# Patient Record
Sex: FEMALE | Race: WHITE | NOT HISPANIC OR LATINO | Employment: FULL TIME | URBAN - METROPOLITAN AREA
[De-identification: names, ages, dates, MRNs, and addresses within clinical notes are randomized per-mention and may not be internally consistent; named-entity substitution may affect disease eponyms.]

---

## 2017-01-05 PROBLEM — R16.1 SPLENOMEGALY: Status: ACTIVE | Noted: 2017-01-05

## 2017-02-03 PROBLEM — R91.8 MULTIPLE LUNG NODULES ON CT: Status: ACTIVE | Noted: 2017-02-03

## 2017-08-01 ENCOUNTER — OFFICE VISIT (OUTPATIENT)
Dept: HEMATOLOGY/ONCOLOGY | Facility: CLINIC | Age: 42
End: 2017-08-01
Payer: COMMERCIAL

## 2017-08-01 VITALS
WEIGHT: 293 LBS | HEART RATE: 118 BPM | BODY MASS INDEX: 45.99 KG/M2 | TEMPERATURE: 98 F | DIASTOLIC BLOOD PRESSURE: 79 MMHG | HEIGHT: 67 IN | SYSTOLIC BLOOD PRESSURE: 144 MMHG

## 2017-08-01 DIAGNOSIS — R93.89 ABNORMAL CT OF THE CHEST: ICD-10-CM

## 2017-08-01 DIAGNOSIS — Z94.84 HISTORY OF AUTO STEM CELL TRANSPLANT: ICD-10-CM

## 2017-08-01 DIAGNOSIS — D69.6 THROMBOCYTOPENIA: ICD-10-CM

## 2017-08-01 DIAGNOSIS — Z85.71 HISTORY OF HODGKIN'S DISEASE: Primary | ICD-10-CM

## 2017-08-01 PROCEDURE — 99999 PR PBB SHADOW E&M-EST. PATIENT-LVL II: CPT | Mod: PBBFAC,,, | Performed by: INTERNAL MEDICINE

## 2017-08-01 PROCEDURE — 99214 OFFICE O/P EST MOD 30 MIN: CPT | Mod: S$GLB,,, | Performed by: INTERNAL MEDICINE

## 2017-08-01 NOTE — PROGRESS NOTES
-refer to miko/baljinder  -continue fu with mariam  -suspect inflammatory vs infectious; interval change, appearance, and time from diagnosis dont fit for lymphoma relapse but not impossible

## 2017-08-01 NOTE — PROGRESS NOTES
SECTION OF HEMATOLOGY AND BONE MARROW TRANSPLANT  New Patient Visit   08/02/2017  Referred by:  Corinne Self  Referred for: abnormal CT chest     CHIEF COMPLAINT: No chief complaint on file.      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  Has remained in clinical remission since.  Followed regularly in oncology clinic by Dr. Mora.  Serial CT scans over the last 2 years have shown waxing an waning pulmonary nodules. She has undergone 3 nondiagnostic brochoscopies with biopsies.  followed by pulmonology at [facility] who recommended fu at [facility] for [facility] recs.  She is asymptomatic from lymphoma or respiratory perspective.  Denies fever, chills, nightsweats, bleeding, brusing, lymphadenopathy, signs/symptoms of splenomegaly.      PAST MEDICAL HISTORY:   Past Medical History:   Diagnosis Date    Biliary colic     Encounter for blood transfusion     Incisional hernia     Large cell (diffuse) non-Hodgkin's lymphoma     Stem cells transplant status        PAST SURGICAL HISTORY:   Past Surgical History:   Procedure Laterality Date    CHOLECYSTECTOMY      HERNIA REPAIR      incisional    lymphnode biopsy      PORTACATH PLACEMENT Left     TUBAL LIGATION      UMBILICAL HERNIA REPAIR         PAST SOCIAL HISTORY:   reports that she is a non-smoker but has been exposed to tobacco smoke. She has never used smokeless tobacco. She reports that she does not drink alcohol or use drugs.    FAMILY HISTORY:  Family History   Problem Relation Age of Onset    Cancer      Colon cancer      Anesthesia problems Neg Hx        CURRENT MEDICATIONS:   Current Outpatient Prescriptions   Medication Sig    fluticasone (FLONASE) 50 mcg/actuation nasal spray 1 spray by Each Nare route once daily.    loratadine (CLARITIN) 10 mg tablet Take 10 mg by mouth once daily.      No current facility-administered medications for this visit.      ALLERGIES:   Review of patient's allergies indicates:   Allergen Reactions    Tetanus vaccines and toxoid Rash             REVIEW OF SYSTEMS:   General ROS: negative  Psychological ROS: negative  Ophthalmic ROS: negative  ENT ROS: negative  Allergy and Immunology ROS: negative  Hematological and Lymphatic ROS: negative  Endocrine ROS: negative  Respiratory ROS: negative  Cardiovascular ROS: negative  Gastrointestinal ROS: negative  Genito-Urinary ROS: negative  Musculoskeletal ROS: negative  Neurological ROS: negative  Dermatological ROS: negative    PHYSICAL EXAM:   Vitals:    08/01/17 1538   BP: (!) 144/79   Pulse: (!) 118   Temp: 98.3 °F (36.8 °C)       General - well developed, well nourished, no apparent distress  Head & Face - no sinus tenderness  Eyes - normal conjunctivae and lids   ENT - normal external auditory canals and tympanic membranes bilaterally oropharynx clear,  Normal dentition and gums  Neck - normal thyroid  Chest and Lung - normal respiratory effort, clear to auscultation bilaterally   Cardiovascular - RRR with no MGR, normal S1 and S2; no pedal edema  Abdomen -  soft, nontender, no palpable hepatomegaly or splenomegaly  Lymph - no palpable lymphadenopathy  Extremities - unremarkable nails and digits  Heme - no bruising, petechiae, pallor  Skin - no rashes or lesions  Psych - appropriate mood and affect      ECOG Performance Status: (foot note - ECOG PS provided by Eastern Cooperative Oncology Group) 0 - Asymptomatic    Karnofsky Performance Score:  100%- Normal, No Complaints, No Evidence of Disease  DATA:   Lab Results   Component Value Date    WBC 5.25 07/13/2017    HGB 11.6 (L) 07/13/2017    HCT 36.7 (L) 07/13/2017    MCV 91 07/13/2017    PLT 79 (L) 07/13/2017     Gran #   Date Value Ref Range Status   07/13/2017 2.5 1.8 - 7.7 K/uL Final     Gran%   Date Value Ref Range Status   07/13/2017 47.6 38.0 - 73.0 % Final      CMP  Sodium   Date Value Ref Range Status   07/13/2017 137 136 - 145 mmol/L Final     Potassium   Date Value Ref Range Status   07/13/2017 3.7 3.5 - 5.1 mmol/L Final     Chloride   Date Value Ref Range Status   07/13/2017 103 95 - 110 mmol/L Final     CO2   Date Value Ref Range Status   07/13/2017 26 23 - 29 mmol/L Final     Glucose   Date Value Ref Range Status   07/13/2017 111 (H) 70 - 110 mg/dL Final     BUN, Bld   Date Value Ref Range Status   07/13/2017 16 6 - 20 mg/dL Final     Creatinine   Date Value Ref Range Status   07/13/2017 0.8 0.5 - 1.4 mg/dL Final     Calcium   Date Value Ref Range Status   07/13/2017 9.0 8.7 - 10.5 mg/dL Final     Total Protein   Date Value Ref Range Status   07/13/2017 6.3 6.0 - 8.4 g/dL Final     Albumin   Date Value Ref Range Status   07/13/2017 4.2 3.5 - 5.2 g/dL Final     Total Bilirubin   Date Value Ref Range Status   07/13/2017 0.7 0.1 - 1.0 mg/dL Final     Comment:     For infants and newborns, interpretation of results should be based  on gestational age, weight and in agreement with clinical  observations.  Premature Infant recommended reference ranges:  Up to 24 hours.............<8.0 mg/dL  Up to 48 hours............<12.0 mg/dL  3-5 days..................<15.0 mg/dL  6-29 days.................<15.0 mg/dL       Alkaline Phosphatase   Date Value Ref Range Status   07/13/2017 86 55 - 135 U/L Final     AST   Date Value Ref Range Status   07/13/2017 19 10 - 40 U/L Final     ALT   Date Value Ref Range Status   07/13/2017 38 10 - 44 U/L Final     Anion Gap   Date Value Ref Range Status   07/13/2017 8 8 - 16 mmol/L Final     eGFR if    Date Value Ref Range Status   07/13/2017 >60.0 >60 mL/min/1.73 m^2 Final     eGFR if non    Date Value Ref Range Status   07/13/2017 >60.0 >60 mL/min/1.73 m^2 Final     Comment:     Calculation used to obtain the estimated glomerular filtration  rate (eGFR) is the CKD-EPI equation. Since race is unknown   in our  information system, the eGFR values for   -American and Non--American patients are given   for each creatinine result.       LD   Date Value Ref Range Status   02/12/2015 154 110 - 260 U/L Final     Comment:     Results are increased in hemolyzed samples.       ASSESSMENT AND PLAN:   Encounter Diagnoses   Name Primary?    History of Hodgkin's disease Yes    History of auto stem cell transplant     Thrombocytopenia     Abnormal CT of the chest      -radiographic appearance, pace of radiographic changes, lesion locations, and lack of symptoms, multiple negative biopsies, and duration since her transplant make pulmonary relapse of her hodgkins or other primary cancer highly unlikely  -suspect these are inflammatory or infectious changes on CT chest  -will refer to thoracic comprehensive clinic for review and consideration of open biopsy   -continue to fu with Dr. Mora for regular oncologic care     Follow Up: as above  Kush Hunter MD  Hematology/Oncology/Bone Marrow Transplant

## 2017-08-08 ENCOUNTER — TELEPHONE (OUTPATIENT)
Dept: CARDIOTHORACIC SURGERY | Facility: CLINIC | Age: 42
End: 2017-08-08

## 2017-08-08 DIAGNOSIS — R91.8 PULMONARY NODULES: Primary | ICD-10-CM

## 2017-08-08 NOTE — TELEPHONE ENCOUNTER
"Received a referral from Dr. Hunter to Dr. Donaldson re: pulmonary nodules s/p bronch/bx.   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  She has remained in clinical remission since. Followed regularly in oncology clinic by Dr. Mora.  Serial CT scans over the last 2 years have shown waxing an waning pulmonary nodules. She has undergone 3 nondiagnostic brochoscopies with biopsies. Followed by pulmonology at [facility] who recommended follow at [facility] for further recs.    Most recent CT chest on 7/5/17 revealed "Increase in the size and number of the nodules in the lungs bilaterally with increasing consolidation in the left upper and lower lobes."  Last PFT performed on 2/3/17. Most recent bronch performed by Dr. Bradley on 7/18/17, pathology reveals "Findings are nondiagnostic for lymphoma".  Ordered/scheduled new PFT on 8/14 prior to Dr. Donaldson's appt. Spoke with the pt, she is agreeable to PFT's at 230p and Dr. Donaldson at 4p. Provided her with location information for both appts and mailed appt slips to confirmed mailing address.  "

## 2017-08-14 ENCOUNTER — HOSPITAL ENCOUNTER (OUTPATIENT)
Dept: PULMONOLOGY | Facility: CLINIC | Age: 42
Discharge: HOME | End: 2017-08-14
Payer: COMMERCIAL

## 2017-08-14 ENCOUNTER — OFFICE VISIT (OUTPATIENT)
Dept: CARDIOTHORACIC SURGERY | Facility: CLINIC | Age: 42
End: 2017-08-14
Payer: COMMERCIAL

## 2017-08-14 VITALS
HEART RATE: 89 BPM | SYSTOLIC BLOOD PRESSURE: 127 MMHG | OXYGEN SATURATION: 97 % | BODY MASS INDEX: 45.99 KG/M2 | HEIGHT: 67 IN | DIASTOLIC BLOOD PRESSURE: 79 MMHG | WEIGHT: 293 LBS

## 2017-08-14 DIAGNOSIS — R91.8 PULMONARY NODULES: ICD-10-CM

## 2017-08-14 DIAGNOSIS — R91.8 LUNG NODULES: Primary | ICD-10-CM

## 2017-08-14 LAB
POST FEV1 FVC: 0.83
POST FEV1: 2.46
POST FVC: 2.97
PRE FEV1 FVC: 82
PRE FEV1: 2.45
PRE FVC: 2.99
PREDICTED FEV1 FVC: 82
PREDICTED FEV1: 2.99
PREDICTED FVC: 3.64

## 2017-08-14 PROCEDURE — 94060 EVALUATION OF WHEEZING: CPT | Mod: S$GLB,,, | Performed by: INTERNAL MEDICINE

## 2017-08-14 PROCEDURE — 99999 PR PBB SHADOW E&M-EST. PATIENT-LVL III: CPT | Mod: PBBFAC,,, | Performed by: THORACIC SURGERY (CARDIOTHORACIC VASCULAR SURGERY)

## 2017-08-14 PROCEDURE — 99245 OFF/OP CONSLTJ NEW/EST HI 55: CPT | Mod: S$GLB,,, | Performed by: THORACIC SURGERY (CARDIOTHORACIC VASCULAR SURGERY)

## 2017-08-14 PROCEDURE — 94727 GAS DIL/WSHOT DETER LNG VOL: CPT | Mod: 59,S$GLB,, | Performed by: INTERNAL MEDICINE

## 2017-08-14 PROCEDURE — 94729 DIFFUSING CAPACITY: CPT | Mod: S$GLB,,, | Performed by: INTERNAL MEDICINE

## 2017-08-14 NOTE — LETTER
August 15, 2017      Angel Hunter MD  [address]  [facility] [address]           [facility] Thoracic Surgery  [address]  [facility] [address]  Phone: [phone]  Fax: [phone]          Patient: Hedy Conway   MR Number: [id number]   YOB: 1975   Date of Visit: 8/14/2017       Dear Dr. Angel Hunter:    Thank you for referring Hedy Conway to me for evaluation. Attached you will find relevant portions of my assessment and plan of care.    If you have questions, please do not hesitate to call me. I look forward to following Hedy Conway along with you.    Sincerely,    Michael Donaldson MD    Enclosure  CC:  No Recipients    If you would like to receive this communication electronically, please contact [email or web address] or [phone] to request more information on [employer] access.    For providers and/or their staff who would like to refer a patient to [facility], please contact us through our one-stop-shop provider referral line, [facility], at [phone].    If you feel you have received this communication in error or would no longer like to receive these types of communications, please e-mail [email or web address]

## 2017-08-14 NOTE — PROGRESS NOTES
History & Physical    SUBJECTIVE:     History of Present Illness:   42 y.o. female presents with PMH of diffuse large B cell NHL diagnosed in  2011, was treated but had relapse and received EPOCH, R-ICE and autologius HSCT in late 2011.  She has remained in clinical remission since. For the last two years, serial chest CT scans have showed waxing and waning pulmonary nodules. She has undergone multiple bronchs by Dr. Jc Aguilera. Most recently BAL, brush and transbronchial biopsy of lingula on 7/18/17 and once again pathology was nondiagnostic. Denies prior systemic steroid treatment. She remembers being given a course of antibiotics after one bronch for a pneumonia but also states she felt well during that time. Today she is feeling well. Denies fever, chills. SOB. CP, cough, N/V or changes in bowel and bladder functioning. PSH only significant for left port-a-cath placment, lap nicole and umbilical hernia repair. No past cardiac history.     Never smoker. Works for the [employer].     Chief Complaint   Patient presents with    Consult       Review of patient's allergies indicates:   Allergen Reactions    Tetanus vaccines and toxoid Rash       Current Outpatient Prescriptions   Medication Sig Dispense Refill    fluticasone (FLONASE) 50 mcg/actuation nasal spray 1 spray by Each Nare route once daily.      loratadine (CLARITIN) 10 mg tablet Take 10 mg by mouth once daily.       No current facility-administered medications for this visit.        Past Medical History:   Diagnosis Date    Biliary colic     Encounter for blood transfusion     Incisional hernia     Large cell (diffuse) non-Hodgkin's lymphoma     Stem cells transplant status      Past Surgical History:   Procedure Laterality Date    CHOLECYSTECTOMY      HERNIA REPAIR      incisional    lymphnode biopsy      PORTACATH PLACEMENT Left     TUBAL LIGATION      UMBILICAL HERNIA REPAIR       Family History   Problem Relation  "Age of Onset    Cancer      Colon cancer      Anesthesia problems Neg Hx      Social History   Substance Use Topics    Smoking status: Passive Smoke Exposure - Never Smoker    Smokeless tobacco: Never Used    Alcohol use No        Review of Systems:  Review of Systems   Constitutional: Negative for activity change, appetite change, diaphoresis, fatigue and fever.   HENT: Positive for congestion and sinus pressure. Negative for trouble swallowing and voice change.    Eyes: Negative.    Respiratory: Negative.    Cardiovascular: Negative.    Gastrointestinal: Negative.    Endocrine: Negative.    Genitourinary: Negative.    Musculoskeletal: Negative.    Skin: Negative.    Allergic/Immunologic: Negative.    Neurological: Negative.    Hematological: Negative.    Psychiatric/Behavioral: Negative.        OBJECTIVE:     Vital Signs (Most Recent)  Pulse: 89 (08/14/17 1627)  BP: 127/79 (08/14/17 1627)  SpO2: 97 % (08/14/17 1627)  5' 7" (1.702 m)  (!) 138.8 kg (306 lb)     Physical Exam:  Physical Exam   Constitutional: She is oriented to person, place, and time. She appears well-developed and well-nourished.   HENT:   Head: Normocephalic and atraumatic.   Eyes: Pupils are equal, round, and reactive to light.   Neck: Normal range of motion. Neck supple. No tracheal deviation present. No thyromegaly present.   Cardiovascular: Normal rate, regular rhythm, normal heart sounds and intact distal pulses.    Pulmonary/Chest: Effort normal and breath sounds normal. No respiratory distress. She has no wheezes. She exhibits no tenderness.   Abdominal: Soft. Bowel sounds are normal. She exhibits no distension. There is no tenderness.   Musculoskeletal: She exhibits edema.        Right ankle: She exhibits swelling.        Left ankle: She exhibits swelling.   Lymphadenopathy:     She has no cervical adenopathy.   Neurological: She is alert and oriented to person, place, and time.   Skin: Skin is warm and dry.   Psychiatric: She has a " normal mood and affect.   Vitals reviewed.    PFTS:   FEV1- 2.45L 82%  DLCO- 21.8 101%     Diagnostic Results:    2/3/17- 2D ECHO CONCLUSIONS     1 - Normal left ventricular systolic function (EF 55-60%).     2 - Normal right ventricular systolic function .     3 - Normal left ventricular diastolic function.     4 - Mild mitral regurgitation.     5 - The estimated PA systolic pressure is 36 mmHg.     6 - Mild tricuspid regurgitation.     7/5/17 Chest CT   A central venous Port-A-Cath is noted entering from the left subclavian region with the distal tip of the catheter in the region of the superior vena cava. There are no new abnormally enlarged lymph nodes in the mediastinal, hilar or axillary regions. There are no pleural or pericardial effusions. The visualized portion of the spleen is enlarged. There is increase in the size and number of noncalcified nodules in the lungs bilaterally since the prior examination. There is also increasing consolidation in the inferior lingular portion of the left upper lobe posteriorly and in the posterior lateral aspect of the left lower lobe. There are chronic fracture deformities of the left rib cage posteriorly and laterally.    ASSESSMENT/PLAN:     42 year old female with PMH of diffuse large cell b cell non-hodgkins lymphoma (treatment and stem cell transplant 2011) and bilateral pulmonary nodules presents to discuss wedge biopsy     PLAN:Plan    Will proceed with right VATS diagnostic wedge biopsy.   Appropriate patient education regarding the macho-operative period as well as intraperative details were discussed. Risks, including but not limited to, bleeding, infection, pain and anesthetic complication were discussed. Patient was given the opportunity to ask questions and to have those questions answered to their satisfaction. Patient verbalized understanding to both procedure and associated risks. Consent was obtained.  Will get Type and Screen and CBC the morning of surgery  to monitor thrombocytopenia. Platelets recently have been greater than 50.     ATTENDING ATTESTATION:    I evaluated the patient and I agree with the assessment and plan.  43 yo female with history of lymphoma, now with diffuse bilateral lung nodules.  BMT not concerned about recurrence, Pulm recommends diagnostic wedge resection given multiple negative bronchoscopies.  Plan for Right VATS diagnostic wedge.  Long discussion with patient and her  about the indication and rationale for diagnostic wedge resection, the details of the operation, as well as its risks (including, but not limited to, prolinged air leak, bleeding, arrhythmia, pneumonia or wound infection), benefits and potential outcomes.  We also discussed the alternative treatments and the risks of no treatment.  They were given the opportunity to ask questions and I answered all of their questions to their satisfaction.  They demonstrated understanding and appreciation of above info.  She has decided to proceed with Right VATS diagnostic wedge resection, possible thoracotomy on 8/25/2017.

## 2017-08-15 NOTE — PRE ADMISSION SCREENING
Anesthesia Assessment: Preoperative EQUATION    Planned Procedure: Procedure(s) (LRB):  THORACOSCOPY-VIDEO ASSISTED (VATS) W/WEDGE LUNG RESECTION (Right)  THORACOTOMY (Right)  Requested Anesthesia Type:General  Surgeon: Michael Donaldson MD  Service: Thoracic  Known or anticipated Date of Surgery:8/25/2017    Surgeon notes: reviewed and Lung nodules    Electronic QUestionnaire Assessment completed via nurse interview with patient.      NO AQ        Triage considerations:     The patient has no apparent active cardiac condition (No unstable coronary Syndrome such as severe unstable angina or recent [<1 month] myocardial infarction, decompensated CHF, severe valvular   disease or significant arrhythmia)  No past cardiac history.     Previous anesthesia records:10/31/90-Ykaziikczjqo-J[facility]-General-Method of Intubation: Direct laryngoscopy; Inserted by: CRNA; Airway Device: Endotracheal Tube-Hi/Lo; Mask Ventilation: Not Attempted; Intubated: Postinduction; Blade: Thomas #3; Airway Device Size: 9.0; Style: Cuffed; Cuff Inflation: Minimal occlusive pressure; Inflation Amount: 5; Placement Verified By: Auscultation, Capnometry; Grade: Grade I; Complicating Factors: None-Airway/Jaw/Neck:  Airway Findings: Mouth Opening: Normal Tongue: Normal  General Airway Assessment: Adult  Mallampati: III  Jaw/Neck Findings:No PONV -no apparent anesthetic complications and tolerated procedure well       Anesthesia Hx:  No problems with previous Anesthesia  History of prior surgery of interest to airway management or planning: Previous anesthesia: General Denies Family Hx of Anesthesia complications.   Denies Personal Hx of Anesthesia complications.     Past Surgical History:   Procedure Laterality Date    CHOLECYSTECTOMY        HERNIA REPAIR         incisional    lymphnode biopsy        PORTACATH PLACEMENT Left      TUBAL LIGATION        UMBILICAL HERNIA REPAIR           Last PCP note: 3-6 months ago , outside [facility] , focused  "visit   Subspecialty notes: Hematology/Oncology, Pulmonary    Other important co-morbidities:   Past Medical History:   Diagnosis Date    Biliary colic      Encounter for blood transfusion      Incisional hernia      Large cell (diffuse) non-Hodgkin's lymphoma      Stem cells transplant status           Tests already available:  Results have been reviewed.Labs-8/7/17-Sed Rate/CMP/CBC/ 7/31/17-PT-INR/CMC,DNA,Quant./CMP/CBC/ CXR-7/18/17/ EKG-10/31/16            Instructions given. (See in Nurse's note)    Optimization:      Medical Opinion Indicated-Reviewed with anesthesia-Dr. Richard regarding preop plan             Plan:    Testing:  T&S      Consultation:Patient's PCP for re-evaluation Appt. on 8/21/17 @ 8:30a & will fax "clearance" ASAP to preop ctr.     Patient  has previously scheduled Medical Appointment:None prior to surgery date.    Navigation: Tests Scheduled. Lab T&S (Sign & Held)-oredered             Consults scheduled.OS PCP-ZENY Pritchard appt. On 8/21/17 @ 8:30a & will fax "clearance" ASAP             Results will be tracked by Preop Clinic.                   Kimberly Salas RN  8/16/17             "

## 2017-08-16 ENCOUNTER — ANESTHESIA EVENT (OUTPATIENT)
Dept: SURGERY | Facility: HOSPITAL | Age: 42
DRG: 204 | End: 2017-08-16
Payer: COMMERCIAL

## 2017-08-16 DIAGNOSIS — Z01.818 PREOP TESTING: Primary | ICD-10-CM

## 2017-08-16 NOTE — ANESTHESIA PREPROCEDURE EVALUATION
Anesthesia Assessment: Preoperative EQUATION    Planned Procedure: Procedure(s) (LRB):  THORACOSCOPY-VIDEO ASSISTED (VATS) W/WEDGE LUNG RESECTION (Right)  THORACOTOMY (Right)  Requested Anesthesia Type:General  Surgeon: Michael Donaldson MD  Service: Thoracic  Known or anticipated Date of Surgery:8/25/2017    Surgeon notes: reviewed and Lung nodules    Electronic QUestionnaire Assessment completed via nurse interview with patient.      NO AQ        Triage considerations:     The patient has no apparent active cardiac condition (No unstable coronary Syndrome such as severe unstable angina or recent [<1 month] myocardial infarction, decompensated CHF, severe valvular   disease or significant arrhythmia)  No past cardiac history.     Previous anesthesia records:10/31/08-Tzmpovdmtdvo-JLCO-General-Method of Intubation: Direct laryngoscopy; Inserted by: CRNA; Airway Device: Endotracheal Tube-Hi/Lo; Mask Ventilation: Not Attempted; Intubated: Postinduction; Blade: Thomas #3; Airway Device Size: 9.0; Style: Cuffed; Cuff Inflation: Minimal occlusive pressure; Inflation Amount: 5; Placement Verified By: Auscultation, Capnometry; Grade: Grade I; Complicating Factors: None-Airway/Jaw/Neck:  Airway Findings: Mouth Opening: Normal Tongue: Normal  General Airway Assessment: Adult  Mallampati: III  Jaw/Neck Findings:No PONV -no apparent anesthetic complications and tolerated procedure well       Anesthesia Hx:  No problems with previous Anesthesia  History of prior surgery of interest to airway management or planning: Previous anesthesia: General Denies Family Hx of Anesthesia complications.   Denies Personal Hx of Anesthesia complications.     Past Surgical History:   Procedure Laterality Date    CHOLECYSTECTOMY        HERNIA REPAIR         incisional    lymphnode biopsy        PORTACATH PLACEMENT Left      TUBAL LIGATION        UMBILICAL HERNIA REPAIR           Last PCP note: 3-6 months ago , outside [facility] ,  "focused visit   Subspecialty notes: Hematology/Oncology, Pulmonary    Other important co-morbidities:   Past Medical History:   Diagnosis Date    Biliary colic      Encounter for blood transfusion      Incisional hernia      Large cell (diffuse) non-Hodgkin's lymphoma      Stem cells transplant status           Tests already available:  Results have been reviewed.Labs-8/7/17-Sed Rate/CMP/CBC/ 7/31/17-PT-INR/CMC,DNA,Quant./CMP/CBC/ CXR-7/18/17/ EKG-10/31/16            Instructions given. (See in Nurse's note)    Optimization:      Medical Opinion Indicated-Reviewed with anesthesia-Dr. Richard regarding preop plan             Plan:    Testing:  T&S      Consultation:Patient's PCP for re-evaluation Appt. on 8/21/17 @ 8:30a & will fax "clearance" ASAP to preop ctr.     Patient  has previously scheduled Medical Appointment:None prior to surgery date.    Navigation: Tests Scheduled. Lab T&S (Sign & Held)-oredered             Consults scheduled.OS PCP-John Lantigua PS-C appt. On 8/21/17 @ 8:30a & will fax "clearance" ASAP             Results will be tracked by Preop Clinic.                   Kimberly Salas RN  8/16/17  Addendum: Received OS "Clearance" ,recent lab results & Ekg from patients' PA-John Lantigua on 8/22/17, & sent to be scanned into Media tab. Kimberly Salas RN  8/22/17      Addendum: Reviewed platelet count from recent lab tests with anesthesia-.(Type & Screen ordered as Sign & Held) for a.m. of surgery) .  Kimberly Salas RN  8/24/17 08/16/2017  Hedy Conway is a 42 y.o., female with non-hodgkins lymphoma here s/p chemo here for R VATS wedge resection.    Anesthesia Evaluation    I have reviewed the Patient Summary Reports.     I have reviewed the Medications.     Review of Systems  Anesthesia Hx:  No problems with previous Anesthesia  Denies Family Hx of Anesthesia complications.   " Denies Personal Hx of Anesthesia complications.   Social:  No Alcohol Use, Non-Smoker Passive smoker- smokes   Hematology/Oncology:         -- Anemia: Hematology Comments: Hx of blood transfusions-did well  --  Cancer in past history:  chemotherapy and surgery  Oncology Comments: Non-Hodgkin's lymphoma     EENT/Dental:   Wears glasses   Cardiovascular:   Exercise tolerance: good    Pulmonary:   Pneumonia Lung nodules/ hx of pneumonia-treated and resolved   Hepatic/GI:   Umbilical hernia hx/splenomegaly hx       Physical Exam  General:  Well nourished, Morbid Obesity    Airway/Jaw/Neck:  Airway Findings: Mouth Opening: Small, but > 3cm Tongue: Normal  General Airway Assessment: Adult  Mallampati: III  Improves to III with phonation.  Jaw/Neck Findings:  Neck ROM: Normal ROM      Dental:  Dental Findings: In tact   Chest/Lungs:  Chest/Lungs Findings: Clear to auscultation, Normal Respiratory Rate     Heart/Vascular:  Heart Findings: Rate: Normal  Rhythm: Regular Rhythm  Sounds: Normal        Mental Status:  Mental Status Findings:  Cooperative, Alert and Oriented         Anesthesia Plan  Type of Anesthesia, risks & benefits discussed:  Anesthesia Type:  general  Patient's Preference:   Intra-op Monitoring Plan: arterial line  Intra-op Monitoring Plan Comments:   Post Op Pain Control Plan: multimodal analgesia  Post Op Pain Control Plan Comments:   Induction:   IV  Beta Blocker:  Patient is not currently on a Beta-Blocker (No further documentation required).       Informed Consent: Patient understands risks and agrees with Anesthesia plan.  Questions answered. Anesthesia consent signed with patient.  ASA Score: 3     Day of Surgery Review of History & Physical:    H&P update referred to the surgeon.         Ready For Surgery From Anesthesia Perspective.   Kimberly Salas RN  8/16/17

## 2017-08-24 ENCOUNTER — TELEPHONE (OUTPATIENT)
Dept: CARDIOTHORACIC SURGERY | Facility: CLINIC | Age: 42
End: 2017-08-24

## 2017-08-25 ENCOUNTER — ANESTHESIA (OUTPATIENT)
Dept: SURGERY | Facility: HOSPITAL | Age: 42
DRG: 204 | End: 2017-08-25
Payer: COMMERCIAL

## 2017-08-25 ENCOUNTER — SURGERY (OUTPATIENT)
Age: 42
End: 2017-08-25

## 2017-08-25 ENCOUNTER — HOSPITAL ENCOUNTER (INPATIENT)
Facility: HOSPITAL | Age: 42
LOS: 2 days | Discharge: HOME | DRG: 204 | End: 2017-08-27
Attending: THORACIC SURGERY (CARDIOTHORACIC VASCULAR SURGERY) | Admitting: THORACIC SURGERY (CARDIOTHORACIC VASCULAR SURGERY)
Payer: COMMERCIAL

## 2017-08-25 DIAGNOSIS — R91.8 PULMONARY NODULES: Primary | ICD-10-CM

## 2017-08-25 DIAGNOSIS — R91.8 LUNG NODULES: Primary | ICD-10-CM

## 2017-08-25 LAB
ABO + RH BLD: NORMAL
ANION GAP SERPL CALC-SCNC: 8 MMOL/L
BASOPHILS # BLD AUTO: 0.01 K/UL
BASOPHILS # BLD AUTO: 0.01 K/UL
BASOPHILS NFR BLD: 0.2 %
BASOPHILS NFR BLD: 0.2 %
BLD GP AB SCN CELLS X3 SERPL QL: NORMAL
BUN SERPL-MCNC: 13 MG/DL
CALCIUM SERPL-MCNC: 8.3 MG/DL
CHLORIDE SERPL-SCNC: 109 MMOL/L
CO2 SERPL-SCNC: 22 MMOL/L
CREAT SERPL-MCNC: 0.8 MG/DL
DIFFERENTIAL METHOD: ABNORMAL
DIFFERENTIAL METHOD: ABNORMAL
EOSINOPHIL # BLD AUTO: 0 K/UL
EOSINOPHIL # BLD AUTO: 0.1 K/UL
EOSINOPHIL NFR BLD: 0.6 %
EOSINOPHIL NFR BLD: 1.7 %
ERYTHROCYTE [DISTWIDTH] IN BLOOD BY AUTOMATED COUNT: 15.1 %
ERYTHROCYTE [DISTWIDTH] IN BLOOD BY AUTOMATED COUNT: 15.2 %
EST. GFR  (AFRICAN AMERICAN): >60 ML/MIN/1.73 M^2
EST. GFR  (NON AFRICAN AMERICAN): >60 ML/MIN/1.73 M^2
GLUCOSE SERPL-MCNC: 154 MG/DL
HCT VFR BLD AUTO: 35.4 %
HCT VFR BLD AUTO: 35.7 %
HGB BLD-MCNC: 11.6 G/DL
HGB BLD-MCNC: 11.7 G/DL
LYMPHOCYTES # BLD AUTO: 1.6 K/UL
LYMPHOCYTES # BLD AUTO: 2 K/UL
LYMPHOCYTES NFR BLD: 24.3 %
LYMPHOCYTES NFR BLD: 42.4 %
MAGNESIUM SERPL-MCNC: 2 MG/DL
MCH RBC QN AUTO: 28.4 PG
MCH RBC QN AUTO: 28.6 PG
MCHC RBC AUTO-ENTMCNC: 32.8 G/DL
MCHC RBC AUTO-ENTMCNC: 32.8 G/DL
MCV RBC AUTO: 87 FL
MCV RBC AUTO: 87 FL
MONOCYTES # BLD AUTO: 0.3 K/UL
MONOCYTES # BLD AUTO: 0.5 K/UL
MONOCYTES NFR BLD: 3.8 %
MONOCYTES NFR BLD: 9.7 %
NEUTROPHILS # BLD AUTO: 2.2 K/UL
NEUTROPHILS # BLD AUTO: 4.7 K/UL
NEUTROPHILS NFR BLD: 45.6 %
NEUTROPHILS NFR BLD: 70.3 %
PHOSPHATE SERPL-MCNC: 4.1 MG/DL
PLATELET # BLD AUTO: 76 K/UL
PLATELET # BLD AUTO: 77 K/UL
PMV BLD AUTO: 10.9 FL
PMV BLD AUTO: 12.1 FL
POTASSIUM SERPL-SCNC: 4.3 MMOL/L
RBC # BLD AUTO: 4.09 M/UL
RBC # BLD AUTO: 4.09 M/UL
SODIUM SERPL-SCNC: 139 MMOL/L
WBC # BLD AUTO: 4.76 K/UL
WBC # BLD AUTO: 6.66 K/UL

## 2017-08-25 PROCEDURE — 88184 FLOWCYTOMETRY/ TC 1 MARKER: CPT | Performed by: PATHOLOGY

## 2017-08-25 PROCEDURE — 36000711: Performed by: THORACIC SURGERY (CARDIOTHORACIC VASCULAR SURGERY)

## 2017-08-25 PROCEDURE — S0028 INJECTION, FAMOTIDINE, 20 MG: HCPCS | Performed by: THORACIC SURGERY (CARDIOTHORACIC VASCULAR SURGERY)

## 2017-08-25 PROCEDURE — 25000003 PHARM REV CODE 250: Performed by: THORACIC SURGERY (CARDIOTHORACIC VASCULAR SURGERY)

## 2017-08-25 PROCEDURE — 63600175 PHARM REV CODE 636 W HCPCS: Performed by: REGISTERED NURSE

## 2017-08-25 PROCEDURE — D9220A PRA ANESTHESIA: Mod: CRNA,,, | Performed by: REGISTERED NURSE

## 2017-08-25 PROCEDURE — C9290 INJ, BUPIVACAINE LIPOSOME: HCPCS | Performed by: THORACIC SURGERY (CARDIOTHORACIC VASCULAR SURGERY)

## 2017-08-25 PROCEDURE — 88307 TISSUE EXAM BY PATHOLOGIST: CPT | Mod: 26,,, | Performed by: PATHOLOGY

## 2017-08-25 PROCEDURE — 88189 FLOWCYTOMETRY/READ 16 & >: CPT | Mod: ,,, | Performed by: PATHOLOGY

## 2017-08-25 PROCEDURE — 0BB54ZX EXCISION OF RIGHT MIDDLE LOBE BRONCHUS, PERCUTANEOUS ENDOSCOPIC APPROACH, DIAGNOSTIC: ICD-10-PCS | Performed by: THORACIC SURGERY (CARDIOTHORACIC VASCULAR SURGERY)

## 2017-08-25 PROCEDURE — 36620 INSERTION CATHETER ARTERY: CPT | Mod: 59,,, | Performed by: ANESTHESIOLOGY

## 2017-08-25 PROCEDURE — 88365 INSITU HYBRIDIZATION (FISH): CPT | Performed by: PATHOLOGY

## 2017-08-25 PROCEDURE — 94799 UNLISTED PULMONARY SVC/PX: CPT

## 2017-08-25 PROCEDURE — 25000242 PHARM REV CODE 250 ALT 637 W/ HCPCS: Performed by: REGISTERED NURSE

## 2017-08-25 PROCEDURE — 86850 RBC ANTIBODY SCREEN: CPT

## 2017-08-25 PROCEDURE — 25000003 PHARM REV CODE 250: Performed by: REGISTERED NURSE

## 2017-08-25 PROCEDURE — 88365 INSITU HYBRIDIZATION (FISH): CPT | Mod: 26,,, | Performed by: PATHOLOGY

## 2017-08-25 PROCEDURE — 85025 COMPLETE CBC W/AUTO DIFF WBC: CPT | Mod: 91

## 2017-08-25 PROCEDURE — 94760 N-INVAS EAR/PLS OXIMETRY 1: CPT

## 2017-08-25 PROCEDURE — 80048 BASIC METABOLIC PNL TOTAL CA: CPT

## 2017-08-25 PROCEDURE — 25000242 PHARM REV CODE 250 ALT 637 W/ HCPCS: Performed by: THORACIC SURGERY (CARDIOTHORACIC VASCULAR SURGERY)

## 2017-08-25 PROCEDURE — 32607 THORACOSCOPY W/BX INFILTRATE: CPT | Mod: ,,, | Performed by: THORACIC SURGERY (CARDIOTHORACIC VASCULAR SURGERY)

## 2017-08-25 PROCEDURE — 83735 ASSAY OF MAGNESIUM: CPT

## 2017-08-25 PROCEDURE — D9220A PRA ANESTHESIA: Mod: ANES,,, | Performed by: ANESTHESIOLOGY

## 2017-08-25 PROCEDURE — 86850 RBC ANTIBODY SCREEN: CPT | Mod: 91

## 2017-08-25 PROCEDURE — 88307 TISSUE EXAM BY PATHOLOGIST: CPT | Performed by: PATHOLOGY

## 2017-08-25 PROCEDURE — 84100 ASSAY OF PHOSPHORUS: CPT

## 2017-08-25 PROCEDURE — 88312 SPECIAL STAINS GROUP 1: CPT | Mod: 26,,, | Performed by: PATHOLOGY

## 2017-08-25 PROCEDURE — 71000039 HC RECOVERY, EACH ADD'L HOUR: Performed by: THORACIC SURGERY (CARDIOTHORACIC VASCULAR SURGERY)

## 2017-08-25 PROCEDURE — 27000221 HC OXYGEN, UP TO 24 HOURS

## 2017-08-25 PROCEDURE — 88342 IMHCHEM/IMCYTCHM 1ST ANTB: CPT | Mod: 26,,, | Performed by: PATHOLOGY

## 2017-08-25 PROCEDURE — 36000710: Performed by: THORACIC SURGERY (CARDIOTHORACIC VASCULAR SURGERY)

## 2017-08-25 PROCEDURE — 63600175 PHARM REV CODE 636 W HCPCS: Performed by: STUDENT IN AN ORGANIZED HEALTH CARE EDUCATION/TRAINING PROGRAM

## 2017-08-25 PROCEDURE — 63600175 PHARM REV CODE 636 W HCPCS: Performed by: THORACIC SURGERY (CARDIOTHORACIC VASCULAR SURGERY)

## 2017-08-25 PROCEDURE — 27201423 OPTIME MED/SURG SUP & DEVICES STERILE SUPPLY: Performed by: THORACIC SURGERY (CARDIOTHORACIC VASCULAR SURGERY)

## 2017-08-25 PROCEDURE — 20600001 HC STEP DOWN PRIVATE ROOM

## 2017-08-25 PROCEDURE — 88341 IMHCHEM/IMCYTCHM EA ADD ANTB: CPT | Mod: 26,,, | Performed by: PATHOLOGY

## 2017-08-25 PROCEDURE — 86900 BLOOD TYPING SEROLOGIC ABO: CPT

## 2017-08-25 PROCEDURE — 94640 AIRWAY INHALATION TREATMENT: CPT

## 2017-08-25 PROCEDURE — 63600175 PHARM REV CODE 636 W HCPCS: Performed by: PHYSICIAN ASSISTANT

## 2017-08-25 PROCEDURE — 37000008 HC ANESTHESIA 1ST 15 MINUTES: Performed by: THORACIC SURGERY (CARDIOTHORACIC VASCULAR SURGERY)

## 2017-08-25 PROCEDURE — C1729 CATH, DRAINAGE: HCPCS | Performed by: THORACIC SURGERY (CARDIOTHORACIC VASCULAR SURGERY)

## 2017-08-25 PROCEDURE — 86900 BLOOD TYPING SEROLOGIC ABO: CPT | Mod: 91

## 2017-08-25 PROCEDURE — 37000009 HC ANESTHESIA EA ADD 15 MINS: Performed by: THORACIC SURGERY (CARDIOTHORACIC VASCULAR SURGERY)

## 2017-08-25 PROCEDURE — 71000033 HC RECOVERY, INTIAL HOUR: Performed by: THORACIC SURGERY (CARDIOTHORACIC VASCULAR SURGERY)

## 2017-08-25 PROCEDURE — 88185 FLOWCYTOMETRY/TC ADD-ON: CPT | Performed by: PATHOLOGY

## 2017-08-25 RX ORDER — ONDANSETRON 2 MG/ML
4 INJECTION INTRAMUSCULAR; INTRAVENOUS EVERY 6 HOURS PRN
Status: DISCONTINUED | OUTPATIENT
Start: 2017-08-25 | End: 2017-08-27 | Stop reason: HOSPADM

## 2017-08-25 RX ORDER — METOCLOPRAMIDE HYDROCHLORIDE 5 MG/ML
5 INJECTION INTRAMUSCULAR; INTRAVENOUS EVERY 6 HOURS PRN
Status: DISCONTINUED | OUTPATIENT
Start: 2017-08-25 | End: 2017-08-27 | Stop reason: HOSPADM

## 2017-08-25 RX ORDER — BISACODYL 10 MG
10 SUPPOSITORY, RECTAL RECTAL DAILY PRN
Status: DISCONTINUED | OUTPATIENT
Start: 2017-08-25 | End: 2017-08-27 | Stop reason: HOSPADM

## 2017-08-25 RX ORDER — MIDAZOLAM HYDROCHLORIDE 1 MG/ML
INJECTION, SOLUTION INTRAMUSCULAR; INTRAVENOUS
Status: DISCONTINUED | OUTPATIENT
Start: 2017-08-25 | End: 2017-08-25

## 2017-08-25 RX ORDER — FENTANYL CITRATE 50 UG/ML
INJECTION, SOLUTION INTRAMUSCULAR; INTRAVENOUS
Status: DISCONTINUED | OUTPATIENT
Start: 2017-08-25 | End: 2017-08-25

## 2017-08-25 RX ORDER — HYDROMORPHONE HYDROCHLORIDE 2 MG/ML
INJECTION, SOLUTION INTRAMUSCULAR; INTRAVENOUS; SUBCUTANEOUS
Status: DISCONTINUED | OUTPATIENT
Start: 2017-08-25 | End: 2017-08-25

## 2017-08-25 RX ORDER — SODIUM CHLORIDE 9 MG/ML
INJECTION, SOLUTION INTRAVENOUS CONTINUOUS
Status: DISCONTINUED | OUTPATIENT
Start: 2017-08-25 | End: 2017-08-25

## 2017-08-25 RX ORDER — GLYCOPYRROLATE 0.2 MG/ML
INJECTION INTRAMUSCULAR; INTRAVENOUS
Status: DISCONTINUED | OUTPATIENT
Start: 2017-08-25 | End: 2017-08-25

## 2017-08-25 RX ORDER — NEOSTIGMINE METHYLSULFATE 1 MG/ML
INJECTION, SOLUTION INTRAVENOUS
Status: DISCONTINUED | OUTPATIENT
Start: 2017-08-25 | End: 2017-08-25

## 2017-08-25 RX ORDER — ROCURONIUM BROMIDE 10 MG/ML
INJECTION, SOLUTION INTRAVENOUS
Status: DISCONTINUED | OUTPATIENT
Start: 2017-08-25 | End: 2017-08-25

## 2017-08-25 RX ORDER — KETAMINE HYDROCHLORIDE 100 MG/ML
INJECTION, SOLUTION INTRAMUSCULAR; INTRAVENOUS
Status: DISCONTINUED | OUTPATIENT
Start: 2017-08-25 | End: 2017-08-25

## 2017-08-25 RX ORDER — SODIUM CHLORIDE 0.9 % (FLUSH) 0.9 %
3 SYRINGE (ML) INJECTION
Status: DISCONTINUED | OUTPATIENT
Start: 2017-08-25 | End: 2017-08-25 | Stop reason: HOSPADM

## 2017-08-25 RX ORDER — LIDOCAINE HYDROCHLORIDE 10 MG/ML
1 INJECTION, SOLUTION EPIDURAL; INFILTRATION; INTRACAUDAL; PERINEURAL ONCE
Status: COMPLETED | OUTPATIENT
Start: 2017-08-25 | End: 2017-08-25

## 2017-08-25 RX ORDER — ACETAMINOPHEN 10 MG/ML
INJECTION, SOLUTION INTRAVENOUS
Status: DISCONTINUED | OUTPATIENT
Start: 2017-08-25 | End: 2017-08-25

## 2017-08-25 RX ORDER — ALBUTEROL SULFATE 90 UG/1
AEROSOL, METERED RESPIRATORY (INHALATION)
Status: DISCONTINUED | OUTPATIENT
Start: 2017-08-25 | End: 2017-08-25

## 2017-08-25 RX ORDER — HYDROMORPHONE HYDROCHLORIDE 1 MG/ML
0.5 INJECTION, SOLUTION INTRAMUSCULAR; INTRAVENOUS; SUBCUTANEOUS EVERY 6 HOURS PRN
Status: DISCONTINUED | OUTPATIENT
Start: 2017-08-25 | End: 2017-08-27 | Stop reason: HOSPADM

## 2017-08-25 RX ORDER — DEXAMETHASONE SODIUM PHOSPHATE 4 MG/ML
INJECTION, SOLUTION INTRA-ARTICULAR; INTRALESIONAL; INTRAMUSCULAR; INTRAVENOUS; SOFT TISSUE
Status: DISCONTINUED | OUTPATIENT
Start: 2017-08-25 | End: 2017-08-25

## 2017-08-25 RX ORDER — ACETAMINOPHEN 325 MG/1
650 TABLET ORAL EVERY 4 HOURS PRN
Status: DISCONTINUED | OUTPATIENT
Start: 2017-08-25 | End: 2017-08-27 | Stop reason: HOSPADM

## 2017-08-25 RX ORDER — FAMOTIDINE 10 MG/ML
20 INJECTION INTRAVENOUS EVERY 12 HOURS
Status: DISCONTINUED | OUTPATIENT
Start: 2017-08-25 | End: 2017-08-27 | Stop reason: HOSPADM

## 2017-08-25 RX ORDER — ONDANSETRON 2 MG/ML
4 INJECTION INTRAMUSCULAR; INTRAVENOUS DAILY PRN
Status: DISCONTINUED | OUTPATIENT
Start: 2017-08-25 | End: 2017-08-25 | Stop reason: HOSPADM

## 2017-08-25 RX ORDER — CEFAZOLIN SODIUM 2 G/50ML
2 SOLUTION INTRAVENOUS
Status: COMPLETED | OUTPATIENT
Start: 2017-08-25 | End: 2017-08-26

## 2017-08-25 RX ORDER — POLYETHYLENE GLYCOL 3350 17 G/17G
17 POWDER, FOR SOLUTION ORAL DAILY
Status: DISCONTINUED | OUTPATIENT
Start: 2017-08-25 | End: 2017-08-27 | Stop reason: HOSPADM

## 2017-08-25 RX ORDER — LACTULOSE 10 G/15ML
20 SOLUTION ORAL EVERY 6 HOURS PRN
Status: DISCONTINUED | OUTPATIENT
Start: 2017-08-25 | End: 2017-08-27 | Stop reason: HOSPADM

## 2017-08-25 RX ORDER — HYDROCODONE BITARTRATE AND ACETAMINOPHEN 10; 325 MG/1; MG/1
1 TABLET ORAL EVERY 4 HOURS PRN
Status: DISCONTINUED | OUTPATIENT
Start: 2017-08-25 | End: 2017-08-27 | Stop reason: HOSPADM

## 2017-08-25 RX ORDER — IPRATROPIUM BROMIDE AND ALBUTEROL SULFATE 2.5; .5 MG/3ML; MG/3ML
3 SOLUTION RESPIRATORY (INHALATION) EVERY 6 HOURS
Status: DISCONTINUED | OUTPATIENT
Start: 2017-08-25 | End: 2017-08-27 | Stop reason: HOSPADM

## 2017-08-25 RX ORDER — HYDROMORPHONE HYDROCHLORIDE 1 MG/ML
0.2 INJECTION, SOLUTION INTRAMUSCULAR; INTRAVENOUS; SUBCUTANEOUS EVERY 5 MIN PRN
Status: DISCONTINUED | OUTPATIENT
Start: 2017-08-25 | End: 2017-08-25 | Stop reason: HOSPADM

## 2017-08-25 RX ORDER — ONDANSETRON 2 MG/ML
INJECTION INTRAMUSCULAR; INTRAVENOUS
Status: DISCONTINUED | OUTPATIENT
Start: 2017-08-25 | End: 2017-08-25

## 2017-08-25 RX ORDER — PROPOFOL 10 MG/ML
VIAL (ML) INTRAVENOUS
Status: DISCONTINUED | OUTPATIENT
Start: 2017-08-25 | End: 2017-08-25

## 2017-08-25 RX ORDER — LIDOCAINE HCL/PF 100 MG/5ML
SYRINGE (ML) INTRAVENOUS
Status: DISCONTINUED | OUTPATIENT
Start: 2017-08-25 | End: 2017-08-25

## 2017-08-25 RX ORDER — FLUTICASONE PROPIONATE 50 MCG
1 SPRAY, SUSPENSION (ML) NASAL DAILY
Status: DISCONTINUED | OUTPATIENT
Start: 2017-08-25 | End: 2017-08-27 | Stop reason: HOSPADM

## 2017-08-25 RX ORDER — HYDROCODONE BITARTRATE AND ACETAMINOPHEN 5; 325 MG/1; MG/1
1 TABLET ORAL EVERY 4 HOURS PRN
Status: DISCONTINUED | OUTPATIENT
Start: 2017-08-25 | End: 2017-08-27 | Stop reason: HOSPADM

## 2017-08-25 RX ADMIN — HYDROMORPHONE HYDROCHLORIDE 0.5 MG: 2 INJECTION, SOLUTION INTRAMUSCULAR; INTRAVENOUS; SUBCUTANEOUS at 11:08

## 2017-08-25 RX ADMIN — ACETAMINOPHEN 1000 MG: 10 INJECTION, SOLUTION INTRAVENOUS at 11:08

## 2017-08-25 RX ADMIN — CEFAZOLIN SODIUM 2 G: 2 SOLUTION INTRAVENOUS at 04:08

## 2017-08-25 RX ADMIN — BUPIVACAINE 266 MG: 13.3 INJECTION, SUSPENSION, LIPOSOMAL INFILTRATION at 11:08

## 2017-08-25 RX ADMIN — DEXAMETHASONE SODIUM PHOSPHATE 4 MG: 4 INJECTION, SOLUTION INTRAMUSCULAR; INTRAVENOUS at 09:08

## 2017-08-25 RX ADMIN — CEFAZOLIN SODIUM 2 G: 2 SOLUTION INTRAVENOUS at 10:08

## 2017-08-25 RX ADMIN — FENTANYL CITRATE 100 MCG: 50 INJECTION, SOLUTION INTRAMUSCULAR; INTRAVENOUS at 09:08

## 2017-08-25 RX ADMIN — PROPOFOL 40 MG: 10 INJECTION, EMULSION INTRAVENOUS at 09:08

## 2017-08-25 RX ADMIN — ROCURONIUM BROMIDE 50 MG: 10 INJECTION, SOLUTION INTRAVENOUS at 09:08

## 2017-08-25 RX ADMIN — HYDROCODONE BITARTRATE AND ACETAMINOPHEN 1 TABLET: 10; 325 TABLET ORAL at 09:08

## 2017-08-25 RX ADMIN — ALBUTEROL SULFATE 1 PUFF: 90 AEROSOL, METERED RESPIRATORY (INHALATION) at 09:08

## 2017-08-25 RX ADMIN — MIDAZOLAM HYDROCHLORIDE 2 MG: 1 INJECTION, SOLUTION INTRAMUSCULAR; INTRAVENOUS at 09:08

## 2017-08-25 RX ADMIN — LIDOCAINE HYDROCHLORIDE 100 MG: 20 INJECTION, SOLUTION INTRAVENOUS at 09:08

## 2017-08-25 RX ADMIN — GLYCOPYRROLATE 0.6 MG: 0.2 INJECTION, SOLUTION INTRAMUSCULAR; INTRAVENOUS at 11:08

## 2017-08-25 RX ADMIN — FENTANYL CITRATE 50 MCG: 50 INJECTION, SOLUTION INTRAMUSCULAR; INTRAVENOUS at 10:08

## 2017-08-25 RX ADMIN — HYDROMORPHONE HYDROCHLORIDE 0.2 MG: 1 INJECTION, SOLUTION INTRAMUSCULAR; INTRAVENOUS; SUBCUTANEOUS at 01:08

## 2017-08-25 RX ADMIN — KETAMINE HYDROCHLORIDE 20 MG: 100 INJECTION, SOLUTION, CONCENTRATE INTRAMUSCULAR; INTRAVENOUS at 10:08

## 2017-08-25 RX ADMIN — FAMOTIDINE 20 MG: 10 INJECTION, SOLUTION INTRAVENOUS at 09:08

## 2017-08-25 RX ADMIN — ONDANSETRON 4 MG: 2 INJECTION INTRAMUSCULAR; INTRAVENOUS at 11:08

## 2017-08-25 RX ADMIN — ROCURONIUM BROMIDE 50 MG: 10 INJECTION, SOLUTION INTRAVENOUS at 10:08

## 2017-08-25 RX ADMIN — MIDAZOLAM HYDROCHLORIDE 1 MG: 1 INJECTION, SOLUTION INTRAMUSCULAR; INTRAVENOUS at 09:08

## 2017-08-25 RX ADMIN — HYDROCODONE BITARTRATE AND ACETAMINOPHEN 1 TABLET: 10; 325 TABLET ORAL at 05:08

## 2017-08-25 RX ADMIN — POLYETHYLENE GLYCOL 3350 17 G: 17 POWDER, FOR SOLUTION ORAL at 01:08

## 2017-08-25 RX ADMIN — IPRATROPIUM BROMIDE AND ALBUTEROL SULFATE 3 ML: .5; 3 SOLUTION RESPIRATORY (INHALATION) at 08:08

## 2017-08-25 RX ADMIN — MIDAZOLAM HYDROCHLORIDE 1 MG: 1 INJECTION, SOLUTION INTRAMUSCULAR; INTRAVENOUS at 10:08

## 2017-08-25 RX ADMIN — PROPOFOL 200 MG: 10 INJECTION, EMULSION INTRAVENOUS at 09:08

## 2017-08-25 RX ADMIN — SODIUM CHLORIDE: 0.9 INJECTION, SOLUTION INTRAVENOUS at 08:08

## 2017-08-25 RX ADMIN — PROPOFOL 50 MG: 10 INJECTION, EMULSION INTRAVENOUS at 10:08

## 2017-08-25 RX ADMIN — HYDROCODONE BITARTRATE AND ACETAMINOPHEN 1 TABLET: 10; 325 TABLET ORAL at 01:08

## 2017-08-25 RX ADMIN — SODIUM CHLORIDE, SODIUM GLUCONATE, SODIUM ACETATE, POTASSIUM CHLORIDE, MAGNESIUM CHLORIDE, SODIUM PHOSPHATE, DIBASIC, AND POTASSIUM PHOSPHATE: .53; .5; .37; .037; .03; .012; .00082 INJECTION, SOLUTION INTRAVENOUS at 10:08

## 2017-08-25 RX ADMIN — IPRATROPIUM BROMIDE AND ALBUTEROL SULFATE 3 ML: .5; 3 SOLUTION RESPIRATORY (INHALATION) at 02:08

## 2017-08-25 RX ADMIN — HYDROMORPHONE HYDROCHLORIDE 0.5 MG: 2 INJECTION, SOLUTION INTRAMUSCULAR; INTRAVENOUS; SUBCUTANEOUS at 10:08

## 2017-08-25 RX ADMIN — Medication 2 G: at 10:08

## 2017-08-25 RX ADMIN — NEOSTIGMINE METHYLSULFATE 4 MG: 1 INJECTION INTRAVENOUS at 11:08

## 2017-08-25 RX ADMIN — KETAMINE HYDROCHLORIDE 50 MG: 100 INJECTION, SOLUTION, CONCENTRATE INTRAMUSCULAR; INTRAVENOUS at 09:08

## 2017-08-25 RX ADMIN — LIDOCAINE HYDROCHLORIDE 1 MG: 10 INJECTION, SOLUTION EPIDURAL; INFILTRATION; INTRACAUDAL; PERINEURAL at 08:08

## 2017-08-25 NOTE — INTERVAL H&P NOTE
The patient has been examined and the H&P has been reviewed:    I concur with the findings and no changes have occurred since H&P was written.    Anesthesia/Surgery risks, benefits and alternative options discussed and understood by patient/family.          Active Hospital Problems    Diagnosis  POA    Pulmonary nodules [R91.8]  Yes      Resolved Hospital Problems    Diagnosis Date Resolved POA   No resolved problems to display.     Leida Jacome MD  PGY-1 Thoracic Surgery  [facility]

## 2017-08-25 NOTE — TRANSFER OF CARE
"Anesthesia Transfer of Care Note    Patient: Hedy Conway    Procedure(s) Performed: Procedure(s) (LRB):  THORACOSCOPY-VIDEO ASSISTED (VATS) W/WEDGE LUNG RESECTION (Right)    Patient location: PACU    Anesthesia Type: general    Transport from OR: Transported from OR on 6-10 L/min O2 by face mask with adequate spontaneous ventilation    Post pain: adequate analgesia    Post assessment: no apparent anesthetic complications and tolerated procedure well    Post vital signs: stable    Level of consciousness: sedated    Nausea/Vomiting: no nausea/vomiting    Complications: none    Transfer of care protocol was followed      Last vitals:   Visit Vitals  BP (!) 140/71 (BP Location: Left arm, Patient Position: Sitting)   Pulse 83   Temp 36.6 °C (97.9 °F) (Oral)   Resp 16   Ht 5' 7" (1.702 m)   Wt (!) 137.9 kg (304 lb)   LMP 01/01/2011   SpO2 100%   Breastfeeding? No   BMI 47.61 kg/m²     "

## 2017-08-25 NOTE — OP NOTE
Date of Procedure: 8/25/2017     Pre-operative Diagnosis: Pulmonary Nodules; History of Non-Hodgkin's Lymphoma     Post-operative Diagnosis: Same     Procedure(s): 1. Right Thoracoscopy with Diagnostic Wedge Resection x2     Surgeon: Michael Donaldson MD     Assistant(s): Atif Carmona MD     Anesthesia: GETA     Findings: No evidence of extraparenchymal disease     Estimated Blood Loss: 50mL     Drains: 24 Cameroonian chest tube     Specimen(s): RML wedge x2 for permanent; RML nodule for flow cytometry     Complications: None     Indications for Procedure: 43 yo female with history of Non-Hodgkin's Lymphoma who has innumerable pulmonary nodules on surveillance imaging.  Bronchoscopic biopsy has failed to yield a diagnosis, so it was decided to proceed with wedge resection.  Risks, benefits and possible outcomes were discussed in detail with the patient, and she was given the opportunity to ask questions and have those questions answered to her satisfaction.  she desires to proceed and signed consent.     Procedure in Detail: The patient was taken to the operating room and placed supine on the operating table.  Adequate general anesthesia was achieved.  Double-lumen endotracheal tube was placed and its position and function were confirmed with bronchoscopy.  She was turned to the left lateral decubitus position, padded appropriately and secured.  Right chest was prepped and draped in standard sterile fashion.  Prophylactic intravenous antibiotics were administered.  Right lung was isolated.  Time-out was performed.  A 1.5cm incision was made in the 8th intercostal space in the posterior axillary line.  Port and camera were inserted.  There was no evidence of extraparenchymal disease.  A 4cm incision was made in the 5th intercostal space in the anterior axillary line.  Subcutaneous tissue was divided with electrocautery and the chest was entered. Wound protector was placed. Nodules were palpated in the right middle  lobe.  Two separate wedge resections of the right middle lobe were performed using EndoGIA 45mm purple loads.  A portion of one of the nodules was sent for flow cytometry, the rest for permanent. The chest was irrigated with one liter of sterile water.  All irrigation was evacuated.  Then, 20mL of 1.3% Exparel was mixed with 10mL normal saline and used for a regional intercostal thoracic block; 3ml were injected into the intercostal spaces from T3-T9 under direct vision after aspiration to ensure extravascular injection.  Hemostasis was adequate.  A 24 Cymro straight chest tube was passed through the camera port and directed posterior-apically.  The lung was inflated under direct vision.  Tube was secured to the skin with Neurolon suture.  Utility incision was closed in layers with absorbable suture.  Steri-strips and sterile dressings were applied.  All sponge, needle and instrument counts were correct at the end of the case.  The patient tolerated the procedure well.  There were no immediate complications.  She was extubated in the operating room.     Disposition: PACU in stable condition

## 2017-08-25 NOTE — ANESTHESIA POSTPROCEDURE EVALUATION
"Anesthesia Post Evaluation    Patient: Hedy Conway    Procedure(s) Performed: Procedure(s) (LRB):  THORACOSCOPY-VIDEO ASSISTED (VATS) W/WEDGE LUNG RESECTION (Right)    Final Anesthesia Type: general  Patient location during evaluation: PACU  Patient participation: Yes- Able to Participate  Level of consciousness: awake and alert and oriented  Post-procedure vital signs: reviewed and stable  Pain management: adequate  Airway patency: patent  PONV status at discharge: No PONV  Anesthetic complications: no      Cardiovascular status: blood pressure returned to baseline and hemodynamically stable  Respiratory status: unassisted and spontaneous ventilation  Hydration status: euvolemic  Follow-up not needed.        Visit Vitals  BP (!) 128/55   Pulse 69   Temp 36.5 °C (97.7 °F) (Oral)   Resp 18   Ht 5' 7" (1.702 m)   Wt (!) 137.9 kg (304 lb)   LMP 01/01/2011   SpO2 96%   Breastfeeding? No   BMI 47.61 kg/m²       Pain/Stephanie Score: Pain Assessment Performed: Yes (8/25/2017  1:25 PM)  Presence of Pain: non-verbal indicators absent (8/25/2017  1:25 PM)  Pain Rating Prior to Med Admin: 8 (8/25/2017  1:15 PM)  Stephanie Score: 7 (8/25/2017  1:25 PM)      "

## 2017-08-25 NOTE — BRIEF OP NOTE
[facility]  Brief Operative Note    SUMMARY     Surgery Date: 8/25/2017     Surgeon(s) and Role:     * Michael Donaldson MD - Primary     * Atif Carmona MD - Resident     * Leida Jacome MD - Resident    Pre-op Diagnosis:  Lung nodules [R91.8]    Post-op Diagnosis:  Post-Op Diagnosis Codes:     * Lung nodules [R91.8]    Procedure(s) (LRB):  THORACOSCOPY-VIDEO ASSISTED (VATS) W/WEDGE LUNG RESECTION (Right)    Anesthesia: General    Complications: None apparent    Description of the findings of the procedure: Two wedge biopsies taken of right middle lobe.  Sent for fresh and permanent analysis.  One 24 Fr chest tube directed to the apex.    Estimated Blood Loss: 75 mL         Specimens:   Specimen (12h ago through future)    Start     Ordered    08/25/17 1059  Specimen to Pathology - Surgery  Once     Comments:  Specimens to pathology:1. Right middle lobe wedge. Permanent specimen, no preservative. Placed in surgery pathology refrigerator.2. Right middle lobe wedge. Fresh specimen, no preservative. Sent to pathology for Flow Cytometry.3. Right middle lobe wedge #2. Permanent specimen, no preservative. Placed in surgery pathology refrigerator.      08/25/17 1106        Atif Carmona MD  Thoracic Surgery Resident, PGY6  [facility]

## 2017-08-25 NOTE — H&P (VIEW-ONLY)
History & Physical    SUBJECTIVE:     History of Present Illness:   42 y.o. female presents with PMH of diffuse large B cell NHL diagnosed in  2011, was treated but had relapse and received EPOCH, R-ICE and autologius HSCT in late 2011.  She has remained in clinical remission since. For the last two years, serial chest CT scans have showed waxing and waning pulmonary nodules. She has undergone multiple bronchs by Dr. Jc Aguilera. Most recently BAL, brush and transbronchial biopsy of lingula on 7/18/17 and once again pathology was nondiagnostic. Denies prior systemic steroid treatment. She remembers being given a course of antibiotics after one bronch for a pneumonia but also states she felt well during that time. Today she is feeling well. Denies fever, chills. SOB. CP, cough, N/V or changes in bowel and bladder functioning. PSH only significant for left port-a-cath placment, lap nicole and umbilical hernia repair. No past cardiac history.     Never smoker. Works for the [employer].     Chief Complaint   Patient presents with    Consult       Review of patient's allergies indicates:   Allergen Reactions    Tetanus vaccines and toxoid Rash       Current Outpatient Prescriptions   Medication Sig Dispense Refill    fluticasone (FLONASE) 50 mcg/actuation nasal spray 1 spray by Each Nare route once daily.      loratadine (CLARITIN) 10 mg tablet Take 10 mg by mouth once daily.       No current facility-administered medications for this visit.        Past Medical History:   Diagnosis Date    Biliary colic     Encounter for blood transfusion     Incisional hernia     Large cell (diffuse) non-Hodgkin's lymphoma     Stem cells transplant status      Past Surgical History:   Procedure Laterality Date    CHOLECYSTECTOMY      HERNIA REPAIR      incisional    lymphnode biopsy      PORTACATH PLACEMENT Left     TUBAL LIGATION      UMBILICAL HERNIA REPAIR       Family History   Problem Relation  "Age of Onset    Cancer      Colon cancer      Anesthesia problems Neg Hx      Social History   Substance Use Topics    Smoking status: Passive Smoke Exposure - Never Smoker    Smokeless tobacco: Never Used    Alcohol use No        Review of Systems:  Review of Systems   Constitutional: Negative for activity change, appetite change, diaphoresis, fatigue and fever.   HENT: Positive for congestion and sinus pressure. Negative for trouble swallowing and voice change.    Eyes: Negative.    Respiratory: Negative.    Cardiovascular: Negative.    Gastrointestinal: Negative.    Endocrine: Negative.    Genitourinary: Negative.    Musculoskeletal: Negative.    Skin: Negative.    Allergic/Immunologic: Negative.    Neurological: Negative.    Hematological: Negative.    Psychiatric/Behavioral: Negative.        OBJECTIVE:     Vital Signs (Most Recent)  Pulse: 89 (08/14/17 1627)  BP: 127/79 (08/14/17 1627)  SpO2: 97 % (08/14/17 1627)  5' 7" (1.702 m)  (!) 138.8 kg (306 lb)     Physical Exam:  Physical Exam   Constitutional: She is oriented to person, place, and time. She appears well-developed and well-nourished.   HENT:   Head: Normocephalic and atraumatic.   Eyes: Pupils are equal, round, and reactive to light.   Neck: Normal range of motion. Neck supple. No tracheal deviation present. No thyromegaly present.   Cardiovascular: Normal rate, regular rhythm, normal heart sounds and intact distal pulses.    Pulmonary/Chest: Effort normal and breath sounds normal. No respiratory distress. She has no wheezes. She exhibits no tenderness.   Abdominal: Soft. Bowel sounds are normal. She exhibits no distension. There is no tenderness.   Musculoskeletal: She exhibits edema.        Right ankle: She exhibits swelling.        Left ankle: She exhibits swelling.   Lymphadenopathy:     She has no cervical adenopathy.   Neurological: She is alert and oriented to person, place, and time.   Skin: Skin is warm and dry.   Psychiatric: She has a " normal mood and affect.   Vitals reviewed.    PFTS:   FEV1- 2.45L 82%  DLCO- 21.8 101%     Diagnostic Results:    2/3/17- 2D ECHO CONCLUSIONS     1 - Normal left ventricular systolic function (EF 55-60%).     2 - Normal right ventricular systolic function .     3 - Normal left ventricular diastolic function.     4 - Mild mitral regurgitation.     5 - The estimated PA systolic pressure is 36 mmHg.     6 - Mild tricuspid regurgitation.     7/5/17 Chest CT   A central venous Port-A-Cath is noted entering from the left subclavian region with the distal tip of the catheter in the region of the superior vena cava. There are no new abnormally enlarged lymph nodes in the mediastinal, hilar or axillary regions. There are no pleural or pericardial effusions. The visualized portion of the spleen is enlarged. There is increase in the size and number of noncalcified nodules in the lungs bilaterally since the prior examination. There is also increasing consolidation in the inferior lingular portion of the left upper lobe posteriorly and in the posterior lateral aspect of the left lower lobe. There are chronic fracture deformities of the left rib cage posteriorly and laterally.    ASSESSMENT/PLAN:     42 year old female with PMH of diffuse large cell b cell non-hodgkins lymphoma (treatment and stem cell transplant 2011) and bilateral pulmonary nodules presents to discuss wedge biopsy     PLAN:Plan    Will proceed with right VATS diagnostic wedge biopsy.   Appropriate patient education regarding the macho-operative period as well as intraperative details were discussed. Risks, including but not limited to, bleeding, infection, pain and anesthetic complication were discussed. Patient was given the opportunity to ask questions and to have those questions answered to their satisfaction. Patient verbalized understanding to both procedure and associated risks. Consent was obtained.  Will get Type and Screen and CBC the morning of surgery  to monitor thrombocytopenia. Platelets recently have been greater than 50.     ATTENDING ATTESTATION:    I evaluated the patient and I agree with the assessment and plan.  41 yo female with history of lymphoma, now with diffuse bilateral lung nodules.  BMT not concerned about recurrence, Pulm recommends diagnostic wedge resection given multiple negative bronchoscopies.  Plan for Right VATS diagnostic wedge.  Long discussion with patient and her  about the indication and rationale for diagnostic wedge resection, the details of the operation, as well as its risks (including, but not limited to, prolinged air leak, bleeding, arrhythmia, pneumonia or wound infection), benefits and potential outcomes.  We also discussed the alternative treatments and the risks of no treatment.  They were given the opportunity to ask questions and I answered all of their questions to their satisfaction.  They demonstrated understanding and appreciation of above info.  She has decided to proceed with Right VATS diagnostic wedge resection, possible thoracotomy on 8/25/2017.

## 2017-08-25 NOTE — NURSING TRANSFER
Nursing Transfer Note      8/25/2017     Transfer To: 654[address]    Transfer via stretcher    Transfer with chest tube    Transported by RN     Medicines sent: ancef     Chart send with patient: Yes    Notified: spouse    Patient reassessed at: 8/25/17 2258

## 2017-08-25 NOTE — ANESTHESIA PROCEDURE NOTES
Arterial    Diagnosis: lymphoma    Patient location during procedure: done in OR  Procedure start time: 8/25/2017 10:10 AM  Timeout: 8/25/2017 10:11 AM  Procedure end time: 8/25/2017 10:10 AM  Staffing  Anesthesiologist: DONNA SANTOYO  Performed: anesthesiologist   Anesthesiologist was present at the time of the procedure.  Preanesthetic Checklist  Completed: patient identified, site marked, surgical consent, pre-op evaluation, timeout performed, IV checked, risks and benefits discussed, monitors and equipment checked and anesthesia consent givenArterial  Skin Prep: chlorhexidine gluconate  Local Infiltration: none  Orientation: left  Location: radial  Catheter Size: 20 G  Catheter placement by Anatomical landmarks. Heme positive aspiration all ports.Insertion Attempts: 2  Assessment  Dressing: secured with tape and tegaderm  Patient: Tolerated well

## 2017-08-26 LAB
ANION GAP SERPL CALC-SCNC: 13 MMOL/L
BASOPHILS # BLD AUTO: 0.01 K/UL
BASOPHILS NFR BLD: 0.1 %
BUN SERPL-MCNC: 11 MG/DL
CALCIUM SERPL-MCNC: 9.1 MG/DL
CHLORIDE SERPL-SCNC: 102 MMOL/L
CO2 SERPL-SCNC: 24 MMOL/L
CREAT SERPL-MCNC: 0.8 MG/DL
DIFFERENTIAL METHOD: ABNORMAL
EOSINOPHIL # BLD AUTO: 0 K/UL
EOSINOPHIL NFR BLD: 0 %
ERYTHROCYTE [DISTWIDTH] IN BLOOD BY AUTOMATED COUNT: 15.4 %
EST. GFR  (AFRICAN AMERICAN): >60 ML/MIN/1.73 M^2
EST. GFR  (NON AFRICAN AMERICAN): >60 ML/MIN/1.73 M^2
GLUCOSE SERPL-MCNC: 118 MG/DL
HCT VFR BLD AUTO: 37.1 %
HGB BLD-MCNC: 12.3 G/DL
LYMPHOCYTES # BLD AUTO: 2.7 K/UL
LYMPHOCYTES NFR BLD: 31.8 %
MAGNESIUM SERPL-MCNC: 2.3 MG/DL
MCH RBC QN AUTO: 28.1 PG
MCHC RBC AUTO-ENTMCNC: 33.2 G/DL
MCV RBC AUTO: 85 FL
MONOCYTES # BLD AUTO: 0.8 K/UL
MONOCYTES NFR BLD: 9.4 %
NEUTROPHILS # BLD AUTO: 4.9 K/UL
NEUTROPHILS NFR BLD: 58.5 %
PHOSPHATE SERPL-MCNC: 3.7 MG/DL
PLATELET # BLD AUTO: 100 K/UL
PMV BLD AUTO: 11.6 FL
POTASSIUM SERPL-SCNC: 4.2 MMOL/L
RBC # BLD AUTO: 4.38 M/UL
SODIUM SERPL-SCNC: 139 MMOL/L
WBC # BLD AUTO: 8.34 K/UL

## 2017-08-26 PROCEDURE — 84100 ASSAY OF PHOSPHORUS: CPT

## 2017-08-26 PROCEDURE — 83735 ASSAY OF MAGNESIUM: CPT

## 2017-08-26 PROCEDURE — 20600001 HC STEP DOWN PRIVATE ROOM

## 2017-08-26 PROCEDURE — S0028 INJECTION, FAMOTIDINE, 20 MG: HCPCS | Performed by: THORACIC SURGERY (CARDIOTHORACIC VASCULAR SURGERY)

## 2017-08-26 PROCEDURE — 36415 COLL VENOUS BLD VENIPUNCTURE: CPT

## 2017-08-26 PROCEDURE — 25000242 PHARM REV CODE 250 ALT 637 W/ HCPCS: Performed by: THORACIC SURGERY (CARDIOTHORACIC VASCULAR SURGERY)

## 2017-08-26 PROCEDURE — 94640 AIRWAY INHALATION TREATMENT: CPT

## 2017-08-26 PROCEDURE — 85025 COMPLETE CBC W/AUTO DIFF WBC: CPT

## 2017-08-26 PROCEDURE — 27000221 HC OXYGEN, UP TO 24 HOURS

## 2017-08-26 PROCEDURE — 63600175 PHARM REV CODE 636 W HCPCS: Performed by: THORACIC SURGERY (CARDIOTHORACIC VASCULAR SURGERY)

## 2017-08-26 PROCEDURE — 25000003 PHARM REV CODE 250: Performed by: THORACIC SURGERY (CARDIOTHORACIC VASCULAR SURGERY)

## 2017-08-26 PROCEDURE — 80048 BASIC METABOLIC PNL TOTAL CA: CPT

## 2017-08-26 RX ADMIN — IPRATROPIUM BROMIDE AND ALBUTEROL SULFATE 3 ML: .5; 3 SOLUTION RESPIRATORY (INHALATION) at 07:08

## 2017-08-26 RX ADMIN — CEFAZOLIN SODIUM 2 G: 2 SOLUTION INTRAVENOUS at 03:08

## 2017-08-26 RX ADMIN — HYDROCODONE BITARTRATE AND ACETAMINOPHEN 1 TABLET: 5; 325 TABLET ORAL at 06:08

## 2017-08-26 RX ADMIN — HYDROCODONE BITARTRATE AND ACETAMINOPHEN 1 TABLET: 10; 325 TABLET ORAL at 06:08

## 2017-08-26 RX ADMIN — IPRATROPIUM BROMIDE AND ALBUTEROL SULFATE 3 ML: .5; 3 SOLUTION RESPIRATORY (INHALATION) at 01:08

## 2017-08-26 RX ADMIN — FAMOTIDINE 20 MG: 10 INJECTION, SOLUTION INTRAVENOUS at 09:08

## 2017-08-26 RX ADMIN — IPRATROPIUM BROMIDE AND ALBUTEROL SULFATE 3 ML: .5; 3 SOLUTION RESPIRATORY (INHALATION) at 08:08

## 2017-08-26 RX ADMIN — HYDROCODONE BITARTRATE AND ACETAMINOPHEN 1 TABLET: 5; 325 TABLET ORAL at 10:08

## 2017-08-26 RX ADMIN — HYDROCODONE BITARTRATE AND ACETAMINOPHEN 1 TABLET: 10; 325 TABLET ORAL at 02:08

## 2017-08-26 RX ADMIN — IPRATROPIUM BROMIDE AND ALBUTEROL SULFATE 3 ML: .5; 3 SOLUTION RESPIRATORY (INHALATION) at 12:08

## 2017-08-26 RX ADMIN — FAMOTIDINE 20 MG: 10 INJECTION, SOLUTION INTRAVENOUS at 08:08

## 2017-08-26 RX ADMIN — POLYETHYLENE GLYCOL 3350 17 G: 17 POWDER, FOR SOLUTION ORAL at 08:08

## 2017-08-26 RX ADMIN — HYDROCODONE BITARTRATE AND ACETAMINOPHEN 1 TABLET: 10; 325 TABLET ORAL at 01:08

## 2017-08-26 RX ADMIN — FLUTICASONE PROPIONATE 1 SPRAY: 50 SPRAY, METERED NASAL at 08:08

## 2017-08-26 RX ADMIN — HYDROCODONE BITARTRATE AND ACETAMINOPHEN 1 TABLET: 10; 325 TABLET ORAL at 11:08

## 2017-08-26 NOTE — PLAN OF CARE
Problem: Patient Care Overview  Goal: Plan of Care Review  POC reviewed with pt, understanding verbalized. Family at bedside. AAOx4. VSS:afebrile. 2L, no distress noted. CT to -20 suction, no subq emphysema noted. Pt up with bathroom with assist. Pain controlled with prn po meds. Pt remains free of trauma, falls, and skin injuries. Bed in lowest position. Call bell within reach. WCTM.

## 2017-08-26 NOTE — PLAN OF CARE
Problem: Patient Care Overview  Goal: Plan of Care Review  Outcome: Ongoing (interventions implemented as appropriate)  Plan of care reviewed with patient. Patient verbalized understanding.  Patient is AAO and VSS. Pain managed with PRN pain meds.  No complaint of nausea or vomiting. Diet advanced to regular diet and tolerating it well. Voids on own with good output. Chest tube removed today, pt tolerated well. Weaned of of O2.  Ambulates in room with stand bu assist. Free of falls and injury. Will continue to monitor. Kiera Araujo RN

## 2017-08-26 NOTE — SUBJECTIVE & OBJECTIVE
Interval History: Did well overnight.  POD1 s/p right VATS and middle lobe wedge biopsy x2.  Still on O2 this AM.    Medications:  Continuous Infusions:   Scheduled Meds:   albuterol-ipratropium 2.5mg-0.5mg/3mL  3 mL Nebulization Q6H    famotidine (PF)  20 mg Intravenous Q12H    fluticasone  1 spray Each Nare Daily    polyethylene glycol  17 g Oral Daily     PRN Meds:acetaminophen, bisacodyl, hydrocodone-acetaminophen 10-325mg, hydrocodone-acetaminophen 5-325mg, HYDROmorphone, lactulose, metoclopramide HCl, ondansetron     Review of patient's allergies indicates:   Allergen Reactions    Tetanus vaccines and toxoid Rash     Objective:     Vital Signs (Most Recent):  Temp: 98.7 °F (37.1 °C) (08/26/17 0728)  Pulse: 85 (08/26/17 0745)  Resp: 14 (08/26/17 0745)  BP: 136/78 (08/26/17 0728)  SpO2: 98 % (08/26/17 0745) Vital Signs (24h Range):  Temp:  [96 °F (35.6 °C)-98.7 °F (37.1 °C)] 98.7 °F (37.1 °C)  Pulse:  [66-87] 85  Resp:  [11-20] 14  SpO2:  [94 %-99 %] 98 %  BP: (116-164)/(55-82) 136/78  Arterial Line BP: (118-123)/(61-68) 122/68     Intake/Output - Last 3 Shifts       08/24 0700 - 08/25 0659 08/25 0700 - 08/26 0659 08/26 0700 - 08/27 0659    I.V. (mL/kg)  1100 (8)     Total Intake(mL/kg)  1100 (8)     Urine (mL/kg/hr)  1320 (0.4)     Blood  75 (0)     Chest Tube  190 (0.1)     Total Output   1585      Net   -485                   SpO2: 98 %  O2 Device (Oxygen Therapy): nasal cannula    Physical Exam   Constitutional: She is oriented to person, place, and time. She appears well-developed and well-nourished. No distress.   HENT:   Head: Normocephalic and atraumatic.   Cardiovascular: Normal rate, regular rhythm and normal heart sounds.    Pulmonary/Chest: Effort normal and breath sounds normal.   Abdominal: Soft. Bowel sounds are normal.   Musculoskeletal: Normal range of motion.   Neurological: She is alert and oriented to person, place, and time.   Skin: Skin is warm and dry. She is not diaphoretic.    Psychiatric: She has a normal mood and affect. Her behavior is normal. Thought content normal.   Nursing note and vitals reviewed.      Significant Labs:  BMP:   Recent Labs  Lab 08/26/17  0338   *      K 4.2      CO2 24   BUN 11   CREATININE 0.8   CALCIUM 9.1   MG 2.3     CBC:   Recent Labs  Lab 08/26/17  0338   WBC 8.34   RBC 4.38   HGB 12.3   HCT 37.1   *   MCV 85   MCH 28.1   MCHC 33.2       Significant Diagnostics:  I have reviewed and interpreted all pertinent imaging results/findings within the past 24 hours.  CXR this AM with expected postop changes, tube in place.    VTE Risk Mitigation         Ordered     High Risk of VTE  Once      08/25/17 1217     Place KILEY hose  Until discontinued      08/25/17 1217     Place sequential compression device  Until discontinued      08/25/17 1217     Reason for No Pharmacological VTE Prophylaxis  Once      08/25/17 1217

## 2017-08-26 NOTE — NURSING
Gauze dressing to CT site draining bloody drainage. Cleaned around site and place t-slit gauze underneath. Informed Dr. Altman. Will let team evaluate drainage before changing. BENI.

## 2017-08-26 NOTE — HOSPITAL COURSE
8/25/17 - Right VATS with middle lobe wedge resection x2.  8/26/17 - Right chest tube removed.  8/27/17 - Pain controlled, tolerating diet, ambulating, oxygenating well on room air.

## 2017-08-26 NOTE — PROGRESS NOTES
[facility]  Thoracic Surgery  Progress Note    Subjective:     History of Present Illness:  Ms. Conway is a 42 year-old woman with multiple pulmonary nodules.  She underwent a right VATS with middle lobe wedge resection for diagnosis on 8/25/17.    Post-Op Info:  Procedure(s) (LRB):  THORACOSCOPY-VIDEO ASSISTED (VATS) W/WEDGE LUNG RESECTION (Right)   1 Day Post-Op     Interval History: Did well overnight.  POD1 s/p right VATS and middle lobe wedge biopsy x2.  Still on O2 this AM.    Medications:  Continuous Infusions:   Scheduled Meds:   albuterol-ipratropium 2.5mg-0.5mg/3mL  3 mL Nebulization Q6H    famotidine (PF)  20 mg Intravenous Q12H    fluticasone  1 spray Each Nare Daily    polyethylene glycol  17 g Oral Daily     PRN Meds:acetaminophen, bisacodyl, hydrocodone-acetaminophen 10-325mg, hydrocodone-acetaminophen 5-325mg, HYDROmorphone, lactulose, metoclopramide HCl, ondansetron     Review of patient's allergies indicates:   Allergen Reactions    Tetanus vaccines and toxoid Rash     Objective:     Vital Signs (Most Recent):  Temp: 98.7 °F (37.1 °C) (08/26/17 0728)  Pulse: 85 (08/26/17 0745)  Resp: 14 (08/26/17 0745)  BP: 136/78 (08/26/17 0728)  SpO2: 98 % (08/26/17 0745) Vital Signs (24h Range):  Temp:  [96 °F (35.6 °C)-98.7 °F (37.1 °C)] 98.7 °F (37.1 °C)  Pulse:  [66-87] 85  Resp:  [11-20] 14  SpO2:  [94 %-99 %] 98 %  BP: (116-164)/(55-82) 136/78  Arterial Line BP: (118-123)/(61-68) 122/68     Intake/Output - Last 3 Shifts       08/24 0700 - 08/25 0659 08/25 0700 - 08/26 0659 08/26 0700 - 08/27 0659    I.V. (mL/kg)  1100 (8)     Total Intake(mL/kg)  1100 (8)     Urine (mL/kg/hr)  1320 (0.4)     Blood  75 (0)     Chest Tube  190 (0.1)     Total Output   1585      Net   -485                   SpO2: 98 %  O2 Device (Oxygen Therapy): nasal cannula    Physical Exam   Constitutional: She is oriented to person, place, and time. She appears well-developed and well-nourished. No distress.    HENT:   Head: Normocephalic and atraumatic.   Cardiovascular: Normal rate, regular rhythm and normal heart sounds.    Pulmonary/Chest: Effort normal and breath sounds normal.   Abdominal: Soft. Bowel sounds are normal.   Musculoskeletal: Normal range of motion.   Neurological: She is alert and oriented to person, place, and time.   Skin: Skin is warm and dry. She is not diaphoretic.   Psychiatric: She has a normal mood and affect. Her behavior is normal. Thought content normal.   Nursing note and vitals reviewed.      Significant Labs:  BMP:   Recent Labs  Lab 08/26/17  0338   *      K 4.2      CO2 24   BUN 11   CREATININE 0.8   CALCIUM 9.1   MG 2.3     CBC:   Recent Labs  Lab 08/26/17  0338   WBC 8.34   RBC 4.38   HGB 12.3   HCT 37.1   *   MCV 85   MCH 28.1   MCHC 33.2       Significant Diagnostics:  I have reviewed and interpreted all pertinent imaging results/findings within the past 24 hours.  CXR this AM with expected postop changes, tube in place.    VTE Risk Mitigation         Ordered     High Risk of VTE  Once      08/25/17 1217     Place KILEY hose  Until discontinued      08/25/17 1217     Place sequential compression device  Until discontinued      08/25/17 1217     Reason for No Pharmacological VTE Prophylaxis  Once      08/25/17 1217        Assessment/Plan:     * Pulmonary nodules    43yo woman POD1 s/p right VATS and middle lobe wedge resection x2 for diagnosis.  No events overnight.  190ml out of chest tube.  Will remove right chest tube and get a post-pull CXR.  If able to wean off oxygen then patient might be able to go home today.  Will need to return to clinic for suture removal and pathology review.        Thrombocytopenia    Platelets up to 100 from 76 preop due to surgery.            Atif Carmona MD  Thoracic Surgery Resident, PGY6  [facility]

## 2017-08-26 NOTE — PLAN OF CARE
Problem: Patient Care Overview  Goal: Plan of Care Review  Outcome: Ongoing (interventions implemented as appropriate)  Plan of care discussed with pt. Pt verbalizes understanding. Pt tolerating clear liquid diet with no complaints of discomfort or nausea. Pain managed with PRN pain medications.  Pt ambulates to bathroom. Pt positions independently. Pt remains free of falls and injury. Will continue to monitor.

## 2017-08-26 NOTE — HPI
Ms. Conway is a 42 year-old woman with multiple pulmonary nodules.  She underwent a right VATS with middle lobe wedge resection for diagnosis on 8/25/17.

## 2017-08-26 NOTE — ASSESSMENT & PLAN NOTE
43yo woman POD1 s/p right VATS and middle lobe wedge resection x2 for diagnosis.  No events overnight.  190ml out of chest tube.  Will remove right chest tube and get a post-pull CXR.  If able to wean off oxygen then patient might be able to go home today.  Will need to return to clinic for suture removal and pathology review.

## 2017-08-27 VITALS
RESPIRATION RATE: 18 BRPM | TEMPERATURE: 97 F | OXYGEN SATURATION: 92 % | WEIGHT: 293 LBS | SYSTOLIC BLOOD PRESSURE: 147 MMHG | HEART RATE: 100 BPM | HEIGHT: 67 IN | BODY MASS INDEX: 45.99 KG/M2 | DIASTOLIC BLOOD PRESSURE: 65 MMHG

## 2017-08-27 LAB
BASOPHILS # BLD AUTO: 0.01 K/UL
BASOPHILS NFR BLD: 0.2 %
DIFFERENTIAL METHOD: ABNORMAL
EOSINOPHIL # BLD AUTO: 0 K/UL
EOSINOPHIL NFR BLD: 0.8 %
ERYTHROCYTE [DISTWIDTH] IN BLOOD BY AUTOMATED COUNT: 15.9 %
HCT VFR BLD AUTO: 33.1 %
HGB BLD-MCNC: 11 G/DL
LYMPHOCYTES # BLD AUTO: 1.8 K/UL
LYMPHOCYTES NFR BLD: 36.6 %
MAGNESIUM SERPL-MCNC: 2 MG/DL
MCH RBC QN AUTO: 28.4 PG
MCHC RBC AUTO-ENTMCNC: 33.2 G/DL
MCV RBC AUTO: 85 FL
MONOCYTES # BLD AUTO: 0.6 K/UL
MONOCYTES NFR BLD: 11.6 %
NEUTROPHILS # BLD AUTO: 2.5 K/UL
NEUTROPHILS NFR BLD: 50.2 %
PHOSPHATE SERPL-MCNC: 3.1 MG/DL
PLATELET # BLD AUTO: 71 K/UL
PMV BLD AUTO: 11.2 FL
RBC # BLD AUTO: 3.88 M/UL
WBC # BLD AUTO: 5 K/UL

## 2017-08-27 PROCEDURE — 85025 COMPLETE CBC W/AUTO DIFF WBC: CPT

## 2017-08-27 PROCEDURE — S0028 INJECTION, FAMOTIDINE, 20 MG: HCPCS | Performed by: THORACIC SURGERY (CARDIOTHORACIC VASCULAR SURGERY)

## 2017-08-27 PROCEDURE — 83735 ASSAY OF MAGNESIUM: CPT

## 2017-08-27 PROCEDURE — 25000003 PHARM REV CODE 250: Performed by: THORACIC SURGERY (CARDIOTHORACIC VASCULAR SURGERY)

## 2017-08-27 PROCEDURE — 25000242 PHARM REV CODE 250 ALT 637 W/ HCPCS: Performed by: THORACIC SURGERY (CARDIOTHORACIC VASCULAR SURGERY)

## 2017-08-27 PROCEDURE — 36415 COLL VENOUS BLD VENIPUNCTURE: CPT

## 2017-08-27 PROCEDURE — 94761 N-INVAS EAR/PLS OXIMETRY MLT: CPT

## 2017-08-27 PROCEDURE — 84100 ASSAY OF PHOSPHORUS: CPT

## 2017-08-27 PROCEDURE — 94640 AIRWAY INHALATION TREATMENT: CPT

## 2017-08-27 PROCEDURE — 99900035 HC TECH TIME PER 15 MIN (STAT)

## 2017-08-27 RX ORDER — HYDROCODONE BITARTRATE AND ACETAMINOPHEN 5; 325 MG/1; MG/1
1-2 TABLET ORAL EVERY 4 HOURS PRN
Qty: 40 TABLET | Refills: 0 | Status: SHIPPED | OUTPATIENT
Start: 2017-08-27 | End: 2017-09-11

## 2017-08-27 RX ORDER — HYDROCODONE BITARTRATE AND ACETAMINOPHEN 5; 325 MG/1; MG/1
1-2 TABLET ORAL EVERY 4 HOURS PRN
Qty: 40 TABLET | Refills: 0 | Status: SHIPPED | OUTPATIENT
Start: 2017-08-27 | End: 2017-08-27

## 2017-08-27 RX ORDER — POLYETHYLENE GLYCOL 3350 17 G/17G
17 POWDER, FOR SOLUTION ORAL DAILY PRN
Qty: 30 PACKET | Refills: 0 | Status: SHIPPED | OUTPATIENT
Start: 2017-08-27 | End: 2018-12-31 | Stop reason: ALTCHOICE

## 2017-08-27 RX ADMIN — FAMOTIDINE 20 MG: 10 INJECTION, SOLUTION INTRAVENOUS at 10:08

## 2017-08-27 RX ADMIN — POLYETHYLENE GLYCOL 3350 17 G: 17 POWDER, FOR SOLUTION ORAL at 10:08

## 2017-08-27 RX ADMIN — HYDROCODONE BITARTRATE AND ACETAMINOPHEN 1 TABLET: 10; 325 TABLET ORAL at 04:08

## 2017-08-27 RX ADMIN — HYDROCODONE BITARTRATE AND ACETAMINOPHEN 1 TABLET: 10; 325 TABLET ORAL at 01:08

## 2017-08-27 RX ADMIN — IPRATROPIUM BROMIDE AND ALBUTEROL SULFATE 3 ML: .5; 3 SOLUTION RESPIRATORY (INHALATION) at 01:08

## 2017-08-27 RX ADMIN — FLUTICASONE PROPIONATE 1 SPRAY: 50 SPRAY, METERED NASAL at 10:08

## 2017-08-27 RX ADMIN — IPRATROPIUM BROMIDE AND ALBUTEROL SULFATE 3 ML: .5; 3 SOLUTION RESPIRATORY (INHALATION) at 08:08

## 2017-08-27 RX ADMIN — HYDROCODONE BITARTRATE AND ACETAMINOPHEN 1 TABLET: 10; 325 TABLET ORAL at 10:08

## 2017-08-27 RX ADMIN — IPRATROPIUM BROMIDE AND ALBUTEROL SULFATE 3 ML: .5; 3 SOLUTION RESPIRATORY (INHALATION) at 12:08

## 2017-08-27 NOTE — PLAN OF CARE
Problem: Patient Care Overview  Goal: Plan of Care Review  Outcome: Ongoing (interventions implemented as appropriate)  POC reviewed with patient. Pt AAOx4, VSS, Afebrile, SpO2> 90% on rm air. Pt complains of intermittent pain, PRN meds given as ordered. R chest incisions x 2 with gauze and transparent CDI. Pt tolerating a regular diet,  no complaints of nausea/vomiting. Pt urinating adequately per toilet, output monitored. Pt up with standby assist, remains free from falls and injuries, will continue to monitor.

## 2017-08-27 NOTE — ASSESSMENT & PLAN NOTE
41yo woman POD2 s/p right VATS and middle lobe wedge resection x2 for diagnosis.  No events overnight.  Chest tube removed yesterday.  CXR stable.  Will need to maintain room air saturations in 90s this morning to be able to go home.  Will need to return to clinic for suture removal and pathology review in about 2 weeks.

## 2017-08-27 NOTE — DISCHARGE INSTRUCTIONS
After a Thoracoscopy  After surgery, you will wake up in a recovery area. At first youll likely feel groggy and thirsty. An IV (intravenous) line gives you fluids and medicines to relieve pain. Monitors will keep track of your breathing and heartbeat. You may have a chest tube coming out of your chest to drain fluid or air. This may be taken out just after surgery. Or it may stay in place for several days.    Recovering in the hospital  While in the hospital, youll work with a respiratory therapist. He or she will teach you breathing exercises. These help keep your lungs clear and prevent inflammation. Youll do these exercises every hour or so. Depending on your condition, a nurse or therapist will help you get up and walk soon after your surgery. This keeps your blood moving and will help improve your healing. The hospital stay after your surgery will be 1-4 days. If you have chest tubes, you wont go home until theyre removed.  Recovering at home  At home, follow the instructions you are given about how to care for your incisions and lungs. This may include:  · Take your pain medicines as prescribed. This helps relieve soreness and makes activity and deep breathing easier.  · Walk to keep your blood moving and strengthen your muscles. But avoid strenuous activity, heavy lifting, and driving for a few weeks.  · Continue to do the breathing exercises taught to you by your therapist.  · Resume sexual relations when you feel ready.  · Ask your doctor when you can go back to work.  · Follow up with your doctor. He or she will monitor your healing and discuss the results of the procedure.  When to call your doctor  Call your doctor if you have any of the following symptoms after your procedure:  · Shortness of breath  · Dizziness or lightheadedness that continues even after sitting down  · Very red or draining incision  · Sudden, sharp chest pain that does not go away  · Fever of 100.4°F (38°C) or higher, or as  directed by your healthcare provider  · Coughing up bright red blood    Date Last Reviewed: 10/1/2016  © 4767-5295 [employer], [employer]. [address] [place] [address], [place] [address]. All rights reserved. This information is not intended as a substitute for professional medical care. Always follow your healthcare professional's instructions.    Dressing care:  You may remove your outer dressings tomorrow and then you may shower.  Leave steri-strips in place until they begin to fall off on their own.  No baths, hot tubs or swimming for 3 weeks.

## 2017-08-27 NOTE — ASSESSMENT & PLAN NOTE
43yo woman POD2 s/p right VATS and middle lobe wedge resection x2 for diagnosis.  No events overnight.  Chest tube removed yesterday.  CXR stable.  Room air SPO2 back to 92.  Patient feels ready for discharge.  Will need to return to clinic for suture removal and pathology review in about 2 weeks.

## 2017-08-27 NOTE — SUBJECTIVE & OBJECTIVE
Interval History: No events overnight.  POD2 s/p right VATS and middle lobe wedge resection x2.  Was weaned off of O2 yesterday.  SPO2 of 92-95 overnight.  SPO2 of 88 recorded at 0830 this morning.    Medications:  Continuous Infusions:   Scheduled Meds:   albuterol-ipratropium 2.5mg-0.5mg/3mL  3 mL Nebulization Q6H    famotidine (PF)  20 mg Intravenous Q12H    fluticasone  1 spray Each Nare Daily    polyethylene glycol  17 g Oral Daily     PRN Meds:acetaminophen, bisacodyl, hydrocodone-acetaminophen 10-325mg, hydrocodone-acetaminophen 5-325mg, HYDROmorphone, lactulose, metoclopramide HCl, ondansetron     Review of patient's allergies indicates:   Allergen Reactions    Tetanus vaccines and toxoid Rash     Objective:     Vital Signs (Most Recent):  Temp: 98.3 °F (36.8 °C) (08/27/17 0449)  Pulse: 100 (08/27/17 0827)  Resp: 18 (08/27/17 0827)  BP: 134/69 (08/27/17 0449)  SpO2: (!) 88 % (08/27/17 0827) Vital Signs (24h Range):  Temp:  [97.2 °F (36.2 °C)-99 °F (37.2 °C)] 98.3 °F (36.8 °C)  Pulse:  [] 100  Resp:  [14-18] 18  SpO2:  [88 %-95 %] 88 %  BP: (128-142)/(65-74) 134/69     Intake/Output - Last 3 Shifts       08/25 0700 - 08/26 0659 08/26 0700 - 08/27 0659 08/27 0700 - 08/28 0659    P.O.  1440     I.V. (mL/kg) 1100 (8)      Total Intake(mL/kg) 1100 (8) 1440 (10.5)     Urine (mL/kg/hr) 1320 (0.4) 1500 (0.5)     Stool  0 (0)     Blood 75 (0)      Chest Tube 190 (0.1)      Total Output 1585 1500      Net -485 -60             Stool Occurrence  0 x           SpO2: (!) 88 %  O2 Device (Oxygen Therapy): room air    Physical Exam   Constitutional: She is oriented to person, place, and time. She appears well-developed and well-nourished. No distress.   HENT:   Head: Normocephalic and atraumatic.   Cardiovascular: Normal rate, regular rhythm and normal heart sounds.    Pulmonary/Chest: Effort normal and breath sounds normal.   Abdominal: Soft. Bowel sounds are normal.   Musculoskeletal: Normal range of motion.    Neurological: She is alert and oriented to person, place, and time.   Skin: Skin is warm and dry. She is not diaphoretic.   Psychiatric: She has a normal mood and affect. Her behavior is normal. Thought content normal.       Significant Labs:  BMP:   Recent Labs  Lab 08/26/17  0338 08/27/17 0349   *  --      --    K 4.2  --      --    CO2 24  --    BUN 11  --    CREATININE 0.8  --    CALCIUM 9.1  --    MG 2.3 2.0     CBC:   Recent Labs  Lab 08/27/17 0349   WBC 5.00   RBC 3.88*   HGB 11.0*   HCT 33.1*   PLT 71*   MCV 85   MCH 28.4   MCHC 33.2       Significant Diagnostics:  I have reviewed and interpreted all pertinent imaging results/findings within the past 24 hours.  CXR unchanged from post-pull CXR yesterday.    VTE Risk Mitigation         Ordered     High Risk of VTE  Once      08/25/17 1217     Place KILEY hose  Until discontinued      08/25/17 1217     Place sequential compression device  Until discontinued      08/25/17 1217     Reason for No Pharmacological VTE Prophylaxis  Once      08/25/17 1217

## 2017-08-27 NOTE — PROGRESS NOTES
Pt disharged to home. Discharge instructions given verbally and hard copy provided to patient. Hard copy prescription given to patient. Left hand 18g IV d/c'd with cath tip in place. Pt remains free of falls. No acute pain or distress noted.

## 2017-08-27 NOTE — DISCHARGE SUMMARY
[facility]  Thoracic Surgery  Discharge Summary    Patient Name: Hedy Conway  MRN: [id number]  Admission Date: 8/25/2017  Hospital Length of Stay: 1 days  Discharge Date and Time:  08/27/2017 12:05 PM  Attending Physician: Michael Donaldson MD   Discharging Provider: Atif Carmona MD  Primary Care Provider: John Lantigua PA-C    HPI:   Ms. Conway is a 42 year-old woman with multiple pulmonary nodules.  She underwent a right VATS with middle lobe wedge resection for diagnosis on 8/25/17.    Procedure(s) (LRB):  THORACOSCOPY-VIDEO ASSISTED (VATS) W/WEDGE LUNG RESECTION (Right)      Hospital Course: 8/25/17 - Right VATS with middle lobe wedge resection x2.  8/26/17 - Right chest tube removed.  8/27/17 - Pain controlled, tolerating diet, ambulating, oxygenating well on room air.        Significant Diagnostic Studies: Labs:   CMP   Recent Labs  Lab 08/25/17  1217 08/26/17  0338    139   K 4.3 4.2    102   CO2 22* 24   * 118*   BUN 13 11   CREATININE 0.8 0.8   CALCIUM 8.3* 9.1   ANIONGAP 8 13   ESTGFRAFRICA >60.0 >60.0   EGFRNONAA >60.0 >60.0    and CBC   Recent Labs  Lab 08/25/17  1217 08/26/17  0338 08/27/17  0349   WBC 6.66 8.34 5.00   HGB 11.7* 12.3 11.0*   HCT 35.7* 37.1 33.1*   PLT 77* 100* 71*       Pending Diagnostic Studies:     None        Final Active Diagnoses:    Diagnosis Date Noted POA    PRINCIPAL PROBLEM:  Pulmonary nodules [R91.8] 08/25/2017 Yes    Thrombocytopenia [D69.6] 10/25/2016 Yes      Problems Resolved During this Admission:    Diagnosis Date Noted Date Resolved POA      Discharged Condition: good    Disposition: Home or Self Care    Follow Up:  Follow-up Information     Michael Donaldson MD. Schedule an appointment as soon as possible for a visit in 2 weeks.    Specialty:  Cardiothoracic Surgery  Why:  Dr. Donaldson's office will call to set up your postop appointment.  Contact information:  [address] [facility] [place]  [address]  [phone]                 Patient Instructions:     Diet general     Activity as tolerated     Lifting restrictions   Order Comments: 10 pounds for 4 weeks     Call MD for:  temperature >100.4     Call MD for:  persistent nausea and vomiting or diarrhea     Call MD for:  severe uncontrolled pain     Call MD for:  redness, tenderness, or signs of infection (pain, swelling, redness, odor or green/yellow discharge around incision site)     Call MD for:  difficulty breathing or increased cough     Call MD for:  persistent dizziness, light-headedness, or visual disturbances     Call MD for:  increased confusion or weakness     Remove dressing in 24 hours       Medications:  Reconciled Home Medications:   Current Discharge Medication List      START taking these medications    Details   hydrocodone-acetaminophen 5-325mg (NORCO) 5-325 mg per tablet Take 1-2 tablets by mouth every 4 (four) hours as needed for Pain.  Qty: 40 tablet, Refills: 0      polyethylene glycol (GLYCOLAX) 17 gram PwPk Take 17 g by mouth daily as needed (prevent constipation while on norco).  Qty: 30 packet, Refills: 0         CONTINUE these medications which have NOT CHANGED    Details   fluticasone (FLONASE) 50 mcg/actuation nasal spray 1 spray by Each Nare route once daily.      loratadine (CLARITIN) 10 mg tablet Take 10 mg by mouth once daily.           Atif Carmona MD  Thoracic Surgery Resident, PGY6  [facility]

## 2017-08-27 NOTE — PROGRESS NOTES
[facility]  Thoracic Surgery  Progress Note    Subjective:     History of Present Illness:  Ms. Conway is a 42 year-old woman with multiple pulmonary nodules.  She underwent a right VATS with middle lobe wedge resection for diagnosis on 8/25/17.    Post-Op Info:  Procedure(s) (LRB):  THORACOSCOPY-VIDEO ASSISTED (VATS) W/WEDGE LUNG RESECTION (Right)   2 Days Post-Op     Interval History: No events overnight.  POD2 s/p right VATS and middle lobe wedge resection x2.  Was weaned off of O2 yesterday.  SPO2 of 92-95 overnight.  SPO2 of 88 recorded at 0830 this morning.    Medications:  Continuous Infusions:   Scheduled Meds:   albuterol-ipratropium 2.5mg-0.5mg/3mL  3 mL Nebulization Q6H    famotidine (PF)  20 mg Intravenous Q12H    fluticasone  1 spray Each Nare Daily    polyethylene glycol  17 g Oral Daily     PRN Meds:acetaminophen, bisacodyl, hydrocodone-acetaminophen 10-325mg, hydrocodone-acetaminophen 5-325mg, HYDROmorphone, lactulose, metoclopramide HCl, ondansetron     Review of patient's allergies indicates:   Allergen Reactions    Tetanus vaccines and toxoid Rash     Objective:     Vital Signs (Most Recent):  Temp: 98.3 °F (36.8 °C) (08/27/17 0449)  Pulse: 100 (08/27/17 0827)  Resp: 18 (08/27/17 0827)  BP: 134/69 (08/27/17 0449)  SpO2: (!) 88 % (08/27/17 0827) Vital Signs (24h Range):  Temp:  [97.2 °F (36.2 °C)-99 °F (37.2 °C)] 98.3 °F (36.8 °C)  Pulse:  [] 100  Resp:  [14-18] 18  SpO2:  [88 %-95 %] 88 %  BP: (128-142)/(65-74) 134/69     Intake/Output - Last 3 Shifts       08/25 0700 - 08/26 0659 08/26 0700 - 08/27 0659 08/27 0700 - 08/28 0659    P.O.  1440     I.V. (mL/kg) 1100 (8)      Total Intake(mL/kg) 1100 (8) 1440 (10.5)     Urine (mL/kg/hr) 1320 (0.4) 1500 (0.5)     Stool  0 (0)     Blood 75 (0)      Chest Tube 190 (0.1)      Total Output 1585 1500      Net -485 -60             Stool Occurrence  0 x           SpO2: (!) 88 %  O2 Device (Oxygen Therapy): room air    Physical  Exam   Constitutional: She is oriented to person, place, and time. She appears well-developed and well-nourished. No distress.   HENT:   Head: Normocephalic and atraumatic.   Cardiovascular: Normal rate, regular rhythm and normal heart sounds.    Pulmonary/Chest: Effort normal and breath sounds normal.   Abdominal: Soft. Bowel sounds are normal.   Musculoskeletal: Normal range of motion.   Neurological: She is alert and oriented to person, place, and time.   Skin: Skin is warm and dry. She is not diaphoretic.   Psychiatric: She has a normal mood and affect. Her behavior is normal. Thought content normal.       Significant Labs:  BMP:   Recent Labs  Lab 08/26/17  0338 08/27/17  0349   *  --      --    K 4.2  --      --    CO2 24  --    BUN 11  --    CREATININE 0.8  --    CALCIUM 9.1  --    MG 2.3 2.0     CBC:   Recent Labs  Lab 08/27/17  0349   WBC 5.00   RBC 3.88*   HGB 11.0*   HCT 33.1*   PLT 71*   MCV 85   MCH 28.4   MCHC 33.2       Significant Diagnostics:  I have reviewed and interpreted all pertinent imaging results/findings within the past 24 hours.  CXR unchanged from post-pull CXR yesterday.    VTE Risk Mitigation         Ordered     High Risk of VTE  Once      08/25/17 1217     Place KILEY hose  Until discontinued      08/25/17 1217     Place sequential compression device  Until discontinued      08/25/17 1217     Reason for No Pharmacological VTE Prophylaxis  Once      08/25/17 1217        Assessment/Plan:     * Pulmonary nodules    41yo woman POD2 s/p right VATS and middle lobe wedge resection x2 for diagnosis.  No events overnight.  Chest tube removed yesterday.  CXR stable.  Will need to maintain room air saturations in 90s this morning to be able to go home.  Will need to return to clinic for suture removal and pathology review in about 2 weeks.        Thrombocytopenia    Platelets back to baseline of 70s.            Atif Carmona MD  Thoracic Surgery Resident, PGY6  [facility]

## 2017-08-28 ENCOUNTER — TELEPHONE (OUTPATIENT)
Dept: CARDIOTHORACIC SURGERY | Facility: CLINIC | Age: 42
End: 2017-08-28

## 2017-08-28 LAB
FLOW CYTOMETRY ANTIBODIES ANALYZED - TISSUE: NORMAL
FLOW CYTOMETRY COMMENT - TISSUE: NORMAL
FLOW CYTOMETRY INTERPRETATION - TISSUE: NORMAL
SPECIMEN TYPE - TISSUE: NORMAL

## 2017-08-28 NOTE — TELEPHONE ENCOUNTER
Called to check on the pt after hospital d/c, she is recovering well at home. She reports soreness with movement/activity and is taking pain medication every 4-5 hours. Medication is helping with pain.   Discussed use of IS and she will con't to use this when she is sitting down.  She will take a shower today, discussed incision care and removing her dressings while leaving steri strips in place. She is agreeable to f/u on 9/11/17 and has our phone # to call with any needs/concerns.

## 2017-09-11 ENCOUNTER — HOSPITAL ENCOUNTER (OUTPATIENT)
Dept: RADIOLOGY | Facility: HOSPITAL | Age: 42
Discharge: HOME | End: 2017-09-11
Payer: COMMERCIAL

## 2017-09-11 ENCOUNTER — OFFICE VISIT (OUTPATIENT)
Dept: CARDIOTHORACIC SURGERY | Facility: CLINIC | Age: 42
End: 2017-09-11
Payer: COMMERCIAL

## 2017-09-11 VITALS
HEIGHT: 67 IN | HEART RATE: 118 BPM | WEIGHT: 293 LBS | OXYGEN SATURATION: 95 % | BODY MASS INDEX: 45.99 KG/M2 | SYSTOLIC BLOOD PRESSURE: 144 MMHG | DIASTOLIC BLOOD PRESSURE: 91 MMHG

## 2017-09-11 DIAGNOSIS — R91.8 PULMONARY NODULES: Primary | ICD-10-CM

## 2017-09-11 DIAGNOSIS — R30.0 DYSURIA: ICD-10-CM

## 2017-09-11 DIAGNOSIS — R91.8 LUNG NODULES: ICD-10-CM

## 2017-09-11 PROCEDURE — 81001 URINALYSIS AUTO W/SCOPE: CPT

## 2017-09-11 PROCEDURE — 99024 POSTOP FOLLOW-UP VISIT: CPT | Mod: S$GLB,,, | Performed by: THORACIC SURGERY (CARDIOTHORACIC VASCULAR SURGERY)

## 2017-09-11 PROCEDURE — 87088 URINE BACTERIA CULTURE: CPT

## 2017-09-11 PROCEDURE — 87186 SC STD MICRODIL/AGAR DIL: CPT

## 2017-09-11 PROCEDURE — 71020 XR CHEST PA AND LATERAL: CPT | Mod: 26,,, | Performed by: RADIOLOGY

## 2017-09-11 PROCEDURE — 87086 URINE CULTURE/COLONY COUNT: CPT

## 2017-09-11 PROCEDURE — 99999 PR PBB SHADOW E&M-EST. PATIENT-LVL III: CPT | Mod: PBBFAC,,, | Performed by: THORACIC SURGERY (CARDIOTHORACIC VASCULAR SURGERY)

## 2017-09-11 PROCEDURE — 87077 CULTURE AEROBIC IDENTIFY: CPT

## 2017-09-11 PROCEDURE — 71020 XR CHEST PA AND LATERAL: CPT | Mod: TC

## 2017-09-11 NOTE — PROGRESS NOTES
Subjective:       Patient ID: Hedy Conway is a 42 y.o. female.    Chief Complaint: Post-op Evaluation    HPI     41 yo female with history of lymphoma, now with diffuse bilateral lung nodules here today for 2 wk f/u from right VATS with diagnostic wedge resection on 8/25/17. Uncomplicated post-operative course. Weaned off of narcotic pain meds. Still using ibuprofen prn. Increasing activity. C/o 4 days of dysuria and urinary frequency. Taking OTC azo with minimal relief. Reports after last bronchoscopy had UTI. Otherwise, doing well. Denies fever, chills, SOB, CP, nausea, vomiting.     Review of Systems   Constitutional: Negative for chills and fever.   HENT: Negative for congestion.    Eyes: Negative for pain.   Respiratory: Negative for cough and shortness of breath.    Gastrointestinal: Negative for abdominal distention, nausea and vomiting.   Genitourinary: Positive for difficulty urinating, dysuria and frequency.   Musculoskeletal: Negative for arthralgias.   Skin: Negative for color change and rash.   Neurological: Negative for dizziness.   Psychiatric/Behavioral: Negative for agitation. The patient is not nervous/anxious.          Objective:      Physical Exam   Constitutional: She is oriented to person, place, and time. She appears well-developed and well-nourished.   HENT:   Head: Normocephalic and atraumatic.   Eyes: EOM are normal. Pupils are equal, round, and reactive to light.   Neck: Normal range of motion. Neck supple.   Cardiovascular: Normal rate and regular rhythm.    Pulmonary/Chest: Effort normal and breath sounds normal.   Abdominal: Soft. Bowel sounds are normal.   Musculoskeletal: Normal range of motion.   Neurological: She is alert and oriented to person, place, and time.   Skin: Skin is warm and dry.   Right VATS incision healing well. No drainage or erythema. Suture intact and removed in clinic.    Psychiatric: She has a normal mood and affect. Thought content normal.          Pathology   RML wedge biopsy    Atypical lymphocytic infiltrates    Background lung parenchyma shows scattered multinucleated giant cells without accompanying well-formed granulomas   RML wedge   Atypical lymphocytic infiltrates  RML wedge x2    Atypical lymphocytic infiltrates    Background lung parenchyma shows scattered multinucleated giant cells without accompanying well-formed granulomas     CXR 9/11/17:   Left chest port with the tip overlying the region of the innominate vein.  Patchy abnormal parenchymal markings persist bilaterally similar allowing for slightly improved depth of inspiration.  Rib deformities on the left.      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 Pathology reviewed. C/o dysuria and urinary frequency x 4 days.     Plan:       UA and culture today. Will call patient if need for ABX.   RTC prn     ATTENDING ATTESTATION:    I evaluated the patient and I agree with the assessment and plan.  Reports post-op pain that is expected and improving.  Also reports dysuria.  CXR clear.  Path as above, discussed with BMT who reviewed with pathologist and feel that the lymphocytic infiltrates are not malignant.  Will defer further follow up to Nas Mora (Med Onc) and Kirk (Pulm) at [facility].  UA, C&S today--will call in abx if positive for UTI.  RTC PRN.

## 2017-09-12 LAB
BACTERIA #/AREA URNS AUTO: ABNORMAL /HPF
BILIRUB UR QL STRIP: NEGATIVE
CLARITY UR REFRACT.AUTO: ABNORMAL
COLOR UR AUTO: ABNORMAL
GLUCOSE UR QL STRIP: NEGATIVE
HGB UR QL STRIP: ABNORMAL
HYALINE CASTS UR QL AUTO: 0 /LPF
KETONES UR QL STRIP: NEGATIVE
LEUKOCYTE ESTERASE UR QL STRIP: ABNORMAL
MICROSCOPIC COMMENT: ABNORMAL
NITRITE UR QL STRIP: NEGATIVE
PH UR STRIP: 5 [PH] (ref 5–8)
PROT UR QL STRIP: ABNORMAL
RBC #/AREA URNS AUTO: 10 /HPF (ref 0–4)
SP GR UR STRIP: 1.01 (ref 1–1.03)
SQUAMOUS #/AREA URNS AUTO: 5 /HPF
URN SPEC COLLECT METH UR: ABNORMAL
UROBILINOGEN UR STRIP-ACNC: 1 EU/DL
WBC #/AREA URNS AUTO: >100 /HPF (ref 0–5)
WBC CLUMPS UR QL AUTO: ABNORMAL

## 2017-09-12 RX ORDER — NITROFURANTOIN (MACROCRYSTALS) 100 MG/1
100 CAPSULE ORAL EVERY 12 HOURS
Qty: 10 CAPSULE | Refills: 0 | Status: SHIPPED | OUTPATIENT
Start: 2017-09-12 | End: 2017-09-17

## 2017-09-14 LAB — BACTERIA UR CULT: NORMAL

## 2017-09-20 ENCOUNTER — TELEPHONE (OUTPATIENT)
Dept: CARDIOTHORACIC SURGERY | Facility: CLINIC | Age: 42
End: 2017-09-20

## 2017-09-20 NOTE — TELEPHONE ENCOUNTER
----- Message from Manav Mary sent at 9/20/2017  1:59 PM CDT -----  Pt said she needs a doctors excuse saying she has been out of work since 8/25/17 to 10/16/17. Please fax the excuse to [facility] at [phone].      Please call pt if you have any questions at [phone]

## 2017-09-29 NOTE — PHYSICIAN QUERY
PT Name: Hedy Conway  MR #: [id number]    Physician Query Form - Pathology Findings Clarification     CDS/: Rebekah Gant RN  CCDS               Contact information: [email or web address]  This form is a permanent document in the medical record.     Query Date: September 29, 2017      By submitting this query, we are merely seeking further clarification of documentation.  Please utilize your independent clinical judgment when addressing the question(s) below.      The medical record contains the following:     Findings Supporting Clinical Information Location in Medical Record   Pulmonary nodules   42 year-old woman with multiple pulmonary nodules. She underwent a right VATS with middle lobe wedge resection for diagnosis on 8/25/17.        FINAL PATHOLOGIC DIAGNOSIS  Part 1  LUNG, RIGHT MIDDLE LOBE, WEDGE BIOPSY:  -- ATYPICAL LYMPHOCYTIC INFILTRATES.  -- BACKGROUND LUNG PARENCHYMA SHOWS SCATTERED MULTINUCLEATED GIANT CELLS WITHOUT ACCOMPANYING WELL-FORMED GRANULOMAS.    Part 2  LUNG, RIGHT MIDDLE LOBE, WEDGE BIOPSY:  --ATYPICAL LYMPHOCYTIC INFILTRATES    Part 3  LUNG, RIGHT MIDDLE LOBE, WEDGE BIOPSY:  -- ATYPICAL LYMPHOCYTIC INFILTRATES.  -- BACKGROUND LUNG PARENCHYMA SHOWS SCATTERED MULTINUCLEATED GIANT CELLS WITHOUT ACCOMPANYING WELL-FORMED GRANULOMAS.   Discharge Summary                   Pathology report 9/15     Please document the clinical significance of the Pathologists findings of ATYPICAL LYMPHOCYTIC LUNG INFILTRATES AND SCATTERED MULTINUCLEATED GIANT CELLS WITHOUT ACCOMPANYING WELL-FORMED GRANULOMAS:          [X] I agree with the Pathology Findings        [  ] I do not agree with the Pathology Findings        [  ] Clinically Insignificant        [  ] Clinically Undetermined        [  ] Other/Clarification of Findings: ______________________________________________    Please document in your progress notes daily for the duration of treatment until resolved and include in your discharge  summary.

## 2018-12-31 ENCOUNTER — OFFICE VISIT (OUTPATIENT)
Dept: URGENT CARE | Facility: CLINIC | Age: 43
End: 2018-12-31
Payer: COMMERCIAL

## 2018-12-31 VITALS
HEART RATE: 72 BPM | WEIGHT: 293 LBS | SYSTOLIC BLOOD PRESSURE: 146 MMHG | DIASTOLIC BLOOD PRESSURE: 78 MMHG | HEIGHT: 67 IN | RESPIRATION RATE: 20 BRPM | OXYGEN SATURATION: 98 % | TEMPERATURE: 99 F | BODY MASS INDEX: 45.99 KG/M2

## 2018-12-31 DIAGNOSIS — J06.9 UPPER RESPIRATORY TRACT INFECTION, UNSPECIFIED TYPE: Primary | ICD-10-CM

## 2018-12-31 PROCEDURE — 3008F BODY MASS INDEX DOCD: CPT | Mod: CPTII,S$GLB,, | Performed by: PHYSICIAN ASSISTANT

## 2018-12-31 PROCEDURE — 96372 THER/PROPH/DIAG INJ SC/IM: CPT | Mod: S$GLB,,, | Performed by: PHYSICIAN ASSISTANT

## 2018-12-31 PROCEDURE — 99203 OFFICE O/P NEW LOW 30 MIN: CPT | Mod: 25,S$GLB,, | Performed by: PHYSICIAN ASSISTANT

## 2018-12-31 RX ORDER — ALBUTEROL SULFATE 90 UG/1
AEROSOL, METERED RESPIRATORY (INHALATION)
Status: ON HOLD | COMMUNITY
End: 2021-06-15 | Stop reason: HOSPADM

## 2018-12-31 RX ORDER — BROMPHENIRAMINE MALEATE, PSEUDOEPHEDRINE HYDROCHLORIDE, AND DEXTROMETHORPHAN HYDROBROMIDE 2; 30; 10 MG/5ML; MG/5ML; MG/5ML
5 SYRUP ORAL EVERY 6 HOURS PRN
Qty: 118 ML | Refills: 0 | Status: SHIPPED | OUTPATIENT
Start: 2018-12-31 | End: 2019-01-10

## 2018-12-31 RX ORDER — BETAMETHASONE SODIUM PHOSPHATE AND BETAMETHASONE ACETATE 3; 3 MG/ML; MG/ML
9 INJECTION, SUSPENSION INTRA-ARTICULAR; INTRALESIONAL; INTRAMUSCULAR; SOFT TISSUE ONCE
Status: COMPLETED | OUTPATIENT
Start: 2018-12-31 | End: 2018-12-31

## 2018-12-31 RX ORDER — AZELASTINE 1 MG/ML
1 SPRAY, METERED NASAL 2 TIMES DAILY
Qty: 30 ML | Refills: 0 | Status: SHIPPED | OUTPATIENT
Start: 2018-12-31 | End: 2021-02-24

## 2018-12-31 RX ADMIN — BETAMETHASONE SODIUM PHOSPHATE AND BETAMETHASONE ACETATE 9 MG: 3; 3 INJECTION, SUSPENSION INTRA-ARTICULAR; INTRALESIONAL; INTRAMUSCULAR; SOFT TISSUE at 12:12

## 2018-12-31 NOTE — PROGRESS NOTES
"Subjective:       Patient ID: Hedy Conway is a 43 y.o. female.    Vitals:  height is 5' 7" (1.702 m) and weight is 136.1 kg (300 lb). Her oral temperature is 98.5 °F (36.9 °C). Her blood pressure is 146/78 (abnormal) and her pulse is 72. Her respiration is 20 and oxygen saturation is 98%.     Chief Complaint: Sinus Problem    Sinus Problem   This is a new problem. The current episode started in the past 7 days (x3days). The problem has been gradually worsening since onset. There has been no fever. She is experiencing no pain. Associated symptoms include congestion, coughing, headaches and sinus pressure. Pertinent negatives include no chills, diaphoresis, ear pain, shortness of breath or sore throat. Past treatments include oral decongestants. The treatment provided mild relief.       Constitution: Negative for chills, sweating, fatigue and fever.   HENT: Positive for congestion, postnasal drip and sinus pressure. Negative for ear pain, sinus pain, sore throat and voice change.    Neck: Negative for painful lymph nodes.   Eyes: Negative for eye redness.   Respiratory: Positive for cough and sputum production. Negative for chest tightness, bloody sputum, COPD, shortness of breath, stridor, wheezing and asthma.    Gastrointestinal: Negative for nausea and vomiting.   Musculoskeletal: Negative for muscle ache.   Skin: Negative for rash.   Allergic/Immunologic: Negative for seasonal allergies and asthma.   Neurological: Positive for headaches.   Hematologic/Lymphatic: Negative for swollen lymph nodes.       Objective:      Physical Exam   Constitutional: She is oriented to person, place, and time. She appears well-developed and well-nourished. She is cooperative.  Non-toxic appearance. She does not have a sickly appearance. She does not appear ill. No distress.   HENT:   Head: Normocephalic and atraumatic.   Right Ear: Hearing, tympanic membrane, external ear and ear canal normal.   Left Ear: Hearing, tympanic " membrane, external ear and ear canal normal.   Nose: Mucosal edema present. No rhinorrhea or nasal deformity. No epistaxis. Right sinus exhibits no maxillary sinus tenderness and no frontal sinus tenderness. Left sinus exhibits no maxillary sinus tenderness and no frontal sinus tenderness.   Mouth/Throat: Uvula is midline, oropharynx is clear and moist and mucous membranes are normal. No trismus in the jaw. Normal dentition. No uvula swelling. No oropharyngeal exudate, posterior oropharyngeal edema or posterior oropharyngeal erythema.       Eyes: Conjunctivae, EOM and lids are normal. Pupils are equal, round, and reactive to light. No scleral icterus.   Sclera clear bilat   Neck: Trachea normal, full passive range of motion without pain and phonation normal. Neck supple. No neck rigidity. No edema and no erythema present.   Cardiovascular: Normal rate, regular rhythm, normal heart sounds, intact distal pulses and normal pulses.   Pulmonary/Chest: Effort normal and breath sounds normal. No respiratory distress. She has no decreased breath sounds. She has no wheezes. She has no rhonchi. She has no rales.   Nonproductive cough   Abdominal: Soft. Normal appearance and bowel sounds are normal. She exhibits no distension. There is no tenderness.   Musculoskeletal: Normal range of motion. She exhibits no edema or deformity.   Lymphadenopathy:     She has no cervical adenopathy.   Neurological: She is alert and oriented to person, place, and time. She exhibits normal muscle tone. Coordination normal.   Skin: Skin is warm, dry and intact. She is not diaphoretic. No pallor.   Psychiatric: She has a normal mood and affect. Her speech is normal and behavior is normal. Judgment and thought content normal. Cognition and memory are normal.   Nursing note and vitals reviewed.      Assessment:       1. Upper respiratory tract infection, unspecified type        Plan:         Upper respiratory tract infection, unspecified type  -      betamethasone acetate-betamethasone sodium phosphate injection 9 mg  -     brompheniramine-pseudoeph-DM (BROMFED DM) 2-30-10 mg/5 mL Syrp; Take 5 mLs by mouth every 6 (six) hours as needed.  Dispense: 118 mL; Refill: 0  -     azelastine (ASTELIN) 137 mcg (0.1 %) nasal spray; 1 spray (137 mcg total) by Nasal route 2 (two) times daily.  Dispense: 30 mL; Refill: 0      Patient Instructions   · Follow up with your primary care in 2-5 days if symptoms have not improved, or you may return here.  · If you were referred to a specialist, please follow up with that specialty.  · If you were prescribed antibiotics, please take them to completion.  · If you were prescribed a narcotic or any medication with sedative effects, do not drive or operate heavy equipment or machinery while taking these medications.  · You must understand that you have received treatment at an Urgent Care facility only, and that you may be released before all of your medical problems are known or treated. Urgent Care facilities are not equipped to handle life threatening emergencies. It is recommended that you go to an Emergency Department for further evaluation of worsening or concerning symptoms, or possibly life threatening conditions as discussed.                                        If you  smoke, please stop smoking        Symptomatic treatment:    Alternate tylenol and motrin every 4-6 hours  salt water gargles  Cold-eeze helps to reduce the duration of sore throat symptoms  Cepachol helps to numb the discomfort  Chloroseptic spray  Nasal saline spray reduces inflammation and dryness  Warm face compresses as often as you can  Vicks vapor rub at night  Flonase OTC or Nasacort OTC  Simple foods like chicken noodle soup help  Pedialyte helps with dehydration if lacking appetite  Rest as much as you can

## 2019-01-03 ENCOUNTER — TELEPHONE (OUTPATIENT)
Dept: URGENT CARE | Facility: CLINIC | Age: 44
End: 2019-01-03

## 2020-01-30 PROBLEM — Z95.828 PORT-A-CATH IN PLACE: Status: ACTIVE | Noted: 2020-01-30

## 2020-05-20 ENCOUNTER — LAB VISIT (OUTPATIENT)
Dept: LAB | Facility: HOSPITAL | Age: 45
End: 2020-05-20
Attending: INTERNAL MEDICINE
Payer: COMMERCIAL

## 2020-05-20 DIAGNOSIS — Z94.84 HISTORY OF AUTO STEM CELL TRANSPLANT: ICD-10-CM

## 2020-05-20 DIAGNOSIS — C85.90 NON-HODGKIN'S LYMPHOMA, UNSPECIFIED BODY REGION, UNSPECIFIED NON-HODGKIN LYMPHOMA TYPE: ICD-10-CM

## 2020-05-20 LAB
ALBUMIN SERPL BCP-MCNC: 3.9 G/DL (ref 3.5–5.2)
ALP SERPL-CCNC: 76 U/L (ref 55–135)
ALT SERPL W/O P-5'-P-CCNC: 27 U/L (ref 10–44)
ANION GAP SERPL CALC-SCNC: 10 MMOL/L (ref 8–16)
AST SERPL-CCNC: 15 U/L (ref 10–40)
BASOPHILS # BLD AUTO: 0.02 K/UL (ref 0–0.2)
BASOPHILS NFR BLD: 0.3 % (ref 0–1.9)
BILIRUB SERPL-MCNC: 0.6 MG/DL (ref 0.1–1)
BUN SERPL-MCNC: 14 MG/DL (ref 6–20)
CALCIUM SERPL-MCNC: 9.2 MG/DL (ref 8.7–10.5)
CHLORIDE SERPL-SCNC: 104 MMOL/L (ref 95–110)
CO2 SERPL-SCNC: 26 MMOL/L (ref 23–29)
CREAT SERPL-MCNC: 0.7 MG/DL (ref 0.5–1.4)
DIFFERENTIAL METHOD: ABNORMAL
EOSINOPHIL # BLD AUTO: 0.1 K/UL (ref 0–0.5)
EOSINOPHIL NFR BLD: 0.8 % (ref 0–8)
ERYTHROCYTE [DISTWIDTH] IN BLOOD BY AUTOMATED COUNT: 15.9 % (ref 11.5–14.5)
ERYTHROCYTE [SEDIMENTATION RATE] IN BLOOD BY WESTERGREN METHOD: 12 MM/HR (ref 0–20)
EST. GFR  (AFRICAN AMERICAN): >60 ML/MIN/1.73 M^2
EST. GFR  (NON AFRICAN AMERICAN): >60 ML/MIN/1.73 M^2
GLUCOSE SERPL-MCNC: 97 MG/DL (ref 70–110)
HCT VFR BLD AUTO: 35.1 % (ref 37–48.5)
HGB BLD-MCNC: 11.1 G/DL (ref 12–16)
IMM GRANULOCYTES # BLD AUTO: 0.05 K/UL (ref 0–0.04)
IMM GRANULOCYTES NFR BLD AUTO: 0.8 % (ref 0–0.5)
LYMPHOCYTES # BLD AUTO: 2.8 K/UL (ref 1–4.8)
LYMPHOCYTES NFR BLD: 46.2 % (ref 18–48)
MAGNESIUM SERPL-MCNC: 2 MG/DL (ref 1.6–2.6)
MCH RBC QN AUTO: 28.3 PG (ref 27–31)
MCHC RBC AUTO-ENTMCNC: 31.6 G/DL (ref 32–36)
MCV RBC AUTO: 90 FL (ref 82–98)
MONOCYTES # BLD AUTO: 0.6 K/UL (ref 0.3–1)
MONOCYTES NFR BLD: 10.1 % (ref 4–15)
NEUTROPHILS # BLD AUTO: 2.6 K/UL (ref 1.8–7.7)
NEUTROPHILS NFR BLD: 41.8 % (ref 38–73)
NRBC BLD-RTO: 0 /100 WBC
PHOSPHATE SERPL-MCNC: 3.4 MG/DL (ref 2.7–4.5)
PLATELET # BLD AUTO: 84 K/UL (ref 150–350)
PMV BLD AUTO: 10.4 FL (ref 9.2–12.9)
POTASSIUM SERPL-SCNC: 4.1 MMOL/L (ref 3.5–5.1)
PROT SERPL-MCNC: 6.1 G/DL (ref 6–8.4)
RBC # BLD AUTO: 3.92 M/UL (ref 4–5.4)
SODIUM SERPL-SCNC: 140 MMOL/L (ref 136–145)
WBC # BLD AUTO: 6.12 K/UL (ref 3.9–12.7)

## 2020-05-20 PROCEDURE — 85651 RBC SED RATE NONAUTOMATED: CPT

## 2020-05-20 PROCEDURE — 80053 COMPREHEN METABOLIC PANEL: CPT

## 2020-05-20 PROCEDURE — 36415 COLL VENOUS BLD VENIPUNCTURE: CPT

## 2020-05-20 PROCEDURE — 84100 ASSAY OF PHOSPHORUS: CPT

## 2020-05-20 PROCEDURE — 83735 ASSAY OF MAGNESIUM: CPT

## 2020-05-20 PROCEDURE — 85025 COMPLETE CBC W/AUTO DIFF WBC: CPT

## 2020-10-14 ENCOUNTER — PATIENT OUTREACH (OUTPATIENT)
Dept: ADMINISTRATIVE | Facility: OTHER | Age: 45
End: 2020-10-14

## 2020-10-14 VITALS — SYSTOLIC BLOOD PRESSURE: 134 MMHG | DIASTOLIC BLOOD PRESSURE: 80 MMHG

## 2021-03-08 PROBLEM — N87.1 DYSPLASIA OF CERVIX, HIGH GRADE CIN 2: Status: ACTIVE | Noted: 2021-03-08

## 2021-03-08 PROBLEM — Z98.890 S/P CONIZATION OF CERVIX: Status: ACTIVE | Noted: 2021-03-08

## 2021-06-04 ENCOUNTER — HOSPITAL ENCOUNTER (OUTPATIENT)
Dept: RADIOLOGY | Facility: HOSPITAL | Age: 46
Discharge: HOME | End: 2021-06-04
Attending: INTERNAL MEDICINE
Payer: COMMERCIAL

## 2021-06-04 DIAGNOSIS — R07.89 CHEST WALL PAIN: Primary | ICD-10-CM

## 2021-06-04 DIAGNOSIS — J44.89 OBSTRUCTIVE CHRONIC BRONCHITIS WITHOUT EXACERBATION: ICD-10-CM

## 2021-06-04 DIAGNOSIS — R07.89 CHEST WALL PAIN: ICD-10-CM

## 2021-06-04 PROCEDURE — 71046 XR CHEST PA AND LATERAL: ICD-10-PCS | Mod: 26,BMT,, | Performed by: RADIOLOGY

## 2021-06-04 PROCEDURE — 71046 X-RAY EXAM CHEST 2 VIEWS: CPT | Mod: TC

## 2021-06-04 PROCEDURE — 71046 X-RAY EXAM CHEST 2 VIEWS: CPT | Mod: 26,BMT,, | Performed by: RADIOLOGY

## 2021-06-08 ENCOUNTER — HOSPITAL ENCOUNTER (OUTPATIENT)
Dept: PULMONOLOGY | Facility: HOSPITAL | Age: 46
Discharge: HOME | End: 2021-06-08
Attending: INTERNAL MEDICINE
Payer: COMMERCIAL

## 2021-06-08 DIAGNOSIS — R07.89 CHEST WALL PAIN: ICD-10-CM

## 2021-06-08 DIAGNOSIS — J44.89 OBSTRUCTIVE CHRONIC BRONCHITIS WITHOUT EXACERBATION: ICD-10-CM

## 2021-06-08 PROCEDURE — 99900031 HC PATIENT EDUCATION (STAT)

## 2021-06-08 PROCEDURE — 94060 EVALUATION OF WHEEZING: CPT

## 2021-06-08 PROCEDURE — 94799 UNLISTED PULMONARY SVC/PX: CPT | Mod: 26,BMT,, | Performed by: INTERNAL MEDICINE

## 2021-06-08 PROCEDURE — 94729 DIFFUSING CAPACITY: CPT

## 2021-06-08 PROCEDURE — 94727 GAS DIL/WSHOT DETER LNG VOL: CPT

## 2021-06-08 PROCEDURE — 94799 COMPL PULMO FUNCTION W/BR: ICD-10-PCS | Mod: 26,BMT,, | Performed by: INTERNAL MEDICINE

## 2021-06-10 ENCOUNTER — HOSPITAL ENCOUNTER (INPATIENT)
Facility: HOSPITAL | Age: 46
LOS: 4 days | Discharge: HOME | DRG: 871 | End: 2021-06-15
Attending: EMERGENCY MEDICINE | Admitting: EMERGENCY MEDICINE
Payer: COMMERCIAL

## 2021-06-10 DIAGNOSIS — J18.9 PNEUMONIA OF BOTH LUNGS DUE TO INFECTIOUS ORGANISM, UNSPECIFIED PART OF LUNG: Primary | ICD-10-CM

## 2021-06-10 DIAGNOSIS — A41.9 SEPSIS, DUE TO UNSPECIFIED ORGANISM, UNSPECIFIED WHETHER ACUTE ORGAN DYSFUNCTION PRESENT: ICD-10-CM

## 2021-06-10 DIAGNOSIS — R06.02 SOB (SHORTNESS OF BREATH): ICD-10-CM

## 2021-06-10 DIAGNOSIS — R05.9 COUGH: ICD-10-CM

## 2021-06-10 DIAGNOSIS — R91.8 MULTIPLE LUNG NODULES ON CT: ICD-10-CM

## 2021-06-10 DIAGNOSIS — A41.9 SEPSIS: ICD-10-CM

## 2021-06-10 DIAGNOSIS — R91.1 LUNG NODULE: ICD-10-CM

## 2021-06-10 LAB
ALBUMIN SERPL BCP-MCNC: 3.1 G/DL (ref 3.5–5.2)
ALP SERPL-CCNC: 146 U/L (ref 55–135)
ALT SERPL W/O P-5'-P-CCNC: 69 U/L (ref 10–44)
ANION GAP SERPL CALC-SCNC: 12 MMOL/L (ref 8–16)
AST SERPL-CCNC: 11 U/L (ref 10–40)
BASOPHILS # BLD AUTO: 0.02 K/UL (ref 0–0.2)
BASOPHILS NFR BLD: 0.1 % (ref 0–1.9)
BILIRUB SERPL-MCNC: 1.4 MG/DL (ref 0.1–1)
BUN SERPL-MCNC: 26 MG/DL (ref 6–20)
CALCIUM SERPL-MCNC: 8.7 MG/DL (ref 8.7–10.5)
CHLORIDE SERPL-SCNC: 104 MMOL/L (ref 95–110)
CO2 SERPL-SCNC: 22 MMOL/L (ref 23–29)
CREAT SERPL-MCNC: 1.6 MG/DL (ref 0.5–1.4)
CTP QC/QA: YES
CTP QC/QA: YES
DIFFERENTIAL METHOD: ABNORMAL
EOSINOPHIL # BLD AUTO: 0 K/UL (ref 0–0.5)
EOSINOPHIL NFR BLD: 0 % (ref 0–8)
ERYTHROCYTE [DISTWIDTH] IN BLOOD BY AUTOMATED COUNT: 16.6 % (ref 11.5–14.5)
EST. GFR  (AFRICAN AMERICAN): 43.9 ML/MIN/1.73 M^2
EST. GFR  (NON AFRICAN AMERICAN): 38.1 ML/MIN/1.73 M^2
GLUCOSE SERPL-MCNC: 163 MG/DL (ref 70–110)
HCT VFR BLD AUTO: 31.5 % (ref 37–48.5)
HGB BLD-MCNC: 9.7 G/DL (ref 12–16)
IMM GRANULOCYTES # BLD AUTO: 0.23 K/UL (ref 0–0.04)
IMM GRANULOCYTES NFR BLD AUTO: 1.4 % (ref 0–0.5)
LACTATE SERPL-SCNC: 2.3 MMOL/L (ref 0.5–2.2)
LIPASE SERPL-CCNC: 57 U/L (ref 23–300)
LYMPHOCYTES # BLD AUTO: 2 K/UL (ref 1–4.8)
LYMPHOCYTES NFR BLD: 12.6 % (ref 18–48)
MCH RBC QN AUTO: 25.1 PG (ref 27–31)
MCHC RBC AUTO-ENTMCNC: 30.8 G/DL (ref 32–36)
MCV RBC AUTO: 82 FL (ref 82–98)
MONOCYTES # BLD AUTO: 1.3 K/UL (ref 0.3–1)
MONOCYTES NFR BLD: 8.1 % (ref 4–15)
NEUTROPHILS # BLD AUTO: 12.5 K/UL (ref 1.8–7.7)
NEUTROPHILS NFR BLD: 77.8 % (ref 38–73)
NRBC BLD-RTO: 0 /100 WBC
NT-PROBNP SERPL-MCNC: 2643 PG/ML (ref 5–450)
PLATELET # BLD AUTO: 146 K/UL (ref 150–450)
PMV BLD AUTO: 11.5 FL (ref 9.2–12.9)
POC MOLECULAR INFLUENZA A AGN: NEGATIVE
POC MOLECULAR INFLUENZA B AGN: NEGATIVE
POCT GLUCOSE: 169 MG/DL (ref 70–110)
POTASSIUM SERPL-SCNC: 3.6 MMOL/L (ref 3.5–5.1)
PROT SERPL-MCNC: 6 G/DL (ref 6–8.4)
RBC # BLD AUTO: 3.86 M/UL (ref 4–5.4)
SARS-COV-2 RDRP RESP QL NAA+PROBE: NEGATIVE
SODIUM SERPL-SCNC: 138 MMOL/L (ref 136–145)
TROPONIN I SERPL DL<=0.01 NG/ML-MCNC: <0.02 NG/ML (ref 0–0.03)
WBC # BLD AUTO: 16.01 K/UL (ref 3.9–12.7)

## 2021-06-10 PROCEDURE — 85025 COMPLETE CBC W/AUTO DIFF WBC: CPT | Performed by: EMERGENCY MEDICINE

## 2021-06-10 PROCEDURE — 83880 ASSAY OF NATRIURETIC PEPTIDE: CPT | Performed by: EMERGENCY MEDICINE

## 2021-06-10 PROCEDURE — 25000003 PHARM REV CODE 250: Performed by: EMERGENCY MEDICINE

## 2021-06-10 PROCEDURE — 87186 SC STD MICRODIL/AGAR DIL: CPT | Performed by: EMERGENCY MEDICINE

## 2021-06-10 PROCEDURE — 63600175 PHARM REV CODE 636 W HCPCS: Performed by: EMERGENCY MEDICINE

## 2021-06-10 PROCEDURE — 93005 ELECTROCARDIOGRAM TRACING: CPT

## 2021-06-10 PROCEDURE — 83605 ASSAY OF LACTIC ACID: CPT | Performed by: EMERGENCY MEDICINE

## 2021-06-10 PROCEDURE — U0002 COVID-19 LAB TEST NON-CDC: HCPCS | Performed by: EMERGENCY MEDICINE

## 2021-06-10 PROCEDURE — 87040 BLOOD CULTURE FOR BACTERIA: CPT | Performed by: EMERGENCY MEDICINE

## 2021-06-10 PROCEDURE — 94640 AIRWAY INHALATION TREATMENT: CPT

## 2021-06-10 PROCEDURE — 96375 TX/PRO/DX INJ NEW DRUG ADDON: CPT

## 2021-06-10 PROCEDURE — 99900035 HC TECH TIME PER 15 MIN (STAT)

## 2021-06-10 PROCEDURE — 80053 COMPREHEN METABOLIC PANEL: CPT | Performed by: EMERGENCY MEDICINE

## 2021-06-10 PROCEDURE — 93010 ELECTROCARDIOGRAM REPORT: CPT | Mod: BMT,,, | Performed by: INTERNAL MEDICINE

## 2021-06-10 PROCEDURE — 96367 TX/PROPH/DG ADDL SEQ IV INF: CPT

## 2021-06-10 PROCEDURE — 96365 THER/PROPH/DIAG IV INF INIT: CPT

## 2021-06-10 PROCEDURE — 96361 HYDRATE IV INFUSION ADD-ON: CPT

## 2021-06-10 PROCEDURE — 87077 CULTURE AEROBIC IDENTIFY: CPT | Performed by: EMERGENCY MEDICINE

## 2021-06-10 PROCEDURE — 82962 GLUCOSE BLOOD TEST: CPT

## 2021-06-10 PROCEDURE — 83690 ASSAY OF LIPASE: CPT | Performed by: EMERGENCY MEDICINE

## 2021-06-10 PROCEDURE — 99285 EMERGENCY DEPT VISIT HI MDM: CPT | Mod: 25

## 2021-06-10 PROCEDURE — 94761 N-INVAS EAR/PLS OXIMETRY MLT: CPT

## 2021-06-10 PROCEDURE — 99900031 HC PATIENT EDUCATION (STAT)

## 2021-06-10 PROCEDURE — 84484 ASSAY OF TROPONIN QUANT: CPT | Performed by: EMERGENCY MEDICINE

## 2021-06-10 PROCEDURE — 25000242 PHARM REV CODE 250 ALT 637 W/ HCPCS: Performed by: EMERGENCY MEDICINE

## 2021-06-10 PROCEDURE — 93010 EKG 12-LEAD: ICD-10-PCS | Mod: BMT,,, | Performed by: INTERNAL MEDICINE

## 2021-06-10 RX ORDER — HYDROCODONE BITARTRATE AND ACETAMINOPHEN 7.5; 325 MG/15ML; MG/15ML
15 SOLUTION ORAL EVERY 4 HOURS PRN
Status: DISCONTINUED | OUTPATIENT
Start: 2021-06-11 | End: 2021-06-15 | Stop reason: HOSPADM

## 2021-06-10 RX ORDER — IPRATROPIUM BROMIDE AND ALBUTEROL SULFATE 2.5; .5 MG/3ML; MG/3ML
3 SOLUTION RESPIRATORY (INHALATION)
Status: COMPLETED | OUTPATIENT
Start: 2021-06-10 | End: 2021-06-10

## 2021-06-10 RX ADMIN — SODIUM CHLORIDE, SODIUM LACTATE, POTASSIUM CHLORIDE, AND CALCIUM CHLORIDE 1000 ML: .6; .31; .03; .02 INJECTION, SOLUTION INTRAVENOUS at 10:06

## 2021-06-10 RX ADMIN — PIPERACILLIN AND TAZOBACTAM 4.5 G: 4; .5 INJECTION, POWDER, LYOPHILIZED, FOR SOLUTION INTRAVENOUS; PARENTERAL at 11:06

## 2021-06-10 RX ADMIN — SODIUM CHLORIDE 1000 ML: 0.9 INJECTION, SOLUTION INTRAVENOUS at 09:06

## 2021-06-10 RX ADMIN — HYDROCODONE BITARTRATE AND ACETAMINOPHEN 15 ML: 7.5; 325 SOLUTION ORAL at 11:06

## 2021-06-10 RX ADMIN — CEFTRIAXONE 2 G: 2 INJECTION, SOLUTION INTRAVENOUS at 09:06

## 2021-06-10 RX ADMIN — IPRATROPIUM BROMIDE AND ALBUTEROL SULFATE 3 ML: .5; 2.5 SOLUTION RESPIRATORY (INHALATION) at 11:06

## 2021-06-11 PROBLEM — Z79.899 DVT PROPHYLAXIS: Status: ACTIVE | Noted: 2021-06-11

## 2021-06-11 PROBLEM — J18.9 PNEUMONIA OF BOTH LUNGS DUE TO INFECTIOUS ORGANISM: Status: ACTIVE | Noted: 2021-06-11

## 2021-06-11 PROBLEM — A41.9 SEPSIS: Status: ACTIVE | Noted: 2021-06-11

## 2021-06-11 PROBLEM — R06.02 SOB (SHORTNESS OF BREATH): Status: ACTIVE | Noted: 2021-06-11

## 2021-06-11 PROBLEM — R05.9 COUGH: Status: ACTIVE | Noted: 2021-06-11

## 2021-06-11 LAB
BACTERIA #/AREA URNS HPF: NEGATIVE /HPF
BILIRUB UR QL STRIP: ABNORMAL
CLARITY UR: ABNORMAL
COLOR UR: YELLOW
GLUCOSE UR QL STRIP: NEGATIVE
HGB UR QL STRIP: NEGATIVE
HYALINE CASTS #/AREA URNS LPF: 10 /LPF
KETONES UR QL STRIP: ABNORMAL
LACTATE SERPL-SCNC: 2.9 MMOL/L (ref 0.5–2.2)
LEUKOCYTE ESTERASE UR QL STRIP: ABNORMAL
MICROSCOPIC COMMENT: ABNORMAL
NITRITE UR QL STRIP: NEGATIVE
PH UR STRIP: 5 [PH] (ref 5–8)
PROT UR QL STRIP: ABNORMAL
RBC #/AREA URNS HPF: 3 /HPF (ref 0–4)
SP GR UR STRIP: 1.02 (ref 1–1.03)
SQUAMOUS #/AREA URNS HPF: 11 /HPF
URN SPEC COLLECT METH UR: ABNORMAL
UROBILINOGEN UR STRIP-ACNC: 1 EU/DL
WBC #/AREA URNS HPF: 17 /HPF (ref 0–5)

## 2021-06-11 PROCEDURE — 94640 AIRWAY INHALATION TREATMENT: CPT

## 2021-06-11 PROCEDURE — 36415 COLL VENOUS BLD VENIPUNCTURE: CPT | Performed by: EMERGENCY MEDICINE

## 2021-06-11 PROCEDURE — 11000001 HC ACUTE MED/SURG PRIVATE ROOM

## 2021-06-11 PROCEDURE — 97161 PT EVAL LOW COMPLEX 20 MIN: CPT

## 2021-06-11 PROCEDURE — 63600175 PHARM REV CODE 636 W HCPCS: Performed by: NURSE PRACTITIONER

## 2021-06-11 PROCEDURE — 99900035 HC TECH TIME PER 15 MIN (STAT)

## 2021-06-11 PROCEDURE — 63600175 PHARM REV CODE 636 W HCPCS: Performed by: EMERGENCY MEDICINE

## 2021-06-11 PROCEDURE — 99900031 HC PATIENT EDUCATION (STAT)

## 2021-06-11 PROCEDURE — 25000242 PHARM REV CODE 250 ALT 637 W/ HCPCS: Performed by: EMERGENCY MEDICINE

## 2021-06-11 PROCEDURE — 63600175 PHARM REV CODE 636 W HCPCS: Performed by: INTERNAL MEDICINE

## 2021-06-11 PROCEDURE — 94761 N-INVAS EAR/PLS OXIMETRY MLT: CPT

## 2021-06-11 PROCEDURE — 81000 URINALYSIS NONAUTO W/SCOPE: CPT | Performed by: EMERGENCY MEDICINE

## 2021-06-11 PROCEDURE — 25000003 PHARM REV CODE 250: Performed by: INTERNAL MEDICINE

## 2021-06-11 PROCEDURE — 27000221 HC OXYGEN, UP TO 24 HOURS

## 2021-06-11 PROCEDURE — 25000003 PHARM REV CODE 250: Performed by: EMERGENCY MEDICINE

## 2021-06-11 PROCEDURE — 87086 URINE CULTURE/COLONY COUNT: CPT | Performed by: EMERGENCY MEDICINE

## 2021-06-11 PROCEDURE — 25000242 PHARM REV CODE 250 ALT 637 W/ HCPCS: Performed by: NURSE PRACTITIONER

## 2021-06-11 PROCEDURE — 83605 ASSAY OF LACTIC ACID: CPT | Performed by: EMERGENCY MEDICINE

## 2021-06-11 PROCEDURE — 25000003 PHARM REV CODE 250: Performed by: NURSE PRACTITIONER

## 2021-06-11 RX ORDER — METHYLPREDNISOLONE SOD SUCC 125 MG
80 VIAL (EA) INJECTION EVERY 8 HOURS
Status: DISCONTINUED | OUTPATIENT
Start: 2021-06-11 | End: 2021-06-14

## 2021-06-11 RX ORDER — PROMETHAZINE HYDROCHLORIDE AND CODEINE PHOSPHATE 6.25; 1 MG/5ML; MG/5ML
5 SOLUTION ORAL EVERY 4 HOURS PRN
Status: DISCONTINUED | OUTPATIENT
Start: 2021-06-11 | End: 2021-06-15 | Stop reason: HOSPADM

## 2021-06-11 RX ORDER — ONDANSETRON 2 MG/ML
4 INJECTION INTRAMUSCULAR; INTRAVENOUS EVERY 8 HOURS PRN
Status: DISCONTINUED | OUTPATIENT
Start: 2021-06-11 | End: 2021-06-15 | Stop reason: HOSPADM

## 2021-06-11 RX ORDER — HYDROCODONE BITARTRATE AND ACETAMINOPHEN 5; 325 MG/1; MG/1
1 TABLET ORAL EVERY 4 HOURS PRN
Status: DISCONTINUED | OUTPATIENT
Start: 2021-06-11 | End: 2021-06-11

## 2021-06-11 RX ORDER — IPRATROPIUM BROMIDE AND ALBUTEROL SULFATE 2.5; .5 MG/3ML; MG/3ML
3 SOLUTION RESPIRATORY (INHALATION)
Status: DISCONTINUED | OUTPATIENT
Start: 2021-06-11 | End: 2021-06-15 | Stop reason: HOSPADM

## 2021-06-11 RX ORDER — MORPHINE SULFATE 4 MG/ML
4 INJECTION, SOLUTION INTRAMUSCULAR; INTRAVENOUS EVERY 4 HOURS PRN
Status: DISCONTINUED | OUTPATIENT
Start: 2021-06-11 | End: 2021-06-14

## 2021-06-11 RX ORDER — ENOXAPARIN SODIUM 100 MG/ML
40 INJECTION SUBCUTANEOUS EVERY 24 HOURS
Status: DISCONTINUED | OUTPATIENT
Start: 2021-06-11 | End: 2021-06-12

## 2021-06-11 RX ORDER — BENZONATATE 100 MG/1
100 CAPSULE ORAL 3 TIMES DAILY
Status: DISCONTINUED | OUTPATIENT
Start: 2021-06-11 | End: 2021-06-15 | Stop reason: HOSPADM

## 2021-06-11 RX ORDER — ONDANSETRON 4 MG/1
8 TABLET, ORALLY DISINTEGRATING ORAL EVERY 8 HOURS PRN
Status: DISCONTINUED | OUTPATIENT
Start: 2021-06-11 | End: 2021-06-15 | Stop reason: HOSPADM

## 2021-06-11 RX ORDER — SODIUM CHLORIDE, SODIUM LACTATE, POTASSIUM CHLORIDE, CALCIUM CHLORIDE 600; 310; 30; 20 MG/100ML; MG/100ML; MG/100ML; MG/100ML
1000 INJECTION, SOLUTION INTRAVENOUS
Status: COMPLETED | OUTPATIENT
Start: 2021-06-11 | End: 2021-06-11

## 2021-06-11 RX ORDER — SODIUM CHLORIDE 9 MG/ML
INJECTION, SOLUTION INTRAVENOUS CONTINUOUS
Status: DISCONTINUED | OUTPATIENT
Start: 2021-06-11 | End: 2021-06-14

## 2021-06-11 RX ORDER — POLYETHYLENE GLYCOL 3350 17 G/17G
17 POWDER, FOR SOLUTION ORAL DAILY
Status: DISCONTINUED | OUTPATIENT
Start: 2021-06-11 | End: 2021-06-15 | Stop reason: HOSPADM

## 2021-06-11 RX ORDER — HYDROCODONE BITARTRATE AND ACETAMINOPHEN 5; 325 MG/1; MG/1
1 TABLET ORAL EVERY 4 HOURS PRN
Status: DISCONTINUED | OUTPATIENT
Start: 2021-06-11 | End: 2021-06-15 | Stop reason: HOSPADM

## 2021-06-11 RX ORDER — ACETAMINOPHEN 325 MG/1
650 TABLET ORAL EVERY 8 HOURS PRN
Status: DISCONTINUED | OUTPATIENT
Start: 2021-06-11 | End: 2021-06-15 | Stop reason: HOSPADM

## 2021-06-11 RX ORDER — GUAIFENESIN 600 MG/1
600 TABLET, EXTENDED RELEASE ORAL 2 TIMES DAILY
Status: DISCONTINUED | OUTPATIENT
Start: 2021-06-11 | End: 2021-06-15 | Stop reason: HOSPADM

## 2021-06-11 RX ORDER — TALC
6 POWDER (GRAM) TOPICAL NIGHTLY PRN
Status: DISCONTINUED | OUTPATIENT
Start: 2021-06-11 | End: 2021-06-15 | Stop reason: HOSPADM

## 2021-06-11 RX ORDER — SODIUM CHLORIDE 0.9 % (FLUSH) 0.9 %
10 SYRINGE (ML) INJECTION
Status: DISCONTINUED | OUTPATIENT
Start: 2021-06-11 | End: 2021-06-15 | Stop reason: HOSPADM

## 2021-06-11 RX ORDER — SODIUM CHLORIDE, SODIUM LACTATE, POTASSIUM CHLORIDE, CALCIUM CHLORIDE 600; 310; 30; 20 MG/100ML; MG/100ML; MG/100ML; MG/100ML
INJECTION, SOLUTION INTRAVENOUS CONTINUOUS
Status: DISCONTINUED | OUTPATIENT
Start: 2021-06-11 | End: 2021-06-11

## 2021-06-11 RX ADMIN — PIPERACILLIN AND TAZOBACTAM 4.5 G: 4; .5 INJECTION, POWDER, LYOPHILIZED, FOR SOLUTION INTRAVENOUS; PARENTERAL at 07:06

## 2021-06-11 RX ADMIN — BENZONATATE 100 MG: 100 CAPSULE ORAL at 01:06

## 2021-06-11 RX ADMIN — GUAIFENESIN 600 MG: 600 TABLET, EXTENDED RELEASE ORAL at 10:06

## 2021-06-11 RX ADMIN — VANCOMYCIN HYDROCHLORIDE 2000 MG: 1 INJECTION, POWDER, LYOPHILIZED, FOR SOLUTION INTRAVENOUS at 09:06

## 2021-06-11 RX ADMIN — SODIUM CHLORIDE 1000 ML: 0.9 INJECTION, SOLUTION INTRAVENOUS at 04:06

## 2021-06-11 RX ADMIN — POLYETHYLENE GLYCOL 3350 17 G: 17 POWDER, FOR SOLUTION ORAL at 09:06

## 2021-06-11 RX ADMIN — BENZONATATE 100 MG: 100 CAPSULE ORAL at 09:06

## 2021-06-11 RX ADMIN — METHYLPREDNISOLONE SODIUM SUCCINATE 80 MG: 125 INJECTION, POWDER, FOR SOLUTION INTRAMUSCULAR; INTRAVENOUS at 10:06

## 2021-06-11 RX ADMIN — SODIUM CHLORIDE: 0.9 INJECTION, SOLUTION INTRAVENOUS at 09:06

## 2021-06-11 RX ADMIN — SODIUM CHLORIDE, SODIUM LACTATE, POTASSIUM CHLORIDE, AND CALCIUM CHLORIDE 1000 ML: .6; .31; .03; .02 INJECTION, SOLUTION INTRAVENOUS at 02:06

## 2021-06-11 RX ADMIN — PIPERACILLIN AND TAZOBACTAM 4.5 G: 4; .5 INJECTION, POWDER, LYOPHILIZED, FOR SOLUTION INTRAVENOUS; PARENTERAL at 11:06

## 2021-06-11 RX ADMIN — ENOXAPARIN SODIUM 40 MG: 40 INJECTION SUBCUTANEOUS at 04:06

## 2021-06-11 RX ADMIN — IPRATROPIUM BROMIDE AND ALBUTEROL SULFATE 3 ML: .5; 2.5 SOLUTION RESPIRATORY (INHALATION) at 07:06

## 2021-06-11 RX ADMIN — VANCOMYCIN HYDROCHLORIDE 1500 MG: 1.5 INJECTION, POWDER, LYOPHILIZED, FOR SOLUTION INTRAVENOUS at 12:06

## 2021-06-11 RX ADMIN — PIPERACILLIN AND TAZOBACTAM 4.5 G: 4; .5 INJECTION, POWDER, LYOPHILIZED, FOR SOLUTION INTRAVENOUS; PARENTERAL at 02:06

## 2021-06-11 RX ADMIN — IPRATROPIUM BROMIDE AND ALBUTEROL SULFATE 3 ML: .5; 2.5 SOLUTION RESPIRATORY (INHALATION) at 12:06

## 2021-06-11 RX ADMIN — PROMETHAZINE HYDROCHLORIDE AND CODEINE PHOSPHATE 5 ML: 10; 6.25 SOLUTION ORAL at 10:06

## 2021-06-11 RX ADMIN — VANCOMYCIN HYDROCHLORIDE 1250 MG: 1.25 INJECTION, POWDER, LYOPHILIZED, FOR SOLUTION INTRAVENOUS at 09:06

## 2021-06-11 RX ADMIN — METHYLPREDNISOLONE SODIUM SUCCINATE 80 MG: 125 INJECTION, POWDER, FOR SOLUTION INTRAMUSCULAR; INTRAVENOUS at 07:06

## 2021-06-11 RX ADMIN — METHYLPREDNISOLONE SODIUM SUCCINATE 80 MG: 125 INJECTION, POWDER, FOR SOLUTION INTRAMUSCULAR; INTRAVENOUS at 01:06

## 2021-06-11 RX ADMIN — GUAIFENESIN 600 MG: 600 TABLET, EXTENDED RELEASE ORAL at 09:06

## 2021-06-11 RX ADMIN — ACETAMINOPHEN 650 MG: 325 TABLET ORAL at 09:06

## 2021-06-11 RX ADMIN — IPRATROPIUM BROMIDE AND ALBUTEROL SULFATE 3 ML: .5; 2.5 SOLUTION RESPIRATORY (INHALATION) at 04:06

## 2021-06-12 PROBLEM — B95.5 STREPTOCOCCAL BACTEREMIA: Status: ACTIVE | Noted: 2021-06-12

## 2021-06-12 PROBLEM — R78.81 STREPTOCOCCAL BACTEREMIA: Status: ACTIVE | Noted: 2021-06-12

## 2021-06-12 LAB
ALBUMIN SERPL BCP-MCNC: 2.6 G/DL (ref 3.5–5.2)
ALP SERPL-CCNC: 97 U/L (ref 55–135)
ALT SERPL W/O P-5'-P-CCNC: 39 U/L (ref 10–44)
ANION GAP SERPL CALC-SCNC: 10 MMOL/L (ref 8–16)
AST SERPL-CCNC: <3 U/L (ref 10–40)
BASOPHILS NFR BLD: 0 % (ref 0–1.9)
BILIRUB SERPL-MCNC: 0.4 MG/DL (ref 0.1–1)
BUN SERPL-MCNC: 21 MG/DL (ref 6–20)
CALCIUM SERPL-MCNC: 9.1 MG/DL (ref 8.7–10.5)
CHLORIDE SERPL-SCNC: 111 MMOL/L (ref 95–110)
CO2 SERPL-SCNC: 22 MMOL/L (ref 23–29)
CREAT SERPL-MCNC: 0.8 MG/DL (ref 0.5–1.4)
DIFFERENTIAL METHOD: ABNORMAL
EOSINOPHIL NFR BLD: 0 % (ref 0–8)
ERYTHROCYTE [DISTWIDTH] IN BLOOD BY AUTOMATED COUNT: 16.6 % (ref 11.5–14.5)
EST. GFR  (AFRICAN AMERICAN): >60 ML/MIN/1.73 M^2
EST. GFR  (NON AFRICAN AMERICAN): >60 ML/MIN/1.73 M^2
GLUCOSE SERPL-MCNC: 238 MG/DL (ref 70–110)
HCT VFR BLD AUTO: 26.1 % (ref 37–48.5)
HGB BLD-MCNC: 8.1 G/DL (ref 12–16)
IMM GRANULOCYTES # BLD AUTO: ABNORMAL K/UL (ref 0–0.04)
IMM GRANULOCYTES NFR BLD AUTO: ABNORMAL % (ref 0–0.5)
LYMPHOCYTES NFR BLD: 14 % (ref 18–48)
MAGNESIUM SERPL-MCNC: 2.6 MG/DL (ref 1.6–2.6)
MCH RBC QN AUTO: 25.2 PG (ref 27–31)
MCHC RBC AUTO-ENTMCNC: 31 G/DL (ref 32–36)
MCV RBC AUTO: 81 FL (ref 82–98)
MONOCYTES NFR BLD: 5 % (ref 4–15)
NEUTROPHILS NFR BLD: 79 % (ref 38–73)
NEUTS BAND NFR BLD MANUAL: 2 %
NRBC BLD-RTO: 0 /100 WBC
PLATELET # BLD AUTO: 79 K/UL (ref 150–450)
PMV BLD AUTO: 12 FL (ref 9.2–12.9)
POTASSIUM SERPL-SCNC: 3.8 MMOL/L (ref 3.5–5.1)
PROT SERPL-MCNC: 5.8 G/DL (ref 6–8.4)
RBC # BLD AUTO: 3.21 M/UL (ref 4–5.4)
SODIUM SERPL-SCNC: 143 MMOL/L (ref 136–145)
VANCOMYCIN TROUGH SERPL-MCNC: 17.5 UG/ML (ref 10–22)
WBC # BLD AUTO: 6.24 K/UL (ref 3.9–12.7)

## 2021-06-12 PROCEDURE — 11000001 HC ACUTE MED/SURG PRIVATE ROOM

## 2021-06-12 PROCEDURE — 94761 N-INVAS EAR/PLS OXIMETRY MLT: CPT

## 2021-06-12 PROCEDURE — 87070 CULTURE OTHR SPECIMN AEROBIC: CPT | Performed by: NURSE PRACTITIONER

## 2021-06-12 PROCEDURE — 80053 COMPREHEN METABOLIC PANEL: CPT | Performed by: NURSE PRACTITIONER

## 2021-06-12 PROCEDURE — 25000003 PHARM REV CODE 250: Performed by: NURSE PRACTITIONER

## 2021-06-12 PROCEDURE — 99900031 HC PATIENT EDUCATION (STAT)

## 2021-06-12 PROCEDURE — 83735 ASSAY OF MAGNESIUM: CPT | Performed by: NURSE PRACTITIONER

## 2021-06-12 PROCEDURE — 87205 SMEAR GRAM STAIN: CPT | Performed by: NURSE PRACTITIONER

## 2021-06-12 PROCEDURE — 94640 AIRWAY INHALATION TREATMENT: CPT

## 2021-06-12 PROCEDURE — 87040 BLOOD CULTURE FOR BACTERIA: CPT | Mod: 59 | Performed by: INTERNAL MEDICINE

## 2021-06-12 PROCEDURE — 80202 ASSAY OF VANCOMYCIN: CPT | Performed by: NURSE PRACTITIONER

## 2021-06-12 PROCEDURE — 63600175 PHARM REV CODE 636 W HCPCS: Performed by: NURSE PRACTITIONER

## 2021-06-12 PROCEDURE — 85007 BL SMEAR W/DIFF WBC COUNT: CPT | Performed by: NURSE PRACTITIONER

## 2021-06-12 PROCEDURE — 85027 COMPLETE CBC AUTOMATED: CPT | Performed by: NURSE PRACTITIONER

## 2021-06-12 PROCEDURE — 36415 COLL VENOUS BLD VENIPUNCTURE: CPT | Performed by: NURSE PRACTITIONER

## 2021-06-12 PROCEDURE — 25000242 PHARM REV CODE 250 ALT 637 W/ HCPCS: Performed by: NURSE PRACTITIONER

## 2021-06-12 PROCEDURE — 99900035 HC TECH TIME PER 15 MIN (STAT)

## 2021-06-12 PROCEDURE — 36415 COLL VENOUS BLD VENIPUNCTURE: CPT | Performed by: INTERNAL MEDICINE

## 2021-06-12 PROCEDURE — 27000221 HC OXYGEN, UP TO 24 HOURS

## 2021-06-12 RX ADMIN — METHYLPREDNISOLONE SODIUM SUCCINATE 80 MG: 125 INJECTION, POWDER, FOR SOLUTION INTRAMUSCULAR; INTRAVENOUS at 10:06

## 2021-06-12 RX ADMIN — IPRATROPIUM BROMIDE AND ALBUTEROL SULFATE 3 ML: .5; 2.5 SOLUTION RESPIRATORY (INHALATION) at 08:06

## 2021-06-12 RX ADMIN — MELATONIN 6 MG: at 08:06

## 2021-06-12 RX ADMIN — PIPERACILLIN AND TAZOBACTAM 4.5 G: 4; .5 INJECTION, POWDER, LYOPHILIZED, FOR SOLUTION INTRAVENOUS; PARENTERAL at 07:06

## 2021-06-12 RX ADMIN — VANCOMYCIN HYDROCHLORIDE 1250 MG: 1.25 INJECTION, POWDER, LYOPHILIZED, FOR SOLUTION INTRAVENOUS at 08:06

## 2021-06-12 RX ADMIN — POLYETHYLENE GLYCOL 3350 17 G: 17 POWDER, FOR SOLUTION ORAL at 08:06

## 2021-06-12 RX ADMIN — IPRATROPIUM BROMIDE AND ALBUTEROL SULFATE 3 ML: .5; 2.5 SOLUTION RESPIRATORY (INHALATION) at 12:06

## 2021-06-12 RX ADMIN — PIPERACILLIN AND TAZOBACTAM 4.5 G: 4; .5 INJECTION, POWDER, LYOPHILIZED, FOR SOLUTION INTRAVENOUS; PARENTERAL at 02:06

## 2021-06-12 RX ADMIN — METHYLPREDNISOLONE SODIUM SUCCINATE 80 MG: 125 INJECTION, POWDER, FOR SOLUTION INTRAMUSCULAR; INTRAVENOUS at 02:06

## 2021-06-12 RX ADMIN — IPRATROPIUM BROMIDE AND ALBUTEROL SULFATE 3 ML: .5; 2.5 SOLUTION RESPIRATORY (INHALATION) at 04:06

## 2021-06-12 RX ADMIN — IPRATROPIUM BROMIDE AND ALBUTEROL SULFATE 3 ML: .5; 2.5 SOLUTION RESPIRATORY (INHALATION) at 07:06

## 2021-06-12 RX ADMIN — BENZONATATE 100 MG: 100 CAPSULE ORAL at 02:06

## 2021-06-12 RX ADMIN — PIPERACILLIN AND TAZOBACTAM 4.5 G: 4; .5 INJECTION, POWDER, LYOPHILIZED, FOR SOLUTION INTRAVENOUS; PARENTERAL at 10:06

## 2021-06-12 RX ADMIN — GUAIFENESIN 600 MG: 600 TABLET, EXTENDED RELEASE ORAL at 08:06

## 2021-06-12 RX ADMIN — BENZONATATE 100 MG: 100 CAPSULE ORAL at 10:06

## 2021-06-12 RX ADMIN — METHYLPREDNISOLONE SODIUM SUCCINATE 80 MG: 125 INJECTION, POWDER, FOR SOLUTION INTRAMUSCULAR; INTRAVENOUS at 05:06

## 2021-06-12 RX ADMIN — BENZONATATE 100 MG: 100 CAPSULE ORAL at 05:06

## 2021-06-13 LAB
ALBUMIN SERPL BCP-MCNC: 2.7 G/DL (ref 3.5–5.2)
ALP SERPL-CCNC: 87 U/L (ref 55–135)
ALT SERPL W/O P-5'-P-CCNC: 37 U/L (ref 10–44)
ANION GAP SERPL CALC-SCNC: 13 MMOL/L (ref 8–16)
AST SERPL-CCNC: <3 U/L (ref 10–40)
BACTERIA UR CULT: NO GROWTH
BASOPHILS # BLD AUTO: 0 K/UL (ref 0–0.2)
BASOPHILS NFR BLD: 0 % (ref 0–1.9)
BILIRUB SERPL-MCNC: 0.2 MG/DL (ref 0.1–1)
BUN SERPL-MCNC: 30 MG/DL (ref 6–20)
CALCIUM SERPL-MCNC: 9.6 MG/DL (ref 8.7–10.5)
CHLORIDE SERPL-SCNC: 112 MMOL/L (ref 95–110)
CO2 SERPL-SCNC: 18 MMOL/L (ref 23–29)
CREAT SERPL-MCNC: 1 MG/DL (ref 0.5–1.4)
DIFFERENTIAL METHOD: ABNORMAL
EOSINOPHIL # BLD AUTO: 0 K/UL (ref 0–0.5)
EOSINOPHIL NFR BLD: 0 % (ref 0–8)
ERYTHROCYTE [DISTWIDTH] IN BLOOD BY AUTOMATED COUNT: 16.7 % (ref 11.5–14.5)
EST. GFR  (AFRICAN AMERICAN): >60 ML/MIN/1.73 M^2
EST. GFR  (NON AFRICAN AMERICAN): >60 ML/MIN/1.73 M^2
GLUCOSE SERPL-MCNC: 341 MG/DL (ref 70–110)
HCT VFR BLD AUTO: 25.1 % (ref 37–48.5)
HGB BLD-MCNC: 7.6 G/DL (ref 12–16)
IMM GRANULOCYTES # BLD AUTO: 0.16 K/UL (ref 0–0.04)
IMM GRANULOCYTES NFR BLD AUTO: 4.1 % (ref 0–0.5)
LYMPHOCYTES # BLD AUTO: 0.8 K/UL (ref 1–4.8)
LYMPHOCYTES NFR BLD: 21.6 % (ref 18–48)
MAGNESIUM SERPL-MCNC: 2.6 MG/DL (ref 1.6–2.6)
MCH RBC QN AUTO: 25.1 PG (ref 27–31)
MCHC RBC AUTO-ENTMCNC: 30.3 G/DL (ref 32–36)
MCV RBC AUTO: 83 FL (ref 82–98)
MONOCYTES # BLD AUTO: 0.2 K/UL (ref 0.3–1)
MONOCYTES NFR BLD: 5.9 % (ref 4–15)
NEUTROPHILS # BLD AUTO: 2.7 K/UL (ref 1.8–7.7)
NEUTROPHILS NFR BLD: 68.4 % (ref 38–73)
NRBC BLD-RTO: 0 /100 WBC
PLATELET # BLD AUTO: 85 K/UL (ref 150–450)
PMV BLD AUTO: 11.9 FL (ref 9.2–12.9)
POCT GLUCOSE: 269 MG/DL (ref 70–110)
POTASSIUM SERPL-SCNC: 4 MMOL/L (ref 3.5–5.1)
PROT SERPL-MCNC: 5.9 G/DL (ref 6–8.4)
RBC # BLD AUTO: 3.03 M/UL (ref 4–5.4)
SODIUM SERPL-SCNC: 143 MMOL/L (ref 136–145)
WBC # BLD AUTO: 3.89 K/UL (ref 3.9–12.7)

## 2021-06-13 PROCEDURE — 85025 COMPLETE CBC W/AUTO DIFF WBC: CPT | Performed by: NURSE PRACTITIONER

## 2021-06-13 PROCEDURE — C9399 UNCLASSIFIED DRUGS OR BIOLOG: HCPCS | Performed by: INTERNAL MEDICINE

## 2021-06-13 PROCEDURE — 25000242 PHARM REV CODE 250 ALT 637 W/ HCPCS: Performed by: NURSE PRACTITIONER

## 2021-06-13 PROCEDURE — 11000001 HC ACUTE MED/SURG PRIVATE ROOM

## 2021-06-13 PROCEDURE — 94640 AIRWAY INHALATION TREATMENT: CPT

## 2021-06-13 PROCEDURE — 63600175 PHARM REV CODE 636 W HCPCS: Performed by: NURSE PRACTITIONER

## 2021-06-13 PROCEDURE — 25000003 PHARM REV CODE 250: Performed by: NURSE PRACTITIONER

## 2021-06-13 PROCEDURE — 63600175 PHARM REV CODE 636 W HCPCS: Performed by: INTERNAL MEDICINE

## 2021-06-13 PROCEDURE — 83735 ASSAY OF MAGNESIUM: CPT | Performed by: NURSE PRACTITIONER

## 2021-06-13 PROCEDURE — 80053 COMPREHEN METABOLIC PANEL: CPT | Performed by: NURSE PRACTITIONER

## 2021-06-13 PROCEDURE — 94761 N-INVAS EAR/PLS OXIMETRY MLT: CPT

## 2021-06-13 PROCEDURE — 99900035 HC TECH TIME PER 15 MIN (STAT)

## 2021-06-13 PROCEDURE — 99900031 HC PATIENT EDUCATION (STAT)

## 2021-06-13 PROCEDURE — 27000221 HC OXYGEN, UP TO 24 HOURS

## 2021-06-13 PROCEDURE — 25000003 PHARM REV CODE 250: Performed by: INTERNAL MEDICINE

## 2021-06-13 RX ORDER — INSULIN ASPART 100 [IU]/ML
10 INJECTION, SOLUTION INTRAVENOUS; SUBCUTANEOUS
Status: DISCONTINUED | OUTPATIENT
Start: 2021-06-13 | End: 2021-06-15 | Stop reason: HOSPADM

## 2021-06-13 RX ORDER — AMLODIPINE BESYLATE 5 MG/1
5 TABLET ORAL DAILY
Status: DISCONTINUED | OUTPATIENT
Start: 2021-06-13 | End: 2021-06-15 | Stop reason: HOSPADM

## 2021-06-13 RX ORDER — INSULIN ASPART 100 [IU]/ML
1-10 INJECTION, SOLUTION INTRAVENOUS; SUBCUTANEOUS
Status: DISCONTINUED | OUTPATIENT
Start: 2021-06-13 | End: 2021-06-15 | Stop reason: HOSPADM

## 2021-06-13 RX ORDER — IBUPROFEN 200 MG
24 TABLET ORAL
Status: DISCONTINUED | OUTPATIENT
Start: 2021-06-13 | End: 2021-06-15 | Stop reason: HOSPADM

## 2021-06-13 RX ORDER — IBUPROFEN 200 MG
16 TABLET ORAL
Status: DISCONTINUED | OUTPATIENT
Start: 2021-06-13 | End: 2021-06-15 | Stop reason: HOSPADM

## 2021-06-13 RX ORDER — GLUCAGON 1 MG
1 KIT INJECTION
Status: DISCONTINUED | OUTPATIENT
Start: 2021-06-13 | End: 2021-06-15 | Stop reason: HOSPADM

## 2021-06-13 RX ADMIN — INSULIN DETEMIR 30 UNITS: 100 INJECTION, SOLUTION SUBCUTANEOUS at 08:06

## 2021-06-13 RX ADMIN — INSULIN ASPART 6 UNITS: 100 INJECTION, SOLUTION INTRAVENOUS; SUBCUTANEOUS at 05:06

## 2021-06-13 RX ADMIN — BENZONATATE 100 MG: 100 CAPSULE ORAL at 09:06

## 2021-06-13 RX ADMIN — POLYETHYLENE GLYCOL 3350 17 G: 17 POWDER, FOR SOLUTION ORAL at 08:06

## 2021-06-13 RX ADMIN — GUAIFENESIN 600 MG: 600 TABLET, EXTENDED RELEASE ORAL at 08:06

## 2021-06-13 RX ADMIN — METHYLPREDNISOLONE SODIUM SUCCINATE 80 MG: 125 INJECTION, POWDER, FOR SOLUTION INTRAMUSCULAR; INTRAVENOUS at 09:06

## 2021-06-13 RX ADMIN — VANCOMYCIN HYDROCHLORIDE 1250 MG: 1.25 INJECTION, POWDER, LYOPHILIZED, FOR SOLUTION INTRAVENOUS at 08:06

## 2021-06-13 RX ADMIN — BENZONATATE 100 MG: 100 CAPSULE ORAL at 05:06

## 2021-06-13 RX ADMIN — PIPERACILLIN AND TAZOBACTAM 4.5 G: 4; .5 INJECTION, POWDER, LYOPHILIZED, FOR SOLUTION INTRAVENOUS; PARENTERAL at 08:06

## 2021-06-13 RX ADMIN — IPRATROPIUM BROMIDE AND ALBUTEROL SULFATE 3 ML: .5; 2.5 SOLUTION RESPIRATORY (INHALATION) at 07:06

## 2021-06-13 RX ADMIN — METHYLPREDNISOLONE SODIUM SUCCINATE 80 MG: 125 INJECTION, POWDER, FOR SOLUTION INTRAMUSCULAR; INTRAVENOUS at 02:06

## 2021-06-13 RX ADMIN — ACETAMINOPHEN 650 MG: 325 TABLET ORAL at 06:06

## 2021-06-13 RX ADMIN — PIPERACILLIN AND TAZOBACTAM 4.5 G: 4; .5 INJECTION, POWDER, LYOPHILIZED, FOR SOLUTION INTRAVENOUS; PARENTERAL at 02:06

## 2021-06-13 RX ADMIN — IPRATROPIUM BROMIDE AND ALBUTEROL SULFATE 3 ML: .5; 2.5 SOLUTION RESPIRATORY (INHALATION) at 03:06

## 2021-06-13 RX ADMIN — AMLODIPINE BESYLATE 5 MG: 5 TABLET ORAL at 02:06

## 2021-06-13 RX ADMIN — MELATONIN 6 MG: at 08:06

## 2021-06-13 RX ADMIN — BENZONATATE 100 MG: 100 CAPSULE ORAL at 02:06

## 2021-06-13 RX ADMIN — IPRATROPIUM BROMIDE AND ALBUTEROL SULFATE 3 ML: .5; 2.5 SOLUTION RESPIRATORY (INHALATION) at 11:06

## 2021-06-13 RX ADMIN — INSULIN ASPART 10 UNITS: 100 INJECTION, SOLUTION INTRAVENOUS; SUBCUTANEOUS at 05:06

## 2021-06-13 RX ADMIN — METHYLPREDNISOLONE SODIUM SUCCINATE 80 MG: 125 INJECTION, POWDER, FOR SOLUTION INTRAMUSCULAR; INTRAVENOUS at 05:06

## 2021-06-13 RX ADMIN — PIPERACILLIN AND TAZOBACTAM 4.5 G: 4; .5 INJECTION, POWDER, LYOPHILIZED, FOR SOLUTION INTRAVENOUS; PARENTERAL at 11:06

## 2021-06-13 RX ADMIN — INSULIN ASPART 10 UNITS: 100 INJECTION, SOLUTION INTRAVENOUS; SUBCUTANEOUS at 01:06

## 2021-06-14 ENCOUNTER — CLINICAL SUPPORT (OUTPATIENT)
Dept: CARDIOLOGY | Facility: HOSPITAL | Age: 46
DRG: 871 | End: 2021-06-14
Attending: INTERNAL MEDICINE
Payer: COMMERCIAL

## 2021-06-14 LAB
ALBUMIN SERPL BCP-MCNC: 2.8 G/DL (ref 3.5–5.2)
ALP SERPL-CCNC: 81 U/L (ref 55–135)
ALT SERPL W/O P-5'-P-CCNC: 48 U/L (ref 10–44)
ANION GAP SERPL CALC-SCNC: 13 MMOL/L (ref 8–16)
AORTIC ROOT ANNULUS: 2.78 CM
AST SERPL-CCNC: 12 U/L (ref 10–40)
AV INDEX (PROSTH): 0.81
AV MEAN GRADIENT: 4 MMHG
AV PEAK GRADIENT: 9 MMHG
AV VALVE AREA: 3.41 CM2
AV VELOCITY RATIO: 0.73
BACTERIA BLD CULT: ABNORMAL
BASOPHILS # BLD AUTO: 0.02 K/UL (ref 0–0.2)
BASOPHILS NFR BLD: 0.5 % (ref 0–1.9)
BILIRUB SERPL-MCNC: 0.4 MG/DL (ref 0.1–1)
BSA FOR ECHO PROCEDURE: 2.52 M2
BUN SERPL-MCNC: 31 MG/DL (ref 6–20)
CALCIUM SERPL-MCNC: 9.3 MG/DL (ref 8.7–10.5)
CHLORIDE SERPL-SCNC: 113 MMOL/L (ref 95–110)
CO2 SERPL-SCNC: 18 MMOL/L (ref 23–29)
CREAT SERPL-MCNC: 0.9 MG/DL (ref 0.5–1.4)
CV ECHO LV RWT: 0.49 CM
DIFFERENTIAL METHOD: ABNORMAL
DOP CALC AO PEAK VEL: 1.5 M/S
DOP CALC AO VTI: 32.24 CM
DOP CALC LVOT AREA: 4.2 CM2
DOP CALC LVOT DIAMETER: 2.31 CM
DOP CALC LVOT PEAK VEL: 1.09 M/S
DOP CALC LVOT STROKE VOLUME: 109.92 CM3
DOP CALCLVOT PEAK VEL VTI: 26.24 CM
E WAVE DECELERATION TIME: 134.35 MSEC
E/A RATIO: 1.46
ECHO LV POSTERIOR WALL: 1.24 CM (ref 0.6–1.1)
EJECTION FRACTION: 60 %
EOSINOPHIL # BLD AUTO: 0 K/UL (ref 0–0.5)
EOSINOPHIL NFR BLD: 0 % (ref 0–8)
ERYTHROCYTE [DISTWIDTH] IN BLOOD BY AUTOMATED COUNT: 16.7 % (ref 11.5–14.5)
EST. GFR  (AFRICAN AMERICAN): >60 ML/MIN/1.73 M^2
EST. GFR  (NON AFRICAN AMERICAN): >60 ML/MIN/1.73 M^2
FRACTIONAL SHORTENING: 35 % (ref 28–44)
GLUCOSE SERPL-MCNC: 222 MG/DL (ref 70–110)
HCT VFR BLD AUTO: 27.3 % (ref 37–48.5)
HGB BLD-MCNC: 8.3 G/DL (ref 12–16)
IMM GRANULOCYTES # BLD AUTO: 0.19 K/UL (ref 0–0.04)
IMM GRANULOCYTES NFR BLD AUTO: 4.8 % (ref 0–0.5)
INTERVENTRICULAR SEPTUM: 0.88 CM (ref 0.6–1.1)
LEFT ATRIUM SIZE: 4.33 CM
LEFT INTERNAL DIMENSION IN SYSTOLE: 3.28 CM (ref 2.1–4)
LEFT VENTRICLE DIASTOLIC VOLUME INDEX: 50.03 ML/M2
LEFT VENTRICLE DIASTOLIC VOLUME: 119.56 ML
LEFT VENTRICLE MASS INDEX: 83 G/M2
LEFT VENTRICLE SYSTOLIC VOLUME INDEX: 18.2 ML/M2
LEFT VENTRICLE SYSTOLIC VOLUME: 43.42 ML
LEFT VENTRICULAR INTERNAL DIMENSION IN DIASTOLE: 5.02 CM (ref 3.5–6)
LEFT VENTRICULAR MASS: 198.19 G
LYMPHOCYTES # BLD AUTO: 1.1 K/UL (ref 1–4.8)
LYMPHOCYTES NFR BLD: 26.3 % (ref 18–48)
MAGNESIUM SERPL-MCNC: 2.4 MG/DL (ref 1.6–2.6)
MCH RBC QN AUTO: 25.2 PG (ref 27–31)
MCHC RBC AUTO-ENTMCNC: 30.4 G/DL (ref 32–36)
MCV RBC AUTO: 83 FL (ref 82–98)
MONOCYTES # BLD AUTO: 0.2 K/UL (ref 0.3–1)
MONOCYTES NFR BLD: 5.8 % (ref 4–15)
MV PEAK A VEL: 0.69 M/S
MV PEAK E VEL: 1.01 M/S
MV STENOSIS PRESSURE HALF TIME: 38.96 MS
MV VALVE AREA P 1/2 METHOD: 5.65 CM2
NEUTROPHILS # BLD AUTO: 2.5 K/UL (ref 1.8–7.7)
NEUTROPHILS NFR BLD: 62.6 % (ref 38–73)
NRBC BLD-RTO: 0 /100 WBC
PISA MRMAX VEL: 0.05 M/S
PISA TR MAX VEL: 3.35 M/S
PLATELET # BLD AUTO: 84 K/UL (ref 150–450)
PMV BLD AUTO: 12.5 FL (ref 9.2–12.9)
POCT GLUCOSE: 264 MG/DL (ref 70–110)
POCT GLUCOSE: 271 MG/DL (ref 70–110)
POCT GLUCOSE: 272 MG/DL (ref 70–110)
POTASSIUM SERPL-SCNC: 4.1 MMOL/L (ref 3.5–5.1)
PROT SERPL-MCNC: 5.7 G/DL (ref 6–8.4)
PV PEAK VELOCITY: 1.02 CM/S
RA PRESSURE: 8 MMHG
RBC # BLD AUTO: 3.29 M/UL (ref 4–5.4)
RIGHT VENTRICULAR END-DIASTOLIC DIMENSION: 3.47 CM
SODIUM SERPL-SCNC: 144 MMOL/L (ref 136–145)
TR MAX PG: 45 MMHG
TV REST PULMONARY ARTERY PRESSURE: 53 MMHG
WBC # BLD AUTO: 3.99 K/UL (ref 3.9–12.7)

## 2021-06-14 PROCEDURE — 80053 COMPREHEN METABOLIC PANEL: CPT | Performed by: NURSE PRACTITIONER

## 2021-06-14 PROCEDURE — 11000001 HC ACUTE MED/SURG PRIVATE ROOM

## 2021-06-14 PROCEDURE — 94640 AIRWAY INHALATION TREATMENT: CPT

## 2021-06-14 PROCEDURE — 99900031 HC PATIENT EDUCATION (STAT)

## 2021-06-14 PROCEDURE — 93306 TTE W/DOPPLER COMPLETE: CPT

## 2021-06-14 PROCEDURE — 85025 COMPLETE CBC W/AUTO DIFF WBC: CPT | Performed by: NURSE PRACTITIONER

## 2021-06-14 PROCEDURE — 25000003 PHARM REV CODE 250: Performed by: INTERNAL MEDICINE

## 2021-06-14 PROCEDURE — 94799 UNLISTED PULMONARY SVC/PX: CPT

## 2021-06-14 PROCEDURE — 99900035 HC TECH TIME PER 15 MIN (STAT)

## 2021-06-14 PROCEDURE — 97164 PT RE-EVAL EST PLAN CARE: CPT

## 2021-06-14 PROCEDURE — 94761 N-INVAS EAR/PLS OXIMETRY MLT: CPT

## 2021-06-14 PROCEDURE — 25000003 PHARM REV CODE 250: Performed by: NURSE PRACTITIONER

## 2021-06-14 PROCEDURE — 63600175 PHARM REV CODE 636 W HCPCS: Performed by: NURSE PRACTITIONER

## 2021-06-14 PROCEDURE — 36415 COLL VENOUS BLD VENIPUNCTURE: CPT | Performed by: NURSE PRACTITIONER

## 2021-06-14 PROCEDURE — 83735 ASSAY OF MAGNESIUM: CPT | Performed by: NURSE PRACTITIONER

## 2021-06-14 PROCEDURE — 25000242 PHARM REV CODE 250 ALT 637 W/ HCPCS: Performed by: NURSE PRACTITIONER

## 2021-06-14 RX ORDER — PANTOPRAZOLE SODIUM 40 MG/1
40 TABLET, DELAYED RELEASE ORAL DAILY
Status: DISCONTINUED | OUTPATIENT
Start: 2021-06-14 | End: 2021-06-15 | Stop reason: HOSPADM

## 2021-06-14 RX ORDER — MAG HYDROX/ALUMINUM HYD/SIMETH 200-200-20
20 SUSPENSION, ORAL (FINAL DOSE FORM) ORAL EVERY 6 HOURS PRN
Status: DISCONTINUED | OUTPATIENT
Start: 2021-06-14 | End: 2021-06-15 | Stop reason: HOSPADM

## 2021-06-14 RX ORDER — METHYLPREDNISOLONE SOD SUCC 125 MG
80 VIAL (EA) INJECTION 2 TIMES DAILY
Status: DISCONTINUED | OUTPATIENT
Start: 2021-06-14 | End: 2021-06-15 | Stop reason: HOSPADM

## 2021-06-14 RX ADMIN — METHYLPREDNISOLONE SODIUM SUCCINATE 80 MG: 125 INJECTION, POWDER, FOR SOLUTION INTRAMUSCULAR; INTRAVENOUS at 09:06

## 2021-06-14 RX ADMIN — GUAIFENESIN 600 MG: 600 TABLET, EXTENDED RELEASE ORAL at 09:06

## 2021-06-14 RX ADMIN — VANCOMYCIN HYDROCHLORIDE 1250 MG: 1.25 INJECTION, POWDER, LYOPHILIZED, FOR SOLUTION INTRAVENOUS at 08:06

## 2021-06-14 RX ADMIN — GUAIFENESIN 600 MG: 600 TABLET, EXTENDED RELEASE ORAL at 08:06

## 2021-06-14 RX ADMIN — INSULIN ASPART 10 UNITS: 100 INJECTION, SOLUTION INTRAVENOUS; SUBCUTANEOUS at 04:06

## 2021-06-14 RX ADMIN — PIPERACILLIN AND TAZOBACTAM 4.5 G: 4; .5 INJECTION, POWDER, LYOPHILIZED, FOR SOLUTION INTRAVENOUS; PARENTERAL at 11:06

## 2021-06-14 RX ADMIN — IPRATROPIUM BROMIDE AND ALBUTEROL SULFATE 3 ML: .5; 2.5 SOLUTION RESPIRATORY (INHALATION) at 07:06

## 2021-06-14 RX ADMIN — BENZONATATE 100 MG: 100 CAPSULE ORAL at 02:06

## 2021-06-14 RX ADMIN — INSULIN ASPART 3 UNITS: 100 INJECTION, SOLUTION INTRAVENOUS; SUBCUTANEOUS at 09:06

## 2021-06-14 RX ADMIN — INSULIN ASPART 10 UNITS: 100 INJECTION, SOLUTION INTRAVENOUS; SUBCUTANEOUS at 08:06

## 2021-06-14 RX ADMIN — SODIUM CHLORIDE: 0.9 INJECTION, SOLUTION INTRAVENOUS at 07:06

## 2021-06-14 RX ADMIN — IPRATROPIUM BROMIDE AND ALBUTEROL SULFATE 3 ML: .5; 2.5 SOLUTION RESPIRATORY (INHALATION) at 04:06

## 2021-06-14 RX ADMIN — IPRATROPIUM BROMIDE AND ALBUTEROL SULFATE 3 ML: .5; 2.5 SOLUTION RESPIRATORY (INHALATION) at 12:06

## 2021-06-14 RX ADMIN — INSULIN ASPART 10 UNITS: 100 INJECTION, SOLUTION INTRAVENOUS; SUBCUTANEOUS at 11:06

## 2021-06-14 RX ADMIN — AMLODIPINE BESYLATE 5 MG: 5 TABLET ORAL at 08:06

## 2021-06-14 RX ADMIN — METHYLPREDNISOLONE SODIUM SUCCINATE 80 MG: 125 INJECTION, POWDER, FOR SOLUTION INTRAMUSCULAR; INTRAVENOUS at 05:06

## 2021-06-14 RX ADMIN — VANCOMYCIN HYDROCHLORIDE 1250 MG: 1.25 INJECTION, POWDER, LYOPHILIZED, FOR SOLUTION INTRAVENOUS at 09:06

## 2021-06-14 RX ADMIN — ALUMINUM HYDROXIDE, MAGNESIUM HYDROXIDE, AND SIMETHICONE 20 ML: 200; 200; 20 SUSPENSION ORAL at 09:06

## 2021-06-14 RX ADMIN — BENZONATATE 100 MG: 100 CAPSULE ORAL at 05:06

## 2021-06-14 RX ADMIN — PIPERACILLIN AND TAZOBACTAM 4.5 G: 4; .5 INJECTION, POWDER, LYOPHILIZED, FOR SOLUTION INTRAVENOUS; PARENTERAL at 08:06

## 2021-06-14 RX ADMIN — INSULIN DETEMIR 30 UNITS: 100 INJECTION, SOLUTION SUBCUTANEOUS at 09:06

## 2021-06-14 RX ADMIN — INSULIN ASPART 6 UNITS: 100 INJECTION, SOLUTION INTRAVENOUS; SUBCUTANEOUS at 11:06

## 2021-06-14 RX ADMIN — BENZONATATE 100 MG: 100 CAPSULE ORAL at 09:06

## 2021-06-14 RX ADMIN — PIPERACILLIN AND TAZOBACTAM 4.5 G: 4; .5 INJECTION, POWDER, LYOPHILIZED, FOR SOLUTION INTRAVENOUS; PARENTERAL at 04:06

## 2021-06-14 RX ADMIN — PANTOPRAZOLE SODIUM 40 MG: 40 TABLET, DELAYED RELEASE ORAL at 09:06

## 2021-06-15 VITALS
BODY MASS INDEX: 45.99 KG/M2 | DIASTOLIC BLOOD PRESSURE: 86 MMHG | RESPIRATION RATE: 18 BRPM | HEART RATE: 73 BPM | TEMPERATURE: 98 F | SYSTOLIC BLOOD PRESSURE: 178 MMHG | WEIGHT: 293 LBS | OXYGEN SATURATION: 96 % | HEIGHT: 67 IN

## 2021-06-15 LAB
ALBUMIN SERPL BCP-MCNC: 2.8 G/DL (ref 3.5–5.2)
ALP SERPL-CCNC: 74 U/L (ref 55–135)
ALT SERPL W/O P-5'-P-CCNC: 89 U/L (ref 10–44)
ANION GAP SERPL CALC-SCNC: 13 MMOL/L (ref 8–16)
AST SERPL-CCNC: 27 U/L (ref 10–40)
BACTERIA SPEC AEROBE CULT: NORMAL
BACTERIA SPEC AEROBE CULT: NORMAL
BASOPHILS NFR BLD: 0 % (ref 0–1.9)
BILIRUB SERPL-MCNC: 0.5 MG/DL (ref 0.1–1)
BRPFT: ABNORMAL
BUN SERPL-MCNC: 31 MG/DL (ref 6–20)
CALCIUM SERPL-MCNC: 9.1 MG/DL (ref 8.7–10.5)
CHLORIDE SERPL-SCNC: 111 MMOL/L (ref 95–110)
CO2 SERPL-SCNC: 20 MMOL/L (ref 23–29)
CREAT SERPL-MCNC: 0.9 MG/DL (ref 0.5–1.4)
DIFFERENTIAL METHOD: ABNORMAL
DLCO SINGLE BREATH LLN: 20.78
DLCO SINGLE BREATH PRE REF: 48.9 %
DLCO SINGLE BREATH REF: 26.51
DLCOC SBVA LLN: 3.49
DLCOC SBVA REF: 4.87
DLCOC SINGLE BREATH LLN: 20.78
DLCOC SINGLE BREATH REF: 26.51
DLCOVA LLN: 3.49
DLCOVA PRE REF: 102.9 %
DLCOVA PRE: 5.01 ML/(MIN*MMHG*L) (ref 3.49–6.25)
DLCOVA REF: 4.87
EOSINOPHIL NFR BLD: 0 % (ref 0–8)
ERVN2 LLN: -16448.97
ERVN2 PRE REF: 34.7 %
ERVN2 PRE: 0.36 L (ref -16448.97–16451.03)
ERVN2 REF: 1.03
ERYTHROCYTE [DISTWIDTH] IN BLOOD BY AUTOMATED COUNT: 16.4 % (ref 11.5–14.5)
EST. GFR  (AFRICAN AMERICAN): >60 ML/MIN/1.73 M^2
EST. GFR  (NON AFRICAN AMERICAN): >60 ML/MIN/1.73 M^2
FEF 25 75 CHG: 8.8 %
FEF 25 75 LLN: 1.76
FEF 25 75 POST REF: 73.1 %
FEF 25 75 PRE REF: 67.2 %
FEF 25 75 REF: 3.05
FET100 CHG: -3.7 %
FEV1 CHG: 1.2 %
FEV1 FVC CHG: 1.2 %
FEV1 FVC LLN: 70
FEV1 FVC POST REF: 104.9 %
FEV1 FVC PRE REF: 103.7 %
FEV1 FVC REF: 81
FEV1 LLN: 2.46
FEV1 POST REF: 59 %
FEV1 PRE REF: 58.3 %
FEV1 REF: 3.13
FRCN2 LLN: 2.04
FRCN2 PRE REF: 32.3 %
FRCN2 REF: 2.86
FVC CHG: 0 %
FVC LLN: 3.08
FVC POST REF: 55.8 %
FVC PRE REF: 55.8 %
FVC REF: 3.91
GLUCOSE SERPL-MCNC: 222 MG/DL (ref 70–110)
GRAM STN SPEC: NORMAL
HCT VFR BLD AUTO: 27.5 % (ref 37–48.5)
HGB BLD-MCNC: 8.4 G/DL (ref 12–16)
IMM GRANULOCYTES # BLD AUTO: ABNORMAL K/UL (ref 0–0.04)
IMM GRANULOCYTES NFR BLD AUTO: ABNORMAL % (ref 0–0.5)
IVC PRE: 1.94 L (ref 3.08–4.77)
IVC SINGLE BREATH LLN: 3.08
IVC SINGLE BREATH PRE REF: 49.5 %
IVC SINGLE BREATH REF: 3.91
LYMPHOCYTES NFR BLD: 31 % (ref 18–48)
MAGNESIUM SERPL-MCNC: 2.6 MG/DL (ref 1.6–2.6)
MCH RBC QN AUTO: 25.1 PG (ref 27–31)
MCHC RBC AUTO-ENTMCNC: 30.5 G/DL (ref 32–36)
MCV RBC AUTO: 82 FL (ref 82–98)
MEP LLN: 63
MEP PRE REF: 52.8 %
MEP PRE: 42.27 CMH2O (ref 63.23–96.78)
MEP REF: 80
METAMYELOCYTES NFR BLD MANUAL: 3 %
MIP LLN: 33
MIP PRE REF: 136 %
MIP PRE: 68.01 CMH2O (ref 33.23–66.78)
MIP REF: 50
MONOCYTES NFR BLD: 4 % (ref 4–15)
MVV LLN: 100
MVV PRE REF: 63.3 %
MVV REF: 118
MYELOCYTES NFR BLD MANUAL: 2 %
NEUTROPHILS NFR BLD: 55 % (ref 38–73)
NEUTS BAND NFR BLD MANUAL: 5 %
NRBC BLD-RTO: 1 /100 WBC
PEF CHG: 17.6 %
PEF LLN: 5.42
PEF POST REF: 50.6 %
PEF PRE REF: 43.1 %
PEF REF: 7.3
PLATELET # BLD AUTO: 90 K/UL (ref 150–450)
PMV BLD AUTO: 12 FL (ref 9.2–12.9)
POCT GLUCOSE: 196 MG/DL (ref 70–110)
POCT GLUCOSE: 203 MG/DL (ref 70–110)
POST FEF 25 75: 2.23 L/S (ref 1.76–4.67)
POST FET 100: 5.98 SEC
POST FEV1 FVC: 84.53 % (ref 69.58–89.73)
POST FEV1: 1.85 L (ref 2.46–3.78)
POST FVC: 2.18 L (ref 3.08–4.77)
POST PEF: 3.7 L/S (ref 5.42–9.17)
POTASSIUM SERPL-SCNC: 3.9 MMOL/L (ref 3.5–5.1)
PRE DLCO: 12.97 ML/(MIN*MMHG) (ref 20.78–32.24)
PRE FEF 25 75: 2.05 L/S (ref 1.76–4.67)
PRE FET 100: 6.21 SEC
PRE FEV1 FVC: 83.52 % (ref 69.58–89.73)
PRE FEV1: 1.82 L (ref 2.46–3.78)
PRE FRC N2: 0.92 L (ref 2.04–3.68)
PRE FVC: 2.18 L (ref 3.08–4.77)
PRE MVV: 74.61 L/MIN (ref 100.2–135.57)
PRE PEF: 3.15 L/S (ref 5.42–9.17)
PROT SERPL-MCNC: 5.4 G/DL (ref 6–8.4)
RBC # BLD AUTO: 3.35 M/UL (ref 4–5.4)
RVN2 LLN: 1.26
RVN2 PRE REF: 30.9 %
RVN2 PRE: 0.57 L (ref 1.26–2.41)
RVN2 REF: 1.83
RVN2TLCN2 LLN: 25.35
RVN2TLCN2 PRE REF: 58.9 %
RVN2TLCN2 PRE: 20.58 % (ref 25.35–44.53)
RVN2TLCN2 REF: 34.94
SODIUM SERPL-SCNC: 144 MMOL/L (ref 136–145)
TLCN2 LLN: 4.45
TLCN2 PRE REF: 50.6 %
TLCN2 PRE: 2.76 L (ref 4.45–6.43)
TLCN2 REF: 5.44
VA PRE: 2.59 L (ref 5.29–5.29)
VA SINGLE BREATH LLN: 5.29
VA SINGLE BREATH PRE REF: 48.9 %
VA SINGLE BREATH REF: 5.29
VANCOMYCIN TROUGH SERPL-MCNC: 19.9 UG/ML (ref 10–22)
VCMAXN2 LLN: 3.08
VCMAXN2 PRE REF: 56 %
VCMAXN2 PRE: 2.19 L (ref 3.08–4.77)
VCMAXN2 REF: 3.91
WBC # BLD AUTO: 4.41 K/UL (ref 3.9–12.7)

## 2021-06-15 PROCEDURE — 85007 BL SMEAR W/DIFF WBC COUNT: CPT | Performed by: NURSE PRACTITIONER

## 2021-06-15 PROCEDURE — 25000003 PHARM REV CODE 250: Performed by: INTERNAL MEDICINE

## 2021-06-15 PROCEDURE — 94799 UNLISTED PULMONARY SVC/PX: CPT

## 2021-06-15 PROCEDURE — 80202 ASSAY OF VANCOMYCIN: CPT | Performed by: INTERNAL MEDICINE

## 2021-06-15 PROCEDURE — 99900035 HC TECH TIME PER 15 MIN (STAT)

## 2021-06-15 PROCEDURE — 80053 COMPREHEN METABOLIC PANEL: CPT | Performed by: NURSE PRACTITIONER

## 2021-06-15 PROCEDURE — 94761 N-INVAS EAR/PLS OXIMETRY MLT: CPT

## 2021-06-15 PROCEDURE — 63600175 PHARM REV CODE 636 W HCPCS: Performed by: NURSE PRACTITIONER

## 2021-06-15 PROCEDURE — 94640 AIRWAY INHALATION TREATMENT: CPT

## 2021-06-15 PROCEDURE — 25000003 PHARM REV CODE 250: Performed by: NURSE PRACTITIONER

## 2021-06-15 PROCEDURE — 99900031 HC PATIENT EDUCATION (STAT)

## 2021-06-15 PROCEDURE — 83735 ASSAY OF MAGNESIUM: CPT | Performed by: NURSE PRACTITIONER

## 2021-06-15 PROCEDURE — 36415 COLL VENOUS BLD VENIPUNCTURE: CPT | Performed by: INTERNAL MEDICINE

## 2021-06-15 PROCEDURE — 63600175 PHARM REV CODE 636 W HCPCS: Performed by: INTERNAL MEDICINE

## 2021-06-15 PROCEDURE — 27000221 HC OXYGEN, UP TO 24 HOURS

## 2021-06-15 PROCEDURE — 25000242 PHARM REV CODE 250 ALT 637 W/ HCPCS: Performed by: NURSE PRACTITIONER

## 2021-06-15 PROCEDURE — 85027 COMPLETE CBC AUTOMATED: CPT | Performed by: NURSE PRACTITIONER

## 2021-06-15 RX ORDER — IPRATROPIUM BROMIDE AND ALBUTEROL SULFATE 2.5; .5 MG/3ML; MG/3ML
3 SOLUTION RESPIRATORY (INHALATION) EVERY 4 HOURS
Qty: 1 BOX | Refills: 0 | Status: SHIPPED | OUTPATIENT
Start: 2021-06-15 | End: 2022-06-15

## 2021-06-15 RX ORDER — PANTOPRAZOLE SODIUM 40 MG/1
40 TABLET, DELAYED RELEASE ORAL DAILY
Qty: 30 TABLET | Refills: 0 | Status: SHIPPED | OUTPATIENT
Start: 2021-06-16 | End: 2023-01-04 | Stop reason: SDUPTHER

## 2021-06-15 RX ORDER — GUAIFENESIN 600 MG/1
600 TABLET, EXTENDED RELEASE ORAL 2 TIMES DAILY
Qty: 20 TABLET | Refills: 0 | Status: SHIPPED | OUTPATIENT
Start: 2021-06-15 | End: 2021-06-25

## 2021-06-15 RX ORDER — BENZONATATE 100 MG/1
100 CAPSULE ORAL 3 TIMES DAILY
Qty: 30 CAPSULE | Refills: 0 | Status: SHIPPED | OUTPATIENT
Start: 2021-06-15 | End: 2021-06-25

## 2021-06-15 RX ORDER — AMLODIPINE BESYLATE 5 MG/1
5 TABLET ORAL DAILY
Qty: 30 TABLET | Refills: 11 | Status: ON HOLD | OUTPATIENT
Start: 2021-06-16 | End: 2023-03-14

## 2021-06-15 RX ORDER — PROMETHAZINE HYDROCHLORIDE AND CODEINE PHOSPHATE 6.25; 1 MG/5ML; MG/5ML
5 SOLUTION ORAL EVERY 6 HOURS PRN
Qty: 200 ML | Refills: 0 | Status: SHIPPED | OUTPATIENT
Start: 2021-06-15 | End: 2021-06-25

## 2021-06-15 RX ORDER — PREDNISONE 20 MG/1
20 TABLET ORAL DAILY
Qty: 11 TABLET | Refills: 0 | Status: SHIPPED | OUTPATIENT
Start: 2021-06-15 | End: 2021-12-15

## 2021-06-15 RX ORDER — DOXYCYCLINE 100 MG/1
100 CAPSULE ORAL EVERY 12 HOURS
Qty: 20 CAPSULE | Refills: 0 | Status: SHIPPED | OUTPATIENT
Start: 2021-06-15 | End: 2021-06-25

## 2021-06-15 RX ADMIN — INSULIN ASPART 10 UNITS: 100 INJECTION, SOLUTION INTRAVENOUS; SUBCUTANEOUS at 10:06

## 2021-06-15 RX ADMIN — IPRATROPIUM BROMIDE AND ALBUTEROL SULFATE 3 ML: .5; 2.5 SOLUTION RESPIRATORY (INHALATION) at 03:06

## 2021-06-15 RX ADMIN — AMLODIPINE BESYLATE 5 MG: 5 TABLET ORAL at 09:06

## 2021-06-15 RX ADMIN — METHYLPREDNISOLONE SODIUM SUCCINATE 80 MG: 125 INJECTION, POWDER, FOR SOLUTION INTRAMUSCULAR; INTRAVENOUS at 09:06

## 2021-06-15 RX ADMIN — PIPERACILLIN AND TAZOBACTAM 4.5 G: 4; .5 INJECTION, POWDER, LYOPHILIZED, FOR SOLUTION INTRAVENOUS; PARENTERAL at 06:06

## 2021-06-15 RX ADMIN — PIPERACILLIN AND TAZOBACTAM 4.5 G: 4; .5 INJECTION, POWDER, LYOPHILIZED, FOR SOLUTION INTRAVENOUS; PARENTERAL at 02:06

## 2021-06-15 RX ADMIN — INSULIN ASPART 4 UNITS: 100 INJECTION, SOLUTION INTRAVENOUS; SUBCUTANEOUS at 10:06

## 2021-06-15 RX ADMIN — GUAIFENESIN 600 MG: 600 TABLET, EXTENDED RELEASE ORAL at 09:06

## 2021-06-15 RX ADMIN — IPRATROPIUM BROMIDE AND ALBUTEROL SULFATE 3 ML: .5; 2.5 SOLUTION RESPIRATORY (INHALATION) at 07:06

## 2021-06-15 RX ADMIN — BENZONATATE 100 MG: 100 CAPSULE ORAL at 06:06

## 2021-06-15 RX ADMIN — IPRATROPIUM BROMIDE AND ALBUTEROL SULFATE 3 ML: .5; 2.5 SOLUTION RESPIRATORY (INHALATION) at 11:06

## 2021-06-15 RX ADMIN — PANTOPRAZOLE SODIUM 40 MG: 40 TABLET, DELAYED RELEASE ORAL at 09:06

## 2021-06-15 RX ADMIN — INSULIN ASPART 10 UNITS: 100 INJECTION, SOLUTION INTRAVENOUS; SUBCUTANEOUS at 09:06

## 2021-06-15 RX ADMIN — BENZONATATE 100 MG: 100 CAPSULE ORAL at 01:06

## 2021-06-15 RX ADMIN — VANCOMYCIN HYDROCHLORIDE 1250 MG: 1.25 INJECTION, POWDER, LYOPHILIZED, FOR SOLUTION INTRAVENOUS at 12:06

## 2021-06-17 ENCOUNTER — PATIENT OUTREACH (OUTPATIENT)
Dept: ADMINISTRATIVE | Facility: CLINIC | Age: 46
End: 2021-06-17

## 2021-06-17 ENCOUNTER — PATIENT MESSAGE (OUTPATIENT)
Dept: ADMINISTRATIVE | Facility: CLINIC | Age: 46
End: 2021-06-17

## 2021-06-17 LAB
BACTERIA BLD CULT: NORMAL
BACTERIA BLD CULT: NORMAL

## 2021-06-18 ENCOUNTER — NURSE TRIAGE (OUTPATIENT)
Dept: ADMINISTRATIVE | Facility: CLINIC | Age: 46
End: 2021-06-18

## 2021-06-21 ENCOUNTER — HOSPITAL ENCOUNTER (INPATIENT)
Facility: HOSPITAL | Age: 46
LOS: 3 days | Discharge: HOME | DRG: 335 | End: 2021-06-24
Attending: EMERGENCY MEDICINE | Admitting: SURGERY
Payer: COMMERCIAL

## 2021-06-21 DIAGNOSIS — E11.69 TYPE 2 DIABETES MELLITUS WITH OTHER SPECIFIED COMPLICATION, UNSPECIFIED WHETHER LONG TERM INSULIN USE: ICD-10-CM

## 2021-06-21 DIAGNOSIS — K56.609 INTESTINAL OBSTRUCTION, UNSPECIFIED CAUSE, UNSPECIFIED WHETHER PARTIAL OR COMPLETE: ICD-10-CM

## 2021-06-21 DIAGNOSIS — R10.9 ABDOMINAL PAIN, UNSPECIFIED ABDOMINAL LOCATION: ICD-10-CM

## 2021-06-21 DIAGNOSIS — K46.0 INCARCERATED HERNIA: Primary | ICD-10-CM

## 2021-06-21 LAB
AMORPH CRY UR QL COMP ASSIST: ABNORMAL
BACTERIA #/AREA URNS AUTO: ABNORMAL /HPF
BILIRUB UR QL STRIP: NEGATIVE
CLARITY UR REFRACT.AUTO: ABNORMAL
COLOR UR AUTO: YELLOW
CTP QC/QA: YES
GLUCOSE UR QL STRIP: NEGATIVE
HGB UR QL STRIP: NEGATIVE
KETONES UR QL STRIP: NEGATIVE
LEUKOCYTE ESTERASE UR QL STRIP: NEGATIVE
MICROSCOPIC COMMENT: ABNORMAL
NITRITE UR QL STRIP: NEGATIVE
PH UR STRIP: >8 [PH] (ref 5–8)
POCT GLUCOSE: 130 MG/DL (ref 70–110)
POCT GLUCOSE: 149 MG/DL (ref 70–110)
PROT UR QL STRIP: NEGATIVE
SARS-COV-2 RDRP RESP QL NAA+PROBE: NEGATIVE
SP GR UR STRIP: 1.03 (ref 1–1.03)
SQUAMOUS #/AREA URNS AUTO: 1 /HPF
URN SPEC COLLECT METH UR: ABNORMAL

## 2021-06-21 PROCEDURE — U0002 COVID-19 LAB TEST NON-CDC: HCPCS | Performed by: STUDENT IN AN ORGANIZED HEALTH CARE EDUCATION/TRAINING PROGRAM

## 2021-06-21 PROCEDURE — 82962 GLUCOSE BLOOD TEST: CPT

## 2021-06-21 PROCEDURE — 96374 THER/PROPH/DIAG INJ IV PUSH: CPT

## 2021-06-21 PROCEDURE — 25000003 PHARM REV CODE 250: Performed by: INTERNAL MEDICINE

## 2021-06-21 PROCEDURE — 99285 EMERGENCY DEPT VISIT HI MDM: CPT | Mod: 25

## 2021-06-21 PROCEDURE — 12000002 HC ACUTE/MED SURGE SEMI-PRIVATE ROOM

## 2021-06-21 PROCEDURE — 25000003 PHARM REV CODE 250: Performed by: STUDENT IN AN ORGANIZED HEALTH CARE EDUCATION/TRAINING PROGRAM

## 2021-06-21 PROCEDURE — 99285 PR EMERGENCY DEPT VISIT,LEVEL V: ICD-10-PCS | Mod: BMT,CS,, | Performed by: EMERGENCY MEDICINE

## 2021-06-21 PROCEDURE — 81001 URINALYSIS AUTO W/SCOPE: CPT | Performed by: INTERNAL MEDICINE

## 2021-06-21 PROCEDURE — 99285 EMERGENCY DEPT VISIT HI MDM: CPT | Mod: BMT,CS,, | Performed by: EMERGENCY MEDICINE

## 2021-06-21 RX ORDER — ENOXAPARIN SODIUM 100 MG/ML
40 INJECTION SUBCUTANEOUS EVERY 24 HOURS
Status: DISCONTINUED | OUTPATIENT
Start: 2021-06-22 | End: 2021-06-23

## 2021-06-21 RX ORDER — ONDANSETRON 2 MG/ML
4 INJECTION INTRAMUSCULAR; INTRAVENOUS EVERY 6 HOURS PRN
Status: DISCONTINUED | OUTPATIENT
Start: 2021-06-21 | End: 2021-06-24 | Stop reason: HOSPADM

## 2021-06-21 RX ORDER — SODIUM CHLORIDE 9 MG/ML
INJECTION, SOLUTION INTRAVENOUS CONTINUOUS
Status: DISCONTINUED | OUTPATIENT
Start: 2021-06-21 | End: 2021-06-23

## 2021-06-21 RX ORDER — AMLODIPINE BESYLATE 5 MG/1
5 TABLET ORAL DAILY
Status: DISCONTINUED | OUTPATIENT
Start: 2021-06-22 | End: 2021-06-24 | Stop reason: HOSPADM

## 2021-06-21 RX ORDER — HYDROMORPHONE HYDROCHLORIDE 1 MG/ML
0.2 INJECTION, SOLUTION INTRAMUSCULAR; INTRAVENOUS; SUBCUTANEOUS EVERY 4 HOURS PRN
Status: DISCONTINUED | OUTPATIENT
Start: 2021-06-21 | End: 2021-06-24 | Stop reason: HOSPADM

## 2021-06-21 RX ORDER — GLUCAGON 1 MG
1 KIT INJECTION
Status: DISCONTINUED | OUTPATIENT
Start: 2021-06-21 | End: 2021-06-24 | Stop reason: HOSPADM

## 2021-06-21 RX ORDER — IPRATROPIUM BROMIDE AND ALBUTEROL SULFATE 2.5; .5 MG/3ML; MG/3ML
3 SOLUTION RESPIRATORY (INHALATION) EVERY 4 HOURS
Status: DISCONTINUED | OUTPATIENT
Start: 2021-06-22 | End: 2021-06-22

## 2021-06-21 RX ORDER — FAMOTIDINE 10 MG/ML
20 INJECTION INTRAVENOUS
Status: COMPLETED | OUTPATIENT
Start: 2021-06-21 | End: 2021-06-21

## 2021-06-21 RX ORDER — DOXYCYCLINE HYCLATE 100 MG
100 TABLET ORAL EVERY 12 HOURS
Status: DISCONTINUED | OUTPATIENT
Start: 2021-06-21 | End: 2021-06-24 | Stop reason: HOSPADM

## 2021-06-21 RX ORDER — SODIUM CHLORIDE 0.9 % (FLUSH) 0.9 %
10 SYRINGE (ML) INJECTION
Status: DISCONTINUED | OUTPATIENT
Start: 2021-06-21 | End: 2021-06-24 | Stop reason: HOSPADM

## 2021-06-21 RX ORDER — MICONAZOLE NITRATE 2 %
1 CREAM WITH APPLICATOR VAGINAL NIGHTLY
Status: DISCONTINUED | OUTPATIENT
Start: 2021-06-21 | End: 2021-06-24 | Stop reason: HOSPADM

## 2021-06-21 RX ORDER — INSULIN ASPART 100 [IU]/ML
0-5 INJECTION, SOLUTION INTRAVENOUS; SUBCUTANEOUS EVERY 6 HOURS PRN
Status: DISCONTINUED | OUTPATIENT
Start: 2021-06-21 | End: 2021-06-24 | Stop reason: HOSPADM

## 2021-06-21 RX ORDER — IPRATROPIUM BROMIDE AND ALBUTEROL SULFATE 2.5; .5 MG/3ML; MG/3ML
3 SOLUTION RESPIRATORY (INHALATION) EVERY 4 HOURS
Status: DISCONTINUED | OUTPATIENT
Start: 2021-06-21 | End: 2021-06-21

## 2021-06-21 RX ORDER — MICONAZOLE NITRATE 100 MG/1
200 SUPPOSITORY VAGINAL NIGHTLY
Status: DISCONTINUED | OUTPATIENT
Start: 2021-06-21 | End: 2021-06-24 | Stop reason: HOSPADM

## 2021-06-21 RX ADMIN — MICONAZOLE NITRATE 1 APPLICATOR: 20 CREAM VAGINAL at 09:06

## 2021-06-21 RX ADMIN — FAMOTIDINE 20 MG: 10 INJECTION INTRAVENOUS at 07:06

## 2021-06-21 RX ADMIN — DOXYCYCLINE HYCLATE 100 MG: 100 TABLET, COATED ORAL at 09:06

## 2021-06-21 RX ADMIN — MICONAZOLE NITRATE 200 MG: 100 SUPPOSITORY VAGINAL at 09:06

## 2021-06-21 RX ADMIN — SODIUM CHLORIDE: 0.9 INJECTION, SOLUTION INTRAVENOUS at 09:06

## 2021-06-22 ENCOUNTER — ANESTHESIA EVENT (OUTPATIENT)
Dept: SURGERY | Facility: HOSPITAL | Age: 46
DRG: 335 | End: 2021-06-22
Payer: COMMERCIAL

## 2021-06-22 ENCOUNTER — ANESTHESIA (OUTPATIENT)
Dept: SURGERY | Facility: HOSPITAL | Age: 46
DRG: 335 | End: 2021-06-22
Payer: COMMERCIAL

## 2021-06-22 LAB
ALBUMIN SERPL BCP-MCNC: 3.4 G/DL (ref 3.5–5.2)
ALP SERPL-CCNC: 66 U/L (ref 55–135)
ALT SERPL W/O P-5'-P-CCNC: 21 U/L (ref 10–44)
ANION GAP SERPL CALC-SCNC: 11 MMOL/L (ref 8–16)
AST SERPL-CCNC: 6 U/L (ref 10–40)
BASOPHILS # BLD AUTO: 0.02 K/UL (ref 0–0.2)
BASOPHILS NFR BLD: 0.2 % (ref 0–1.9)
BILIRUB SERPL-MCNC: 1.1 MG/DL (ref 0.1–1)
BUN SERPL-MCNC: 18 MG/DL (ref 6–20)
CALCIUM SERPL-MCNC: 8.5 MG/DL (ref 8.7–10.5)
CHLORIDE SERPL-SCNC: 102 MMOL/L (ref 95–110)
CO2 SERPL-SCNC: 26 MMOL/L (ref 23–29)
CREAT SERPL-MCNC: 0.7 MG/DL (ref 0.5–1.4)
DIFFERENTIAL METHOD: ABNORMAL
EOSINOPHIL # BLD AUTO: 0 K/UL (ref 0–0.5)
EOSINOPHIL NFR BLD: 0.1 % (ref 0–8)
ERYTHROCYTE [DISTWIDTH] IN BLOOD BY AUTOMATED COUNT: 18.9 % (ref 11.5–14.5)
EST. GFR  (AFRICAN AMERICAN): >60 ML/MIN/1.73 M^2
EST. GFR  (NON AFRICAN AMERICAN): >60 ML/MIN/1.73 M^2
GLUCOSE SERPL-MCNC: 147 MG/DL (ref 70–110)
HCT VFR BLD AUTO: 37 % (ref 37–48.5)
HGB BLD-MCNC: 11.3 G/DL (ref 12–16)
IMM GRANULOCYTES # BLD AUTO: 0.13 K/UL (ref 0–0.04)
IMM GRANULOCYTES NFR BLD AUTO: 1.5 % (ref 0–0.5)
LYMPHOCYTES # BLD AUTO: 2.8 K/UL (ref 1–4.8)
LYMPHOCYTES NFR BLD: 32.2 % (ref 18–48)
MAGNESIUM SERPL-MCNC: 2.7 MG/DL (ref 1.6–2.6)
MCH RBC QN AUTO: 25.6 PG (ref 27–31)
MCHC RBC AUTO-ENTMCNC: 30.5 G/DL (ref 32–36)
MCV RBC AUTO: 84 FL (ref 82–98)
MONOCYTES # BLD AUTO: 0.7 K/UL (ref 0.3–1)
MONOCYTES NFR BLD: 7.9 % (ref 4–15)
NEUTROPHILS # BLD AUTO: 5 K/UL (ref 1.8–7.7)
NEUTROPHILS NFR BLD: 58.1 % (ref 38–73)
NRBC BLD-RTO: 0 /100 WBC
PHOSPHATE SERPL-MCNC: 2.9 MG/DL (ref 2.7–4.5)
PLATELET # BLD AUTO: 103 K/UL (ref 150–450)
PMV BLD AUTO: 11.7 FL (ref 9.2–12.9)
POCT GLUCOSE: 159 MG/DL (ref 70–110)
POCT GLUCOSE: 190 MG/DL (ref 70–110)
POTASSIUM SERPL-SCNC: 3.8 MMOL/L (ref 3.5–5.1)
PROT SERPL-MCNC: 5.2 G/DL (ref 6–8.4)
RBC # BLD AUTO: 4.41 M/UL (ref 4–5.4)
SODIUM SERPL-SCNC: 139 MMOL/L (ref 136–145)
WBC # BLD AUTO: 8.63 K/UL (ref 3.9–12.7)

## 2021-06-22 PROCEDURE — 80053 COMPREHEN METABOLIC PANEL: CPT | Performed by: STUDENT IN AN ORGANIZED HEALTH CARE EDUCATION/TRAINING PROGRAM

## 2021-06-22 PROCEDURE — 83735 ASSAY OF MAGNESIUM: CPT | Performed by: STUDENT IN AN ORGANIZED HEALTH CARE EDUCATION/TRAINING PROGRAM

## 2021-06-22 PROCEDURE — 25000003 PHARM REV CODE 250: Performed by: ANESTHESIOLOGY

## 2021-06-22 PROCEDURE — 36000707: Performed by: SURGERY

## 2021-06-22 PROCEDURE — 63600175 PHARM REV CODE 636 W HCPCS: Performed by: STUDENT IN AN ORGANIZED HEALTH CARE EDUCATION/TRAINING PROGRAM

## 2021-06-22 PROCEDURE — 37000008 HC ANESTHESIA 1ST 15 MINUTES: Performed by: SURGERY

## 2021-06-22 PROCEDURE — 49568 PR IMPLANT MESH HERNIA REPAIR/DEBRIDEMENT CLOSURE: ICD-10-PCS | Mod: BMT,,, | Performed by: SURGERY

## 2021-06-22 PROCEDURE — 49561 PR REPAIR INCISIONAL HERNIA,STRANG: CPT | Mod: BMT,,, | Performed by: SURGERY

## 2021-06-22 PROCEDURE — 25000242 PHARM REV CODE 250 ALT 637 W/ HCPCS: Performed by: STUDENT IN AN ORGANIZED HEALTH CARE EDUCATION/TRAINING PROGRAM

## 2021-06-22 PROCEDURE — 49568 PR IMPLANT MESH HERNIA REPAIR/DEBRIDEMENT CLOSURE: CPT | Mod: BMT,,, | Performed by: SURGERY

## 2021-06-22 PROCEDURE — 88302 TISSUE EXAM BY PATHOLOGIST: CPT | Mod: 26,BMT,, | Performed by: PATHOLOGY

## 2021-06-22 PROCEDURE — 25000003 PHARM REV CODE 250: Performed by: STUDENT IN AN ORGANIZED HEALTH CARE EDUCATION/TRAINING PROGRAM

## 2021-06-22 PROCEDURE — 71000033 HC RECOVERY, INTIAL HOUR: Performed by: SURGERY

## 2021-06-22 PROCEDURE — 25000003 PHARM REV CODE 250: Performed by: NURSE ANESTHETIST, CERTIFIED REGISTERED

## 2021-06-22 PROCEDURE — 49561 PR REPAIR INCISIONAL HERNIA,STRANG: ICD-10-PCS | Mod: BMT,,, | Performed by: SURGERY

## 2021-06-22 PROCEDURE — 94640 AIRWAY INHALATION TREATMENT: CPT

## 2021-06-22 PROCEDURE — 63600175 PHARM REV CODE 636 W HCPCS: Performed by: ANESTHESIOLOGY

## 2021-06-22 PROCEDURE — 99223 1ST HOSP IP/OBS HIGH 75: CPT | Mod: BMT,,, | Performed by: SURGERY

## 2021-06-22 PROCEDURE — 37000009 HC ANESTHESIA EA ADD 15 MINS: Performed by: SURGERY

## 2021-06-22 PROCEDURE — D9220A PRA ANESTHESIA: Mod: BMT,,, | Performed by: ANESTHESIOLOGY

## 2021-06-22 PROCEDURE — 71000015 HC POSTOP RECOV 1ST HR: Performed by: SURGERY

## 2021-06-22 PROCEDURE — 82962 GLUCOSE BLOOD TEST: CPT | Performed by: SURGERY

## 2021-06-22 PROCEDURE — 63600175 PHARM REV CODE 636 W HCPCS: Performed by: NURSE ANESTHETIST, CERTIFIED REGISTERED

## 2021-06-22 PROCEDURE — 94761 N-INVAS EAR/PLS OXIMETRY MLT: CPT

## 2021-06-22 PROCEDURE — 27000221 HC OXYGEN, UP TO 24 HOURS

## 2021-06-22 PROCEDURE — D9220A PRA ANESTHESIA: ICD-10-PCS | Mod: BMT,,, | Performed by: NURSE ANESTHETIST, CERTIFIED REGISTERED

## 2021-06-22 PROCEDURE — 36415 COLL VENOUS BLD VENIPUNCTURE: CPT | Performed by: STUDENT IN AN ORGANIZED HEALTH CARE EDUCATION/TRAINING PROGRAM

## 2021-06-22 PROCEDURE — D9220A PRA ANESTHESIA: ICD-10-PCS | Mod: BMT,,, | Performed by: ANESTHESIOLOGY

## 2021-06-22 PROCEDURE — 88302 PR  SURG PATH,LEVEL II: ICD-10-PCS | Mod: 26,BMT,, | Performed by: PATHOLOGY

## 2021-06-22 PROCEDURE — 99900035 HC TECH TIME PER 15 MIN (STAT)

## 2021-06-22 PROCEDURE — 25000242 PHARM REV CODE 250 ALT 637 W/ HCPCS: Performed by: EMERGENCY MEDICINE

## 2021-06-22 PROCEDURE — 84100 ASSAY OF PHOSPHORUS: CPT | Performed by: STUDENT IN AN ORGANIZED HEALTH CARE EDUCATION/TRAINING PROGRAM

## 2021-06-22 PROCEDURE — 36000706: Performed by: SURGERY

## 2021-06-22 PROCEDURE — 99223 PR INITIAL HOSPITAL CARE,LEVL III: ICD-10-PCS | Mod: BMT,,, | Performed by: SURGERY

## 2021-06-22 PROCEDURE — 25000242 PHARM REV CODE 250 ALT 637 W/ HCPCS: Performed by: SURGERY

## 2021-06-22 PROCEDURE — 85025 COMPLETE CBC W/AUTO DIFF WBC: CPT | Performed by: STUDENT IN AN ORGANIZED HEALTH CARE EDUCATION/TRAINING PROGRAM

## 2021-06-22 PROCEDURE — 88302 TISSUE EXAM BY PATHOLOGIST: CPT | Performed by: PATHOLOGY

## 2021-06-22 PROCEDURE — 20600001 HC STEP DOWN PRIVATE ROOM

## 2021-06-22 PROCEDURE — C1781 MESH (IMPLANTABLE): HCPCS | Performed by: SURGERY

## 2021-06-22 PROCEDURE — D9220A PRA ANESTHESIA: Mod: BMT,,, | Performed by: NURSE ANESTHETIST, CERTIFIED REGISTERED

## 2021-06-22 DEVICE — PATCH HERNIA VENTRIO ST LG.: Type: IMPLANTABLE DEVICE | Site: ABDOMEN | Status: FUNCTIONAL

## 2021-06-22 RX ORDER — FENTANYL CITRATE 50 UG/ML
INJECTION, SOLUTION INTRAMUSCULAR; INTRAVENOUS
Status: DISCONTINUED | OUTPATIENT
Start: 2021-06-22 | End: 2021-06-22

## 2021-06-22 RX ORDER — HYDROMORPHONE HYDROCHLORIDE 1 MG/ML
0.2 INJECTION, SOLUTION INTRAMUSCULAR; INTRAVENOUS; SUBCUTANEOUS EVERY 5 MIN PRN
Status: DISCONTINUED | OUTPATIENT
Start: 2021-06-22 | End: 2021-06-22 | Stop reason: HOSPADM

## 2021-06-22 RX ORDER — CEFAZOLIN SODIUM 1 G/3ML
INJECTION, POWDER, FOR SOLUTION INTRAMUSCULAR; INTRAVENOUS
Status: DISCONTINUED | OUTPATIENT
Start: 2021-06-22 | End: 2021-06-22

## 2021-06-22 RX ORDER — SCOLOPAMINE TRANSDERMAL SYSTEM 1 MG/1
1 PATCH, EXTENDED RELEASE TRANSDERMAL
Status: DISCONTINUED | OUTPATIENT
Start: 2021-06-22 | End: 2021-06-24 | Stop reason: HOSPADM

## 2021-06-22 RX ORDER — SUCCINYLCHOLINE CHLORIDE 20 MG/ML
INJECTION INTRAMUSCULAR; INTRAVENOUS
Status: DISCONTINUED | OUTPATIENT
Start: 2021-06-22 | End: 2021-06-22

## 2021-06-22 RX ORDER — KETOROLAC TROMETHAMINE 30 MG/ML
INJECTION, SOLUTION INTRAMUSCULAR; INTRAVENOUS
Status: DISCONTINUED | OUTPATIENT
Start: 2021-06-22 | End: 2021-06-22

## 2021-06-22 RX ORDER — NEOSTIGMINE METHYLSULFATE 0.5 MG/ML
INJECTION, SOLUTION INTRAVENOUS
Status: DISCONTINUED | OUTPATIENT
Start: 2021-06-22 | End: 2021-06-22

## 2021-06-22 RX ORDER — IPRATROPIUM BROMIDE AND ALBUTEROL SULFATE 2.5; .5 MG/3ML; MG/3ML
3 SOLUTION RESPIRATORY (INHALATION) EVERY 6 HOURS
Status: DISCONTINUED | OUTPATIENT
Start: 2021-06-22 | End: 2021-06-24 | Stop reason: HOSPADM

## 2021-06-22 RX ORDER — PROPOFOL 10 MG/ML
VIAL (ML) INTRAVENOUS
Status: DISCONTINUED | OUTPATIENT
Start: 2021-06-22 | End: 2021-06-22

## 2021-06-22 RX ORDER — LIDOCAINE HCL/PF 100 MG/5ML
SYRINGE (ML) INTRAVENOUS
Status: DISCONTINUED | OUTPATIENT
Start: 2021-06-22 | End: 2021-06-22

## 2021-06-22 RX ORDER — MIDAZOLAM HYDROCHLORIDE 1 MG/ML
INJECTION, SOLUTION INTRAMUSCULAR; INTRAVENOUS
Status: DISCONTINUED | OUTPATIENT
Start: 2021-06-22 | End: 2021-06-22

## 2021-06-22 RX ORDER — HYDROMORPHONE HYDROCHLORIDE 2 MG/ML
INJECTION, SOLUTION INTRAMUSCULAR; INTRAVENOUS; SUBCUTANEOUS
Status: DISCONTINUED | OUTPATIENT
Start: 2021-06-22 | End: 2021-06-22

## 2021-06-22 RX ORDER — MUPIROCIN 20 MG/G
OINTMENT TOPICAL 2 TIMES DAILY
Status: DISCONTINUED | OUTPATIENT
Start: 2021-06-22 | End: 2021-06-24 | Stop reason: HOSPADM

## 2021-06-22 RX ORDER — DEXAMETHASONE SODIUM PHOSPHATE 4 MG/ML
INJECTION, SOLUTION INTRA-ARTICULAR; INTRALESIONAL; INTRAMUSCULAR; INTRAVENOUS; SOFT TISSUE
Status: DISCONTINUED | OUTPATIENT
Start: 2021-06-22 | End: 2021-06-22

## 2021-06-22 RX ORDER — PROCHLORPERAZINE EDISYLATE 5 MG/ML
5 INJECTION INTRAMUSCULAR; INTRAVENOUS EVERY 30 MIN PRN
Status: DISCONTINUED | OUTPATIENT
Start: 2021-06-22 | End: 2021-06-22

## 2021-06-22 RX ORDER — OXYCODONE AND ACETAMINOPHEN 5; 325 MG/1; MG/1
1 TABLET ORAL EVERY 4 HOURS PRN
Status: DISCONTINUED | OUTPATIENT
Start: 2021-06-22 | End: 2021-06-24 | Stop reason: HOSPADM

## 2021-06-22 RX ORDER — ONDANSETRON 2 MG/ML
INJECTION INTRAMUSCULAR; INTRAVENOUS
Status: DISCONTINUED | OUTPATIENT
Start: 2021-06-22 | End: 2021-06-22

## 2021-06-22 RX ORDER — OXYCODONE AND ACETAMINOPHEN 10; 325 MG/1; MG/1
1 TABLET ORAL EVERY 4 HOURS PRN
Status: DISCONTINUED | OUTPATIENT
Start: 2021-06-22 | End: 2021-06-24 | Stop reason: HOSPADM

## 2021-06-22 RX ORDER — ROCURONIUM BROMIDE 10 MG/ML
INJECTION, SOLUTION INTRAVENOUS
Status: DISCONTINUED | OUTPATIENT
Start: 2021-06-22 | End: 2021-06-22

## 2021-06-22 RX ADMIN — HYDROMORPHONE HYDROCHLORIDE 0.2 MG: 1 INJECTION, SOLUTION INTRAMUSCULAR; INTRAVENOUS; SUBCUTANEOUS at 09:06

## 2021-06-22 RX ADMIN — PROPOFOL 40 MG: 10 INJECTION, EMULSION INTRAVENOUS at 08:06

## 2021-06-22 RX ADMIN — SODIUM CHLORIDE: 0.9 INJECTION, SOLUTION INTRAVENOUS at 03:06

## 2021-06-22 RX ADMIN — HYDROMORPHONE HYDROCHLORIDE 0.4 MG: 2 INJECTION INTRAMUSCULAR; INTRAVENOUS; SUBCUTANEOUS at 08:06

## 2021-06-22 RX ADMIN — GLYCOPYRROLATE 0.4 MG: 0.2 INJECTION, SOLUTION INTRAMUSCULAR; INTRAVITREAL at 08:06

## 2021-06-22 RX ADMIN — HYDROMORPHONE HYDROCHLORIDE 0.2 MG: 1 INJECTION, SOLUTION INTRAMUSCULAR; INTRAVENOUS; SUBCUTANEOUS at 02:06

## 2021-06-22 RX ADMIN — IPRATROPIUM BROMIDE AND ALBUTEROL SULFATE 3 ML: .5; 2.5 SOLUTION RESPIRATORY (INHALATION) at 07:06

## 2021-06-22 RX ADMIN — SODIUM CHLORIDE: 0.9 INJECTION, SOLUTION INTRAVENOUS at 07:06

## 2021-06-22 RX ADMIN — MIDAZOLAM HYDROCHLORIDE 2 MG: 1 INJECTION, SOLUTION INTRAMUSCULAR; INTRAVENOUS at 07:06

## 2021-06-22 RX ADMIN — ROCURONIUM BROMIDE 45 MG: 10 INJECTION, SOLUTION INTRAVENOUS at 07:06

## 2021-06-22 RX ADMIN — SUCCINYLCHOLINE CHLORIDE 160 MG: 20 INJECTION, SOLUTION INTRAMUSCULAR; INTRAVENOUS at 07:06

## 2021-06-22 RX ADMIN — OXYCODONE AND ACETAMINOPHEN 1 TABLET: 10; 325 TABLET ORAL at 11:06

## 2021-06-22 RX ADMIN — METHOCARBAMOL 500 MG: 100 INJECTION, SOLUTION INTRAMUSCULAR; INTRAVENOUS at 11:06

## 2021-06-22 RX ADMIN — DOXYCYCLINE HYCLATE 100 MG: 100 TABLET, COATED ORAL at 11:06

## 2021-06-22 RX ADMIN — IPRATROPIUM BROMIDE AND ALBUTEROL SULFATE 3 ML: .5; 2.5 SOLUTION RESPIRATORY (INHALATION) at 10:06

## 2021-06-22 RX ADMIN — SCOPALAMINE 1 PATCH: 1 PATCH, EXTENDED RELEASE TRANSDERMAL at 06:06

## 2021-06-22 RX ADMIN — METHOCARBAMOL 500 MG: 100 INJECTION, SOLUTION INTRAMUSCULAR; INTRAVENOUS at 03:06

## 2021-06-22 RX ADMIN — AMLODIPINE BESYLATE 5 MG: 5 TABLET ORAL at 11:06

## 2021-06-22 RX ADMIN — NEOSTIGMINE METHYLSULFATE 4 MG: 0.5 INJECTION INTRAVENOUS at 08:06

## 2021-06-22 RX ADMIN — IPRATROPIUM BROMIDE AND ALBUTEROL SULFATE 3 ML: .5; 2.5 SOLUTION RESPIRATORY (INHALATION) at 04:06

## 2021-06-22 RX ADMIN — IPRATROPIUM BROMIDE AND ALBUTEROL SULFATE 3 ML: .5; 2.5 SOLUTION RESPIRATORY (INHALATION) at 12:06

## 2021-06-22 RX ADMIN — ROCURONIUM BROMIDE 5 MG: 10 INJECTION, SOLUTION INTRAVENOUS at 07:06

## 2021-06-22 RX ADMIN — DEXAMETHASONE SODIUM PHOSPHATE 8 MG: 4 INJECTION INTRA-ARTICULAR; INTRALESIONAL; INTRAMUSCULAR; INTRAVENOUS; SOFT TISSUE at 07:06

## 2021-06-22 RX ADMIN — ONDANSETRON 4 MG: 2 INJECTION INTRAMUSCULAR; INTRAVENOUS at 08:06

## 2021-06-22 RX ADMIN — KETOROLAC TROMETHAMINE 30 MG: 30 INJECTION, SOLUTION INTRAMUSCULAR; INTRAVENOUS at 08:06

## 2021-06-22 RX ADMIN — OXYCODONE AND ACETAMINOPHEN 1 TABLET: 10; 325 TABLET ORAL at 07:06

## 2021-06-22 RX ADMIN — CEFAZOLIN 3 G: 330 INJECTION, POWDER, FOR SOLUTION INTRAMUSCULAR; INTRAVENOUS at 07:06

## 2021-06-22 RX ADMIN — SODIUM CHLORIDE: 0.9 INJECTION, SOLUTION INTRAVENOUS at 10:06

## 2021-06-22 RX ADMIN — MUPIROCIN: 20 OINTMENT TOPICAL at 11:06

## 2021-06-22 RX ADMIN — PROPOFOL 160 MG: 10 INJECTION, EMULSION INTRAVENOUS at 07:06

## 2021-06-22 RX ADMIN — FENTANYL CITRATE 100 MCG: 50 INJECTION, SOLUTION INTRAMUSCULAR; INTRAVENOUS at 07:06

## 2021-06-22 RX ADMIN — OXYCODONE AND ACETAMINOPHEN 1 TABLET: 10; 325 TABLET ORAL at 03:06

## 2021-06-22 RX ADMIN — LIDOCAINE HYDROCHLORIDE 75 MG: 20 INJECTION, SOLUTION INTRAVENOUS at 07:06

## 2021-06-23 LAB
ALBUMIN SERPL BCP-MCNC: 3 G/DL (ref 3.5–5.2)
ALP SERPL-CCNC: 177 U/L (ref 55–135)
ALT SERPL W/O P-5'-P-CCNC: 473 U/L (ref 10–44)
ANION GAP SERPL CALC-SCNC: 11 MMOL/L (ref 8–16)
AST SERPL-CCNC: 341 U/L (ref 10–40)
BASOPHILS # BLD AUTO: 0 K/UL (ref 0–0.2)
BASOPHILS NFR BLD: 0 % (ref 0–1.9)
BILIRUB SERPL-MCNC: 2.8 MG/DL (ref 0.1–1)
BUN SERPL-MCNC: 15 MG/DL (ref 6–20)
CALCIUM SERPL-MCNC: 8.3 MG/DL (ref 8.7–10.5)
CHLORIDE SERPL-SCNC: 104 MMOL/L (ref 95–110)
CO2 SERPL-SCNC: 21 MMOL/L (ref 23–29)
CREAT SERPL-MCNC: 0.7 MG/DL (ref 0.5–1.4)
DIFFERENTIAL METHOD: ABNORMAL
EOSINOPHIL # BLD AUTO: 0 K/UL (ref 0–0.5)
EOSINOPHIL NFR BLD: 0.3 % (ref 0–8)
ERYTHROCYTE [DISTWIDTH] IN BLOOD BY AUTOMATED COUNT: 18.6 % (ref 11.5–14.5)
EST. GFR  (AFRICAN AMERICAN): >60 ML/MIN/1.73 M^2
EST. GFR  (NON AFRICAN AMERICAN): >60 ML/MIN/1.73 M^2
GLUCOSE SERPL-MCNC: 148 MG/DL (ref 70–110)
HCT VFR BLD AUTO: 33.2 % (ref 37–48.5)
HGB BLD-MCNC: 9.9 G/DL (ref 12–16)
IMM GRANULOCYTES # BLD AUTO: 0.08 K/UL (ref 0–0.04)
IMM GRANULOCYTES NFR BLD AUTO: 1.1 % (ref 0–0.5)
LYMPHOCYTES # BLD AUTO: 2 K/UL (ref 1–4.8)
LYMPHOCYTES NFR BLD: 27 % (ref 18–48)
MAGNESIUM SERPL-MCNC: 2.3 MG/DL (ref 1.6–2.6)
MCH RBC QN AUTO: 25.9 PG (ref 27–31)
MCHC RBC AUTO-ENTMCNC: 29.8 G/DL (ref 32–36)
MCV RBC AUTO: 87 FL (ref 82–98)
MONOCYTES # BLD AUTO: 0.7 K/UL (ref 0.3–1)
MONOCYTES NFR BLD: 8.8 % (ref 4–15)
NEUTROPHILS # BLD AUTO: 4.7 K/UL (ref 1.8–7.7)
NEUTROPHILS NFR BLD: 62.8 % (ref 38–73)
NRBC BLD-RTO: 0 /100 WBC
PHOSPHATE SERPL-MCNC: 2.4 MG/DL (ref 2.7–4.5)
PLATELET # BLD AUTO: 85 K/UL (ref 150–450)
PMV BLD AUTO: 11.2 FL (ref 9.2–12.9)
POCT GLUCOSE: 164 MG/DL (ref 70–110)
POTASSIUM SERPL-SCNC: 4.2 MMOL/L (ref 3.5–5.1)
PROT SERPL-MCNC: 4.9 G/DL (ref 6–8.4)
RBC # BLD AUTO: 3.82 M/UL (ref 4–5.4)
SODIUM SERPL-SCNC: 136 MMOL/L (ref 136–145)
WBC # BLD AUTO: 7.41 K/UL (ref 3.9–12.7)

## 2021-06-23 PROCEDURE — 83735 ASSAY OF MAGNESIUM: CPT | Performed by: STUDENT IN AN ORGANIZED HEALTH CARE EDUCATION/TRAINING PROGRAM

## 2021-06-23 PROCEDURE — 85025 COMPLETE CBC W/AUTO DIFF WBC: CPT | Performed by: STUDENT IN AN ORGANIZED HEALTH CARE EDUCATION/TRAINING PROGRAM

## 2021-06-23 PROCEDURE — 36415 COLL VENOUS BLD VENIPUNCTURE: CPT | Performed by: STUDENT IN AN ORGANIZED HEALTH CARE EDUCATION/TRAINING PROGRAM

## 2021-06-23 PROCEDURE — 25000242 PHARM REV CODE 250 ALT 637 W/ HCPCS: Performed by: SURGERY

## 2021-06-23 PROCEDURE — 94640 AIRWAY INHALATION TREATMENT: CPT

## 2021-06-23 PROCEDURE — 99900035 HC TECH TIME PER 15 MIN (STAT)

## 2021-06-23 PROCEDURE — 99024 PR POST-OP FOLLOW-UP VISIT: ICD-10-PCS | Mod: BMT,POP,, | Performed by: STUDENT IN AN ORGANIZED HEALTH CARE EDUCATION/TRAINING PROGRAM

## 2021-06-23 PROCEDURE — 25000003 PHARM REV CODE 250: Performed by: STUDENT IN AN ORGANIZED HEALTH CARE EDUCATION/TRAINING PROGRAM

## 2021-06-23 PROCEDURE — 20600001 HC STEP DOWN PRIVATE ROOM

## 2021-06-23 PROCEDURE — 99024 POSTOP FOLLOW-UP VISIT: CPT | Mod: BMT,POP,, | Performed by: STUDENT IN AN ORGANIZED HEALTH CARE EDUCATION/TRAINING PROGRAM

## 2021-06-23 PROCEDURE — 94761 N-INVAS EAR/PLS OXIMETRY MLT: CPT

## 2021-06-23 PROCEDURE — 63600175 PHARM REV CODE 636 W HCPCS: Performed by: STUDENT IN AN ORGANIZED HEALTH CARE EDUCATION/TRAINING PROGRAM

## 2021-06-23 PROCEDURE — 80053 COMPREHEN METABOLIC PANEL: CPT | Performed by: STUDENT IN AN ORGANIZED HEALTH CARE EDUCATION/TRAINING PROGRAM

## 2021-06-23 PROCEDURE — 84100 ASSAY OF PHOSPHORUS: CPT | Performed by: STUDENT IN AN ORGANIZED HEALTH CARE EDUCATION/TRAINING PROGRAM

## 2021-06-23 RX ORDER — POLYETHYLENE GLYCOL 3350 17 G/17G
17 POWDER, FOR SOLUTION ORAL DAILY
Status: DISCONTINUED | OUTPATIENT
Start: 2021-06-23 | End: 2021-06-24 | Stop reason: HOSPADM

## 2021-06-23 RX ORDER — ENOXAPARIN SODIUM 100 MG/ML
40 INJECTION SUBCUTANEOUS EVERY 12 HOURS
Status: DISCONTINUED | OUTPATIENT
Start: 2021-06-23 | End: 2021-06-24 | Stop reason: HOSPADM

## 2021-06-23 RX ORDER — SODIUM,POTASSIUM PHOSPHATES 280-250MG
2 POWDER IN PACKET (EA) ORAL ONCE
Status: COMPLETED | OUTPATIENT
Start: 2021-06-23 | End: 2021-06-23

## 2021-06-23 RX ORDER — METHOCARBAMOL 500 MG/1
500 TABLET, FILM COATED ORAL 4 TIMES DAILY
Status: DISCONTINUED | OUTPATIENT
Start: 2021-06-23 | End: 2021-06-24 | Stop reason: HOSPADM

## 2021-06-23 RX ORDER — BISACODYL 10 MG
10 SUPPOSITORY, RECTAL RECTAL ONCE
Status: DISCONTINUED | OUTPATIENT
Start: 2021-06-23 | End: 2021-06-24

## 2021-06-23 RX ADMIN — IPRATROPIUM BROMIDE AND ALBUTEROL SULFATE 3 ML: .5; 2.5 SOLUTION RESPIRATORY (INHALATION) at 09:06

## 2021-06-23 RX ADMIN — IPRATROPIUM BROMIDE AND ALBUTEROL SULFATE 3 ML: .5; 2.5 SOLUTION RESPIRATORY (INHALATION) at 12:06

## 2021-06-23 RX ADMIN — Medication 2 PACKET: at 09:06

## 2021-06-23 RX ADMIN — MICONAZOLE NITRATE 1 APPLICATOR: 20 CREAM VAGINAL at 01:06

## 2021-06-23 RX ADMIN — MUPIROCIN: 20 OINTMENT TOPICAL at 08:06

## 2021-06-23 RX ADMIN — OXYCODONE AND ACETAMINOPHEN 1 TABLET: 10; 325 TABLET ORAL at 04:06

## 2021-06-23 RX ADMIN — DOXYCYCLINE HYCLATE 100 MG: 100 TABLET, COATED ORAL at 09:06

## 2021-06-23 RX ADMIN — POLYETHYLENE GLYCOL 3350 17 G: 17 POWDER, FOR SOLUTION ORAL at 08:06

## 2021-06-23 RX ADMIN — MUPIROCIN: 20 OINTMENT TOPICAL at 09:06

## 2021-06-23 RX ADMIN — OXYCODONE AND ACETAMINOPHEN 1 TABLET: 10; 325 TABLET ORAL at 08:06

## 2021-06-23 RX ADMIN — ENOXAPARIN SODIUM 40 MG: 40 INJECTION SUBCUTANEOUS at 08:06

## 2021-06-23 RX ADMIN — MICONAZOLE NITRATE 1 APPLICATOR: 20 CREAM VAGINAL at 09:06

## 2021-06-23 RX ADMIN — ONDANSETRON 4 MG: 2 INJECTION INTRAMUSCULAR; INTRAVENOUS at 09:06

## 2021-06-23 RX ADMIN — OXYCODONE AND ACETAMINOPHEN 1 TABLET: 10; 325 TABLET ORAL at 06:06

## 2021-06-23 RX ADMIN — DOCUSATE SODIUM 50 MG: 50 CAPSULE, LIQUID FILLED ORAL at 08:06

## 2021-06-23 RX ADMIN — OXYCODONE AND ACETAMINOPHEN 1 TABLET: 10; 325 TABLET ORAL at 02:06

## 2021-06-23 RX ADMIN — METHOCARBAMOL 500 MG: 500 TABLET ORAL at 02:06

## 2021-06-23 RX ADMIN — IPRATROPIUM BROMIDE AND ALBUTEROL SULFATE 3 ML: .5; 2.5 SOLUTION RESPIRATORY (INHALATION) at 08:06

## 2021-06-23 RX ADMIN — IPRATROPIUM BROMIDE AND ALBUTEROL SULFATE 3 ML: .5; 2.5 SOLUTION RESPIRATORY (INHALATION) at 01:06

## 2021-06-23 RX ADMIN — DOXYCYCLINE HYCLATE 100 MG: 100 TABLET, COATED ORAL at 08:06

## 2021-06-23 RX ADMIN — METHOCARBAMOL 500 MG: 500 TABLET ORAL at 08:06

## 2021-06-23 RX ADMIN — METHOCARBAMOL 500 MG: 500 TABLET ORAL at 06:06

## 2021-06-23 RX ADMIN — AMLODIPINE BESYLATE 5 MG: 5 TABLET ORAL at 08:06

## 2021-06-23 RX ADMIN — METHOCARBAMOL 500 MG: 500 TABLET ORAL at 09:06

## 2021-06-24 VITALS
SYSTOLIC BLOOD PRESSURE: 146 MMHG | RESPIRATION RATE: 18 BRPM | TEMPERATURE: 97 F | OXYGEN SATURATION: 97 % | HEART RATE: 81 BPM | WEIGHT: 293 LBS | HEIGHT: 67 IN | DIASTOLIC BLOOD PRESSURE: 70 MMHG | BODY MASS INDEX: 45.99 KG/M2

## 2021-06-24 PROBLEM — K46.0 INCARCERATED HERNIA: Status: RESOLVED | Noted: 2021-06-21 | Resolved: 2021-06-24

## 2021-06-24 LAB
ALBUMIN SERPL BCP-MCNC: 2.8 G/DL (ref 3.5–5.2)
ALP SERPL-CCNC: 270 U/L (ref 55–135)
ALT SERPL W/O P-5'-P-CCNC: 670 U/L (ref 10–44)
ANION GAP SERPL CALC-SCNC: 10 MMOL/L (ref 8–16)
AST SERPL-CCNC: 234 U/L (ref 10–40)
BASOPHILS # BLD AUTO: 0.01 K/UL (ref 0–0.2)
BASOPHILS NFR BLD: 0.2 % (ref 0–1.9)
BILIRUB SERPL-MCNC: 2.2 MG/DL (ref 0.1–1)
BUN SERPL-MCNC: 13 MG/DL (ref 6–20)
CALCIUM SERPL-MCNC: 8.4 MG/DL (ref 8.7–10.5)
CHLORIDE SERPL-SCNC: 104 MMOL/L (ref 95–110)
CO2 SERPL-SCNC: 23 MMOL/L (ref 23–29)
CREAT SERPL-MCNC: 0.7 MG/DL (ref 0.5–1.4)
DIFFERENTIAL METHOD: ABNORMAL
EOSINOPHIL # BLD AUTO: 0 K/UL (ref 0–0.5)
EOSINOPHIL NFR BLD: 0.3 % (ref 0–8)
ERYTHROCYTE [DISTWIDTH] IN BLOOD BY AUTOMATED COUNT: 18.8 % (ref 11.5–14.5)
EST. GFR  (AFRICAN AMERICAN): >60 ML/MIN/1.73 M^2
EST. GFR  (NON AFRICAN AMERICAN): >60 ML/MIN/1.73 M^2
GLUCOSE SERPL-MCNC: 137 MG/DL (ref 70–110)
HCT VFR BLD AUTO: 31.9 % (ref 37–48.5)
HGB BLD-MCNC: 9.8 G/DL (ref 12–16)
IMM GRANULOCYTES # BLD AUTO: 0.05 K/UL (ref 0–0.04)
IMM GRANULOCYTES NFR BLD AUTO: 0.8 % (ref 0–0.5)
INR PPP: 0.9 (ref 0.8–1.2)
LYMPHOCYTES # BLD AUTO: 2.1 K/UL (ref 1–4.8)
LYMPHOCYTES NFR BLD: 35.5 % (ref 18–48)
MAGNESIUM SERPL-MCNC: 2.2 MG/DL (ref 1.6–2.6)
MCH RBC QN AUTO: 26.3 PG (ref 27–31)
MCHC RBC AUTO-ENTMCNC: 30.7 G/DL (ref 32–36)
MCV RBC AUTO: 86 FL (ref 82–98)
MONOCYTES # BLD AUTO: 0.6 K/UL (ref 0.3–1)
MONOCYTES NFR BLD: 9.3 % (ref 4–15)
NEUTROPHILS # BLD AUTO: 3.2 K/UL (ref 1.8–7.7)
NEUTROPHILS NFR BLD: 53.9 % (ref 38–73)
NRBC BLD-RTO: 0 /100 WBC
PHOSPHATE SERPL-MCNC: 2.9 MG/DL (ref 2.7–4.5)
PLATELET # BLD AUTO: 77 K/UL (ref 150–450)
PMV BLD AUTO: 11.4 FL (ref 9.2–12.9)
POTASSIUM SERPL-SCNC: 4 MMOL/L (ref 3.5–5.1)
PROT SERPL-MCNC: 4.9 G/DL (ref 6–8.4)
PROTHROMBIN TIME: 10.3 SEC (ref 9–12.5)
RBC # BLD AUTO: 3.73 M/UL (ref 4–5.4)
SODIUM SERPL-SCNC: 137 MMOL/L (ref 136–145)
WBC # BLD AUTO: 5.92 K/UL (ref 3.9–12.7)

## 2021-06-24 PROCEDURE — 36415 COLL VENOUS BLD VENIPUNCTURE: CPT | Performed by: STUDENT IN AN ORGANIZED HEALTH CARE EDUCATION/TRAINING PROGRAM

## 2021-06-24 PROCEDURE — 85610 PROTHROMBIN TIME: CPT | Performed by: STUDENT IN AN ORGANIZED HEALTH CARE EDUCATION/TRAINING PROGRAM

## 2021-06-24 PROCEDURE — 80053 COMPREHEN METABOLIC PANEL: CPT | Performed by: STUDENT IN AN ORGANIZED HEALTH CARE EDUCATION/TRAINING PROGRAM

## 2021-06-24 PROCEDURE — 99024 PR POST-OP FOLLOW-UP VISIT: ICD-10-PCS | Mod: BMT,POP,, | Performed by: STUDENT IN AN ORGANIZED HEALTH CARE EDUCATION/TRAINING PROGRAM

## 2021-06-24 PROCEDURE — 25000003 PHARM REV CODE 250: Performed by: STUDENT IN AN ORGANIZED HEALTH CARE EDUCATION/TRAINING PROGRAM

## 2021-06-24 PROCEDURE — 83735 ASSAY OF MAGNESIUM: CPT | Performed by: STUDENT IN AN ORGANIZED HEALTH CARE EDUCATION/TRAINING PROGRAM

## 2021-06-24 PROCEDURE — 94640 AIRWAY INHALATION TREATMENT: CPT

## 2021-06-24 PROCEDURE — 25000242 PHARM REV CODE 250 ALT 637 W/ HCPCS: Performed by: SURGERY

## 2021-06-24 PROCEDURE — 85025 COMPLETE CBC W/AUTO DIFF WBC: CPT | Performed by: STUDENT IN AN ORGANIZED HEALTH CARE EDUCATION/TRAINING PROGRAM

## 2021-06-24 PROCEDURE — 99024 POSTOP FOLLOW-UP VISIT: CPT | Mod: BMT,POP,, | Performed by: STUDENT IN AN ORGANIZED HEALTH CARE EDUCATION/TRAINING PROGRAM

## 2021-06-24 PROCEDURE — 84100 ASSAY OF PHOSPHORUS: CPT | Performed by: STUDENT IN AN ORGANIZED HEALTH CARE EDUCATION/TRAINING PROGRAM

## 2021-06-24 PROCEDURE — 94761 N-INVAS EAR/PLS OXIMETRY MLT: CPT

## 2021-06-24 RX ORDER — METHOCARBAMOL 500 MG/1
500 TABLET, FILM COATED ORAL 4 TIMES DAILY
Qty: 28 TABLET | Refills: 0 | Status: SHIPPED | OUTPATIENT
Start: 2021-06-24 | End: 2021-06-24

## 2021-06-24 RX ORDER — BISACODYL 10 MG
10 SUPPOSITORY, RECTAL RECTAL ONCE
Status: DISCONTINUED | OUTPATIENT
Start: 2021-06-24 | End: 2021-06-24 | Stop reason: HOSPADM

## 2021-06-24 RX ORDER — OXYCODONE AND ACETAMINOPHEN 10; 325 MG/1; MG/1
1 TABLET ORAL EVERY 4 HOURS PRN
Qty: 30 TABLET | Refills: 0 | Status: SHIPPED | OUTPATIENT
Start: 2021-06-24 | End: 2021-12-15

## 2021-06-24 RX ORDER — OXYCODONE AND ACETAMINOPHEN 10; 325 MG/1; MG/1
1 TABLET ORAL EVERY 4 HOURS PRN
Qty: 30 TABLET | Refills: 0 | Status: SHIPPED | OUTPATIENT
Start: 2021-06-24 | End: 2021-06-24

## 2021-06-24 RX ORDER — METHOCARBAMOL 500 MG/1
500 TABLET, FILM COATED ORAL 4 TIMES DAILY
Qty: 28 TABLET | Refills: 0 | Status: SHIPPED | OUTPATIENT
Start: 2021-06-24 | End: 2021-07-01

## 2021-06-24 RX ADMIN — IPRATROPIUM BROMIDE AND ALBUTEROL SULFATE 3 ML: .5; 2.5 SOLUTION RESPIRATORY (INHALATION) at 01:06

## 2021-06-24 RX ADMIN — DOCUSATE SODIUM 50 MG: 50 CAPSULE, LIQUID FILLED ORAL at 08:06

## 2021-06-24 RX ADMIN — METHOCARBAMOL 500 MG: 500 TABLET ORAL at 08:06

## 2021-06-24 RX ADMIN — METHOCARBAMOL 500 MG: 500 TABLET ORAL at 05:06

## 2021-06-24 RX ADMIN — POLYETHYLENE GLYCOL 3350 17 G: 17 POWDER, FOR SOLUTION ORAL at 08:06

## 2021-06-24 RX ADMIN — OXYCODONE AND ACETAMINOPHEN 1 TABLET: 10; 325 TABLET ORAL at 01:06

## 2021-06-24 RX ADMIN — MUPIROCIN: 20 OINTMENT TOPICAL at 09:06

## 2021-06-24 RX ADMIN — METHOCARBAMOL 500 MG: 500 TABLET ORAL at 03:06

## 2021-06-24 RX ADMIN — OXYCODONE AND ACETAMINOPHEN 1 TABLET: 10; 325 TABLET ORAL at 07:06

## 2021-06-24 RX ADMIN — MICONAZOLE NITRATE 200 MG: 100 SUPPOSITORY VAGINAL at 03:06

## 2021-06-24 RX ADMIN — AMLODIPINE BESYLATE 5 MG: 5 TABLET ORAL at 08:06

## 2021-06-24 RX ADMIN — IPRATROPIUM BROMIDE AND ALBUTEROL SULFATE 3 ML: .5; 2.5 SOLUTION RESPIRATORY (INHALATION) at 07:06

## 2021-06-24 RX ADMIN — DOXYCYCLINE HYCLATE 100 MG: 100 TABLET, COATED ORAL at 08:06

## 2021-06-28 ENCOUNTER — PATIENT OUTREACH (OUTPATIENT)
Dept: ADMINISTRATIVE | Facility: CLINIC | Age: 46
End: 2021-06-28

## 2021-06-28 LAB
FINAL PATHOLOGIC DIAGNOSIS: NORMAL
Lab: NORMAL

## 2021-07-08 ENCOUNTER — OFFICE VISIT (OUTPATIENT)
Dept: SURGERY | Facility: CLINIC | Age: 46
End: 2021-07-08
Payer: COMMERCIAL

## 2021-07-08 VITALS
WEIGHT: 293 LBS | HEART RATE: 99 BPM | TEMPERATURE: 98 F | SYSTOLIC BLOOD PRESSURE: 166 MMHG | HEIGHT: 67 IN | BODY MASS INDEX: 45.99 KG/M2 | DIASTOLIC BLOOD PRESSURE: 87 MMHG

## 2021-07-08 DIAGNOSIS — K43.9 VENTRAL HERNIA WITHOUT OBSTRUCTION OR GANGRENE: Primary | ICD-10-CM

## 2021-07-08 PROCEDURE — 99024 POSTOP FOLLOW-UP VISIT: CPT | Mod: BMT,S$GLB,, | Performed by: SURGERY

## 2021-07-08 PROCEDURE — 99999 PR PBB SHADOW E&M-EST. PATIENT-LVL III: ICD-10-PCS | Mod: PBBFAC,BMT,, | Performed by: SURGERY

## 2021-07-08 PROCEDURE — 99024 PR POST-OP FOLLOW-UP VISIT: ICD-10-PCS | Mod: BMT,S$GLB,, | Performed by: SURGERY

## 2021-07-08 PROCEDURE — 3008F PR BODY MASS INDEX (BMI) DOCUMENTED: ICD-10-PCS | Mod: BMT,CPTII,S$GLB, | Performed by: SURGERY

## 2021-07-08 PROCEDURE — 1126F AMNT PAIN NOTED NONE PRSNT: CPT | Mod: BMT,S$GLB,, | Performed by: SURGERY

## 2021-07-08 PROCEDURE — 3008F BODY MASS INDEX DOCD: CPT | Mod: BMT,CPTII,S$GLB, | Performed by: SURGERY

## 2021-07-08 PROCEDURE — 99999 PR PBB SHADOW E&M-EST. PATIENT-LVL III: CPT | Mod: PBBFAC,BMT,, | Performed by: SURGERY

## 2021-07-08 PROCEDURE — 1126F PR PAIN SEVERITY QUANTIFIED, NO PAIN PRESENT: ICD-10-PCS | Mod: BMT,S$GLB,, | Performed by: SURGERY

## 2021-07-12 ENCOUNTER — HOSPITAL ENCOUNTER (OUTPATIENT)
Dept: RADIOLOGY | Facility: HOSPITAL | Age: 46
Discharge: HOME | End: 2021-07-12
Attending: INTERNAL MEDICINE
Payer: COMMERCIAL

## 2021-07-12 DIAGNOSIS — J44.1 OBSTRUCTIVE CHRONIC BRONCHITIS WITH EXACERBATION: ICD-10-CM

## 2021-07-12 DIAGNOSIS — R94.5 NONSPECIFIC ABNORMAL RESULTS OF LIVER FUNCTION STUDY: Primary | ICD-10-CM

## 2021-07-12 PROCEDURE — 71046 X-RAY EXAM CHEST 2 VIEWS: CPT | Mod: TC

## 2021-07-12 PROCEDURE — 71046 XR CHEST PA AND LATERAL: ICD-10-PCS | Mod: 26,BMT,, | Performed by: RADIOLOGY

## 2021-07-12 PROCEDURE — 71046 X-RAY EXAM CHEST 2 VIEWS: CPT | Mod: 26,BMT,, | Performed by: RADIOLOGY

## 2021-10-21 ENCOUNTER — PATIENT OUTREACH (OUTPATIENT)
Dept: ADMINISTRATIVE | Facility: OTHER | Age: 46
End: 2021-10-21

## 2021-10-21 VITALS — SYSTOLIC BLOOD PRESSURE: 142 MMHG | DIASTOLIC BLOOD PRESSURE: 90 MMHG | OXYGEN SATURATION: 98 %

## 2021-12-27 ENCOUNTER — TELEPHONE (OUTPATIENT)
Dept: OBSTETRICS AND GYNECOLOGY | Facility: HOSPITAL | Age: 46
End: 2021-12-27
Payer: COMMERCIAL

## 2021-12-27 DIAGNOSIS — R87.810 CERVICAL HIGH RISK HPV (HUMAN PAPILLOMAVIRUS) TEST POSITIVE: Primary | ICD-10-CM

## 2023-01-16 ENCOUNTER — HOSPITAL ENCOUNTER (OUTPATIENT)
Dept: RADIOLOGY | Facility: HOSPITAL | Age: 48
Discharge: HOME | End: 2023-01-16
Attending: INTERNAL MEDICINE
Payer: COMMERCIAL

## 2023-01-16 DIAGNOSIS — J44.1 COPD EXACERBATION: Primary | ICD-10-CM

## 2023-01-16 DIAGNOSIS — J44.1 COPD EXACERBATION: ICD-10-CM

## 2023-01-16 PROCEDURE — 71046 XR CHEST PA AND LATERAL: ICD-10-PCS | Mod: 26,,, | Performed by: RADIOLOGY

## 2023-01-16 PROCEDURE — 71046 X-RAY EXAM CHEST 2 VIEWS: CPT | Mod: TC

## 2023-01-16 PROCEDURE — 71046 X-RAY EXAM CHEST 2 VIEWS: CPT | Mod: 26,,, | Performed by: RADIOLOGY

## 2023-03-13 ENCOUNTER — HOSPITAL ENCOUNTER (INPATIENT)
Facility: HOSPITAL | Age: 48
LOS: 8 days | Discharge: ANOTHER HOSPITAL | DRG: 871 | End: 2023-03-21
Attending: SURGERY | Admitting: INTERNAL MEDICINE
Payer: COMMERCIAL

## 2023-03-13 DIAGNOSIS — N30.00 ACUTE CYSTITIS WITHOUT HEMATURIA: ICD-10-CM

## 2023-03-13 DIAGNOSIS — N95.0 PMB (POSTMENOPAUSAL BLEEDING): ICD-10-CM

## 2023-03-13 DIAGNOSIS — A41.9 SEPSIS: Primary | ICD-10-CM

## 2023-03-13 DIAGNOSIS — M17.11 ARTHRITIS OF RIGHT KNEE: ICD-10-CM

## 2023-03-13 DIAGNOSIS — J18.9 PNEUMONIA OF BOTH LUNGS DUE TO INFECTIOUS ORGANISM, UNSPECIFIED PART OF LUNG: ICD-10-CM

## 2023-03-13 DIAGNOSIS — A40.3 SEPSIS DUE TO STREPTOCOCCUS PNEUMONIAE, UNSPECIFIED WHETHER ACUTE ORGAN DYSFUNCTION PRESENT: ICD-10-CM

## 2023-03-13 DIAGNOSIS — D64.9 ANEMIA, UNSPECIFIED TYPE: ICD-10-CM

## 2023-03-13 LAB
ALBUMIN SERPL BCP-MCNC: 1.9 G/DL (ref 3.5–5.2)
ALP SERPL-CCNC: 193 U/L (ref 55–135)
ALT SERPL W/O P-5'-P-CCNC: 20 U/L (ref 10–44)
AMORPH CRY URNS QL MICRO: ABNORMAL
ANION GAP SERPL CALC-SCNC: 15 MMOL/L (ref 8–16)
ANISOCYTOSIS BLD QL SMEAR: SLIGHT
AST SERPL-CCNC: 21 U/L (ref 10–40)
BACTERIA #/AREA URNS HPF: ABNORMAL /HPF
BASOPHILS # BLD AUTO: ABNORMAL K/UL (ref 0–0.2)
BASOPHILS NFR BLD: 0 % (ref 0–1.9)
BILIRUB SERPL-MCNC: 3.3 MG/DL (ref 0.1–1)
BILIRUB UR QL STRIP: ABNORMAL
BNP SERPL-MCNC: 32 PG/ML (ref 0–99)
BUN SERPL-MCNC: 45 MG/DL (ref 6–20)
CALCIUM SERPL-MCNC: 9.6 MG/DL (ref 8.7–10.5)
CHLORIDE SERPL-SCNC: 102 MMOL/L (ref 95–110)
CK SERPL-CCNC: 27 U/L (ref 20–180)
CLARITY UR: ABNORMAL
CO2 SERPL-SCNC: 19 MMOL/L (ref 23–29)
COLOR UR: ABNORMAL
CREAT SERPL-MCNC: 1.1 MG/DL (ref 0.5–1.4)
DACRYOCYTES BLD QL SMEAR: ABNORMAL
DIFFERENTIAL METHOD: ABNORMAL
EOSINOPHIL # BLD AUTO: ABNORMAL K/UL (ref 0–0.5)
EOSINOPHIL NFR BLD: 0 % (ref 0–8)
ERYTHROCYTE [DISTWIDTH] IN BLOOD BY AUTOMATED COUNT: 18.3 % (ref 11.5–14.5)
ERYTHROCYTE [SEDIMENTATION RATE] IN BLOOD BY WESTERGREN METHOD: 125 MM/HR (ref 0–20)
EST. GFR  (NO RACE VARIABLE): >60 ML/MIN/1.73 M^2
GIANT PLATELETS BLD QL SMEAR: PRESENT
GLUCOSE SERPL-MCNC: 203 MG/DL (ref 70–110)
GLUCOSE UR QL STRIP: NEGATIVE
HCT VFR BLD AUTO: 29.9 % (ref 37–48.5)
HGB BLD-MCNC: 9.1 G/DL (ref 12–16)
HGB UR QL STRIP: NEGATIVE
HYALINE CASTS #/AREA URNS LPF: 0 /LPF
HYPOCHROMIA BLD QL SMEAR: ABNORMAL
IMM GRANULOCYTES # BLD AUTO: ABNORMAL K/UL (ref 0–0.04)
IMM GRANULOCYTES NFR BLD AUTO: ABNORMAL % (ref 0–0.5)
INFLUENZA A, MOLECULAR: NEGATIVE
INFLUENZA B, MOLECULAR: NEGATIVE
KETONES UR QL STRIP: NEGATIVE
LACTATE SERPL-SCNC: 1.7 MMOL/L (ref 0.5–2.2)
LACTATE SERPL-SCNC: 1.8 MMOL/L (ref 0.5–2.2)
LEUKOCYTE ESTERASE UR QL STRIP: NEGATIVE
LYMPHOCYTES # BLD AUTO: ABNORMAL K/UL (ref 1–4.8)
LYMPHOCYTES NFR BLD: 6 % (ref 18–48)
MAGNESIUM SERPL-MCNC: 2.3 MG/DL (ref 1.6–2.6)
MCH RBC QN AUTO: 23.4 PG (ref 27–31)
MCHC RBC AUTO-ENTMCNC: 30.4 G/DL (ref 32–36)
MCV RBC AUTO: 77 FL (ref 82–98)
MICROSCOPIC COMMENT: ABNORMAL
MONOCYTES # BLD AUTO: ABNORMAL K/UL (ref 0.3–1)
MONOCYTES NFR BLD: 3 % (ref 4–15)
NEUTROPHILS NFR BLD: 72 % (ref 38–73)
NEUTS BAND NFR BLD MANUAL: 19 %
NITRITE UR QL STRIP: NEGATIVE
NRBC BLD-RTO: 0 /100 WBC
OVALOCYTES BLD QL SMEAR: ABNORMAL
PH UR STRIP: 5 [PH] (ref 5–8)
PLATELET # BLD AUTO: 165 K/UL (ref 150–450)
PLATELET BLD QL SMEAR: ABNORMAL
PMV BLD AUTO: 11.4 FL (ref 9.2–12.9)
POIKILOCYTOSIS BLD QL SMEAR: SLIGHT
POTASSIUM SERPL-SCNC: 5 MMOL/L (ref 3.5–5.1)
PROCALCITONIN SERPL IA-MCNC: 5.06 NG/ML
PROT SERPL-MCNC: 5.9 G/DL (ref 6–8.4)
PROT UR QL STRIP: ABNORMAL
RBC # BLD AUTO: 3.89 M/UL (ref 4–5.4)
RBC #/AREA URNS HPF: 0 /HPF (ref 0–4)
SARS-COV-2 RDRP RESP QL NAA+PROBE: NEGATIVE
SCHISTOCYTES BLD QL SMEAR: ABNORMAL
SODIUM SERPL-SCNC: 136 MMOL/L (ref 136–145)
SP GR UR STRIP: 1.02 (ref 1–1.03)
SPECIMEN SOURCE: NORMAL
SQUAMOUS #/AREA URNS HPF: >100 /HPF
TOXIC GRANULES BLD QL SMEAR: PRESENT
TROPONIN I SERPL DL<=0.01 NG/ML-MCNC: <0.006 NG/ML (ref 0–0.03)
URN SPEC COLLECT METH UR: ABNORMAL
UROBILINOGEN UR STRIP-ACNC: ABNORMAL EU/DL
WBC # BLD AUTO: 21.81 K/UL (ref 3.9–12.7)
WBC #/AREA URNS HPF: 20 /HPF (ref 0–5)
YEAST URNS QL MICRO: ABNORMAL

## 2023-03-13 PROCEDURE — 86140 C-REACTIVE PROTEIN: CPT | Performed by: NURSE PRACTITIONER

## 2023-03-13 PROCEDURE — 82550 ASSAY OF CK (CPK): CPT | Performed by: NURSE PRACTITIONER

## 2023-03-13 PROCEDURE — 93010 EKG 12-LEAD: ICD-10-PCS | Mod: ,,, | Performed by: INTERNAL MEDICINE

## 2023-03-13 PROCEDURE — 84145 PROCALCITONIN (PCT): CPT | Performed by: NURSE PRACTITIONER

## 2023-03-13 PROCEDURE — U0002 COVID-19 LAB TEST NON-CDC: HCPCS | Performed by: NURSE PRACTITIONER

## 2023-03-13 PROCEDURE — 87086 URINE CULTURE/COLONY COUNT: CPT | Performed by: NURSE PRACTITIONER

## 2023-03-13 PROCEDURE — 63600175 PHARM REV CODE 636 W HCPCS: Performed by: STUDENT IN AN ORGANIZED HEALTH CARE EDUCATION/TRAINING PROGRAM

## 2023-03-13 PROCEDURE — 84484 ASSAY OF TROPONIN QUANT: CPT | Performed by: NURSE PRACTITIONER

## 2023-03-13 PROCEDURE — 87502 INFLUENZA DNA AMP PROBE: CPT | Performed by: NURSE PRACTITIONER

## 2023-03-13 PROCEDURE — 87077 CULTURE AEROBIC IDENTIFY: CPT | Performed by: NURSE PRACTITIONER

## 2023-03-13 PROCEDURE — 99900031 HC PATIENT EDUCATION (STAT)

## 2023-03-13 PROCEDURE — 85007 BL SMEAR W/DIFF WBC COUNT: CPT | Performed by: NURSE PRACTITIONER

## 2023-03-13 PROCEDURE — 36415 COLL VENOUS BLD VENIPUNCTURE: CPT | Performed by: NURSE PRACTITIONER

## 2023-03-13 PROCEDURE — 85651 RBC SED RATE NONAUTOMATED: CPT | Performed by: NURSE PRACTITIONER

## 2023-03-13 PROCEDURE — 96375 TX/PRO/DX INJ NEW DRUG ADDON: CPT

## 2023-03-13 PROCEDURE — 25500020 PHARM REV CODE 255: Performed by: STUDENT IN AN ORGANIZED HEALTH CARE EDUCATION/TRAINING PROGRAM

## 2023-03-13 PROCEDURE — 87186 SC STD MICRODIL/AGAR DIL: CPT | Performed by: NURSE PRACTITIONER

## 2023-03-13 PROCEDURE — 80053 COMPREHEN METABOLIC PANEL: CPT | Performed by: NURSE PRACTITIONER

## 2023-03-13 PROCEDURE — 99900035 HC TECH TIME PER 15 MIN (STAT)

## 2023-03-13 PROCEDURE — 81000 URINALYSIS NONAUTO W/SCOPE: CPT | Performed by: NURSE PRACTITIONER

## 2023-03-13 PROCEDURE — 83880 ASSAY OF NATRIURETIC PEPTIDE: CPT | Performed by: NURSE PRACTITIONER

## 2023-03-13 PROCEDURE — 83735 ASSAY OF MAGNESIUM: CPT | Performed by: NURSE PRACTITIONER

## 2023-03-13 PROCEDURE — 93005 ELECTROCARDIOGRAM TRACING: CPT

## 2023-03-13 PROCEDURE — 96367 TX/PROPH/DG ADDL SEQ IV INF: CPT

## 2023-03-13 PROCEDURE — 93010 ELECTROCARDIOGRAM REPORT: CPT | Mod: ,,, | Performed by: INTERNAL MEDICINE

## 2023-03-13 PROCEDURE — 87040 BLOOD CULTURE FOR BACTERIA: CPT | Performed by: NURSE PRACTITIONER

## 2023-03-13 PROCEDURE — 25000003 PHARM REV CODE 250: Performed by: NURSE PRACTITIONER

## 2023-03-13 PROCEDURE — 99285 EMERGENCY DEPT VISIT HI MDM: CPT | Mod: 25

## 2023-03-13 PROCEDURE — 85027 COMPLETE CBC AUTOMATED: CPT | Performed by: NURSE PRACTITIONER

## 2023-03-13 PROCEDURE — 96365 THER/PROPH/DIAG IV INF INIT: CPT

## 2023-03-13 PROCEDURE — 83605 ASSAY OF LACTIC ACID: CPT | Performed by: NURSE PRACTITIONER

## 2023-03-13 PROCEDURE — 11000001 HC ACUTE MED/SURG PRIVATE ROOM

## 2023-03-13 PROCEDURE — 25000003 PHARM REV CODE 250: Performed by: STUDENT IN AN ORGANIZED HEALTH CARE EDUCATION/TRAINING PROGRAM

## 2023-03-13 PROCEDURE — 63600175 PHARM REV CODE 636 W HCPCS: Performed by: NURSE PRACTITIONER

## 2023-03-13 RX ORDER — KETOROLAC TROMETHAMINE 30 MG/ML
15 INJECTION, SOLUTION INTRAMUSCULAR; INTRAVENOUS
Status: COMPLETED | OUTPATIENT
Start: 2023-03-13 | End: 2023-03-13

## 2023-03-13 RX ORDER — VANCOMYCIN 2 GRAM/500 ML IN 0.9 % SODIUM CHLORIDE INTRAVENOUS
2000 ONCE
Status: COMPLETED | OUTPATIENT
Start: 2023-03-13 | End: 2023-03-14

## 2023-03-13 RX ORDER — VANCOMYCIN 2 GRAM/500 ML IN 0.9 % SODIUM CHLORIDE INTRAVENOUS
2000
Status: DISCONTINUED | OUTPATIENT
Start: 2023-03-14 | End: 2023-03-13

## 2023-03-13 RX ADMIN — SODIUM CHLORIDE 1848 ML: 9 INJECTION, SOLUTION INTRAVENOUS at 07:03

## 2023-03-13 RX ADMIN — IOHEXOL 100 ML: 350 INJECTION, SOLUTION INTRAVENOUS at 09:03

## 2023-03-13 RX ADMIN — VANCOMYCIN 2 GRAM/500 ML IN 0.9 % SODIUM CHLORIDE INTRAVENOUS 2000 MG: at 10:03

## 2023-03-13 RX ADMIN — KETOROLAC TROMETHAMINE 15 MG: 30 INJECTION, SOLUTION INTRAMUSCULAR; INTRAVENOUS at 11:03

## 2023-03-13 RX ADMIN — PIPERACILLIN AND TAZOBACTAM 4.5 G: 4; .5 INJECTION, POWDER, LYOPHILIZED, FOR SOLUTION INTRAVENOUS; PARENTERAL at 08:03

## 2023-03-13 NOTE — ED PROVIDER NOTES
Encounter Date: 3/13/2023       History     Chief Complaint   Patient presents with    Pain     Patient to ER CC of pain all over her body for a week and was dx with a pulled muscle, however her PCP told her that she has a infection in her blood from blood work that she had done today      Hedy Conway is a 47 y.o. female with PMH: Non-Hodgkin's Lymphoma (2011), DM on metformin, HTN, Arthritis, COPD who was sent to the ED by PCP for further eval of abnormal lab work.   Patient reports that last Friday night (03/03) she went to bed in her normal state of health, woke up the next morning (03/04) and could not walk due to L thigh pain. She went to [facility] ED and was dx with a pulled muscle (Sat). She also reports subjective fever with tmax of 101.3F for three days while home. She followed up with her PCP-John Lantigua, on Wed (03/08) and was treated symptomatically and told to return if symptoms worsen. She went to bed Wed evening; felt a pop in her R knee and now with R wrist pain prompting ED visit at [facility] [place] on 03/09/23. She was again dx with muscle pain.   She was seen by Dr. Mark Anthony gongora, for R knee pain and was told that she possibly has torn cartilage in her knee. She also received a steroid injection in her R wrist. She is awaiting a MRI of her R knee and continues with pain.  Symptoms have progressively worsened, noting that she cannot walk and has been having to wear diapers for the past 5 days. She went back to the ED at [facility] on Friday and was dx with a UTI. She was discharged home with an antibiotic that starts with a C.   Since this time she has had Progressive weakness, fatigue, hallucinations, and night sweats.  Denies abdominal pain, NVD. Denies back pain.   She notes a + productive cough with blood tinged sputum. Denies associated cp or sob.  LBM X 1 week ago>Taking norco for pain at home. Denies rectal bleeding or Black stool  She presents pale, diaphoretic and tachycardic.     The  history is provided by the patient.   Review of patient's allergies indicates:   Allergen Reactions    Tetanus vaccines and toxoid Rash     Past Medical History:   Diagnosis Date    Acute carpal tunnel syndrome of left wrist     Biliary colic     Diabetes mellitus     Encounter for blood transfusion     History of chemotherapy     Incisional hernia     Large cell (diffuse) non-Hodgkin's lymphoma     Stem cells transplant status      Past Surgical History:   Procedure Laterality Date    BREAST BIOPSY Left     lymph node biopsy, benign    BREAST SURGERY      CHOLECYSTECTOMY      COLD KNIFE CONIZATION OF CERVIX N/A 3/8/2021    Procedure: CONE BIOPSY, CERVIX, USING COLD KNIFE;  Surgeon: Evelyn Wheeler MD;  Location: [facility] OR;  Service: OB/GYN;  Laterality: N/A;    COLONOSCOPY N/A 12/21/2022    Procedure: COLONOSCOPY;  Surgeon: Annamarie Jane MD;  Location: [facility];  Service: Endoscopy;  Laterality: N/A;    HERNIA REPAIR      incisional    LUNG BIOPSY      lymphnode biopsy      MEDIPORT REMOVAL Left 1/30/2020    Procedure: REMOVAL, CATHETER, CENTRAL VENOUS, TUNNELED, WITH PORT;  Surgeon: Luis Bogran-Reyes, MD;  Location: [facility] OR;  Service: General;  Laterality: Left;    PORTACATH PLACEMENT Left     REVISION OF SCAR  6/22/2021    Procedure: REVISION, SCAR;  Surgeon: Otis Grimes MD;  Location: [facility] OR [address] [place] [address];  Service: General;;    TUBAL LIGATION      UMBILICAL HERNIA REPAIR       Family History   Problem Relation Age of Onset    Diabetes Mother     Hypertension Mother     Kidney disease Mother     Heart block Mother     Diabetes Father     Hypertension Father     Lung cancer Father     Heart failure Father     Breast cancer Maternal Aunt     Breast cancer Maternal Aunt         leukemia    Colon cancer Paternal Grandmother     Cancer Paternal Grandmother     Anesthesia problems Neg Hx      Social History     Tobacco Use    Smoking status: Never    Smokeless tobacco: Never   Substance Use Topics    Alcohol  use: Not Currently    Drug use: No     Review of Systems   Constitutional:  Positive for activity change, fatigue and fever.   HENT:  Positive for sore throat.    Respiratory:  Positive for cough. Negative for chest tightness and shortness of breath.    Cardiovascular:  Negative for chest pain.   Gastrointestinal:  Positive for vomiting (vomited X 1 (4 days ago)). Negative for abdominal pain, diarrhea and nausea.   Genitourinary:  Positive for dysuria. Negative for frequency and urgency.   Musculoskeletal:  Positive for arthralgias (R knee, wrist). Negative for back pain.   Skin:  Positive for color change and pallor. Negative for rash.   Neurological:  Positive for weakness. Negative for dizziness, light-headedness and numbness.   Hematological:  Does not bruise/bleed easily.     Physical Exam     Initial Vitals [03/13/23 1818]   BP Pulse Resp Temp SpO2   103/65 (!) 128 18 97.6 °F (36.4 °C) 96 %      MAP       --         Physical Exam    Nursing note and vitals reviewed.  Constitutional: She appears well-developed and well-nourished. She is diaphoretic. She is Obese . She appears toxic. She appears ill.   HENT:   Head: Normocephalic and atraumatic.   Right Ear: Tympanic membrane, external ear and ear canal normal. Tympanic membrane is not erythematous. No middle ear effusion.   Left Ear: Tympanic membrane, external ear and ear canal normal. Tympanic membrane is not erythematous.  No middle ear effusion.   Nose: Nose normal.   Mouth/Throat: Uvula is midline and oropharynx is clear and moist. Mucous membranes are dry.   Eyes: Conjunctivae and EOM are normal. Pupils are equal, round, and reactive to light.   Neck: Neck supple.   Normal range of motion.  Cardiovascular:  Regular rhythm, normal heart sounds and intact distal pulses.  No extrasystoles are present.  Tachycardia present.  PMI is not displaced.        Pulmonary/Chest: Effort normal and breath sounds normal. She has no decreased breath sounds. She has no  wheezes. She has no rhonchi. She has no rales.   Abdominal: Abdomen is soft. Bowel sounds are normal. There is generalized abdominal tenderness.   Musculoskeletal:      Cervical back: Normal range of motion and neck supple.      Right knee: Decreased range of motion. Tenderness present.      Right lower leg: Swelling present. 2+ Edema present.      Left lower leg: Swelling present. 1+ Edema present.     Neurological: She is alert and oriented to person, place, and time. She has normal strength. She displays normal reflexes. No cranial nerve deficit or sensory deficit.   Skin: Skin is warm. Capillary refill takes less than 2 seconds. No rash noted. There is pallor.   Psychiatric: She has a normal mood and affect. Her behavior is normal. Judgment and thought content normal.       ED Course   Critical Care    Date/Time: 3/13/2023 8:45 PM  Performed by: Marizol Freitas NP  Authorized by: Say Mayer MD   Direct patient critical care time: 5 minutes  Additional history critical care time: 5 minutes  Ordering / reviewing critical care time: 5 minutes  Documentation critical care time: 5 minutes  Consulting other physicians critical care time: 5 minutes  Consult with family critical care time: 5 minutes  Other critical care time: 5 minutes  Total critical care time (exclusive of procedural time) : 35 minutes  Critical care was necessary to treat or prevent imminent or life-threatening deterioration of the following conditions: sepsis.  Critical care was time spent personally by me on the following activities: blood draw for specimens, discussions with primary provider, evaluation of patient's response to treatment, examination of patient, obtaining history from patient or surrogate, ordering and performing treatments and interventions, ordering and review of laboratory studies, ordering and review of radiographic studies, pulse oximetry, re-evaluation of patient's condition and review of old charts.      Labs  Reviewed   CBC W/ AUTO DIFFERENTIAL - Abnormal; Notable for the following components:       Result Value    WBC 21.81 (*)     RBC 3.89 (*)     Hemoglobin 9.1 (*)     Hematocrit 29.9 (*)     MCV 77 (*)     MCH 23.4 (*)     MCHC 30.4 (*)     RDW 18.3 (*)     Lymph % 6.0 (*)     Mono % 3.0 (*)     All other components within normal limits   COMPREHENSIVE METABOLIC PANEL - Abnormal; Notable for the following components:    CO2 19 (*)     Glucose 203 (*)     BUN 45 (*)     Total Protein 5.9 (*)     Albumin 1.9 (*)     Total Bilirubin 3.3 (*)     Alkaline Phosphatase 193 (*)     All other components within normal limits   URINALYSIS, REFLEX TO URINE CULTURE - Abnormal; Notable for the following components:    Color, UA Orange (*)     Appearance, UA Cloudy (*)     Protein, UA 1+ (*)     Bilirubin (UA) 2+ (*)     Urobilinogen, UA 2.0-3.0 (*)     All other components within normal limits    Narrative:     Specimen Source->Urine   PROCALCITONIN - Abnormal; Notable for the following components:    Procalcitonin 5.06 (*)     All other components within normal limits   SEDIMENTATION RATE - Abnormal; Notable for the following components:    Sed Rate 125 (*)     All other components within normal limits   C-REACTIVE PROTEIN - Abnormal; Notable for the following components:    .9 (*)     All other components within normal limits   URINALYSIS MICROSCOPIC - Abnormal; Notable for the following components:    WBC, UA 20 (*)     Bacteria Many (*)     Yeast, UA Moderate (*)     All other components within normal limits    Narrative:     Specimen Source->Urine   CBC W/ AUTO DIFFERENTIAL - Abnormal; Notable for the following components:    WBC 15.19 (*)     RBC 3.07 (*)     Hemoglobin 7.2 (*)     Hematocrit 23.7 (*)     MCV 77 (*)     MCH 23.5 (*)     MCHC 30.4 (*)     RDW 18.1 (*)     Platelets 142 (*)     Gran % 80.0 (*)     Lymph % 11.0 (*)     Mono % 0.0 (*)     All other components within normal limits   COMPREHENSIVE METABOLIC  PANEL - Abnormal; Notable for the following components:    Sodium 135 (*)     CO2 19 (*)     Glucose 194 (*)     BUN 39 (*)     Total Protein 4.9 (*)     Albumin 1.5 (*)     Total Bilirubin 3.3 (*)     Alkaline Phosphatase 151 (*)     All other components within normal limits   CULTURE, BLOOD    Narrative:     Aerobic and anaerobic   CULTURE, BLOOD    Narrative:     Aerobic and anaerobic   INFLUENZA A & B BY MOLECULAR   CULTURE, URINE   CULTURE, RESPIRATORY   LACTIC ACID, PLASMA   TROPONIN I   B-TYPE NATRIURETIC PEPTIDE   MAGNESIUM   CK   SARS-COV-2 RNA AMPLIFICATION, QUAL   LACTIC ACID, PLASMA   TROPONIN I   PREGNANCY TEST, URINE RAPID          Imaging Results              CT Abdomen Pelvis With Contrast (Final result)  Result time 03/13/23 22:16:29      Final result by Jona Fajardo MD (03/13/23 22:16:29)                   Impression:      1. Bibasilar patchy alveolar infiltrates could represent pneumonia.  Minimal consolidation in the lingula and left lower lobe also.  Recommend follow-up.  2. Hepatosplenomegaly.  3. Nonobstructing nephrolithiasis of the left kidney.  4. Few stable mildly enlarged retroperitoneal lymph nodes.      Electronically signed by: Jona Fajardo  Date:    03/13/2023  Time:    22:16               Narrative:    EXAMINATION:  CT ABDOMEN PELVIS WITH CONTRAST    CLINICAL HISTORY:  Abdominal abscess/infection suspected;    TECHNIQUE:  Low dose axial images, sagittal and coronal reformations were obtained from the lung bases to the pubic symphysis following the IV administration of 100 mL of Omnipaque 350 .  Oral contrast was not administered.    COMPARISON:  12/15/2021, 02/01/2018    FINDINGS:  Abdomen:    - Lower thorax:    - Lung bases: Mild patchy alveolar infiltrates at the lung bases.  Minimal consolidation in the lingula and left lower lobe.    - Liver: Liver is enlarged.  No focal abnormality.    - Gallbladder: Status post cholecystectomy.    - Bile Ducts: No evidence of intra or  extra hepatic biliary ductal dilation.    - Spleen: Spleen is enlarged.    - Kidneys: Small nonobstructing stone at the lower pole the left kidney.  No mass, hydronephrosis or hydroureter bilaterally.    - Adrenals: Unremarkable.    - Pancreas: No mass or peripancreatic fat stranding.    - Retroperitoneum:  Few mildly enlarged periaortic Evelyn iliac retroperitoneal lymph nodes, similar to the prior study.  1.4 cm node on axial 75 1.5 cm lymph node axial 127    - Vascular: No abdominal aortic aneurysm.    - Abdominal wall:  Unremarkable.    Pelvis:    Urinary bladder is within normal limits.    Uterus is present.    The appendix is within normal limits.    Bowel/Mesentery:    No evidence of bowel obstruction or inflammation.    Bones:  No acute osseous abnormality and no suspicious lytic or blastic lesion.    Small bilateral fat containing inguinal hernias.                                       X-Ray Chest AP Portable (Final result)  Result time 03/13/23 20:05:59      Final result by Jona Fajardo MD (03/13/23 20:05:59)                   Impression:      See above comments.  Recommend follow-up      Electronically signed by: Jona Fajardo  Date:    03/13/2023  Time:    20:05               Narrative:    EXAMINATION:  XR CHEST AP PORTABLE    CLINICAL HISTORY:  Sepsis;    TECHNIQUE:  Single frontal view of the chest was performed.    COMPARISON:  01/16/2023    FINDINGS:  Suboptimal inspiration.  Cardiac silhouette is within normal limits.  Bibasilar patchy alveolar infiltrates could represent pneumonia.  This is minimally improved from prior study.  No effusion or pneumothorax.    No acute osseous abnormality.  Remote rib fractures on the left.                                       Medications   piperacillin-tazobactam (ZOSYN) 4.5 g in dextrose 5 % in water (D5W) 5 % 100 mL IVPB (MB+) (0 g Intravenous Stopped 3/14/23 0553)   vancomycin - pharmacy to dose (has no administration in time range)   vancomycin 1,500 mg in  dextrose 5 % (D5W) 250 mL IVPB (Vial-Mate) (has no administration in time range)   fluconazole (DIFLUCAN) IVPB 200 mg (200 mg Intravenous New Bag 3/14/23 0842)   0.9%  NaCl infusion (1,000 mLs Intravenous New Bag 3/14/23 0841)   sodium chloride 0.9% bolus 1,848 mL 1,848 mL (0 mLs Intravenous Stopped 3/13/23 2010)   iohexoL (OMNIPAQUE 350) injection 100 mL (100 mLs Intravenous Given 3/13/23 2123)   vancomycin 2 g in 0.9% sodium chloride 500 mL IVPB (0 mg Intravenous Stopped 3/14/23 0021)     Followed by   vancomycin (VANCOCIN) 500 mg in dextrose 5 % in water (D5W) 5 % 100 mL IVPB (MB+) (0 mg Intravenous Stopped 3/14/23 0154)   azithromycin (ZITHROMAX) 500 mg in dextrose 5 % (D5W) 250 mL IVPB (Vial-Mate) (0 mg Intravenous Stopped 3/14/23 0309)   ketorolac injection 15 mg (15 mg Intravenous Given 3/13/23 2305)   sodium chloride 0.9% bolus 1,000 mL 1,000 mL (0 mLs Intravenous Stopped 3/14/23 0114)   morphine injection 4 mg (4 mg Intravenous Given 3/14/23 0626)     Medical Decision Making:   Differential Diagnosis:   Sepsis, dehydration, anemia, GI bleed, UTI, pyelonephritis, COVID-19, influenza, pneumonia  Independently Interpreted Test(s):   I have ordered and independently interpreted EKG Reading(s) - see summary below       <> Summary of EKG Reading(s): ST, rate 124. Normal NH and QRS intervals. No STEMI.  No significant change when compared to EKG of 06/10/21  Clinical Tests:   Lab Tests: Ordered and Reviewed  Radiological Study: Ordered and Reviewed  ED Management:  Evaluation of a 47-year-old female with generalized body aches, progressive weakness and fatigue for the past week.  She was sent to the ED by her PCP for further evaluation of abnormal lab work with concern for infection.  She presents pale, diaphoretic, and tachycardic.  She is normotensive.  Physical exam with dry mucous membranes, coarse breath sounds bilaterally, generalized abdominal tenderness with no rebound or guarding.  + bilateral lower  "extremity edema with good pedal pulses.  Negative Homans bilaterally.  Reviewed imaging from ED visit @ [facility] [place] on 03/09.   Sepsis workup initiated.  30 ml/kg IBW administered in addition to IV vanc/zosyn  Lab izaguirre with WBC of 21, microcytic anemia that appears to be chronic with H/H of 9.1/29.9, BUN 45, t-bili 3.3. Procal 5.0, Negative lactic.  UA with 20 Wbcs; cx pending. Bld cx X 2 pending in addition to CRP/SED rate.   CXR shows patchy alveolar infiltrates could represent pneumonia.  This is minimally improved from prior study.  No effusion or pneumothorax.    Care of patient transitioned to Dr. Mayer at shift change pending CT abd/pelvis results.                Sepsis Perfusion Assessment: "I attest a sepsis perfusion exam was performed within 6 hours of sepsis, severe sepsis, or septic shock presentation, following fluid resuscitation."            Clinical Impression:   Final diagnoses:  [A41.9] Sepsis (Primary)  [J18.9] Pneumonia of both lungs due to infectious organism, unspecified part of lung  [N30.00] Acute cystitis without hematuria  [D64.9] Anemia, unspecified type        ED Disposition Condition    Admit Stable                Marizol Freitas NP  03/14/23 0927    "

## 2023-03-14 PROBLEM — E80.6 HYPERBILIRUBINEMIA: Status: ACTIVE | Noted: 2023-03-14

## 2023-03-14 PROBLEM — D50.9 MICROCYTIC ANEMIA: Status: ACTIVE | Noted: 2023-03-14

## 2023-03-14 PROBLEM — E11.9 TYPE 2 DIABETES MELLITUS WITHOUT COMPLICATION, WITHOUT LONG-TERM CURRENT USE OF INSULIN: Status: ACTIVE | Noted: 2023-03-14

## 2023-03-14 PROBLEM — N30.00 ACUTE CYSTITIS WITHOUT HEMATURIA: Status: ACTIVE | Noted: 2023-03-14

## 2023-03-14 PROBLEM — M79.10 MYALGIA: Status: ACTIVE | Noted: 2023-03-14

## 2023-03-14 LAB
ALBUMIN SERPL BCP-MCNC: 1.5 G/DL (ref 3.5–5.2)
ALP SERPL-CCNC: 151 U/L (ref 55–135)
ALT SERPL W/O P-5'-P-CCNC: 19 U/L (ref 10–44)
AMPHET+METHAMPHET UR QL: NEGATIVE
ANION GAP SERPL CALC-SCNC: 11 MMOL/L (ref 8–16)
ANISOCYTOSIS BLD QL SMEAR: ABNORMAL
AST SERPL-CCNC: 24 U/L (ref 10–40)
B-HCG UR QL: NEGATIVE
BARBITURATES UR QL SCN>200 NG/ML: NEGATIVE
BASOPHILS # BLD AUTO: ABNORMAL K/UL (ref 0–0.2)
BASOPHILS NFR BLD: 0 % (ref 0–1.9)
BENZODIAZ UR QL SCN>200 NG/ML: NEGATIVE
BILIRUB SERPL-MCNC: 3.3 MG/DL (ref 0.1–1)
BUN SERPL-MCNC: 39 MG/DL (ref 6–20)
BZE UR QL SCN: NEGATIVE
CALCIUM SERPL-MCNC: 8.7 MG/DL (ref 8.7–10.5)
CANNABINOIDS UR QL SCN: NEGATIVE
CHLORIDE SERPL-SCNC: 105 MMOL/L (ref 95–110)
CK SERPL-CCNC: 19 U/L (ref 20–180)
CO2 SERPL-SCNC: 19 MMOL/L (ref 23–29)
CREAT SERPL-MCNC: 0.8 MG/DL (ref 0.5–1.4)
CREAT UR-MCNC: 71.1 MG/DL (ref 15–325)
CRP SERPL-MCNC: 412.9 MG/L (ref 0–8.2)
DIFFERENTIAL METHOD: ABNORMAL
EOSINOPHIL # BLD AUTO: ABNORMAL K/UL (ref 0–0.5)
EOSINOPHIL NFR BLD: 1 % (ref 0–8)
ERYTHROCYTE [DISTWIDTH] IN BLOOD BY AUTOMATED COUNT: 18.1 % (ref 11.5–14.5)
EST. GFR  (NO RACE VARIABLE): >60 ML/MIN/1.73 M^2
GLUCOSE SERPL-MCNC: 194 MG/DL (ref 70–110)
HCT VFR BLD AUTO: 23.7 % (ref 37–48.5)
HGB BLD-MCNC: 7.2 G/DL (ref 12–16)
IMM GRANULOCYTES # BLD AUTO: ABNORMAL K/UL (ref 0–0.04)
IMM GRANULOCYTES NFR BLD AUTO: ABNORMAL % (ref 0–0.5)
LYMPHOCYTES # BLD AUTO: ABNORMAL K/UL (ref 1–4.8)
LYMPHOCYTES NFR BLD: 11 % (ref 18–48)
MCH RBC QN AUTO: 23.5 PG (ref 27–31)
MCHC RBC AUTO-ENTMCNC: 30.4 G/DL (ref 32–36)
MCV RBC AUTO: 77 FL (ref 82–98)
METHADONE UR QL SCN>300 NG/ML: NEGATIVE
MONOCYTES # BLD AUTO: ABNORMAL K/UL (ref 0.3–1)
MONOCYTES NFR BLD: 0 % (ref 4–15)
NEUTROPHILS NFR BLD: 80 % (ref 38–73)
NEUTS BAND NFR BLD MANUAL: 8 %
NRBC BLD-RTO: 0 /100 WBC
OPIATES UR QL SCN: ABNORMAL
OVALOCYTES BLD QL SMEAR: ABNORMAL
PCP UR QL SCN>25 NG/ML: NEGATIVE
PLATELET # BLD AUTO: 142 K/UL (ref 150–450)
PMV BLD AUTO: 11.5 FL (ref 9.2–12.9)
POCT GLUCOSE: 240 MG/DL (ref 70–110)
POCT GLUCOSE: 253 MG/DL (ref 70–110)
POIKILOCYTOSIS BLD QL SMEAR: ABNORMAL
POTASSIUM SERPL-SCNC: 4.6 MMOL/L (ref 3.5–5.1)
PROT SERPL-MCNC: 4.9 G/DL (ref 6–8.4)
RBC # BLD AUTO: 3.07 M/UL (ref 4–5.4)
SODIUM SERPL-SCNC: 135 MMOL/L (ref 136–145)
TARGETS BLD QL SMEAR: ABNORMAL
TOXIC GRANULES BLD QL SMEAR: PRESENT
TOXICOLOGY INFORMATION: ABNORMAL
TROPONIN I SERPL DL<=0.01 NG/ML-MCNC: <0.006 NG/ML (ref 0–0.03)
WBC # BLD AUTO: 15.19 K/UL (ref 3.9–12.7)

## 2023-03-14 PROCEDURE — 63600175 PHARM REV CODE 636 W HCPCS: Performed by: STUDENT IN AN ORGANIZED HEALTH CARE EDUCATION/TRAINING PROGRAM

## 2023-03-14 PROCEDURE — 94760 N-INVAS EAR/PLS OXIMETRY 1: CPT

## 2023-03-14 PROCEDURE — 25000003 PHARM REV CODE 250: Performed by: STUDENT IN AN ORGANIZED HEALTH CARE EDUCATION/TRAINING PROGRAM

## 2023-03-14 PROCEDURE — 36415 COLL VENOUS BLD VENIPUNCTURE: CPT | Performed by: STUDENT IN AN ORGANIZED HEALTH CARE EDUCATION/TRAINING PROGRAM

## 2023-03-14 PROCEDURE — 11000001 HC ACUTE MED/SURG PRIVATE ROOM

## 2023-03-14 PROCEDURE — 36415 COLL VENOUS BLD VENIPUNCTURE: CPT | Performed by: INTERNAL MEDICINE

## 2023-03-14 PROCEDURE — 85027 COMPLETE CBC AUTOMATED: CPT | Performed by: PHYSICIAN ASSISTANT

## 2023-03-14 PROCEDURE — 81025 URINE PREGNANCY TEST: CPT | Performed by: INTERNAL MEDICINE

## 2023-03-14 PROCEDURE — 25000003 PHARM REV CODE 250: Performed by: INTERNAL MEDICINE

## 2023-03-14 PROCEDURE — 80307 DRUG TEST PRSMV CHEM ANLYZR: CPT | Performed by: INTERNAL MEDICINE

## 2023-03-14 PROCEDURE — 94761 N-INVAS EAR/PLS OXIMETRY MLT: CPT

## 2023-03-14 PROCEDURE — 25000003 PHARM REV CODE 250: Performed by: SURGERY

## 2023-03-14 PROCEDURE — 99223 1ST HOSP IP/OBS HIGH 75: CPT | Mod: ,,, | Performed by: INTERNAL MEDICINE

## 2023-03-14 PROCEDURE — 82550 ASSAY OF CK (CPK): CPT | Performed by: INTERNAL MEDICINE

## 2023-03-14 PROCEDURE — 36415 COLL VENOUS BLD VENIPUNCTURE: CPT | Performed by: PHYSICIAN ASSISTANT

## 2023-03-14 PROCEDURE — 80053 COMPREHEN METABOLIC PANEL: CPT | Performed by: PHYSICIAN ASSISTANT

## 2023-03-14 PROCEDURE — 63600175 PHARM REV CODE 636 W HCPCS: Performed by: INTERNAL MEDICINE

## 2023-03-14 PROCEDURE — 99223 PR INITIAL HOSPITAL CARE,LEVL III: ICD-10-PCS | Mod: ,,, | Performed by: INTERNAL MEDICINE

## 2023-03-14 PROCEDURE — 85007 BL SMEAR W/DIFF WBC COUNT: CPT | Performed by: PHYSICIAN ASSISTANT

## 2023-03-14 PROCEDURE — 25000003 PHARM REV CODE 250: Performed by: NURSE PRACTITIONER

## 2023-03-14 PROCEDURE — 63600175 PHARM REV CODE 636 W HCPCS: Performed by: SURGERY

## 2023-03-14 PROCEDURE — 63600175 PHARM REV CODE 636 W HCPCS: Performed by: NURSE PRACTITIONER

## 2023-03-14 PROCEDURE — 84484 ASSAY OF TROPONIN QUANT: CPT | Performed by: STUDENT IN AN ORGANIZED HEALTH CARE EDUCATION/TRAINING PROGRAM

## 2023-03-14 PROCEDURE — 27000492 HC SLEEVE, SCD T/L

## 2023-03-14 PROCEDURE — 99900035 HC TECH TIME PER 15 MIN (STAT)

## 2023-03-14 PROCEDURE — 94640 AIRWAY INHALATION TREATMENT: CPT

## 2023-03-14 RX ORDER — INSULIN ASPART 100 [IU]/ML
1-10 INJECTION, SOLUTION INTRAVENOUS; SUBCUTANEOUS
Status: DISCONTINUED | OUTPATIENT
Start: 2023-03-14 | End: 2023-03-15

## 2023-03-14 RX ORDER — TALC
6 POWDER (GRAM) TOPICAL NIGHTLY PRN
Status: DISCONTINUED | OUTPATIENT
Start: 2023-03-14 | End: 2023-03-21 | Stop reason: HOSPADM

## 2023-03-14 RX ORDER — GLUCAGON 1 MG
1 KIT INJECTION
Status: DISCONTINUED | OUTPATIENT
Start: 2023-03-14 | End: 2023-03-15

## 2023-03-14 RX ORDER — DEXTROSE 40 %
15 GEL (GRAM) ORAL
Status: DISCONTINUED | OUTPATIENT
Start: 2023-03-14 | End: 2023-03-21 | Stop reason: SDUPTHER

## 2023-03-14 RX ORDER — SODIUM CHLORIDE 9 MG/ML
1000 INJECTION, SOLUTION INTRAVENOUS CONTINUOUS
Status: DISCONTINUED | OUTPATIENT
Start: 2023-03-14 | End: 2023-03-14

## 2023-03-14 RX ORDER — CYCLOBENZAPRINE HCL 10 MG
10 TABLET ORAL EVERY 8 HOURS PRN
Status: DISCONTINUED | OUTPATIENT
Start: 2023-03-14 | End: 2023-03-21 | Stop reason: HOSPADM

## 2023-03-14 RX ORDER — HYDROCODONE BITARTRATE AND ACETAMINOPHEN 5; 325 MG/1; MG/1
1 TABLET ORAL EVERY 6 HOURS PRN
Status: ON HOLD | COMMUNITY
Start: 2023-03-10 | End: 2023-04-06 | Stop reason: HOSPADM

## 2023-03-14 RX ORDER — SODIUM CHLORIDE 0.9 % (FLUSH) 0.9 %
10 SYRINGE (ML) INJECTION
Status: DISCONTINUED | OUTPATIENT
Start: 2023-03-14 | End: 2023-03-21 | Stop reason: HOSPADM

## 2023-03-14 RX ORDER — MORPHINE SULFATE 2 MG/ML
4 INJECTION, SOLUTION INTRAMUSCULAR; INTRAVENOUS
Status: COMPLETED | OUTPATIENT
Start: 2023-03-14 | End: 2023-03-14

## 2023-03-14 RX ORDER — CYCLOBENZAPRINE HCL 10 MG
10 TABLET ORAL 3 TIMES DAILY PRN
Status: ON HOLD | COMMUNITY
Start: 2023-03-12 | End: 2023-06-08 | Stop reason: HOSPADM

## 2023-03-14 RX ORDER — HYDROCODONE BITARTRATE AND ACETAMINOPHEN 5; 325 MG/1; MG/1
1 TABLET ORAL EVERY 6 HOURS PRN
Status: DISCONTINUED | OUTPATIENT
Start: 2023-03-14 | End: 2023-03-16

## 2023-03-14 RX ORDER — DEXTROSE 40 %
30 GEL (GRAM) ORAL
Status: DISCONTINUED | OUTPATIENT
Start: 2023-03-14 | End: 2023-03-21 | Stop reason: SDUPTHER

## 2023-03-14 RX ORDER — NAPROXEN 500 MG/1
500 TABLET ORAL 2 TIMES DAILY PRN
Status: ON HOLD | COMMUNITY
End: 2023-06-08 | Stop reason: HOSPADM

## 2023-03-14 RX ORDER — IPRATROPIUM BROMIDE AND ALBUTEROL SULFATE 2.5; .5 MG/3ML; MG/3ML
3 SOLUTION RESPIRATORY (INHALATION) EVERY 8 HOURS
Status: DISCONTINUED | OUTPATIENT
Start: 2023-03-15 | End: 2023-03-17

## 2023-03-14 RX ORDER — MELATONIN 10 MG
10 CAPSULE ORAL NIGHTLY PRN
COMMUNITY

## 2023-03-14 RX ORDER — FLUCONAZOLE 2 MG/ML
200 INJECTION, SOLUTION INTRAVENOUS
Status: DISCONTINUED | OUTPATIENT
Start: 2023-03-14 | End: 2023-03-15

## 2023-03-14 RX ORDER — ONDANSETRON 4 MG/1
4 TABLET, FILM COATED ORAL EVERY 8 HOURS PRN
COMMUNITY
Start: 2023-03-14

## 2023-03-14 RX ADMIN — IPRATROPIUM BROMIDE AND ALBUTEROL SULFATE 3 ML: 2.5; .5 SOLUTION RESPIRATORY (INHALATION) at 11:03

## 2023-03-14 RX ADMIN — HYDROCODONE BITARTRATE AND ACETAMINOPHEN 1 TABLET: 5; 325 TABLET ORAL at 11:03

## 2023-03-14 RX ADMIN — AZITHROMYCIN MONOHYDRATE 500 MG: 500 INJECTION, POWDER, LYOPHILIZED, FOR SOLUTION INTRAVENOUS at 02:03

## 2023-03-14 RX ADMIN — VANCOMYCIN HYDROCHLORIDE 500 MG: 500 INJECTION, POWDER, LYOPHILIZED, FOR SOLUTION INTRAVENOUS at 12:03

## 2023-03-14 RX ADMIN — HYDROCODONE BITARTRATE AND ACETAMINOPHEN 1 TABLET: 5; 325 TABLET ORAL at 05:03

## 2023-03-14 RX ADMIN — SODIUM CHLORIDE 1000 ML: 9 INJECTION, SOLUTION INTRAVENOUS at 12:03

## 2023-03-14 RX ADMIN — CYCLOBENZAPRINE HYDROCHLORIDE 10 MG: 10 TABLET, FILM COATED ORAL at 08:03

## 2023-03-14 RX ADMIN — INSULIN ASPART 3 UNITS: 100 INJECTION, SOLUTION INTRAVENOUS; SUBCUTANEOUS at 08:03

## 2023-03-14 RX ADMIN — MORPHINE SULFATE 4 MG: 2 INJECTION, SOLUTION INTRAMUSCULAR; INTRAVENOUS at 06:03

## 2023-03-14 RX ADMIN — VANCOMYCIN HYDROCHLORIDE 1500 MG: 1.5 INJECTION, POWDER, LYOPHILIZED, FOR SOLUTION INTRAVENOUS at 10:03

## 2023-03-14 RX ADMIN — VANCOMYCIN HYDROCHLORIDE 1500 MG: 1.5 INJECTION, POWDER, LYOPHILIZED, FOR SOLUTION INTRAVENOUS at 11:03

## 2023-03-14 RX ADMIN — FLUCONAZOLE 200 MG: 2 INJECTION, SOLUTION INTRAVENOUS at 08:03

## 2023-03-14 RX ADMIN — PIPERACILLIN AND TAZOBACTAM 4.5 G: 4; .5 INJECTION, POWDER, LYOPHILIZED, FOR SOLUTION INTRAVENOUS; PARENTERAL at 02:03

## 2023-03-14 RX ADMIN — PIPERACILLIN AND TAZOBACTAM 4.5 G: 4; .5 INJECTION, POWDER, LYOPHILIZED, FOR SOLUTION INTRAVENOUS; PARENTERAL at 05:03

## 2023-03-14 RX ADMIN — SODIUM CHLORIDE 1000 ML: 9 INJECTION, SOLUTION INTRAVENOUS at 08:03

## 2023-03-14 NOTE — PROGRESS NOTES
Pharmacokinetic Initial Assessment: IV Vancomycin    Assessment/Plan:    Initiate intravenous vancomycin with loading dose of 2500 mg once followed by a maintenance dose of vancomycin 1500 mg IV every 12 hours  Desired empiric serum trough concentration is 15 to 20 mcg/mL  Draw vancomycin trough level 60 min prior to third dose on 3/15 at approximately 0800  Pharmacy will continue to follow and monitor vancomycin.      Thank you for the consult,   Irasema Ramsey       Patient brief summary:  Hedy Conway is a 47 y.o. female initiated on antimicrobial therapy with IV Vancomycin for treatment of suspected  unknown     Drug Allergies:   Review of patient's allergies indicates:   Allergen Reactions    Tetanus vaccines and toxoid Rash       Actual Body Weight:   124.7 kg     Renal Function:   Estimated Creatinine Clearance: 85.7 mL/min (based on SCr of 1.1 mg/dL).,       CBC (last 72 hours):  Recent Labs   Lab Result Units 03/13/23 1849   WBC K/uL 21.81*   Hemoglobin g/dL 9.1*   Hematocrit % 29.9*   Platelets K/uL 165   Gran % % 72.0   Lymph % % 6.0*   Mono % % 3.0*   Eosinophil % % 0.0   Basophil % % 0.0   Differential Method  Manual       Metabolic Panel (last 72 hours):  Recent Labs   Lab Result Units 03/13/23 1849 03/13/23 1924   Sodium mmol/L 136  --    Potassium mmol/L 5.0  --    Chloride mmol/L 102  --    CO2 mmol/L 19*  --    Glucose mg/dL 203*  --    Glucose, UA   --  Negative   BUN mg/dL 45*  --    Creatinine mg/dL 1.1  --    Albumin g/dL 1.9*  --    Total Bilirubin mg/dL 3.3*  --    Alkaline Phosphatase U/L 193*  --    AST U/L 21  --    ALT U/L 20  --    Magnesium mg/dL 2.3  --        Drug levels (last 3 results):  No results for input(s): VANCOMYCINRA, VANCORANDOM, VANCOMYCINPE, VANCOPEAK, VANCOMYCINTR, VANCOTROUGH in the last 72 hours.    Microbiologic Results:  Microbiology Results (last 7 days)       Procedure Component Value Units Date/Time    Urine culture [[id number]] Collected: 03/13/23 1924     Order Status: No result Specimen: Urine Updated: 03/13/23 2018    Influenza A & B by Molecular [[id number]] Collected: 03/13/23 1926    Order Status: Completed Specimen: Nasopharyngeal Swab Updated: 03/13/23 1959     Influenza A, Molecular Negative     Influenza B, Molecular Negative     Flu A & B Source Nasal swab    Blood culture x two cultures. Draw prior to antibiotics. [[id number]] Collected: 03/13/23 1849    Order Status: Sent Specimen: Blood from Antecubital, Left Arm Updated: 03/13/23 1849    Blood culture x two cultures. Draw prior to antibiotics. [[id number]] Collected: 03/13/23 1849    Order Status: Sent Specimen: Blood from Antecubital, Right Arm Updated: 03/13/23 1849

## 2023-03-14 NOTE — HPI
Hedy Conway is a 47 y.o. female with PMH: Non-Hodgkin's Lymphoma (2011), DM on metformin, HTN, Arthritis, COPD who was sent to the ED by PCP for further eval of abnormal lab work.   Patient reports that last Friday night (03/03) she went to bed in her normal state of health, woke up the next morning (03/04) and could not walk due to L thigh pain. She went to [facility] ED and was dx with a pulled muscle (Sat). She also reports subjective fever with tmax of 101.3F for three days while home. She followed up with her PCP-John Lantigua, on Wed (03/08) and was treated symptomatically and told to return if symptoms worsen. She went to bed Wed evening; felt a pop in her R knee and now with R wrist pain [place] [facility]. She was again dx with muscle pain.   She was seen by Dr. Mark Anthony gongora, for R knee pain and was told that she possibly has torn cartilage in her knee. She also received a steroid injection in her R wrist. She is awaiting a MRI of her R knee and continues with pain.  Symptoms have progressively worsened, noting that she cannot walk and has been having to wear diapers for the past 5 days. She went back to the ED at [facility] on Friday and was dx with a UTI. She was discharged home with an antibiotic that starts with a C.   Since this time she has had Progressive weakness, fatigue, hallucinations, and night sweats.  Denies abdominal pain, NVD. Denies back pain.   She notes a + productive cough with blood tinged sputum. Denies associated cp or sob.  LBM X 1 week ago>Taking norco for pain at home. Denies rectal bleeding or Black stool  She presents pale, diaphoretic and tachycardic.    ER work up showed concern for UTI and bibasilar PNA. She does also report cough and NAIR on further questioning. Started on Vanc and Zosyn for sepsis with WBC of 21K. Initial LA elevated, resolved with IVF in ED. Cr improved from 1.1 to 0.8. Interestingly t bili 3.3m alk phos 151. CT showed  hepatomegaly. US without CBD dilation and gallbladder surgically removed. Denies abd pain. She is mildly jaundiced on exam. On rounds this afternoon her only complaint is diffuse body and joint pain and requesting pain medication.

## 2023-03-14 NOTE — ASSESSMENT & PLAN NOTE
Diffuse myalgias and arthralgias  Possible due to acute infection  IVF in ED  CPK pending  PRN Luis Fernando  PT/OT ordered---she wants to go to a nursing home for skilled if she doesn't get better

## 2023-03-14 NOTE — ASSESSMENT & PLAN NOTE
H/H trended down  No evidence of acute bleeding but stool for occult ordered (currently with constipation)  Daily labs  Iron studies in AM

## 2023-03-14 NOTE — PLAN OF CARE
03/14/23 0857   ED Admissions Case Approval   ED Admissions Case Approval  Approved         Chart review complete Inpatient criteria MET at Inpatient level of care.    LOC:Acute Adult-Infection: Pneumonia by Veronica Freitas RN       Acute Met: Reviewed on 3/14/2023 by Veronica Freitas RN       Created Using Review Status Review Entered   [employer] Completed 3/14/2023 0857       Created By   Veronica Freitas RN       Criteria Set Name - Subset   LOC:Acute Adult-Infection: Pneumonia      Criteria Review   REVIEW SUMMARY     [employer]® Review Status: Completed  Condition Specific: Yes        REVIEW OUTCOMES     Primary Outcome: Approved  Date/Time: 3/14/2023 0857  Reviewer: Veronica Freitas  Reason: Medically necessary              REVIEW DETAILS     Product: LOC:Acute Adult  Subset: Infection: Pneumonia            [X] Select Day, One:          [X] Episode Day 1, One:              [X] ACUTE, One:                  [X] Pneumonia confirmed by imaging and, Both:                  Comment by Veronica Freitas on 3/14/2023 0854:                  CT:  Bibasilar patchy alveolar infiltrates could represent pneumonia.  Minimal consolidation in the lingula and left lower lobe also                      [X] Finding, >= One:                          [X] >= 2 lobes                          Comment by Veronica Freitas on 3/14/2023 0855:                           Bibasilar patchy alveolar infiltrates could represent pneumonia.  Minimal consolidation in the lingula and left lower lobe also                      [X] Intervention, >= One:                          [X] Anti-infective

## 2023-03-14 NOTE — PROVIDER PROGRESS NOTES - EMERGENCY DEPT.
Emergency Room Physician       Hedy Conway is a 47 y.o. female that presented with weakness & fatigue  Patient has not been feeling well last couple days, saw orthopedic last week  Patient thought she had a pulled muscle & was treated conservatively by Ortho  Patient continued to feel bad ended up with the [facility]  Patient was treated for UTI & discharged a couple days ago, feeling poorly still    Patient was seen & evaluated last night by Dr. Mayer with a white count of [id number]   Urine has bacteria & yeast with no obvious leukocyte esterase or nitrite (+) findings  Additionally the patient's chest x-ray showed possible pneumonia based on reading  Patient meeting sepsis criteria based on white count with an elevated procalcitonin     Physical Exam  -- Nursing note and vitals reviewed  -- Constitutional: Appears well-developed and well-nourished  -- Head: Atraumatic. Normocephalic. No obvious abnormality  -- Eyes: Pupils are equal and reactive to light. Normal conjunctiva and lids  -- Cardiac: Normal rate, regular rhythm and normal heart sounds  -- Respiratory: Mild rhonchi with no active wheezing  -- Gastrointestinal: Soft, no tenderness. Normal bowel sounds. Normal liver edge  -- Musculoskeletal: Normal range of motion, no effusions. Joints stable   -- Neurological: No focal deficits. Showed good interaction with staff  -- Vascular: Posterior tibial, dorsalis pedis and radial pulses 2+ bilaterally      Tissue Perfusion Assessment for Sepsis (performed by ER MD)      Cardiovascular Exam:  -- Normal rate  -- Regular rhythm  -- Normal heart sounds    Pulses:  -- Carotid pulses are 2+ on the right side, and 2+ on the left side  -- Radial pulses are 2+ on the right side, and 2+ on the left side   -- Femoral pulses are 2+ on the right side, and 2+ on the left side  -- Popliteal pulses are 2+ on the right side, and 2+ on the left side  -- Dorsalis pedis pulses are 2+ on the right side, and 2+ on  the left side  -- Posterior tibial pulses are 2+ on the right side, and 2+ on the left side    Pulmonary Exam:  -- Effort normal     Skin Exam:  -- Skin is warm and dry  -- Capillary refill <3 sec    This tissue perfusion assessment was performed within the initial 3 hours     Assessment, Disposition, & Plan       I reviewed the patient's antibiotics, she is currently on vancomycin Zosyn  After reviewing the patient's urinalysis I have added IV Diflucan for yeast  Patient's blood pressure is 96/47, I have added maintenance fluids as well  The patient's lactic acid appears to be normal my chart review this morning    This is a complex case, patient has leukocytosis but also had steroids recently  Patient has also had what appears to be body aches & other nonspecific pains  I agree with the plan of treating sepsis with pressure support, pending cultures    Critical Care ED Physician Time (minutes):  -- Performed by: Jonathan Cole M.D.  -- Date/Time: 7:57 AM 3/14/2023   -- Direct Patient Care (Face Time): 5  -- Additional History from Records or Taking Additional History: 5  -- Ordering, Reviewing, and Interpreting Diagnostic Studies: 5  -- Total Time in Documentation: 11  -- Consultation with Other Physicians: 5  -- Consultation with Family Related to Condition: 0  -- Total Critical Care Time: 31  -- Critical care was necessary to treat sepsis/hypotension  -- Critical care was time spent personally by me on the following activities:   -- examination of patient, ordering and performing treatments   -- review of radiographic studies, re-evaluation of pt's condition  -- review of labs and evaluation of response to treatment        Jonathan Cole M.D. 7:58 AM 3/14/2023

## 2023-03-14 NOTE — SUBJECTIVE & OBJECTIVE
Past Medical History:   Diagnosis Date    Acute carpal tunnel syndrome of left wrist     Biliary colic     Diabetes mellitus     Encounter for blood transfusion     History of chemotherapy     Incisional hernia     Large cell (diffuse) non-Hodgkin's lymphoma     Stem cells transplant status        Past Surgical History:   Procedure Laterality Date    BREAST BIOPSY Left     lymph node biopsy, benign    BREAST SURGERY      CHOLECYSTECTOMY      COLD KNIFE CONIZATION OF CERVIX N/A 3/8/2021    Procedure: CONE BIOPSY, CERVIX, USING COLD KNIFE;  Surgeon: Evelyn Wheeler MD;  Location: [facility] OR;  Service: OB/GYN;  Laterality: N/A;    COLONOSCOPY N/A 12/21/2022    Procedure: COLONOSCOPY;  Surgeon: Annamarie Jane MD;  Location: [facility];  Service: Endoscopy;  Laterality: N/A;    HERNIA REPAIR      incisional    LUNG BIOPSY      lymphnode biopsy      MEDIPORT REMOVAL Left 1/30/2020    Procedure: REMOVAL, CATHETER, CENTRAL VENOUS, TUNNELED, WITH PORT;  Surgeon: Luis Bogran-Reyes, MD;  Location: [facility] OR;  Service: General;  Laterality: Left;    PORTACATH PLACEMENT Left     REVISION OF SCAR  6/22/2021    Procedure: REVISION, SCAR;  Surgeon: Otis Grimes MD;  Location: [address];  Service: General;;    TUBAL LIGATION      UMBILICAL HERNIA REPAIR         Review of patient's allergies indicates:   Allergen Reactions    Tetanus vaccines and toxoid Rash       No current facility-administered medications on file prior to encounter.     Current Outpatient Medications on File Prior to Encounter   Medication Sig    budesonide (PULMICORT) 0.5 mg/2 mL nebulizer solution USE 1 VIAL IN NEBULIZER TWICE DAILY (OK TO MIX WITH ALBUTEROL. RINSE MOUTH AFTER USE)    celecoxib (CELEBREX) 200 MG capsule Take 200 mg by mouth once daily.    conjugated estrogens (PREMARIN) vaginal cream Place 0.5 g vaginally once daily. Place 0.5 vaginally for 4 weeks, then transition to twice weekly use.    cyclobenzaprine (FLEXERIL) 10 MG tablet Take 10  mg by mouth every 8 (eight) hours as needed.    fluticasone (FLONASE) 50 mcg/actuation nasal spray 1 spray by Each Nare route once daily.    HYDROcodone-acetaminophen (NORCO) 5-325 mg per tablet Take 1 tablet by mouth every 6 (six) hours as needed.    metFORMIN (GLUCOPHAGE) 500 MG tablet Take 500 mg by mouth once daily.    montelukast (SINGULAIR) 10 mg tablet Take 10 mg by mouth once daily.    naproxen (NAPROSYN) 500 MG tablet Take 500 mg by mouth 2 (two) times daily as needed.    ondansetron (ZOFRAN) 4 MG tablet Take 4 mg by mouth every 8 (eight) hours as needed.    pantoprazole (PROTONIX) 40 MG tablet Take 1 tablet (40 mg total) by mouth once daily.    zinc sulfate (ZINCATE) 220 (50) mg capsule Take 220 mg by mouth 3 (three) times daily.    [DISCONTINUED] bisoprolol (ZEBETA) 5 MG tablet Take 5 mg by mouth once daily.    [DISCONTINUED] diclofenac sodium (VOLTAREN) 1 % Gel APPLY 4 GM (TOPICAL) 4 TIMES PER DAY AS NEEED    albuterol (PROVENTIL/VENTOLIN HFA) 90 mcg/actuation inhaler Ventolin HFA 90 mcg/actuation aerosol inhaler   INHALE 2 PUFFS BY MOUTH 3 TIMES DAILY AS NEEDED.    albuterol-ipratropium (DUO-NEB) 2.5 mg-0.5 mg/3 mL nebulizer solution ipratropium 0.5 mg-albuterol 3 mg (2.5 mg base)/3 mL nebulization soln   USE 1 VIAL IN NEBULIZER EVERY 6 TO 8 HOURS AS NEEDED    blood sugar diagnostic Strp USE TO CHECK BLOOD SUGAR TWICE A DAY    budesonide (PULMICORT) 0.5 mg/2 mL nebulizer solution budesonide 0.5 mg/2 mL suspension for nebulization   USE 1 VIAL IN NEBULIZER TWICE DAILY (OK TO MIX WITH ALBUTEROL. RINSE MOUTH AFTER USE)    melatonin 10 mg Cap Take 10 mg by mouth nightly as needed.    [DISCONTINUED] amLODIPine (NORVASC) 5 MG tablet Take 1 tablet (5 mg total) by mouth once daily.    [DISCONTINUED] amoxicillin-clavulanate 875-125mg (AUGMENTIN) 875-125 mg per tablet amoxicillin 875 mg-potassium clavulanate 125 mg tablet   Take 1 tablet every 12 hours by oral route for 10 days.    [DISCONTINUED]  amoxicillin-clavulanate 875-125mg (AUGMENTIN) 875-125 mg per tablet amoxicillin 875 mg-potassium clavulanate 125 mg tablet   Take 1 tablet every 12 hours by oral route for 7 days.    [DISCONTINUED] benzonatate (TESSALON) 200 MG capsule Take 200 mg by mouth 3 (three) times daily as needed.    [DISCONTINUED] HYDROcodone-acetaminophen (NORCO) 7.5-325 mg per tablet Take 1 tablet by mouth every 6 (six) hours as needed for Pain.    [DISCONTINUED] ondansetron (ZOFRAN-ODT) 8 MG TbDL Take 8 mg by mouth as needed.     Family History       Problem Relation (Age of Onset)    Breast cancer Maternal Aunt, Maternal Aunt    Cancer Paternal Grandmother    Colon cancer Paternal Grandmother    Diabetes Mother, Father    Heart block Mother    Heart failure Father    Hypertension Mother, Father    Kidney disease Mother    Lung cancer Father          Tobacco Use    Smoking status: Never    Smokeless tobacco: Never   Substance and Sexual Activity    Alcohol use: Not Currently    Drug use: No    Sexual activity: Yes     Partners: Male     Birth control/protection: See Surgical Hx     Comment: with one partner for 24 years     Review of Systems   Constitutional:  Positive for fatigue and fever. Negative for activity change and unexpected weight change.   HENT:  Negative for congestion, ear pain, hearing loss, rhinorrhea, sore throat and tinnitus.    Eyes:  Negative for pain, redness and visual disturbance.   Respiratory:  Positive for cough (productive green sputum) and shortness of breath (NAIR). Negative for wheezing.    Cardiovascular:  Positive for leg swelling. Negative for chest pain and palpitations.   Gastrointestinal:  Positive for constipation (1 week since last BM). Negative for abdominal pain, blood in stool, diarrhea, nausea and vomiting.   Genitourinary:  Negative for dysuria, frequency, pelvic pain and urgency.        Dark colored urine but denies dysuria   Musculoskeletal:  Positive for arthralgias and myalgias. Negative for  back pain, joint swelling and neck pain.   Skin:  Negative for color change, rash and wound.   Neurological:  Negative for dizziness, tremors, weakness, light-headedness and headaches.   Objective:     Vital Signs (Most Recent):  Temp: 98.9 °F (37.2 °C) (03/14/23 1540)  Pulse: (!) 114 (03/14/23 1759)  Resp: 20 (03/14/23 1746)  BP: (!) 113/54 (03/14/23 1540)  SpO2: 95 % (03/14/23 1540)   Vital Signs (24h Range):  Temp:  [97.6 °F (36.4 °C)-98.9 °F (37.2 °C)] 98.9 °F (37.2 °C)  Pulse:  [111-128] 114  Resp:  [18-25] 20  SpO2:  [95 %-98 %] 95 %  BP: ()/(46-65) 113/54     Weight: (!) 136.4 kg (300 lb 11.3 oz)  Body mass index is 47.1 kg/m².    Physical Exam  Vitals and nursing note reviewed.   Constitutional:       General: She is not in acute distress.     Appearance: She is well-developed. She is obese.   HENT:      Head: Normocephalic and atraumatic.      Right Ear: External ear normal.      Left Ear: External ear normal.      Nose: Nose normal.   Eyes:      General: No scleral icterus.        Right eye: No discharge.         Left eye: No discharge.      Extraocular Movements: Extraocular movements intact.      Conjunctiva/sclera: Conjunctivae normal.      Pupils: Pupils are equal, round, and reactive to light.   Neck:      Thyroid: No thyromegaly.   Cardiovascular:      Rate and Rhythm: Normal rate and regular rhythm.      Heart sounds: No murmur heard.  Pulmonary:      Effort: Pulmonary effort is normal. No respiratory distress.      Breath sounds: Rhonchi (soft rhonchi b/l bases) present. No wheezing.   Abdominal:      General: Bowel sounds are normal. There is no distension.      Palpations: Abdomen is soft.      Tenderness: There is no abdominal tenderness.   Musculoskeletal:      Right lower leg: Edema present.      Left lower leg: Edema present.   Skin:     General: Skin is warm and dry.      Coloration: Skin is jaundiced.   Neurological:      Mental Status: She is alert and oriented to person, place, and  time.      Cranial Nerves: No cranial nerve deficit.   Psychiatric:         Behavior: Behavior normal.         Thought Content: Thought content normal.         CRANIAL NERVES     CN III, IV, VI   Pupils are equal, round, and reactive to light.     Significant Labs: All pertinent labs within the past 24 hours have been reviewed.  Blood Culture:   Recent Labs   Lab 03/13/23 1849   LABBLOO Gram stain aer bottle: Gram positive cocci in chains resembling Strep  Results called to and read back by: Nurys Gutiérrez RN  03/14/2023  15:50  No Growth to date     CBC:   Recent Labs   Lab 03/13/23 1849 03/14/23  0645   WBC 21.81* 15.19*   HGB 9.1* 7.2*   HCT 29.9* 23.7*    142*     CMP:   Recent Labs   Lab 03/13/23 1849 03/14/23  0645    135*   K 5.0 4.6    105   CO2 19* 19*   * 194*   BUN 45* 39*   CREATININE 1.1 0.8   CALCIUM 9.6 8.7   PROT 5.9* 4.9*   ALBUMIN 1.9* 1.5*   BILITOT 3.3* 3.3*   ALKPHOS 193* 151*   AST 21 24   ALT 20 19   ANIONGAP 15 11     Cardiac Markers:   Recent Labs   Lab 03/13/23 1849   BNP 32     Lactic Acid:   Recent Labs   Lab 03/13/23 1849 03/13/23  2241   LACTATE 1.8 1.7     Troponin:   Recent Labs   Lab 03/13/23 1849 03/14/23  0645   TROPONINI <0.006 <0.006     Urine Culture: No results for input(s): LABURIN in the last 48 hours.  Urine Studies:   Recent Labs   Lab 03/13/23 1924   COLORU Orange*   APPEARANCEUA Cloudy*   PHUR 5.0   SPECGRAV 1.025   PROTEINUA 1+*   GLUCUA Negative   KETONESU Negative   BILIRUBINUA 2+*   OCCULTUA Negative   NITRITE Negative   UROBILINOGEN 2.0-3.0*   LEUKOCYTESUR Negative   RBCUA 0   WBCUA 20*   BACTERIA Many*   SQUAMEPITHEL >100   HYALINECASTS 0       Significant Imaging: I have reviewed all pertinent imaging results/findings within the past 24 hours.

## 2023-03-14 NOTE — ASSESSMENT & PLAN NOTE
This patient does have evidence of infective focus  My overall impression is sepsis.  Source: Respiratory and Urinary Tract  Antibiotics given-   Antibiotics (72h ago, onward)    Start     Stop Route Frequency Ordered    03/14/23 1000  vancomycin 1,500 mg in dextrose 5 % (D5W) 250 mL IVPB (Vial-Mate)         -- IV Every 12 hours (non-standard times) 03/13/23 2153 03/13/23 2125  vancomycin - pharmacy to dose  (vancomycin IVPB)        See Hyperspace for full Linked Orders Report.    -- IV pharmacy to manage frequency 03/13/23 2026        Latest lactate reviewed-  Recent Labs   Lab 03/13/23  1849 03/13/23  2241   LACTATE 1.8 1.7     Organ dysfunction indicated by Thrombocytopenia     Fluid challenge Not needed - patient is not hypotensive      Post- resuscitation assessment No - Post resuscitation assessment not needed       Will Not start Pressors- Levophed for MAP of 65  Source control achieved by: Munir Mcneill  IVF resuscitation already occurred in ED

## 2023-03-14 NOTE — H&P
[facility] Surg ([facility])  [facility] Medicine  History & Physical    Patient Name: Hedy Conway  MRN: [id number]  Patient Class: IP- Inpatient  Admission Date: 3/13/2023  Attending Physician: Uche Blum MD   Primary Care Provider: John Lantigua PA-C         Patient information was obtained from patient and ER records.     Subjective:     Principal Problem:Sepsis    Chief Complaint:   Chief Complaint   Patient presents with    Pain     Patient to ER CC of pain all over her body for a week and was dx with a pulled muscle, however her PCP told her that she has a infection in her blood from blood work that she had done today         HPI: Hedy Conway is a 47 y.o. female with PMH: Non-Hodgkin's Lymphoma (2011), DM on metformin, HTN, Arthritis, COPD who was sent to the ED by PCP for further eval of abnormal lab work.   Patient reports that last Friday night (03/03) she went to bed in her normal state of health, woke up the next morning (03/04) and could not walk due to L thigh pain. She went to [facility] ED and was dx with a pulled muscle (Sat). She also reports subjective fever with tmax of 101.3F for three days while home. She followed up with her PCP-John Lantigua, on Wed (03/08) and was treated symptomatically and told to return if symptoms worsen. She went to bed Wed evening; felt a pop in her R knee and now with R wrist pain prompting ED visit at [facility] on 03/09/23. She was again dx with muscle pain.   She was seen by Dr. Mark Anthony gongora, for R knee pain and was told that she possibly has torn cartilage in her knee. She also received a steroid injection in her R wrist. She is awaiting a MRI of her R knee and continues with pain.  Symptoms have progressively worsened, noting that she cannot walk and has been having to wear diapers for the past 5 days. She went back to the ED at [facility] on Friday and was dx with a UTI. She was discharged home with an antibiotic that starts with a C.    Since this time she has had Progressive weakness, fatigue, hallucinations, and night sweats.  Denies abdominal pain, NVD. Denies back pain.   She notes a + productive cough with blood tinged sputum. Denies associated cp or sob.  LBM X 1 week ago>Taking norco for pain at home. Denies rectal bleeding or Black stool  She presents pale, diaphoretic and tachycardic.    ER work up showed concern for UTI and bibasilar PNA. She does also report cough and NAIR on further questioning. Started on Vanc and Zosyn for sepsis with WBC of 21K. Initial LA elevated, resolved with IVF in ED. Cr improved from 1.1 to 0.8. Interestingly t bili 3.3m alk phos 151. CT showed hepatomegaly. US without CBD dilation and gallbladder surgically removed. Denies abd pain. She is mildly jaundiced on exam. On rounds this afternoon her only complaint is diffuse body and joint pain and requesting pain medication.       Past Medical History:   Diagnosis Date    Acute carpal tunnel syndrome of left wrist     Biliary colic     Diabetes mellitus     Encounter for blood transfusion     History of chemotherapy     Incisional hernia     Large cell (diffuse) non-Hodgkin's lymphoma     Stem cells transplant status        Past Surgical History:   Procedure Laterality Date    BREAST BIOPSY Left     lymph node biopsy, benign    BREAST SURGERY      CHOLECYSTECTOMY      COLD KNIFE CONIZATION OF CERVIX N/A 3/8/2021    Procedure: CONE BIOPSY, CERVIX, USING COLD KNIFE;  Surgeon: Evelyn Wheeler MD;  Location: [facility];  Service: OB/GYN;  Laterality: N/A;    COLONOSCOPY N/A 12/21/2022    Procedure: COLONOSCOPY;  Surgeon: Annamarie Jane MD;  [facility];  Service: Endoscopy;  Laterality: N/A;    HERNIA REPAIR      incisional    LUNG BIOPSY      lymphnode biopsy      MEDIPORT REMOVAL Left 1/30/2020    Procedure: REMOVAL, CATHETER, CENTRAL VENOUS, TUNNELED, WITH PORT;  Surgeon: Luis Bogran-Reyes, MD;  Location: [facility];  Service: General;   Laterality: Left;    PORTACATH PLACEMENT Left     REVISION OF SCAR  6/22/2021    Procedure: REVISION, SCAR;  Surgeon: Otis Grimes MD;  Location: [facility] OR [address] [place] [address];  Service: General;;    TUBAL LIGATION      UMBILICAL HERNIA REPAIR         Review of patient's allergies indicates:   Allergen Reactions    Tetanus vaccines and toxoid Rash       No current facility-administered medications on file prior to encounter.     Current Outpatient Medications on File Prior to Encounter   Medication Sig    budesonide (PULMICORT) 0.5 mg/2 mL nebulizer solution USE 1 VIAL IN NEBULIZER TWICE DAILY (OK TO MIX WITH ALBUTEROL. RINSE MOUTH AFTER USE)    celecoxib (CELEBREX) 200 MG capsule Take 200 mg by mouth once daily.    conjugated estrogens (PREMARIN) vaginal cream Place 0.5 g vaginally once daily. Place 0.5 vaginally for 4 weeks, then transition to twice weekly use.    cyclobenzaprine (FLEXERIL) 10 MG tablet Take 10 mg by mouth every 8 (eight) hours as needed.    fluticasone (FLONASE) 50 mcg/actuation nasal spray 1 spray by Each Nare route once daily.    HYDROcodone-acetaminophen (NORCO) 5-325 mg per tablet Take 1 tablet by mouth every 6 (six) hours as needed.    metFORMIN (GLUCOPHAGE) 500 MG tablet Take 500 mg by mouth once daily.    montelukast (SINGULAIR) 10 mg tablet Take 10 mg by mouth once daily.    naproxen (NAPROSYN) 500 MG tablet Take 500 mg by mouth 2 (two) times daily as needed.    ondansetron (ZOFRAN) 4 MG tablet Take 4 mg by mouth every 8 (eight) hours as needed.    pantoprazole (PROTONIX) 40 MG tablet Take 1 tablet (40 mg total) by mouth once daily.    zinc sulfate (ZINCATE) 220 (50) mg capsule Take 220 mg by mouth 3 (three) times daily.    [DISCONTINUED] bisoprolol (ZEBETA) 5 MG tablet Take 5 mg by mouth once daily.    [DISCONTINUED] diclofenac sodium (VOLTAREN) 1 % Gel APPLY 4 GM (TOPICAL) 4 TIMES PER DAY AS NEEED    albuterol (PROVENTIL/VENTOLIN HFA) 90 mcg/actuation inhaler Ventolin HFA 90  mcg/actuation aerosol inhaler   INHALE 2 PUFFS BY MOUTH 3 TIMES DAILY AS NEEDED.    albuterol-ipratropium (DUO-NEB) 2.5 mg-0.5 mg/3 mL nebulizer solution ipratropium 0.5 mg-albuterol 3 mg (2.5 mg base)/3 mL nebulization soln   USE 1 VIAL IN NEBULIZER EVERY 6 TO 8 HOURS AS NEEDED    blood sugar diagnostic Strp USE TO CHECK BLOOD SUGAR TWICE A DAY    budesonide (PULMICORT) 0.5 mg/2 mL nebulizer solution budesonide 0.5 mg/2 mL suspension for nebulization   USE 1 VIAL IN NEBULIZER TWICE DAILY (OK TO MIX WITH ALBUTEROL. RINSE MOUTH AFTER USE)    melatonin 10 mg Cap Take 10 mg by mouth nightly as needed.    [DISCONTINUED] amLODIPine (NORVASC) 5 MG tablet Take 1 tablet (5 mg total) by mouth once daily.    [DISCONTINUED] amoxicillin-clavulanate 875-125mg (AUGMENTIN) 875-125 mg per tablet amoxicillin 875 mg-potassium clavulanate 125 mg tablet   Take 1 tablet every 12 hours by oral route for 10 days.    [DISCONTINUED] amoxicillin-clavulanate 875-125mg (AUGMENTIN) 875-125 mg per tablet amoxicillin 875 mg-potassium clavulanate 125 mg tablet   Take 1 tablet every 12 hours by oral route for 7 days.    [DISCONTINUED] benzonatate (TESSALON) 200 MG capsule Take 200 mg by mouth 3 (three) times daily as needed.    [DISCONTINUED] HYDROcodone-acetaminophen (NORCO) 7.5-325 mg per tablet Take 1 tablet by mouth every 6 (six) hours as needed for Pain.    [DISCONTINUED] ondansetron (ZOFRAN-ODT) 8 MG TbDL Take 8 mg by mouth as needed.     Family History       Problem Relation (Age of Onset)    Breast cancer Maternal Aunt, Maternal Aunt    Cancer Paternal Grandmother    Colon cancer Paternal Grandmother    Diabetes Mother, Father    Heart block Mother    Heart failure Father    Hypertension Mother, Father    Kidney disease Mother    Lung cancer Father          Tobacco Use    Smoking status: Never    Smokeless tobacco: Never   Substance and Sexual Activity    Alcohol use: Not Currently    Drug use: No    Sexual activity: Yes      Partners: Male     Birth control/protection: See Surgical Hx     Comment: with one partner for 24 years     Review of Systems   Constitutional:  Positive for fatigue and fever. Negative for activity change and unexpected weight change.   HENT:  Negative for congestion, ear pain, hearing loss, rhinorrhea, sore throat and tinnitus.    Eyes:  Negative for pain, redness and visual disturbance.   Respiratory:  Positive for cough (productive green sputum) and shortness of breath (NAIR). Negative for wheezing.    Cardiovascular:  Positive for leg swelling. Negative for chest pain and palpitations.   Gastrointestinal:  Positive for constipation (1 week since last BM). Negative for abdominal pain, blood in stool, diarrhea, nausea and vomiting.   Genitourinary:  Negative for dysuria, frequency, pelvic pain and urgency.        Dark colored urine but denies dysuria   Musculoskeletal:  Positive for arthralgias and myalgias. Negative for back pain, joint swelling and neck pain.   Skin:  Negative for color change, rash and wound.   Neurological:  Negative for dizziness, tremors, weakness, light-headedness and headaches.   Objective:     Vital Signs (Most Recent):  Temp: 98.9 °F (37.2 °C) (03/14/23 1540)  Pulse: (!) 114 (03/14/23 1759)  Resp: 20 (03/14/23 1746)  BP: (!) 113/54 (03/14/23 1540)  SpO2: 95 % (03/14/23 1540)   Vital Signs (24h Range):  Temp:  [97.6 °F (36.4 °C)-98.9 °F (37.2 °C)] 98.9 °F (37.2 °C)  Pulse:  [111-128] 114  Resp:  [18-25] 20  SpO2:  [95 %-98 %] 95 %  BP: ()/(46-65) 113/54     Weight: (!) 136.4 kg (300 lb 11.3 oz)  Body mass index is 47.1 kg/m².    Physical Exam  Vitals and nursing note reviewed.   Constitutional:       General: She is not in acute distress.     Appearance: She is well-developed. She is obese.   HENT:      Head: Normocephalic and atraumatic.      Right Ear: External ear normal.      Left Ear: External ear normal.      Nose: Nose normal.   Eyes:      General: No scleral icterus.         Right eye: No discharge.         Left eye: No discharge.      Extraocular Movements: Extraocular movements intact.      Conjunctiva/sclera: Conjunctivae normal.      Pupils: Pupils are equal, round, and reactive to light.   Neck:      Thyroid: No thyromegaly.   Cardiovascular:      Rate and Rhythm: Normal rate and regular rhythm.      Heart sounds: No murmur heard.  Pulmonary:      Effort: Pulmonary effort is normal. No respiratory distress.      Breath sounds: Rhonchi (soft rhonchi b/l bases) present. No wheezing.   Abdominal:      General: Bowel sounds are normal. There is no distension.      Palpations: Abdomen is soft.      Tenderness: There is no abdominal tenderness.   Musculoskeletal:      Right lower leg: Edema present.      Left lower leg: Edema present.   Skin:     General: Skin is warm and dry.      Coloration: Skin is jaundiced.   Neurological:      Mental Status: She is alert and oriented to person, place, and time.      Cranial Nerves: No cranial nerve deficit.   Psychiatric:         Behavior: Behavior normal.         Thought Content: Thought content normal.         CRANIAL NERVES     CN III, IV, VI   Pupils are equal, round, and reactive to light.     Significant Labs: All pertinent labs within the past 24 hours have been reviewed.  Blood Culture:   Recent Labs   Lab 03/13/23 1849   LABBLOO Gram stain aer bottle: Gram positive cocci in chains resembling Strep  Results called to and read back by: Nurys Gutiérrez RN  03/14/2023  15:50  No Growth to date     CBC:   Recent Labs   Lab 03/13/23 1849 03/14/23  0645   WBC 21.81* 15.19*   HGB 9.1* 7.2*   HCT 29.9* 23.7*    142*     CMP:   Recent Labs   Lab 03/13/23 1849 03/14/23  0645    135*   K 5.0 4.6    105   CO2 19* 19*   * 194*   BUN 45* 39*   CREATININE 1.1 0.8   CALCIUM 9.6 8.7   PROT 5.9* 4.9*   ALBUMIN 1.9* 1.5*   BILITOT 3.3* 3.3*   ALKPHOS 193* 151*   AST 21 24   ALT 20 19   ANIONGAP 15 11     Cardiac Markers:    Recent Labs   Lab 03/13/23  1849   BNP 32     Lactic Acid:   Recent Labs   Lab 03/13/23  1849 03/13/23  2241   LACTATE 1.8 1.7     Troponin:   Recent Labs   Lab 03/13/23  1849 03/14/23  0645   TROPONINI <0.006 <0.006     Urine Culture: No results for input(s): LABURIN in the last 48 hours.  Urine Studies:   Recent Labs   Lab 03/13/23  1924   COLORU Orange*   APPEARANCEUA Cloudy*   PHUR 5.0   SPECGRAV 1.025   PROTEINUA 1+*   GLUCUA Negative   KETONESU Negative   BILIRUBINUA 2+*   OCCULTUA Negative   NITRITE Negative   UROBILINOGEN 2.0-3.0*   LEUKOCYTESUR Negative   RBCUA 0   WBCUA 20*   BACTERIA Many*   SQUAMEPITHEL >100   HYALINECASTS 0       Significant Imaging: I have reviewed all pertinent imaging results/findings within the past 24 hours.    Assessment/Plan:     * Sepsis  This patient does have evidence of infective focus  My overall impression is sepsis.  Source: Respiratory and Urinary Tract  Antibiotics given-   Antibiotics (72h ago, onward)    Start     Stop Route Frequency Ordered    03/14/23 1000  vancomycin 1,500 mg in dextrose 5 % (D5W) 250 mL IVPB (Vial-Mate)         -- IV Every 12 hours (non-standard times) 03/13/23 2153 03/13/23 2125  vancomycin - pharmacy to dose  (vancomycin IVPB)        See Hyperspace for full Linked Orders Report.    -- IV pharmacy to manage frequency 03/13/23 2026        Latest lactate reviewed-  Recent Labs   Lab 03/13/23  1849 03/13/23  2241   LACTATE 1.8 1.7     Organ dysfunction indicated by Thrombocytopenia     Fluid challenge Not needed - patient is not hypotensive      Post- resuscitation assessment No - Post resuscitation assessment not needed       Will Not start Pressors- Levophed for MAP of 65  Source control achieved by: Vanc, Zosyn  IVF resuscitation already occurred in ED    Myalgia  Diffuse myalgias and arthralgias  Possible due to acute infection  IVF in ED  CPK pending  PRN Luis Fernando  PT/OT ordered---she wants to go to a nursing home for skilled if she doesn't  get better      Type 2 diabetes mellitus without complication, without long-term current use of insulin  Patient's FSGs are uncontrolled due to hyperglycemia on current medication regimen.  Last A1c reviewed- No results found for: LABA1C, HGBA1C  Most recent fingerstick glucose reviewed- Recent Labs   Lab 03/14/23  1645   POCTGLUCOSE 240*     Current correctional scale  Medium  Maintain anti-hyperglycemic dose as follows-   Antihyperglycemics (From admission, onward)    Start     Stop Route Frequency Ordered    03/14/23 1903  insulin aspart U-100 pen 1-10 Units         -- SubQ Before meals & nightly PRN 03/14/23 1803        Hold Oral hypoglycemics while patient is in the hospital.    Acute cystitis without hematuria  UA concnerning for infection  Urine and blood cx sent and pending  Cont broad spectrum antibx for now        Hyperbilirubinemia  Unclear etiology  Imaging is unrevelaing  Repeat labs in AM      Microcytic anemia  H/H trended down  No evidence of acute bleeding but stool for occult ordered (currently with constipation)  Daily labs  Iron studies in AM      Streptococcal bacteremia  Suspect related to PNA  Cont broad spectrum antibx pending culture results      Pneumonia of both lungs due to infectious organism  notedo n imaging  No hypoxia  Nebs scheduled  Cont broad spectrum antibx for now and narrow pending cx      Thrombocytopenia  Has had in the psat as well but currently in relation to sepsis  Daily labs        VTE Risk Mitigation (From admission, onward)         Ordered     IP VTE HIGH RISK PATIENT  Once         03/14/23 1046     Place sequential compression device  Until discontinued         03/14/23 1046                   Elisha Grimes MD  Department of Hospital Medicine   [place] [facility] Surg (3rd Fl)

## 2023-03-14 NOTE — ASSESSMENT & PLAN NOTE
notedo n imaging  No hypoxia  Nebs scheduled  Cont broad spectrum antibx for now and narrow pending cx

## 2023-03-14 NOTE — ASSESSMENT & PLAN NOTE
UA concnerning for infection  Urine and blood cx sent and pending  Cont broad spectrum antibx for now

## 2023-03-14 NOTE — ASSESSMENT & PLAN NOTE
Patient's FSGs are uncontrolled due to hyperglycemia on current medication regimen.  Last A1c reviewed- No results found for: LABA1C, HGBA1C  Most recent fingerstick glucose reviewed- Recent Labs   Lab 03/14/23  1645   POCTGLUCOSE 240*     Current correctional scale  Medium  Maintain anti-hyperglycemic dose as follows-   Antihyperglycemics (From admission, onward)    Start     Stop Route Frequency Ordered    03/14/23 1903  insulin aspart U-100 pen 1-10 Units         -- SubQ Before meals & nightly PRN 03/14/23 1803        Hold Oral hypoglycemics while patient is in the hospital.

## 2023-03-15 LAB
ABO + RH BLD: NORMAL
ALBUMIN SERPL BCP-MCNC: 1.3 G/DL (ref 3.5–5.2)
ALP SERPL-CCNC: 123 U/L (ref 55–135)
ALT SERPL W/O P-5'-P-CCNC: 19 U/L (ref 10–44)
ANION GAP SERPL CALC-SCNC: 8 MMOL/L (ref 8–16)
ANISOCYTOSIS BLD QL SMEAR: ABNORMAL
AST SERPL-CCNC: 19 U/L (ref 10–40)
BACTERIA UR CULT: NO GROWTH
BASOPHILS # BLD AUTO: ABNORMAL K/UL (ref 0–0.2)
BASOPHILS NFR BLD: 0 % (ref 0–1.9)
BILIRUB SERPL-MCNC: 2.1 MG/DL (ref 0.1–1)
BLD GP AB SCN CELLS X3 SERPL QL: NORMAL
BLD PROD TYP BPU: NORMAL
BLD PROD TYP BPU: NORMAL
BLOOD UNIT EXPIRATION DATE: NORMAL
BLOOD UNIT EXPIRATION DATE: NORMAL
BLOOD UNIT TYPE CODE: 6200
BLOOD UNIT TYPE CODE: 6200
BLOOD UNIT TYPE: NORMAL
BLOOD UNIT TYPE: NORMAL
BUN SERPL-MCNC: 24 MG/DL (ref 6–20)
CALCIUM SERPL-MCNC: 8.7 MG/DL (ref 8.7–10.5)
CHLORIDE SERPL-SCNC: 104 MMOL/L (ref 95–110)
CO2 SERPL-SCNC: 21 MMOL/L (ref 23–29)
CODING SYSTEM: NORMAL
CODING SYSTEM: NORMAL
CREAT SERPL-MCNC: 0.7 MG/DL (ref 0.5–1.4)
CROSSMATCH INTERPRETATION: NORMAL
CROSSMATCH INTERPRETATION: NORMAL
DACRYOCYTES BLD QL SMEAR: ABNORMAL
DIFFERENTIAL METHOD: ABNORMAL
DISPENSE STATUS: NORMAL
DISPENSE STATUS: NORMAL
EOSINOPHIL # BLD AUTO: ABNORMAL K/UL (ref 0–0.5)
EOSINOPHIL NFR BLD: 0 % (ref 0–8)
ERYTHROCYTE [DISTWIDTH] IN BLOOD BY AUTOMATED COUNT: 18.3 % (ref 11.5–14.5)
EST. GFR  (NO RACE VARIABLE): >60 ML/MIN/1.73 M^2
FERRITIN SERPL-MCNC: 2150 NG/ML (ref 20–300)
GLUCOSE SERPL-MCNC: 205 MG/DL (ref 70–110)
HCT VFR BLD AUTO: 21.3 % (ref 37–48.5)
HGB BLD-MCNC: 6.3 G/DL (ref 12–16)
HYPOCHROMIA BLD QL SMEAR: ABNORMAL
IMM GRANULOCYTES # BLD AUTO: ABNORMAL K/UL (ref 0–0.04)
IMM GRANULOCYTES NFR BLD AUTO: ABNORMAL % (ref 0–0.5)
IRON SERPL-MCNC: 17 UG/DL (ref 30–160)
LYMPHOCYTES # BLD AUTO: ABNORMAL K/UL (ref 1–4.8)
LYMPHOCYTES NFR BLD: 6 % (ref 18–48)
MCH RBC QN AUTO: 23.3 PG (ref 27–31)
MCHC RBC AUTO-ENTMCNC: 29.6 G/DL (ref 32–36)
MCV RBC AUTO: 79 FL (ref 82–98)
MONOCYTES # BLD AUTO: ABNORMAL K/UL (ref 0.3–1)
MONOCYTES NFR BLD: 5 % (ref 4–15)
NEUTROPHILS NFR BLD: 83 % (ref 38–73)
NEUTS BAND NFR BLD MANUAL: 6 %
NRBC BLD-RTO: 0 /100 WBC
NUM UNITS TRANS PACKED RBC: NORMAL
NUM UNITS TRANS PACKED RBC: NORMAL
OVALOCYTES BLD QL SMEAR: ABNORMAL
PLATELET # BLD AUTO: 170 K/UL (ref 150–450)
PLATELET BLD QL SMEAR: ABNORMAL
PMV BLD AUTO: 11.5 FL (ref 9.2–12.9)
POCT GLUCOSE: 203 MG/DL (ref 70–110)
POCT GLUCOSE: 255 MG/DL (ref 70–110)
POCT GLUCOSE: 261 MG/DL (ref 70–110)
POIKILOCYTOSIS BLD QL SMEAR: ABNORMAL
POTASSIUM SERPL-SCNC: 4.6 MMOL/L (ref 3.5–5.1)
PROT SERPL-MCNC: 4.6 G/DL (ref 6–8.4)
RBC # BLD AUTO: 2.7 M/UL (ref 4–5.4)
RETICS/RBC NFR AUTO: 1 % (ref 0.5–2.5)
SATURATED IRON: 14 % (ref 20–50)
SODIUM SERPL-SCNC: 133 MMOL/L (ref 136–145)
TARGETS BLD QL SMEAR: ABNORMAL
TOTAL IRON BINDING CAPACITY: 123 UG/DL (ref 250–450)
TRANSFERRIN SERPL-MCNC: 83 MG/DL (ref 200–375)
VANCOMYCIN TROUGH SERPL-MCNC: 17.7 UG/ML (ref 10–22)
WBC # BLD AUTO: 14.43 K/UL (ref 3.9–12.7)

## 2023-03-15 PROCEDURE — 11000001 HC ACUTE MED/SURG PRIVATE ROOM

## 2023-03-15 PROCEDURE — 97162 PT EVAL MOD COMPLEX 30 MIN: CPT

## 2023-03-15 PROCEDURE — 99233 PR SUBSEQUENT HOSPITAL CARE,LEVL III: ICD-10-PCS | Mod: GT,,, | Performed by: PHYSICIAN ASSISTANT

## 2023-03-15 PROCEDURE — 25000003 PHARM REV CODE 250: Performed by: INTERNAL MEDICINE

## 2023-03-15 PROCEDURE — 85027 COMPLETE CBC AUTOMATED: CPT | Performed by: INTERNAL MEDICINE

## 2023-03-15 PROCEDURE — 87205 SMEAR GRAM STAIN: CPT | Performed by: PHYSICIAN ASSISTANT

## 2023-03-15 PROCEDURE — 99233 SBSQ HOSP IP/OBS HIGH 50: CPT | Mod: GT,,, | Performed by: PHYSICIAN ASSISTANT

## 2023-03-15 PROCEDURE — 36430 TRANSFUSION BLD/BLD COMPNT: CPT

## 2023-03-15 PROCEDURE — 87070 CULTURE OTHR SPECIMN AEROBIC: CPT | Performed by: PHYSICIAN ASSISTANT

## 2023-03-15 PROCEDURE — 84466 ASSAY OF TRANSFERRIN: CPT | Performed by: INTERNAL MEDICINE

## 2023-03-15 PROCEDURE — P9040 RBC LEUKOREDUCED IRRADIATED: HCPCS | Performed by: PHYSICIAN ASSISTANT

## 2023-03-15 PROCEDURE — 87186 SC STD MICRODIL/AGAR DIL: CPT | Performed by: PHYSICIAN ASSISTANT

## 2023-03-15 PROCEDURE — 63600175 PHARM REV CODE 636 W HCPCS: Performed by: INTERNAL MEDICINE

## 2023-03-15 PROCEDURE — 36415 COLL VENOUS BLD VENIPUNCTURE: CPT | Performed by: PHYSICIAN ASSISTANT

## 2023-03-15 PROCEDURE — 97530 THERAPEUTIC ACTIVITIES: CPT

## 2023-03-15 PROCEDURE — 94640 AIRWAY INHALATION TREATMENT: CPT

## 2023-03-15 PROCEDURE — 94761 N-INVAS EAR/PLS OXIMETRY MLT: CPT

## 2023-03-15 PROCEDURE — 25000003 PHARM REV CODE 250: Performed by: PHYSICIAN ASSISTANT

## 2023-03-15 PROCEDURE — 85007 BL SMEAR W/DIFF WBC COUNT: CPT | Performed by: INTERNAL MEDICINE

## 2023-03-15 PROCEDURE — 82728 ASSAY OF FERRITIN: CPT | Performed by: INTERNAL MEDICINE

## 2023-03-15 PROCEDURE — 80053 COMPREHEN METABOLIC PANEL: CPT | Performed by: INTERNAL MEDICINE

## 2023-03-15 PROCEDURE — 63600175 PHARM REV CODE 636 W HCPCS: Performed by: SURGERY

## 2023-03-15 PROCEDURE — 83036 HEMOGLOBIN GLYCOSYLATED A1C: CPT | Performed by: FAMILY MEDICINE

## 2023-03-15 PROCEDURE — 86900 BLOOD TYPING SEROLOGIC ABO: CPT | Performed by: PHYSICIAN ASSISTANT

## 2023-03-15 PROCEDURE — 63600175 PHARM REV CODE 636 W HCPCS: Performed by: FAMILY MEDICINE

## 2023-03-15 PROCEDURE — 36415 COLL VENOUS BLD VENIPUNCTURE: CPT | Performed by: FAMILY MEDICINE

## 2023-03-15 PROCEDURE — 97165 OT EVAL LOW COMPLEX 30 MIN: CPT

## 2023-03-15 PROCEDURE — 86920 COMPATIBILITY TEST SPIN: CPT | Performed by: PHYSICIAN ASSISTANT

## 2023-03-15 PROCEDURE — 25000242 PHARM REV CODE 250 ALT 637 W/ HCPCS: Performed by: INTERNAL MEDICINE

## 2023-03-15 PROCEDURE — 87077 CULTURE AEROBIC IDENTIFY: CPT | Performed by: PHYSICIAN ASSISTANT

## 2023-03-15 PROCEDURE — 36415 COLL VENOUS BLD VENIPUNCTURE: CPT | Performed by: INTERNAL MEDICINE

## 2023-03-15 PROCEDURE — 63600175 PHARM REV CODE 636 W HCPCS: Performed by: PHYSICIAN ASSISTANT

## 2023-03-15 PROCEDURE — 85045 AUTOMATED RETICULOCYTE COUNT: CPT | Performed by: INTERNAL MEDICINE

## 2023-03-15 PROCEDURE — 36415 COLL VENOUS BLD VENIPUNCTURE: CPT | Performed by: STUDENT IN AN ORGANIZED HEALTH CARE EDUCATION/TRAINING PROGRAM

## 2023-03-15 PROCEDURE — 80202 ASSAY OF VANCOMYCIN: CPT | Performed by: STUDENT IN AN ORGANIZED HEALTH CARE EDUCATION/TRAINING PROGRAM

## 2023-03-15 RX ORDER — LANOLIN ALCOHOL/MO/W.PET/CERES
1 CREAM (GRAM) TOPICAL DAILY
Status: DISCONTINUED | OUTPATIENT
Start: 2023-03-15 | End: 2023-03-21 | Stop reason: HOSPADM

## 2023-03-15 RX ORDER — DEXTROSE 40 %
15 GEL (GRAM) ORAL
Status: DISCONTINUED | OUTPATIENT
Start: 2023-03-15 | End: 2023-03-21 | Stop reason: HOSPADM

## 2023-03-15 RX ORDER — DIPHENHYDRAMINE HYDROCHLORIDE 50 MG/ML
25 INJECTION INTRAMUSCULAR; INTRAVENOUS ONCE
Status: COMPLETED | OUTPATIENT
Start: 2023-03-15 | End: 2023-03-15

## 2023-03-15 RX ORDER — DOCUSATE SODIUM 100 MG/1
100 CAPSULE, LIQUID FILLED ORAL DAILY
Status: DISCONTINUED | OUTPATIENT
Start: 2023-03-15 | End: 2023-03-21 | Stop reason: HOSPADM

## 2023-03-15 RX ORDER — HYDROCODONE BITARTRATE AND ACETAMINOPHEN 500; 5 MG/1; MG/1
TABLET ORAL
Status: DISCONTINUED | OUTPATIENT
Start: 2023-03-15 | End: 2023-03-21

## 2023-03-15 RX ORDER — GLUCAGON 1 MG
1 KIT INJECTION
Status: DISCONTINUED | OUTPATIENT
Start: 2023-03-15 | End: 2023-03-21 | Stop reason: HOSPADM

## 2023-03-15 RX ORDER — DEXTROSE 40 %
30 GEL (GRAM) ORAL
Status: DISCONTINUED | OUTPATIENT
Start: 2023-03-15 | End: 2023-03-21 | Stop reason: HOSPADM

## 2023-03-15 RX ORDER — INSULIN ASPART 100 [IU]/ML
0-15 INJECTION, SOLUTION INTRAVENOUS; SUBCUTANEOUS
Status: DISCONTINUED | OUTPATIENT
Start: 2023-03-15 | End: 2023-03-21 | Stop reason: HOSPADM

## 2023-03-15 RX ADMIN — INSULIN ASPART 0 UNITS: 100 INJECTION, SOLUTION INTRAVENOUS; SUBCUTANEOUS at 08:03

## 2023-03-15 RX ADMIN — HYDROCODONE BITARTRATE AND ACETAMINOPHEN 1 TABLET: 5; 325 TABLET ORAL at 06:03

## 2023-03-15 RX ADMIN — AZITHROMYCIN MONOHYDRATE 250 MG: 500 INJECTION, POWDER, LYOPHILIZED, FOR SOLUTION INTRAVENOUS at 09:03

## 2023-03-15 RX ADMIN — PIPERACILLIN AND TAZOBACTAM 4.5 G: 4; .5 INJECTION, POWDER, LYOPHILIZED, FOR SOLUTION INTRAVENOUS; PARENTERAL at 06:03

## 2023-03-15 RX ADMIN — INSULIN ASPART 6 UNITS: 100 INJECTION, SOLUTION INTRAVENOUS; SUBCUTANEOUS at 05:03

## 2023-03-15 RX ADMIN — PIPERACILLIN AND TAZOBACTAM 4.5 G: 4; .5 INJECTION, POWDER, LYOPHILIZED, FOR SOLUTION INTRAVENOUS; PARENTERAL at 11:03

## 2023-03-15 RX ADMIN — IPRATROPIUM BROMIDE AND ALBUTEROL SULFATE 3 ML: 2.5; .5 SOLUTION RESPIRATORY (INHALATION) at 07:03

## 2023-03-15 RX ADMIN — FLUCONAZOLE 200 MG: 2 INJECTION, SOLUTION INTRAVENOUS at 09:03

## 2023-03-15 RX ADMIN — VANCOMYCIN HYDROCHLORIDE 1250 MG: 1.25 INJECTION, POWDER, LYOPHILIZED, FOR SOLUTION INTRAVENOUS at 12:03

## 2023-03-15 RX ADMIN — FERROUS SULFATE TAB 325 MG (65 MG ELEMENTAL FE) 1 EACH: 325 (65 FE) TAB at 04:03

## 2023-03-15 RX ADMIN — IPRATROPIUM BROMIDE AND ALBUTEROL SULFATE 3 ML: 2.5; .5 SOLUTION RESPIRATORY (INHALATION) at 11:03

## 2023-03-15 RX ADMIN — IPRATROPIUM BROMIDE AND ALBUTEROL SULFATE 3 ML: 2.5; .5 SOLUTION RESPIRATORY (INHALATION) at 03:03

## 2023-03-15 RX ADMIN — DOCUSATE SODIUM 100 MG: 100 CAPSULE, LIQUID FILLED ORAL at 04:03

## 2023-03-15 RX ADMIN — CYCLOBENZAPRINE HYDROCHLORIDE 10 MG: 10 TABLET, FILM COATED ORAL at 07:03

## 2023-03-15 RX ADMIN — DIPHENHYDRAMINE HYDROCHLORIDE 25 MG: 50 INJECTION, SOLUTION INTRAMUSCULAR; INTRAVENOUS at 04:03

## 2023-03-15 RX ADMIN — CYCLOBENZAPRINE HYDROCHLORIDE 10 MG: 10 TABLET, FILM COATED ORAL at 06:03

## 2023-03-15 RX ADMIN — INSULIN ASPART 6 UNITS: 100 INJECTION, SOLUTION INTRAVENOUS; SUBCUTANEOUS at 11:03

## 2023-03-15 NOTE — PROGRESS NOTES
Pharmacokinetic Assessment Follow Up: IV Vancomycin    Vancomycin serum concentration assessment(s):    The trough level was drawn correctly and can be used to guide therapy at this time. The measurement is above the desired definitive target range of 10 to 15 mcg/mL.    Vancomycin Regimen Plan:    Change regimen to Vancomycin 1250 mg IV every 12 hours with next serum trough concentration measured at 2200 prior to 4th dose on 3/16/23    Drug levels (last 3 results):  Recent Labs   Lab Result Units 03/15/23  0854   Vancomycin-Trough ug/mL 17.7       Pharmacy will continue to follow and monitor vancomycin.    Please contact pharmacy at extension [phone] for questions regarding this assessment.    Thank you for the consult,   Shi Edge       Patient brief summary:  Hedy Conway is a 47 y.o. female initiated on antimicrobial therapy with IV Vancomycin for treatment of sepsis    The patient's current regimen is 1250mg IV q 12 h     Drug Allergies:   Review of patient's allergies indicates:   Allergen Reactions    Tetanus vaccines and toxoid Rash       Actual Body Weight:   136.4kg    Renal Function:   Estimated Creatinine Clearance: 142 mL/min (based on SCr of 0.7 mg/dL).,     Dialysis Method (if applicable):  N/A    CBC (last 72 hours):  Recent Labs   Lab Result Units 03/13/23 1849 03/14/23  0645 03/15/23  0555   WBC K/uL 21.81* 15.19* 14.43*   Hemoglobin g/dL 9.1* 7.2* 6.3*   Hematocrit % 29.9* 23.7* 21.3*   Platelets K/uL 165 142* 170   Gran % % 72.0 80.0* 83.0*   Lymph % % 6.0* 11.0* 6.0*   Mono % % 3.0* 0.0* 5.0   Eosinophil % % 0.0 1.0 0.0   Basophil % % 0.0 0.0 0.0   Differential Method  Manual Manual Manual       Metabolic Panel (last 72 hours):  Recent Labs   Lab Result Units 03/13/23  1849 03/13/23  1924 03/14/23  0645 03/14/23  1230 03/15/23  0555   Sodium mmol/L 136  --  135*  --  133*   Potassium mmol/L 5.0  --  4.6  --  4.6   Chloride mmol/L 102  --  105  --  104   CO2 mmol/L 19*  --  19*   --  21*   Glucose mg/dL 203*  --  194*  --  205*   Glucose, UA   --  Negative  --   --   --    BUN mg/dL 45*  --  39*  --  24*   Creatinine mg/dL 1.1  --  0.8  --  0.7   Creatinine, Urine mg/dL  --   --   --  71.1  --    Albumin g/dL 1.9*  --  1.5*  --  1.3*   Total Bilirubin mg/dL 3.3*  --  3.3*  --  2.1*   Alkaline Phosphatase U/L 193*  --  151*  --  123   AST U/L 21  --  24  --  19   ALT U/L 20  --  19  --  19   Magnesium mg/dL 2.3  --   --   --   --        Vancomycin Administrations:  vancomycin given in the last 96 hours                     vancomycin 1,500 mg in dextrose 5 % (D5W) 250 mL IVPB (Vial-Mate) (mg) 1,500 mg New Bag 03/14/23 2205     1,500 mg New Bag  1110    vancomycin (VANCOCIN) 500 mg in dextrose 5 % in water (D5W) 5 % 100 mL IVPB (MB+) (mg) 500 mg New Bag 03/14/23 0054    vancomycin 2 g in 0.9% sodium chloride 500 mL IVPB (mg) 2,000 mg New Bag 03/13/23 2221                    Microbiologic Results:  Microbiology Results (last 7 days)       Procedure Component Value Units Date/Time    Culture, Respiratory with Gram Stain [[id number]] Collected: 03/15/23 1009    Order Status: Sent Specimen: Respiratory from Sputum Updated: 03/15/23 1012    Blood culture x two cultures. Draw prior to antibiotics. [[id number]]  (Abnormal) Collected: 03/13/23 1849    Order Status: Completed Specimen: Blood from Antecubital, Right Arm Updated: 03/15/23 0947     Blood Culture, Routine Gram stain aer bottle: Gram positive cocci in chains resembling Strep      Results called to and read back by: Nurys Gutiérrez RN  03/14/2023      15:50      ALPHA HEMOLYTIC STREP  Identification and susceptibility pending      Narrative:      Aerobic and anaerobic    Urine culture [[id number]] Collected: 03/13/23 1924    Order Status: Completed Specimen: Urine Updated: 03/15/23 0208     Urine Culture, Routine No growth    Narrative:      Specimen Source->Urine    Blood culture x two cultures. Draw prior to antibiotics. [[id number]] Collected:  03/13/23 1849    Order Status: Completed Specimen: Blood from Antecubital, Left Arm Updated: 03/14/23 2312     Blood Culture, Routine No Growth to date      No Growth to date    Narrative:      Aerobic and anaerobic    Influenza A & B by Molecular [[id number]] Collected: 03/13/23 1926    Order Status: Completed Specimen: Nasopharyngeal Swab Updated: 03/13/23 1959     Influenza A, Molecular Negative     Influenza B, Molecular Negative     Flu A & B Source Nasal swab

## 2023-03-15 NOTE — PLAN OF CARE
03/15/23 1413   Post-Acute Status   Post-Acute Authorization Placement   Post-Acute Placement Status Patient List Provided   Discharge Delays None known at this time         Patient requesting SNF at [facility]. SW sent patient referral through [facility]. Patient denied due to needs exceeding current capacity. SW discussed with patient. Provided list of additional local SNF facilities. Patient will review list and discuss with family. SW to follow-up.

## 2023-03-15 NOTE — PT/OT/SLP EVAL
"Occupational Therapy   Evaluation    Name: Hedy Conway  MRN: [id number]  Admitting Diagnosis: Sepsis  Recent Surgery: * No surgery found *      Recommendations:     Discharge Recommendations: nursing facility, basic, nursing facility, skilled  Discharge Equipment Recommendations:  hospital bed, lift device, wheelchair  Barriers to discharge:  Inaccessible home environment, Decreased caregiver support    Assessment:     Hedy Conway is a 47 y.o. female with a medical diagnosis of Sepsis.  She presents with performance deficits affecting function: weakness, impaired endurance, impaired self care skills, impaired functional mobility, gait instability, impaired balance, decreased upper extremity function, decreased lower extremity function, pain, decreased ROM, impaired joint extensibility. Pt with significant weakness and pain in bilateral upper extremity and lower extremity. Upon evaluation Pt with much reduced bilateral shoulder range of motion with increased pain. Reduced range of motion also noted in bilateral elbow movement with increased pain. Reduced strength noted in right hand during gripping tasks with reduced active supination. Unable to assess bed mobility due to antibiotic lines and increased pain in Pt. Pt participated in ADL activity at bed level with increased pain and difficulty using bilateral upper extremity. Pt would benefit from continued skilled OT services for increased strength and independence in ADL prior to discharge.     Rehab Prognosis: Fair; patient would benefit from acute skilled OT services to address these deficits and reach maximum level of function.       Plan:     Patient to be seen 5 x/week to address the above listed problems via self-care/home management, therapeutic activities, therapeutic exercises  Plan of Care Expires:    Plan of Care Reviewed with: patient    Subjective     Chief Complaint: "My legs and arms hurt."  Patient/Family Comments/goals: To not hurt so " much.    Occupational Profile:  Living Environment: Pt lives in RV with  and daughter. RV with 2 CASI and handrail.  Previous level of function: Pt reports prior to injury she was independent in all ADL. Following injury, pain became so severe that she became dependent on  for all ADL.  Equipment Used at Home: CPAP, nebulizer, glucometer  Assistance upon Discharge: .    Pain/Comfort:  Pain Rating 1: 10/10  Location - Orientation 1: generalized  Location 1: other (see comments) (Pt with reports of pain at bilateral legs, shoulders, and wrists.)  Pain Addressed 1: Reposition, Cessation of Activity  Pain Rating Post-Intervention 1: 10/10    Patients cultural, spiritual, Caodaism conflicts given the current situation: no    Objective:     Communicated with: Nursing prior to session.  Patient found HOB elevated with Kane, peripheral IV, SCD upon OT entry to room.    General Precautions: Standard, fall  Orthopedic Precautions: N/A  Braces: N/A  Respiratory Status: Room air    Occupational Performance:    Bed Mobility:    - Limited bed mobility due to lines from antibiotics in bilateral upper extremity and increased full body pain. Will follow up tomorrow (3/16/2023)    Activities of Daily Living:  Feeding:  minimum assistance to grasp cup with right upper extremity due to increased weakness.  Grooming: stand by assistance for seated oral care and face washing at bed level.     Cognitive/Visual Perceptual:  Cognitive/Psychosocial Skills:     -       Oriented to: Person, Place, Time, and Situation   -       Follows Commands/attention:Follows multistep  commands  -       Communication: clear/fluent    Physical Exam:  Skin integrity: Visible skin intact  Upper Extremity Range of Motion:     -       Right Upper Extremity: Deficits: Shoulder flexion <70 degrees with reduced elbow flexion due to pain. Reduced forearm supination with Pt reaching ~neutral.  -       Left Upper Extremity: Deficits: Shoulder  flexion <20 degrees with reduced elbow flexion due to pain.  Upper Extremity Strength:    -       Right Upper Extremity: Deficits: 3/5 strength in shoulder flexion.  -       Left Upper Extremity: Deficits: -3/5 strength in shoulder flexion.   Strength:    -       Right Upper Extremity: Deficits: -4/5  -       Left Upper Extremity: WNL    AMPAC 6 Click ADL:  AMPAC Total Score: 10    Treatment & Education:  OT evaluation completed. Pt educated on OT POC and role.     Patient left HOB elevated with all lines intact, call button in reach, and nursing notified    GOALS:   Multidisciplinary Problems       Occupational Therapy Goals          Problem: Occupational Therapy    Goal Priority Disciplines Outcome Interventions   Occupational Therapy Goal     OT, PT/OT Ongoing, Progressing    Description: Pt to perform level functional transfers required for ADL's with MOD A, RW level.  Pt to participate in bilateral upper extremity strengthening routine for increased strength, range of motion, and performance in ADL.  Pt to perform seated ADL activity for ~ 5 minutes at EOB for improved activity tolerance and reduced caregiver burden upon discharge.                        History:     Past Medical History:   Diagnosis Date    Acute carpal tunnel syndrome of left wrist     Biliary colic     Diabetes mellitus     Encounter for blood transfusion     History of chemotherapy     Incisional hernia     Large cell (diffuse) non-Hodgkin's lymphoma     Stem cells transplant status          Past Surgical History:   Procedure Laterality Date    BREAST BIOPSY Left     lymph node biopsy, benign    BREAST SURGERY      CHOLECYSTECTOMY      COLD KNIFE CONIZATION OF CERVIX N/A 3/8/2021    Procedure: CONE BIOPSY, CERVIX, USING COLD KNIFE;  Surgeon: Evelyn Wheeler MD;  Location: [facility];  Service: OB/GYN;  Laterality: N/A;    COLONOSCOPY N/A 12/21/2022    Procedure: COLONOSCOPY;  Surgeon: Annamarie Jane MD;  Location: [facility];  Service:  Endoscopy;  Laterality: N/A;    HERNIA REPAIR      incisional    LUNG BIOPSY      lymphnode biopsy      MEDIPORT REMOVAL Left 1/30/2020    Procedure: REMOVAL, CATHETER, CENTRAL VENOUS, TUNNELED, WITH PORT;  Surgeon: Luis Bogran-Reyes, MD;  Location: [facility] OR;  Service: General;  Laterality: Left;    PORTACATH PLACEMENT Left     REVISION OF SCAR  6/22/2021    Procedure: REVISION, SCAR;  Surgeon: Otis Grimes MD;  Location: [facility] OR [address] [place] [address];  Service: General;;    TUBAL LIGATION      UMBILICAL HERNIA REPAIR         Time Tracking:     OT Date of Treatment: 03/15/23  OT Start Time: 1345  OT Stop Time: 1405  OT Total Time (min): 20 min    Billable Minutes:Evaluation 20    3/15/2023

## 2023-03-15 NOTE — ASSESSMENT & PLAN NOTE
H/H trended down  No evidence of acute bleeding but stool for occult ordered (currently with constipation)  Daily labs  Iron level low but will defer IV iron in acute infection.  Ferrous sulfate ordered.     This is a chronic problem for pt and she receives IV injections (unsure name)

## 2023-03-15 NOTE — ASSESSMENT & PLAN NOTE
Diffuse myalgias and arthralgias  Possible due to acute infection  IVF in ED  CPK normal  PRN Venkatco  PT/OT ordered---she wants to go to a nursing home for skilled if she doesn't get better

## 2023-03-15 NOTE — PLAN OF CARE
[facility] - Med Surg (3rd Fl)  Initial Discharge Assessment       Primary Care Provider: John Lantigua PA-C    Admission Diagnosis: Acute cystitis without hematuria [N30.00]  Sepsis [A41.9]  Pneumonia of both lungs due to infectious organism, unspecified part of lung [J18.9]  Anemia, unspecified type [D64.9]    Admission Date: 3/13/2023  Expected Discharge Date:     Discharge Barriers Identified: Other (see comments)    Payor: BLUE CROSS BLUE SHIELD / Plan: [place] PPO / Product Type: PPO /     Extended Emergency Contact Information  Primary Emergency Contact: Raheem Conway  Address: [address] [place] [address]  Home Phone: [phone]  Mobile Phone: [phone]  Relation: Spouse    Discharge Plan A: Home with family, Home Health  Discharge Plan B: Skilled Nursing Facility      [facility] - [place], [place] - [address]  [address]  [place] [address]  Phone: [phone] Fax: [phone]    [facility]  [address]  [place] [address]  Phone: [phone] Fax: [phone]      Initial Assessment (most recent)       Adult Discharge Assessment - 03/15/23 0920          Discharge Assessment    Assessment Type Discharge Planning Assessment     Confirmed/corrected address, phone number and insurance Yes     Confirmed Demographics Correct on Facesheet     Source of Information patient     Communicated JESSE with patient/caregiver Date not available/Unable to determine     Reason For Admission Sepsis     People in Home spouse     Facility Arrived From: Home     Do you expect to return to your current living situation? Other (see comments)     Do you have help at home or someone to help you manage your care at home? Yes     Who are your caregiver(s) and their phone number(s)? Raheem Conway (Spouse) [phone]     Prior to hospitilization cognitive status: Alert/Oriented     Current cognitive status:  Alert/Oriented     Home Accessibility stairs to enter home   Patient resides in a [place]    Number of Stairs, Main Entrance two     Equipment Currently Used at Home CPAP;nebulizer;glucometer     Readmission within 30 days? No     Patient currently being followed by outpatient case management? No     Do you currently have service(s) that help you manage your care at home? No     Do you take prescription medications? Yes     Do you have prescription coverage? Yes     Coverage BCBS     Do you have any problems affording any of your prescribed medications? No     How do you get to doctors appointments? family or friend will provide;car, drives self     Are you on dialysis? No     Do you take coumadin? No     Discharge Plan A Home with family;Home Health     Discharge Plan B Skilled Nursing Facility     DME Needed Upon Discharge  other (see comments)   TBD    Discharge Plan discussed with: Patient     Discharge Barriers Identified Other (see comments)                   Discharge assessment completed with patient. Patient requesting to go to [facility] at discharge for SNF pending an 'MRI of knee and potential surgery.' States she cannot walk since hearing a 'pop' in her knee. Due to this, she is requesting SNF. STACY explained to patient that SNF may be difficult to locate as SNF is generally a Medicare product but [facility] may approve. Also explained to patient that she will need to make progress with therapy if approved to continue SNF and may not be an appropriate solution as patient believes she needs an MRI and surgery. STACY to send patient referral to [facility] once PT note received. STACY to complete LOCET and PASRR. SW to remain available.

## 2023-03-15 NOTE — ASSESSMENT & PLAN NOTE
This patient does have evidence of infective focus  My overall impression is sepsis.  Source: Respiratory and Urinary Tract  Antibiotics given-   Antibiotics (72h ago, onward)    Start     Stop Route Frequency Ordered    03/15/23 1100  vancomycin 1,250 mg in dextrose 5 % (D5W) 250 mL IVPB (Vial-Mate)         -- IV Every 12 hours (non-standard times) 03/15/23 1025    03/15/23 0930  piperacillin-tazobactam (ZOSYN) 4.5 g in dextrose 5 % in water (D5W) 5 % 100 mL IVPB (MB+)         -- IV Every 8 hours (non-standard times) 03/15/23 0810    03/15/23 0915  azithromycin (ZITHROMAX) 250 mg in dextrose 5 % (D5W) 250 mL IVPB         03/19 0914 IV Every 24 hours (non-standard times) 03/15/23 0811    03/13/23 2125  vancomycin - pharmacy to dose  (vancomycin IVPB)        See Hyperspace for full Linked Orders Report.    -- IV pharmacy to manage frequency 03/13/23 2026        Latest lactate reviewed-  Recent Labs   Lab 03/13/23  1849 03/13/23  2241   LACTATE 1.8 1.7     Organ dysfunction indicated by Thrombocytopenia     Fluid challenge Not needed - patient is not hypotensive      Post- resuscitation assessment No - Post resuscitation assessment not needed       Will Not start Pressors- Levophed for MAP of 65  Source control achieved by: Vanc, Zosyn and azithromycin   IVF resuscitation already occurred in ED

## 2023-03-15 NOTE — PLAN OF CARE
Problem: Adult Inpatient Plan of Care  Goal: Plan of Care Review  Outcome: Ongoing, Progressing  Goal: Optimal Comfort and Wellbeing  Outcome: Ongoing, Progressing     Problem: Bariatric Environmental Safety  Goal: Safety Maintained with Care  Outcome: Ongoing, Progressing     Problem: Skin Injury Risk Increased  Goal: Skin Health and Integrity  Outcome: Ongoing, Progressing     Problem: Respiratory Compromise (Pneumonia)  Goal: Effective Oxygenation and Ventilation  Outcome: Ongoing, Progressing     Problem: Diabetes Comorbidity  Goal: Blood Glucose Level Within Targeted Range  Outcome: Ongoing, Progressing     Problem: Impaired Wound Healing  Goal: Optimal Wound Healing  Outcome: Ongoing, Progressing

## 2023-03-15 NOTE — PLAN OF CARE
Plan of care reviewed with pt. Pt voiced understanding. Pt AAO X 4. Pt c/o generalized body pain especially when she is moved. No apparent distress noted. Fall precautions maintained. Pt remains free of fall or injury. Bed in lowest position, locked, call light within reach, and bed alarm on. Side rails up x's 2 with slip resistant socks on. Patient at risk for skin breakdown. Patient turned Q2 hours during shift to prevent skin from breakdown. Pt with purewick with concentrated, tea colored urine. Pt skin is jaundice but eyes are white in color. Pt's bilateral thigh are red and hot. Pt on telemetry running NST. No true red alarms or ectopy throughout shift. Pt states she was supposed to be on something to help control her HR, but her blood pressure kept dropping so they had to discontinue the medication. Pt couldn't think of what medication it was. Pt on RA with sats >93%. Pt has her own cpap at bedside but states she can't wear it because she can't get comfortable in the bed with it on.   Problem: Adult Inpatient Plan of Care  Goal: Plan of Care Review  Outcome: Ongoing, Progressing  Flowsheets (Taken 3/15/2023 7101)  Plan of Care Reviewed With: patient  Goal: Absence of Hospital-Acquired Illness or Injury  Outcome: Ongoing, Progressing  Goal: Optimal Comfort and Wellbeing  Outcome: Ongoing, Progressing     Problem: Bariatric Environmental Safety  Goal: Safety Maintained with Care  Outcome: Ongoing, Progressing     Problem: Skin Injury Risk Increased  Goal: Skin Health and Integrity  Outcome: Ongoing, Progressing     Problem: Adjustment to Illness (Sepsis/Septic Shock)  Goal: Optimal Coping  Outcome: Ongoing, Progressing     Problem: Bleeding (Sepsis/Septic Shock)  Goal: Absence of Bleeding  Outcome: Ongoing, Progressing     Problem: Glycemic Control Impaired (Sepsis/Septic Shock)  Goal: Blood Glucose Level Within Desired Range  Outcome: Ongoing, Progressing     Problem: Infection Progression (Sepsis/Septic  Shock)  Goal: Absence of Infection Signs and Symptoms  Outcome: Ongoing, Progressing     Problem: Nutrition Impaired (Sepsis/Septic Shock)  Goal: Optimal Nutrition Intake  Outcome: Ongoing, Progressing     Problem: Fluid Imbalance (Pneumonia)  Goal: Fluid Balance  Outcome: Ongoing, Progressing     Problem: Infection (Pneumonia)  Goal: Resolution of Infection Signs and Symptoms  Outcome: Ongoing, Progressing     Problem: Respiratory Compromise (Pneumonia)  Goal: Effective Oxygenation and Ventilation  Outcome: Ongoing, Progressing     Problem: Diabetes Comorbidity  Goal: Blood Glucose Level Within Targeted Range  Outcome: Ongoing, Progressing     Problem: Impaired Wound Healing  Goal: Optimal Wound Healing  Outcome: Ongoing, Progressing     Problem: Fall Injury Risk  Goal: Absence of Fall and Fall-Related Injury  Outcome: Ongoing, Progressing     Problem: Pain Acute  Goal: Acceptable Pain Control and Functional Ability  Outcome: Ongoing, Progressing     Problem: Anemia  Goal: Anemia Symptom Improvement  Outcome: Ongoing, Progressing

## 2023-03-15 NOTE — SUBJECTIVE & OBJECTIVE
Past Medical History:   Diagnosis Date    Acute carpal tunnel syndrome of left wrist     Biliary colic     Diabetes mellitus     Encounter for blood transfusion     History of chemotherapy     Incisional hernia     Large cell (diffuse) non-Hodgkin's lymphoma     Stem cells transplant status        Past Surgical History:   Procedure Laterality Date    BREAST BIOPSY Left     lymph node biopsy, benign    BREAST SURGERY      CHOLECYSTECTOMY      COLD KNIFE CONIZATION OF CERVIX N/A 3/8/2021    Procedure: CONE BIOPSY, CERVIX, USING COLD KNIFE;  Surgeon: Evelyn Wheeler MD;  Location: [facility] OR;  Service: OB/GYN;  Laterality: N/A;    COLONOSCOPY N/A 12/21/2022    Procedure: COLONOSCOPY;  Surgeon: Annamarie Jane MD;  Location: [facility];  Service: Endoscopy;  Laterality: N/A;    HERNIA REPAIR      incisional    LUNG BIOPSY      lymphnode biopsy      MEDIPORT REMOVAL Left 1/30/2020    Procedure: REMOVAL, CATHETER, CENTRAL VENOUS, TUNNELED, WITH PORT;  Surgeon: Luis Bogran-Reyes, MD;  Location: [facility] OR;  Service: General;  Laterality: Left;    PORTACATH PLACEMENT Left     REVISION OF SCAR  6/22/2021    Procedure: REVISION, SCAR;  Surgeon: Otis Grimes MD;  Location: [address];  Service: General;;    TUBAL LIGATION      UMBILICAL HERNIA REPAIR         Review of patient's allergies indicates:   Allergen Reactions    Tetanus vaccines and toxoid Rash       No current facility-administered medications on file prior to encounter.     Current Outpatient Medications on File Prior to Encounter   Medication Sig    budesonide (PULMICORT) 0.5 mg/2 mL nebulizer solution USE 1 VIAL IN NEBULIZER TWICE DAILY (OK TO MIX WITH ALBUTEROL. RINSE MOUTH AFTER USE)    celecoxib (CELEBREX) 200 MG capsule Take 200 mg by mouth once daily.    conjugated estrogens (PREMARIN) vaginal cream Place 0.5 g vaginally once daily. Place 0.5 vaginally for 4 weeks, then transition to twice weekly use.    cyclobenzaprine (FLEXERIL) 10 MG tablet Take 10  mg by mouth every 8 (eight) hours as needed.    fluticasone (FLONASE) 50 mcg/actuation nasal spray 1 spray by Each Nare route once daily.    HYDROcodone-acetaminophen (NORCO) 5-325 mg per tablet Take 1 tablet by mouth every 6 (six) hours as needed.    metFORMIN (GLUCOPHAGE) 500 MG tablet Take 500 mg by mouth once daily.    montelukast (SINGULAIR) 10 mg tablet Take 10 mg by mouth once daily.    naproxen (NAPROSYN) 500 MG tablet Take 500 mg by mouth 2 (two) times daily as needed.    ondansetron (ZOFRAN) 4 MG tablet Take 4 mg by mouth every 8 (eight) hours as needed.    pantoprazole (PROTONIX) 40 MG tablet Take 1 tablet (40 mg total) by mouth once daily.    zinc sulfate (ZINCATE) 220 (50) mg capsule Take 220 mg by mouth 3 (three) times daily.    albuterol (PROVENTIL/VENTOLIN HFA) 90 mcg/actuation inhaler Ventolin HFA 90 mcg/actuation aerosol inhaler   INHALE 2 PUFFS BY MOUTH 3 TIMES DAILY AS NEEDED.    albuterol-ipratropium (DUO-NEB) 2.5 mg-0.5 mg/3 mL nebulizer solution ipratropium 0.5 mg-albuterol 3 mg (2.5 mg base)/3 mL nebulization soln   USE 1 VIAL IN NEBULIZER EVERY 6 TO 8 HOURS AS NEEDED    blood sugar diagnostic Strp USE TO CHECK BLOOD SUGAR TWICE A DAY    budesonide (PULMICORT) 0.5 mg/2 mL nebulizer solution budesonide 0.5 mg/2 mL suspension for nebulization   USE 1 VIAL IN NEBULIZER TWICE DAILY (OK TO MIX WITH ALBUTEROL. RINSE MOUTH AFTER USE)    melatonin 10 mg Cap Take 10 mg by mouth nightly as needed.     Family History       Problem Relation (Age of Onset)    Breast cancer Maternal Aunt, Maternal Aunt    Cancer Paternal Grandmother    Colon cancer Paternal Grandmother    Diabetes Mother, Father    Heart block Mother    Heart failure Father    Hypertension Mother, Father    Kidney disease Mother    Lung cancer Father          Tobacco Use    Smoking status: Never    Smokeless tobacco: Never   Substance and Sexual Activity    Alcohol use: Not Currently    Drug use: No    Sexual activity: Yes     Partners:  Male     Birth control/protection: See Surgical Hx     Comment: with one partner for 24 years     Review of Systems   Constitutional:  Positive for fatigue and fever. Negative for activity change and unexpected weight change.   HENT:  Negative for congestion, ear pain, hearing loss, rhinorrhea, sore throat and tinnitus.    Eyes:  Negative for pain, redness and visual disturbance.   Respiratory:  Positive for cough (productive green sputum) and shortness of breath (NAIR). Negative for wheezing.    Cardiovascular:  Positive for leg swelling. Negative for chest pain and palpitations.   Gastrointestinal:  Positive for constipation (1 week since last BM). Negative for abdominal pain, blood in stool, diarrhea, nausea and vomiting.   Genitourinary:  Negative for dysuria, frequency, pelvic pain and urgency.        Dark colored urine but denies dysuria   Musculoskeletal:  Positive for arthralgias and myalgias. Negative for back pain, joint swelling and neck pain.   Skin:  Negative for color change, rash and wound.   Neurological:  Negative for dizziness, tremors, weakness, light-headedness and headaches.   Objective:     Vital Signs (Most Recent):  Temp: 98.2 °F (36.8 °C) (03/15/23 1120)  Pulse: 105 (03/15/23 1200)  Resp: 20 (03/15/23 1120)  BP: 136/65 (03/15/23 1120)  SpO2: (!) 94 % (03/15/23 1120)   Vital Signs (24h Range):  Temp:  [97.8 °F (36.6 °C)-99 °F (37.2 °C)] 98.2 °F (36.8 °C)  Pulse:  [105-117] 105  Resp:  [12-24] 20  SpO2:  [93 %-95 %] 94 %  BP: ()/(54-65) 136/65     Weight: (!) 136.4 kg (300 lb 11.3 oz)  Body mass index is 47.1 kg/m².    Physical Exam  Vitals and nursing note reviewed.   Constitutional:       General: She is not in acute distress.     Appearance: She is well-developed. She is obese.   HENT:      Head: Normocephalic and atraumatic.      Right Ear: External ear normal.      Left Ear: External ear normal.      Nose: Nose normal.   Eyes:      General: No scleral icterus.        Right eye: No  discharge.         Left eye: No discharge.      Extraocular Movements: Extraocular movements intact.      Conjunctiva/sclera: Conjunctivae normal.      Pupils: Pupils are equal, round, and reactive to light.   Neck:      Thyroid: No thyromegaly.   Cardiovascular:      Rate and Rhythm: Normal rate and regular rhythm.      Heart sounds: No murmur heard.  Pulmonary:      Effort: Pulmonary effort is normal. No respiratory distress.      Breath sounds: Rhonchi (soft rhonchi b/l bases) present. No wheezing.   Abdominal:      General: Bowel sounds are normal. There is no distension.      Palpations: Abdomen is soft.      Tenderness: There is no abdominal tenderness.   Musculoskeletal:      Right lower leg: Edema present.      Left lower leg: Edema present.   Skin:     General: Skin is warm and dry.      Coloration: Skin is jaundiced.   Neurological:      Mental Status: She is alert and oriented to person, place, and time.      Cranial Nerves: No cranial nerve deficit.   Psychiatric:         Behavior: Behavior normal.         Thought Content: Thought content normal.         CRANIAL NERVES     CN III, IV, VI   Pupils are equal, round, and reactive to light.     Significant Labs: All pertinent labs within the past 24 hours have been reviewed.  Blood Culture:   Recent Labs   Lab 03/13/23 1849   LABBLOO Gram stain aer bottle: Gram positive cocci in chains resembling Strep  Results called to and read back by: Nurys Gutiérrez RN  03/14/2023  15:50  ALPHA HEMOLYTIC STREP  Identification and susceptibility pending  *  No Growth to date  No Growth to date       CBC:   Recent Labs   Lab 03/13/23 1849 03/14/23  0645 03/15/23  0555   WBC 21.81* 15.19* 14.43*   HGB 9.1* 7.2* 6.3*   HCT 29.9* 23.7* 21.3*    142* 170       CMP:   Recent Labs   Lab 03/13/23 1849 03/14/23  0645 03/15/23  0555    135* 133*   K 5.0 4.6 4.6    105 104   CO2 19* 19* 21*   * 194* 205*   BUN 45* 39* 24*   CREATININE 1.1 0.8 0.7    CALCIUM 9.6 8.7 8.7   PROT 5.9* 4.9* 4.6*   ALBUMIN 1.9* 1.5* 1.3*   BILITOT 3.3* 3.3* 2.1*   ALKPHOS 193* 151* 123   AST 21 24 19   ALT 20 19 19   ANIONGAP 15 11 8       Cardiac Markers:   Recent Labs   Lab 03/13/23  1849   BNP 32       Lactic Acid:   Recent Labs   Lab 03/13/23  1849 03/13/23  2241   LACTATE 1.8 1.7       Troponin:   Recent Labs   Lab 03/13/23  1849 03/14/23  0645   TROPONINI <0.006 <0.006       Urine Culture:   Recent Labs   Lab 03/13/23 1924   LABURIN No growth     Urine Studies:   Recent Labs   Lab 03/13/23 1924   COLORU Orange*   APPEARANCEUA Cloudy*   PHUR 5.0   SPECGRAV 1.025   PROTEINUA 1+*   GLUCUA Negative   KETONESU Negative   BILIRUBINUA 2+*   OCCULTUA Negative   NITRITE Negative   UROBILINOGEN 2.0-3.0*   LEUKOCYTESUR Negative   RBCUA 0   WBCUA 20*   BACTERIA Many*   SQUAMEPITHEL >100   HYALINECASTS 0         Significant Imaging: I have reviewed all pertinent imaging results/findings within the past 24 hours.  Abdominal u/s:  Surgically removed gallbladder with no biliary dilatation.  Enlarged heterogeneous liver.  Splenomegaly     CT AP  1. Bibasilar patchy alveolar infiltrates could represent pneumonia.  Minimal consolidation in the lingula and left lower lobe also.  Recommend follow-up.  2. Hepatosplenomegaly.  3. Nonobstructing nephrolithiasis of the left kidney.  4. Few stable mildly enlarged retroperitoneal lymph nodes.    CXR  Suboptimal inspiration.  Cardiac silhouette is within normal limits.  Bibasilar patchy alveolar infiltrates could represent pneumonia.  This is minimally improved from prior study.  No effusion or pneumothorax.     No acute osseous abnormality.  Remote rib fractures on the left.

## 2023-03-15 NOTE — PLAN OF CARE
VS stable. Blood administered in RAC. Pt premedicated with benadryl. No adverse side effects noted.   Piperacillin administered in LAC. House supervisor verified okay.     Problem: Anemia  Goal: Anemia Symptom Improvement  Outcome: Ongoing, Progressing     Problem: Pain Acute  Goal: Acceptable Pain Control and Functional Ability  Outcome: Ongoing, Progressing     Problem: Fall Injury Risk  Goal: Absence of Fall and Fall-Related Injury  Outcome: Ongoing, Progressing

## 2023-03-15 NOTE — ASSESSMENT & PLAN NOTE
Patient's FSGs are uncontrolled due to hyperglycemia on current medication regimen.  Last A1c reviewed- No results found for: LABA1C, HGBA1C  Most recent fingerstick glucose reviewed-   Recent Labs   Lab 03/14/23  1645 03/14/23  1948 03/15/23  0715 03/15/23  1115   POCTGLUCOSE 240* 253* 203* 255*     Current correctional scale  Medium  Maintain anti-hyperglycemic dose as follows-   Antihyperglycemics (From admission, onward)    Start     Stop Route Frequency Ordered    03/14/23 1903  insulin aspart U-100 pen 1-10 Units         -- SubQ Before meals & nightly PRN 03/14/23 1803        Hold Oral hypoglycemics while patient is in the hospital.

## 2023-03-15 NOTE — PT/OT/SLP EVAL
"Physical Therapy Evaluation    Patient Name:  Hedy Conway   MRN:  [id number]    Recommendations:     Discharge Recommendations: nursing facility, basic, nursing facility, skilled   Discharge Equipment Recommendations: hospital bed, lift device, wheelchair   Barriers to discharge: Inaccessible home and Decreased caregiver support    Assessment:     Hedy Conway is a 47 y.o. female admitted with a medical diagnosis of Sepsis.  She presents with the following impairments/functional limitations: weakness, impaired endurance, impaired self care skills, impaired functional mobility, gait instability, impaired balance, decreased upper extremity function, decreased lower extremity function, pain, decreased ROM, impaired joint extensibility. Patient is having general body pain and requires dependent assistance in rolling to sides with limited tolerance due to body pain. Also noted with difficulty to move upper and lower extremities due to pain and weakness. Patient will benefit from PT tx to inc safety , inc mobility and dec burden of care.    Rehab Prognosis: Fair; patient would benefit from acute skilled PT services to address these deficits and reach maximum level of function.    Recent Surgery: * No surgery found *      Plan:     During this hospitalization, patient to be seen 5 x/week to address the identified rehab impairments via therapeutic activities, therapeutic exercises and progress toward the following goals:    Plan of Care Expires:  03/29/23    Subjective     Chief Complaint: body pain and weakness  Patient/Family Comments/goals: "to get better"  Pain/Comfort:  Pain Rating 1: 10/10 (general body pain)  Location - Orientation 1: generalized  Location 1: other (see comments) (general body pain)  Pain Addressed 1: Cessation of Activity, Reposition    Patients cultural, spiritual, Anglican conflicts given the current situation:      Living Environment:  Patient lives with  and daughter in a camper " with 2 steps and 1 handrail to enter.  Prior to admission, patients level of function was currently bedbound , totally assisted by  with transfers, was calling paramedics to get out from the [facility] for doctor's appointment.   Equipment used at home: CPAP, nebulizer, glucometer.  DME owned (not currently used): none.  Upon discharge, patient will have assistance from .    Objective:     Communicated with patient prior to session.  Patient found HOB elevated with PureWick, peripheral IV, SCD  upon PT entry to room.    General Precautions: Standard, fall  Orthopedic Precautions:N/A   Braces: N/A  Respiratory Status: Room air    Exams:  Cognitive Exam:  Patient is oriented to Person, Place, Time, and Situation  Gross Motor Coordination:  WFL  Skin Integrity/Edema:      -       Skin integrity: Visible skin intact  RLE ROM: Limited with pain  RLE Strength: 2/5 with pain  LLE ROM: limited with pain  LLE Strength: 2/5 with pain    Functional Mobility:  Bed Mobility:     Rolling Left:  dependence  Rolling Right: dependence  Supine to Sit: unable due to body pain and weakness  Transfers:  unable due to medical safety; recommend total assist with nia lift  Gait: unable; currently bed bound      AM-PAC 6 CLICK MOBILITY  Total Score:7       Treatment & Education:  PT eval; Educated patfridan the role of PT and initiated ROM ex on B LE and rolling to sides with difficulty and requires dependent assistance.    Patient left HOB elevated with all lines intact, call button in reach, bed alarm on, and nursing notified.    GOALS:   Multidisciplinary Problems       Physical Therapy Goals          Problem: Physical Therapy    Goal Priority Disciplines Outcome Goal Variances Interventions   Physical Therapy Goal     PT, PT/OT      Description:   Patient will increase functional independence with mobility by performin. Rolling to sides with Malachi using bed rail  2. Supine <> sit with Moderate Assistance  3. Able to  perform and tolerate static sitting at edge of the  bed for ~ 10 minutes  with minimal assistance.  4. Lower extremity exercise program x10 reps per handout, with assistance as needed                          History:     Past Medical History:   Diagnosis Date    Acute carpal tunnel syndrome of left wrist     Biliary colic     Diabetes mellitus     Encounter for blood transfusion     History of chemotherapy     Incisional hernia     Large cell (diffuse) non-Hodgkin's lymphoma     Stem cells transplant status        Past Surgical History:   Procedure Laterality Date    BREAST BIOPSY Left     lymph node biopsy, benign    BREAST SURGERY      CHOLECYSTECTOMY      COLD KNIFE CONIZATION OF CERVIX N/A 3/8/2021    Procedure: CONE BIOPSY, CERVIX, USING COLD KNIFE;  Surgeon: Evelyn Wheeler MD;  Location: [facility];  Service: OB/GYN;  Laterality: N/A;    COLONOSCOPY N/A 12/21/2022    Procedure: COLONOSCOPY;  Surgeon: Annamarie Jane MD;  Location: [facility];  Service: Endoscopy;  Laterality: N/A;    HERNIA REPAIR      incisional    LUNG BIOPSY      lymphnode biopsy      MEDIPORT REMOVAL Left 1/30/2020    Procedure: REMOVAL, CATHETER, CENTRAL VENOUS, TUNNELED, WITH PORT;  Surgeon: Luis Bogran-Reyes, MD;  Location: [facility];  Service: General;  Laterality: Left;    PORTACATH PLACEMENT Left     REVISION OF SCAR  6/22/2021    Procedure: REVISION, SCAR;  Surgeon: Otis Grimes MD;  Location: [address];  Service: General;;    TUBAL LIGATION      UMBILICAL HERNIA REPAIR         Time Tracking:     PT Received On: 03/15/23  PT Start Time: 0815     PT Stop Time: 0845  PT Total Time (min): 30 min     Billable Minutes: Evaluation 15 and Therapeutic Activity 15      03/15/2023

## 2023-03-15 NOTE — HOSPITAL COURSE
Admitted for sepsis secondary to bacteremia and pneumonia.  Currently on IV azithromycin, vancomycin and zosyn.  Hg 6.3 on am labs and patient with weakness/tachycardia.  2 units of pRBC ordered.      Leukocytosis slowly improving.  H/h stable with blood transfusion and currently 8.2.  Deescalate abx; group A strept seen on 1 out of 2 blood cultures.  Sensitive to rocephin.  Will cover for pneumonia with rocephin and azithromycin.  Encouraging patient to participate with therapy.  She has generalized pain.  Gabapentin started.      Patient still with generalized pain.  ESR and CRP ordered.  Leukocytosis continues to improve. Continue IV abx for strep bacteremia secondary to PNA infection.  Encouraging patient to work with therapy.      3/18  Patient still c/o pain and swelling to right wrist and right knee. Left hip pain improved. She was able to get up on the side of the bed. No fever. WBC ct improved.     3/19  Patient is day 6 on rocephin today. She has had some improvement of her right wrist swelling and has been able to at least bear weight on her right knee. However, because of persistent swelling and her total clinical picture, I reached out to ID. The recommendation was to have an aspirate of her joints to determine duration of abx in the event that her bacteremia was secondary to migratory septic arthritis. Aspiration of the right wrist and right knee at bedside - see a/p. She remains afebrile, pain improved, no hypoxia, slight cough.    3/20 Leukocytosis improving. CT wrist with diffuse superficial soft tissue swelling and concern for carpal joint effusion concerning for septic arthritis.  MRI knee pending.  Working with therapy as tolerated.  Continue IV abxs for pneumonia, bacteremia and possible septic arthritis.      3/21 Found to have large joint synovitis.  Plan for transfer for ortho and ID.  Concern for septic arthritis.  Patient with vaginal bleeding (stable h/h and bp).  Obgyn consulted.

## 2023-03-15 NOTE — ASSESSMENT & PLAN NOTE
Noted on imaging  No hypoxia  Nebs scheduled  Cont broad spectrum antibx for now and narrow pending cx

## 2023-03-15 NOTE — PROGRESS NOTES
[facility]urg ([facility])  [facility] Medicine  Progress Note    Patient Name: Hedy Conway  MRN: [id number]  Patient Class: IP- Inpatient   Admission Date: 3/13/2023  Length of Stay: 2 days  Attending Physician: Uche Blum MD  Primary Care Provider: John Lantigua PA-C        Subjective:     Principal Problem:Sepsis        HPI:  Hedy Conway is a 47 y.o. female with PMH: Non-Hodgkin's Lymphoma (2011), DM on metformin, HTN, Arthritis, COPD who was sent to the ED by PCP for further eval of abnormal lab work.   Patient reports that last Friday night (03/03) she went to bed in her normal state of health, woke up the next morning (03/04) and could not walk due to L thigh pain. She went to [facility] ED and was dx with a pulled muscle (Sat). She also reports subjective fever with tmax of 101.3F for three days while home. She followed up with her PCP-John Lantigua, on Wed (03/08) and was treated symptomatically and told to return if symptoms worsen. She went to bed Wed evening; felt a pop in her R knee and now with R wrist pain prompting ED visit at [facility] on 03/09/23. She was again dx with muscle pain.   She was seen by Dr. Mark Anthony gongora, for R knee pain and was told that she possibly has torn cartilage in her knee. She also received a steroid injection in her R wrist. She is awaiting a MRI of her R knee and continues with pain.  Symptoms have progressively worsened, noting that she cannot walk and has been having to wear diapers for the past 5 days. She went back to the ED at [facility] on Friday and was dx with a UTI. She was discharged home with an antibiotic that starts with a C.   Since this time she has had Progressive weakness, fatigue, hallucinations, and night sweats.  Denies abdominal pain, NVD. Denies back pain.   She notes a + productive cough with blood tinged sputum. Denies associated cp or sob.  LBM X 1 week ago>Taking norco for pain at home. Denies rectal bleeding or Black  stool  She presents pale, diaphoretic and tachycardic.    ER work up showed concern for UTI and bibasilar PNA. She does also report cough and NAIR on further questioning. Started on Vanc and Zosyn for sepsis with WBC of 21K. Initial LA elevated, resolved with IVF in ED. Cr improved from 1.1 to 0.8. Interestingly t bili 3.3m alk phos 151. CT showed hepatomegaly. US without CBD dilation and gallbladder surgically removed. Denies abd pain. She is mildly jaundiced on exam. On rounds this afternoon her only complaint is diffuse body and joint pain and requesting pain medication.       Overview/Hospital Course:  Admitted for sepsis secondary to bacteremia and pneumonia.  Currently on IV azithromycin, vancomycin and zosyn.  Hg 6.3 on am labs and patient with weakness/tachycardia.  2 units of pRBC ordered.        Past Medical History:   Diagnosis Date    Acute carpal tunnel syndrome of left wrist     Biliary colic     Diabetes mellitus     Encounter for blood transfusion     History of chemotherapy     Incisional hernia     Large cell (diffuse) non-Hodgkin's lymphoma     Stem cells transplant status        Past Surgical History:   Procedure Laterality Date    BREAST BIOPSY Left     lymph node biopsy, benign    BREAST SURGERY      CHOLECYSTECTOMY      COLD KNIFE CONIZATION OF CERVIX N/A 3/8/2021    Procedure: CONE BIOPSY, CERVIX, USING COLD KNIFE;  Surgeon: Evelyn Wheeler MD;  Location: [facility];  Service: OB/GYN;  Laterality: N/A;    COLONOSCOPY N/A 12/21/2022    Procedure: COLONOSCOPY;  Surgeon: Annamarie Jane MD;  Location: [facility];  Service: Endoscopy;  Laterality: N/A;    HERNIA REPAIR      incisional    LUNG BIOPSY      lymphnode biopsy      MEDIPORT REMOVAL Left 1/30/2020    Procedure: REMOVAL, CATHETER, CENTRAL VENOUS, TUNNELED, WITH PORT;  Surgeon: Luis Bogran-Reyes, MD;  Location: [facility];  Service: General;  Laterality: Left;    PORTACATH PLACEMENT Left     REVISION OF SCAR  6/22/2021     Procedure: REVISION, SCAR;  Surgeon: Otis Grimes MD;  Location: [facility] OR [address] [place] [address];  Service: General;;    TUBAL LIGATION      UMBILICAL HERNIA REPAIR         Review of patient's allergies indicates:   Allergen Reactions    Tetanus vaccines and toxoid Rash       No current facility-administered medications on file prior to encounter.     Current Outpatient Medications on File Prior to Encounter   Medication Sig    budesonide (PULMICORT) 0.5 mg/2 mL nebulizer solution USE 1 VIAL IN NEBULIZER TWICE DAILY (OK TO MIX WITH ALBUTEROL. RINSE MOUTH AFTER USE)    celecoxib (CELEBREX) 200 MG capsule Take 200 mg by mouth once daily.    conjugated estrogens (PREMARIN) vaginal cream Place 0.5 g vaginally once daily. Place 0.5 vaginally for 4 weeks, then transition to twice weekly use.    cyclobenzaprine (FLEXERIL) 10 MG tablet Take 10 mg by mouth every 8 (eight) hours as needed.    fluticasone (FLONASE) 50 mcg/actuation nasal spray 1 spray by Each Nare route once daily.    HYDROcodone-acetaminophen (NORCO) 5-325 mg per tablet Take 1 tablet by mouth every 6 (six) hours as needed.    metFORMIN (GLUCOPHAGE) 500 MG tablet Take 500 mg by mouth once daily.    montelukast (SINGULAIR) 10 mg tablet Take 10 mg by mouth once daily.    naproxen (NAPROSYN) 500 MG tablet Take 500 mg by mouth 2 (two) times daily as needed.    ondansetron (ZOFRAN) 4 MG tablet Take 4 mg by mouth every 8 (eight) hours as needed.    pantoprazole (PROTONIX) 40 MG tablet Take 1 tablet (40 mg total) by mouth once daily.    zinc sulfate (ZINCATE) 220 (50) mg capsule Take 220 mg by mouth 3 (three) times daily.    albuterol (PROVENTIL/VENTOLIN HFA) 90 mcg/actuation inhaler Ventolin HFA 90 mcg/actuation aerosol inhaler   INHALE 2 PUFFS BY MOUTH 3 TIMES DAILY AS NEEDED.    albuterol-ipratropium (DUO-NEB) 2.5 mg-0.5 mg/3 mL nebulizer solution ipratropium 0.5 mg-albuterol 3 mg (2.5 mg base)/3 mL nebulization soln   USE 1 VIAL IN NEBULIZER EVERY 6 TO 8  HOURS AS NEEDED    blood sugar diagnostic Strp USE TO CHECK BLOOD SUGAR TWICE A DAY    budesonide (PULMICORT) 0.5 mg/2 mL nebulizer solution budesonide 0.5 mg/2 mL suspension for nebulization   USE 1 VIAL IN NEBULIZER TWICE DAILY (OK TO MIX WITH ALBUTEROL. RINSE MOUTH AFTER USE)    melatonin 10 mg Cap Take 10 mg by mouth nightly as needed.     Family History       Problem Relation (Age of Onset)    Breast cancer Maternal Aunt, Maternal Aunt    Cancer Paternal Grandmother    Colon cancer Paternal Grandmother    Diabetes Mother, Father    Heart block Mother    Heart failure Father    Hypertension Mother, Father    Kidney disease Mother    Lung cancer Father          Tobacco Use    Smoking status: Never    Smokeless tobacco: Never   Substance and Sexual Activity    Alcohol use: Not Currently    Drug use: No    Sexual activity: Yes     Partners: Male     Birth control/protection: See Surgical Hx     Comment: with one partner for 24 years     Review of Systems   Constitutional:  Positive for fatigue and fever. Negative for activity change and unexpected weight change.   HENT:  Negative for congestion, ear pain, hearing loss, rhinorrhea, sore throat and tinnitus.    Eyes:  Negative for pain, redness and visual disturbance.   Respiratory:  Positive for cough (productive green sputum) and shortness of breath (NAIR). Negative for wheezing.    Cardiovascular:  Positive for leg swelling. Negative for chest pain and palpitations.   Gastrointestinal:  Positive for constipation (1 week since last BM). Negative for abdominal pain, blood in stool, diarrhea, nausea and vomiting.   Genitourinary:  Negative for dysuria, frequency, pelvic pain and urgency.        Dark colored urine but denies dysuria   Musculoskeletal:  Positive for arthralgias and myalgias. Negative for back pain, joint swelling and neck pain.   Skin:  Negative for color change, rash and wound.   Neurological:  Negative for dizziness, tremors, weakness,  light-headedness and headaches.   Objective:     Vital Signs (Most Recent):  Temp: 98.2 °F (36.8 °C) (03/15/23 1120)  Pulse: 105 (03/15/23 1200)  Resp: 20 (03/15/23 1120)  BP: 136/65 (03/15/23 1120)  SpO2: (!) 94 % (03/15/23 1120)   Vital Signs (24h Range):  Temp:  [97.8 °F (36.6 °C)-99 °F (37.2 °C)] 98.2 °F (36.8 °C)  Pulse:  [105-117] 105  Resp:  [12-24] 20  SpO2:  [93 %-95 %] 94 %  BP: ()/(54-65) 136/65     Weight: (!) 136.4 kg (300 lb 11.3 oz)  Body mass index is 47.1 kg/m².    Physical Exam  Vitals and nursing note reviewed.   Constitutional:       General: She is not in acute distress.     Appearance: She is well-developed. She is obese.   HENT:      Head: Normocephalic and atraumatic.      Right Ear: External ear normal.      Left Ear: External ear normal.      Nose: Nose normal.   Eyes:      General: No scleral icterus.        Right eye: No discharge.         Left eye: No discharge.      Extraocular Movements: Extraocular movements intact.      Conjunctiva/sclera: Conjunctivae normal.      Pupils: Pupils are equal, round, and reactive to light.   Neck:      Thyroid: No thyromegaly.   Cardiovascular:      Rate and Rhythm: Normal rate and regular rhythm.      Heart sounds: No murmur heard.  Pulmonary:      Effort: Pulmonary effort is normal. No respiratory distress.      Breath sounds: Rhonchi (soft rhonchi b/l bases) present. No wheezing.   Abdominal:      General: Bowel sounds are normal. There is no distension.      Palpations: Abdomen is soft.      Tenderness: There is no abdominal tenderness.   Musculoskeletal:      Right lower leg: Edema present.      Left lower leg: Edema present.   Skin:     General: Skin is warm and dry.      Coloration: Skin is jaundiced.   Neurological:      Mental Status: She is alert and oriented to person, place, and time.      Cranial Nerves: No cranial nerve deficit.   Psychiatric:         Behavior: Behavior normal.         Thought Content: Thought content normal.          CRANIAL NERVES     CN III, IV, VI   Pupils are equal, round, and reactive to light.     Significant Labs: All pertinent labs within the past 24 hours have been reviewed.  Blood Culture:   Recent Labs   Lab 03/13/23 1849   LABBLOO Gram stain aer bottle: Gram positive cocci in chains resembling Strep  Results called to and read back by: Nurys Gutiérrez RN  03/14/2023  15:50  ALPHA HEMOLYTIC STREP  Identification and susceptibility pending  *  No Growth to date  No Growth to date       CBC:   Recent Labs   Lab 03/13/23 1849 03/14/23  0645 03/15/23  0555   WBC 21.81* 15.19* 14.43*   HGB 9.1* 7.2* 6.3*   HCT 29.9* 23.7* 21.3*    142* 170       CMP:   Recent Labs   Lab 03/13/23 1849 03/14/23  0645 03/15/23  0555    135* 133*   K 5.0 4.6 4.6    105 104   CO2 19* 19* 21*   * 194* 205*   BUN 45* 39* 24*   CREATININE 1.1 0.8 0.7   CALCIUM 9.6 8.7 8.7   PROT 5.9* 4.9* 4.6*   ALBUMIN 1.9* 1.5* 1.3*   BILITOT 3.3* 3.3* 2.1*   ALKPHOS 193* 151* 123   AST 21 24 19   ALT 20 19 19   ANIONGAP 15 11 8       Cardiac Markers:   Recent Labs   Lab 03/13/23 1849   BNP 32       Lactic Acid:   Recent Labs   Lab 03/13/23 1849 03/13/23  2241   LACTATE 1.8 1.7       Troponin:   Recent Labs   Lab 03/13/23 1849 03/14/23  0645   TROPONINI <0.006 <0.006       Urine Culture:   Recent Labs   Lab 03/13/23 1924   LABURIN No growth     Urine Studies:   Recent Labs   Lab 03/13/23 1924   COLORU Orange*   APPEARANCEUA Cloudy*   PHUR 5.0   SPECGRAV 1.025   PROTEINUA 1+*   GLUCUA Negative   KETONESU Negative   BILIRUBINUA 2+*   OCCULTUA Negative   NITRITE Negative   UROBILINOGEN 2.0-3.0*   LEUKOCYTESUR Negative   RBCUA 0   WBCUA 20*   BACTERIA Many*   SQUAMEPITHEL >100   HYALINECASTS 0         Significant Imaging: I have reviewed all pertinent imaging results/findings within the past 24 hours.  Abdominal u/s:  Surgically removed gallbladder with no biliary dilatation.  Enlarged heterogeneous liver.   Splenomegaly     CT AP  1. Bibasilar patchy alveolar infiltrates could represent pneumonia.  Minimal consolidation in the lingula and left lower lobe also.  Recommend follow-up.  2. Hepatosplenomegaly.  3. Nonobstructing nephrolithiasis of the left kidney.  4. Few stable mildly enlarged retroperitoneal lymph nodes.    CXR  Suboptimal inspiration.  Cardiac silhouette is within normal limits.  Bibasilar patchy alveolar infiltrates could represent pneumonia.  This is minimally improved from prior study.  No effusion or pneumothorax.     No acute osseous abnormality.  Remote rib fractures on the left.         Assessment/Plan:      * Sepsis  This patient does have evidence of infective focus  My overall impression is sepsis.  Source: Respiratory and Urinary Tract  Antibiotics given-   Antibiotics (72h ago, onward)    Start     Stop Route Frequency Ordered    03/15/23 1100  vancomycin 1,250 mg in dextrose 5 % (D5W) 250 mL IVPB (Vial-Mate)         -- IV Every 12 hours (non-standard times) 03/15/23 1025    03/15/23 0930  piperacillin-tazobactam (ZOSYN) 4.5 g in dextrose 5 % in water (D5W) 5 % 100 mL IVPB (MB+)         -- IV Every 8 hours (non-standard times) 03/15/23 0810    03/15/23 0915  azithromycin (ZITHROMAX) 250 mg in dextrose 5 % (D5W) 250 mL IVPB         03/19 0914 IV Every 24 hours (non-standard times) 03/15/23 0811    03/13/23 2125  vancomycin - pharmacy to dose  (vancomycin IVPB)        See Hyperspace for full Linked Orders Report.    -- IV pharmacy to manage frequency 03/13/23 2026        Latest lactate reviewed-  Recent Labs   Lab 03/13/23  1849 03/13/23  2241   LACTATE 1.8 1.7     Organ dysfunction indicated by Thrombocytopenia     Fluid challenge Not needed - patient is not hypotensive      Post- resuscitation assessment No - Post resuscitation assessment not needed       Will Not start Pressors- Levophed for MAP of 65  Source control achieved by: Vanc, Zosyn and azithromycin   IVF resuscitation already  occurred in ED    Myalgia  Diffuse myalgias and arthralgias  Possible due to acute infection  IVF in ED  CPK normal  PRN Norco  PT/OT ordered---she wants to go to a nursing home for skilled if she doesn't get better      Type 2 diabetes mellitus without complication, without long-term current use of insulin  Patient's FSGs are uncontrolled due to hyperglycemia on current medication regimen.  Last A1c reviewed- No results found for: LABA1C, HGBA1C  Most recent fingerstick glucose reviewed-   Recent Labs   Lab 03/14/23  1645 03/14/23  1948 03/15/23  0715 03/15/23  1115   POCTGLUCOSE 240* 253* 203* 255*     Current correctional scale  Medium  Maintain anti-hyperglycemic dose as follows-   Antihyperglycemics (From admission, onward)    Start     Stop Route Frequency Ordered    03/14/23 1903  insulin aspart U-100 pen 1-10 Units         -- SubQ Before meals & nightly PRN 03/14/23 1803        Hold Oral hypoglycemics while patient is in the hospital.    Acute cystitis without hematuria  UA concnerning for infection  Urine and blood cx sent and pending  Cont broad spectrum antibx for now        Hyperbilirubinemia  Unclear etiology  Imaging is unrevelaing  Repeat lab shows down trending       Microcytic anemia  H/H trended down  No evidence of acute bleeding but stool for occult ordered (currently with constipation)  Daily labs  Iron level low but will defer IV iron in acute infection.  Ferrous sulfate ordered.     This is a chronic problem for pt and she receives IV injections (unsure name)        Streptococcal bacteremia  Suspect related to PNA  Cont broad spectrum antibx pending culture results      Pneumonia of both lungs due to infectious organism  Noted on imaging  No hypoxia  Nebs scheduled  Cont broad spectrum antibx for now and narrow pending cx      Thrombocytopenia  Has had in the psat as well but currently in relation to sepsis  Daily labs      Large cell (diffuse) non-Hodgkin's lymphoma  In 2011         VTE Risk  Mitigation (From admission, onward)         Ordered     IP VTE HIGH RISK PATIENT  Once         03/14/23 1046     Place sequential compression device  Until discontinued         03/14/23 1046                Discharge Planning   JESSE:      Code Status: Full Code   Is the patient medically ready for discharge?:     Reason for patient still in hospital (select all that apply): Patient trending condition  Discharge Plan A: Home with family, Home Health                  Saira Solitario PA-C  Department of Hospital Medicine   [place] [facility] Surg (3rd Fl)

## 2023-03-16 LAB
ALBUMIN SERPL BCP-MCNC: 1.5 G/DL (ref 3.5–5.2)
ALP SERPL-CCNC: 115 U/L (ref 55–135)
ALT SERPL W/O P-5'-P-CCNC: 20 U/L (ref 10–44)
ANION GAP SERPL CALC-SCNC: 9 MMOL/L (ref 8–16)
ANISOCYTOSIS BLD QL SMEAR: ABNORMAL
AST SERPL-CCNC: 16 U/L (ref 10–40)
BACTERIA BLD CULT: ABNORMAL
BASOPHILS NFR BLD: 0 % (ref 0–1.9)
BILIRUB SERPL-MCNC: 2.5 MG/DL (ref 0.1–1)
BUN SERPL-MCNC: 18 MG/DL (ref 6–20)
CALCIUM SERPL-MCNC: 9 MG/DL (ref 8.7–10.5)
CHLORIDE SERPL-SCNC: 101 MMOL/L (ref 95–110)
CO2 SERPL-SCNC: 21 MMOL/L (ref 23–29)
CREAT SERPL-MCNC: 0.7 MG/DL (ref 0.5–1.4)
DACRYOCYTES BLD QL SMEAR: ABNORMAL
DIFFERENTIAL METHOD: ABNORMAL
EOSINOPHIL NFR BLD: 0 % (ref 0–8)
ERYTHROCYTE [DISTWIDTH] IN BLOOD BY AUTOMATED COUNT: 17.5 % (ref 11.5–14.5)
EST. GFR  (NO RACE VARIABLE): >60 ML/MIN/1.73 M^2
ESTIMATED AVG GLUCOSE: 137 MG/DL (ref 68–131)
GIANT PLATELETS BLD QL SMEAR: PRESENT
GLUCOSE SERPL-MCNC: 231 MG/DL (ref 70–110)
HBA1C MFR BLD: 6.4 % (ref 4–5.6)
HCT VFR BLD AUTO: 26.7 % (ref 37–48.5)
HGB BLD-MCNC: 8.2 G/DL (ref 12–16)
HYPOCHROMIA BLD QL SMEAR: ABNORMAL
IMM GRANULOCYTES # BLD AUTO: ABNORMAL K/UL (ref 0–0.04)
IMM GRANULOCYTES NFR BLD AUTO: ABNORMAL % (ref 0–0.5)
LYMPHOCYTES NFR BLD: 16 % (ref 18–48)
MCH RBC QN AUTO: 24.3 PG (ref 27–31)
MCHC RBC AUTO-ENTMCNC: 30.7 G/DL (ref 32–36)
MCV RBC AUTO: 79 FL (ref 82–98)
MONOCYTES NFR BLD: 10 % (ref 4–15)
NEUTROPHILS NFR BLD: 69 % (ref 38–73)
NEUTS BAND NFR BLD MANUAL: 5 %
NRBC BLD-RTO: 0 /100 WBC
OVALOCYTES BLD QL SMEAR: ABNORMAL
PLATELET # BLD AUTO: 197 K/UL (ref 150–450)
PLATELET BLD QL SMEAR: ABNORMAL
PMV BLD AUTO: 11.3 FL (ref 9.2–12.9)
POCT GLUCOSE: 230 MG/DL (ref 70–110)
POCT GLUCOSE: 241 MG/DL (ref 70–110)
POCT GLUCOSE: 253 MG/DL (ref 70–110)
POCT GLUCOSE: 305 MG/DL (ref 70–110)
POCT GLUCOSE: 310 MG/DL (ref 70–110)
POIKILOCYTOSIS BLD QL SMEAR: ABNORMAL
POTASSIUM SERPL-SCNC: 5.1 MMOL/L (ref 3.5–5.1)
PROT SERPL-MCNC: 5 G/DL (ref 6–8.4)
RBC # BLD AUTO: 3.38 M/UL (ref 4–5.4)
SODIUM SERPL-SCNC: 131 MMOL/L (ref 136–145)
TARGETS BLD QL SMEAR: ABNORMAL
WBC # BLD AUTO: 14.66 K/UL (ref 3.9–12.7)

## 2023-03-16 PROCEDURE — 25000003 PHARM REV CODE 250: Performed by: FAMILY MEDICINE

## 2023-03-16 PROCEDURE — 99232 SBSQ HOSP IP/OBS MODERATE 35: CPT | Mod: GT,,, | Performed by: PHYSICIAN ASSISTANT

## 2023-03-16 PROCEDURE — 63600175 PHARM REV CODE 636 W HCPCS: Performed by: PHYSICIAN ASSISTANT

## 2023-03-16 PROCEDURE — 97530 THERAPEUTIC ACTIVITIES: CPT

## 2023-03-16 PROCEDURE — 94761 N-INVAS EAR/PLS OXIMETRY MLT: CPT

## 2023-03-16 PROCEDURE — 97110 THERAPEUTIC EXERCISES: CPT

## 2023-03-16 PROCEDURE — 85007 BL SMEAR W/DIFF WBC COUNT: CPT | Performed by: PHYSICIAN ASSISTANT

## 2023-03-16 PROCEDURE — 80053 COMPREHEN METABOLIC PANEL: CPT | Performed by: PHYSICIAN ASSISTANT

## 2023-03-16 PROCEDURE — 25000003 PHARM REV CODE 250: Performed by: INTERNAL MEDICINE

## 2023-03-16 PROCEDURE — 11000001 HC ACUTE MED/SURG PRIVATE ROOM

## 2023-03-16 PROCEDURE — 94640 AIRWAY INHALATION TREATMENT: CPT

## 2023-03-16 PROCEDURE — 25000242 PHARM REV CODE 250 ALT 637 W/ HCPCS: Performed by: INTERNAL MEDICINE

## 2023-03-16 PROCEDURE — 87040 BLOOD CULTURE FOR BACTERIA: CPT | Performed by: PHYSICIAN ASSISTANT

## 2023-03-16 PROCEDURE — 36415 COLL VENOUS BLD VENIPUNCTURE: CPT | Performed by: PHYSICIAN ASSISTANT

## 2023-03-16 PROCEDURE — 36430 TRANSFUSION BLD/BLD COMPNT: CPT

## 2023-03-16 PROCEDURE — 63600175 PHARM REV CODE 636 W HCPCS: Performed by: INTERNAL MEDICINE

## 2023-03-16 PROCEDURE — 99232 PR SUBSEQUENT HOSPITAL CARE,LEVL II: ICD-10-PCS | Mod: GT,,, | Performed by: PHYSICIAN ASSISTANT

## 2023-03-16 PROCEDURE — 25000003 PHARM REV CODE 250: Performed by: STUDENT IN AN ORGANIZED HEALTH CARE EDUCATION/TRAINING PROGRAM

## 2023-03-16 PROCEDURE — 25000003 PHARM REV CODE 250: Performed by: PHYSICIAN ASSISTANT

## 2023-03-16 PROCEDURE — 85027 COMPLETE CBC AUTOMATED: CPT | Performed by: PHYSICIAN ASSISTANT

## 2023-03-16 RX ORDER — GABAPENTIN 100 MG/1
100 CAPSULE ORAL 3 TIMES DAILY
Status: DISCONTINUED | OUTPATIENT
Start: 2023-03-16 | End: 2023-03-21 | Stop reason: HOSPADM

## 2023-03-16 RX ORDER — CALCIUM CARBONATE 200(500)MG
500 TABLET,CHEWABLE ORAL 2 TIMES DAILY PRN
Status: DISCONTINUED | OUTPATIENT
Start: 2023-03-16 | End: 2023-03-21 | Stop reason: HOSPADM

## 2023-03-16 RX ORDER — LIDOCAINE HYDROCHLORIDE 20 MG/ML
15 SOLUTION OROPHARYNGEAL ONCE
Status: COMPLETED | OUTPATIENT
Start: 2023-03-16 | End: 2023-03-16

## 2023-03-16 RX ORDER — ACETAMINOPHEN 500 MG
500 TABLET ORAL EVERY 8 HOURS PRN
Status: DISCONTINUED | OUTPATIENT
Start: 2023-03-16 | End: 2023-03-21 | Stop reason: HOSPADM

## 2023-03-16 RX ADMIN — LIDOCAINE HYDROCHLORIDE 15 ML: 20 SOLUTION ORAL at 06:03

## 2023-03-16 RX ADMIN — IPRATROPIUM BROMIDE AND ALBUTEROL SULFATE 3 ML: 2.5; .5 SOLUTION RESPIRATORY (INHALATION) at 11:03

## 2023-03-16 RX ADMIN — VANCOMYCIN HYDROCHLORIDE 1250 MG: 1.25 INJECTION, POWDER, LYOPHILIZED, FOR SOLUTION INTRAVENOUS at 12:03

## 2023-03-16 RX ADMIN — GABAPENTIN 100 MG: 100 CAPSULE ORAL at 03:03

## 2023-03-16 RX ADMIN — CEFTRIAXONE 1 G: 1 INJECTION, SOLUTION INTRAVENOUS at 12:03

## 2023-03-16 RX ADMIN — IPRATROPIUM BROMIDE AND ALBUTEROL SULFATE 3 ML: 2.5; .5 SOLUTION RESPIRATORY (INHALATION) at 03:03

## 2023-03-16 RX ADMIN — INSULIN ASPART 12 UNITS: 100 INJECTION, SOLUTION INTRAVENOUS; SUBCUTANEOUS at 12:03

## 2023-03-16 RX ADMIN — PIPERACILLIN AND TAZOBACTAM 4.5 G: 4; .5 INJECTION, POWDER, LYOPHILIZED, FOR SOLUTION INTRAVENOUS; PARENTERAL at 09:03

## 2023-03-16 RX ADMIN — INSULIN ASPART 6 UNITS: 100 INJECTION, SOLUTION INTRAVENOUS; SUBCUTANEOUS at 08:03

## 2023-03-16 RX ADMIN — CYCLOBENZAPRINE HYDROCHLORIDE 10 MG: 10 TABLET, FILM COATED ORAL at 10:03

## 2023-03-16 RX ADMIN — INSULIN ASPART 9 UNITS: 100 INJECTION, SOLUTION INTRAVENOUS; SUBCUTANEOUS at 04:03

## 2023-03-16 RX ADMIN — PIPERACILLIN AND TAZOBACTAM 4.5 G: 4; .5 INJECTION, POWDER, LYOPHILIZED, FOR SOLUTION INTRAVENOUS; PARENTERAL at 02:03

## 2023-03-16 RX ADMIN — AZITHROMYCIN MONOHYDRATE 250 MG: 500 INJECTION, POWDER, LYOPHILIZED, FOR SOLUTION INTRAVENOUS at 09:03

## 2023-03-16 RX ADMIN — INSULIN ASPART 4 UNITS: 100 INJECTION, SOLUTION INTRAVENOUS; SUBCUTANEOUS at 08:03

## 2023-03-16 RX ADMIN — DOCUSATE SODIUM 100 MG: 100 CAPSULE, LIQUID FILLED ORAL at 08:03

## 2023-03-16 RX ADMIN — HYDROCODONE BITARTRATE AND ACETAMINOPHEN 1 TABLET: 5; 325 TABLET ORAL at 02:03

## 2023-03-16 RX ADMIN — IPRATROPIUM BROMIDE AND ALBUTEROL SULFATE 3 ML: 2.5; .5 SOLUTION RESPIRATORY (INHALATION) at 07:03

## 2023-03-16 RX ADMIN — FERROUS SULFATE TAB 325 MG (65 MG ELEMENTAL FE) 1 EACH: 325 (65 FE) TAB at 08:03

## 2023-03-16 RX ADMIN — VANCOMYCIN HYDROCHLORIDE 1250 MG: 1.25 INJECTION, POWDER, LYOPHILIZED, FOR SOLUTION INTRAVENOUS at 11:03

## 2023-03-16 RX ADMIN — ACETAMINOPHEN 500 MG: 500 TABLET ORAL at 05:03

## 2023-03-16 RX ADMIN — GABAPENTIN 100 MG: 100 CAPSULE ORAL at 08:03

## 2023-03-16 NOTE — PLAN OF CARE
VS stable. Insulin coverage administered. Flexeril administered for pain.       Problem: Impaired Wound Healing  Goal: Optimal Wound Healing  Outcome: Ongoing, Progressing     Problem: Diabetes Comorbidity  Goal: Blood Glucose Level Within Targeted Range  Outcome: Ongoing, Progressing     Problem: Pain Acute  Goal: Acceptable Pain Control and Functional Ability  Outcome: Ongoing, Progressing

## 2023-03-16 NOTE — PT/OT/SLP PROGRESS
Physical Therapy Treatment    Patient Name:  Hedy Conway   MRN:  [id number]    Recommendations:     Discharge Recommendations: nursing facility, basic, nursing facility, skilled  Discharge Equipment Recommendations: hospital bed, wheelchair, lift device  Barriers to discharge: Inaccessible home and Decreased caregiver support    Assessment:     Hedy Conway is a 47 y.o. female admitted with a medical diagnosis of Sepsis.  She presents with the following impairments/functional limitations: weakness, impaired endurance, impaired functional mobility, impaired self care skills, gait instability, pain, decreased upper extremity function, decreased lower extremity function, decreased ROM, impaired joint extensibility. Patient tolerated AAROM ex on B LE's fairly due to pain  Nursing made aware.     Rehab Prognosis: Fair; patient would benefit from acute skilled PT services to address these deficits and reach maximum level of function.    Recent Surgery: * No surgery found *      Plan:     During this hospitalization, patient to be seen 5 x/week to address the identified rehab impairments via therapeutic activities, therapeutic exercises and progress toward the following goals:    Plan of Care Expires:  03/29/23    Subjective     Chief Complaint: pain especially upon movement on B LE/UE  Patient/Family Comments/goals: to decrease pain  Pain/Comfort:  Pain Rating 1: 10/10  Location - Side 1: Bilateral  Location - Orientation 1: generalized  Location 1: other (see comments) (bilateral LE and UE)  Pain Addressed 1: Reposition, Cessation of Activity, Other (see comments) (nursing made aware)  Pain Rating Post-Intervention 1: 10/10      Objective:     Communicated with patient prior to session.  Patient found HOB elevated with PureWick, peripheral IV, SCD upon PT entry to room.     General Precautions: Standard, fall  Orthopedic Precautions: N/A  Braces: N/A  Respiratory Status: Room air     Functional Mobility:  Bed  Mobility:     Rolling Left:  dependence  Rolling Right: dependence  Supine to Sit: unable      AM-PAC 6 CLICK MOBILITY  Turning over in bed (including adjusting bedclothes, sheets and blankets)?: 2  Sitting down on and standing up from a chair with arms (e.g., wheelchair, bedside commode, etc.): 1  Moving from lying on back to sitting on the side of the bed?: 1  Moving to and from a bed to a chair (including a wheelchair)?: 1  Need to walk in hospital room?: 1  Climbing 3-5 steps with a railing?: 1  Basic Mobility Total Score: 7       Treatment & Education:  Provided AAROM ex on B LE's x 10 reps with rest peiods. Working on rolling to sides x 5 reps with dependentA with rest periods. Bed repositioning    Patient left HOB elevated with all lines intact, call button in reach, bed alarm on, and nursing present..    GOALS:   Multidisciplinary Problems       Physical Therapy Goals          Problem: Physical Therapy    Goal Priority Disciplines Outcome Goal Variances Interventions   Physical Therapy Goal     PT, PT/OT      Description:   Patient will increase functional independence with mobility by performin. Rolling to sides with Malachi using bed rail  2. Supine <> sit with Moderate Assistance  3. Able to perform and tolerate static sitting at edge of the  bed for ~ 10 minutes  with minimal assistance.  4. Lower extremity exercise program x10 reps per handout, with assistance as needed                          Time Tracking:     PT Received On: 23  PT Start Time: 952     PT Stop Time: 1007  PT Total Time (min): 15 min     Billable Minutes: Therapeutic Exercise 15    Treatment Type: Treatment  PT/PTA: PT           2023

## 2023-03-16 NOTE — NURSING
Messaged Dr. Gloria due to pt's blood sugar being 310 but only on moderate insulin scale. Questioned if he wanted to put pt on Detemir. MD stated to change sliding scale to high intensity. Order changed at this time.

## 2023-03-16 NOTE — ASSESSMENT & PLAN NOTE
H/H trended down  No evidence of acute bleeding but stool for occult ordered (currently with constipation)  Daily labs  Iron level low but will defer IV iron in acute infection.  Ferrous sulfate ordered.     This is a chronic problem for pt and she receives IV injections (unsure name)    Stable today 3/16

## 2023-03-16 NOTE — PLAN OF CARE
03/16/23 1453   Post-Acute Status   Post-Acute Authorization Placement   Post-Acute Placement Status Referrals Sent   Discharge Delays None known at this time       On admit, patient requesting SNF placement at [facility]. SW sent patient referral and [facility] has declined. Yesterday, SW provided patient with a list from [employer] of other local SNF locations. Patient states that she did not review and did not know where list was located. SW looked briefly in hospital room without touching patients' belongings. Patient agreeable to referral being sent to [facility]. Referral faxed via [facility]. [facility] has declined stating that patient is functioning at her prior level of functioning (bedbound).     STACY then sent patient's referral to ALL local nursing homes in [facility] including:   [facility]     STACY to send hard fax referral to [facility] and [facility] at they are not in [facility].

## 2023-03-16 NOTE — PROGRESS NOTES
Therapy with Vancomycin complete and/or consult discontinued by provider.  Pharmacy will sign off, please re-consult as needed.    Thanks!   Michelle Cedillo, PharmD  03/16/2023 11:40 AM

## 2023-03-16 NOTE — ASSESSMENT & PLAN NOTE
This patient does have evidence of infective focus  My overall impression is sepsis.  Source: Respiratory and Urinary Tract  Antibiotics given-   Antibiotics (72h ago, onward)    Start     Stop Route Frequency Ordered    03/16/23 1200  cefTRIAXone (ROCEPHIN) 1 g/50 mL D5W IVPB         -- IV Every 24 hours (non-standard times) 03/16/23 1138    03/15/23 0915  azithromycin (ZITHROMAX) 250 mg in dextrose 5 % (D5W) 250 mL IVPB         03/19 0914 IV Every 24 hours (non-standard times) 03/15/23 0811        Latest lactate reviewed-  Recent Labs   Lab 03/13/23  1849 03/13/23  2241   LACTATE 1.8 1.7     Organ dysfunction indicated by Thrombocytopenia     Fluid challenge Not needed - patient is not hypotensive      Post- resuscitation assessment No - Post resuscitation assessment not needed       Will Not start Pressors- Levophed for MAP of 65  Source control achieved by: Vanc, Zosyn and azithromycin   IVF resuscitation already occurred in ED    Resolved and descalate abx 3/16

## 2023-03-16 NOTE — PT/OT/SLP PROGRESS
Occupational Therapy   Treatment    Name: Hedy Conway  MRN: [id number]  Admitting Diagnosis:  Sepsis       Recommendations:     Discharge Recommendations: nursing facility, basic, nursing facility, skilled  Discharge Equipment Recommendations:  hospital bed, wheelchair, lift device  Barriers to discharge:  Inaccessible home environment, Decreased caregiver support    Assessment:     Hedy Conway is a 47 y.o. female with a medical diagnosis of Sepsis.  She presents with performance deficits affecting function are weakness, impaired endurance, impaired self care skills, impaired functional mobility, gait instability, pain, decreased lower extremity function, decreased upper extremity function, decreased ROM, impaired joint extensibility.     Rehab Prognosis:  Fair; patient would benefit from acute skilled OT services to address these deficits and reach maximum level of function.       Plan:     Patient to be seen 5 x/week to address the above listed problems via self-care/home management, therapeutic activities, therapeutic exercises  Plan of Care Expires:    Plan of Care Reviewed with: patient    Subjective     Pain/Comfort:  Pain Rating 1: 10/10  Location - Side 1: Bilateral  Location - Orientation 1: generalized  Location 1: other (see comments) (Bilateral upper and lower extremities.)  Pain Addressed 1: Reposition, Cessation of Activity  Pain Rating Post-Intervention 1: 10/10    Objective:     Communicated with: Nursing prior to session.  Patient found HOB elevated with PureWick, peripheral IV, SCD upon OT entry to room.    General Precautions: Standard, fall    Orthopedic Precautions:N/A  Braces: N/A  Respiratory Status: Room air     Occupational Performance:     Bed Mobility:    Patient completed Rolling/Turning to Left with  total assistance  Patient completed Scooting/Bridging with total assistance  Patient completed Supine to Sit with total assistance  Patient completed Sit to Supine with total  assistance     Activities of Daily Living:  Feeding:  stand by assistance to drink from cup with straw using right upper extremity seated EOB.  Grooming: stand by assistance to perform oral  care and face washing seated EOB. Pt tolerated ~5 minutes seated with stabilization on bed using left upper extremity. Pt unable to scoot completely to EOB to place right foot on floor due to increased right LE pain.       [facility] 6 Click ADL: 11    Treatment & Education:  Pt with Total Assist for all bed mobility tasks demonstrating good seated balance once at EOB with increased stabilization from left upper extremity on bed. Pt performed seated oral care and face washing using right upper extremity tolerating ~5 minutes seated. Consistent report of bilateral upper extremity and LE pain throughout. Total assist to return to supine with Pt in significant pain until properly positioned with pillows under bilateral knees, right hip, and right upper extremity.    Patient left HOB elevated with all lines intact, call button in reach, and nursing notified    GOALS:   Multidisciplinary Problems       Occupational Therapy Goals          Problem: Occupational Therapy    Goal Priority Disciplines Outcome Interventions   Occupational Therapy Goal     OT, PT/OT Ongoing, Progressing    Description: Pt to perform level functional transfers required for ADL's with MOD A, RW level.  Pt to participate in bilateral upper extremity strengthening routine for increased strength, range of motion, and performance in ADL.  Pt to perform seated ADL activity for ~ 5 minutes at EOB for improved activity tolerance and reduced caregiver burden upon discharge. (MET)                       Time Tracking:     OT Date of Treatment: 03/16/23  OT Start Time: 1326  OT Stop Time: 1350  OT Total Time (min): 24 min    Billable Minutes:Self Care/Home Management 5  Therapeutic Activity 19    OT/CHANDLER: OT     Number of CHANDLER visits since last OT visit: 0    3/16/2023

## 2023-03-16 NOTE — ASSESSMENT & PLAN NOTE
Diffuse myalgias and arthralgias  Possible due to acute infection  IVF in ED  CPK normal  PRN Cunningham  D/c today and started on gabapentin   PT/OT ordered---she wants to go to a nursing home for skilled if she doesn't get better.  If unable to work with therapy nursing home is an alternative.

## 2023-03-16 NOTE — PROGRESS NOTES
[facility] S[facility] ([facility])  [facility] Medicine  Progress Note    Patient Name: Hedy Conway  MRN: [id number]  Patient Class: IP- Inpatient   Admission Date: 3/13/2023  Length of Stay: 3 days  Attending Physician: Uche Blum MD  Primary Care Provider: John Lantigua PA-C        Subjective:     Principal Problem:Sepsis        HPI:  Hedy Conway is a 47 y.o. female with PMH: Non-Hodgkin's Lymphoma (2011), DM on metformin, HTN, Arthritis, COPD who was sent to the ED by PCP for further eval of abnormal lab work.   Patient reports that last Friday night (03/03) she went to bed in her normal state of health, woke up the next morning (03/04) and could not walk due to L thigh pain. She went to [facility] ED and was dx with a pulled muscle (Sat). She also reports subjective fever with tmax of 101.3F for three days while home. She followed up with her PCP-John Lantigua, on Wed (03/08) and was treated symptomatically and told to return if symptoms worsen. She went to bed Wed evening; felt a pop in her R knee and now with R wrist pain prompting ED visit at [facility] on 03/09/23. She was again dx with muscle pain.   She was seen by Dr. Mark Anthony gongora, for R knee pain and was told that she possibly has torn cartilage in her knee. She also received a steroid injection in her R wrist. She is awaiting a MRI of her R knee and continues with pain.  Symptoms have progressively worsened, noting that she cannot walk and has been having to wear diapers for the past 5 days. She went back to the ED at [facility] on Friday and was dx with a UTI. She was discharged home with an antibiotic that starts with a C.   Since this time she has had Progressive weakness, fatigue, hallucinations, and night sweats.  Denies abdominal pain, NVD. Denies back pain.   She notes a + productive cough with blood tinged sputum. Denies associated cp or sob.  LBM X 1 week ago>Taking norco for pain at home. Denies rectal bleeding or Black  stool  She presents pale, diaphoretic and tachycardic.    ER work up showed concern for UTI and bibasilar PNA. She does also report cough and NAIR on further questioning. Started on Vanc and Zosyn for sepsis with WBC of 21K. Initial LA elevated, resolved with IVF in ED. Cr improved from 1.1 to 0.8. Interestingly t bili 3.3m alk phos 151. CT showed hepatomegaly. US without CBD dilation and gallbladder surgically removed. Denies abd pain. She is mildly jaundiced on exam. On rounds this afternoon her only complaint is diffuse body and joint pain and requesting pain medication.       Overview/Hospital Course:  Admitted for sepsis secondary to bacteremia and pneumonia.  Currently on IV azithromycin, vancomycin and zosyn.  Hg 6.3 on am labs and patient with weakness/tachycardia.  2 units of pRBC ordered.      Leukocytosis slowly improving.  H/h stable with blood transfusion and currently 8.2.  Deescalate abx; group A strept seen on 1 out of 2 blood cultures.  Sensitive to rocephin.  Will cover for pneumonia with rocephin and azithromycin.  Encouraging patient to participate with therapy.  She has generalized pain.  Gabapentin started.        Past Medical History:   Diagnosis Date    Acute carpal tunnel syndrome of left wrist     Biliary colic     Diabetes mellitus     Encounter for blood transfusion     History of chemotherapy     Incisional hernia     Large cell (diffuse) non-Hodgkin's lymphoma     Stem cells transplant status        Past Surgical History:   Procedure Laterality Date    BREAST BIOPSY Left     lymph node biopsy, benign    BREAST SURGERY      CHOLECYSTECTOMY      COLD KNIFE CONIZATION OF CERVIX N/A 3/8/2021    Procedure: CONE BIOPSY, CERVIX, USING COLD KNIFE;  Surgeon: Evelyn Wheeler MD;  Location: [facility] OR;  Service: OB/GYN;  Laterality: N/A;    COLONOSCOPY N/A 12/21/2022    Procedure: COLONOSCOPY;  Surgeon: Annamarie Jane MD;  Location: [facility];  Service: Endoscopy;  Laterality: N/A;     HERNIA REPAIR      incisional    LUNG BIOPSY      lymphnode biopsy      MEDIPORT REMOVAL Left 1/30/2020    Procedure: REMOVAL, CATHETER, CENTRAL VENOUS, TUNNELED, WITH PORT;  Surgeon: Luis Bogran-Reyes, MD;  Location: [facility] OR;  Service: General;  Laterality: Left;    PORTACATH PLACEMENT Left     REVISION OF SCAR  6/22/2021    Procedure: REVISION, SCAR;  Surgeon: Otis Grimes MD;  Location: [facility] OR 2ND FLR;  Service: General;;    TUBAL LIGATION      UMBILICAL HERNIA REPAIR         Review of patient's allergies indicates:   Allergen Reactions    Tetanus vaccines and toxoid Rash       No current facility-administered medications on file prior to encounter.     Current Outpatient Medications on File Prior to Encounter   Medication Sig    budesonide (PULMICORT) 0.5 mg/2 mL nebulizer solution USE 1 VIAL IN NEBULIZER TWICE DAILY (OK TO MIX WITH ALBUTEROL. RINSE MOUTH AFTER USE)    celecoxib (CELEBREX) 200 MG capsule Take 200 mg by mouth once daily.    conjugated estrogens (PREMARIN) vaginal cream Place 0.5 g vaginally once daily. Place 0.5 vaginally for 4 weeks, then transition to twice weekly use.    cyclobenzaprine (FLEXERIL) 10 MG tablet Take 10 mg by mouth every 8 (eight) hours as needed.    fluticasone (FLONASE) 50 mcg/actuation nasal spray 1 spray by Each Nare route once daily.    HYDROcodone-acetaminophen (NORCO) 5-325 mg per tablet Take 1 tablet by mouth every 6 (six) hours as needed.    metFORMIN (GLUCOPHAGE) 500 MG tablet Take 500 mg by mouth once daily.    montelukast (SINGULAIR) 10 mg tablet Take 10 mg by mouth once daily.    naproxen (NAPROSYN) 500 MG tablet Take 500 mg by mouth 2 (two) times daily as needed.    ondansetron (ZOFRAN) 4 MG tablet Take 4 mg by mouth every 8 (eight) hours as needed.    pantoprazole (PROTONIX) 40 MG tablet Take 1 tablet (40 mg total) by mouth once daily.    zinc sulfate (ZINCATE) 220 (50) mg capsule Take 220 mg by mouth 3 (three) times daily.     albuterol (PROVENTIL/VENTOLIN HFA) 90 mcg/actuation inhaler Ventolin HFA 90 mcg/actuation aerosol inhaler   INHALE 2 PUFFS BY MOUTH 3 TIMES DAILY AS NEEDED.    albuterol-ipratropium (DUO-NEB) 2.5 mg-0.5 mg/3 mL nebulizer solution ipratropium 0.5 mg-albuterol 3 mg (2.5 mg base)/3 mL nebulization soln   USE 1 VIAL IN NEBULIZER EVERY 6 TO 8 HOURS AS NEEDED    blood sugar diagnostic Strp USE TO CHECK BLOOD SUGAR TWICE A DAY    budesonide (PULMICORT) 0.5 mg/2 mL nebulizer solution budesonide 0.5 mg/2 mL suspension for nebulization   USE 1 VIAL IN NEBULIZER TWICE DAILY (OK TO MIX WITH ALBUTEROL. RINSE MOUTH AFTER USE)    melatonin 10 mg Cap Take 10 mg by mouth nightly as needed.     Family History       Problem Relation (Age of Onset)    Breast cancer Maternal Aunt, Maternal Aunt    Cancer Paternal Grandmother    Colon cancer Paternal Grandmother    Diabetes Mother, Father    Heart block Mother    Heart failure Father    Hypertension Mother, Father    Kidney disease Mother    Lung cancer Father          Tobacco Use    Smoking status: Never    Smokeless tobacco: Never   Substance and Sexual Activity    Alcohol use: Not Currently    Drug use: No    Sexual activity: Yes     Partners: Male     Birth control/protection: See Surgical Hx     Comment: with one partner for 24 years     Review of Systems   Constitutional:  Positive for fatigue and fever. Negative for activity change and unexpected weight change.   HENT:  Negative for congestion, ear pain, hearing loss, rhinorrhea, sore throat and tinnitus.    Eyes:  Negative for pain, redness and visual disturbance.   Respiratory:  Positive for cough (productive green sputum) and shortness of breath (NAIR). Negative for wheezing.    Cardiovascular:  Positive for leg swelling. Negative for chest pain and palpitations.   Gastrointestinal:  Positive for constipation (1 week since last BM). Negative for abdominal pain, blood in stool, diarrhea, nausea and vomiting.    Genitourinary:  Negative for dysuria, frequency, pelvic pain and urgency.        Dark colored urine but denies dysuria   Musculoskeletal:  Positive for arthralgias and myalgias. Negative for back pain, joint swelling and neck pain.   Skin:  Negative for color change, rash and wound.   Neurological:  Negative for dizziness, tremors, weakness, light-headedness and headaches.   Objective:     Vital Signs (Most Recent):  Temp: 99.8 °F (37.7 °C) (03/16/23 1125)  Pulse: (!) 113 (03/16/23 1200)  Resp: (!) 22 (03/16/23 1125)  BP: (!) 119/59 (03/16/23 1125)  SpO2: (!) 94 % (03/16/23 1125)   Vital Signs (24h Range):  Temp:  [98 °F (36.7 °C)-99.8 °F (37.7 °C)] 99.8 °F (37.7 °C)  Pulse:  [] 113  Resp:  [16-40] 22  SpO2:  [92 %-97 %] 94 %  BP: (119-136)/(58-72) 119/59     Weight: (!) 136.4 kg (300 lb 11.3 oz)  Body mass index is 47.1 kg/m².    Physical Exam  Vitals and nursing note reviewed.   Constitutional:       General: She is not in acute distress.     Appearance: She is well-developed. She is obese.   HENT:      Head: Normocephalic and atraumatic.      Right Ear: External ear normal.      Left Ear: External ear normal.      Nose: Nose normal.      Mouth/Throat:      Pharynx: Posterior oropharyngeal erythema (but no edema.  Pt states this happens to her intermittently) present.   Eyes:      General: No scleral icterus.        Right eye: No discharge.         Left eye: No discharge.      Extraocular Movements: Extraocular movements intact.      Conjunctiva/sclera: Conjunctivae normal.      Pupils: Pupils are equal, round, and reactive to light.   Neck:      Thyroid: No thyromegaly.   Cardiovascular:      Rate and Rhythm: Normal rate and regular rhythm.      Heart sounds: No murmur heard.  Pulmonary:      Effort: Pulmonary effort is normal. No respiratory distress.      Breath sounds: Rhonchi (soft rhonchi b/l bases) present. No wheezing.   Abdominal:      General: Bowel sounds are normal. There is no distension.       Palpations: Abdomen is soft.      Tenderness: There is no abdominal tenderness.   Musculoskeletal:      Right lower leg: Edema present.      Left lower leg: Edema present.   Skin:     General: Skin is warm and dry.      Coloration: Skin is jaundiced.   Neurological:      Mental Status: She is alert and oriented to person, place, and time.      Cranial Nerves: No cranial nerve deficit.   Psychiatric:         Behavior: Behavior normal.         Thought Content: Thought content normal.         CRANIAL NERVES     CN III, IV, VI   Pupils are equal, round, and reactive to light.     Significant Labs: All pertinent labs within the past 24 hours have been reviewed.  Blood Culture:   No results for input(s): LABBLOO in the last 48 hours.    CBC:   Recent Labs   Lab 03/15/23  0555 03/16/23  0606   WBC 14.43* 14.66*   HGB 6.3* 8.2*   HCT 21.3* 26.7*    197       CMP:   Recent Labs   Lab 03/15/23  0555 03/16/23  0606   * 131*   K 4.6 5.1    101   CO2 21* 21*   * 231*   BUN 24* 18   CREATININE 0.7 0.7   CALCIUM 8.7 9.0   PROT 4.6* 5.0*   ALBUMIN 1.3* 1.5*   BILITOT 2.1* 2.5*   ALKPHOS 123 115   AST 19 16   ALT 19 20   ANIONGAP 8 9       Cardiac Markers:   No results for input(s): CKMB, MYOGLOBIN, BNP, TROPISTAT in the last 48 hours.    Lactic Acid:   No results for input(s): LACTATE in the last 48 hours.    Troponin:   No results for input(s): TROPONINI, TROPONINIHS in the last 48 hours.    Urine Culture: No results for input(s): LABURIN in the last 48 hours.    Urine Studies:   No results for input(s): COLORU, APPEARANCEUA, PHUR, SPECGRAV, PROTEINUA, GLUCUA, KETONESU, BILIRUBINUA, OCCULTUA, NITRITE, UROBILINOGEN, LEUKOCYTESUR, RBCUA, WBCUA, BACTERIA, SQUAMEPITHEL, HYALINECASTS in the last 48 hours.    Invalid input(s): WRIGHTSUR      Significant Imaging: I have reviewed all pertinent imaging results/findings within the past 24 hours.  Abdominal u/s:  Surgically removed gallbladder with no biliary  dilatation.  Enlarged heterogeneous liver.  Splenomegaly     CT AP  1. Bibasilar patchy alveolar infiltrates could represent pneumonia.  Minimal consolidation in the lingula and left lower lobe also.  Recommend follow-up.  2. Hepatosplenomegaly.  3. Nonobstructing nephrolithiasis of the left kidney.  4. Few stable mildly enlarged retroperitoneal lymph nodes.    CXR  Suboptimal inspiration.  Cardiac silhouette is within normal limits.  Bibasilar patchy alveolar infiltrates could represent pneumonia.  This is minimally improved from prior study.  No effusion or pneumothorax.     No acute osseous abnormality.  Remote rib fractures on the left.         Assessment/Plan:      * Sepsis  This patient does have evidence of infective focus  My overall impression is sepsis.  Source: Respiratory and Urinary Tract  Antibiotics given-   Antibiotics (72h ago, onward)    Start     Stop Route Frequency Ordered    03/16/23 1200  cefTRIAXone (ROCEPHIN) 1 g/50 mL D5W IVPB         -- IV Every 24 hours (non-standard times) 03/16/23 1138    03/15/23 0915  azithromycin (ZITHROMAX) 250 mg in dextrose 5 % (D5W) 250 mL IVPB         03/19 0914 IV Every 24 hours (non-standard times) 03/15/23 0811        Latest lactate reviewed-  Recent Labs   Lab 03/13/23  1849 03/13/23  2241   LACTATE 1.8 1.7     Organ dysfunction indicated by Thrombocytopenia     Fluid challenge Not needed - patient is not hypotensive      Post- resuscitation assessment No - Post resuscitation assessment not needed       Will Not start Pressors- Levophed for MAP of 65  Source control achieved by: Vanc, Zosyn and azithromycin   IVF resuscitation already occurred in ED    Resolved and descalate abx 3/16    Myalgia  Diffuse myalgias and arthralgias  Possible due to acute infection  IVF in ED  CPK normal  PRN [place]ano  D/c today and started on gabapentin   PT/OT ordered---she wants to go to a nursing home for skilled if she doesn't get better.  If unable to work with therapy nursing  home is an alternative.        Type 2 diabetes mellitus without complication, without long-term current use of insulin  Patient's FSGs are uncontrolled due to hyperglycemia on current medication regimen.  Last A1c reviewed-   Lab Results   Component Value Date    HGBA1C 6.4 (H) 03/15/2023     Most recent fingerstick glucose reviewed-   Recent Labs   Lab 03/15/23  1621 03/15/23  1946 03/16/23  0733 03/16/23  1122   POCTGLUCOSE 261* 310* 241* 305*     Current correctional scale  Medium  Maintain anti-hyperglycemic dose as follows-   Antihyperglycemics (From admission, onward)    Start     Stop Route Frequency Ordered    03/15/23 2054  insulin aspart U-100 pen 0-15 Units         -- SubQ Before meals & nightly PRN 03/15/23 1955        Hold Oral hypoglycemics while patient is in the hospital.    Acute cystitis without hematuria  UA concnerning for infection  Urine and blood cx sent and pending  Cont broad spectrum antibx for now        Hyperbilirubinemia  Unclear etiology  Imaging is unrevelaing  Repeat lab shows down trending       Microcytic anemia  H/H trended down  No evidence of acute bleeding but stool for occult ordered (currently with constipation)  Daily labs  Iron level low but will defer IV iron in acute infection.  Ferrous sulfate ordered.     This is a chronic problem for pt and she receives IV injections (unsure name)    Stable today 3/16        Streptococcal bacteremia  Suspect related to PNA  Sensitive to rocephin  D/c vanc and zosyn       Pneumonia of both lungs due to infectious organism  Noted on imaging  No hypoxia  Nebs scheduled  Deescalate abx to IV rocephin and IV azithromycin      Thrombocytopenia  Has had in the pastt as well but currently in relation to sepsis  Daily labs      Large cell (diffuse) non-Hodgkin's lymphoma  Diagnose In 2011         VTE Risk Mitigation (From admission, onward)         Ordered     IP VTE HIGH RISK PATIENT  Once         03/14/23 1046     Place sequential compression  device  Until discontinued         03/14/23 1046                Discharge Planning   JESSE:      Code Status: Full Code   Is the patient medically ready for discharge?:     Reason for patient still in hospital (select all that apply): Patient trending condition  Discharge Plan A: Home with family, Home Health   Discharge Delays: None known at this time              Saira Solitario PA-C  Department of Hospital Medicine   [place] [facility] ([facility])

## 2023-03-16 NOTE — SUBJECTIVE & OBJECTIVE
Past Medical History:   Diagnosis Date    Acute carpal tunnel syndrome of left wrist     Biliary colic     Diabetes mellitus     Encounter for blood transfusion     History of chemotherapy     Incisional hernia     Large cell (diffuse) non-Hodgkin's lymphoma     Stem cells transplant status        Past Surgical History:   Procedure Laterality Date    BREAST BIOPSY Left     lymph node biopsy, benign    BREAST SURGERY      CHOLECYSTECTOMY      COLD KNIFE CONIZATION OF CERVIX N/A 3/8/2021    Procedure: CONE BIOPSY, CERVIX, USING COLD KNIFE;  Surgeon: Evelyn Wheeler MD;  Location: [facility] OR;  Service: OB/GYN;  Laterality: N/A;    COLONOSCOPY N/A 12/21/2022    Procedure: COLONOSCOPY;  Surgeon: Annamarie Jane MD;  Location: [facility];  Service: Endoscopy;  Laterality: N/A;    HERNIA REPAIR      incisional    LUNG BIOPSY      lymphnode biopsy      MEDIPORT REMOVAL Left 1/30/2020    Procedure: REMOVAL, CATHETER, CENTRAL VENOUS, TUNNELED, WITH PORT;  Surgeon: Luis Bogran-Reyes, MD;  Location: [facility] OR;  Service: General;  Laterality: Left;    PORTACATH PLACEMENT Left     REVISION OF SCAR  6/22/2021    Procedure: REVISION, SCAR;  Surgeon: Otis Grimes MD;  Location: [address];  Service: General;;    TUBAL LIGATION      UMBILICAL HERNIA REPAIR         Review of patient's allergies indicates:   Allergen Reactions    Tetanus vaccines and toxoid Rash       No current facility-administered medications on file prior to encounter.     Current Outpatient Medications on File Prior to Encounter   Medication Sig    budesonide (PULMICORT) 0.5 mg/2 mL nebulizer solution USE 1 VIAL IN NEBULIZER TWICE DAILY (OK TO MIX WITH ALBUTEROL. RINSE MOUTH AFTER USE)    celecoxib (CELEBREX) 200 MG capsule Take 200 mg by mouth once daily.    conjugated estrogens (PREMARIN) vaginal cream Place 0.5 g vaginally once daily. Place 0.5 vaginally for 4 weeks, then transition to twice weekly use.    cyclobenzaprine (FLEXERIL) 10 MG tablet Take 10  mg by mouth every 8 (eight) hours as needed.    fluticasone (FLONASE) 50 mcg/actuation nasal spray 1 spray by Each Nare route once daily.    HYDROcodone-acetaminophen (NORCO) 5-325 mg per tablet Take 1 tablet by mouth every 6 (six) hours as needed.    metFORMIN (GLUCOPHAGE) 500 MG tablet Take 500 mg by mouth once daily.    montelukast (SINGULAIR) 10 mg tablet Take 10 mg by mouth once daily.    naproxen (NAPROSYN) 500 MG tablet Take 500 mg by mouth 2 (two) times daily as needed.    ondansetron (ZOFRAN) 4 MG tablet Take 4 mg by mouth every 8 (eight) hours as needed.    pantoprazole (PROTONIX) 40 MG tablet Take 1 tablet (40 mg total) by mouth once daily.    zinc sulfate (ZINCATE) 220 (50) mg capsule Take 220 mg by mouth 3 (three) times daily.    albuterol (PROVENTIL/VENTOLIN HFA) 90 mcg/actuation inhaler Ventolin HFA 90 mcg/actuation aerosol inhaler   INHALE 2 PUFFS BY MOUTH 3 TIMES DAILY AS NEEDED.    albuterol-ipratropium (DUO-NEB) 2.5 mg-0.5 mg/3 mL nebulizer solution ipratropium 0.5 mg-albuterol 3 mg (2.5 mg base)/3 mL nebulization soln   USE 1 VIAL IN NEBULIZER EVERY 6 TO 8 HOURS AS NEEDED    blood sugar diagnostic Strp USE TO CHECK BLOOD SUGAR TWICE A DAY    budesonide (PULMICORT) 0.5 mg/2 mL nebulizer solution budesonide 0.5 mg/2 mL suspension for nebulization   USE 1 VIAL IN NEBULIZER TWICE DAILY (OK TO MIX WITH ALBUTEROL. RINSE MOUTH AFTER USE)    melatonin 10 mg Cap Take 10 mg by mouth nightly as needed.     Family History       Problem Relation (Age of Onset)    Breast cancer Maternal Aunt, Maternal Aunt    Cancer Paternal Grandmother    Colon cancer Paternal Grandmother    Diabetes Mother, Father    Heart block Mother    Heart failure Father    Hypertension Mother, Father    Kidney disease Mother    Lung cancer Father          Tobacco Use    Smoking status: Never    Smokeless tobacco: Never   Substance and Sexual Activity    Alcohol use: Not Currently    Drug use: No    Sexual activity: Yes     Partners:  Male     Birth control/protection: See Surgical Hx     Comment: with one partner for 24 years     Review of Systems   Constitutional:  Positive for fatigue and fever. Negative for activity change and unexpected weight change.   HENT:  Negative for congestion, ear pain, hearing loss, rhinorrhea, sore throat and tinnitus.    Eyes:  Negative for pain, redness and visual disturbance.   Respiratory:  Positive for cough (productive green sputum) and shortness of breath (NAIR). Negative for wheezing.    Cardiovascular:  Positive for leg swelling. Negative for chest pain and palpitations.   Gastrointestinal:  Positive for constipation (1 week since last BM). Negative for abdominal pain, blood in stool, diarrhea, nausea and vomiting.   Genitourinary:  Negative for dysuria, frequency, pelvic pain and urgency.        Dark colored urine but denies dysuria   Musculoskeletal:  Positive for arthralgias and myalgias. Negative for back pain, joint swelling and neck pain.   Skin:  Negative for color change, rash and wound.   Neurological:  Negative for dizziness, tremors, weakness, light-headedness and headaches.   Objective:     Vital Signs (Most Recent):  Temp: 99.8 °F (37.7 °C) (03/16/23 1125)  Pulse: (!) 113 (03/16/23 1200)  Resp: (!) 22 (03/16/23 1125)  BP: (!) 119/59 (03/16/23 1125)  SpO2: (!) 94 % (03/16/23 1125)   Vital Signs (24h Range):  Temp:  [98 °F (36.7 °C)-99.8 °F (37.7 °C)] 99.8 °F (37.7 °C)  Pulse:  [] 113  Resp:  [16-40] 22  SpO2:  [92 %-97 %] 94 %  BP: (119-136)/(58-72) 119/59     Weight: (!) 136.4 kg (300 lb 11.3 oz)  Body mass index is 47.1 kg/m².    Physical Exam  Vitals and nursing note reviewed.   Constitutional:       General: She is not in acute distress.     Appearance: She is well-developed. She is obese.   HENT:      Head: Normocephalic and atraumatic.      Right Ear: External ear normal.      Left Ear: External ear normal.      Nose: Nose normal.      Mouth/Throat:      Pharynx: Posterior  oropharyngeal erythema (but no edema.  Pt states this happens to her intermittently) present.   Eyes:      General: No scleral icterus.        Right eye: No discharge.         Left eye: No discharge.      Extraocular Movements: Extraocular movements intact.      Conjunctiva/sclera: Conjunctivae normal.      Pupils: Pupils are equal, round, and reactive to light.   Neck:      Thyroid: No thyromegaly.   Cardiovascular:      Rate and Rhythm: Normal rate and regular rhythm.      Heart sounds: No murmur heard.  Pulmonary:      Effort: Pulmonary effort is normal. No respiratory distress.      Breath sounds: Rhonchi (soft rhonchi b/l bases) present. No wheezing.   Abdominal:      General: Bowel sounds are normal. There is no distension.      Palpations: Abdomen is soft.      Tenderness: There is no abdominal tenderness.   Musculoskeletal:      Right lower leg: Edema present.      Left lower leg: Edema present.   Skin:     General: Skin is warm and dry.      Coloration: Skin is jaundiced.   Neurological:      Mental Status: She is alert and oriented to person, place, and time.      Cranial Nerves: No cranial nerve deficit.   Psychiatric:         Behavior: Behavior normal.         Thought Content: Thought content normal.         CRANIAL NERVES     CN III, IV, VI   Pupils are equal, round, and reactive to light.     Significant Labs: All pertinent labs within the past 24 hours have been reviewed.  Blood Culture:   No results for input(s): LABBLOO in the last 48 hours.    CBC:   Recent Labs   Lab 03/15/23  0555 03/16/23  0606   WBC 14.43* 14.66*   HGB 6.3* 8.2*   HCT 21.3* 26.7*    197       CMP:   Recent Labs   Lab 03/15/23  0555 03/16/23  0606   * 131*   K 4.6 5.1    101   CO2 21* 21*   * 231*   BUN 24* 18   CREATININE 0.7 0.7   CALCIUM 8.7 9.0   PROT 4.6* 5.0*   ALBUMIN 1.3* 1.5*   BILITOT 2.1* 2.5*   ALKPHOS 123 115   AST 19 16   ALT 19 20   ANIONGAP 8 9       Cardiac Markers:   No results for  input(s): CKMB, MYOGLOBIN, BNP, TROPISTAT in the last 48 hours.    Lactic Acid:   No results for input(s): LACTATE in the last 48 hours.    Troponin:   No results for input(s): TROPONINI, TROPONINIHS in the last 48 hours.    Urine Culture: No results for input(s): LABURIN in the last 48 hours.    Urine Studies:   No results for input(s): COLORU, APPEARANCEUA, PHUR, SPECGRAV, PROTEINUA, GLUCUA, KETONESU, BILIRUBINUA, OCCULTUA, NITRITE, UROBILINOGEN, LEUKOCYTESUR, RBCUA, WBCUA, BACTERIA, SQUAMEPITHEL, HYALINECASTS in the last 48 hours.    Invalid input(s): WRIGHTSUR      Significant Imaging: I have reviewed all pertinent imaging results/findings within the past 24 hours.  Abdominal u/s:  Surgically removed gallbladder with no biliary dilatation.  Enlarged heterogeneous liver.  Splenomegaly     CT AP  1. Bibasilar patchy alveolar infiltrates could represent pneumonia.  Minimal consolidation in the lingula and left lower lobe also.  Recommend follow-up.  2. Hepatosplenomegaly.  3. Nonobstructing nephrolithiasis of the left kidney.  4. Few stable mildly enlarged retroperitoneal lymph nodes.    CXR  Suboptimal inspiration.  Cardiac silhouette is within normal limits.  Bibasilar patchy alveolar infiltrates could represent pneumonia.  This is minimally improved from prior study.  No effusion or pneumothorax.     No acute osseous abnormality.  Remote rib fractures on the left.

## 2023-03-16 NOTE — PLAN OF CARE
Problem: Adult Inpatient Plan of Care  Goal: Plan of Care Review  Outcome: Ongoing, Progressing  Flowsheets (Taken 3/16/2023 4422)  Plan of Care Reviewed With: patient  Goal: Absence of Hospital-Acquired Illness or Injury  Outcome: Ongoing, Progressing  Goal: Optimal Comfort and Wellbeing  Outcome: Ongoing, Progressing     Problem: Bariatric Environmental Safety  Goal: Safety Maintained with Care  Outcome: Ongoing, Progressing     Problem: Skin Injury Risk Increased  Goal: Skin Health and Integrity  Outcome: Ongoing, Progressing     Problem: Adjustment to Illness (Sepsis/Septic Shock)  Goal: Optimal Coping  Outcome: Ongoing, Progressing     Problem: Bleeding (Sepsis/Septic Shock)  Goal: Absence of Bleeding  Outcome: Ongoing, Progressing     Problem: Glycemic Control Impaired (Sepsis/Septic Shock)  Goal: Blood Glucose Level Within Desired Range  Outcome: Ongoing, Progressing     Problem: Infection Progression (Sepsis/Septic Shock)  Goal: Absence of Infection Signs and Symptoms  Outcome: Ongoing, Progressing     Problem: Nutrition Impaired (Sepsis/Septic Shock)  Goal: Optimal Nutrition Intake  Outcome: Ongoing, Progressing     Problem: Fluid Imbalance (Pneumonia)  Goal: Fluid Balance  Outcome: Ongoing, Progressing     Problem: Infection (Pneumonia)  Goal: Resolution of Infection Signs and Symptoms  Outcome: Ongoing, Progressing     Problem: Respiratory Compromise (Pneumonia)  Goal: Effective Oxygenation and Ventilation  Outcome: Ongoing, Progressing     Problem: Diabetes Comorbidity  Goal: Blood Glucose Level Within Targeted Range  Outcome: Ongoing, Progressing     Problem: Impaired Wound Healing  Goal: Optimal Wound Healing  Outcome: Ongoing, Progressing     Problem: Fall Injury Risk  Goal: Absence of Fall and Fall-Related Injury  Outcome: Ongoing, Progressing     Problem: Pain Acute  Goal: Acceptable Pain Control and Functional Ability  Outcome: Ongoing, Progressing     Problem: Anemia  Goal: Anemia Symptom  Improvement  Outcome: Ongoing, Progressing

## 2023-03-16 NOTE — ASSESSMENT & PLAN NOTE
Patient's FSGs are uncontrolled due to hyperglycemia on current medication regimen.  Last A1c reviewed-   Lab Results   Component Value Date    HGBA1C 6.4 (H) 03/15/2023     Most recent fingerstick glucose reviewed-   Recent Labs   Lab 03/15/23  1621 03/15/23  1946 03/16/23  0733 03/16/23  1122   POCTGLUCOSE 261* 310* 241* 305*     Current correctional scale  Medium  Maintain anti-hyperglycemic dose as follows-   Antihyperglycemics (From admission, onward)    Start     Stop Route Frequency Ordered    03/15/23 2054  insulin aspart U-100 pen 0-15 Units         -- SubQ Before meals & nightly PRN 03/15/23 1955        Hold Oral hypoglycemics while patient is in the hospital.

## 2023-03-17 PROBLEM — J44.9 COPD (CHRONIC OBSTRUCTIVE PULMONARY DISEASE): Status: ACTIVE | Noted: 2023-03-17

## 2023-03-17 LAB
ANION GAP SERPL CALC-SCNC: 11 MMOL/L (ref 8–16)
ANISOCYTOSIS BLD QL SMEAR: SLIGHT
BASOPHILS # BLD AUTO: ABNORMAL K/UL (ref 0–0.2)
BASOPHILS NFR BLD: 0 % (ref 0–1.9)
BUN SERPL-MCNC: 16 MG/DL (ref 6–20)
CALCIUM SERPL-MCNC: 8.9 MG/DL (ref 8.7–10.5)
CHLORIDE SERPL-SCNC: 101 MMOL/L (ref 95–110)
CK SERPL-CCNC: 10 U/L (ref 20–180)
CO2 SERPL-SCNC: 21 MMOL/L (ref 23–29)
CREAT SERPL-MCNC: 0.6 MG/DL (ref 0.5–1.4)
CRP SERPL-MCNC: 217.6 MG/L (ref 0–8.2)
DACRYOCYTES BLD QL SMEAR: ABNORMAL
DIFFERENTIAL METHOD: ABNORMAL
EOSINOPHIL # BLD AUTO: ABNORMAL K/UL (ref 0–0.5)
EOSINOPHIL NFR BLD: 2 % (ref 0–8)
ERYTHROCYTE [DISTWIDTH] IN BLOOD BY AUTOMATED COUNT: 18 % (ref 11.5–14.5)
ERYTHROCYTE [SEDIMENTATION RATE] IN BLOOD BY WESTERGREN METHOD: 105 MM/HR (ref 0–20)
EST. GFR  (NO RACE VARIABLE): >60 ML/MIN/1.73 M^2
GIANT PLATELETS BLD QL SMEAR: PRESENT
GLUCOSE SERPL-MCNC: 213 MG/DL (ref 70–110)
HCT VFR BLD AUTO: 26.5 % (ref 37–48.5)
HGB BLD-MCNC: 8.1 G/DL (ref 12–16)
HYPOCHROMIA BLD QL SMEAR: ABNORMAL
IMM GRANULOCYTES # BLD AUTO: ABNORMAL K/UL (ref 0–0.04)
IMM GRANULOCYTES NFR BLD AUTO: ABNORMAL % (ref 0–0.5)
LYMPHOCYTES # BLD AUTO: ABNORMAL K/UL (ref 1–4.8)
LYMPHOCYTES NFR BLD: 8 % (ref 18–48)
MCH RBC QN AUTO: 24.7 PG (ref 27–31)
MCHC RBC AUTO-ENTMCNC: 30.6 G/DL (ref 32–36)
MCV RBC AUTO: 81 FL (ref 82–98)
MONOCYTES # BLD AUTO: ABNORMAL K/UL (ref 0.3–1)
MONOCYTES NFR BLD: 1 % (ref 4–15)
MYELOCYTES NFR BLD MANUAL: 1 %
NEUTROPHILS NFR BLD: 81 % (ref 38–73)
NEUTS BAND NFR BLD MANUAL: 7 %
NRBC BLD-RTO: 0 /100 WBC
OVALOCYTES BLD QL SMEAR: ABNORMAL
PLATELET # BLD AUTO: 201 K/UL (ref 150–450)
PLATELET BLD QL SMEAR: ABNORMAL
PMV BLD AUTO: 11.3 FL (ref 9.2–12.9)
POCT GLUCOSE: 220 MG/DL (ref 70–110)
POCT GLUCOSE: 220 MG/DL (ref 70–110)
POCT GLUCOSE: 234 MG/DL (ref 70–110)
POCT GLUCOSE: 266 MG/DL (ref 70–110)
POIKILOCYTOSIS BLD QL SMEAR: SLIGHT
POLYCHROMASIA BLD QL SMEAR: ABNORMAL
POTASSIUM SERPL-SCNC: 4.3 MMOL/L (ref 3.5–5.1)
RBC # BLD AUTO: 3.28 M/UL (ref 4–5.4)
SODIUM SERPL-SCNC: 133 MMOL/L (ref 136–145)
TARGETS BLD QL SMEAR: ABNORMAL
TOXIC GRANULES BLD QL SMEAR: PRESENT
WBC # BLD AUTO: 11.12 K/UL (ref 3.9–12.7)

## 2023-03-17 PROCEDURE — 97110 THERAPEUTIC EXERCISES: CPT

## 2023-03-17 PROCEDURE — 94640 AIRWAY INHALATION TREATMENT: CPT

## 2023-03-17 PROCEDURE — 85027 COMPLETE CBC AUTOMATED: CPT | Performed by: STUDENT IN AN ORGANIZED HEALTH CARE EDUCATION/TRAINING PROGRAM

## 2023-03-17 PROCEDURE — 25000003 PHARM REV CODE 250: Performed by: PHYSICIAN ASSISTANT

## 2023-03-17 PROCEDURE — 80048 BASIC METABOLIC PNL TOTAL CA: CPT | Performed by: STUDENT IN AN ORGANIZED HEALTH CARE EDUCATION/TRAINING PROGRAM

## 2023-03-17 PROCEDURE — 11000001 HC ACUTE MED/SURG PRIVATE ROOM

## 2023-03-17 PROCEDURE — 25000242 PHARM REV CODE 250 ALT 637 W/ HCPCS: Performed by: PHYSICIAN ASSISTANT

## 2023-03-17 PROCEDURE — 36415 COLL VENOUS BLD VENIPUNCTURE: CPT | Performed by: STUDENT IN AN ORGANIZED HEALTH CARE EDUCATION/TRAINING PROGRAM

## 2023-03-17 PROCEDURE — 25000242 PHARM REV CODE 250 ALT 637 W/ HCPCS: Performed by: INTERNAL MEDICINE

## 2023-03-17 PROCEDURE — 94761 N-INVAS EAR/PLS OXIMETRY MLT: CPT

## 2023-03-17 PROCEDURE — 85007 BL SMEAR W/DIFF WBC COUNT: CPT | Performed by: STUDENT IN AN ORGANIZED HEALTH CARE EDUCATION/TRAINING PROGRAM

## 2023-03-17 PROCEDURE — 63600175 PHARM REV CODE 636 W HCPCS: Performed by: PHYSICIAN ASSISTANT

## 2023-03-17 PROCEDURE — 36415 COLL VENOUS BLD VENIPUNCTURE: CPT | Performed by: FAMILY MEDICINE

## 2023-03-17 PROCEDURE — 86140 C-REACTIVE PROTEIN: CPT | Performed by: PHYSICIAN ASSISTANT

## 2023-03-17 PROCEDURE — 85651 RBC SED RATE NONAUTOMATED: CPT | Performed by: FAMILY MEDICINE

## 2023-03-17 PROCEDURE — 82550 ASSAY OF CK (CPK): CPT | Performed by: PHYSICIAN ASSISTANT

## 2023-03-17 PROCEDURE — 97110 THERAPEUTIC EXERCISES: CPT | Mod: CO

## 2023-03-17 PROCEDURE — 25000003 PHARM REV CODE 250: Performed by: STUDENT IN AN ORGANIZED HEALTH CARE EDUCATION/TRAINING PROGRAM

## 2023-03-17 PROCEDURE — 36415 COLL VENOUS BLD VENIPUNCTURE: CPT | Performed by: PHYSICIAN ASSISTANT

## 2023-03-17 RX ORDER — FLUTICASONE FUROATE AND VILANTEROL 100; 25 UG/1; UG/1
1 POWDER RESPIRATORY (INHALATION) DAILY
Status: DISCONTINUED | OUTPATIENT
Start: 2023-03-17 | End: 2023-03-21 | Stop reason: HOSPADM

## 2023-03-17 RX ORDER — LEVALBUTEROL INHALATION SOLUTION 0.63 MG/3ML
0.63 SOLUTION RESPIRATORY (INHALATION) EVERY 8 HOURS PRN
Status: DISCONTINUED | OUTPATIENT
Start: 2023-03-17 | End: 2023-03-21 | Stop reason: HOSPADM

## 2023-03-17 RX ORDER — MONTELUKAST SODIUM 10 MG/1
10 TABLET ORAL DAILY
Status: DISCONTINUED | OUTPATIENT
Start: 2023-03-17 | End: 2023-03-21 | Stop reason: HOSPADM

## 2023-03-17 RX ADMIN — INSULIN ASPART 9 UNITS: 100 INJECTION, SOLUTION INTRAVENOUS; SUBCUTANEOUS at 11:03

## 2023-03-17 RX ADMIN — MONTELUKAST 10 MG: 10 TABLET, FILM COATED ORAL at 03:03

## 2023-03-17 RX ADMIN — FLUTICASONE FUROATE AND VILANTEROL TRIFENATATE 1 PUFF: 100; 25 POWDER RESPIRATORY (INHALATION) at 03:03

## 2023-03-17 RX ADMIN — INSULIN ASPART 6 UNITS: 100 INJECTION, SOLUTION INTRAVENOUS; SUBCUTANEOUS at 08:03

## 2023-03-17 RX ADMIN — INSULIN DETEMIR 5 UNITS: 100 INJECTION, SOLUTION SUBCUTANEOUS at 08:03

## 2023-03-17 RX ADMIN — DOCUSATE SODIUM 100 MG: 100 CAPSULE, LIQUID FILLED ORAL at 08:03

## 2023-03-17 RX ADMIN — GABAPENTIN 100 MG: 100 CAPSULE ORAL at 03:03

## 2023-03-17 RX ADMIN — IPRATROPIUM BROMIDE AND ALBUTEROL SULFATE 3 ML: 2.5; .5 SOLUTION RESPIRATORY (INHALATION) at 07:03

## 2023-03-17 RX ADMIN — INSULIN ASPART 6 UNITS: 100 INJECTION, SOLUTION INTRAVENOUS; SUBCUTANEOUS at 04:03

## 2023-03-17 RX ADMIN — GABAPENTIN 100 MG: 100 CAPSULE ORAL at 08:03

## 2023-03-17 RX ADMIN — CEFTRIAXONE 1 G: 1 INJECTION, SOLUTION INTRAVENOUS at 11:03

## 2023-03-17 RX ADMIN — FERROUS SULFATE TAB 325 MG (65 MG ELEMENTAL FE) 1 EACH: 325 (65 FE) TAB at 08:03

## 2023-03-17 RX ADMIN — INSULIN ASPART 4 UNITS: 100 INJECTION, SOLUTION INTRAVENOUS; SUBCUTANEOUS at 08:03

## 2023-03-17 RX ADMIN — ACETAMINOPHEN 500 MG: 500 TABLET ORAL at 04:03

## 2023-03-17 RX ADMIN — AZITHROMYCIN MONOHYDRATE 250 MG: 500 INJECTION, POWDER, LYOPHILIZED, FOR SOLUTION INTRAVENOUS at 09:03

## 2023-03-17 NOTE — PLAN OF CARE
03/17/23 1004   Post-Acute Status   Post-Acute Authorization Placement   Post-Acute Placement Status Referrals Sent   Discharge Delays None known at this time         Patient remains awaiting SNF placement. Referrals have been sent to the following facilities:     [facility]-- Declined   [facility]--Declined (Care Needs Exceed Current Capacity)   [facility]--Declined (Patient prior level of functioning is bed bound and currently bed bound)   [facility]--[facility] of [facility] [place]-- Under Review (Verifying benefits)  [facility]--Referral received  [facility]   Tiana KUMAR to follow-up with remaining nursing facilities that have yet to respond. Will update this note as responses are received.     1448--LOCET and PASRR completed. 142 received.

## 2023-03-17 NOTE — PT/OT/SLP PROGRESS
Occupational Therapy   Treatment    Name: Hedy Conway  MRN: [id number]  Admitting Diagnosis:  Sepsis       Recommendations:     Discharge Recommendations: nursing facility, basic, nursing facility, skilled  Discharge Equipment Recommendations:  hospital bed, wheelchair, lift device  Barriers to discharge:  Inaccessible home environment, Decreased caregiver support    Assessment:     Hedy Conway is a 47 y.o. female with a medical diagnosis of Sepsis.   Performance deficits affecting function are weakness, impaired endurance, impaired self care skills, impaired functional mobility, gait instability, decreased upper extremity function, decreased lower extremity function, pain, decreased ROM, impaired joint extensibility.     Rehab Prognosis:  Fair; patient would benefit from acute skilled OT services to address these deficits and reach maximum level of function.       Plan:     Patient to be seen 5 x/week to address the above listed problems via self-care/home management, therapeutic activities, therapeutic exercises  Plan of Care Expires:    Plan of Care Reviewed with: patient, spouse    Subjective     Pain/Comfort:  Pain Rating 1: 10/10  Location - Side 1: Right  Location - Orientation 1: generalized  Location 1: hand  Pain Addressed 1: Cessation of Activity, Nurse notified, Reposition  Pain Rating Post-Intervention 1: 10/10    Objective:     Communicated with: RN prior to session.  Patient found HOB elevated with PureWick, peripheral IV upon OT entry to room.    General Precautions: Standard, fall    Orthopedic Precautions:N/A  Braces: N/A  Respiratory Status: Room air     Occupational Performance:     Bed Mobility:     Patient completed Rolling/Turning to Left with  maximal assistance x 2  Patient completed Rolling/Turning to Right with maximal assistance x 2      AMPAC 6 Click ADL: 11    Treatment & Education:  Pt required Max A x 2 to roll to R and L sides due to c/o pain in legs, arms. Pt required  verbal and tactile cues for hand placement on bedside rails to assist with rolling.  Pt was unable to move hips due to pain. Pt c/o pain in R hand with some swelling evident. Pt was educated on the use of retrograde massage to help with swelling and pain/sensitivity and was able to perform task with verbal cues and encouragement by using lotion to massage R hand with L hand. Pt was instructed in B UE there ex of shoulder flex/ext, ab/adduction and bicep flex/extension( 2 sets of 5 reps), B hand AROM to make composite fist to increase strength and to reduce swelling and hypersensitivity of R hand 2 sets of 10 reps. Pt required some assistance for shoulder flex/ext exercises to reach full range or L shoulder due to what patient describes as an old injury.     Patient left HOB elevated with all lines intact, call button in reach, bed alarm on, RN notified, and  present    GOALS:   Multidisciplinary Problems       Occupational Therapy Goals          Problem: Occupational Therapy    Goal Priority Disciplines Outcome Interventions   Occupational Therapy Goal     OT, PT/OT Ongoing, Progressing    Description: Pt to perform level functional transfers required for ADL's with MOD A, RW level.  Pt to participate in bilateral upper extremity strengthening routine for increased strength, range of motion, and performance in ADL.  Pt to perform seated ADL activity for ~ 5 minutes at EOB for improved activity tolerance and reduced caregiver burden upon discharge. (MET)                       Time Tracking:     OT Date of Treatment: 03/17/23  OT Start Time: 1359  OT Stop Time: 1422  OT Total Time (min): 23 min    Billable Minutes:Therapeutic Exercise 23    OT/CHANDLER: CHANDLER     Number of CHANDLER visits since last OT visit: 1    3/17/2023

## 2023-03-17 NOTE — PLAN OF CARE
Problem: Adult Inpatient Plan of Care  Goal: Absence of Hospital-Acquired Illness or Injury  Outcome: Ongoing, Progressing     Problem: Adult Inpatient Plan of Care  Goal: Optimal Comfort and Wellbeing  Outcome: Ongoing, Progressing     Problem: Bariatric Environmental Safety  Goal: Safety Maintained with Care  Outcome: Ongoing, Progressing     Problem: Diabetes Comorbidity  Goal: Blood Glucose Level Within Targeted Range  Outcome: Ongoing, Progressing     Problem: Pain Acute  Goal: Acceptable Pain Control and Functional Ability  Outcome: Ongoing, Progressing

## 2023-03-17 NOTE — ASSESSMENT & PLAN NOTE
Noted on imaging  No hypoxia  Nebs scheduled  Deescalate abx to IV rocephin and IV azithromycin (completed 4 days)

## 2023-03-17 NOTE — PLAN OF CARE
Problem: Occupational Therapy  Goal: Occupational Therapy Goal  Description: Pt to perform level functional transfers required for ADL's with MOD A, RW level.  Pt to participate in bilateral upper extremity strengthening routine for increased strength, range of motion, and performance in ADL.  Pt to perform seated ADL activity for ~ 5 minutes at EOB for improved activity tolerance and reduced caregiver burden upon discharge. (MET)  Outcome: Ongoing, Progressing   Cont with OT POC

## 2023-03-17 NOTE — SUBJECTIVE & OBJECTIVE
Past Medical History:   Diagnosis Date    Acute carpal tunnel syndrome of left wrist     Biliary colic     Diabetes mellitus     Encounter for blood transfusion     History of chemotherapy     Incisional hernia     Large cell (diffuse) non-Hodgkin's lymphoma     Stem cells transplant status        Past Surgical History:   Procedure Laterality Date    BREAST BIOPSY Left     lymph node biopsy, benign    BREAST SURGERY      CHOLECYSTECTOMY      COLD KNIFE CONIZATION OF CERVIX N/A 3/8/2021    Procedure: CONE BIOPSY, CERVIX, USING COLD KNIFE;  Surgeon: Evelyn Wheeler MD;  Location: [facility] OR;  Service: OB/GYN;  Laterality: N/A;    COLONOSCOPY N/A 12/21/2022    Procedure: COLONOSCOPY;  Surgeon: Annamarie Jane MD;  Location: [facility];  Service: Endoscopy;  Laterality: N/A;    HERNIA REPAIR      incisional    LUNG BIOPSY      lymphnode biopsy      MEDIPORT REMOVAL Left 1/30/2020    Procedure: REMOVAL, CATHETER, CENTRAL VENOUS, TUNNELED, WITH PORT;  Surgeon: Luis Bogran-Reyes, MD;  Location: [facility] OR;  Service: General;  Laterality: Left;    PORTACATH PLACEMENT Left     REVISION OF SCAR  6/22/2021    Procedure: REVISION, SCAR;  Surgeon: Otis Grimes MD;  Location: [address];  Service: General;;    TUBAL LIGATION      UMBILICAL HERNIA REPAIR         Review of patient's allergies indicates:   Allergen Reactions    Tetanus vaccines and toxoid Rash       No current facility-administered medications on file prior to encounter.     Current Outpatient Medications on File Prior to Encounter   Medication Sig    budesonide (PULMICORT) 0.5 mg/2 mL nebulizer solution USE 1 VIAL IN NEBULIZER TWICE DAILY (OK TO MIX WITH ALBUTEROL. RINSE MOUTH AFTER USE)    celecoxib (CELEBREX) 200 MG capsule Take 200 mg by mouth once daily.    conjugated estrogens (PREMARIN) vaginal cream Place 0.5 g vaginally once daily. Place 0.5 vaginally for 4 weeks, then transition to twice weekly use.    cyclobenzaprine (FLEXERIL) 10 MG tablet Take 10  mg by mouth every 8 (eight) hours as needed.    fluticasone (FLONASE) 50 mcg/actuation nasal spray 1 spray by Each Nare route once daily.    HYDROcodone-acetaminophen (NORCO) 5-325 mg per tablet Take 1 tablet by mouth every 6 (six) hours as needed.    metFORMIN (GLUCOPHAGE) 500 MG tablet Take 500 mg by mouth once daily.    montelukast (SINGULAIR) 10 mg tablet Take 10 mg by mouth once daily.    naproxen (NAPROSYN) 500 MG tablet Take 500 mg by mouth 2 (two) times daily as needed.    ondansetron (ZOFRAN) 4 MG tablet Take 4 mg by mouth every 8 (eight) hours as needed.    pantoprazole (PROTONIX) 40 MG tablet Take 1 tablet (40 mg total) by mouth once daily.    zinc sulfate (ZINCATE) 220 (50) mg capsule Take 220 mg by mouth 3 (three) times daily.    albuterol (PROVENTIL/VENTOLIN HFA) 90 mcg/actuation inhaler Ventolin HFA 90 mcg/actuation aerosol inhaler   INHALE 2 PUFFS BY MOUTH 3 TIMES DAILY AS NEEDED.    albuterol-ipratropium (DUO-NEB) 2.5 mg-0.5 mg/3 mL nebulizer solution ipratropium 0.5 mg-albuterol 3 mg (2.5 mg base)/3 mL nebulization soln   USE 1 VIAL IN NEBULIZER EVERY 6 TO 8 HOURS AS NEEDED    blood sugar diagnostic Strp USE TO CHECK BLOOD SUGAR TWICE A DAY    budesonide (PULMICORT) 0.5 mg/2 mL nebulizer solution budesonide 0.5 mg/2 mL suspension for nebulization   USE 1 VIAL IN NEBULIZER TWICE DAILY (OK TO MIX WITH ALBUTEROL. RINSE MOUTH AFTER USE)    melatonin 10 mg Cap Take 10 mg by mouth nightly as needed.     Family History       Problem Relation (Age of Onset)    Breast cancer Maternal Aunt, Maternal Aunt    Cancer Paternal Grandmother    Colon cancer Paternal Grandmother    Diabetes Mother, Father    Heart block Mother    Heart failure Father    Hypertension Mother, Father    Kidney disease Mother    Lung cancer Father          Tobacco Use    Smoking status: Never    Smokeless tobacco: Never   Substance and Sexual Activity    Alcohol use: Not Currently    Drug use: No    Sexual activity: Yes     Partners:  Male     Birth control/protection: See Surgical Hx     Comment: with one partner for 24 years     Review of Systems   Constitutional:  Positive for fatigue. Negative for activity change, fever and unexpected weight change.   HENT:  Negative for congestion, ear pain, hearing loss, rhinorrhea, sore throat and tinnitus.    Eyes:  Negative for pain, redness and visual disturbance.   Respiratory:  Positive for cough (productive green sputum). Negative for shortness of breath (NAIR) and wheezing.    Cardiovascular:  Negative for chest pain, palpitations and leg swelling.   Gastrointestinal:  Negative for abdominal pain, blood in stool, constipation (1 week since last BM), diarrhea, nausea and vomiting.   Genitourinary:  Negative for dysuria, frequency, pelvic pain and urgency.        Dark colored urine but denies dysuria   Musculoskeletal:  Positive for arthralgias and myalgias. Negative for back pain, joint swelling and neck pain.   Skin:  Negative for color change, rash and wound.   Neurological:  Negative for dizziness, tremors, weakness, light-headedness and headaches.   Objective:     Vital Signs (Most Recent):  Temp: 98.7 °F (37.1 °C) (03/17/23 1148)  Pulse: (!) 115 (03/17/23 1356)  Resp: (!) 22 (03/17/23 1148)  BP: 122/72 (03/17/23 1148)  SpO2: 96 % (03/17/23 1148)   Vital Signs (24h Range):  Temp:  [96.9 °F (36.1 °C)-98.7 °F (37.1 °C)] 98.7 °F (37.1 °C)  Pulse:  [105-124] 115  Resp:  [16-28] 22  SpO2:  [93 %-97 %] 96 %  BP: (116-148)/(53-77) 122/72     Weight: (!) 136.4 kg (300 lb 11.3 oz)  Body mass index is 47.1 kg/m².    Physical Exam  Vitals and nursing note reviewed.   Constitutional:       General: She is not in acute distress.     Appearance: She is well-developed. She is obese.   HENT:      Head: Normocephalic and atraumatic.      Right Ear: External ear normal.      Left Ear: External ear normal.      Nose: Nose normal.      Mouth/Throat:      Pharynx: Posterior oropharyngeal erythema (but no edema.  Pt  states this happens to her intermittently) present.   Eyes:      General: No scleral icterus.        Right eye: No discharge.         Left eye: No discharge.      Extraocular Movements: Extraocular movements intact.      Conjunctiva/sclera: Conjunctivae normal.   Neck:      Thyroid: No thyromegaly.   Cardiovascular:      Rate and Rhythm: Normal rate and regular rhythm.      Heart sounds: No murmur heard.  Pulmonary:      Effort: Pulmonary effort is normal. No respiratory distress.      Breath sounds: No wheezing or rhonchi (soft rhonchi b/l bases).   Abdominal:      General: Bowel sounds are normal. There is no distension.      Palpations: Abdomen is soft.      Tenderness: There is no abdominal tenderness.   Musculoskeletal:         General: Swelling (right wrist) present.      Right lower leg: No edema.      Left lower leg: No edema.   Skin:     General: Skin is warm and dry.      Coloration: Skin is not jaundiced.   Neurological:      Mental Status: She is alert and oriented to person, place, and time.      Comments: Neg kernigs/brudzinskis. But cannot bend right knee   Psychiatric:         Behavior: Behavior normal.         Thought Content: Thought content normal.           Significant Labs: All pertinent labs within the past 24 hours have been reviewed.  Blood Culture:   Recent Labs   Lab 03/16/23  0849   LABBLOO No Growth to date  No Growth to date       CBC:   Recent Labs   Lab 03/16/23  0606 03/17/23  0545   WBC 14.66* 11.12   HGB 8.2* 8.1*   HCT 26.7* 26.5*    201       CMP:   Recent Labs   Lab 03/16/23  0606 03/17/23  0545   * 133*   K 5.1 4.3    101   CO2 21* 21*   * 213*   BUN 18 16   CREATININE 0.7 0.6   CALCIUM 9.0 8.9   PROT 5.0*  --    ALBUMIN 1.5*  --    BILITOT 2.5*  --    ALKPHOS 115  --    AST 16  --    ALT 20  --    ANIONGAP 9 11       Cardiac Markers:   No results for input(s): CKMB, MYOGLOBIN, BNP, TROPISTAT in the last 48 hours.    Lactic Acid:   No results for  input(s): LACTATE in the last 48 hours.    Troponin:   No results for input(s): TROPONINI, TROPONINIHS in the last 48 hours.    Urine Culture: No results for input(s): LABURIN in the last 48 hours.    Urine Studies:   No results for input(s): COLORU, APPEARANCEUA, PHUR, SPECGRAV, PROTEINUA, GLUCUA, KETONESU, BILIRUBINUA, OCCULTUA, NITRITE, UROBILINOGEN, LEUKOCYTESUR, RBCUA, WBCUA, BACTERIA, SQUAMEPITHEL, HYALINECASTS in the last 48 hours.    Invalid input(s): WRIGHTSUR      Significant Imaging: I have reviewed all pertinent imaging results/findings within the past 24 hours.  Abdominal u/s:  Surgically removed gallbladder with no biliary dilatation.  Enlarged heterogeneous liver.  Splenomegaly     CT AP  1. Bibasilar patchy alveolar infiltrates could represent pneumonia.  Minimal consolidation in the lingula and left lower lobe also.  Recommend follow-up.  2. Hepatosplenomegaly.  3. Nonobstructing nephrolithiasis of the left kidney.  4. Few stable mildly enlarged retroperitoneal lymph nodes.    CXR  Suboptimal inspiration.  Cardiac silhouette is within normal limits.  Bibasilar patchy alveolar infiltrates could represent pneumonia.  This is minimally improved from prior study.  No effusion or pneumothorax.     No acute osseous abnormality.  Remote rib fractures on the left.

## 2023-03-17 NOTE — PT/OT/SLP PROGRESS
Physical Therapy Treatment    Patient Name:  Hedy Conway   MRN:  [id number]    Recommendations:     Discharge Recommendations: nursing facility, basic, nursing facility, skilled  Discharge Equipment Recommendations: hospital bed, wheelchair, lift device  Barriers to discharge: Inaccessible home and Decreased caregiver support    Assessment:     Hedy Conway is a 47 y.o. female admitted with a medical diagnosis of Sepsis.  She presents with the following impairments/functional limitations: weakness, impaired endurance, impaired self care skills, impaired functional mobility, gait instability, decreased upper extremity function, decreased lower extremity function, pain, decreased ROM, impaired joint extensibility. Patient tolerated AAROM ex on B LE with pain on R LE upon movement. Patient requires continues verbal cueing to inc participation and complete tasks. Requires max/dependent A in rolling to sides with verbal cueing to complete task.    Rehab Prognosis: Fair; patient would benefit from acute skilled PT services to address these deficits and reach maximum level of function.    Recent Surgery: * No surgery found *      Plan:     During this hospitalization, patient to be seen 5 x/week to address the identified rehab impairments via therapeutic activities, therapeutic exercises and progress toward the following goals:    Plan of Care Expires:  03/29/23    Subjective     Chief Complaint: pain on R leg upon movement  Patient/Family Comments/goals: none stated  Pain/Comfort:  Pain Rating 1: 10/10  Location - Side 1: Right  Location - Orientation 1: generalized  Location 1: leg  Pain Addressed 1: Reposition, Cessation of Activity      Objective:     Communicated with patient prior to session.  Patient found HOB elevated with PureWick, peripheral IV, SCD upon PT entry to room.     General Precautions: Standard, fall  Orthopedic Precautions: N/A  Braces: N/A  Respiratory Status: Room air     Functional  Mobility:  Bed Mobility:     Rolling Left:  dependence/maximal assistance  Rolling Right: dependence/maximal assistance  AAROM ex on B LE      AM-PAC 6 CLICK MOBILITY  Turning over in bed (including adjusting bedclothes, sheets and blankets)?: 2  Sitting down on and standing up from a chair with arms (e.g., wheelchair, bedside commode, etc.): 1  Moving from lying on back to sitting on the side of the bed?: 1  Moving to and from a bed to a chair (including a wheelchair)?: 1  Need to walk in hospital room?: 1  Climbing 3-5 steps with a railing?: 1  Basic Mobility Total Score: 7       Treatment & Education:  Provided AAROM ex on B LE x 10 reps on each with rest periods; Rolling to sides x 5 reps with max/dep A and cues    Patient left HOB elevated with all lines intact, call button in reach, bed alarm on, and nursing present..    GOALS:   Multidisciplinary Problems       Physical Therapy Goals          Problem: Physical Therapy    Goal Priority Disciplines Outcome Goal Variances Interventions   Physical Therapy Goal     PT, PT/OT      Description:   Patient will increase functional independence with mobility by performin. Rolling to sides with Malachi using bed rail  2. Supine <> sit with Moderate Assistance  3. Able to perform and tolerate static sitting at edge of the  bed for ~ 10 minutes  with minimal assistance.  4. Lower extremity exercise program x10 reps per handout, with assistance as needed                          Time Tracking:     PT Received On: 23  PT Start Time: 920     PT Stop Time: 935  PT Total Time (min): 15 min     Billable Minutes: Therapeutic Exercise 15    Treatment Type: Treatment  PT/PTA: PT           2023

## 2023-03-17 NOTE — ASSESSMENT & PLAN NOTE
This patient does have evidence of infective focus  My overall impression is sepsis.  Source: Respiratory and Urinary Tract Bacteremia  Antibiotics given-   Antibiotics (72h ago, onward)    Start     Stop Route Frequency Ordered    03/16/23 1200  cefTRIAXone (ROCEPHIN) 1 g/50 mL D5W IVPB         -- IV Every 24 hours (non-standard times) 03/16/23 1138    03/15/23 0915  azithromycin (ZITHROMAX) 250 mg in dextrose 5 % (D5W) 250 mL IVPB         03/19 0914 IV Every 24 hours (non-standard times) 03/15/23 0811        Latest lactate reviewed-  No results for input(s): LACTATE in the last 72 hours.  Organ dysfunction indicated by Thrombocytopenia     Fluid challenge Not needed - patient is not hypotensive      Post- resuscitation assessment No - Post resuscitation assessment not needed       Will Not start Pressors- Levophed for MAP of 65  Source control achieved by: Vanc, Zosyn and azithromycin   IVF resuscitation already occurred in ED    Resolved and descalate abx 3/16

## 2023-03-17 NOTE — PLAN OF CARE
03/17/23 1056   Post-Acute Status   Post-Acute Authorization Placement   Post-Acute Placement Status Pending post-acute provider review/more information requested   Discharge Plan   Discharge Plan A Skilled Nursing Facility         [facility] in [place] called and states they are interested in taking patient. They state they will contact patient's [employer] to make sure they are in network with payor.   Awaiting response.

## 2023-03-18 LAB
ALBUMIN SERPL BCP-MCNC: 1.7 G/DL (ref 3.5–5.2)
ALP SERPL-CCNC: 103 U/L (ref 55–135)
ALT SERPL W/O P-5'-P-CCNC: 21 U/L (ref 10–44)
ANION GAP SERPL CALC-SCNC: 9 MMOL/L (ref 8–16)
ANISOCYTOSIS BLD QL SMEAR: SLIGHT
AST SERPL-CCNC: 21 U/L (ref 10–40)
BACTERIA BLD CULT: NORMAL
BACTERIA SPEC AEROBE CULT: ABNORMAL
BACTERIA SPEC AEROBE CULT: ABNORMAL
BASOPHILS NFR BLD: 0 % (ref 0–1.9)
BILIRUB SERPL-MCNC: 1.1 MG/DL (ref 0.1–1)
BUN SERPL-MCNC: 16 MG/DL (ref 6–20)
CALCIUM SERPL-MCNC: 9.1 MG/DL (ref 8.7–10.5)
CHLORIDE SERPL-SCNC: 101 MMOL/L (ref 95–110)
CO2 SERPL-SCNC: 22 MMOL/L (ref 23–29)
CREAT SERPL-MCNC: 0.6 MG/DL (ref 0.5–1.4)
DACRYOCYTES BLD QL SMEAR: ABNORMAL
DIFFERENTIAL METHOD: ABNORMAL
EOSINOPHIL NFR BLD: 0 % (ref 0–8)
ERYTHROCYTE [DISTWIDTH] IN BLOOD BY AUTOMATED COUNT: 18.3 % (ref 11.5–14.5)
EST. GFR  (NO RACE VARIABLE): >60 ML/MIN/1.73 M^2
GIANT PLATELETS BLD QL SMEAR: PRESENT
GLUCOSE SERPL-MCNC: 181 MG/DL (ref 70–110)
GRAM STN SPEC: ABNORMAL
GRAM STN SPEC: ABNORMAL
HCT VFR BLD AUTO: 27.2 % (ref 37–48.5)
HGB BLD-MCNC: 8.2 G/DL (ref 12–16)
HYPOCHROMIA BLD QL SMEAR: ABNORMAL
IMM GRANULOCYTES # BLD AUTO: ABNORMAL K/UL (ref 0–0.04)
IMM GRANULOCYTES NFR BLD AUTO: ABNORMAL % (ref 0–0.5)
LYMPHOCYTES NFR BLD: 9 % (ref 18–48)
MCH RBC QN AUTO: 24.6 PG (ref 27–31)
MCHC RBC AUTO-ENTMCNC: 30.1 G/DL (ref 32–36)
MCV RBC AUTO: 81 FL (ref 82–98)
MONOCYTES NFR BLD: 9 % (ref 4–15)
MYELOCYTES NFR BLD MANUAL: 2 %
NEUTROPHILS NFR BLD: 70 % (ref 38–73)
NEUTS BAND NFR BLD MANUAL: 10 %
NRBC BLD-RTO: 0 /100 WBC
OVALOCYTES BLD QL SMEAR: ABNORMAL
PLATELET # BLD AUTO: 225 K/UL (ref 150–450)
PLATELET BLD QL SMEAR: ABNORMAL
PMV BLD AUTO: 11.4 FL (ref 9.2–12.9)
POCT GLUCOSE: 193 MG/DL (ref 70–110)
POCT GLUCOSE: 232 MG/DL (ref 70–110)
POCT GLUCOSE: 255 MG/DL (ref 70–110)
POCT GLUCOSE: 265 MG/DL (ref 70–110)
POIKILOCYTOSIS BLD QL SMEAR: SLIGHT
POTASSIUM SERPL-SCNC: 4.3 MMOL/L (ref 3.5–5.1)
PROT SERPL-MCNC: 4.9 G/DL (ref 6–8.4)
RBC # BLD AUTO: 3.34 M/UL (ref 4–5.4)
SODIUM SERPL-SCNC: 132 MMOL/L (ref 136–145)
TARGETS BLD QL SMEAR: ABNORMAL
TOXIC GRANULES BLD QL SMEAR: PRESENT
WBC # BLD AUTO: 11.04 K/UL (ref 3.9–12.7)

## 2023-03-18 PROCEDURE — 94640 AIRWAY INHALATION TREATMENT: CPT

## 2023-03-18 PROCEDURE — 85027 COMPLETE CBC AUTOMATED: CPT | Performed by: FAMILY MEDICINE

## 2023-03-18 PROCEDURE — 80053 COMPREHEN METABOLIC PANEL: CPT | Performed by: FAMILY MEDICINE

## 2023-03-18 PROCEDURE — 97110 THERAPEUTIC EXERCISES: CPT

## 2023-03-18 PROCEDURE — 99232 PR SUBSEQUENT HOSPITAL CARE,LEVL II: ICD-10-PCS | Mod: ,,, | Performed by: FAMILY MEDICINE

## 2023-03-18 PROCEDURE — 25000003 PHARM REV CODE 250: Performed by: PHYSICIAN ASSISTANT

## 2023-03-18 PROCEDURE — 99232 SBSQ HOSP IP/OBS MODERATE 35: CPT | Mod: ,,, | Performed by: FAMILY MEDICINE

## 2023-03-18 PROCEDURE — 63600175 PHARM REV CODE 636 W HCPCS: Performed by: PHYSICIAN ASSISTANT

## 2023-03-18 PROCEDURE — 25000003 PHARM REV CODE 250: Performed by: FAMILY MEDICINE

## 2023-03-18 PROCEDURE — 11000001 HC ACUTE MED/SURG PRIVATE ROOM

## 2023-03-18 PROCEDURE — 36415 COLL VENOUS BLD VENIPUNCTURE: CPT | Performed by: FAMILY MEDICINE

## 2023-03-18 PROCEDURE — 85007 BL SMEAR W/DIFF WBC COUNT: CPT | Performed by: FAMILY MEDICINE

## 2023-03-18 PROCEDURE — 94761 N-INVAS EAR/PLS OXIMETRY MLT: CPT

## 2023-03-18 PROCEDURE — 25000003 PHARM REV CODE 250: Performed by: STUDENT IN AN ORGANIZED HEALTH CARE EDUCATION/TRAINING PROGRAM

## 2023-03-18 PROCEDURE — 63600175 PHARM REV CODE 636 W HCPCS: Performed by: FAMILY MEDICINE

## 2023-03-18 PROCEDURE — 97530 THERAPEUTIC ACTIVITIES: CPT

## 2023-03-18 RX ORDER — METOPROLOL TARTRATE 25 MG/1
25 TABLET, FILM COATED ORAL 2 TIMES DAILY
Status: DISCONTINUED | OUTPATIENT
Start: 2023-03-18 | End: 2023-03-21 | Stop reason: HOSPADM

## 2023-03-18 RX ADMIN — METOPROLOL TARTRATE 25 MG: 25 TABLET, FILM COATED ORAL at 01:03

## 2023-03-18 RX ADMIN — CALCIUM CARBONATE (ANTACID) CHEW TAB 500 MG 500 MG: 500 CHEW TAB at 06:03

## 2023-03-18 RX ADMIN — AZITHROMYCIN MONOHYDRATE 250 MG: 500 INJECTION, POWDER, LYOPHILIZED, FOR SOLUTION INTRAVENOUS at 10:03

## 2023-03-18 RX ADMIN — FLUTICASONE FUROATE AND VILANTEROL TRIFENATATE 1 PUFF: 100; 25 POWDER RESPIRATORY (INHALATION) at 07:03

## 2023-03-18 RX ADMIN — INSULIN ASPART 6 UNITS: 100 INJECTION, SOLUTION INTRAVENOUS; SUBCUTANEOUS at 08:03

## 2023-03-18 RX ADMIN — CEFTRIAXONE 1 G: 1 INJECTION, SOLUTION INTRAVENOUS at 11:03

## 2023-03-18 RX ADMIN — METHYLPREDNISOLONE SODIUM SUCCINATE 40 MG: 40 INJECTION, POWDER, FOR SOLUTION INTRAMUSCULAR; INTRAVENOUS at 01:03

## 2023-03-18 RX ADMIN — INSULIN ASPART 9 UNITS: 100 INJECTION, SOLUTION INTRAVENOUS; SUBCUTANEOUS at 05:03

## 2023-03-18 RX ADMIN — DOCUSATE SODIUM 100 MG: 100 CAPSULE, LIQUID FILLED ORAL at 09:03

## 2023-03-18 RX ADMIN — ACETAMINOPHEN 500 MG: 500 TABLET ORAL at 05:03

## 2023-03-18 RX ADMIN — GABAPENTIN 100 MG: 100 CAPSULE ORAL at 09:03

## 2023-03-18 RX ADMIN — FERROUS SULFATE TAB 325 MG (65 MG ELEMENTAL FE) 1 EACH: 325 (65 FE) TAB at 09:03

## 2023-03-18 RX ADMIN — INSULIN ASPART 6 UNITS: 100 INJECTION, SOLUTION INTRAVENOUS; SUBCUTANEOUS at 12:03

## 2023-03-18 RX ADMIN — ACETAMINOPHEN 500 MG: 500 TABLET ORAL at 01:03

## 2023-03-18 RX ADMIN — INSULIN ASPART 3 UNITS: 100 INJECTION, SOLUTION INTRAVENOUS; SUBCUTANEOUS at 09:03

## 2023-03-18 RX ADMIN — GABAPENTIN 100 MG: 100 CAPSULE ORAL at 08:03

## 2023-03-18 RX ADMIN — METOPROLOL TARTRATE 25 MG: 25 TABLET, FILM COATED ORAL at 08:03

## 2023-03-18 RX ADMIN — MONTELUKAST 10 MG: 10 TABLET, FILM COATED ORAL at 09:03

## 2023-03-18 RX ADMIN — GABAPENTIN 100 MG: 100 CAPSULE ORAL at 03:03

## 2023-03-18 NOTE — ASSESSMENT & PLAN NOTE
Patient's FSGs are uncontrolled due to hyperglycemia on current medication regimen.  Last A1c reviewed-   Lab Results   Component Value Date    HGBA1C 6.4 (H) 03/15/2023     Most recent fingerstick glucose reviewed-   Recent Labs   Lab 03/17/23  1651 03/17/23  1952 03/18/23  0702 03/18/23  1136   POCTGLUCOSE 234* 220* 193* 232*     Current correctional scale  Medium  Maintain anti-hyperglycemic dose as follows-   Antihyperglycemics (From admission, onward)    Start     Stop Route Frequency Ordered    03/17/23 2100  insulin detemir U-100 pen 5 Units         -- SubQ Nightly 03/17/23 1305    03/15/23 2054  insulin aspart U-100 pen 0-15 Units         -- SubQ Before meals & nightly PRN 03/15/23 1955        Hold Oral hypoglycemics while patient is in the hospital.    Increase detemir 10

## 2023-03-18 NOTE — ASSESSMENT & PLAN NOTE
Suspect related to PNA - though sputum culture with pseudomonas  Sensitive to rocephin  WBC ct improved. ESR and CRP improved.  Right knee and wrist still swollen painful which draws concern for possible septic arthritis. Check CT because of no MRI today. Will consider MRI on Monday. She is able to bear weight now.  No meningeal signs.

## 2023-03-18 NOTE — PLAN OF CARE
Problem: Adult Inpatient Plan of Care  Goal: Plan of Care Review  Outcome: Ongoing, Progressing  Goal: Optimal Comfort and Wellbeing  Outcome: Ongoing, Progressing     Problem: Bariatric Environmental Safety  Goal: Safety Maintained with Care  Outcome: Ongoing, Progressing     Problem: Skin Injury Risk Increased  Goal: Skin Health and Integrity  Outcome: Ongoing, Progressing     Problem: Bleeding (Sepsis/Septic Shock)  Goal: Absence of Bleeding  Outcome: Ongoing, Progressing     Problem: Infection Progression (Sepsis/Septic Shock)  Goal: Absence of Infection Signs and Symptoms  Outcome: Ongoing, Progressing     Problem: Fluid Imbalance (Pneumonia)  Goal: Fluid Balance  Outcome: Ongoing, Progressing     Problem: Infection (Pneumonia)  Goal: Resolution of Infection Signs and Symptoms  Outcome: Ongoing, Progressing     Problem: Diabetes Comorbidity  Goal: Blood Glucose Level Within Targeted Range  Outcome: Ongoing, Progressing     Problem: Impaired Wound Healing  Goal: Optimal Wound Healing  Outcome: Ongoing, Progressing     Problem: Fall Injury Risk  Goal: Absence of Fall and Fall-Related Injury  Outcome: Ongoing, Progressing     Problem: Pain Acute  Goal: Acceptable Pain Control and Functional Ability  Outcome: Ongoing, Progressing

## 2023-03-18 NOTE — PROGRESS NOTES
[facility] ([facility])  [facility] Medicine  Progress Note    Patient Name: Hedy Conway  MRN: [id number]  Patient Class: IP- Inpatient   Admission Date: 3/13/2023  Length of Stay: 5 days  Attending Physician: Uche Blum MD  Primary Care Provider: John Lantigua PA-C        Subjective:     Principal Problem:Sepsis        HPI:  Hedy Conway is a 47 y.o. female with PMH: Non-Hodgkin's Lymphoma (2011), DM on metformin, HTN, Arthritis, COPD who was sent to the ED by PCP for further eval of abnormal lab work.   Patient reports that last Friday night (03/03) she went to bed in her normal state of health, woke up the next morning (03/04) and could not walk due to L thigh pain. She went to [facility] ED and was dx with a pulled muscle (Sat). She also reports subjective fever with tmax of 101.3F for three days while home. She followed up with her PCP-John Lantigua, on Wed (03/08) and was treated symptomatically and told to return if symptoms worsen. She went to bed Wed evening; felt a pop in her R knee and now with R wrist pain prompting ED visit at [facility] on [place]09/23. She was again dx with muscle pain.   She was seen by Dr. Mark Anthony gongora, for R knee pain and was told that she possibly has torn cartilage in her knee. She also received a steroid injection in her R wrist. She is awaiting a MRI of her R knee and continues with pain.  Symptoms have progressively worsened, noting that she cannot walk and has been having to wear diapers for the past 5 days. She went back to the ED at [facility] on Friday and was dx with a UTI. She was discharged home with an antibiotic that starts with a C.   Since this time she has had Progressive weakness, fatigue, hallucinations, and night sweats.  Denies abdominal pain, NVD. Denies back pain.   She notes a + productive cough with blood tinged sputum. Denies associated cp or sob.  LBM X 1 week ago>Taking norco for pain at home. Denies rectal bleeding or Black  stool  She presents pale, diaphoretic and tachycardic.    ER work up showed concern for UTI and bibasilar PNA. She does also report cough and NAIR on further questioning. Started on Vanc and Zosyn for sepsis with WBC of 21K. Initial LA elevated, resolved with IVF in ED. Cr improved from 1.1 to 0.8. Interestingly t bili 3.3m alk phos 151. CT showed hepatomegaly. US without CBD dilation and gallbladder surgically removed. Denies abd pain. She is mildly jaundiced on exam. On rounds this afternoon her only complaint is diffuse body and joint pain and requesting pain medication.       Overview/Hospital Course:  Admitted for sepsis secondary to bacteremia and pneumonia.  Currently on IV azithromycin, vancomycin and zosyn.  Hg 6.3 on am labs and patient with weakness/tachycardia.  2 units of pRBC ordered.      Leukocytosis slowly improving.  H/h stable with blood transfusion and currently 8.2.  Deescalate abx; group A strept seen on 1 out of 2 blood cultures.  Sensitive to rocephin.  Will cover for pneumonia with rocephin and azithromycin.  Encouraging patient to participate with therapy.  She has generalized pain.  Gabapentin started.      Patient still with generalized pain.  ESR and CRP ordered.  Leukocytosis continues to improve. Continue IV abx for strep bacteremia secondary to PNA infection.  Encouraging patient to work with therapy.      3/18  Patient still c/o pain and swelling to right wrist and right knee. Left hip pain improved. She was able to get up on the side of the bed. No fever. WBC ct improved.       Past Medical History:   Diagnosis Date    Acute carpal tunnel syndrome of left wrist     Biliary colic     Diabetes mellitus     Encounter for blood transfusion     History of chemotherapy     Incisional hernia     Large cell (diffuse) non-Hodgkin's lymphoma     Stem cells transplant status        Past Surgical History:   Procedure Laterality Date    BREAST BIOPSY Left     lymph node biopsy, benign     BREAST SURGERY      CHOLECYSTECTOMY      COLD KNIFE CONIZATION OF CERVIX N/A 3/8/2021    Procedure: CONE BIOPSY, CERVIX, USING COLD KNIFE;  Surgeon: Evelyn Wheeler MD;  Location: [facility] OR;  Service: OB/GYN;  Laterality: N/A;    COLONOSCOPY N/A 12/21/2022    Procedure: COLONOSCOPY;  Surgeon: Annamarie Jane MD;  Location: [facility];  Service: Endoscopy;  Laterality: N/A;    HERNIA REPAIR      incisional    LUNG BIOPSY      lymphnode biopsy      MEDIPORT REMOVAL Left 1/30/2020    Procedure: REMOVAL, CATHETER, CENTRAL VENOUS, TUNNELED, WITH PORT;  Surgeon: Luis Bogran-Reyes, MD;  Location: [facility] OR;  Service: General;  Laterality: Left;    PORTACATH PLACEMENT Left     REVISION OF SCAR  6/22/2021    Procedure: REVISION, SCAR;  Surgeon: Otis Grimes MD;  Location: [address];  Service: General;;    TUBAL LIGATION      UMBILICAL HERNIA REPAIR         Review of patient's allergies indicates:   Allergen Reactions    Tetanus vaccines and toxoid Rash       No current facility-administered medications on file prior to encounter.     Current Outpatient Medications on File Prior to Encounter   Medication Sig    budesonide (PULMICORT) 0.5 mg/2 mL nebulizer solution USE 1 VIAL IN NEBULIZER TWICE DAILY (OK TO MIX WITH ALBUTEROL. RINSE MOUTH AFTER USE)    celecoxib (CELEBREX) 200 MG capsule Take 200 mg by mouth once daily.    conjugated estrogens (PREMARIN) vaginal cream Place 0.5 g vaginally once daily. Place 0.5 vaginally for 4 weeks, then transition to twice weekly use.    cyclobenzaprine (FLEXERIL) 10 MG tablet Take 10 mg by mouth every 8 (eight) hours as needed.    fluticasone (FLONASE) 50 mcg/actuation nasal spray 1 spray by Each Nare route once daily.    HYDROcodone-acetaminophen (NORCO) 5-325 mg per tablet Take 1 tablet by mouth every 6 (six) hours as needed.    metFORMIN (GLUCOPHAGE) 500 MG tablet Take 500 mg by mouth once daily.    montelukast (SINGULAIR) 10 mg tablet Take 10 mg by  mouth once daily.    naproxen (NAPROSYN) 500 MG tablet Take 500 mg by mouth 2 (two) times daily as needed.    ondansetron (ZOFRAN) 4 MG tablet Take 4 mg by mouth every 8 (eight) hours as needed.    pantoprazole (PROTONIX) 40 MG tablet Take 1 tablet (40 mg total) by mouth once daily.    zinc sulfate (ZINCATE) 220 (50) mg capsule Take 220 mg by mouth 3 (three) times daily.    albuterol (PROVENTIL/VENTOLIN HFA) 90 mcg/actuation inhaler Ventolin HFA 90 mcg/actuation aerosol inhaler   INHALE 2 PUFFS BY MOUTH 3 TIMES DAILY AS NEEDED.    albuterol-ipratropium (DUO-NEB) 2.5 mg-0.5 mg/3 mL nebulizer solution ipratropium 0.5 mg-albuterol 3 mg (2.5 mg base)/3 mL nebulization soln   USE 1 VIAL IN NEBULIZER EVERY 6 TO 8 HOURS AS NEEDED    blood sugar diagnostic Strp USE TO CHECK BLOOD SUGAR TWICE A DAY    budesonide (PULMICORT) 0.5 mg/2 mL nebulizer solution budesonide 0.5 mg/2 mL suspension for nebulization   USE 1 VIAL IN NEBULIZER TWICE DAILY (OK TO MIX WITH ALBUTEROL. RINSE MOUTH AFTER USE)    melatonin 10 mg Cap Take 10 mg by mouth nightly as needed.     Family History       Problem Relation (Age of Onset)    Breast cancer Maternal Aunt, Maternal Aunt    Cancer Paternal Grandmother    Colon cancer Paternal Grandmother    Diabetes Mother, Father    Heart block Mother    Heart failure Father    Hypertension Mother, Father    Kidney disease Mother    Lung cancer Father          Tobacco Use    Smoking status: Never    Smokeless tobacco: Never   Substance and Sexual Activity    Alcohol use: Not Currently    Drug use: No    Sexual activity: Yes     Partners: Male     Birth control/protection: See Surgical Hx     Comment: with one partner for 24 years     Review of Systems   Constitutional:  Positive for fatigue. Negative for activity change, fever and unexpected weight change.   HENT:  Negative for congestion, ear pain, hearing loss, rhinorrhea, sore throat and tinnitus.    Eyes:  Negative for pain, redness and  visual disturbance.   Respiratory:  Positive for cough (productive green sputum). Negative for shortness of breath (NAIR) and wheezing.    Cardiovascular:  Negative for chest pain, palpitations and leg swelling.   Gastrointestinal:  Negative for abdominal pain, blood in stool, constipation (1 week since last BM), diarrhea, nausea and vomiting.   Genitourinary:  Negative for dysuria, frequency, pelvic pain and urgency.        Dark colored urine but denies dysuria   Musculoskeletal:  Positive for arthralgias and myalgias. Negative for back pain, joint swelling and neck pain.   Skin:  Negative for color change, rash and wound.   Neurological:  Negative for dizziness, tremors, weakness, light-headedness and headaches.   Objective:     Vital Signs (Most Recent):  Temp: 98.7 °F (37.1 °C) (03/17/23 1148)  Pulse: (!) 115 (03/17/23 1356)  Resp: (!) 22 (03/17/23 1148)  BP: 122/72 (03/17/23 1148)  SpO2: 96 % (03/17/23 1148)   Vital Signs (24h Range):  Temp:  [96.9 °F (36.1 °C)-98.7 °F (37.1 °C)] 98.7 °F (37.1 °C)  Pulse:  [105-124] 115  Resp:  [16-28] 22  SpO2:  [93 %-97 %] 96 %  BP: (116-148)/(53-77) 122/72     Weight: (!) 136.4 kg (300 lb 11.3 oz)  Body mass index is 47.1 kg/m².    Physical Exam  Vitals and nursing note reviewed.   Constitutional:       General: She is not in acute distress.     Appearance: She is well-developed. She is obese.   HENT:      Head: Normocephalic and atraumatic.      Right Ear: External ear normal.      Left Ear: External ear normal.      Nose: Nose normal.      Mouth/Throat:      Pharynx: Posterior oropharyngeal erythema (but no edema.  Pt states this happens to her intermittently) present.   Eyes:      General: No scleral icterus.        Right eye: No discharge.         Left eye: No discharge.      Extraocular Movements: Extraocular movements intact.      Conjunctiva/sclera: Conjunctivae normal.   Neck:      Thyroid: No thyromegaly.   Cardiovascular:      Rate and Rhythm: Normal rate and  regular rhythm.      Heart sounds: No murmur heard.  Pulmonary:      Effort: Pulmonary effort is normal. No respiratory distress.      Breath sounds: No wheezing or rhonchi (soft rhonchi b/l bases).   Abdominal:      General: Bowel sounds are normal. There is no distension.      Palpations: Abdomen is soft.      Tenderness: There is no abdominal tenderness.   Musculoskeletal:         General: Swelling (right wrist) present.      Right lower leg: No edema.      Left lower leg: No edema.   Skin:     General: Skin is warm and dry.      Coloration: Skin is not jaundiced.   Neurological:      Mental Status: She is alert and oriented to person, place, and time.      Comments: Neg kernigs/brudzinskis. But cannot bend right knee   Psychiatric:         Behavior: Behavior normal.         Thought Content: Thought content normal.           Significant Labs: All pertinent labs within the past 24 hours have been reviewed.  Blood Culture:   Recent Labs   Lab 03/16/23  0849   LABBLOO No Growth to date  No Growth to date       CBC:   Recent Labs   Lab 03/16/23  0606 03/17/23  0545   WBC 14.66* 11.12   HGB 8.2* 8.1*   HCT 26.7* 26.5*    201       CMP:   Recent Labs   Lab 03/16/23  0606 03/17/23  0545   * 133*   K 5.1 4.3    101   CO2 21* 21*   * 213*   BUN 18 16   CREATININE 0.7 0.6   CALCIUM 9.0 8.9   PROT 5.0*  --    ALBUMIN 1.5*  --    BILITOT 2.5*  --    ALKPHOS 115  --    AST 16  --    ALT 20  --    ANIONGAP 9 11       Cardiac Markers:   No results for input(s): CKMB, MYOGLOBIN, BNP, TROPISTAT in the last 48 hours.    Lactic Acid:   No results for input(s): LACTATE in the last 48 hours.    Troponin:   No results for input(s): TROPONINI, TROPONINIHS in the last 48 hours.    Urine Culture: No results for input(s): LABURIN in the last 48 hours.    Urine Studies:   No results for input(s): COLORU, APPEARANCEUA, PHUR, SPECGRAV, PROTEINUA, GLUCUA, KETONESU, BILIRUBINUA, OCCULTUA, NITRITE, UROBILINOGEN,  LEUKOCYTESUR, RBCUA, WBCUA, BACTERIA, SQUAMEPITHEL, HYALINECASTS in the last 48 hours.    Invalid input(s): WRIGHTSUR      Significant Imaging: I have reviewed all pertinent imaging results/findings within the past 24 hours.  Abdominal u/s:  Surgically removed gallbladder with no biliary dilatation.  Enlarged heterogeneous liver.  Splenomegaly     CT AP  1. Bibasilar patchy alveolar infiltrates could represent pneumonia.  Minimal consolidation in the lingula and left lower lobe also.  Recommend follow-up.  2. Hepatosplenomegaly.  3. Nonobstructing nephrolithiasis of the left kidney.  4. Few stable mildly enlarged retroperitoneal lymph nodes.    CXR  Suboptimal inspiration.  Cardiac silhouette is within normal limits.  Bibasilar patchy alveolar infiltrates could represent pneumonia.  This is minimally improved from prior study.  No effusion or pneumothorax.     No acute osseous abnormality.  Remote rib fractures on the left.         Assessment/Plan:      * Sepsis  This patient does have evidence of infective focus  My overall impression is sepsis.  Source: Respiratory and Urinary Tract Bacteremia  Antibiotics given-   Antibiotics (72h ago, onward)    Start     Stop Route Frequency Ordered    03/16/23 1200  cefTRIAXone (ROCEPHIN) 1 g/50 mL D5W IVPB         -- IV Every 24 hours (non-standard times) 03/16/23 1138    03/15/23 0915  azithromycin (ZITHROMAX) 250 mg in dextrose 5 % (D5W) 250 mL IVPB         03/19 0914 IV Every 24 hours (non-standard times) 03/15/23 0811        Latest lactate reviewed-  No results for input(s): LACTATE in the last 72 hours.  Organ dysfunction indicated by Thrombocytopenia     Fluid challenge Not needed - patient is not hypotensive      Post- resuscitation assessment No - Post resuscitation assessment not needed       Will Not start Pressors- Levophed for MAP of 65  Source control achieved by: Vanc, Zosyn and azithromycin   IVF resuscitation already occurred in ED    Resolved and descalate  abx 3/16    COPD (chronic obstructive pulmonary disease)  xopenex prn  Scheduled breo       Myalgia  Diffuse myalgias and arthralgias  Possible due to acute infection  IVF in ED  CPK normal  PRN [place]  D/c today and started on gabapentin   PT/OT ordered---she wants to go to a nursing home for skilled if she doesn't get better.  If unable to work with therapy nursing home is an alternative.        Type 2 diabetes mellitus without complication, without long-term current use of insulin  Patient's FSGs are uncontrolled due to hyperglycemia on current medication regimen.  Last A1c reviewed-   Lab Results   Component Value Date    HGBA1C 6.4 (H) 03/15/2023     Most recent fingerstick glucose reviewed-   Recent Labs   Lab 03/17/23  1651 03/17/23  1952 03/18/23  0702 03/18/23  1136   POCTGLUCOSE 234* 220* 193* 232*     Current correctional scale  Medium  Maintain anti-hyperglycemic dose as follows-   Antihyperglycemics (From admission, onward)    Start     Stop Route Frequency Ordered    03/17/23 2100  insulin detemir U-100 pen 5 Units         -- SubQ Nightly 03/17/23 1305    03/15/23 2054  insulin aspart U-100 pen 0-15 Units         -- SubQ Before meals & nightly PRN 03/15/23 1955        Hold Oral hypoglycemics while patient is in the hospital.    Increase detemir 10    Acute cystitis without hematuria  UA concnerning for infection  Urine and blood cx sent and pending  Cont broad spectrum antibx for now    NGTD      Hyperbilirubinemia  Unclear etiology  Imaging is unrevelaing  Repeat lab shows down trending       Microcytic anemia  H/H trended down  No evidence of acute bleeding but stool for occult ordered (currently with constipation)  Daily labs  Iron level low but will defer IV iron in acute infection.  Ferrous sulfate ordered.     This is a chronic problem for pt and she receives IV injections (unsure name)    Stable today 3/16        Streptococcal bacteremia  Suspect related to PNA - though sputum culture with  pseudomonas  Sensitive to rocephin  WBC ct improved. ESR and CRP improved.  Right knee and wrist still swollen painful which draws concern for possible septic arthritis. Check CT because of no MRI today. Will consider MRI on Monday. She is able to bear weight now.  No meningeal signs.      Pneumonia of both lungs due to infectious organism  Noted on imaging  No hypoxia  Nebs scheduled  Deescalate abx to IV rocephin and IV azithromycin (completed 4 days)      Thrombocytopenia  Has had in the pastt as well but currently in relation to sepsis  Daily labs      Large cell (diffuse) non-Hodgkin's lymphoma  Diagnosed In 2011         VTE Risk Mitigation (From admission, onward)         Ordered     IP VTE HIGH RISK PATIENT  Once         03/14/23 1046     Place sequential compression device  Until discontinued         03/14/23 1046                Discharge Planning   EJSSE:      Code Status: Full Code   Is the patient medically ready for discharge?:     Reason for patient still in hospital (select all that apply): Treatment and Imaging  Discharge Plan A: Skilled Nursing Facility   Discharge Delays: None known at this time              Anaid Yung MD  Department of Hospital Medicine   [facility] (3rd Fl)

## 2023-03-18 NOTE — ASSESSMENT & PLAN NOTE
UA concnerning for infection  Urine and blood cx sent and pending  Cont broad spectrum antibx for now    NGTD

## 2023-03-18 NOTE — PLAN OF CARE
Problem: Adult Inpatient Plan of Care  Goal: Absence of Hospital-Acquired Illness or Injury  Outcome: Ongoing, Progressing     Problem: Adult Inpatient Plan of Care  Goal: Optimal Comfort and Wellbeing  Outcome: Ongoing, Progressing     Problem: Skin Injury Risk Increased  Goal: Skin Health and Integrity  Outcome: Ongoing, Progressing     Problem: Glycemic Control Impaired (Sepsis/Septic Shock)  Goal: Blood Glucose Level Within Desired Range  Outcome: Ongoing, Progressing     Problem: Pain Acute  Goal: Acceptable Pain Control and Functional Ability  Outcome: Ongoing, Progressing

## 2023-03-18 NOTE — PLAN OF CARE
Problem: Physical Therapy  Goal: Physical Therapy Goal  Description:   Patient will increase functional independence with mobility by performin. Rolling to sides with Malachi using bed rail  2. Supine <> sit with Moderate Assistance  3. Able to perform and tolerate static sitting at edge of the  bed for ~ 10 minutes  with minimal assistance. (MET dated 3/18/2023)   4. Lower extremity exercise program x10 reps per handout, with assistance as needed     3/18/2023 1003 by Amanda Paul, PT  Outcome: Ongoing, Progressing

## 2023-03-18 NOTE — PT/OT/SLP PROGRESS
Physical Therapy Treatment    Patient Name:  Hedy Conway   MRN:  [id number]    Recommendations:     Discharge Recommendations: rehabilitation facility  Discharge Equipment Recommendations: walker, rolling, wheelchair  Barriers to discharge:  severity of impairments, pain and morbid obesity     Assessment:     Hedy Conway is a 47 y.o. female admitted with a medical diagnosis of Sepsis.  She presents with the following impairments/functional limitations: weakness, impaired endurance, impaired fine motor, impaired sensation, decreased upper extremity function, impaired self care skills, decreased lower extremity function, edema, impaired functional mobility, decreased safety awareness, impaired cardiopulmonary response to activity, pain, impaired joint extensibility, gait instability, impaired balance, impaired muscle length, decreased ROM .  Patient was found supine in bed, c/o generalized pain, both legs and right hand.  She reports that she has not gotten out of bed or sat at the edge of the bed  for  almost a week and she would like to try to sit up. Patient required max assist with supine to sit with increase time due to pain, max assist with scooting at edge of bed, sat at edge of bed modified independently after set up, performed AAROM on both legs in sitting.  Patient requested to sit at the edge of bed  after therapy, nursing assistant informed. If patient continue to progress and tolerate functional activity, she will benefit from an in-patient rehab unit to maximize functional independence.    Rehab Prognosis: Good; patient would benefit from acute skilled PT services to address these deficits and reach maximum level of function.    Recent Surgery: * No surgery found *      Plan:     During this hospitalization, patient to be seen 5 x/week to address the identified rehab impairments via gait training, therapeutic activities, therapeutic exercises, neuromuscular re-education, wheelchair  "management/training and progress toward the following goals:    Plan of Care Expires:  03/29/23    Subjective     Chief Complaint: "I have not sat up for almost a week."   Patient/Family Comments/goals: Get out of this bed.  Pain/Comfort:  Pain Rating 1:  (did not quantify)  Location - Orientation 1: generalized  Pain Addressed 1: Reposition, Distraction  Pain Rating Post-Intervention 1:  (did not quantify)  Pain Rating 2:  (did not quantify)  Location - Side 2: Right  Location 2: hand  Pain Addressed 2: Reposition, Distraction  Pain Rating Post-Intervention 2:  (did not quantify)      Objective:     Communicated with nurse and patient  prior to session.  Patient found supine with PureWick, peripheral IV upon PT entry to room.     General Precautions: Standard, fall  Orthopedic Precautions: N/A  Braces: N/A  Respiratory Status: Room air     Functional Mobility:  Bed Mobility:     Rolling Left:  maximal assistance  Scooting: maximal assistance  Supine to Sit: maximal assistance  Transfers:     Sit to Stand:  unable  with rolling walker  Gait: unable   Balance: modified independent with static sitting balance       AM-PAC 6 CLICK MOBILITY  Turning over in bed (including adjusting bedclothes, sheets and blankets)?: 2  Sitting down on and standing up from a chair with arms (e.g., wheelchair, bedside commode, etc.): 1  Moving from lying on back to sitting on the side of the bed?: 2  Moving to and from a bed to a chair (including a wheelchair)?: 1  Need to walk in hospital room?: 1  Climbing 3-5 steps with a railing?: 1  Basic Mobility Total Score: 8       Treatment & Education:  Rolling to the left, supine to sit, scooting, sitting at edge of bed and thera. Ex   Seated   Both Lower Extremity   Active ROM Sets / Reps / Resistance / Etc.   Hip Flexion 10 x 1   LAQ 10 x 1   Ankle DF/PF                 10 x 1        Patient left sitting edge of bed with all lines intact, call button in reach, bed alarm on, and nursing " assistant  notified..    GOALS:   Multidisciplinary Problems       Physical Therapy Goals          Problem: Physical Therapy    Goal Priority Disciplines Outcome Goal Variances Interventions   Physical Therapy Goal     PT, PT/OT Ongoing, Progressing     Description:   Patient will increase functional independence with mobility by performin. Rolling to sides with Malachi using bed rail  2. Supine <> sit with Moderate Assistance  3. Able to perform and tolerate static sitting at edge of the  bed for ~ 10 minutes  with minimal assistance. (MET dated 3/18/2023)   4. Lower extremity exercise program x10 reps per handout, with assistance as needed                          Time Tracking:     PT Received On: 23  PT Start Time: 927     PT Stop Time: 957  PT Total Time (min): 30 min     Billable Minutes: Therapeutic Activity 20 and Therapeutic Exercise 10    Treatment Type: Treatment  PT/PTA: PT           2023

## 2023-03-18 NOTE — PLAN OF CARE
Problem: Adult Inpatient Plan of Care  Goal: Plan of Care Review  Outcome: Ongoing, Progressing  Goal: Optimal Comfort and Wellbeing  Outcome: Ongoing, Progressing     Problem: Bariatric Environmental Safety  Goal: Safety Maintained with Care  Outcome: Ongoing, Progressing     Problem: Skin Injury Risk Increased  Goal: Skin Health and Integrity  Outcome: Ongoing, Progressing     Problem: Adjustment to Illness (Sepsis/Septic Shock)  Goal: Optimal Coping  Outcome: Ongoing, Progressing     Problem: Glycemic Control Impaired (Sepsis/Septic Shock)  Goal: Blood Glucose Level Within Desired Range  Outcome: Ongoing, Progressing     Problem: Fall Injury Risk  Goal: Absence of Fall and Fall-Related Injury  Outcome: Ongoing, Progressing     Problem: Pain Acute  Goal: Acceptable Pain Control and Functional Ability  Outcome: Ongoing, Progressing

## 2023-03-19 PROBLEM — M17.11 ARTHRITIS OF RIGHT KNEE: Status: ACTIVE | Noted: 2023-03-19

## 2023-03-19 LAB
BASOPHILS NFR BLD: 0 % (ref 0–1.9)
DACRYOCYTES BLD QL SMEAR: ABNORMAL
DIFFERENTIAL METHOD: ABNORMAL
EOSINOPHIL NFR BLD: 0 % (ref 0–8)
ERYTHROCYTE [DISTWIDTH] IN BLOOD BY AUTOMATED COUNT: 18.3 % (ref 11.5–14.5)
GIANT PLATELETS BLD QL SMEAR: PRESENT
HCT VFR BLD AUTO: 28 % (ref 37–48.5)
HGB BLD-MCNC: 8.4 G/DL (ref 12–16)
IMM GRANULOCYTES # BLD AUTO: ABNORMAL K/UL (ref 0–0.04)
IMM GRANULOCYTES NFR BLD AUTO: ABNORMAL % (ref 0–0.5)
LYMPHOCYTES NFR BLD: 18 % (ref 18–48)
MCH RBC QN AUTO: 24.6 PG (ref 27–31)
MCHC RBC AUTO-ENTMCNC: 30 G/DL (ref 32–36)
MCV RBC AUTO: 82 FL (ref 82–98)
MONOCYTES NFR BLD: 7 % (ref 4–15)
NEUTROPHILS NFR BLD: 70 % (ref 38–73)
NEUTS BAND NFR BLD MANUAL: 4 %
NRBC BLD-RTO: 0 /100 WBC
OVALOCYTES BLD QL SMEAR: ABNORMAL
PLATELET # BLD AUTO: 262 K/UL (ref 150–450)
PLATELET BLD QL SMEAR: ABNORMAL
PMV BLD AUTO: 11 FL (ref 9.2–12.9)
POCT GLUCOSE: 157 MG/DL (ref 70–110)
POCT GLUCOSE: 186 MG/DL (ref 70–110)
POCT GLUCOSE: 193 MG/DL (ref 70–110)
POCT GLUCOSE: 240 MG/DL (ref 70–110)
POIKILOCYTOSIS BLD QL SMEAR: SLIGHT
POLYCHROMASIA BLD QL SMEAR: ABNORMAL
PROCALCITONIN SERPL IA-MCNC: 0.62 NG/ML
RBC # BLD AUTO: 3.42 M/UL (ref 4–5.4)
WBC # BLD AUTO: 8.56 K/UL (ref 3.9–12.7)
WBC OTHER NFR BLD MANUAL: 1 %

## 2023-03-19 PROCEDURE — 85027 COMPLETE CBC AUTOMATED: CPT | Performed by: FAMILY MEDICINE

## 2023-03-19 PROCEDURE — 99233 PR SUBSEQUENT HOSPITAL CARE,LEVL III: ICD-10-PCS | Mod: 25,,, | Performed by: FAMILY MEDICINE

## 2023-03-19 PROCEDURE — 20610 PR DRAIN/INJECT LARGE JOINT/BURSA: ICD-10-PCS | Mod: RT,,, | Performed by: FAMILY MEDICINE

## 2023-03-19 PROCEDURE — 25000003 PHARM REV CODE 250

## 2023-03-19 PROCEDURE — 25000003 PHARM REV CODE 250: Performed by: PHYSICIAN ASSISTANT

## 2023-03-19 PROCEDURE — 97530 THERAPEUTIC ACTIVITIES: CPT

## 2023-03-19 PROCEDURE — 25000003 PHARM REV CODE 250: Performed by: FAMILY MEDICINE

## 2023-03-19 PROCEDURE — 94640 AIRWAY INHALATION TREATMENT: CPT

## 2023-03-19 PROCEDURE — 84145 PROCALCITONIN (PCT): CPT | Performed by: FAMILY MEDICINE

## 2023-03-19 PROCEDURE — 20610 DRAIN/INJ JOINT/BURSA W/O US: CPT | Mod: RT,,, | Performed by: FAMILY MEDICINE

## 2023-03-19 PROCEDURE — 99233 SBSQ HOSP IP/OBS HIGH 50: CPT | Mod: 25,,, | Performed by: FAMILY MEDICINE

## 2023-03-19 PROCEDURE — 25000003 PHARM REV CODE 250: Performed by: INTERNAL MEDICINE

## 2023-03-19 PROCEDURE — 11000001 HC ACUTE MED/SURG PRIVATE ROOM

## 2023-03-19 PROCEDURE — 85007 BL SMEAR W/DIFF WBC COUNT: CPT | Performed by: FAMILY MEDICINE

## 2023-03-19 PROCEDURE — 25000003 PHARM REV CODE 250: Performed by: STUDENT IN AN ORGANIZED HEALTH CARE EDUCATION/TRAINING PROGRAM

## 2023-03-19 PROCEDURE — 63600175 PHARM REV CODE 636 W HCPCS: Performed by: PHYSICIAN ASSISTANT

## 2023-03-19 PROCEDURE — 94761 N-INVAS EAR/PLS OXIMETRY MLT: CPT

## 2023-03-19 PROCEDURE — 36415 COLL VENOUS BLD VENIPUNCTURE: CPT | Performed by: FAMILY MEDICINE

## 2023-03-19 RX ORDER — LEVOFLOXACIN 250 MG/1
750 TABLET ORAL DAILY
Status: DISCONTINUED | OUTPATIENT
Start: 2023-03-19 | End: 2023-03-21 | Stop reason: HOSPADM

## 2023-03-19 RX ORDER — LIDOCAINE HYDROCHLORIDE 10 MG/ML
INJECTION INFILTRATION; PERINEURAL
Status: COMPLETED
Start: 2023-03-19 | End: 2023-03-19

## 2023-03-19 RX ORDER — LIDOCAINE HYDROCHLORIDE 10 MG/ML
10 INJECTION, SOLUTION EPIDURAL; INFILTRATION; INTRACAUDAL; PERINEURAL ONCE
Status: DISCONTINUED | OUTPATIENT
Start: 2023-03-19 | End: 2023-03-21

## 2023-03-19 RX ADMIN — CEFTRIAXONE 1 G: 1 INJECTION, SOLUTION INTRAVENOUS at 01:03

## 2023-03-19 RX ADMIN — GABAPENTIN 100 MG: 100 CAPSULE ORAL at 08:03

## 2023-03-19 RX ADMIN — INSULIN ASPART 2 UNITS: 100 INJECTION, SOLUTION INTRAVENOUS; SUBCUTANEOUS at 08:03

## 2023-03-19 RX ADMIN — FERROUS SULFATE TAB 325 MG (65 MG ELEMENTAL FE) 1 EACH: 325 (65 FE) TAB at 08:03

## 2023-03-19 RX ADMIN — Medication 6 MG: at 08:03

## 2023-03-19 RX ADMIN — MONTELUKAST 10 MG: 10 TABLET, FILM COATED ORAL at 08:03

## 2023-03-19 RX ADMIN — CYCLOBENZAPRINE HYDROCHLORIDE 10 MG: 10 TABLET, FILM COATED ORAL at 08:03

## 2023-03-19 RX ADMIN — INSULIN ASPART 3 UNITS: 100 INJECTION, SOLUTION INTRAVENOUS; SUBCUTANEOUS at 05:03

## 2023-03-19 RX ADMIN — GABAPENTIN 100 MG: 100 CAPSULE ORAL at 03:03

## 2023-03-19 RX ADMIN — LEVOFLOXACIN 750 MG: 250 TABLET, FILM COATED ORAL at 09:03

## 2023-03-19 RX ADMIN — ACETAMINOPHEN 500 MG: 500 TABLET ORAL at 05:03

## 2023-03-19 RX ADMIN — LIDOCAINE HYDROCHLORIDE 100 MG: 10 INJECTION, SOLUTION INFILTRATION; PERINEURAL at 12:03

## 2023-03-19 RX ADMIN — CALCIUM CARBONATE (ANTACID) CHEW TAB 500 MG 500 MG: 500 CHEW TAB at 09:03

## 2023-03-19 RX ADMIN — METOPROLOL TARTRATE 25 MG: 25 TABLET, FILM COATED ORAL at 08:03

## 2023-03-19 RX ADMIN — ACETAMINOPHEN 500 MG: 500 TABLET ORAL at 06:03

## 2023-03-19 RX ADMIN — FLUTICASONE FUROATE AND VILANTEROL TRIFENATATE 1 PUFF: 100; 25 POWDER RESPIRATORY (INHALATION) at 07:03

## 2023-03-19 RX ADMIN — CYCLOBENZAPRINE HYDROCHLORIDE 10 MG: 10 TABLET, FILM COATED ORAL at 09:03

## 2023-03-19 RX ADMIN — DOCUSATE SODIUM 100 MG: 100 CAPSULE, LIQUID FILLED ORAL at 08:03

## 2023-03-19 RX ADMIN — INSULIN ASPART 6 UNITS: 100 INJECTION, SOLUTION INTRAVENOUS; SUBCUTANEOUS at 12:03

## 2023-03-19 RX ADMIN — INSULIN ASPART 3 UNITS: 100 INJECTION, SOLUTION INTRAVENOUS; SUBCUTANEOUS at 08:03

## 2023-03-19 NOTE — ASSESSMENT & PLAN NOTE
Noted on imaging  No hypoxia  Nebs scheduled  Deescalate abx to IV rocephin and IV azithromycin (completed 4 days)    3/19  Sputum cx with pseudomonas - levaquin day 1

## 2023-03-19 NOTE — ASSESSMENT & PLAN NOTE
Suspect related to PNA - though sputum culture with pseudomonas  Sensitive to rocephin  WBC ct improved. ESR and CRP improved.  Right knee and wrist still swollen painful which draws concern for possible septic arthritis. Check CT because of no MRI today. Will consider MRI on Monday. She is able to bear weight now.  No meningeal signs.    3/19  I tapped her wrist without aspirate, and Dr. Gloria tapped her knee. Neither was productive of a large amount of fluid. This makes septic joint very unlikely. She did have an injection of her right wrist which should explain her localized swelling/effusion seen yesterday. Today, her swelling is improved. With above information, and recs from ID, she needs 7 days of Rocephin from neg culture, will order repeat esr/crp for tomorrow. As we do not have ortho here, and Dr. Hopson saw patient, may consider d/c home and f/u with him for joint aspiration and continued oral abx until negative eval with ortho. She has clinically improved. MRI of right knee ordered for tomorrow to gain more information as to why she cannot bear weight.

## 2023-03-19 NOTE — PLAN OF CARE
Problem: Physical Therapy  Goal: Physical Therapy Goal  Description:   Patient will increase functional independence with mobility by performin. Rolling to sides with Malachi using bed rail  2. Supine <> sit with Moderate Assistance  3. Able to perform and tolerate static sitting at edge of the  bed for ~ 10 minutes  with minimal assistance. (MET dated 3/18/2023)   4. Lower extremity exercise program x10 reps per handout, with assistance as needed  5. SBA with wheelchair mobility using both UE/LE for about 100 feet.     Outcome: Ongoing, Progressing

## 2023-03-19 NOTE — PT/OT/SLP PROGRESS
Physical Therapy Treatment    Patient Name:  Hedy Conway   MRN:  [id number]    Recommendations:     Discharge Recommendations: rehabilitation facility  Discharge Equipment Recommendations: walker, rolling, wheelchair  Barriers to discharge:  medical issues and functional impairments    Assessment:     Hedy Conway is a 47 y.o. female admitted with a medical diagnosis of Sepsis.  She presents with the following impairments/functional limitations: weakness, impaired coordination, impaired endurance, impaired sensation, impaired self care skills, decreased lower extremity function, edema, impaired functional mobility, impaired cardiopulmonary response to activity, pain, impaired joint extensibility, impaired balance, impaired muscle length, decreased ROM .  Patient was found sitting at edge of bed , still c/o pain on the right hand and right knee.  Still with generalized swelling on both UE/LE, she is morbidly obese.  Max assist with sliding board transfers from bed to wheelchair, increase time needed due to pain and anxiety.  Propelled her wheelchair for about 25 feet using both UE/LE with SBA, increase time needed.  MD came and talk to the patient explaining her medical situation, she might be transferred to another facility for orthopedic management due to pain and swelling of multiple joints. Awaiting medical management, once medically stable she might benefit from in-patient rehab unit to return to prior level of function.     Rehab Prognosis: Good; patient would benefit from acute skilled PT services to address these deficits and reach maximum level of function.    Recent Surgery: * No surgery found *      Plan:     During this hospitalization, patient to be seen 5 x/week to address the identified rehab impairments via gait training, therapeutic activities, therapeutic exercises, neuromuscular re-education, wheelchair management/training and progress toward the following goals:    Plan of Care Expires:   "03/29/23    Subjective     Chief Complaint: "I feel better sitting up."   Patient/Family Comments/goals: Unstated   Pain/Comfort:  Pain Rating 1:  (generalized)  Location - Side 1: Right  Location - Orientation 1: generalized  Location 1: hand  Pain Addressed 1: Reposition, Distraction  Pain Rating Post-Intervention 1:  (better but di dnot quantify)  Pain Rating 2:  (did not quantify)  Location - Side 2: Right  Location 2: knee  Pain Addressed 2: Reposition, Distraction, Cessation of Activity, Nurse notified  Pain Rating Post-Intervention 2:  (did not quantify)      Objective:     Communicated with nurse and patient  prior to session.  Patient found sitting edge of bed with peripheral IV, PureWick upon PT entry to room.     General Precautions: Standard, fall  Orthopedic Precautions: N/A  Braces: N/A  Respiratory Status: Room air     Functional Mobility:  Bed Mobility:   sit to supine: max assist                          Scooting max assist   Transfers:     Bed to Chair: maximal assistance with  slide board  using  Scoot Pivot  Gait: unable   Balance: independent with static sitting   Wheelchair Propulsion:  Pt propelled Standard wheelchair x 25  feet on Level tile with  Bilateral upper extremity and Bilateral lower extremity with Stand-by Assistance.       AM-PAC 6 CLICK MOBILITY  Turning over in bed (including adjusting bedclothes, sheets and blankets)?: 2  Sitting down on and standing up from a chair with arms (e.g., wheelchair, bedside commode, etc.): 1  Moving from lying on back to sitting on the side of the bed?: 2  Moving to and from a bed to a chair (including a wheelchair)?: 2  Need to walk in hospital room?: 1  Climbing 3-5 steps with a railing?: 1  Basic Mobility Total Score: 9       Treatment & Education:  Scooting, sliding board transfers from bed to wheelchair, wheelchair mobility and out of bed tolerance     Patient supine in bed with call button in reach and nurse notified..    GOALS: "   Multidisciplinary Problems       Physical Therapy Goals          Problem: Physical Therapy    Goal Priority Disciplines Outcome Goal Variances Interventions   Physical Therapy Goal     PT, PT/OT Ongoing, Progressing     Description:   Patient will increase functional independence with mobility by performin. Rolling to sides with Malachi using bed rail  2. Supine <> sit with Moderate Assistance  3. Able to perform and tolerate static sitting at edge of the  bed for ~ 10 minutes  with minimal assistance. (MET dated 3/18/2023)   4. Lower extremity exercise program x10 reps per handout, with assistance as needed  5. SBA with wheelchair mobility using both UE/LE for about 100 feet.                          Time Tracking:     PT Received On: 23  PT Start Time: 911     PT Stop Time: 0947 (Returned to bed at [phone])  PT Total Time (min): 36 min  plus 10 minutes = 46 minutes   Billable Minutes: Therapeutic Activity 46    Treatment Type: Treatment  PT/PTA: PT           2023

## 2023-03-19 NOTE — PROGRESS NOTES
[facility] ([facility])  [facility] Medicine  Progress Note    Patient Name: Hedy Conway  MRN: [id number]  Patient Class: IP- Inpatient   Admission Date: 3/13/2023  Length of Stay: 6 days  Attending Physician: Uche Blum MD  Primary Care Provider: John Lantigua PA-C        Subjective:     Principal Problem:Sepsis        HPI:  Hedy Conway is a 47 y.o. female with PMH: Non-Hodgkin's Lymphoma (2011), DM on metformin, HTN, Arthritis, COPD who was sent to the ED by PCP for further eval of abnormal lab work.   Patient reports that last Friday night (03/03) she went to bed in her normal state of health, woke up the next morning (03/04) and could not walk due to L thigh pain. She went to [facility] ED and was dx with a pulled muscle (Sat). She also reports subjective fever with tmax of 101.3F for three days while home. She followed up with her PCP-John Lantigua, on Wed (03/08) and was treated symptomatically and told to return if symptoms worsen. She went to bed Wed evening; felt a pop in her R knee and now with R wrist pain prompting [place] [facility] [place]. She was again dx with muscle pain.   She was seen by Dr. Mark Anthony gongora, for R knee pain and was told that she possibly has torn cartilage in her knee. She also received a steroid injection in her R wrist. She is awaiting a MRI of her R knee and continues with pain.  Symptoms have progressively worsened, noting that she cannot walk and has been having to wear diapers for the past 5 days. She went back to the ED at [facility] on Friday and was dx with a UTI. She was discharged home with an antibiotic that starts with a C.   Since this time she has had Progressive weakness, fatigue, hallucinations, and night sweats.  Denies abdominal pain, NVD. Denies back pain.   She notes a + productive cough with blood tinged sputum. Denies associated cp or sob.  LBM X 1 week ago>Taking norco for pain at home. Denies rectal bleeding or Black  stool  She presents pale, diaphoretic and tachycardic.    ER work up showed concern for UTI and bibasilar PNA. She does also report cough and NAIR on further questioning. Started on Vanc and Zosyn for sepsis with WBC of 21K. Initial LA elevated, resolved with IVF in ED. Cr improved from 1.1 to 0.8. Interestingly t bili 3.3m alk phos 151. CT showed hepatomegaly. US without CBD dilation and gallbladder surgically removed. Denies abd pain. She is mildly jaundiced on exam. On rounds this afternoon her only complaint is diffuse body and joint pain and requesting pain medication.       Overview/Hospital Course:  Admitted for sepsis secondary to bacteremia and pneumonia.  Currently on IV azithromycin, vancomycin and zosyn.  Hg 6.3 on am labs and patient with weakness/tachycardia.  2 units of pRBC ordered.      Leukocytosis slowly improving.  H/h stable with blood transfusion and currently 8.2.  Deescalate abx; group A strept seen on 1 out of 2 blood cultures.  Sensitive to rocephin.  Will cover for pneumonia with rocephin and azithromycin.  Encouraging patient to participate with therapy.  She has generalized pain.  Gabapentin started.      Patient still with generalized pain.  ESR and CRP ordered.  Leukocytosis continues to improve. Continue IV abx for strep bacteremia secondary to PNA infection.  Encouraging patient to work with therapy.      3/18  Patient still c/o pain and swelling to right wrist and right knee. Left hip pain improved. She was able to get up on the side of the bed. No fever. WBC ct improved.     3/19  Patient is day 6 on rocephin today. She has had some improvement of her right wrist swelling and has been able to at least bear weight on her right knee. However, because of persistent swelling and her total clinical picture, I reached out to ID. The recommendation was to have an aspirate of her joints to determine duration of abx in the event that her bacteremia was secondary to migratory septic  arthritis. Aspiration of the right wrist and right knee at bedside - see a/p. She remains afebrile, pain improved, no hypoxia, slight cough.          Review of Systems   Constitutional:  Positive for fatigue. Negative for activity change, fever and unexpected weight change.   HENT:  Negative for congestion, ear pain, hearing loss, rhinorrhea, sore throat and tinnitus.    Eyes:  Negative for pain, redness and visual disturbance.   Respiratory:  Positive for cough (productive green sputum). Negative for shortness of breath (NAIR) and wheezing.    Cardiovascular:  Negative for chest pain, palpitations and leg swelling.   Gastrointestinal:  Negative for abdominal pain, blood in stool, constipation (1 week since last BM), diarrhea, nausea and vomiting.   Genitourinary:  Negative for dysuria, frequency, pelvic pain and urgency.        Dark colored urine but denies dysuria   Musculoskeletal:  Positive for arthralgias and myalgias. Negative for back pain, joint swelling and neck pain.   Skin:  Negative for color change, rash and wound.   Neurological:  Negative for dizziness, tremors, weakness, light-headedness and headaches.   Objective:     Vital Signs (Most Recent):  Temp: 97.7 °F (36.5 °C) (03/19/23 1100)  Pulse: 97 (03/19/23 1355)  Resp: 20 (03/19/23 1100)  BP: (!) 117/56 (03/19/23 1100)  SpO2: 96 % (03/19/23 1100)   Vital Signs (24h Range):  Temp:  [97.5 °F (36.4 °C)-98.4 °F (36.9 °C)] 97.7 °F (36.5 °C)  Pulse:  [] 97  Resp:  [18-21] 20  SpO2:  [93 %-96 %] 96 %  BP: (117-127)/(56-75) 117/56     Weight: (!) 136.4 kg (300 lb 11.3 oz)  Body mass index is 47.1 kg/m².    Physical Exam  Vitals and nursing note reviewed.   Constitutional:       General: She is not in acute distress.     Appearance: She is well-developed. She is obese.   HENT:      Head: Normocephalic and atraumatic.      Right Ear: External ear normal.      Left Ear: External ear normal.      Nose: Nose normal.      Mouth/Throat:      Pharynx: Posterior  oropharyngeal erythema (but no edema.  Pt states this happens to her intermittently) present.   Eyes:      General: No scleral icterus.        Right eye: No discharge.         Left eye: No discharge.      Extraocular Movements: Extraocular movements intact.      Conjunctiva/sclera: Conjunctivae normal.   Neck:      Thyroid: No thyromegaly.   Cardiovascular:      Rate and Rhythm: Normal rate and regular rhythm.      Heart sounds: No murmur heard.  Pulmonary:      Effort: Pulmonary effort is normal. No respiratory distress.      Breath sounds: No wheezing or rhonchi (soft rhonchi b/l bases).   Abdominal:      General: Bowel sounds are normal. There is no distension.      Palpations: Abdomen is soft.      Tenderness: There is no abdominal tenderness.   Musculoskeletal:         General: Swelling (right wrist, right knee) present.      Right lower leg: No edema.      Left lower leg: No edema.   Skin:     General: Skin is warm and dry.      Coloration: Skin is not jaundiced.   Neurological:      Mental Status: She is alert and oriented to person, place, and time.      Comments: Neg kernigs/brudzinskis. But cannot bend right knee   Psychiatric:         Behavior: Behavior normal.         Thought Content: Thought content normal.           Significant Labs: All pertinent labs within the past 24 hours have been reviewed.  Blood Culture:   No results for input(s): LABBLOO in the last 48 hours.    CBC:   Recent Labs   Lab 03/18/23  0620 03/19/23  0613   WBC 11.04 8.56   HGB 8.2* 8.4*   HCT 27.2* 28.0*    262       CMP:   Recent Labs   Lab 03/18/23  0620   *   K 4.3      CO2 22*   *   BUN 16   CREATININE 0.6   CALCIUM 9.1   PROT 4.9*   ALBUMIN 1.7*   BILITOT 1.1*   ALKPHOS 103   AST 21   ALT 21   ANIONGAP 9       Cardiac Markers:   No results for input(s): CKMB, MYOGLOBIN, BNP, TROPISTAT in the last 48 hours.    Lactic Acid:   No results for input(s): LACTATE in the last 48 hours.    Troponin:   No  results for input(s): TROPONINI, TROPONINIHS in the last 48 hours.    Urine Culture: No results for input(s): LABURIN in the last 48 hours.    Urine Studies:   No results for input(s): COLORU, APPEARANCEUA, PHUR, SPECGRAV, PROTEINUA, GLUCUA, KETONESU, BILIRUBINUA, OCCULTUA, NITRITE, UROBILINOGEN, LEUKOCYTESUR, RBCUA, WBCUA, BACTERIA, SQUAMEPITHEL, HYALINECASTS in the last 48 hours.    Invalid input(s): WRIGHTSUR      Significant Imaging: I have reviewed all pertinent imaging results/findings within the past 24 hours.  Abdominal u/s:  Surgically removed gallbladder with no biliary dilatation.  Enlarged heterogeneous liver.  Splenomegaly     CT AP  1. Bibasilar patchy alveolar infiltrates could represent pneumonia.  Minimal consolidation in the lingula and left lower lobe also.  Recommend follow-up.  2. Hepatosplenomegaly.  3. Nonobstructing nephrolithiasis of the left kidney.  4. Few stable mildly enlarged retroperitoneal lymph nodes.    CXR  Suboptimal inspiration.  Cardiac silhouette is within normal limits.  Bibasilar patchy alveolar infiltrates could represent pneumonia.  This is minimally improved from prior study.  No effusion or pneumothorax.     No acute osseous abnormality.  Remote rib fractures on the left.    CT:  Diffuse superficial soft tissue swelling compatible cellulitis.  Accompany findings concerning for accompanying deep soft tissue infection.  Carpal joint effusion for which septic arthritis is not excluded.  Flexor pollicis longus tenosynovitis, for septic tenosynovitis is not excluded.  Apparent small fluid collection within the superficial soft tissues over the dorsal ulnar aspect of the wrist for which abscess is not excluded.            Assessment/Plan:      * Sepsis  This patient does have evidence of infective focus  My overall impression is sepsis.  Source: Respiratory and Urinary Tract Bacteremia  Antibiotics given-   Antibiotics (72h ago, onward)    Start     Stop Route Frequency Ordered     03/16/23 1200  cefTRIAXone (ROCEPHIN) 1 g/50 mL D5W IVPB         -- IV Every 24 hours (non-standard times) 03/16/23 1138    03/15/23 0915  azithromycin (ZITHROMAX) 250 mg in dextrose 5 % (D5W) 250 mL IVPB         03/19 0914 IV Every 24 hours (non-standard times) 03/15/23 0811        Latest lactate reviewed-  No results for input(s): LACTATE in the last 72 hours.  Organ dysfunction indicated by Thrombocytopenia     Fluid challenge Not needed - patient is not hypotensive      Post- resuscitation assessment No - Post resuscitation assessment not needed       Will Not start Pressors- Levophed for MAP of 65  Source control achieved by: Vanc, Zosyn and azithromycin   IVF resuscitation already occurred in ED    Resolved and descalate abx 3/16    Arthritis of right knee  MRI in am.      COPD (chronic obstructive pulmonary disease)  xopenex prn  Scheduled breo       Myalgia  Diffuse myalgias and arthralgias  Possible due to acute infection  IVF in ED  CPK normal  PRN [place]  D/c today and started on gabapentin   PT/OT ordered---she wants to go to a nursing home for skilled if she doesn't get better.  If unable to work with therapy nursing home is an alternative.        Type 2 diabetes mellitus without complication, without long-term current use of insulin  Patient's FSGs are uncontrolled due to hyperglycemia on current medication regimen.  Last A1c reviewed-   Lab Results   Component Value Date    HGBA1C 6.4 (H) 03/15/2023     Most recent fingerstick glucose reviewed-   Recent Labs   Lab 03/17/23  1651 03/17/23  1952 03/18/23  0702 03/18/23  1136   POCTGLUCOSE 234* 220* 193* 232*     Current correctional scale  Medium  Maintain anti-hyperglycemic dose as follows-   Antihyperglycemics (From admission, onward)    Start     Stop Route Frequency Ordered    03/17/23 2100  insulin detemir U-100 pen 5 Units         -- SubQ Nightly 03/17/23 1305    03/15/23 2054  insulin aspart U-100 pen 0-15 Units         -- SubQ Before meals &  nightly PRN 03/15/23 1955        Hold Oral hypoglycemics while patient is in the hospital.    Increase detemir 10    Acute cystitis without hematuria  UA concnerning for infection  Urine and blood cx sent and pending  Cont broad spectrum antibx for now    NGTD      Hyperbilirubinemia  Unclear etiology  Imaging is unrevelaing  Repeat lab shows down trending       Microcytic anemia  H/H trended down  No evidence of acute bleeding but stool for occult ordered (currently with constipation)  Daily labs  Iron level low but will defer IV iron in acute infection.  Ferrous sulfate ordered.     This is a chronic problem for pt and she receives IV injections (unsure name)    Stable today 3/16        Streptococcal bacteremia  Suspect related to PNA - though sputum culture with pseudomonas  Sensitive to rocephin  WBC ct improved. ESR and CRP improved.  Right knee and wrist still swollen painful which draws concern for possible septic arthritis. Check CT because of no MRI today. Will consider MRI on Monday. She is able to bear weight now.  No meningeal signs.    3/19  I tapped her wrist without aspirate, and Dr. Gloria tapped her knee. Neither was productive of a large amount of fluid. This makes septic joint very unlikely. She did have an injection of her right wrist which should explain her localized swelling/effusion seen yesterday. Today, her swelling is improved. With above information, and recs from ID, she needs 7 days of Rocephin from neg culture, will order repeat esr/crp for tomorrow. As we do not have ortho here, and Dr. Hopson saw patient, may consider d/c home and f/u with him for joint aspiration and continued oral abx until negative eval with ortho. She has clinically improved. MRI of right knee ordered for tomorrow to gain more information as to why she cannot bear weight.    Pneumonia of both lungs due to infectious organism  Noted on imaging  No hypoxia  Nebs scheduled  Deescalate abx to IV rocephin and IV  azithromycin (completed 4 days)    3/19  Sputum cx with pseudomonas - levaquin day 1      Thrombocytopenia  Has had in the pastt as well but currently in relation to sepsis  Daily labs      Large cell (diffuse) non-Hodgkin's lymphoma  Diagnosed In 2011         VTE Risk Mitigation (From admission, onward)         Ordered     IP VTE HIGH RISK PATIENT  Once         03/14/23 1046     Place sequential compression device  Until discontinued         03/14/23 1046                Discharge Planning   JESSE:      Code Status: Full Code   Is the patient medically ready for discharge?:     Reason for patient still in hospital (select all that apply): Pending disposition  Discharge Plan A: Skilled Nursing Facility   Discharge Delays: None known at this time              Anaid Yung MD  Department of Hospital Medicine   [place] - Med Surg (3rd Fl)

## 2023-03-19 NOTE — SUBJECTIVE & OBJECTIVE
Review of Systems   Constitutional:  Positive for fatigue. Negative for activity change, fever and unexpected weight change.   HENT:  Negative for congestion, ear pain, hearing loss, rhinorrhea, sore throat and tinnitus.    Eyes:  Negative for pain, redness and visual disturbance.   Respiratory:  Positive for cough (productive green sputum). Negative for shortness of breath (NAIR) and wheezing.    Cardiovascular:  Negative for chest pain, palpitations and leg swelling.   Gastrointestinal:  Negative for abdominal pain, blood in stool, constipation (1 week since last BM), diarrhea, nausea and vomiting.   Genitourinary:  Negative for dysuria, frequency, pelvic pain and urgency.        Dark colored urine but denies dysuria   Musculoskeletal:  Positive for arthralgias and myalgias. Negative for back pain, joint swelling and neck pain.   Skin:  Negative for color change, rash and wound.   Neurological:  Negative for dizziness, tremors, weakness, light-headedness and headaches.   Objective:     Vital Signs (Most Recent):  Temp: 97.7 °F (36.5 °C) (03/19/23 1100)  Pulse: 97 (03/19/23 1355)  Resp: 20 (03/19/23 1100)  BP: (!) 117/56 (03/19/23 1100)  SpO2: 96 % (03/19/23 1100)   Vital Signs (24h Range):  Temp:  [97.5 °F (36.4 °C)-98.4 °F (36.9 °C)] 97.7 °F (36.5 °C)  Pulse:  [] 97  Resp:  [18-21] 20  SpO2:  [93 %-96 %] 96 %  BP: (117-127)/(56-75) 117/56     Weight: (!) 136.4 kg (300 lb 11.3 oz)  Body mass index is 47.1 kg/m².    Physical Exam  Vitals and nursing note reviewed.   Constitutional:       General: She is not in acute distress.     Appearance: She is well-developed. She is obese.   HENT:      Head: Normocephalic and atraumatic.      Right Ear: External ear normal.      Left Ear: External ear normal.      Nose: Nose normal.      Mouth/Throat:      Pharynx: Posterior oropharyngeal erythema (but no edema.  Pt states this happens to her intermittently) present.   Eyes:      General: No scleral icterus.         Right eye: No discharge.         Left eye: No discharge.      Extraocular Movements: Extraocular movements intact.      Conjunctiva/sclera: Conjunctivae normal.   Neck:      Thyroid: No thyromegaly.   Cardiovascular:      Rate and Rhythm: Normal rate and regular rhythm.      Heart sounds: No murmur heard.  Pulmonary:      Effort: Pulmonary effort is normal. No respiratory distress.      Breath sounds: No wheezing or rhonchi (soft rhonchi b/l bases).   Abdominal:      General: Bowel sounds are normal. There is no distension.      Palpations: Abdomen is soft.      Tenderness: There is no abdominal tenderness.   Musculoskeletal:         General: Swelling (right wrist, right knee) present.      Right lower leg: No edema.      Left lower leg: No edema.   Skin:     General: Skin is warm and dry.      Coloration: Skin is not jaundiced.   Neurological:      Mental Status: She is alert and oriented to person, place, and time.      Comments: Neg kernigs/brudzinskis. But cannot bend right knee   Psychiatric:         Behavior: Behavior normal.         Thought Content: Thought content normal.           Significant Labs: All pertinent labs within the past 24 hours have been reviewed.  Blood Culture:   No results for input(s): LABBLOO in the last 48 hours.    CBC:   Recent Labs   Lab 03/18/23  0620 03/19/23  0613   WBC 11.04 8.56   HGB 8.2* 8.4*   HCT 27.2* 28.0*    262       CMP:   Recent Labs   Lab 03/18/23  0620   *   K 4.3      CO2 22*   *   BUN 16   CREATININE 0.6   CALCIUM 9.1   PROT 4.9*   ALBUMIN 1.7*   BILITOT 1.1*   ALKPHOS 103   AST 21   ALT 21   ANIONGAP 9       Cardiac Markers:   No results for input(s): CKMB, MYOGLOBIN, BNP, TROPISTAT in the last 48 hours.    Lactic Acid:   No results for input(s): LACTATE in the last 48 hours.    Troponin:   No results for input(s): TROPONINI, TROPONINIHS in the last 48 hours.    Urine Culture: No results for input(s): LABURIN in the last 48 hours.    Urine  Studies:   No results for input(s): COLORU, APPEARANCEUA, PHUR, SPECGRAV, PROTEINUA, GLUCUA, KETONESU, BILIRUBINUA, OCCULTUA, NITRITE, UROBILINOGEN, LEUKOCYTESUR, RBCUA, WBCUA, BACTERIA, SQUAMEPITHEL, HYALINECASTS in the last 48 hours.    Invalid input(s): WRIGHTSUR      Significant Imaging: I have reviewed all pertinent imaging results/findings within the past 24 hours.  Abdominal u/s:  Surgically removed gallbladder with no biliary dilatation.  Enlarged heterogeneous liver.  Splenomegaly     CT AP  1. Bibasilar patchy alveolar infiltrates could represent pneumonia.  Minimal consolidation in the lingula and left lower lobe also.  Recommend follow-up.  2. Hepatosplenomegaly.  3. Nonobstructing nephrolithiasis of the left kidney.  4. Few stable mildly enlarged retroperitoneal lymph nodes.    CXR  Suboptimal inspiration.  Cardiac silhouette is within normal limits.  Bibasilar patchy alveolar infiltrates could represent pneumonia.  This is minimally improved from prior study.  No effusion or pneumothorax.     No acute osseous abnormality.  Remote rib fractures on the left.    CT:  Diffuse superficial soft tissue swelling compatible cellulitis.  Accompany findings concerning for accompanying deep soft tissue infection.  Carpal joint effusion for which septic arthritis is not excluded.  Flexor pollicis longus tenosynovitis, for septic tenosynovitis is not excluded.  Apparent small fluid collection within the superficial soft tissues over the dorsal ulnar aspect of the wrist for which abscess is not excluded.

## 2023-03-19 NOTE — PROGRESS NOTES
Pharmacist Renal Dose Adjustment Note    Hedy Conway is a 47 y.o. female being treated with the medication LEVAQUIN    Patient Data:    Vital Signs (Most Recent):  Temp: 97.9 °F (36.6 °C) (03/19/23 0728)  Pulse: 91 (03/19/23 0800)  Resp: 20 (03/19/23 0729)  BP: 122/75 (03/19/23 0728)  SpO2: 96 % (03/19/23 0729) Vital Signs (72h Range):  Temp:  [96.9 °F (36.1 °C)-99.8 °F (37.7 °C)]   Pulse:  []   Resp:  [16-28]   BP: (116-148)/(53-77)   SpO2:  [92 %-97 %]      Recent Labs   Lab 03/16/23  0606 03/17/23  0545 03/18/23  0620   CREATININE 0.7 0.6 0.6     Serum creatinine: 0.6 mg/dL 03/18/23 0620  Estimated creatinine clearance: 165.6 mL/min    Medication: LEVAQUIN  dose:  500MG  frequency  DAILY  will be changed to medication: LEVAQUIN  dose: 750MG  frequency: Q24H     Pharmacist's Name: Manjula Pike, PharmD   Pharmacist's Extension: [phone]

## 2023-03-19 NOTE — PLAN OF CARE
Problem: Adult Inpatient Plan of Care  Goal: Absence of Hospital-Acquired Illness or Injury  Outcome: Ongoing, Progressing     Problem: Adult Inpatient Plan of Care  Goal: Optimal Comfort and Wellbeing  Outcome: Ongoing, Progressing     Problem: Glycemic Control Impaired (Sepsis/Septic Shock)  Goal: Blood Glucose Level Within Desired Range  Outcome: Ongoing, Progressing     Problem: Pain Acute  Goal: Acceptable Pain Control and Functional Ability  Outcome: Ongoing, Progressing

## 2023-03-19 NOTE — PROCEDURES
"Hedy Conway is a 47 y.o. female patient.    Temp: 97.7 °F (36.5 °C) (03/19/23 1100)  Pulse: 96 (03/19/23 1100)  Resp: 20 (03/19/23 1100)  BP: (!) 117/56 (03/19/23 1100)  SpO2: 96 % (03/19/23 1100)  Weight: (!) 136.4 kg (300 lb 11.3 oz) (03/14/23 2006)  Height: 5' 7" (170.2 cm) (03/14/23 2006)       Arthrocentesis    Date/Time: 3/19/2023 1:11 PM  Location procedure was performed: [facility] MEDICINE  Performed by: Faizan Palmer MD  Authorized by: Faizan Palmer MD   Pre-op diagnosis: Arthritis right knee with effusion  Post-op diagnosis: Same  Consent Done: Yes  Consent: Verbal consent obtained.  Risks and benefits: risks, benefits and alternatives were discussed  Consent given by: patient  Patient understanding: patient states understanding of the procedure being performed  Patient consent: the patient's understanding of the procedure matches consent given  Procedure consent: procedure consent matches procedure scheduled  Relevant documents: relevant documents present and verified  Test results: test results available and properly labeled  Site marked: the operative site was marked  Imaging studies: imaging studies available  Patient identity confirmed: verbally with patient  Time out: Immediately prior to procedure a "time out" was called to verify the correct patient, procedure, equipment, support staff and site/side marked as required.  Indications: joint swelling and possible septic joint   Body area: knee  Joint: right knee  Local anesthesia used: yes  Anesthesia: local infiltration    Anesthesia:  Local anesthesia used: yes  Local Anesthetic: lidocaine 1% with epinephrine    Patient sedated: no  Description of findings: Tender swollen right knee   Needle size: 22 G  Approach: lateral  Aspirate amount: 0 (2 drops of serosanguinous fluid) mL  Patient tolerance: Patient tolerated the procedure well with no immediate complications  Complications: No  Estimated blood loss (mL): " 0  Specimens: No        3/19/2023

## 2023-03-20 PROBLEM — R82.90 ABNORMAL URINALYSIS: Status: ACTIVE | Noted: 2023-03-14

## 2023-03-20 LAB
POCT GLUCOSE: 148 MG/DL (ref 70–110)
POCT GLUCOSE: 153 MG/DL (ref 70–110)
POCT GLUCOSE: 206 MG/DL (ref 70–110)
POCT GLUCOSE: 208 MG/DL (ref 70–110)

## 2023-03-20 PROCEDURE — 99233 SBSQ HOSP IP/OBS HIGH 50: CPT | Mod: GT,,, | Performed by: PHYSICIAN ASSISTANT

## 2023-03-20 PROCEDURE — 97535 SELF CARE MNGMENT TRAINING: CPT | Mod: CO

## 2023-03-20 PROCEDURE — 11000001 HC ACUTE MED/SURG PRIVATE ROOM

## 2023-03-20 PROCEDURE — 63600175 PHARM REV CODE 636 W HCPCS: Performed by: PHYSICIAN ASSISTANT

## 2023-03-20 PROCEDURE — 25000003 PHARM REV CODE 250: Performed by: PHYSICIAN ASSISTANT

## 2023-03-20 PROCEDURE — 25000003 PHARM REV CODE 250: Performed by: FAMILY MEDICINE

## 2023-03-20 PROCEDURE — 25000003 PHARM REV CODE 250: Performed by: INTERNAL MEDICINE

## 2023-03-20 PROCEDURE — 99233 PR SUBSEQUENT HOSPITAL CARE,LEVL III: ICD-10-PCS | Mod: GT,,, | Performed by: PHYSICIAN ASSISTANT

## 2023-03-20 PROCEDURE — 97530 THERAPEUTIC ACTIVITIES: CPT

## 2023-03-20 PROCEDURE — 97110 THERAPEUTIC EXERCISES: CPT | Mod: CO

## 2023-03-20 PROCEDURE — 94640 AIRWAY INHALATION TREATMENT: CPT

## 2023-03-20 PROCEDURE — 94761 N-INVAS EAR/PLS OXIMETRY MLT: CPT

## 2023-03-20 PROCEDURE — 25000003 PHARM REV CODE 250: Performed by: STUDENT IN AN ORGANIZED HEALTH CARE EDUCATION/TRAINING PROGRAM

## 2023-03-20 RX ORDER — BISACODYL 10 MG
10 SUPPOSITORY, RECTAL RECTAL DAILY PRN
Status: DISCONTINUED | OUTPATIENT
Start: 2023-03-20 | End: 2023-03-21 | Stop reason: HOSPADM

## 2023-03-20 RX ADMIN — DOCUSATE SODIUM 100 MG: 100 CAPSULE, LIQUID FILLED ORAL at 09:03

## 2023-03-20 RX ADMIN — ACETAMINOPHEN 500 MG: 500 TABLET ORAL at 02:03

## 2023-03-20 RX ADMIN — GABAPENTIN 100 MG: 100 CAPSULE ORAL at 08:03

## 2023-03-20 RX ADMIN — GABAPENTIN 100 MG: 100 CAPSULE ORAL at 09:03

## 2023-03-20 RX ADMIN — FERROUS SULFATE TAB 325 MG (65 MG ELEMENTAL FE) 1 EACH: 325 (65 FE) TAB at 09:03

## 2023-03-20 RX ADMIN — INSULIN ASPART 6 UNITS: 100 INJECTION, SOLUTION INTRAVENOUS; SUBCUTANEOUS at 12:03

## 2023-03-20 RX ADMIN — ACETAMINOPHEN 500 MG: 500 TABLET ORAL at 08:03

## 2023-03-20 RX ADMIN — CEFTRIAXONE 1 G: 1 INJECTION, SOLUTION INTRAVENOUS at 11:03

## 2023-03-20 RX ADMIN — FLUTICASONE FUROATE AND VILANTEROL TRIFENATATE 1 PUFF: 100; 25 POWDER RESPIRATORY (INHALATION) at 07:03

## 2023-03-20 RX ADMIN — CYCLOBENZAPRINE HYDROCHLORIDE 10 MG: 10 TABLET, FILM COATED ORAL at 08:03

## 2023-03-20 RX ADMIN — LEVOFLOXACIN 750 MG: 250 TABLET, FILM COATED ORAL at 09:03

## 2023-03-20 RX ADMIN — METOPROLOL TARTRATE 25 MG: 25 TABLET, FILM COATED ORAL at 08:03

## 2023-03-20 RX ADMIN — MONTELUKAST 10 MG: 10 TABLET, FILM COATED ORAL at 09:03

## 2023-03-20 RX ADMIN — BISACODYL 10 MG: 10 SUPPOSITORY RECTAL at 09:03

## 2023-03-20 RX ADMIN — GABAPENTIN 100 MG: 100 CAPSULE ORAL at 02:03

## 2023-03-20 RX ADMIN — INSULIN ASPART 4 UNITS: 100 INJECTION, SOLUTION INTRAVENOUS; SUBCUTANEOUS at 08:03

## 2023-03-20 RX ADMIN — METOPROLOL TARTRATE 25 MG: 25 TABLET, FILM COATED ORAL at 09:03

## 2023-03-20 RX ADMIN — ACETAMINOPHEN 500 MG: 500 TABLET ORAL at 09:03

## 2023-03-20 NOTE — ASSESSMENT & PLAN NOTE
This patient does have evidence of infective focus  My overall impression is sepsis.  Source: Respiratory and Bacteremia  Antibiotics given-   Antibiotics (72h ago, onward)    Start     Stop Route Frequency Ordered    03/19/23 0930  levoFLOXacin tablet 750 mg         -- Oral Daily 03/19/23 0908    03/16/23 1200  cefTRIAXone (ROCEPHIN) 1 g/50 mL D5W IVPB         -- IV Every 24 hours (non-standard times) 03/16/23 1138        Latest lactate reviewed-  No results for input(s): LACTATE in the last 72 hours.  Organ dysfunction indicated by Thrombocytopenia     Fluid challenge Not needed - patient is not hypotensive      Post- resuscitation assessment No - Post resuscitation assessment not needed       Will Not start Pressors- Levophed for MAP of 65  Source control achieved by: Vanc, Zosyn and azithromycin   IVF resuscitation already occurred in ED    Resolved and descalate abx 3/16

## 2023-03-20 NOTE — PROGRESS NOTES
[facility] Surg ([facility])  [facility] Medicine  Progress Note    Patient Name: Hedy Conway  MRN: [id number]  Patient Class: IP- Inpatient   Admission Date: 3/13/2023  Length of Stay: 7 days  Attending Physician: Uche Blum MD  Primary Care Provider: John Lantigua PA-C        Subjective:     Principal Problem:Sepsis        HPI:  Hedy Conway is a 47 y.o. female with PMH: Non-Hodgkin's Lymphoma (2011), DM on metformin, HTN, Arthritis, COPD who was sent to the ED by PCP for further eval of abnormal lab work.   Patient reports that last Friday night (03/03) she went to bed in her normal state of health, woke up the next morning (03/04) and could not walk due to L thigh pain. She went to [facility] ED and was dx with a pulled muscle (Sat). She also reports subjective fever with tmax of 101.3F for three days while home. She followed up with her PCP-John Lantigua, on Wed (03/08) and was treated symptomatically and told to return if symptoms worsen. She went to bed Wed evening; felt a pop in her R knee and now with R wrist pain prompting ED visit at [facility] on 03/09/23. She was again dx with muscle pain.   She was seen by Dr. Mark Anthony gongora, for R knee pain and was told that she possibly has torn cartilage in her knee. She also received a steroid injection in her R wrist. She is awaiting a MRI of her R knee and continues with pain.  Symptoms have progressively worsened, noting that she cannot walk and has been having to wear diapers for the past 5 days. She went back to the ED at [facility] on Friday and was dx with a UTI. She was discharged home with an antibiotic that starts with a C.   Since this time she has had Progressive weakness, fatigue, hallucinations, and night sweats.  Denies abdominal pain, NVD. Denies back pain.   She notes a + productive cough with blood tinged sputum. Denies associated cp or sob.  LBM X 1 week ago>Taking norco for pain at home. Denies rectal bleeding or Black  stool  She presents pale, diaphoretic and tachycardic.    ER work up showed concern for UTI and bibasilar PNA. She does also report cough and NAIR on further questioning. Started on Vanc and Zosyn for sepsis with WBC of 21K. Initial LA elevated, resolved with IVF in ED. Cr improved from 1.1 to 0.8. Interestingly t bili 3.3m alk phos 151. CT showed hepatomegaly. US without CBD dilation and gallbladder surgically removed. Denies abd pain. She is mildly jaundiced on exam. On rounds this afternoon her only complaint is diffuse body and joint pain and requesting pain medication.       Overview/Hospital Course:  Admitted for sepsis secondary to bacteremia and pneumonia.  Currently on IV azithromycin, vancomycin and zosyn.  Hg 6.3 on am labs and patient with weakness/tachycardia.  2 units of pRBC ordered.      Leukocytosis slowly improving.  H/h stable with blood transfusion and currently 8.2.  Deescalate abx; group A strept seen on 1 out of 2 blood cultures.  Sensitive to rocephin.  Will cover for pneumonia with rocephin and azithromycin.  Encouraging patient to participate with therapy.  She has generalized pain.  Gabapentin started.      Patient still with generalized pain.  ESR and CRP ordered.  Leukocytosis continues to improve. Continue IV abx for strep bacteremia secondary to PNA infection.  Encouraging patient to work with therapy.      3/18  Patient still c/o pain and swelling to right wrist and right knee. Left hip pain improved. She was able to get up on the side of the bed. No fever. WBC ct improved.     3/19  Patient is day 6 on rocephin today. She has had some improvement of her right wrist swelling and has been able to at least bear weight on her right knee. However, because of persistent swelling and her total clinical picture, I reached out to ID. The recommendation was to have an aspirate of her joints to determine duration of abx in the event that her bacteremia was secondary to migratory septic  arthritis. Aspiration of the right wrist and right knee at bedside - see a/p. She remains afebrile, pain improved, no hypoxia, slight cough.    3/20 Leukocytosis improving. CT wrist with diffuse superficial soft tissue swelling and concern for carpal joint effusion concerning for septic arthritis.  MRI knee pending.  Working with therapy as tolerated.  Continue IV abxs for pneumonia, bacteremia and possible septic arthritis.            Review of Systems   Constitutional:  Positive for fatigue. Negative for activity change, fever and unexpected weight change.   HENT:  Negative for congestion, ear pain, hearing loss, rhinorrhea, sore throat and tinnitus.    Eyes:  Negative for pain, redness and visual disturbance.   Respiratory:  Positive for cough (improving). Negative for shortness of breath (NAIR) and wheezing.    Cardiovascular:  Negative for chest pain, palpitations and leg swelling.   Gastrointestinal:  Negative for abdominal pain, blood in stool, constipation (1 week since last BM), diarrhea, nausea and vomiting.   Genitourinary:  Negative for dysuria, frequency, pelvic pain and urgency.        Dark colored urine but denies dysuria   Musculoskeletal:  Positive for arthralgias and myalgias. Negative for back pain, joint swelling and neck pain.   Skin:  Negative for color change, rash and wound.   Neurological:  Negative for dizziness, tremors, weakness, light-headedness and headaches.   Objective:     Vital Signs (Most Recent):  Temp: 99 °F (37.2 °C) (03/20/23 1122)  Pulse: 103 (03/20/23 1200)  Resp: 16 (03/20/23 1122)  BP: 120/63 (03/20/23 1122)  SpO2: 97 % (03/20/23 1122)   Vital Signs (24h Range):  Temp:  [96.9 °F (36.1 °C)-99 °F (37.2 °C)] 99 °F (37.2 °C)  Pulse:  [] 103  Resp:  [16-20] 16  SpO2:  [94 %-98 %] 97 %  BP: (116-140)/(56-82) 120/63     Weight: 130.8 kg (288 lb 5.8 oz)  Body mass index is 45.16 kg/m².    Physical Exam  Vitals and nursing note reviewed.   Constitutional:       General: She is  not in acute distress.     Appearance: She is well-developed. She is obese.   HENT:      Head: Normocephalic and atraumatic.      Right Ear: External ear normal.      Left Ear: External ear normal.      Nose: Nose normal.      Mouth/Throat:      Pharynx: Posterior oropharyngeal erythema (but no edema.  Pt states this happens to her intermittently) present.   Eyes:      General: No scleral icterus.        Right eye: No discharge.         Left eye: No discharge.      Extraocular Movements: Extraocular movements intact.      Conjunctiva/sclera: Conjunctivae normal.   Neck:      Thyroid: No thyromegaly.   Cardiovascular:      Rate and Rhythm: Normal rate and regular rhythm.      Heart sounds: No murmur heard.  Pulmonary:      Effort: Pulmonary effort is normal. No respiratory distress.      Breath sounds: No wheezing or rhonchi (soft rhonchi b/l bases).   Abdominal:      General: Bowel sounds are normal. There is no distension.      Palpations: Abdomen is soft.      Tenderness: There is no abdominal tenderness.   Musculoskeletal:         General: Swelling (right wrist, right knee) present.      Right lower leg: No edema.      Left lower leg: No edema.   Skin:     General: Skin is warm and dry.      Coloration: Skin is not jaundiced.   Neurological:      Mental Status: She is alert and oriented to person, place, and time.      Comments: Neg kernigs/brudzinskis. But cannot bend right knee   Psychiatric:         Behavior: Behavior normal.         Thought Content: Thought content normal.           Significant Labs: All pertinent labs within the past 24 hours have been reviewed.  Blood Culture:   No results for input(s): LABBLOO in the last 48 hours.    CBC:   Recent Labs   Lab 03/19/23  0613   WBC 8.56   HGB 8.4*   HCT 28.0*                Significant Imaging: I have reviewed all pertinent imaging results/findings within the past 24 hours.  Abdominal u/s:  Surgically removed gallbladder with no biliary dilatation.   Enlarged heterogeneous liver.  Splenomegaly     CT AP  1. Bibasilar patchy alveolar infiltrates could represent pneumonia.  Minimal consolidation in the lingula and left lower lobe also.  Recommend follow-up.  2. Hepatosplenomegaly.  3. Nonobstructing nephrolithiasis of the left kidney.  4. Few stable mildly enlarged retroperitoneal lymph nodes.    CXR  Suboptimal inspiration.  Cardiac silhouette is within normal limits.  Bibasilar patchy alveolar infiltrates could represent pneumonia.  This is minimally improved from prior study.  No effusion or pneumothorax.     No acute osseous abnormality.  Remote rib fractures on the left.    CT:  Diffuse superficial soft tissue swelling compatible cellulitis.  Accompany findings concerning for accompanying deep soft tissue infection.  Carpal joint effusion for which septic arthritis is not excluded.  Flexor pollicis longus tenosynovitis, for septic tenosynovitis is not excluded.  Apparent small fluid collection within the superficial soft tissues over the dorsal ulnar aspect of the wrist for which abscess is not excluded.            Assessment/Plan:      * Sepsis  This patient does have evidence of infective focus  My overall impression is sepsis.  Source: Respiratory and Bacteremia  Antibiotics given-   Antibiotics (72h ago, onward)    Start     Stop Route Frequency Ordered    03/19/23 0930  levoFLOXacin tablet 750 mg         -- Oral Daily 03/19/23 0908    03/16/23 1200  cefTRIAXone (ROCEPHIN) 1 g/50 mL D5W IVPB         -- IV Every 24 hours (non-standard times) 03/16/23 1138        Latest lactate reviewed-  No results for input(s): LACTATE in the last 72 hours.  Organ dysfunction indicated by Thrombocytopenia     Fluid challenge Not needed - patient is not hypotensive      Post- resuscitation assessment No - Post resuscitation assessment not needed       Will Not start Pressors- Levophed for MAP of 65  Source control achieved by: Vanc, Zosyn and azithromycin   IVF  resuscitation already occurred in ED    Resolved and descalate abx 3/16          Arthritis of right knee  MRI today      COPD (chronic obstructive pulmonary disease)  xopenex prn  Scheduled breo       Myalgia  Diffuse myalgias and arthralgias  Possible due to acute infection  IVF in ED  CPK elevated at presentation   Gabapentin   PT/OT ordered---she wants to go to a nursing home for skilled if she doesn't get better.  If unable to work with therapy nursing home is an alternative.    Working up septic arthritis.        Type 2 diabetes mellitus without complication, without long-term current use of insulin  Patient's FSGs are uncontrolled due to hyperglycemia on current medication regimen.  Last A1c reviewed-   Lab Results   Component Value Date    HGBA1C 6.4 (H) 03/15/2023     Most recent fingerstick glucose reviewed-   Recent Labs   Lab 03/19/23  1643 03/19/23  1952 03/20/23  0711 03/20/23  1119   POCTGLUCOSE 157* 186* 148* 208*     Current correctional scale  Medium  Maintain anti-hyperglycemic dose as follows-   Antihyperglycemics (From admission, onward)    Start     Stop Route Frequency Ordered    03/18/23 2100  insulin detemir U-100 pen 10 Units         -- SubQ Nightly 03/18/23 1247    03/15/23 2054  insulin aspart U-100 pen 0-15 Units         -- SubQ Before meals & nightly PRN 03/15/23 1955        Hold Oral hypoglycemics while patient is in the hospital.    Continue detemir 10    Abnormal urinalysis  Received broad spectrum abx but low suspicion for UTI  Urine culture negative       Hyperbilirubinemia  Unclear etiology  Imaging is unrevelaing  Repeat lab shows down trending       Microcytic anemia  H/H trended down  No evidence of acute bleeding but stool for occult ordered (currently with constipation)  Daily labs  Iron level low but will defer IV iron in acute infection.  Ferrous sulfate ordered.     This is a chronic problem for pt and she receives IV injections (unsure name)    Stable 8.4  Repeat labs for  tomorrow       Streptococcal bacteremia  Suspect related to PNA - though sputum culture with pseudomonas  Sensitive to rocephin  WBC ct improved. ESR and CRP improved.  Right knee and wrist still swollen painful which draws concern for possible septic arthritis. Check CT because of no MRI today. Will consider MRI on Monday. She is able to bear weight now.  No meningeal signs.    3/19  I tapped her wrist without aspirate, and Dr. Gloria tapped her knee. Neither was productive of a large amount of fluid. This makes septic joint very unlikely. She did have an injection of her right wrist which should explain her localized swelling/effusion seen yesterday. Today, her swelling is improved. With above information, and recs from ID, she needs 7 days of Rocephin from neg culture, will order repeat esr/crp for tomorrow. As we do not have ortho here, and Dr. Hopson saw patient, may consider d/c home and f/u with him for joint aspiration and continued oral abx until negative eval with ortho. She has clinically improved. MRI of right knee ordered for tomorrow to gain more information as to why she cannot bear weight.    3/20 CT wrist with cellulitis and effusion in carpal jt concerning for septic arthritis (Elevated CRP/ESR).  MRI right knee pending.  Continue IV rocephin.       Pneumonia of both lungs due to infectious organism  Noted on imaging  No hypoxia  Nebs scheduled  Deescalate abx to IV rocephin and IV azithromycin (completed 4 days)    3/20  Sputum cx with pseudomonas - levaquin day 2      Thrombocytopenia  Has had in the past as well but currently in relation to sepsis  Daily labs      Large cell (diffuse) non-Hodgkin's lymphoma  Diagnosed In 2011         VTE Risk Mitigation (From admission, onward)         Ordered     IP VTE HIGH RISK PATIENT  Once         03/14/23 1046     Place sequential compression device  Until discontinued         03/14/23 1046                Discharge Planning   JESSE:      Code Status: Full Code    Is the patient medically ready for discharge?:     Reason for patient still in hospital (select all that apply): Patient trending condition  Discharge Plan A: Skilled Nursing Facility   Discharge Delays: None known at this time              Saira Solitario PA-C  Department of Hospital Medicine   [facility] Surg (3rd Fl)

## 2023-03-20 NOTE — ASSESSMENT & PLAN NOTE
H/H trended down  No evidence of acute bleeding but stool for occult ordered (currently with constipation)  Daily labs  Iron level low but will defer IV iron in acute infection.  Ferrous sulfate ordered.     This is a chronic problem for pt and she receives IV injections (unsure name)    Stable 8.4  Repeat labs for tomorrow

## 2023-03-20 NOTE — ASSESSMENT & PLAN NOTE
Diffuse myalgias and arthralgias  Possible due to acute infection  IVF in ED  CPK elevated at presentation   Gabapentin   PT/OT ordered---she wants to go to a nursing home for skilled if she doesn't get better.  If unable to work with therapy nursing home is an alternative.    Working up septic arthritis.

## 2023-03-20 NOTE — ASSESSMENT & PLAN NOTE
Patient's FSGs are uncontrolled due to hyperglycemia on current medication regimen.  Last A1c reviewed-   Lab Results   Component Value Date    HGBA1C 6.4 (H) 03/15/2023     Most recent fingerstick glucose reviewed-   Recent Labs   Lab 03/19/23  1643 03/19/23  1952 03/20/23  0711 03/20/23  1119   POCTGLUCOSE 157* 186* 148* 208*     Current correctional scale  Medium  Maintain anti-hyperglycemic dose as follows-   Antihyperglycemics (From admission, onward)    Start     Stop Route Frequency Ordered    03/18/23 2100  insulin detemir U-100 pen 10 Units         -- SubQ Nightly 03/18/23 1247    03/15/23 2054  insulin aspart U-100 pen 0-15 Units         -- SubQ Before meals & nightly PRN 03/15/23 1955        Hold Oral hypoglycemics while patient is in the hospital.    Continue detemir 10

## 2023-03-20 NOTE — PT/OT/SLP PROGRESS
"Physical Therapy Treatment    Patient Name:  Hedy Conway   MRN:  [id number]    Recommendations:     Discharge Recommendations: rehabilitation facility, nursing facility, skilled, nursing facility, basic  Discharge Equipment Recommendations: walker, rolling, wheelchair  Barriers to discharge: Inaccessible home and Decreased caregiver support    Assessment:     Hedy Conway is a 47 y.o. female admitted with a medical diagnosis of Sepsis.  She presents with the following impairments/functional limitations: weakness, impaired endurance, impaired self care skills, impaired functional mobility, gait instability, impaired balance, decreased upper extremity function, decreased lower extremity function, decreased safety awareness, pain, decreased ROM, impaired joint extensibility, impaired coordination, edema, impaired muscle length. Patient reported pain on right knee and hand especially upon movement resulting to limited ROM ex with rest periods in between. Tolerated sitting up at edge of the bed with maxA and cues in supine to sit. Patient  requested to stay up at edge of the bed for breakfast. Nursing made aware.    Rehab Prognosis: Fair; patient would benefit from acute skilled PT services to address these deficits and reach maximum level of function.    Recent Surgery: * No surgery found *      Plan:     During this hospitalization, patient to be seen 5 x/week to address the identified rehab impairments via therapeutic activities, therapeutic exercises, neuromuscular re-education and progress toward the following goals:    Plan of Care Expires:  03/29/23    Subjective     Chief Complaint: Right knee and hand pain especially upon movement.  Patient/Family Comments/goals: 'to dec pain".  Pain/Comfort:  Pain Rating 1: 10/10  Location - Orientation 1: generalized  Location 1: hand  Pain Addressed 1: Reposition, Distraction  Pain Rating Post-Intervention 1: 10/10  Pain Rating 2: 10/10  Location - Side 2: " Right  Location - Orientation 2: generalized  Location 2: knee  Pain Addressed 2: Reposition, Cessation of Activity  Pain Rating Post-Intervention 2: 10/10      Objective:     Communicated with patient  prior to session.  Patient found HOB elevated with peripheral IV, telemetry, PureWick upon PT entry to room.     General Precautions: Standard, fall  Orthopedic Precautions: N/A  Braces: N/A  Respiratory Status: Room air     Functional Mobility:  Bed Mobility:     Rolling Left:  maximal assistance   Rolling Right: maximal assistance  Supine to Sit: maximal assistance  Transfers:  unable due to generalized body pain upon movement.  Gait: unable      AM-PAC 6 CLICK MOBILITY  Turning over in bed (including adjusting bedclothes, sheets and blankets)?: 2  Sitting down on and standing up from a chair with arms (e.g., wheelchair, bedside commode, etc.): 1  Moving from lying on back to sitting on the side of the bed?: 2  Moving to and from a bed to a chair (including a wheelchair)?: 1  Need to walk in hospital room?: 1  Climbing 3-5 steps with a railing?: 1  Basic Mobility Total Score: 8       Treatment & Education:  Provided AAROM ex to Bilateral LE x 5 - 10 reps on each with rest periods dependeing on pain tolerance; Rolling to side x 5 reps on each with maxA and cues using bed rail. Supine to sit with maxA and cues; Sitting activity at edge of the bed for breakfast set up.    Patient left sitting edge of bed with all lines intact, call button in reach, and nursing notified..    GOALS:   Multidisciplinary Problems       Physical Therapy Goals          Problem: Physical Therapy    Goal Priority Disciplines Outcome Goal Variances Interventions   Physical Therapy Goal     PT, PT/OT Ongoing, Progressing     Description:   Patient will increase functional independence with mobility by performin. Rolling to sides with Malachi using bed rail  2. Supine <> sit with Moderate Assistance  3. Able to perform and tolerate static  sitting at edge of the  bed for ~ 10 minutes  with minimal assistance. (MET dated 3/18/2023)   4. Lower extremity exercise program x10 reps per handout, with assistance as needed  5. SBA with wheelchair mobility using both UE/LE for about 100 feet.                          Time Tracking:     PT Received On: 03/20/23  PT Start Time: 0848     PT Stop Time: 0905  PT Total Time (min): 17 min     Billable Minutes: Therapeutic Activity 17    Treatment Type: Treatment  PT/PTA: PT           03/20/2023

## 2023-03-20 NOTE — PLAN OF CARE
Problem: Adult Inpatient Plan of Care  Goal: Plan of Care Review  Outcome: Ongoing, Progressing  Goal: Patient-Specific Goal (Individualized)  Outcome: Ongoing, Progressing  Goal: Absence of Hospital-Acquired Illness or Injury  Outcome: Ongoing, Progressing  Goal: Optimal Comfort and Wellbeing  Outcome: Ongoing, Progressing  Goal: Readiness for Transition of Care  Outcome: Ongoing, Progressing     Problem: Bariatric Environmental Safety  Goal: Safety Maintained with Care  Outcome: Ongoing, Progressing     Problem: Skin Injury Risk Increased  Goal: Skin Health and Integrity  Outcome: Ongoing, Progressing     Problem: Adjustment to Illness (Sepsis/Septic Shock)  Goal: Optimal Coping  Outcome: Ongoing, Progressing     Problem: Bleeding (Sepsis/Septic Shock)  Goal: Absence of Bleeding  Outcome: Ongoing, Progressing     Problem: Glycemic Control Impaired (Sepsis/Septic Shock)  Goal: Blood Glucose Level Within Desired Range  Outcome: Ongoing, Progressing     Problem: Infection Progression (Sepsis/Septic Shock)  Goal: Absence of Infection Signs and Symptoms  Outcome: Ongoing, Progressing     Problem: Nutrition Impaired (Sepsis/Septic Shock)  Goal: Optimal Nutrition Intake  Outcome: Ongoing, Progressing     Problem: Fluid Imbalance (Pneumonia)  Goal: Fluid Balance  Outcome: Ongoing, Progressing     Problem: Infection (Pneumonia)  Goal: Resolution of Infection Signs and Symptoms  Outcome: Ongoing, Progressing     Problem: Respiratory Compromise (Pneumonia)  Goal: Effective Oxygenation and Ventilation  Outcome: Ongoing, Progressing     Problem: Diabetes Comorbidity  Goal: Blood Glucose Level Within Targeted Range  Outcome: Ongoing, Progressing     Problem: Impaired Wound Healing  Goal: Optimal Wound Healing  Outcome: Ongoing, Progressing     Problem: Fall Injury Risk  Goal: Absence of Fall and Fall-Related Injury  Outcome: Ongoing, Progressing     Problem: Pain Acute  Goal: Acceptable Pain Control and Functional  Ability  Outcome: Ongoing, Progressing     Problem: Anemia  Goal: Anemia Symptom Improvement  Outcome: Ongoing, Progressing

## 2023-03-20 NOTE — PT/OT/SLP PROGRESS
Occupational Therapy   Treatment    Name: Hedy Conway  MRN: [id number]  Admitting Diagnosis:  Sepsis       Recommendations:     Discharge Recommendations: rehabilitation facility, nursing facility, basic, nursing facility, skilled  Discharge Equipment Recommendations:  walker, rolling, wheelchair  Barriers to discharge:  Inaccessible home environment, Decreased caregiver support    Assessment:     Hedy Conway is a 47 y.o. female with a medical diagnosis of Sepsis.  Performance deficits affecting function are weakness, impaired endurance, impaired self care skills, impaired functional mobility, gait instability, impaired balance, decreased upper extremity function, decreased lower extremity function, decreased safety awareness, pain, decreased ROM, impaired joint extensibility, impaired coordination, edema, impaired muscle length.     Rehab Prognosis:  Fair; patient would benefit from acute skilled OT services to address these deficits and reach maximum level of function.       Plan:     Patient to be seen 5 x/week to address the above listed problems via self-care/home management, therapeutic activities, therapeutic exercises  Plan of Care Expires:    Plan of Care Reviewed with: patient    Subjective     Pain/Comfort:  Pain Rating 1: other (see comments) (5/10  with no movement and 10/10 with movement)  Location - Side 1: Right  Location - Orientation 1: generalized  Location 1: hand  Pain Addressed 1: Reposition, Cessation of Activity  Pain Rating Post-Intervention 1:  (5/10 without movement and 10/10 with movement)  Pain Rating 2:  (5/10 without movement and 10/10 with movement)  Location - Side 2: Right  Location - Orientation 2: generalized  Location 2: wrist  Pain Addressed 2: Reposition, Cessation of Activity  Pain Rating Post-Intervention 2:  (5/10 without movement and 10/10 with movement)    Objective:     Communicated with: RN prior to session.  Patient found HOB elevated with peripheral IV,  telemetry, Kane upon OT entry to room.    General Precautions: Standard, fall    Orthopedic Precautions:N/A  Braces: N/A  Respiratory Status: Room air     Occupational Performance:     Activities of Daily Living:  Feeding:  stand by assistance for opening packages      [facility] 6 Click ADL: 11    Treatment & Education:  Pt was instructed in B UE there ex 2 sets of 10 reps with AAROM to perform shoulder flex/ext of both arms due to pain and stiffness. Pt was able to perform hand flex/ext with limited range of motion of R hand as patient was unable to make a composite fist or move wrist without increase in c/o pain. Pt required SBA for feeding post set up as patient is unable to open any packages that require bilateral coordination due to pain and limited strength/AROM of R hand.     Patient left HOB elevated with all lines intact, call button in reach, and RN notified    GOALS:   Multidisciplinary Problems       Occupational Therapy Goals          Problem: Occupational Therapy    Goal Priority Disciplines Outcome Interventions   Occupational Therapy Goal     OT, PT/OT Ongoing, Progressing    Description: Pt to perform level functional transfers required for ADL's with MOD A, RW level.  Pt to participate in bilateral upper extremity strengthening routine for increased strength, range of motion, and performance in ADL.  Pt to perform seated ADL activity for ~ 5 minutes at EOB for improved activity tolerance and reduced caregiver burden upon discharge. (MET)                       Time Tracking:     OT Date of Treatment: 03/20/23  OT Start Time: 1650  OT Stop Time: 1715  OT Total Time (min): 25 min    Billable Minutes:Self Care/Home Management 8  Therapeutic Exercise 17    OT/CHANDLER: CHANDLER     Number of CHANDLER visits since last OT visit: 2    3/20/2023

## 2023-03-20 NOTE — ASSESSMENT & PLAN NOTE
Suspect related to PNA - though sputum culture with pseudomonas  Sensitive to rocephin  WBC ct improved. ESR and CRP improved.  Right knee and wrist still swollen painful which draws concern for possible septic arthritis. Check CT because of no MRI today. Will consider MRI on Monday. She is able to bear weight now.  No meningeal signs.    3/19  I tapped her wrist without aspirate, and Dr. Gloria tapped her knee. Neither was productive of a large amount of fluid. This makes septic joint very unlikely. She did have an injection of her right wrist which should explain her localized swelling/effusion seen yesterday. Today, her swelling is improved. With above information, and recs from ID, she needs 7 days of Rocephin from neg culture, will order repeat esr/crp for tomorrow. As we do not have ortho here, and Dr. Hopson saw patient, may consider d/c home and f/u with him for joint aspiration and continued oral abx until negative eval with ortho. She has clinically improved. MRI of right knee ordered for tomorrow to gain more information as to why she cannot bear weight.    3/20 CT wrist with cellulitis and effusion in carpal jt concerning for septic arthritis (Elevated CRP/ESR).  MRI right knee pending.  Continue IV rocephin.

## 2023-03-20 NOTE — PLAN OF CARE
03/20/23 0957   Post-Acute Status   Post-Acute Authorization Placement   Post-Acute Placement Status Referrals Sent   Discharge Delays None known at this time         Patient remains awaiting placement for debility. PT now recommending inpatient rehab. SW sent patient referral to [facility] and [facility]. Awaiting responses.

## 2023-03-20 NOTE — SUBJECTIVE & OBJECTIVE
Review of Systems   Constitutional:  Positive for fatigue. Negative for activity change, fever and unexpected weight change.   HENT:  Negative for congestion, ear pain, hearing loss, rhinorrhea, sore throat and tinnitus.    Eyes:  Negative for pain, redness and visual disturbance.   Respiratory:  Positive for cough (improving). Negative for shortness of breath (NAIR) and wheezing.    Cardiovascular:  Negative for chest pain, palpitations and leg swelling.   Gastrointestinal:  Negative for abdominal pain, blood in stool, constipation (1 week since last BM), diarrhea, nausea and vomiting.   Genitourinary:  Negative for dysuria, frequency, pelvic pain and urgency.        Dark colored urine but denies dysuria   Musculoskeletal:  Positive for arthralgias and myalgias. Negative for back pain, joint swelling and neck pain.   Skin:  Negative for color change, rash and wound.   Neurological:  Negative for dizziness, tremors, weakness, light-headedness and headaches.   Objective:     Vital Signs (Most Recent):  Temp: 99 °F (37.2 °C) (03/20/23 1122)  Pulse: 103 (03/20/23 1200)  Resp: 16 (03/20/23 1122)  BP: 120/63 (03/20/23 1122)  SpO2: 97 % (03/20/23 1122)   Vital Signs (24h Range):  Temp:  [96.9 °F (36.1 °C)-99 °F (37.2 °C)] 99 °F (37.2 °C)  Pulse:  [] 103  Resp:  [16-20] 16  SpO2:  [94 %-98 %] 97 %  BP: (116-140)/(56-82) 120/63     Weight: 130.8 kg (288 lb 5.8 oz)  Body mass index is 45.16 kg/m².    Physical Exam  Vitals and nursing note reviewed.   Constitutional:       General: She is not in acute distress.     Appearance: She is well-developed. She is obese.   HENT:      Head: Normocephalic and atraumatic.      Right Ear: External ear normal.      Left Ear: External ear normal.      Nose: Nose normal.      Mouth/Throat:      Pharynx: Posterior oropharyngeal erythema (but no edema.  Pt states this happens to her intermittently) present.   Eyes:      General: No scleral icterus.        Right eye: No discharge.          Left eye: No discharge.      Extraocular Movements: Extraocular movements intact.      Conjunctiva/sclera: Conjunctivae normal.   Neck:      Thyroid: No thyromegaly.   Cardiovascular:      Rate and Rhythm: Normal rate and regular rhythm.      Heart sounds: No murmur heard.  Pulmonary:      Effort: Pulmonary effort is normal. No respiratory distress.      Breath sounds: No wheezing or rhonchi (soft rhonchi b/l bases).   Abdominal:      General: Bowel sounds are normal. There is no distension.      Palpations: Abdomen is soft.      Tenderness: There is no abdominal tenderness.   Musculoskeletal:         General: Swelling (right wrist, right knee) present.      Right lower leg: No edema.      Left lower leg: No edema.   Skin:     General: Skin is warm and dry.      Coloration: Skin is not jaundiced.   Neurological:      Mental Status: She is alert and oriented to person, place, and time.      Comments: Neg kernigs/brudzinskis. But cannot bend right knee   Psychiatric:         Behavior: Behavior normal.         Thought Content: Thought content normal.           Significant Labs: All pertinent labs within the past 24 hours have been reviewed.  Blood Culture:   No results for input(s): LABBLOO in the last 48 hours.    CBC:   Recent Labs   Lab 03/19/23  0613   WBC 8.56   HGB 8.4*   HCT 28.0*                Significant Imaging: I have reviewed all pertinent imaging results/findings within the past 24 hours.  Abdominal u/s:  Surgically removed gallbladder with no biliary dilatation.  Enlarged heterogeneous liver.  Splenomegaly     CT AP  1. Bibasilar patchy alveolar infiltrates could represent pneumonia.  Minimal consolidation in the lingula and left lower lobe also.  Recommend follow-up.  2. Hepatosplenomegaly.  3. Nonobstructing nephrolithiasis of the left kidney.  4. Few stable mildly enlarged retroperitoneal lymph nodes.    CXR  Suboptimal inspiration.  Cardiac silhouette is within normal limits.  Bibasilar  patchy alveolar infiltrates could represent pneumonia.  This is minimally improved from prior study.  No effusion or pneumothorax.     No acute osseous abnormality.  Remote rib fractures on the left.    CT:  Diffuse superficial soft tissue swelling compatible cellulitis.  Accompany findings concerning for accompanying deep soft tissue infection.  Carpal joint effusion for which septic arthritis is not excluded.  Flexor pollicis longus tenosynovitis, for septic tenosynovitis is not excluded.  Apparent small fluid collection within the superficial soft tissues over the dorsal ulnar aspect of the wrist for which abscess is not excluded.

## 2023-03-20 NOTE — ASSESSMENT & PLAN NOTE
Noted on imaging  No hypoxia  Nebs scheduled  Deescalate abx to IV rocephin and IV azithromycin (completed 4 days)    3/20  Sputum cx with pseudomonas - levaquin day 2

## 2023-03-21 ENCOUNTER — HOSPITAL ENCOUNTER (INPATIENT)
Facility: HOSPITAL | Age: 48
LOS: 17 days | Discharge: NURSING, REHAB OR LONG-TERM CARE | DRG: 485 | End: 2023-04-07
Attending: INTERNAL MEDICINE | Admitting: INTERNAL MEDICINE
Payer: COMMERCIAL

## 2023-03-21 VITALS
BODY MASS INDEX: 45.26 KG/M2 | OXYGEN SATURATION: 92 % | WEIGHT: 288.38 LBS | TEMPERATURE: 99 F | RESPIRATION RATE: 20 BRPM | HEIGHT: 67 IN | SYSTOLIC BLOOD PRESSURE: 123 MMHG | DIASTOLIC BLOOD PRESSURE: 66 MMHG | HEART RATE: 115 BPM

## 2023-03-21 DIAGNOSIS — J18.9 PNEUMONIA: ICD-10-CM

## 2023-03-21 DIAGNOSIS — M13.0 POLYARTHRITIS: Primary | ICD-10-CM

## 2023-03-21 DIAGNOSIS — R07.9 CHEST PAIN: ICD-10-CM

## 2023-03-21 PROBLEM — M00.9 SEPTIC ARTHRITIS: Status: ACTIVE | Noted: 2023-03-14

## 2023-03-21 PROBLEM — N95.0 POSTMENOPAUSAL BLEEDING: Status: ACTIVE | Noted: 2023-03-19

## 2023-03-21 LAB
ANION GAP SERPL CALC-SCNC: 10 MMOL/L (ref 8–16)
BACTERIA BLD CULT: NORMAL
BACTERIA BLD CULT: NORMAL
BASOPHILS # BLD AUTO: 0.01 K/UL (ref 0–0.2)
BASOPHILS # BLD AUTO: 0.01 K/UL (ref 0–0.2)
BASOPHILS NFR BLD: 0.1 % (ref 0–1.9)
BASOPHILS NFR BLD: 0.2 % (ref 0–1.9)
BUN SERPL-MCNC: 14 MG/DL (ref 6–20)
CALCIUM SERPL-MCNC: 9.1 MG/DL (ref 8.7–10.5)
CHLORIDE SERPL-SCNC: 100 MMOL/L (ref 95–110)
CO2 SERPL-SCNC: 24 MMOL/L (ref 23–29)
CREAT SERPL-MCNC: 0.5 MG/DL (ref 0.5–1.4)
DIFFERENTIAL METHOD: ABNORMAL
DIFFERENTIAL METHOD: ABNORMAL
EOSINOPHIL # BLD AUTO: 0 K/UL (ref 0–0.5)
EOSINOPHIL # BLD AUTO: 0 K/UL (ref 0–0.5)
EOSINOPHIL NFR BLD: 0 % (ref 0–8)
EOSINOPHIL NFR BLD: 0.2 % (ref 0–8)
ERYTHROCYTE [DISTWIDTH] IN BLOOD BY AUTOMATED COUNT: 18.3 % (ref 11.5–14.5)
ERYTHROCYTE [DISTWIDTH] IN BLOOD BY AUTOMATED COUNT: 18.3 % (ref 11.5–14.5)
EST. GFR  (NO RACE VARIABLE): >60 ML/MIN/1.73 M^2
GLUCOSE SERPL-MCNC: 189 MG/DL (ref 70–110)
HCT VFR BLD AUTO: 26.2 % (ref 37–48.5)
HCT VFR BLD AUTO: 28.5 % (ref 37–48.5)
HGB BLD-MCNC: 7.8 G/DL (ref 12–16)
HGB BLD-MCNC: 8.6 G/DL (ref 12–16)
IMM GRANULOCYTES # BLD AUTO: 0.11 K/UL (ref 0–0.04)
IMM GRANULOCYTES # BLD AUTO: 0.14 K/UL (ref 0–0.04)
IMM GRANULOCYTES NFR BLD AUTO: 1.9 % (ref 0–0.5)
IMM GRANULOCYTES NFR BLD AUTO: 1.9 % (ref 0–0.5)
LYMPHOCYTES # BLD AUTO: 1.2 K/UL (ref 1–4.8)
LYMPHOCYTES # BLD AUTO: 1.5 K/UL (ref 1–4.8)
LYMPHOCYTES NFR BLD: 16.3 % (ref 18–48)
LYMPHOCYTES NFR BLD: 25.5 % (ref 18–48)
MAGNESIUM SERPL-MCNC: 1.8 MG/DL (ref 1.6–2.6)
MCH RBC QN AUTO: 24.5 PG (ref 27–31)
MCH RBC QN AUTO: 24.9 PG (ref 27–31)
MCHC RBC AUTO-ENTMCNC: 29.8 G/DL (ref 32–36)
MCHC RBC AUTO-ENTMCNC: 30.2 G/DL (ref 32–36)
MCV RBC AUTO: 82 FL (ref 82–98)
MCV RBC AUTO: 82 FL (ref 82–98)
MONOCYTES # BLD AUTO: 0.5 K/UL (ref 0.3–1)
MONOCYTES # BLD AUTO: 0.6 K/UL (ref 0.3–1)
MONOCYTES NFR BLD: 8.1 % (ref 4–15)
MONOCYTES NFR BLD: 8.5 % (ref 4–15)
NEUTROPHILS # BLD AUTO: 3.8 K/UL (ref 1.8–7.7)
NEUTROPHILS # BLD AUTO: 5.3 K/UL (ref 1.8–7.7)
NEUTROPHILS NFR BLD: 63.7 % (ref 38–73)
NEUTROPHILS NFR BLD: 73.6 % (ref 38–73)
NRBC BLD-RTO: 0 /100 WBC
NRBC BLD-RTO: 0 /100 WBC
PLATELET # BLD AUTO: 220 K/UL (ref 150–450)
PLATELET # BLD AUTO: 256 K/UL (ref 150–450)
PMV BLD AUTO: 10.2 FL (ref 9.2–12.9)
PMV BLD AUTO: 10.4 FL (ref 9.2–12.9)
POCT GLUCOSE: 136 MG/DL (ref 70–110)
POCT GLUCOSE: 156 MG/DL (ref 70–110)
POCT GLUCOSE: 159 MG/DL (ref 70–110)
POCT GLUCOSE: 169 MG/DL (ref 70–110)
POTASSIUM SERPL-SCNC: 3.8 MMOL/L (ref 3.5–5.1)
RBC # BLD AUTO: 3.19 M/UL (ref 4–5.4)
RBC # BLD AUTO: 3.46 M/UL (ref 4–5.4)
SODIUM SERPL-SCNC: 134 MMOL/L (ref 136–145)
WBC # BLD AUTO: 5.91 K/UL (ref 3.9–12.7)
WBC # BLD AUTO: 7.25 K/UL (ref 3.9–12.7)

## 2023-03-21 PROCEDURE — 63600175 PHARM REV CODE 636 W HCPCS: Performed by: HOSPITALIST

## 2023-03-21 PROCEDURE — 83735 ASSAY OF MAGNESIUM: CPT | Performed by: PHYSICIAN ASSISTANT

## 2023-03-21 PROCEDURE — 25000003 PHARM REV CODE 250: Performed by: INTERNAL MEDICINE

## 2023-03-21 PROCEDURE — 36415 COLL VENOUS BLD VENIPUNCTURE: CPT | Performed by: PHYSICIAN ASSISTANT

## 2023-03-21 PROCEDURE — 11000001 HC ACUTE MED/SURG PRIVATE ROOM

## 2023-03-21 PROCEDURE — 25000003 PHARM REV CODE 250: Performed by: PHYSICIAN ASSISTANT

## 2023-03-21 PROCEDURE — 97530 THERAPEUTIC ACTIVITIES: CPT

## 2023-03-21 PROCEDURE — 63600175 PHARM REV CODE 636 W HCPCS: Performed by: PHYSICIAN ASSISTANT

## 2023-03-21 PROCEDURE — 25000003 PHARM REV CODE 250: Performed by: HOSPITALIST

## 2023-03-21 PROCEDURE — 99233 PR SUBSEQUENT HOSPITAL CARE,LEVL III: ICD-10-PCS | Mod: GT,,, | Performed by: PHYSICIAN ASSISTANT

## 2023-03-21 PROCEDURE — 94640 AIRWAY INHALATION TREATMENT: CPT

## 2023-03-21 PROCEDURE — 94761 N-INVAS EAR/PLS OXIMETRY MLT: CPT

## 2023-03-21 PROCEDURE — 85025 COMPLETE CBC W/AUTO DIFF WBC: CPT | Performed by: PHYSICIAN ASSISTANT

## 2023-03-21 PROCEDURE — 80048 BASIC METABOLIC PNL TOTAL CA: CPT | Performed by: PHYSICIAN ASSISTANT

## 2023-03-21 PROCEDURE — 25000003 PHARM REV CODE 250: Performed by: STUDENT IN AN ORGANIZED HEALTH CARE EDUCATION/TRAINING PROGRAM

## 2023-03-21 PROCEDURE — 99233 SBSQ HOSP IP/OBS HIGH 50: CPT | Mod: GT,,, | Performed by: PHYSICIAN ASSISTANT

## 2023-03-21 PROCEDURE — 97110 THERAPEUTIC EXERCISES: CPT | Mod: CO

## 2023-03-21 PROCEDURE — 25000003 PHARM REV CODE 250: Performed by: FAMILY MEDICINE

## 2023-03-21 PROCEDURE — 63600175 PHARM REV CODE 636 W HCPCS: Performed by: INTERNAL MEDICINE

## 2023-03-21 RX ORDER — ONDANSETRON 2 MG/ML
4 INJECTION INTRAMUSCULAR; INTRAVENOUS EVERY 8 HOURS PRN
Status: DISCONTINUED | OUTPATIENT
Start: 2023-03-21 | End: 2023-04-07 | Stop reason: HOSPADM

## 2023-03-21 RX ORDER — ACETAMINOPHEN 325 MG/1
650 TABLET ORAL EVERY 6 HOURS PRN
Status: DISCONTINUED | OUTPATIENT
Start: 2023-03-21 | End: 2023-03-31

## 2023-03-21 RX ORDER — SIMETHICONE 80 MG
1 TABLET,CHEWABLE ORAL 4 TIMES DAILY PRN
Status: DISCONTINUED | OUTPATIENT
Start: 2023-03-21 | End: 2023-04-07 | Stop reason: HOSPADM

## 2023-03-21 RX ORDER — GLUCAGON 1 MG
1 KIT INJECTION
Status: DISCONTINUED | OUTPATIENT
Start: 2023-03-21 | End: 2023-04-07 | Stop reason: HOSPADM

## 2023-03-21 RX ORDER — BISOPROLOL FUMARATE 5 MG/1
5 TABLET, FILM COATED ORAL DAILY
COMMUNITY

## 2023-03-21 RX ORDER — CEFTRIAXONE 2 G/50ML
2 INJECTION, SOLUTION INTRAVENOUS
Status: DISCONTINUED | OUTPATIENT
Start: 2023-03-21 | End: 2023-03-22

## 2023-03-21 RX ORDER — PROCHLORPERAZINE EDISYLATE 5 MG/ML
5 INJECTION INTRAMUSCULAR; INTRAVENOUS EVERY 6 HOURS PRN
Status: DISCONTINUED | OUTPATIENT
Start: 2023-03-21 | End: 2023-04-07 | Stop reason: HOSPADM

## 2023-03-21 RX ORDER — DEXTROSE 40 %
15 GEL (GRAM) ORAL
Status: DISCONTINUED | OUTPATIENT
Start: 2023-03-21 | End: 2023-04-07 | Stop reason: HOSPADM

## 2023-03-21 RX ORDER — POLYETHYLENE GLYCOL 3350 17 G/17G
17 POWDER, FOR SOLUTION ORAL DAILY
Status: DISCONTINUED | OUTPATIENT
Start: 2023-03-22 | End: 2023-04-07 | Stop reason: HOSPADM

## 2023-03-21 RX ORDER — TALC
6 POWDER (GRAM) TOPICAL NIGHTLY PRN
Status: DISCONTINUED | OUTPATIENT
Start: 2023-03-21 | End: 2023-04-07 | Stop reason: HOSPADM

## 2023-03-21 RX ORDER — DEXTROSE 40 %
30 GEL (GRAM) ORAL
Status: DISCONTINUED | OUTPATIENT
Start: 2023-03-21 | End: 2023-04-07 | Stop reason: HOSPADM

## 2023-03-21 RX ORDER — NALOXONE HCL 0.4 MG/ML
0.02 VIAL (ML) INJECTION
Status: DISCONTINUED | OUTPATIENT
Start: 2023-03-21 | End: 2023-04-07 | Stop reason: HOSPADM

## 2023-03-21 RX ORDER — SODIUM CHLORIDE 0.9 % (FLUSH) 0.9 %
10 SYRINGE (ML) INJECTION EVERY 8 HOURS PRN
Status: DISCONTINUED | OUTPATIENT
Start: 2023-03-21 | End: 2023-03-29

## 2023-03-21 RX ORDER — MAG HYDROX/ALUMINUM HYD/SIMETH 200-200-20
30 SUSPENSION, ORAL (FINAL DOSE FORM) ORAL 4 TIMES DAILY PRN
Status: DISCONTINUED | OUTPATIENT
Start: 2023-03-21 | End: 2023-04-07 | Stop reason: HOSPADM

## 2023-03-21 RX ORDER — HYDROCODONE BITARTRATE AND ACETAMINOPHEN 5; 325 MG/1; MG/1
1 TABLET ORAL ONCE
Status: COMPLETED | OUTPATIENT
Start: 2023-03-21 | End: 2023-03-21

## 2023-03-21 RX ADMIN — FERROUS SULFATE TAB 325 MG (65 MG ELEMENTAL FE) 1 EACH: 325 (65 FE) TAB at 08:03

## 2023-03-21 RX ADMIN — CEFTRIAXONE 2 G: 2 INJECTION, SOLUTION INTRAVENOUS at 11:03

## 2023-03-21 RX ADMIN — ACETAMINOPHEN 500 MG: 500 TABLET ORAL at 03:03

## 2023-03-21 RX ADMIN — MONTELUKAST 10 MG: 10 TABLET, FILM COATED ORAL at 08:03

## 2023-03-21 RX ADMIN — MELATONIN TAB 3 MG 6 MG: 3 TAB at 11:03

## 2023-03-21 RX ADMIN — CYCLOBENZAPRINE HYDROCHLORIDE 10 MG: 10 TABLET, FILM COATED ORAL at 03:03

## 2023-03-21 RX ADMIN — HYDROCODONE BITARTRATE AND ACETAMINOPHEN 1 TABLET: 5; 325 TABLET ORAL at 05:03

## 2023-03-21 RX ADMIN — ACETAMINOPHEN 500 MG: 500 TABLET ORAL at 08:03

## 2023-03-21 RX ADMIN — GABAPENTIN 100 MG: 100 CAPSULE ORAL at 08:03

## 2023-03-21 RX ADMIN — METOPROLOL TARTRATE 25 MG: 25 TABLET, FILM COATED ORAL at 08:03

## 2023-03-21 RX ADMIN — DOCUSATE SODIUM 100 MG: 100 CAPSULE, LIQUID FILLED ORAL at 08:03

## 2023-03-21 RX ADMIN — ACETAMINOPHEN 650 MG: 325 TABLET ORAL at 11:03

## 2023-03-21 RX ADMIN — VANCOMYCIN HYDROCHLORIDE 2250 MG: 500 INJECTION, POWDER, LYOPHILIZED, FOR SOLUTION INTRAVENOUS at 11:03

## 2023-03-21 RX ADMIN — FLUTICASONE FUROATE AND VILANTEROL TRIFENATATE 1 PUFF: 100; 25 POWDER RESPIRATORY (INHALATION) at 08:03

## 2023-03-21 RX ADMIN — GABAPENTIN 100 MG: 100 CAPSULE ORAL at 04:03

## 2023-03-21 RX ADMIN — LEVOFLOXACIN 750 MG: 250 TABLET, FILM COATED ORAL at 08:03

## 2023-03-21 RX ADMIN — CEFTRIAXONE 1 G: 1 INJECTION, SOLUTION INTRAVENOUS at 12:03

## 2023-03-21 NOTE — ASSESSMENT & PLAN NOTE
H/H trended down  No evidence of acute bleeding but stool for occult ordered (currently with constipation)  Daily labs  Iron level low but will defer IV iron in acute infection.  Ferrous sulfate ordered.     This is a chronic problem for pt and she receives IV injections (unsure name)    H/h stable

## 2023-03-21 NOTE — PLAN OF CARE
Plan of care reviewed with pt. Pt voiced understanding. Pt AAO X 4. Pt c/o R wrist and R knee pain during the shift especially when moving her or the bed. Pt does get spasm in her R thigh when she is moved. Pt did have several bowel movements after getting PRN supp. Pt did start bleeding vaginally. Pt stated she hasn't had a menses in years but does spot every once in a while. No apparent distress noted. Fall precautions maintained. Pt remains free of fall or injury. Bed in lowest position, locked, call light within reach, and bed alarm on. Side rails up x's 3 with slip resistant socks on. Patient at risk for skin breakdown. Patient turned Q2 hours during shift to prevent skin from breakdown.     Problem: Adult Inpatient Plan of Care  Goal: Plan of Care Review  Outcome: Ongoing, Progressing  Flowsheets (Taken 3/21/2023 9374)  Plan of Care Reviewed With: patient  Goal: Absence of Hospital-Acquired Illness or Injury  Outcome: Ongoing, Progressing  Goal: Optimal Comfort and Wellbeing  Outcome: Ongoing, Progressing     Problem: Bariatric Environmental Safety  Goal: Safety Maintained with Care  Outcome: Ongoing, Progressing     Problem: Skin Injury Risk Increased  Goal: Skin Health and Integrity  Outcome: Ongoing, Progressing     Problem: Adjustment to Illness (Sepsis/Septic Shock)  Goal: Optimal Coping  Outcome: Ongoing, Progressing     Problem: Bleeding (Sepsis/Septic Shock)  Goal: Absence of Bleeding  Outcome: Ongoing, Progressing     Problem: Glycemic Control Impaired (Sepsis/Septic Shock)  Goal: Blood Glucose Level Within Desired Range  Outcome: Ongoing, Progressing     Problem: Infection Progression (Sepsis/Septic Shock)  Goal: Absence of Infection Signs and Symptoms  Outcome: Ongoing, Progressing     Problem: Nutrition Impaired (Sepsis/Septic Shock)  Goal: Optimal Nutrition Intake  Outcome: Ongoing, Progressing     Problem: Fluid Imbalance (Pneumonia)  Goal: Fluid Balance  Outcome: Ongoing, Progressing      Problem: Infection (Pneumonia)  Goal: Resolution of Infection Signs and Symptoms  Outcome: Ongoing, Progressing     Problem: Respiratory Compromise (Pneumonia)  Goal: Effective Oxygenation and Ventilation  Outcome: Ongoing, Progressing     Problem: Diabetes Comorbidity  Goal: Blood Glucose Level Within Targeted Range  Outcome: Ongoing, Progressing     Problem: Impaired Wound Healing  Goal: Optimal Wound Healing  Outcome: Ongoing, Progressing     Problem: Fall Injury Risk  Goal: Absence of Fall and Fall-Related Injury  Outcome: Ongoing, Progressing     Problem: Pain Acute  Goal: Acceptable Pain Control and Functional Ability  Outcome: Ongoing, Progressing     Problem: Anemia  Goal: Anemia Symptom Improvement  Outcome: Ongoing, Progressing

## 2023-03-21 NOTE — ASSESSMENT & PLAN NOTE
Diffuse myalgias and arthralgias  Elevated CPK at presentation  Gabapentin  MRI knee: large joint infusion  3/20 CT wrist with cellulitis and effusion in carpal jt concerning for septic arthritis (Elevated CRP/ESR).      Transfer to [facility] for ortho and ID services.

## 2023-03-21 NOTE — SUBJECTIVE & OBJECTIVE
Review of Systems   Constitutional:  Positive for fatigue. Negative for activity change, fever and unexpected weight change.   HENT:  Negative for congestion, ear pain, hearing loss, rhinorrhea, sore throat and tinnitus.    Eyes:  Negative for pain, redness and visual disturbance.   Respiratory:  Positive for cough (improving). Negative for shortness of breath (NAIR) and wheezing.    Cardiovascular:  Negative for chest pain, palpitations and leg swelling.   Gastrointestinal:  Negative for abdominal pain, blood in stool, constipation (1 week since last BM), diarrhea, nausea and vomiting.   Genitourinary:  Negative for dysuria, frequency, pelvic pain and urgency.        Dark colored urine but denies dysuria   Musculoskeletal:  Positive for arthralgias and myalgias. Negative for back pain, joint swelling and neck pain.   Skin:  Negative for color change, rash and wound.   Neurological:  Negative for dizziness, tremors, weakness, light-headedness and headaches.   Objective:     Vital Signs (Most Recent):  Temp: 98.5 °F (36.9 °C) (03/21/23 1418)  Pulse: 103 (03/21/23 1418)  Resp: (!) 22 (03/21/23 1418)  BP: 127/64 (03/21/23 1418)  SpO2: 97 % (03/21/23 1418)   Vital Signs (24h Range):  Temp:  [98.1 °F (36.7 °C)-99.4 °F (37.4 °C)] 98.5 °F (36.9 °C)  Pulse:  [] 103  Resp:  [16-24] 22  SpO2:  [95 %-97 %] 97 %  BP: (100-141)/(55-72) 127/64     Weight: 130.8 kg (288 lb 5.8 oz)  Body mass index is 45.16 kg/m².    Physical Exam  Vitals and nursing note reviewed.   Constitutional:       General: She is not in acute distress.     Appearance: She is well-developed. She is obese.   HENT:      Head: Normocephalic and atraumatic.      Right Ear: External ear normal.      Left Ear: External ear normal.      Nose: Nose normal.      Mouth/Throat:      Pharynx: Posterior oropharyngeal erythema (but no edema.  Pt states this happens to her intermittently) present.   Eyes:      General: No scleral icterus.        Right eye: No  discharge.         Left eye: No discharge.      Extraocular Movements: Extraocular movements intact.      Conjunctiva/sclera: Conjunctivae normal.   Neck:      Thyroid: No thyromegaly.   Cardiovascular:      Rate and Rhythm: Normal rate and regular rhythm.      Heart sounds: No murmur heard.  Pulmonary:      Effort: Pulmonary effort is normal. No respiratory distress.      Breath sounds: No wheezing or rhonchi (soft rhonchi b/l bases).   Abdominal:      General: Bowel sounds are normal. There is no distension.      Palpations: Abdomen is soft.      Tenderness: There is no abdominal tenderness.   Musculoskeletal:         General: Swelling (right wrist, right knee) present.      Right lower leg: No edema.      Left lower leg: No edema.   Skin:     General: Skin is warm and dry.      Coloration: Skin is not jaundiced.   Neurological:      Mental Status: She is alert and oriented to person, place, and time.      Comments: Neg kernigs/brudzinskis. But cannot bend right knee   Psychiatric:         Behavior: Behavior normal.         Thought Content: Thought content normal.           Significant Labs: All pertinent labs within the past 24 hours have been reviewed.  Blood Culture:   No results for input(s): LABBLOO in the last 48 hours.    CBC:   Recent Labs   Lab 03/21/23  0549 03/21/23  1210   WBC 5.91 7.25   HGB 7.8* 8.6*   HCT 26.2* 28.5*    256             Significant Imaging: I have reviewed all pertinent imaging results/findings within the past 24 hours.  Abdominal u/s:  Surgically removed gallbladder with no biliary dilatation.  Enlarged heterogeneous liver.  Splenomegaly     CT AP  1. Bibasilar patchy alveolar infiltrates could represent pneumonia.  Minimal consolidation in the lingula and left lower lobe also.  Recommend follow-up.  2. Hepatosplenomegaly.  3. Nonobstructing nephrolithiasis of the left kidney.  4. Few stable mildly enlarged retroperitoneal lymph nodes.    CXR  Suboptimal inspiration.  Cardiac  silhouette is within normal limits.  Bibasilar patchy alveolar infiltrates could represent pneumonia.  This is minimally improved from prior study.  No effusion or pneumothorax.     No acute osseous abnormality.  Remote rib fractures on the left.    CT:  Diffuse superficial soft tissue swelling compatible cellulitis.  Accompany findings concerning for accompanying deep soft tissue infection.  Carpal joint effusion for which septic arthritis is not excluded.  Flexor pollicis longus tenosynovitis, for septic tenosynovitis is not excluded.  Apparent small fluid collection within the superficial soft tissues over the dorsal ulnar aspect of the wrist for which abscess is not excluded.    MRI right knee 3/20  Radial tear of medial meniscus, with peripheral extrusion and reactive joint line edema.     Tricompartmental cartilage loss, difficult to evaluate given motion artifact.     Bone marrow signal abnormalities as described.  Red marrow reconversion is favored given history of anemia, COPD, and obesity.  No erosions.     Large joint effusion with synovitis.  Septic arthritis cannot be excluded.  Consider arthrocentesis.     Extensive soft tissue edema about the knee, which could be inflammatory or infectious.  No organized fluid collections.

## 2023-03-21 NOTE — PLAN OF CARE
Patient to remain inpatient as she is not medically ready for discharge. Patient now with dx of septic arthritis. It was dicussed that patient would need higher level of care for ortho and ID. CM contacted House supervisor, Selma, to notify of initiation of transfer.  Patient pending transfer at this time.

## 2023-03-21 NOTE — PT/OT/SLP PROGRESS
Physical Therapy Treatment    Patient Name:  Hedy Conway   MRN:  [id number]    Recommendations:     Discharge Recommendations: rehabilitation facility, nursing facility, basic, nursing facility, skilled  Discharge Equipment Recommendations: hospital bed, slide board, wheelchair  Barriers to discharge: Inaccessible home and Decreased caregiver support    Assessment:     Hdey Conway is a 47 y.o. female admitted with a medical diagnosis of Sepsis.  She presents with the following impairments/functional limitations: weakness, impaired endurance, impaired self care skills, impaired functional mobility, gait instability, impaired balance, decreased upper extremity function, decreased lower extremity function, decreased safety awareness, pain, decreased ROM, impaired joint extensibility, impaired muscle length, edema. Continue to encourage patient to increase participation and initiate active body movement  for pain mgt today. Able to perform out to bed activity/ transfers bed<>wheelchair using sliding board with maxA and cues. Able to perform  and tolerarte wheelchair propulsion using bilateral UE at the hallway with supervision at slow pace. Left patient up in a wheelchair for continue self care activities including meal activity and nursing made aware.    Rehab Prognosis: Fair; patient would benefit from acute skilled PT services to address these deficits and reach maximum level of function.    Recent Surgery: * No surgery found *      Plan:     During this hospitalization, patient to be seen 5 x/week to address the identified rehab impairments via therapeutic activities, therapeutic exercises, wheelchair management/training and progress toward the following goals:    Plan of Care Expires:  03/29/23    Subjective     Chief Complaint: pain on right hand and right knee  Patient/Family Comments/goals: to dec pain  Pain/Comfort:  Pain Rating 1: 10/10  Location - Side 1: Right  Location - Orientation 1:  generalized  Location 1: hand  Pain Addressed 1: Reposition, Cessation of Activity  Pain Rating Post-Intervention 1: 10/10  Pain Rating 2: 10/10  Location - Side 2: Right  Location - Orientation 2: generalized  Location 2: hand  Pain Addressed 2: Reposition, Cessation of Activity  Pain Rating Post-Intervention 2: 10/10      Objective:     Communicated with patient  prior to session.  Patient found HOB elevated with peripheral IV, telemetry, SCD, PureWick upon PT entry to room.     General Precautions: Standard, fall  Orthopedic Precautions: N/A  Braces: N/A  Respiratory Status: Room air     Functional Mobility:  Bed Mobility:     Rolling Left:  moderate assistance  Rolling Right: moderate assistance  Scooting: maximal assistance  Supine to Sit: moderate assistance  Sit to Supine: moderate assistance  Transfers:     Bed to Wheelchair  maximal assistance  and cues with  sliding board  using  Slide tech.  Wheelchair Propulsion:  Pt propelled Standard wheelchair x ~100 feet on Level tile with  Bilateral upper extremity with Supervision or Set-up Assistance.       AM-PAC 6 CLICK MOBILITY  Turning over in bed (including adjusting bedclothes, sheets and blankets)?: 2  Sitting down on and standing up from a chair with arms (e.g., wheelchair, bedside commode, etc.): 1  Moving from lying on back to sitting on the side of the bed?: 2  Moving to and from a bed to a chair (including a wheelchair)?: 2  Need to walk in hospital room?: 1  Climbing 3-5 steps with a railing?: 1  Basic Mobility Total Score: 9       Treatment & Education:  AAROM ex on B LE x 10 reps on each, rolling to sides x 10 reps; siting activity at edge of the bed, transfer trng bed<>wheelchair  using sliding board with max A and cues; wheelchair mobility/mgt trng.    Patient left up in a wheelchair for 1 - 2 hours  and assisted back to bed  with all lines intact, call button in reach, and nursing notified..    GOALS:   Multidisciplinary Problems       Physical  Therapy Goals          Problem: Physical Therapy    Goal Priority Disciplines Outcome Goal Variances Interventions   Physical Therapy Goal     PT, PT/OT Ongoing, Progressing     Description:   Patient will increase functional independence with mobility by performin. Rolling to sides with Malachi using bed rail  2. Supine <> sit with Moderate Assistance  3. Able to perform and tolerate static sitting at edge of the  bed for ~ 10 minutes  with minimal assistance. (MET dated 3/18/2023)   4. Lower extremity exercise program x10 reps per handout, with assistance as needed  5. SBA with wheelchair mobility using both UE/LE for about 100 feet.                          Time Tracking:     PT Received On: 23  PT Start Time: 917     PT Stop Time: 947  PT Total Time (min): 30 min     Billable Minutes: Therapeutic Activity 30    Treatment Type: Treatment  PT/PTA: PT           2023

## 2023-03-21 NOTE — ASSESSMENT & PLAN NOTE
Patient's FSGs are uncontrolled due to hyperglycemia on current medication regimen.  Last A1c reviewed-   Lab Results   Component Value Date    HGBA1C 6.4 (H) 03/15/2023     Most recent fingerstick glucose reviewed-   Recent Labs   Lab 03/20/23  1614 03/20/23  2022 03/21/23  0715 03/21/23  1419   POCTGLUCOSE 153* 206* 169* 159*     Current correctional scale  Medium  Maintain anti-hyperglycemic dose as follows-   Antihyperglycemics (From admission, onward)    Start     Stop Route Frequency Ordered    03/18/23 2100  insulin detemir U-100 pen 10 Units         -- SubQ Nightly 03/18/23 1247    03/15/23 2054  insulin aspart U-100 pen 0-15 Units         -- SubQ Before meals & nightly PRN 03/15/23 1955        Hold Oral hypoglycemics while patient is in the hospital.    Continue detemir 10

## 2023-03-21 NOTE — CONSULTS
Subjective:    Patient ID: Hedy Conway is a 47 y.o. female    Chief Complaint:   Chief Complaint   Patient presents with    Pain     Patient to ER CC of pain all over her body for a week and was dx with a pulled muscle, however her PCP told her that she has a infection in her blood from blood work that she had done today        History of Present Illness:  Hedy is admitted to the hospital for treatment of sepsis/septic arthritis. She starting having acute onset of vaginal bleeding following many years of amenorrhea. Overnight the patient had some passage of clots but that has stabilized; her pad today has scant blood present.       ROS:   CONSTITUTIONAL: weakness, fatigue  ENT: negative for sore throat, nasal congestion, nasal discharge, epistaxis, tinnitus, hearing loss  EYES: negative for blurry vision, decreased vision, loss of vision, eye pain, diplopia, photophobia, discharge  SKIN: Negative for rash, itching, hives  RESPIRATORY: negative for cough, hemoptysis, shortness of breath, pleuritic chest pain, wheezing  CARDIOVASCULAR: negative for chest pain, dyspnea on exertion, orthopnea, paroxysmal nocturnal dyspnea, edema, palpitations  GASTROINTESTINAL: negative for abdominal pain, flank pain, nausea, vomiting, diarrhea, constipation, black stool, blood in stool  BREAST: negative for breast  tenderness, breast mass, nipple discharge, or skin changes  GENITOURINARY: vaginal bleeding, HEMATOLOGIC/LYMPHATIC: negative for swollen lymph nodes, bleeding, bruising  MUSCULOSKELETAL: back pain, joint pain, muscle pain, muscle weakness  NEUROLOGICAL: negative for dizzy/vertigo, headache, focal weakness, numbness/tingling, speech problems, loss of consciousness, confusion, memory loss  BEHAVORIAL/PSYCH: negative for anxiety, depression, psychiatric illness  ENDOCRINE: negative for polydipsia/polyuria, palpitations, skin changes, temperature intolerance, unexpected weight changes  ALLERGIC/IMMUNOLOGIC: negative for  "urticaria, hay fever, angioedema          Objective:    /67 (BP Location: Right arm, Patient Position: Lying)   Pulse (!) 113   Temp 98.6 °F (37 °C) (Oral)   Resp 16   Ht 5' 7" (1.702 m)   Wt 130.8 kg (288 lb 5.8 oz)   LMP 06/22/2011 (Approximate) Comment: menopause from chemotherapy  SpO2 97%   Breastfeeding No   BMI 45.16 kg/m²       Physical Exam: speculum exam: dark blood present but no active bleeding noted; cervix palpated small and flush with vaginal tissue. Uterus and adnexa unable to palpate secondary to body habitus. Exam severely limited secondary to patient's inability to move/position.     Pelvic scan:  The uterus is normal in size and measures 7.8 x 3.4 x 4.5 cm.  There is thickening of the endometrium which measures 6.2 mm.  There is a hyperechoic nodule in the endometrial cavity near the internal cervical os which either represents a polyp or submucosal fibroid.  The nodule measures 2.0 x 1.6 x 1.6 cm.  Neither ovary could be visualized due to obscuration by bowel gas.  No abnormal fluid collections are evident.     Impression:     1. Abnormal thickening of the endometrium.  The possibility of endometrial hyperplasia or neoplasia should be considered and biopsy may be indicated.  2. Hyperechoic nodule within the endometrial cavity near the internal cervical os which either represents a polyp or submucosal fibroid.  3. Nonvisualization of either ovary.    Assessment:    PMB    Plan:      Recent H/H reviewed; stable from admit  U/S reviewed; patient will need a EMBx as an outpatient  OK for patient to be transferred and can address PMB as an outpatient work up              "

## 2023-03-21 NOTE — ASSESSMENT & PLAN NOTE
Pelvic U/S abnormal thickening of the endometrium.  Consider hyperplasia or neoplasia  Hyperechoic nodule within the endometrial cavity near the internal cervical os which with represents a polyp or submucosal fibroid.     OBGYN guiding care  H/h stable

## 2023-03-21 NOTE — PROVIDER TRANSFER
Outside Transfer Acceptance Note / Regional Referral Center    Referring facility: [facility]   Referring provider: COLT HALEY  Accepting facility: [facility]  Accepting provider: KWADWO WASHINGTON  Reason for transfer: Higher Level of Care   Transfer diagnosis: pneumonia, bacteremia, artthritis  Transfer specialty requested:   Transfer specialty notified: no  Transfer level: NUMBER 1-5: 2  Bed type requested: MS  Isolation status: No active isolations   Admission class or status: IP- Inpatient    Narrative     47-year-old woman with PMH non-Hodgkin's lymphoma (2011), chronic anemia, DM2 HTN, obesity (BMI 45.1), arthritis and COPD was adm to [facility] on 03/13 with fever and cough and  pain in the right knee (unable to walk), and right wrist.  Patient is being followed by orthopedics for chronic right knee pain.    VS on admission T 98.9°, , /54, RR 20, SpO2 95% (RA) Exam was pos for BL LE edema and jaundice    Labs (3/13) WBC 21.8, Hb 9.1, Na 136, K 5.0, CO2 19, Cr 1.1, Glu 203, T bili 3.3, AST 21, ALT 20, BNP 32, CPK 27, troponin < 0.006, LA 1.7, U/A WBC 20, bacteria many.      CXR pos for bibasilar patchy alveolar infiltrates. CTA abdomen and pelvis with contrast (3/13) pos for bibasilar patchy alveolar infiltrates and hepatosplenomegaly.     X-ray of right wrist and right knee unremarkable.     Patient started on started on Zosyn and [place] .  BC (1/2) from 03/13 pos for strep pneumoniae resistant to penicillin, sensitive to CTX.  Patient switched to CTX on 03/16.  Patient has had some vaginal bleeding in the hospital and has been given pRBC x1.  Ob gyn consult pending.    CT wrist (right) right wrist (3/18) pos for diffuse soft tissue swelling with findings concerning for deep soft tissue infection.  MRI right knee (3/19) pos for large joint effusion with synovitis     Labs (3/21) WBC 7.25, Hb 8.6, Plts 256 Na 134, K 3.8, CO2 24, Cr 0.5, T bili 1.1.  VS T 98.3°, , RR 18,  /56, SpO2 96% (RA)    Transfer requested for Infectious Disease and Orthopedics.  Transfer diagnosis:  Pneumonia, pneumococcal bacteremia, pain and swelling in the right knee and wrist concerning for septic arthritis.      Instructions      [facility]  Admit to Hospital Medicine  Upon patient arrival to floor, please contact Hospital Medicine on call.

## 2023-03-21 NOTE — ASSESSMENT & PLAN NOTE
Noted on imaging  No hypoxia  Nebs scheduled  Deescalate abx to IV rocephin and IV azithromycin (completed 4 days)    3/20  Sputum cx with pseudomonas - levaquin day 2    3/21 levaquin day 3

## 2023-03-21 NOTE — PROGRESS NOTES
[facility] Surg ([facility])  [facility] Medicine  Progress Note    Patient Name: Hedy Conway  MRN: [id number]  Patient Class: IP- Inpatient   Admission Date: 3/13/2023  Length of Stay: 8 days  Attending Physician: Uche Blum MD  Primary Care Provider: John Lantigua PA-C        Subjective:     Principal Problem:Sepsis        HPI:  Hedy Conway is a 47 y.o. female with PMH: Non-Hodgkin's Lymphoma (2011), DM on metformin, HTN, Arthritis, COPD who was sent to the ED by PCP for further eval of abnormal lab work.   Patient reports that last Friday night (03/03) she went to bed in her normal state of health, woke up the next morning (03/04) and could not walk due to L thigh pain. She went to [facility] ED and was dx with a pulled muscle (Sat). She also reports subjective fever with tmax of 101.3F for three days while home. She followed up with her PCP-John Lantigua, on Wed (03/08) and was treated symptomatically and told to return if symptoms worsen. She went to bed Wed evening; felt a pop in her R knee and now with R wrist pain prompting ED visit at [facility] on 03/09/23. She was again dx with muscle pain.   She was seen by Dr. Mark Anthony gongora, for R knee pain and was told that she possibly has torn cartilage in her knee. She also received a steroid injection in her R wrist. She is awaiting a MRI of her R knee and continues with pain.  Symptoms have progressively worsened, noting that she cannot walk and has been having to wear diapers for the past 5 days. She went back to the ED at [facility] on Friday and was dx with a UTI. She was discharged home with an antibiotic that starts with a C.   Since this time she has had Progressive weakness, fatigue, hallucinations, and night sweats.  Denies abdominal pain, NVD. Denies back pain.   She notes a + productive cough with blood tinged sputum. Denies associated cp or sob.  LBM X 1 week ago>Taking norco for pain at home. Denies rectal bleeding or Black  stool  She presents pale, diaphoretic and tachycardic.    ER work up showed concern for UTI and bibasilar PNA. She does also report cough and NAIR on further questioning. Started on Vanc and Zosyn for sepsis with WBC of 21K. Initial LA elevated, resolved with IVF in ED. Cr improved from 1.1 to 0.8. Interestingly t bili 3.3m alk phos 151. CT showed hepatomegaly. US without CBD dilation and gallbladder surgically removed. Denies abd pain. She is mildly jaundiced on exam. On rounds this afternoon her only complaint is diffuse body and joint pain and requesting pain medication.       Overview/Hospital Course:  Admitted for sepsis secondary to bacteremia and pneumonia.  Currently on IV azithromycin, vancomycin and zosyn.  Hg 6.3 on am labs and patient with weakness/tachycardia.  2 units of pRBC ordered.      Leukocytosis slowly improving.  H/h stable with blood transfusion and currently 8.2.  Deescalate abx; group A strept seen on 1 out of 2 blood cultures.  Sensitive to rocephin.  Will cover for pneumonia with rocephin and azithromycin.  Encouraging patient to participate with therapy.  She has generalized pain.  Gabapentin started.      Patient still with generalized pain.  ESR and CRP ordered.  Leukocytosis continues to improve. Continue IV abx for strep bacteremia secondary to PNA infection.  Encouraging patient to work with therapy.      3/18  Patient still c/o pain and swelling to right wrist and right knee. Left hip pain improved. She was able to get up on the side of the bed. No fever. WBC ct improved.     3/19  Patient is day 6 on rocephin today. She has had some improvement of her right wrist swelling and has been able to at least bear weight on her right knee. However, because of persistent swelling and her total clinical picture, I reached out to ID. The recommendation was to have an aspirate of her joints to determine duration of abx in the event that her bacteremia was secondary to migratory septic  arthritis. Aspiration of the right wrist and right knee at bedside - see a/p. She remains afebrile, pain improved, no hypoxia, slight cough.    3/20 Leukocytosis improving. CT wrist with diffuse superficial soft tissue swelling and concern for carpal joint effusion concerning for septic arthritis.  MRI knee pending.  Working with therapy as tolerated.  Continue IV abxs for pneumonia, bacteremia and possible septic arthritis.      3/21 Found to have large joint synovitis.  Plan for transfer for ortho and ID.  Concern for septic arthritis.  Patient with vaginal bleeding (stable h/h and bp).  Obgyn consulted.            Review of Systems   Constitutional:  Positive for fatigue. Negative for activity change, fever and unexpected weight change.   HENT:  Negative for congestion, ear pain, hearing loss, rhinorrhea, sore throat and tinnitus.    Eyes:  Negative for pain, redness and visual disturbance.   Respiratory:  Positive for cough (improving). Negative for shortness of breath (NAIR) and wheezing.    Cardiovascular:  Negative for chest pain, palpitations and leg swelling.   Gastrointestinal:  Negative for abdominal pain, blood in stool, constipation (1 week since last BM), diarrhea, nausea and vomiting.   Genitourinary:  Negative for dysuria, frequency, pelvic pain and urgency.        Dark colored urine but denies dysuria   Musculoskeletal:  Positive for arthralgias and myalgias. Negative for back pain, joint swelling and neck pain.   Skin:  Negative for color change, rash and wound.   Neurological:  Negative for dizziness, tremors, weakness, light-headedness and headaches.   Objective:     Vital Signs (Most Recent):  Temp: 98.5 °F (36.9 °C) (03/21/23 1418)  Pulse: 103 (03/21/23 1418)  Resp: (!) 22 (03/21/23 1418)  BP: 127/64 (03/21/23 1418)  SpO2: 97 % (03/21/23 1418)   Vital Signs (24h Range):  Temp:  [98.1 °F (36.7 °C)-99.4 °F (37.4 °C)] 98.5 °F (36.9 °C)  Pulse:  [] 103  Resp:  [16-24] 22  SpO2:  [95 %-97 %] 97  %  BP: (100-141)/(55-72) 127/64     Weight: 130.8 kg (288 lb 5.8 oz)  Body mass index is 45.16 kg/m².    Physical Exam  Vitals and nursing note reviewed.   Constitutional:       General: She is not in acute distress.     Appearance: She is well-developed. She is obese.   HENT:      Head: Normocephalic and atraumatic.      Right Ear: External ear normal.      Left Ear: External ear normal.      Nose: Nose normal.      Mouth/Throat:      Pharynx: Posterior oropharyngeal erythema (but no edema.  Pt states this happens to her intermittently) present.   Eyes:      General: No scleral icterus.        Right eye: No discharge.         Left eye: No discharge.      Extraocular Movements: Extraocular movements intact.      Conjunctiva/sclera: Conjunctivae normal.   Neck:      Thyroid: No thyromegaly.   Cardiovascular:      Rate and Rhythm: Normal rate and regular rhythm.      Heart sounds: No murmur heard.  Pulmonary:      Effort: Pulmonary effort is normal. No respiratory distress.      Breath sounds: No wheezing or rhonchi (soft rhonchi b/l bases).   Abdominal:      General: Bowel sounds are normal. There is no distension.      Palpations: Abdomen is soft.      Tenderness: There is no abdominal tenderness.   Musculoskeletal:         General: Swelling (right wrist, right knee) present.      Right lower leg: No edema.      Left lower leg: No edema.   Skin:     General: Skin is warm and dry.      Coloration: Skin is not jaundiced.   Neurological:      Mental Status: She is alert and oriented to person, place, and time.      Comments: Neg kernigs/brudzinskis. But cannot bend right knee   Psychiatric:         Behavior: Behavior normal.         Thought Content: Thought content normal.           Significant Labs: All pertinent labs within the past 24 hours have been reviewed.  Blood Culture:   No results for input(s): LABBLOO in the last 48 hours.    CBC:   Recent Labs   Lab 03/21/23  0549 03/21/23  1210   WBC 5.91 7.25   HGB 7.8*  8.6*   HCT 26.2* 28.5*    256             Significant Imaging: I have reviewed all pertinent imaging results/findings within the past 24 hours.  Abdominal u/s:  Surgically removed gallbladder with no biliary dilatation.  Enlarged heterogeneous liver.  Splenomegaly     CT AP  1. Bibasilar patchy alveolar infiltrates could represent pneumonia.  Minimal consolidation in the lingula and left lower lobe also.  Recommend follow-up.  2. Hepatosplenomegaly.  3. Nonobstructing nephrolithiasis of the left kidney.  4. Few stable mildly enlarged retroperitoneal lymph nodes.    CXR  Suboptimal inspiration.  Cardiac silhouette is within normal limits.  Bibasilar patchy alveolar infiltrates could represent pneumonia.  This is minimally improved from prior study.  No effusion or pneumothorax.     No acute osseous abnormality.  Remote rib fractures on the left.    CT:  Diffuse superficial soft tissue swelling compatible cellulitis.  Accompany findings concerning for accompanying deep soft tissue infection.  Carpal joint effusion for which septic arthritis is not excluded.  Flexor pollicis longus tenosynovitis, for septic tenosynovitis is not excluded.  Apparent small fluid collection within the superficial soft tissues over the dorsal ulnar aspect of the wrist for which abscess is not excluded.    MRI right knee 3/20  Radial tear of medial meniscus, with peripheral extrusion and reactive joint line edema.     Tricompartmental cartilage loss, difficult to evaluate given motion artifact.     Bone marrow signal abnormalities as described.  Red marrow reconversion is favored given history of anemia, COPD, and obesity.  No erosions.     Large joint effusion with synovitis.  Septic arthritis cannot be excluded.  Consider arthrocentesis.     Extensive soft tissue edema about the knee, which could be inflammatory or infectious.  No organized fluid collections.                 Assessment/Plan:      * Sepsis  This patient does have  evidence of infective focus  My overall impression is sepsis.  Source: Respiratory and Bacteremia septic joint  Antibiotics given-   Antibiotics (72h ago, onward)    Start     Stop Route Frequency Ordered    03/19/23 0930  levoFLOXacin tablet 750 mg         -- Oral Daily 03/19/23 0908    03/16/23 1200  cefTRIAXone (ROCEPHIN) 1 g/50 mL D5W IVPB         -- IV Every 24 hours (non-standard times) 03/16/23 1138        Latest lactate reviewed-  No results for input(s): LACTATE in the last 72 hours.  Organ dysfunction indicated by Thrombocytopenia     Fluid challenge Not needed - patient is not hypotensive      Post- resuscitation assessment No - Post resuscitation assessment not needed       Will Not start Pressors- Levophed for MAP of 65  Source control achieved by: Vanc, Zosyn and azithromycin   IVF resuscitation already occurred in ED    Resolved and descalate abx 3/16          Postmenopausal bleeding  Pelvic U/S abnormal thickening of the endometrium.  Consider hyperplasia or neoplasia  Hyperechoic nodule within the endometrial cavity near the internal cervical os which with represents a polyp or submucosal fibroid.     OBGYN guiding care  H/h stable       COPD (chronic obstructive pulmonary disease)  xopenex prn  Scheduled breo       Septic arthritis  Diffuse myalgias and arthralgias  Elevated CPK at presentation  Gabapentin  MRI knee: large joint infusion  3/20 CT wrist with cellulitis and effusion in carpal jt concerning for septic arthritis (Elevated CRP/ESR).      Transfer to [facility] for ortho and ID services.        Type 2 diabetes mellitus without complication, without long-term current use of insulin  Patient's FSGs are uncontrolled due to hyperglycemia on current medication regimen.  Last A1c reviewed-   Lab Results   Component Value Date    HGBA1C 6.4 (H) 03/15/2023     Most recent fingerstick glucose reviewed-   Recent Labs   Lab 03/20/23  1614 03/20/23 2022 03/21/23  0715 03/21/23  1419   POCTGLUCOSE  153* 206* 169* 159*     Current correctional scale  Medium  Maintain anti-hyperglycemic dose as follows-   Antihyperglycemics (From admission, onward)    Start     Stop Route Frequency Ordered    03/18/23 2100  insulin detemir U-100 pen 10 Units         -- SubQ Nightly 03/18/23 1247    03/15/23 2054  insulin aspart U-100 pen 0-15 Units         -- SubQ Before meals & nightly PRN 03/15/23 1955        Hold Oral hypoglycemics while patient is in the hospital.    Continue detemir 10    Abnormal urinalysis  Received broad spectrum abx but low suspicion for UTI  Urine culture negative       Hyperbilirubinemia  Unclear etiology  Imaging is unrevelaing  Repeat lab shows down trending       Microcytic anemia  H/H trended down  No evidence of acute bleeding but stool for occult ordered (currently with constipation)  Daily labs  Iron level low but will defer IV iron in acute infection.  Ferrous sulfate ordered.     This is a chronic problem for pt and she receives IV injections (unsure name)    H/h stable       Streptococcal bacteremia  Suspect related to PNA - though sputum culture with pseudomonas  Sensitive to rocephin  WBC ct improved. ESR and CRP improved.  Right knee and wrist still swollen painful which draws concern for possible septic arthritis. Check CT because of no MRI today. Will consider MRI on Monday. She is able to bear weight now.  No meningeal signs.    3/19  I tapped her wrist without aspirate, and Dr. Gloria tapped her knee. Neither was productive of a large amount of fluid. This makes septic joint very unlikely. She did have an injection of her right wrist which should explain her localized swelling/effusion seen yesterday. Today, her swelling is improved. With above information, and recs from ID, she needs 7 days of Rocephin from neg culture, will order repeat esr/crp for tomorrow. As we do not have ortho here, and Dr. Hopson saw patient, may consider d/c home and f/u with him for joint aspiration and  continued oral abx until negative eval with ortho. She has clinically improved. MRI of right knee ordered for tomorrow to gain more information as to why she cannot bear weight.        Pneumonia of both lungs due to infectious organism  Noted on imaging  No hypoxia  Nebs scheduled  Deescalate abx to IV rocephin and IV azithromycin (completed 4 days)    3/20  Sputum cx with pseudomonas - levaquin day 2    3/21 levaquin day 3       Thrombocytopenia  Has had in the past as well but currently in relation to sepsis  Daily labs      Large cell (diffuse) non-Hodgkin's lymphoma  Diagnosed In 2011         VTE Risk Mitigation (From admission, onward)         Ordered     IP VTE HIGH RISK PATIENT  Once         03/14/23 1046     Place sequential compression device  Until discontinued         03/14/23 1046                Discharge Planning   JESSE:      Code Status: Full Code   Is the patient medically ready for discharge?:     Reason for patient still in hospital (select all that apply): Patient trending condition Pending transfer.    Discharge Plan A: Skilled Nursing Facility   Discharge Delays: None known at this time              Saira Solitario PA-C  Department of Hospital Medicine   [place] [facility] Surg (3rd Fl)

## 2023-03-21 NOTE — ASSESSMENT & PLAN NOTE
This patient does have evidence of infective focus  My overall impression is sepsis.  Source: Respiratory and Bacteremia septic joint  Antibiotics given-   Antibiotics (72h ago, onward)    Start     Stop Route Frequency Ordered    03/19/23 0930  levoFLOXacin tablet 750 mg         -- Oral Daily 03/19/23 0908    03/16/23 1200  cefTRIAXone (ROCEPHIN) 1 g/50 mL D5W IVPB         -- IV Every 24 hours (non-standard times) 03/16/23 1138        Latest lactate reviewed-  No results for input(s): LACTATE in the last 72 hours.  Organ dysfunction indicated by Thrombocytopenia     Fluid challenge Not needed - patient is not hypotensive      Post- resuscitation assessment No - Post resuscitation assessment not needed       Will Not start Pressors- Levophed for MAP of 65  Source control achieved by: Vanc, Zosyn and azithromycin   IVF resuscitation already occurred in ED    Resolved and descalate abx 3/16

## 2023-03-21 NOTE — PLAN OF CARE
Pt alert and oriented X4. Pt moderate assist with 2 people.   Pt receiving IV Rocephin Q24 and oral levofloxacin daily. Accuchecks ac and hs.   Pt with generalized pain/ discomfort with movement.  Pure-wick in place and suctioning well.    Pt with vaginal bleeding throughout shift.   OBGYN on case.    Pt and OT on case.    Infection disease has been consulted and patient is awaiting transport for transfer to [facility] for Ortho.   Report was called into receiving nurse.   Pt VSS      Problem: Adult Inpatient Plan of Care  Goal: Plan of Care Review  Outcome: Ongoing, Progressing  Goal: Patient-Specific Goal (Individualized)  Outcome: Ongoing, Progressing  Goal: Absence of Hospital-Acquired Illness or Injury  Outcome: Ongoing, Progressing  Goal: Optimal Comfort and Wellbeing  Outcome: Ongoing, Progressing  Goal: Readiness for Transition of Care  Outcome: Ongoing, Progressing     Problem: Bariatric Environmental Safety  Goal: Safety Maintained with Care  Outcome: Ongoing, Progressing     Problem: Skin Injury Risk Increased  Goal: Skin Health and Integrity  Outcome: Ongoing, Progressing     Problem: Adjustment to Illness (Sepsis/Septic Shock)  Goal: Optimal Coping  Outcome: Ongoing, Progressing     Problem: Bleeding (Sepsis/Septic Shock)  Goal: Absence of Bleeding  Outcome: Ongoing, Progressing     Problem: Glycemic Control Impaired (Sepsis/Septic Shock)  Goal: Blood Glucose Level Within Desired Range  Outcome: Ongoing, Progressing     Problem: Infection Progression (Sepsis/Septic Shock)  Goal: Absence of Infection Signs and Symptoms  Outcome: Ongoing, Progressing     Problem: Nutrition Impaired (Sepsis/Septic Shock)  Goal: Optimal Nutrition Intake  Outcome: Ongoing, Progressing     Problem: Fluid Imbalance (Pneumonia)  Goal: Fluid Balance  Outcome: Ongoing, Progressing     Problem: Infection (Pneumonia)  Goal: Resolution of Infection Signs and Symptoms  Outcome: Ongoing, Progressing     Problem: Respiratory  Compromise (Pneumonia)  Goal: Effective Oxygenation and Ventilation  Outcome: Ongoing, Progressing     Problem: Diabetes Comorbidity  Goal: Blood Glucose Level Within Targeted Range  Outcome: Ongoing, Progressing     Problem: Impaired Wound Healing  Goal: Optimal Wound Healing  Outcome: Ongoing, Progressing     Problem: Fall Injury Risk  Goal: Absence of Fall and Fall-Related Injury  Outcome: Ongoing, Progressing     Problem: Pain Acute  Goal: Acceptable Pain Control and Functional Ability  Outcome: Ongoing, Progressing     Problem: Anemia  Goal: Anemia Symptom Improvement  Outcome: Ongoing, Progressing

## 2023-03-21 NOTE — PT/OT/SLP PROGRESS
Occupational Therapy   Treatment    Name: Hedy Conway  MRN: [id number]  Admitting Diagnosis:  Sepsis       Recommendations:     Discharge Recommendations: nursing facility, basic, nursing facility, skilled, rehabilitation facility  Discharge Equipment Recommendations:  hospital bed, slide board, wheelchair  Barriers to discharge:  Inaccessible home environment, Decreased caregiver support    Assessment:     Hedy Conway is a 47 y.o. female with a medical diagnosis of Sepsis. Performance deficits affecting function are weakness, impaired endurance, impaired self care skills, impaired functional mobility, gait instability, impaired balance, decreased upper extremity function, decreased lower extremity function, decreased safety awareness, pain, impaired muscle length, impaired joint extensibility, decreased ROM.     Rehab Prognosis:  Fair; patient would benefit from acute skilled OT services to address these deficits and reach maximum level of function.       Plan:     Patient to be seen 5 x/week to address the above listed problems via self-care/home management, therapeutic activities, therapeutic exercises  Plan of Care Expires:    Plan of Care Reviewed with: patient    Subjective     Pain/Comfort:  Pain Rating 1: 10/10  Location - Side 1: Right  Location - Orientation 1: generalized  Location 1: hand  Pain Addressed 1: Reposition, Cessation of Activity  Pain Rating Post-Intervention 1: 10/10  Pain Rating 2: 10/10  Location - Side 2: Right  Location - Orientation 2: generalized  Location 2: knee  Pain Addressed 2: Reposition, Cessation of Activity  Pain Rating Post-Intervention 2: 10/10    Objective:     Communicated with: RN prior to session.  Patient found HOB elevated with peripheral IV, telemetry, SCD, PureWick upon OT entry to room.    General Precautions: Standard, fall    Orthopedic Precautions:N/A  Braces: N/A  Respiratory Status: Room air     Occupational Performance:       [facility] 6 Click ADL:  11    Treatment & Education:  Pt was instructed in B UE there ex for shoulder flex/ext, ab/adduction, elbow/hand flex/ext 2 sets if 10 reps. Pt required AAROM for B shoulder flex/ext due to pain and weakness but was able to perform elbow and hand exercises with AROM. Pt is still unable to make a full composite fist of R hand and continues to c/o wrist and hand pain which is limiting range of motion. Pt was encouraged to get up but declined due to pain with movement. Pt was also encouraged to eat breakfast sitting on edge of bed but declined as well. Pt was encouraged to move arms/ hands and legs as much as possible to keep from getting stiff and to reduce risk of contractures. Pt v/u.    Patient left HOB elevated with all lines intact, call button in reach, and RN notified    GOALS:   Multidisciplinary Problems       Occupational Therapy Goals          Problem: Occupational Therapy    Goal Priority Disciplines Outcome Interventions   Occupational Therapy Goal     OT, PT/OT Ongoing, Progressing    Description: Pt to perform level functional transfers required for ADL's with MOD A, RW level.  Pt to participate in bilateral upper extremity strengthening routine for increased strength, range of motion, and performance in ADL.  Pt to perform seated ADL activity for ~ 5 minutes at EOB for improved activity tolerance and reduced caregiver burden upon discharge. (MET)                       Time Tracking:     OT Date of Treatment: 03/21/23  OT Start Time: 0844  OT Stop Time: 0907  OT Total Time (min): 23 min    Billable Minutes:Therapeutic Exercise 23    OT/CHANDLER: CHANDLER     Number of CHANDLER visits since last OT visit: 3    3/21/2023

## 2023-03-22 ENCOUNTER — TELEPHONE (OUTPATIENT)
Dept: ORTHOPEDICS | Facility: CLINIC | Age: 48
End: 2023-03-22

## 2023-03-22 PROBLEM — D63.8 ANEMIA OF CHRONIC DISEASE: Status: ACTIVE | Noted: 2023-03-22

## 2023-03-22 PROBLEM — G72.89: Status: ACTIVE | Noted: 2023-03-22

## 2023-03-22 PROBLEM — D72.9 PLASMA CELL DISORDER: Status: ACTIVE | Noted: 2023-03-22

## 2023-03-22 LAB
ALBUMIN SERPL BCP-MCNC: 1.8 G/DL (ref 3.5–5.2)
ALP SERPL-CCNC: 78 U/L (ref 55–135)
ALT SERPL W/O P-5'-P-CCNC: 26 U/L (ref 10–44)
ANION GAP SERPL CALC-SCNC: 6 MMOL/L (ref 8–16)
AST SERPL-CCNC: 17 U/L (ref 10–40)
BASOPHILS # BLD AUTO: 0.01 K/UL (ref 0–0.2)
BASOPHILS NFR BLD: 0.2 % (ref 0–1.9)
BILIRUB SERPL-MCNC: 0.7 MG/DL (ref 0.1–1)
BUN SERPL-MCNC: 13 MG/DL (ref 6–20)
CALCIUM SERPL-MCNC: 8.8 MG/DL (ref 8.7–10.5)
CHLORIDE SERPL-SCNC: 103 MMOL/L (ref 95–110)
CK SERPL-CCNC: 7 U/L (ref 20–180)
CO2 SERPL-SCNC: 26 MMOL/L (ref 23–29)
CREAT SERPL-MCNC: 0.5 MG/DL (ref 0.5–1.4)
CRP SERPL-MCNC: 222.8 MG/L (ref 0–8.2)
DIFFERENTIAL METHOD: ABNORMAL
EOSINOPHIL # BLD AUTO: 0 K/UL (ref 0–0.5)
EOSINOPHIL NFR BLD: 0.2 % (ref 0–8)
ERYTHROCYTE [DISTWIDTH] IN BLOOD BY AUTOMATED COUNT: 18.3 % (ref 11.5–14.5)
ERYTHROCYTE [SEDIMENTATION RATE] IN BLOOD BY WESTERGREN METHOD: 99 MM/HR (ref 0–20)
EST. GFR  (NO RACE VARIABLE): >60 ML/MIN/1.73 M^2
ESTIMATED AVG GLUCOSE: 137 MG/DL (ref 68–131)
GLUCOSE SERPL-MCNC: 190 MG/DL (ref 70–110)
HBA1C MFR BLD: 6.4 % (ref 4–5.6)
HCT VFR BLD AUTO: 25 % (ref 37–48.5)
HGB BLD-MCNC: 7.4 G/DL (ref 12–16)
IMM GRANULOCYTES # BLD AUTO: 0.11 K/UL (ref 0–0.04)
IMM GRANULOCYTES NFR BLD AUTO: 2.1 % (ref 0–0.5)
LYMPHOCYTES # BLD AUTO: 1.5 K/UL (ref 1–4.8)
LYMPHOCYTES NFR BLD: 28 % (ref 18–48)
MCH RBC QN AUTO: 23.9 PG (ref 27–31)
MCHC RBC AUTO-ENTMCNC: 29.6 G/DL (ref 32–36)
MCV RBC AUTO: 81 FL (ref 82–98)
MONOCYTES # BLD AUTO: 0.5 K/UL (ref 0.3–1)
MONOCYTES NFR BLD: 9.2 % (ref 4–15)
NEUTROPHILS # BLD AUTO: 3.2 K/UL (ref 1.8–7.7)
NEUTROPHILS NFR BLD: 60.3 % (ref 38–73)
NRBC BLD-RTO: 0 /100 WBC
PLATELET # BLD AUTO: 247 K/UL (ref 150–450)
PMV BLD AUTO: 10.6 FL (ref 9.2–12.9)
POCT GLUCOSE: 149 MG/DL (ref 70–110)
POCT GLUCOSE: 168 MG/DL (ref 70–110)
POCT GLUCOSE: 173 MG/DL (ref 70–110)
POCT GLUCOSE: 248 MG/DL (ref 70–110)
POTASSIUM SERPL-SCNC: 3.8 MMOL/L (ref 3.5–5.1)
PROT SERPL-MCNC: 4.7 G/DL (ref 6–8.4)
RBC # BLD AUTO: 3.09 M/UL (ref 4–5.4)
SODIUM SERPL-SCNC: 135 MMOL/L (ref 136–145)
T4 FREE SERPL-MCNC: 0.77 NG/DL (ref 0.71–1.51)
TSH SERPL DL<=0.005 MIU/L-ACNC: 14.2 UIU/ML (ref 0.4–4)
URATE SERPL-MCNC: 2.8 MG/DL (ref 2.4–5.7)
WBC # BLD AUTO: 5.22 K/UL (ref 3.9–12.7)

## 2023-03-22 PROCEDURE — 86431 RHEUMATOID FACTOR QUANT: CPT | Performed by: ORTHOPAEDIC SURGERY

## 2023-03-22 PROCEDURE — 80053 COMPREHEN METABOLIC PANEL: CPT | Performed by: HOSPITALIST

## 2023-03-22 PROCEDURE — 36415 COLL VENOUS BLD VENIPUNCTURE: CPT | Performed by: HOSPITALIST

## 2023-03-22 PROCEDURE — 82550 ASSAY OF CK (CPK): CPT | Performed by: STUDENT IN AN ORGANIZED HEALTH CARE EDUCATION/TRAINING PROGRAM

## 2023-03-22 PROCEDURE — 63600175 PHARM REV CODE 636 W HCPCS: Performed by: INTERNAL MEDICINE

## 2023-03-22 PROCEDURE — 36415 COLL VENOUS BLD VENIPUNCTURE: CPT | Performed by: STUDENT IN AN ORGANIZED HEALTH CARE EDUCATION/TRAINING PROGRAM

## 2023-03-22 PROCEDURE — 25000003 PHARM REV CODE 250: Performed by: HOSPITALIST

## 2023-03-22 PROCEDURE — 63600175 PHARM REV CODE 636 W HCPCS: Mod: TB,JG | Performed by: HOSPITALIST

## 2023-03-22 PROCEDURE — 99222 1ST HOSP IP/OBS MODERATE 55: CPT | Mod: ,,, | Performed by: PSYCHIATRY & NEUROLOGY

## 2023-03-22 PROCEDURE — 85025 COMPLETE CBC W/AUTO DIFF WBC: CPT | Performed by: HOSPITALIST

## 2023-03-22 PROCEDURE — 25000003 PHARM REV CODE 250: Performed by: STUDENT IN AN ORGANIZED HEALTH CARE EDUCATION/TRAINING PROGRAM

## 2023-03-22 PROCEDURE — 84550 ASSAY OF BLOOD/URIC ACID: CPT | Performed by: ORTHOPAEDIC SURGERY

## 2023-03-22 PROCEDURE — 86038 ANTINUCLEAR ANTIBODIES: CPT | Performed by: ORTHOPAEDIC SURGERY

## 2023-03-22 PROCEDURE — 83036 HEMOGLOBIN GLYCOSYLATED A1C: CPT | Performed by: STUDENT IN AN ORGANIZED HEALTH CARE EDUCATION/TRAINING PROGRAM

## 2023-03-22 PROCEDURE — 36415 COLL VENOUS BLD VENIPUNCTURE: CPT | Performed by: ORTHOPAEDIC SURGERY

## 2023-03-22 PROCEDURE — 11000001 HC ACUTE MED/SURG PRIVATE ROOM

## 2023-03-22 PROCEDURE — 84443 ASSAY THYROID STIM HORMONE: CPT | Performed by: STUDENT IN AN ORGANIZED HEALTH CARE EDUCATION/TRAINING PROGRAM

## 2023-03-22 PROCEDURE — 25000003 PHARM REV CODE 250: Performed by: ORTHOPAEDIC SURGERY

## 2023-03-22 PROCEDURE — 84439 ASSAY OF FREE THYROXINE: CPT | Performed by: STUDENT IN AN ORGANIZED HEALTH CARE EDUCATION/TRAINING PROGRAM

## 2023-03-22 PROCEDURE — 87040 BLOOD CULTURE FOR BACTERIA: CPT | Performed by: STUDENT IN AN ORGANIZED HEALTH CARE EDUCATION/TRAINING PROGRAM

## 2023-03-22 PROCEDURE — 25000003 PHARM REV CODE 250: Performed by: INTERNAL MEDICINE

## 2023-03-22 PROCEDURE — 86140 C-REACTIVE PROTEIN: CPT | Performed by: ORTHOPAEDIC SURGERY

## 2023-03-22 PROCEDURE — 99222 PR INITIAL HOSPITAL CARE,LEVL II: ICD-10-PCS | Mod: ,,, | Performed by: PSYCHIATRY & NEUROLOGY

## 2023-03-22 PROCEDURE — 63600175 PHARM REV CODE 636 W HCPCS: Performed by: STUDENT IN AN ORGANIZED HEALTH CARE EDUCATION/TRAINING PROGRAM

## 2023-03-22 PROCEDURE — 86334 IMMUNOFIX E-PHORESIS SERUM: CPT | Performed by: STUDENT IN AN ORGANIZED HEALTH CARE EDUCATION/TRAINING PROGRAM

## 2023-03-22 PROCEDURE — 85652 RBC SED RATE AUTOMATED: CPT | Performed by: ORTHOPAEDIC SURGERY

## 2023-03-22 RX ORDER — CYCLOBENZAPRINE HCL 5 MG
5 TABLET ORAL 3 TIMES DAILY PRN
Status: DISCONTINUED | OUTPATIENT
Start: 2023-03-22 | End: 2023-04-07 | Stop reason: HOSPADM

## 2023-03-22 RX ORDER — DEXTROSE 40 %
30 GEL (GRAM) ORAL
Status: DISCONTINUED | OUTPATIENT
Start: 2023-03-22 | End: 2023-03-22 | Stop reason: SDUPTHER

## 2023-03-22 RX ORDER — IBUPROFEN 400 MG/1
800 TABLET ORAL 3 TIMES DAILY
Status: COMPLETED | OUTPATIENT
Start: 2023-03-22 | End: 2023-03-23

## 2023-03-22 RX ORDER — SODIUM CHLORIDE 0.9 % (FLUSH) 0.9 %
10 SYRINGE (ML) INJECTION EVERY 12 HOURS PRN
Status: DISCONTINUED | OUTPATIENT
Start: 2023-03-22 | End: 2023-03-22

## 2023-03-22 RX ORDER — DEXTROSE 40 %
15 GEL (GRAM) ORAL
Status: DISCONTINUED | OUTPATIENT
Start: 2023-03-22 | End: 2023-03-22 | Stop reason: SDUPTHER

## 2023-03-22 RX ORDER — COLCHICINE 0.6 MG/1
1.2 TABLET, FILM COATED ORAL ONCE
Status: COMPLETED | OUTPATIENT
Start: 2023-03-22 | End: 2023-03-22

## 2023-03-22 RX ORDER — GLUCAGON 1 MG
1 KIT INJECTION
Status: DISCONTINUED | OUTPATIENT
Start: 2023-03-22 | End: 2023-04-07 | Stop reason: HOSPADM

## 2023-03-22 RX ORDER — CEFEPIME HYDROCHLORIDE 1 G/50ML
2 INJECTION, SOLUTION INTRAVENOUS
Status: DISCONTINUED | OUTPATIENT
Start: 2023-03-22 | End: 2023-03-27

## 2023-03-22 RX ORDER — COLCHICINE 0.6 MG/1
0.6 TABLET, FILM COATED ORAL DAILY
Status: DISCONTINUED | OUTPATIENT
Start: 2023-03-23 | End: 2023-03-26

## 2023-03-22 RX ORDER — ACETAMINOPHEN 325 MG/1
650 TABLET ORAL EVERY 8 HOURS PRN
Status: DISCONTINUED | OUTPATIENT
Start: 2023-03-22 | End: 2023-03-22

## 2023-03-22 RX ORDER — INSULIN ASPART 100 [IU]/ML
0-5 INJECTION, SOLUTION INTRAVENOUS; SUBCUTANEOUS
Status: DISCONTINUED | OUTPATIENT
Start: 2023-03-22 | End: 2023-04-07 | Stop reason: HOSPADM

## 2023-03-22 RX ADMIN — MELATONIN TAB 3 MG 6 MG: 3 TAB at 08:03

## 2023-03-22 RX ADMIN — CEFEPIME HYDROCHLORIDE 2 G: 2 INJECTION, SOLUTION INTRAVENOUS at 05:03

## 2023-03-22 RX ADMIN — CYCLOBENZAPRINE HYDROCHLORIDE 5 MG: 5 TABLET, FILM COATED ORAL at 07:03

## 2023-03-22 RX ADMIN — INSULIN DETEMIR 5 UNITS: 100 INJECTION, SOLUTION SUBCUTANEOUS at 09:03

## 2023-03-22 RX ADMIN — VANCOMYCIN HYDROCHLORIDE 2000 MG: 500 INJECTION, POWDER, LYOPHILIZED, FOR SOLUTION INTRAVENOUS at 01:03

## 2023-03-22 RX ADMIN — CYCLOBENZAPRINE HYDROCHLORIDE 5 MG: 5 TABLET, FILM COATED ORAL at 08:03

## 2023-03-22 RX ADMIN — POLYETHYLENE GLYCOL 3350 17 G: 17 POWDER, FOR SOLUTION ORAL at 09:03

## 2023-03-22 RX ADMIN — CEFEPIME HYDROCHLORIDE 2 G: 2 INJECTION, SOLUTION INTRAVENOUS at 09:03

## 2023-03-22 RX ADMIN — INSULIN ASPART 2 UNITS: 100 INJECTION, SOLUTION INTRAVENOUS; SUBCUTANEOUS at 05:03

## 2023-03-22 RX ADMIN — IBUPROFEN 800 MG: 400 TABLET ORAL at 08:03

## 2023-03-22 RX ADMIN — COLCHICINE 1.2 MG: 0.6 TABLET, FILM COATED ORAL at 08:03

## 2023-03-22 NOTE — TELEPHONE ENCOUNTER
----- Message from Heath Carlos Eduardo sent at 3/22/2023  4:25 PM CDT -----  Regarding: [facility] [phone]  Type: Patient Call Back    Who called: [facility]    What is the request in detail:  called in regards to an inpatient consult for the pt. Stated room number is 430, for sceptic arthritis     Can the clinic reply by [facility]? No     Would the patient rather a call back or a response via My [facility]? Call back     Best call back number: [phone]    Additional Information:    Thank you.

## 2023-03-22 NOTE — NURSING
Patient resting in bed, admitted from [facility]. Pt a&ox4, respirations e/u, denies pain. CLWR, BR upx2, bed low, locked in position, pt placed on purewick. Pt educated on unit policies and procedures, pt verbalized understanding. Significant other at bedside. CLWR,BR upx3,bed low, locked in positoin. Safety maintained, will continue to monitor. Physician (Darlin) notified patient on unit.

## 2023-03-22 NOTE — CARE UPDATE
Assumed care of Ms Hedy Conway who was transferred from [facility]. There she was admitted for multiple joint pains. MRI R knee shows meniscal tear and large effusion with synovitis. There was concern for septic arthritis, but no aspiration has been done. She also had Strep pneumoniae bacteremia (3/13) and Pseudomonas pneumonia (3/15) and is currently being treated with cefepime. She is also receiving vancomycin with concerns for septic joint. She did require 1U RBC transfusion and has had vaginal bleeding. Pelvic US 3/20 showed thickened endometrium, and she will need outpatient GYN follow up. Her iron panel indicates mixed iron deficiency and anemia of chronic disease. Plan for her to see Neurology and Orthopaedics today for workup of her multiple joint pains and weakness. Plan of care discussed with patient and her  at bedside.     Karla Garber MD  03/22/2023  5:19 PM

## 2023-03-22 NOTE — ASSESSMENT & PLAN NOTE
Presented with bilateral lower extremity weakness  With no significant sensory deficit  She denied preceding diarrhea/GI symptoms, although she had upper respiratory tract symptoms  Power in the LLE is 2/5, right is 1/5  Guillain-Merino syndrome is in the differential  We will consult Neurology to review patient.  While awaiting blood culture and continue antibiotics.

## 2023-03-22 NOTE — PLAN OF CARE
Problem: Diabetes Comorbidity  Goal: Blood Glucose Level Within Targeted Range  Outcome: Ongoing, Progressing     Problem: Adjustment to Illness (Sepsis/Septic Shock)  Goal: Optimal Coping  Outcome: Ongoing, Progressing     Problem: Bleeding (Sepsis/Septic Shock)  Goal: Absence of Bleeding  Outcome: Ongoing, Progressing     Problem: Glycemic Control Impaired (Sepsis/Septic Shock)  Goal: Blood Glucose Level Within Desired Range  Outcome: Ongoing, Progressing     Problem: Infection Progression (Sepsis/Septic Shock)  Goal: Absence of Infection Signs and Symptoms  Outcome: Ongoing, Progressing     Problem: Fluid Imbalance (Pneumonia)  Goal: Fluid Balance  Outcome: Ongoing, Progressing     Problem: Infection (Pneumonia)  Goal: Resolution of Infection Signs and Symptoms  Outcome: Ongoing, Progressing     Problem: Adult Inpatient Plan of Care  Goal: Plan of Care Review  Outcome: Ongoing, Progressing  Goal: Absence of Hospital-Acquired Illness or Injury  Outcome: Ongoing, Progressing  Goal: Optimal Comfort and Wellbeing  Outcome: Ongoing, Progressing     Problem: Infection  Goal: Absence of Infection Signs and Symptoms  Outcome: Ongoing, Progressing     Problem: Skin Injury Risk Increased  Goal: Skin Health and Integrity  Outcome: Ongoing, Progressing     Problem: Pain Acute  Goal: Acceptable Pain Control and Functional Ability  Outcome: Ongoing, Progressing

## 2023-03-22 NOTE — HPI
47-year-old  female with medical history significant for type 2 diabetes mellitus, hypertension, large cell diffuse non-Hodgkin lymphoma status post chemotherapy and stem cell transplant 2011 was transferred from outside hospital with suspicion of septic arthritis.  Of note she voiced migratory pain in her left-right knee-both shoulders that started 2 weeks ago, associated with generalized weakness, cleansing inability to move right lower extremity and only have movement without gravity on the left lower extremity without associated trauma, she voiced being on 3 different antibiotics at the outside hospital for both pneumonia and urinary tract infection, and this could explain for been afebrile although she voiced subjective fever at the onset of her symptoms she denied cough, shortness of breath, orthopnea, paroxysmal nocturnal dyspnea or pedal swelling.  She denied prior diarrhea although she voiced upper respiratory tract symptoms,treatment for pneumonia prior to onset of these symptoms.  She denied any urinary symptoms at this time of dysuria, frequency, urgency, straining at micturition or hematuria.  No recent travel, no sick contacts, no tick bite.  She had been sent here for possible knee aspiration.

## 2023-03-22 NOTE — ASSESSMENT & PLAN NOTE
Likely anemia of chronic disease  H/H 8.6/28.5  Ferritin elevated at 2150  Iron 17, TIBC 123, %sat14  Treating underlying condition with help

## 2023-03-22 NOTE — SUBJECTIVE & OBJECTIVE
Past Medical History:   Diagnosis Date    Acute carpal tunnel syndrome of left wrist     Biliary colic     Diabetes mellitus     Encounter for blood transfusion     History of chemotherapy     Incisional hernia     Large cell (diffuse) non-Hodgkin's lymphoma     Stem cells transplant status        Past Surgical History:   Procedure Laterality Date    BREAST BIOPSY Left     lymph node biopsy, benign    BREAST SURGERY      CHOLECYSTECTOMY      COLD KNIFE CONIZATION OF CERVIX N/A 3/8/2021    Procedure: CONE BIOPSY, CERVIX, USING COLD KNIFE;  Surgeon: Evelyn Wheeler MD;  Location: [facility] OR;  Service: OB/GYN;  Laterality: N/A;    COLONOSCOPY N/A 12/21/2022    Procedure: COLONOSCOPY;  Surgeon: Annamarie Jane MD;  Location: [facility];  Service: Endoscopy;  Laterality: N/A;    HERNIA REPAIR      incisional    LUNG BIOPSY      lymphnode biopsy      MEDIPORT REMOVAL Left 1/30/2020    Procedure: REMOVAL, CATHETER, CENTRAL VENOUS, TUNNELED, WITH PORT;  Surgeon: Luis Bogran-Reyes, MD;  Location: [facility] OR;  Service: General;  Laterality: Left;    PORTACATH PLACEMENT Left     REVISION OF SCAR  6/22/2021    Procedure: REVISION, SCAR;  Surgeon: Otis Grimes MD;  Location: [address];  Service: General;;    TUBAL LIGATION      UMBILICAL HERNIA REPAIR         Review of patient's allergies indicates:   Allergen Reactions    Tetanus vaccines and toxoid Rash       Current Facility-Administered Medications on File Prior to Encounter   Medication    [COMPLETED] HYDROcodone-acetaminophen 5-325 mg per tablet 1 tablet    [DISCONTINUED] 0.9%  NaCl infusion (for blood administration)    [DISCONTINUED] 0.9%  NaCl infusion (for blood administration)    [DISCONTINUED] acetaminophen tablet 500 mg    [DISCONTINUED] bisacodyL suppository 10 mg    [DISCONTINUED] calcium carbonate 200 mg calcium (500 mg) chewable tablet 500 mg    [DISCONTINUED] cefTRIAXone (ROCEPHIN) 1 g/50 mL D5W IVPB    [DISCONTINUED] cyclobenzaprine tablet 10 mg     [DISCONTINUED] dextrose 10% bolus 125 mL 125 mL    [DISCONTINUED] dextrose 10% bolus 125 mL 125 mL    [DISCONTINUED] dextrose 10% bolus 250 mL 250 mL    [DISCONTINUED] dextrose 10% bolus 250 mL 250 mL    [DISCONTINUED] dextrose 40 % gel 15,000 mg    [DISCONTINUED] dextrose 40 % gel 15,000 mg    [DISCONTINUED] dextrose 40 % gel 30,000 mg    [DISCONTINUED] dextrose 40 % gel 30,000 mg    [DISCONTINUED] docusate sodium capsule 100 mg    [DISCONTINUED] ferrous sulfate tablet 1 each    [DISCONTINUED] fluticasone furoate-vilanteroL 100-25 mcg/dose diskus inhaler 1 puff    [DISCONTINUED] gabapentin capsule 100 mg    [DISCONTINUED] glucagon (human recombinant) injection 1 mg    [DISCONTINUED] insulin aspart U-100 pen 0-15 Units    [DISCONTINUED] insulin detemir U-100 pen 10 Units    [DISCONTINUED] levalbuterol nebulizer solution 0.63 mg    [DISCONTINUED] levoFLOXacin tablet 750 mg    [DISCONTINUED] LIDOcaine (PF) 10 mg/ml (1%) injection 100 mg    [DISCONTINUED] melatonin tablet 6 mg    [DISCONTINUED] metoprolol tartrate (LOPRESSOR) tablet 25 mg    [DISCONTINUED] montelukast tablet 10 mg    [DISCONTINUED] sodium chloride 0.9% flush 10 mL     Current Outpatient Medications on File Prior to Encounter   Medication Sig    celecoxib (CELEBREX) 200 MG capsule Take 200 mg by mouth once daily. Pt stated she was recently taken off this medication    albuterol (PROVENTIL/VENTOLIN HFA) 90 mcg/actuation inhaler Ventolin HFA 90 mcg/actuation aerosol inhaler   INHALE 2 PUFFS BY MOUTH 3 TIMES DAILY AS NEEDED.    albuterol-ipratropium (DUO-NEB) 2.5 mg-0.5 mg/3 mL nebulizer solution ipratropium 0.5 mg-albuterol 3 mg (2.5 mg base)/3 mL nebulization soln   USE 1 VIAL IN NEBULIZER EVERY 6 TO 8 HOURS AS NEEDED    bisoprolol (ZEBETA) 5 MG tablet Take 5 mg by mouth once daily.    blood sugar diagnostic Strp USE TO CHECK BLOOD SUGAR TWICE A DAY    budesonide (PULMICORT) 0.5 mg/2 mL nebulizer solution USE 1 VIAL IN NEBULIZER TWICE DAILY (OK TO MIX  WITH ALBUTEROL. RINSE MOUTH AFTER USE)    budesonide (PULMICORT) 0.5 mg/2 mL nebulizer solution budesonide 0.5 mg/2 mL suspension for nebulization   USE 1 VIAL IN NEBULIZER TWICE DAILY (OK TO MIX WITH ALBUTEROL. RINSE MOUTH AFTER USE)    conjugated estrogens (PREMARIN) vaginal cream Place 0.5 g vaginally once daily. Place 0.5 vaginally for 4 weeks, then transition to twice weekly use.    cyclobenzaprine (FLEXERIL) 10 MG tablet Take 10 mg by mouth 3 (three) times daily as needed. Take for 5 days    fluticasone (FLONASE) 50 mcg/actuation nasal spray 1 spray by Each Nare route once daily.    HYDROcodone-acetaminophen (NORCO) 5-325 mg per tablet Take 1 tablet by mouth every 6 (six) hours as needed.    melatonin 10 mg Cap Take 10 mg by mouth nightly as needed.    metFORMIN (GLUCOPHAGE) 500 MG tablet Take 500 mg by mouth once daily.    montelukast (SINGULAIR) 10 mg tablet Take 10 mg by mouth once daily.    naproxen (NAPROSYN) 500 MG tablet Take 500 mg by mouth 2 (two) times daily as needed. Take for 5 days    ondansetron (ZOFRAN) 4 MG tablet Take 4 mg by mouth every 8 (eight) hours as needed.    pantoprazole (PROTONIX) 40 MG tablet Take 1 tablet (40 mg total) by mouth once daily.    zinc sulfate (ZINCATE) 220 (50) mg capsule Take 220 mg by mouth 3 (three) times daily.     Family History       Problem Relation (Age of Onset)    Breast cancer Maternal Aunt, Maternal Aunt    Cancer Paternal Grandmother    Colon cancer Paternal Grandmother    Diabetes Mother, Father    Heart block Mother    Heart failure Father    Hypertension Mother, Father    Kidney disease Mother    Lung cancer Father          Tobacco Use    Smoking status: Never    Smokeless tobacco: Never   Substance and Sexual Activity    Alcohol use: Not Currently    Drug use: No    Sexual activity: Yes     Partners: Male     Birth control/protection: See Surgical Hx     Comment: with one partner for 24 years     Review of Systems   Constitutional:  Positive for  activity change and fatigue. Negative for appetite change, chills and fever.   HENT:  Negative for congestion, ear discharge, ear pain and sore throat.    Eyes:  Negative for photophobia, redness and visual disturbance.   Respiratory:  Negative for apnea, cough, chest tightness, shortness of breath and wheezing.    Cardiovascular:  Negative for chest pain, palpitations and leg swelling.   Gastrointestinal:  Positive for vomiting (single episode - recently ingested meals). Negative for abdominal distention, abdominal pain, blood in stool, constipation, diarrhea and nausea.   Endocrine: Negative for cold intolerance and heat intolerance.   Genitourinary:  Negative for difficulty urinating, dysuria, flank pain, frequency and hematuria.   Musculoskeletal:  Positive for arthralgias and gait problem. Negative for back pain and joint swelling.   Skin:  Negative for rash and wound.   Neurological:  Negative for dizziness, tremors, seizures, syncope, weakness, light-headedness and headaches.   Psychiatric/Behavioral:  Negative for behavioral problems, confusion, hallucinations and suicidal ideas.    Objective:     Vital Signs (Most Recent):  Temp: 98.3 °F (36.8 °C) (03/22/23 0405)  Pulse: 102 (03/22/23 0405)  Resp: 18 (03/22/23 0405)  BP: 134/64 (03/22/23 0405)  SpO2: (!) 94 % (03/22/23 0405)   Vital Signs (24h Range):  Temp:  [98.1 °F (36.7 °C)-98.9 °F (37.2 °C)] 98.3 °F (36.8 °C)  Pulse:  [] 102  Resp:  [16-22] 18  SpO2:  [92 %-97 %] 94 %  BP: (100-134)/(56-70) 134/64     Weight: 121.5 kg (267 lb 13.7 oz)  Body mass index is 41.95 kg/m².    Physical Exam  Constitutional:       Appearance: She is obese.   HENT:      Head: Normocephalic and atraumatic.      Nose: Nose normal. No congestion or rhinorrhea.      Mouth/Throat:      Mouth: Mucous membranes are moist.   Eyes:      Extraocular Movements: Extraocular movements intact.      Conjunctiva/sclera: Conjunctivae normal.      Pupils: Pupils are equal, round, and  reactive to light.   Cardiovascular:      Rate and Rhythm: Normal rate and regular rhythm.      Pulses: Normal pulses.      Heart sounds: Normal heart sounds.   Pulmonary:      Effort: Pulmonary effort is normal. No respiratory distress.      Breath sounds: Normal breath sounds. No wheezing or rales.   Chest:      Chest wall: No tenderness.   Abdominal:      General: Abdomen is flat. Bowel sounds are normal. There is no distension.      Palpations: Abdomen is soft.      Tenderness: There is no abdominal tenderness. There is no right CVA tenderness, left CVA tenderness, guarding or rebound.   Musculoskeletal:         General: Tenderness (bilateral lower extremity tenderness) present.      Cervical back: Normal range of motion.      Right lower leg: No edema.      Left lower leg: No edema.   Skin:     General: Skin is warm and dry.   Neurological:      Mental Status: She is alert and oriented to person, place, and time.      Cranial Nerves: No cranial nerve deficit.      Sensory: No sensory deficit.      Motor: Weakness (power 1/5 RLL 2/5 LLL) present.      Gait: Gait abnormal (Could not work).   Psychiatric:         Mood and Affect: Mood normal.         Behavior: Behavior normal.         Thought Content: Thought content normal.         CRANIAL NERVES     CN III, IV, VI   Pupils are equal, round, and reactive to light.     Significant Labs: All pertinent labs within the past 24 hours have been reviewed.  Blood Culture: No results for input(s): LABBLOO in the last 48 hours.  CBC:   Recent Labs   Lab 03/21/23  0549 03/21/23  1210 03/22/23  0424   WBC 5.91 7.25 5.22   HGB 7.8* 8.6* 7.4*   HCT 26.2* 28.5* 25.0*    256 247     CMP:   Recent Labs   Lab 03/21/23  0549   *   K 3.8      CO2 24   *   BUN 14   CREATININE 0.5   CALCIUM 9.1   ANIONGAP 10     Lactic Acid: No results for input(s): LACTATE in the last 48 hours.  Lipase: No results for input(s): LIPASE in the last 48 hours.  Lipid Panel: No  results for input(s): CHOL, HDL, LDLCALC, TRIG, CHOLHDL in the last 48 hours.  Magnesium:   Recent Labs   Lab 03/21/23  0549   MG 1.8     TSH:   Recent Labs   Lab 12/30/22  1415   TSH 2.144     Urine Studies: No results for input(s): COLORU, APPEARANCEUA, PHUR, SPECGRAV, PROTEINUA, GLUCUA, KETONESU, BILIRUBINUA, OCCULTUA, NITRITE, UROBILINOGEN, LEUKOCYTESUR, RBCUA, WBCUA, BACTERIA, SQUAMEPITHEL, HYALINECASTS in the last 48 hours.    Invalid input(s): JOSSELIN    Significant Imaging: I have reviewed all pertinent imaging results/findings within the past 24 hours.

## 2023-03-22 NOTE — PLAN OF CARE
Problem: Diabetes Comorbidity  Goal: Blood Glucose Level Within Targeted Range  Intervention: Monitor and Manage Glycemia  Flowsheets (Taken 3/22/2023 1657)  Glycemic Management:   blood glucose monitored   supplemental insulin given     Problem: Adjustment to Illness (Sepsis/Septic Shock)  Goal: Optimal Coping  Outcome: Ongoing, Progressing     Problem: Fluid Imbalance (Pneumonia)  Goal: Fluid Balance  Intervention: Monitor and Manage Fluid Balance  Flowsheets (Taken 3/22/2023 1657)  Fluid/Electrolyte Management:   electrolyte-binding therapy initiated   fluids provided   electrolyte supplement adjusted   electrolyte supplement initiated   fluids adjusted

## 2023-03-22 NOTE — PROGRESS NOTES
Pharmacokinetic Initial Assessment: IV Vancomycin    Assessment/Plan:    Initiate intravenous vancomycin with loading dose of 2250 mg once followed by a maintenance dose of vancomycin 2000 mg IV every 12 hours  Desired empiric serum trough concentration is 10 to 20 mcg/mL  Draw vancomycin trough level 60 min prior to fourth dose on 3/23/23 at approximately 1100  Pharmacy will continue to follow and monitor vancomycin.      Please contact pharmacy at extension [phone] with any questions regarding this assessment.     Thank you for the consult,   Fernando Barajas       Patient brief summary:  Hedy Conway is a 47 y.o. female initiated on antimicrobial therapy with IV Vancomycin for treatment of suspected bone/joint infection    Drug Allergies:   Review of patient's allergies indicates:   Allergen Reactions    Tetanus vaccines and toxoid Rash       Actual Body Weight:   121.5 kg    Renal Function:   Estimated Creatinine Clearance: 185.9 mL/min (based on SCr of 0.5 mg/dL).,     Dialysis Method (if applicable):  N/A    CBC (last 72 hours):  Recent Labs   Lab Result Units 03/19/23  0613 03/21/23  0549 03/21/23  1210   WBC K/uL 8.56 5.91 7.25   Hemoglobin g/dL 8.4* 7.8* 8.6*   Hematocrit % 28.0* 26.2* 28.5*   Platelets K/uL 262 220 256   Gran % % 70.0 63.7 73.6*   Lymph % % 18.0 25.5 16.3*   Mono % % 7.0 8.5 8.1   Eosinophil % % 0.0 0.2 0.0   Basophil % % 0.0 0.2 0.1   Differential Method  Manual Automated Automated       Metabolic Panel (last 72 hours):  Recent Labs   Lab Result Units 03/21/23  0549   Sodium mmol/L 134*   Potassium mmol/L 3.8   Chloride mmol/L 100   CO2 mmol/L 24   Glucose mg/dL 189*   BUN mg/dL 14   Creatinine mg/dL 0.5   Magnesium mg/dL 1.8       Drug levels (last 3 results):  No results for input(s): VANCOMYCINRA, VANCORANDOM, VANCOMYCINPE, VANCOPEAK, VANCOMYCINTR, VANCOTROUGH in the last 72 hours.    Microbiologic Results:  Microbiology Results (last 7 days)       ** No results found for the  last 168 hours. **

## 2023-03-22 NOTE — ASSESSMENT & PLAN NOTE
Patient's FSGs are controlled on current medication regimen.  Last A1c reviewed-   Lab Results   Component Value Date    HGBA1C 6.4 (H) 03/15/2023     Most recent fingerstick glucose reviewed-   Recent Labs   Lab 03/21/23  0715 03/21/23  1419 03/21/23  1642 03/21/23  2308   POCTGLUCOSE 169* 159* 136* 156*     Current correctional scale  Medium  Maintain anti-hyperglycemic dose as follows-   Antihyperglycemics (From admission, onward)    Start     Stop Route Frequency Ordered    03/22/23 0900  insulin detemir U-100 pen 5 Units         -- SubQ Daily 03/22/23 0541    03/22/23 0638  insulin aspart U-100 pen 0-5 Units         -- SubQ Before meals & nightly PRN 03/22/23 0541        Hold Oral hypoglycemics while patient is in the hospital.  Will start basal.bolus and correctional insulin

## 2023-03-22 NOTE — CONSULTS
47-year-old female transferred from [facility] last night after 12 days admission for multiple joint pains.  Orthopedics consulted to rule out septic joint.    Patient reports she began with spontaneous left hip pain about 3 weeks ago.  She was seen at 3 different hospitals and told she had muscle pull.  The pain slowly worsened to where she could not ambulate.  The pain then radiated to her left knee.  After admission she began having pain in her right wrist followed by severe pain in her right knee and both shoulders.  Apparently attempt was made at outlying facility to aspirate right knee but no fluid was obtained     Patient has diabetes  Patient has a history of lymphoma with stem cell transplant but no recent treatment.  Denies history of gout, rheumatoid arthritis or lupus     Patient has had radiographs of the right knee and right wrist which were essentially normal.  CT scan of the right wrist shows tendonitis, MRI of the right knee is read as joint effusion with probable meniscus tear.      Afebrile vital signs stable   WBC normal but leukocytosis present last week in [facility].  Sed rate and C-reactive protein elevated     On exam patient is alert and oriented and very pleasant.  No pain unless she begins range of motion to both shoulders, right wrist, both knees and left hip.  Neurovascular intact all extremities   No erythema on any joint.  Very limited range of motion both shoulders, right wrist, both knees and left hip secondary to pain.    Patient appears to have some sort of polyarthralgia due to rheumatologic disorder or something associated with her lymphoma and previous stem cell transplant.  I doubt patient has multiple septic joints of both knees, left hip, right wrist and both shoulders.    Rheumatologic labs ordered, rheumatoid factor, uric acid, SILVIA, repeat ESR and CRP.  Colchicine ordered, ibuprofen ordered.  Rheumatology consult placed for polyarthralgia   Patient  should probably be followed by heme Onc also  If workup with above does not eliminate any disorder, I will attempt aspiration of 1 of the painful joints.  Also consider course of corticosteroids if inflammatory disorder suspected.

## 2023-03-22 NOTE — PLAN OF CARE
[facility] - Med Surg  Initial Discharge Assessment      Patient from home and lives with spouse, who will be her help at home. Spoke with pt at bedside, stated currently lives in a camper. Pt transfer from [place], previous pt/ot recommendations for rehab vs snf. Ortho consulted. TN to continue to follow up for dc needs.      Primary Care Provider: John Lantigua PA-C    Admission Diagnosis: Pneumonia [J18.9]    Admission Date: 3/21/2023  Expected Discharge Date:     Discharge Barriers Identified: None    Payor: [employer] [facility] / Plan: [employer] [place] PPO / Product Type: PPO /     Extended Emergency Contact Information  Primary Emergency Contact: Raheem Conway  Address: [address] [place] [address]  Home Phone: [phone]  Mobile Phone: [phone]  Relation: Spouse    Discharge Plan A: Skilled Nursing Facility  Discharge Plan B: Rehab      [facility] - [place], [place] - [address]  [address]  [place] [address]  Phone: [phone] Fax: [phone]    [facility]  [address]  [place] [address]  Phone: [phone] Fax: [phone]      Initial Assessment (most recent)       Adult Discharge Assessment - 03/22/23 1220          Discharge Assessment    Assessment Type Discharge Planning Assessment     Confirmed/corrected address, phone number and insurance Yes     Confirmed Demographics Correct on Facesheet     Source of Information patient     Does patient/caregiver understand observation status No     Was observation education provided? No     Communicated JESSE with patient/caregiver Date not available/Unable to determine     Reason For Admission Large cell non-Hodgkin's lymphoma     People in Home spouse     Facility Arrived From: Home     Do you expect to return to your current living situation? Yes     Do you have help at home or someone to help you manage your care at home? Yes     Who are your  caregiver(s) and their phone number(s)? Raheem Conway (Spouse)   [phone]     Prior to hospitilization cognitive status: Alert/Oriented     Current cognitive status: Alert/Oriented     Walking or Climbing Stairs ambulation difficulty, dependent;stair climbing difficulty, dependent;transferring difficulty, dependent     Dressing/Bathing bathing difficulty, dependent;dressing difficulty, dependent     Equipment Currently Used at Home CPAP;nebulizer;glucometer     Readmission within 30 days? No     Patient currently being followed by outpatient case management? No     Do you currently have service(s) that help you manage your care at home? No     Do you take prescription medications? Yes     Do you have prescription coverage? Yes     Coverage BCBS     Do you have any problems affording any of your prescribed medications? No     Is the patient taking medications as prescribed? yes     Who is going to help you get home at discharge? Raheem Conway (Spouse)   [phone]     How do you get to doctors appointments? family or friend will provide     Are you on dialysis? No     Do you take coumadin? No     Discharge Plan A Skilled Nursing Facility     Discharge Plan B Rehab     DME Needed Upon Discharge  other (see comments)   TBD    Discharge Plan discussed with: Patient     Discharge Barriers Identified None

## 2023-03-22 NOTE — H&P
[facility] Medicine  History & Physical    Patient Name: Hedy Conway  MRN: [id number]  Patient Class: IP- Inpatient  Admission Date: 3/21/2023  Attending Physician: Karla Garber MD   Primary Care Provider: John Lantigua PA-C         Patient information was obtained from patient, past medical records and ER records.     Subjective:     Principal Problem:<principal problem not specified>    Chief Complaint: No chief complaint on file.       HPI: 47-year-old  female with medical history significant for type 2 diabetes mellitus, hypertension, large cell diffuse non-Hodgkin lymphoma status post chemotherapy and stem cell transplant 2011 was transferred from outside hospital with suspicion of septic arthritis.  Of note she voiced migratory pain in her left-right knee-both shoulders that started 2 weeks ago, associated with generalized weakness, cleansing inability to move right lower extremity and only have movement without gravity on the left lower extremity without associated trauma, she voiced being on 3 different antibiotics at the outside hospital for both pneumonia and urinary tract infection, and this could explain for been afebrile although she voiced subjective fever at the onset of her symptoms she denied cough, shortness of breath, orthopnea, paroxysmal nocturnal dyspnea or pedal swelling.  She denied prior diarrhea although she voiced upper respiratory tract symptoms,treatment for pneumonia prior to onset of these symptoms.  She denied any urinary symptoms at this time of dysuria, frequency, urgency, straining at micturition or hematuria.  No recent travel, no sick contacts, no tick bite.  She had been sent here for possible knee aspiration.      Past Medical History:   Diagnosis Date    Acute carpal tunnel syndrome of left wrist     Biliary colic     Diabetes mellitus     Encounter for blood transfusion     History of chemotherapy     Incisional hernia     Large  cell (diffuse) non-Hodgkin's lymphoma     Stem cells transplant status        Past Surgical History:   Procedure Laterality Date    BREAST BIOPSY Left     lymph node biopsy, benign    BREAST SURGERY      CHOLECYSTECTOMY      COLD KNIFE CONIZATION OF CERVIX N/A 3/8/2021    Procedure: CONE BIOPSY, CERVIX, USING COLD KNIFE;  Surgeon: Evelyn Wheeler MD;  Location: [facility] OR;  Service: OB/GYN;  Laterality: N/A;    COLONOSCOPY N/A 12/21/2022    Procedure: COLONOSCOPY;  Surgeon: Annamarie Jane MD;  Location: [facility];  Service: Endoscopy;  Laterality: N/A;    HERNIA REPAIR      incisional    LUNG BIOPSY      lymphnode biopsy      MEDIPORT REMOVAL Left 1/30/2020    Procedure: REMOVAL, CATHETER, CENTRAL VENOUS, TUNNELED, WITH PORT;  Surgeon: Luis Bogran-Reyes, MD;  Location: [facility];  Service: General;  Laterality: Left;    PORTACATH PLACEMENT Left     REVISION OF SCAR  6/22/2021    Procedure: REVISION, SCAR;  Surgeon: Otis Grimes MD;  Location: [address];  Service: General;;    TUBAL LIGATION      UMBILICAL HERNIA REPAIR         Review of patient's allergies indicates:   Allergen Reactions    Tetanus vaccines and toxoid Rash       Current Facility-Administered Medications on File Prior to Encounter   Medication    [COMPLETED] HYDROcodone-acetaminophen 5-325 mg per tablet 1 tablet    [DISCONTINUED] 0.9%  NaCl infusion (for blood administration)    [DISCONTINUED] 0.9%  NaCl infusion (for blood administration)    [DISCONTINUED] acetaminophen tablet 500 mg    [DISCONTINUED] bisacodyL suppository 10 mg    [DISCONTINUED] calcium carbonate 200 mg calcium (500 mg) chewable tablet 500 mg    [DISCONTINUED] cefTRIAXone (ROCEPHIN) 1 g/50 mL D5W IVPB    [DISCONTINUED] cyclobenzaprine tablet 10 mg    [DISCONTINUED] dextrose 10% bolus 125 mL 125 mL    [DISCONTINUED] dextrose 10% bolus 125 mL 125 mL    [DISCONTINUED] dextrose 10% bolus 250 mL 250 mL    [DISCONTINUED] dextrose 10% bolus 250 mL 250  mL    [DISCONTINUED] dextrose 40 % gel 15,000 mg    [DISCONTINUED] dextrose 40 % gel 15,000 mg    [DISCONTINUED] dextrose 40 % gel 30,000 mg    [DISCONTINUED] dextrose 40 % gel 30,000 mg    [DISCONTINUED] docusate sodium capsule 100 mg    [DISCONTINUED] ferrous sulfate tablet 1 each    [DISCONTINUED] fluticasone furoate-vilanteroL 100-25 mcg/dose diskus inhaler 1 puff    [DISCONTINUED] gabapentin capsule 100 mg    [DISCONTINUED] glucagon (human recombinant) injection 1 mg    [DISCONTINUED] insulin aspart U-100 pen 0-15 Units    [DISCONTINUED] insulin detemir U-100 pen 10 Units    [DISCONTINUED] levalbuterol nebulizer solution 0.63 mg    [DISCONTINUED] levoFLOXacin tablet 750 mg    [DISCONTINUED] LIDOcaine (PF) 10 mg/ml (1%) injection 100 mg    [DISCONTINUED] melatonin tablet 6 mg    [DISCONTINUED] metoprolol tartrate (LOPRESSOR) tablet 25 mg    [DISCONTINUED] montelukast tablet 10 mg    [DISCONTINUED] sodium chloride 0.9% flush 10 mL     Current Outpatient Medications on File Prior to Encounter   Medication Sig    celecoxib (CELEBREX) 200 MG capsule Take 200 mg by mouth once daily. Pt stated she was recently taken off this medication    albuterol (PROVENTIL/VENTOLIN HFA) 90 mcg/actuation inhaler Ventolin HFA 90 mcg/actuation aerosol inhaler   INHALE 2 PUFFS BY MOUTH 3 TIMES DAILY AS NEEDED.    albuterol-ipratropium (DUO-NEB) 2.5 mg-0.5 mg/3 mL nebulizer solution ipratropium 0.5 mg-albuterol 3 mg (2.5 mg base)/3 mL nebulization soln   USE 1 VIAL IN NEBULIZER EVERY 6 TO 8 HOURS AS NEEDED    bisoprolol (ZEBETA) 5 MG tablet Take 5 mg by mouth once daily.    blood sugar diagnostic Strp USE TO CHECK BLOOD SUGAR TWICE A DAY    budesonide (PULMICORT) 0.5 mg/2 mL nebulizer solution USE 1 VIAL IN NEBULIZER TWICE DAILY (OK TO MIX WITH ALBUTEROL. RINSE MOUTH AFTER USE)    budesonide (PULMICORT) 0.5 mg/2 mL nebulizer solution budesonide 0.5 mg/2 mL suspension for nebulization   USE 1 VIAL IN NEBULIZER  TWICE DAILY (OK TO MIX WITH ALBUTEROL. RINSE MOUTH AFTER USE)    conjugated estrogens (PREMARIN) vaginal cream Place 0.5 g vaginally once daily. Place 0.5 vaginally for 4 weeks, then transition to twice weekly use.    cyclobenzaprine (FLEXERIL) 10 MG tablet Take 10 mg by mouth 3 (three) times daily as needed. Take for 5 days    fluticasone (FLONASE) 50 mcg/actuation nasal spray 1 spray by Each Nare route once daily.    HYDROcodone-acetaminophen (NORCO) 5-325 mg per tablet Take 1 tablet by mouth every 6 (six) hours as needed.    melatonin 10 mg Cap Take 10 mg by mouth nightly as needed.    metFORMIN (GLUCOPHAGE) 500 MG tablet Take 500 mg by mouth once daily.    montelukast (SINGULAIR) 10 mg tablet Take 10 mg by mouth once daily.    naproxen (NAPROSYN) 500 MG tablet Take 500 mg by mouth 2 (two) times daily as needed. Take for 5 days    ondansetron (ZOFRAN) 4 MG tablet Take 4 mg by mouth every 8 (eight) hours as needed.    pantoprazole (PROTONIX) 40 MG tablet Take 1 tablet (40 mg total) by mouth once daily.    zinc sulfate (ZINCATE) 220 (50) mg capsule Take 220 mg by mouth 3 (three) times daily.     Family History       Problem Relation (Age of Onset)    Breast cancer Maternal Aunt, Maternal Aunt    Cancer Paternal Grandmother    Colon cancer Paternal Grandmother    Diabetes Mother, Father    Heart block Mother    Heart failure Father    Hypertension Mother, Father    Kidney disease Mother    Lung cancer Father          Tobacco Use    Smoking status: Never    Smokeless tobacco: Never   Substance and Sexual Activity    Alcohol use: Not Currently    Drug use: No    Sexual activity: Yes     Partners: Male     Birth control/protection: See Surgical Hx     Comment: with one partner for 24 years     Review of Systems   Constitutional:  Positive for activity change and fatigue. Negative for appetite change, chills and fever.   HENT:  Negative for congestion, ear discharge, ear pain and sore throat.    Eyes:   Negative for photophobia, redness and visual disturbance.   Respiratory:  Negative for apnea, cough, chest tightness, shortness of breath and wheezing.    Cardiovascular:  Negative for chest pain, palpitations and leg swelling.   Gastrointestinal:  Positive for vomiting (single episode - recently ingested meals). Negative for abdominal distention, abdominal pain, blood in stool, constipation, diarrhea and nausea.   Endocrine: Negative for cold intolerance and heat intolerance.   Genitourinary:  Negative for difficulty urinating, dysuria, flank pain, frequency and hematuria.   Musculoskeletal:  Positive for arthralgias and gait problem. Negative for back pain and joint swelling.   Skin:  Negative for rash and wound.   Neurological:  Negative for dizziness, tremors, seizures, syncope, weakness, light-headedness and headaches.   Psychiatric/Behavioral:  Negative for behavioral problems, confusion, hallucinations and suicidal ideas.    Objective:     Vital Signs (Most Recent):  Temp: 98.3 °F (36.8 °C) (03/22/23 0405)  Pulse: 102 (03/22/23 0405)  Resp: 18 (03/22/23 0405)  BP: 134/64 (03/22/23 0405)  SpO2: (!) 94 % (03/22/23 0405)   Vital Signs (24h Range):  Temp:  [98.1 °F (36.7 °C)-98.9 °F (37.2 °C)] 98.3 °F (36.8 °C)  Pulse:  [] 102  Resp:  [16-22] 18  SpO2:  [92 %-97 %] 94 %  BP: (100-134)/(56-70) 134/64     Weight: 121.5 kg (267 lb 13.7 oz)  Body mass index is 41.95 kg/m².    Physical Exam  Constitutional:       Appearance: She is obese.   HENT:      Head: Normocephalic and atraumatic.      Nose: Nose normal. No congestion or rhinorrhea.      Mouth/Throat:      Mouth: Mucous membranes are moist.   Eyes:      Extraocular Movements: Extraocular movements intact.      Conjunctiva/sclera: Conjunctivae normal.      Pupils: Pupils are equal, round, and reactive to light.   Cardiovascular:      Rate and Rhythm: Normal rate and regular rhythm.      Pulses: Normal pulses.      Heart sounds: Normal heart sounds.    Pulmonary:      Effort: Pulmonary effort is normal. No respiratory distress.      Breath sounds: Normal breath sounds. No wheezing or rales.   Chest:      Chest wall: No tenderness.   Abdominal:      General: Abdomen is flat. Bowel sounds are normal. There is no distension.      Palpations: Abdomen is soft.      Tenderness: There is no abdominal tenderness. There is no right CVA tenderness, left CVA tenderness, guarding or rebound.   Musculoskeletal:         General: Tenderness (bilateral lower extremity tenderness) present.      Cervical back: Normal range of motion.      Right lower leg: No edema.      Left lower leg: No edema.   Skin:     General: Skin is warm and dry.   Neurological:      Mental Status: She is alert and oriented to person, place, and time.      Cranial Nerves: No cranial nerve deficit.      Sensory: No sensory deficit.      Motor: Weakness (power 1/5 RLL 2/5 LLL) present.      Gait: Gait abnormal (Could not work).   Psychiatric:         Mood and Affect: Mood normal.         Behavior: Behavior normal.         Thought Content: Thought content normal.         CRANIAL NERVES     CN III, IV, VI   Pupils are equal, round, and reactive to light.     Significant Labs: All pertinent labs within the past 24 hours have been reviewed.  Blood Culture: No results for input(s): LABBLOO in the last 48 hours.  CBC:   Recent Labs   Lab 03/21/23  0549 03/21/23  1210 03/22/23  0424   WBC 5.91 7.25 5.22   HGB 7.8* 8.6* 7.4*   HCT 26.2* 28.5* 25.0*    256 247     CMP:   Recent Labs   Lab 03/21/23  0549   *   K 3.8      CO2 24   *   BUN 14   CREATININE 0.5   CALCIUM 9.1   ANIONGAP 10     Lactic Acid: No results for input(s): LACTATE in the last 48 hours.  Lipase: No results for input(s): LIPASE in the last 48 hours.  Lipid Panel: No results for input(s): CHOL, HDL, LDLCALC, TRIG, CHOLHDL in the last 48 hours.  Magnesium:   Recent Labs   Lab 03/21/23  0549   MG 1.8     TSH:   Recent Labs    Lab 12/30/22  1415   TSH 2.144     Urine Studies: No results for input(s): COLORU, APPEARANCEUA, PHUR, SPECGRAV, PROTEINUA, GLUCUA, KETONESU, BILIRUBINUA, OCCULTUA, NITRITE, UROBILINOGEN, LEUKOCYTESUR, RBCUA, WBCUA, BACTERIA, SQUAMEPITHEL, HYALINECASTS in the last 48 hours.    Invalid input(s): WRIGHTSUR    Significant Imaging: I have reviewed all pertinent imaging results/findings within the past 24 hours.    Assessment/Plan:     Plasma cell disorder  She presented with bilateral lower extremity pain and weakness  Anion gap was low at 6.serum albumin of 1.8 and total protein of 4  Will obtain serum and urine electrophoresis  Urine/protein cr ratio  Will rule out multiple myeloma as well  Has history of  large cell diffuse non-hodgkin's lymphoma    Anemia of chronic disease  Likely anemia of chronic disease  H/H 8.6/28.5  Ferritin elevated at 2150  Iron 17, TIBC 123, %sat14  Treating underlying condition with help      Proximal myopathy  Presented with bilateral lower extremity weakness  With no significant sensory deficit  She denied preceding diarrhea/GI symptoms, although she had upper respiratory tract symptoms  Power in the LLE is 2/5, right is 1/5  Guillain-Bennett syndrome is in the differential  We will consult Neurology to review patient.  While awaiting blood culture and continue antibiotics.      Septic arthritis  Complained of rt knee pain  Prior tap has been unsuccessful  Sed rate elevated at 105  Has been on antibiotics, so they not be febrile  Will consult ortho for review      Type 2 diabetes mellitus without complication, without long-term current use of insulin  Patient's FSGs are controlled on current medication regimen.  Last A1c reviewed-   Lab Results   Component Value Date    HGBA1C 6.4 (H) 03/15/2023     Most recent fingerstick glucose reviewed-   Recent Labs   Lab 03/21/23  0715 03/21/23  1419 03/21/23  1642 03/21/23  2308   POCTGLUCOSE 169* 159* 136* 156*     Current correctional scale   Medium  Maintain anti-hyperglycemic dose as follows-   Antihyperglycemics (From admission, onward)    Start     Stop Route Frequency Ordered    03/22/23 0900  insulin detemir U-100 pen 5 Units         -- SubQ Daily 03/22/23 0541    03/22/23 0638  insulin aspart U-100 pen 0-5 Units         -- SubQ Before meals & nightly PRN 03/22/23 0541        Hold Oral hypoglycemics while patient is in the hospital.  Will start basal.bolus and correctional insulin    Pneumonia of both lungs due to infectious organism  She voiced cough and subjective fever  Has just been treated for pneumonia  CXR showed basal consolidation,concerning for pneumonia but she just got treated and still on antibiotics  This should suffice    Stem cells transplant status  Noted  Not currently onimmunosuppresant  Follow up with oncology      Large cell (diffuse) non-Hodgkin's lymphoma  In remission,follow up with oncology  No new enlarged lymph nodes        VTE Risk Mitigation (From admission, onward)         Ordered     Reason for No Pharmacological VTE Prophylaxis  Once        Question:  Reasons:  Answer:  Active Bleeding    03/21/23 2230     IP VTE HIGH RISK PATIENT  Once         03/21/23 2230     Place sequential compression device  Until discontinued         03/21/23 2230                   On 03/22/2023, patient should be placed in hospital observation services under my care.        Atilio Saeed MD  Department of Hospital Medicine  [facility] [place]

## 2023-03-22 NOTE — ASSESSMENT & PLAN NOTE
Complained of rt knee pain  Prior tap has been unsuccessful  Sed rate elevated at 105  Has been on antibiotics, so they not be febrile  Will consult ortho for review

## 2023-03-22 NOTE — ASSESSMENT & PLAN NOTE
She voiced cough and subjective fever  Has just been treated for pneumonia  CXR showed basal consolidation,concerning for pneumonia but she just got treated and still on antibiotics  This should suffice

## 2023-03-22 NOTE — CONSULTS
"[facility]  Neurology  Consult Note    Patient Name: Hedy Conway  MRN: [id number]  Admission Date: 3/21/2023  Hospital Length of Stay: 1 days  Code Status: Full Code   Attending Provider: Karla Garber MD   Consulting Provider: Mandeep Dietz MD  Primary Care Physician: John Lantigua PA-C  Principal Problem:<principal problem not specified>    Inpatient consult to Neurology  Consult performed by: Mandeep Dietz MD  Consult ordered by: Atilio Saeed MD      Subjective:     Chief Complaint:  "Pain in my legs and arms. 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 She also had a fever of 103.  Ms. Conway's BCx were positive for strep pneumoniae. Pt had been on antibiotics. Symptoms have been present for two week. She reports pain in all limbs as well as tingling and needles sensation in her LE's with associated weakness. She has  not been beatrice to walk. Denies urinary/bowel disturbances, headaches, visual or speech difficulties.    Past Medical History:   Diagnosis Date    Acute carpal tunnel syndrome of left wrist     Biliary colic     Diabetes mellitus     Encounter for blood transfusion     History of chemotherapy     Incisional hernia     Large cell (diffuse) non-Hodgkin's lymphoma     Stem cells transplant status        Past Surgical History:   Procedure Laterality Date    BREAST BIOPSY Left     lymph node biopsy, benign    BREAST SURGERY      CHOLECYSTECTOMY      COLD KNIFE CONIZATION OF CERVIX N/A 3/8/2021    Procedure: CONE BIOPSY, CERVIX, USING COLD KNIFE;  Surgeon: Evelyn Wheeler MD;  Location: [facility];  Service: OB/GYN;  Laterality: N/A;    COLONOSCOPY N/A 12/21/2022    Procedure: COLONOSCOPY;  Surgeon: Annamarie Jane MD;  Location: [facility];  Service: Endoscopy;  " Laterality: N/A;    HERNIA REPAIR      incisional    LUNG BIOPSY      lymphnode biopsy      MEDIPORT REMOVAL Left 1/30/2020    Procedure: REMOVAL, CATHETER, CENTRAL VENOUS, TUNNELED, WITH PORT;  Surgeon: Luis Bogran-Reyes, MD;  Location: [facility] OR;  Service: General;  Laterality: Left;    PORTACATH PLACEMENT Left     REVISION OF SCAR  6/22/2021    Procedure: REVISION, SCAR;  Surgeon: Otis Grimes MD;  Location: [facility] OR [address] [place] [address];  Service: General;;    TUBAL LIGATION      UMBILICAL HERNIA REPAIR         Review of patient's allergies indicates:   Allergen Reactions    Tetanus vaccines and toxoid Rash       Current Neurological Medications:     Current Facility-Administered Medications on File Prior to Encounter   Medication    [COMPLETED] HYDROcodone-acetaminophen 5-325 mg per tablet 1 tablet    [DISCONTINUED] acetaminophen tablet 500 mg    [DISCONTINUED] bisacodyL suppository 10 mg    [DISCONTINUED] calcium carbonate 200 mg calcium (500 mg) chewable tablet 500 mg    [DISCONTINUED] cefTRIAXone (ROCEPHIN) 1 g/50 mL D5W IVPB    [DISCONTINUED] cyclobenzaprine tablet 10 mg    [DISCONTINUED] dextrose 10% bolus 125 mL 125 mL    [DISCONTINUED] dextrose 10% bolus 250 mL 250 mL    [DISCONTINUED] dextrose 40 % gel 15,000 mg    [DISCONTINUED] dextrose 40 % gel 30,000 mg    [DISCONTINUED] docusate sodium capsule 100 mg    [DISCONTINUED] ferrous sulfate tablet 1 each    [DISCONTINUED] fluticasone furoate-vilanteroL 100-25 mcg/dose diskus inhaler 1 puff    [DISCONTINUED] gabapentin capsule 100 mg    [DISCONTINUED] glucagon (human recombinant) injection 1 mg    [DISCONTINUED] insulin aspart U-100 pen 0-15 Units    [DISCONTINUED] insulin detemir U-100 pen 10 Units    [DISCONTINUED] levalbuterol nebulizer solution 0.63 mg    [DISCONTINUED] levoFLOXacin tablet 750 mg    [DISCONTINUED] melatonin tablet 6 mg    [DISCONTINUED] metoprolol tartrate (LOPRESSOR) tablet 25 mg    [DISCONTINUED] montelukast tablet 10 mg    [DISCONTINUED]  sodium chloride 0.9% flush 10 mL     Current Outpatient Medications on File Prior to Encounter   Medication Sig    celecoxib (CELEBREX) 200 MG capsule Take 200 mg by mouth once daily. Pt stated she was recently taken off this medication    albuterol (PROVENTIL/VENTOLIN HFA) 90 mcg/actuation inhaler Ventolin HFA 90 mcg/actuation aerosol inhaler   INHALE 2 PUFFS BY MOUTH 3 TIMES DAILY AS NEEDED.    albuterol-ipratropium (DUO-NEB) 2.5 mg-0.5 mg/3 mL nebulizer solution ipratropium 0.5 mg-albuterol 3 mg (2.5 mg base)/3 mL nebulization soln   USE 1 VIAL IN NEBULIZER EVERY 6 TO 8 HOURS AS NEEDED    bisoprolol (ZEBETA) 5 MG tablet Take 5 mg by mouth once daily.    blood sugar diagnostic Strp USE TO CHECK BLOOD SUGAR TWICE A DAY    budesonide (PULMICORT) 0.5 mg/2 mL nebulizer solution USE 1 VIAL IN NEBULIZER TWICE DAILY (OK TO MIX WITH ALBUTEROL. RINSE MOUTH AFTER USE)    budesonide (PULMICORT) 0.5 mg/2 mL nebulizer solution budesonide 0.5 mg/2 mL suspension for nebulization   USE 1 VIAL IN NEBULIZER TWICE DAILY (OK TO MIX WITH ALBUTEROL. RINSE MOUTH AFTER USE)    conjugated estrogens (PREMARIN) vaginal cream Place 0.5 g vaginally once daily. Place 0.5 vaginally for 4 weeks, then transition to twice weekly use.    cyclobenzaprine (FLEXERIL) 10 MG tablet Take 10 mg by mouth 3 (three) times daily as needed. Take for 5 days    fluticasone (FLONASE) 50 mcg/actuation nasal spray 1 spray by Each Nare route once daily.    HYDROcodone-acetaminophen (NORCO) 5-325 mg per tablet Take 1 tablet by mouth every 6 (six) hours as needed.    melatonin 10 mg Cap Take 10 mg by mouth nightly as needed.    metFORMIN (GLUCOPHAGE) 500 MG tablet Take 500 mg by mouth once daily.    montelukast (SINGULAIR) 10 mg tablet Take 10 mg by mouth once daily.    naproxen (NAPROSYN) 500 MG tablet Take 500 mg by mouth 2 (two) times daily as needed. Take for 5 days    ondansetron (ZOFRAN) 4 MG tablet Take 4 mg by mouth every 8 (eight) hours as needed.     pantoprazole (PROTONIX) 40 MG tablet Take 1 tablet (40 mg total) by mouth once daily.    zinc sulfate (ZINCATE) 220 (50) mg capsule Take 220 mg by mouth 3 (three) times daily.      Family History       Problem Relation (Age of Onset)    Breast cancer Maternal Aunt, Maternal Aunt    Cancer Paternal Grandmother    Colon cancer Paternal Grandmother    Diabetes Mother, Father    Heart block Mother    Heart failure Father    Hypertension Mother, Father    Kidney disease Mother    Lung cancer Father          Tobacco Use    Smoking status: Never    Smokeless tobacco: Never   Substance and Sexual Activity    Alcohol use: Not Currently    Drug use: No    Sexual activity: Yes     Partners: Male     Birth control/protection: See Surgical Hx     Comment: with one partner for 24 years     Review of Systems   Constitutional:  Negative for fever.   HENT:  Negative for trouble swallowing.    Eyes:  Negative for photophobia.   Respiratory:  Negative for shortness of breath.    Cardiovascular:  Negative for chest pain.   Gastrointestinal:  Negative for abdominal pain.   Genitourinary:  Negative for flank pain.   Musculoskeletal:  Positive for back pain, gait problem and myalgias.   Neurological:  Positive for weakness.   Psychiatric/Behavioral:  Negative for confusion.    Objective:     Vital Signs (Most Recent):  Temp: 99.2 °F (37.3 °C) (03/22/23 1105)  Pulse: 109 (03/22/23 1105)  Resp: 18 (03/22/23 1105)  BP: 129/60 (03/22/23 1105)  SpO2: 95 % (03/22/23 1105) Vital Signs (24h Range):  Temp:  [98.3 °F (36.8 °C)-99.2 °F (37.3 °C)] 99.2 °F (37.3 °C)  Pulse:  [101-115] 109  Resp:  [16-22] 18  SpO2:  [92 %-97 %] 95 %  BP: (116-134)/(56-67) 129/60     Weight: 121.5 kg (267 lb 13.7 oz)  Body mass index is 41.95 kg/m².    Physical Exam  Constitutional:       General: She is not in acute distress.  HENT:      Head: Normocephalic.   Eyes:      General:         Right eye: No discharge.         Left eye: No discharge.   Cardiovascular:       Rate and Rhythm: Normal rate.   Pulmonary:      Breath sounds: Normal breath sounds.   Abdominal:      Palpations: Abdomen is soft.   Musculoskeletal:      Cervical back: Neck supple.      Comments: Pain on palpation on limbs   Skin:     Findings: No rash.   Neurological:      Mental Status: She is oriented to person, place, and time.      Deep Tendon Reflexes:      Reflex Scores:       Tricep reflexes are 2+ on the right side and 2+ on the left side.       Bicep reflexes are 2+ on the right side and 2+ on the left side.       Brachioradialis reflexes are 2+ on the right side and 2+ on the left side.  Psychiatric:         Speech: Speech normal.       NEUROLOGICAL EXAMINATION:     MENTAL STATUS   Oriented to person, place, and time.   Speech: speech is normal   Level of consciousness: alert    CRANIAL NERVES     CN III, IV, VI   Right pupil: Size: 2 mm. Shape: regular.   Left pupil: Size: 2 mm. Shape: regular.   Nystagmus: none   Ophthalmoparesis: none  Conjugate gaze: present    CN V   Right facial sensation deficit: none  Left facial sensation deficit: none    CN VII   Right facial weakness: none  Left facial weakness: none    CN XII   Tongue deviation: none    MOTOR EXAM        Unable to test strength adequately given pain. Seems that effort in UE's and LE is 4/5 when repeatedly prompted in hip add/abd and ankle plantar flexion     REFLEXES     Reflexes   Right brachioradialis: 2+  Left brachioradialis: 2+  Right biceps: 2+  Left biceps: 2+  Right triceps: 2+  Left triceps: 2+    Significant Labs: CBC:   Recent Labs   Lab 03/21/23  1210 03/22/23  0424 03/23/23  0444   WBC 7.25 5.22 5.35   HGB 8.6* 7.4* 7.6*   HCT 28.5* 25.0* 24.9*    247 SEE COMMENT     CMP:   Recent Labs   Lab 03/22/23  0424 03/23/23  0444   * 198*   * 136   K 3.8 5.4*    103   CO2 26 22*   BUN 13 13   CREATININE 0.5 0.6   CALCIUM 8.8 8.7   PROT 4.7* 4.9*   ALBUMIN 1.8* 1.9*   BILITOT 0.7 0.9   ALKPHOS 78 79   AST 17 25    ALT 26 31   ANIONGAP 6* 11       Significant Imaging: I have reviewed all pertinent imaging results/findings within the past 24 hours.    CT  Impression:     1. Bibasilar patchy alveolar infiltrates could represent pneumonia.  Minimal consolidation in the lingula and left lower lobe also.  Recommend follow-up.  2. Hepatosplenomegaly.  3. Nonobstructing nephrolithiasis of the left kidney.  4. Few stable mildly enlarged retroperitoneal lymph nodes.        Electronically signed by: Jona Vega  Date:                                            03/13/2023  Time:                                           22:16    Assessment and Plan:     46 y/o female consulted for LE weakness    LE weakness: exam is limited due to pain but no sensory level to suggest and spinal cord lesion. DTR's are intact in UE's. Likely not a myopathy as CK is not elevated. Could be an inflammatory process.  DDx also include demyelinating polyneuropathy.  Lumbar MRI with and without contrast can be obtained  Repeat inflammatory markers.  Trial of steroids?    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] 03/22/2023 Yes    Septic arthritis [M00.9] 03/14/2023 Yes    Type 2 diabetes mellitus without complication, without long-term current use of insulin [E11.9] 03/14/2023 Yes    Pneumonia of both lungs due to infectious organism [J18.9] 06/11/2021 Yes    Large cell (diffuse) non-Hodgkin's lymphoma [C83.30] 06/28/2012 Yes    Stem cells transplant status [Z94.84] 06/28/2012 Not Applicable      Problems Resolved During this Admission:       VTE Risk Mitigation (From admission, onward)           Ordered     Reason for No Pharmacological VTE Prophylaxis  Once        Question:  Reasons:  Answer:  Active Bleeding    03/21/23 2230     IP VTE HIGH RISK PATIENT  Once         03/21/23 2230     Place sequential compression device   Until discontinued         03/21/23 5773                    Thank you for your consult. I will follow-up with patient. Please contact us if you have any additional questions.    Mandeep Dietz MD  Neurology  [facility] [place] [facility]

## 2023-03-22 NOTE — NURSING
Patient discharged from [facility] to be transferred to [facility] via [facility]. Informed oncoming nurse of departure. Some confusion on room number. Previously told patient was going to room 431 but patient going to room 430.

## 2023-03-22 NOTE — ASSESSMENT & PLAN NOTE
She presented with bilateral lower extremity pain and weakness  Anion gap was low at 6.serum albumin of 1.8 and total protein of 4  Will obtain serum and urine electrophoresis  Urine/protein cr ratio  Will rule out multiple myeloma as well  Has history of  large cell diffuse non-hodgkin's lymphoma

## 2023-03-22 NOTE — NURSING
[facility] to notify Dr. Sinha of Ortho consult. Per Dr. Sinha, consult was called in last night to Dr. Thompson and they've discussed that Dr. Murrell will be seeing the pt today. Called [facility] again and spoke w/ Kimberly to make sure Dr. Murrell is aware; they said Dr. Murrell will be rounding after clinic hours.

## 2023-03-23 PROBLEM — R29.898 WEAKNESS OF BOTH LOWER EXTREMITIES: Status: ACTIVE | Noted: 2023-03-22

## 2023-03-23 PROBLEM — M25.50 POLYARTHRALGIA: Status: ACTIVE | Noted: 2023-03-14

## 2023-03-23 PROBLEM — E80.6 HYPERBILIRUBINEMIA: Status: RESOLVED | Noted: 2023-03-14 | Resolved: 2023-03-23

## 2023-03-23 PROBLEM — D50.9 MICROCYTIC ANEMIA: Status: RESOLVED | Noted: 2023-03-14 | Resolved: 2023-03-23

## 2023-03-23 PROBLEM — A41.9 SEPSIS: Status: RESOLVED | Noted: 2021-06-11 | Resolved: 2023-03-23

## 2023-03-23 PROBLEM — E03.8 SUBCLINICAL HYPOTHYROIDISM: Status: ACTIVE | Noted: 2023-03-23

## 2023-03-23 PROBLEM — R06.02 SOB (SHORTNESS OF BREATH): Status: RESOLVED | Noted: 2021-06-11 | Resolved: 2023-03-23

## 2023-03-23 PROBLEM — D72.9 PLASMA CELL DISORDER: Status: RESOLVED | Noted: 2023-03-22 | Resolved: 2023-03-23

## 2023-03-23 PROBLEM — R82.90 ABNORMAL URINALYSIS: Status: RESOLVED | Noted: 2023-03-14 | Resolved: 2023-03-23

## 2023-03-23 PROBLEM — Z79.899 DVT PROPHYLAXIS: Status: RESOLVED | Noted: 2021-06-11 | Resolved: 2023-03-23

## 2023-03-23 PROBLEM — R05.9 COUGH: Status: RESOLVED | Noted: 2021-06-11 | Resolved: 2023-03-23

## 2023-03-23 LAB
ALBUMIN SERPL BCP-MCNC: 1.9 G/DL (ref 3.5–5.2)
ALP SERPL-CCNC: 79 U/L (ref 55–135)
ALT SERPL W/O P-5'-P-CCNC: 31 U/L (ref 10–44)
ANION GAP SERPL CALC-SCNC: 11 MMOL/L (ref 8–16)
AST SERPL-CCNC: 25 U/L (ref 10–40)
BASOPHILS # BLD AUTO: 0.02 K/UL (ref 0–0.2)
BASOPHILS NFR BLD: 0.4 % (ref 0–1.9)
BILIRUB SERPL-MCNC: 0.9 MG/DL (ref 0.1–1)
BUN SERPL-MCNC: 13 MG/DL (ref 6–20)
CALCIUM SERPL-MCNC: 8.7 MG/DL (ref 8.7–10.5)
CCP AB SER IA-ACNC: <0.5 U/ML
CHLORIDE SERPL-SCNC: 103 MMOL/L (ref 95–110)
CO2 SERPL-SCNC: 22 MMOL/L (ref 23–29)
CREAT SERPL-MCNC: 0.6 MG/DL (ref 0.5–1.4)
CREAT UR-MCNC: 12.9 MG/DL (ref 15–325)
DIFFERENTIAL METHOD: ABNORMAL
EOSINOPHIL # BLD AUTO: 0 K/UL (ref 0–0.5)
EOSINOPHIL NFR BLD: 0.6 % (ref 0–8)
ERYTHROCYTE [DISTWIDTH] IN BLOOD BY AUTOMATED COUNT: 18.2 % (ref 11.5–14.5)
EST. GFR  (NO RACE VARIABLE): >60 ML/MIN/1.73 M^2
GLUCOSE SERPL-MCNC: 198 MG/DL (ref 70–110)
HCT VFR BLD AUTO: 24.9 % (ref 37–48.5)
HGB BLD-MCNC: 7.6 G/DL (ref 12–16)
IMM GRANULOCYTES # BLD AUTO: 0.12 K/UL (ref 0–0.04)
IMM GRANULOCYTES NFR BLD AUTO: 2.2 % (ref 0–0.5)
LYMPHOCYTES # BLD AUTO: 1.8 K/UL (ref 1–4.8)
LYMPHOCYTES NFR BLD: 33.3 % (ref 18–48)
MCH RBC QN AUTO: 24 PG (ref 27–31)
MCHC RBC AUTO-ENTMCNC: 30.5 G/DL (ref 32–36)
MCV RBC AUTO: 79 FL (ref 82–98)
MONOCYTES # BLD AUTO: 0.6 K/UL (ref 0.3–1)
MONOCYTES NFR BLD: 11.4 % (ref 4–15)
NEUTROPHILS # BLD AUTO: 2.8 K/UL (ref 1.8–7.7)
NEUTROPHILS NFR BLD: 52.1 % (ref 38–73)
NRBC BLD-RTO: 0 /100 WBC
PLATELET # BLD AUTO: ABNORMAL K/UL (ref 150–450)
PLATELET BLD QL SMEAR: ABNORMAL
PMV BLD AUTO: ABNORMAL FL (ref 9.2–12.9)
POCT GLUCOSE: 138 MG/DL (ref 70–110)
POCT GLUCOSE: 145 MG/DL (ref 70–110)
POCT GLUCOSE: 158 MG/DL (ref 70–110)
POCT GLUCOSE: 214 MG/DL (ref 70–110)
POTASSIUM SERPL-SCNC: 5.4 MMOL/L (ref 3.5–5.1)
PROT SERPL-MCNC: 4.9 G/DL (ref 6–8.4)
PROT UR-MCNC: 14 MG/DL
PROT/CREAT UR: 1.09 MG/G{CREAT} (ref 0–0.2)
RBC # BLD AUTO: 3.17 M/UL (ref 4–5.4)
RHEUMATOID FACT SERPL-ACNC: <13 IU/ML (ref 0–15)
SODIUM SERPL-SCNC: 136 MMOL/L (ref 136–145)
VANCOMYCIN TROUGH SERPL-MCNC: 14.6 UG/ML (ref 10–22)
WBC # BLD AUTO: 5.35 K/UL (ref 3.9–12.7)

## 2023-03-23 PROCEDURE — 84156 ASSAY OF PROTEIN URINE: CPT | Performed by: STUDENT IN AN ORGANIZED HEALTH CARE EDUCATION/TRAINING PROGRAM

## 2023-03-23 PROCEDURE — 86335 IMMUNFIX E-PHORSIS/URINE/CSF: CPT | Mod: 26,,, | Performed by: PATHOLOGY

## 2023-03-23 PROCEDURE — 11000001 HC ACUTE MED/SURG PRIVATE ROOM

## 2023-03-23 PROCEDURE — 25000003 PHARM REV CODE 250: Performed by: HOSPITALIST

## 2023-03-23 PROCEDURE — 86335 PATHOLOGIST INTERPRETATION UIFE: ICD-10-PCS | Mod: 26,,, | Performed by: PATHOLOGY

## 2023-03-23 PROCEDURE — 85025 COMPLETE CBC W/AUTO DIFF WBC: CPT | Performed by: HOSPITALIST

## 2023-03-23 PROCEDURE — 25000003 PHARM REV CODE 250: Performed by: STUDENT IN AN ORGANIZED HEALTH CARE EDUCATION/TRAINING PROGRAM

## 2023-03-23 PROCEDURE — 84166 PROTEIN E-PHORESIS/URINE/CSF: CPT | Performed by: STUDENT IN AN ORGANIZED HEALTH CARE EDUCATION/TRAINING PROGRAM

## 2023-03-23 PROCEDURE — 36415 COLL VENOUS BLD VENIPUNCTURE: CPT | Performed by: HOSPITALIST

## 2023-03-23 PROCEDURE — 86335 IMMUNFIX E-PHORSIS/URINE/CSF: CPT | Performed by: STUDENT IN AN ORGANIZED HEALTH CARE EDUCATION/TRAINING PROGRAM

## 2023-03-23 PROCEDURE — 63600175 PHARM REV CODE 636 W HCPCS: Mod: TB,JG | Performed by: HOSPITALIST

## 2023-03-23 PROCEDURE — 87205 SMEAR GRAM STAIN: CPT | Performed by: ORTHOPAEDIC SURGERY

## 2023-03-23 PROCEDURE — 25000003 PHARM REV CODE 250: Performed by: INTERNAL MEDICINE

## 2023-03-23 PROCEDURE — 80053 COMPREHEN METABOLIC PANEL: CPT | Performed by: HOSPITALIST

## 2023-03-23 PROCEDURE — 84166 PROTEIN E-PHORESIS/URINE/CSF: CPT | Mod: 26,,, | Performed by: PATHOLOGY

## 2023-03-23 PROCEDURE — 86200 CCP ANTIBODY: CPT | Performed by: HOSPITALIST

## 2023-03-23 PROCEDURE — 87070 CULTURE OTHR SPECIMN AEROBIC: CPT | Performed by: ORTHOPAEDIC SURGERY

## 2023-03-23 PROCEDURE — 25000003 PHARM REV CODE 250: Performed by: ORTHOPAEDIC SURGERY

## 2023-03-23 PROCEDURE — 63600175 PHARM REV CODE 636 W HCPCS: Performed by: INTERNAL MEDICINE

## 2023-03-23 PROCEDURE — 84166 PATHOLOGIST INTERPRETATION UPE: ICD-10-PCS | Mod: 26,,, | Performed by: PATHOLOGY

## 2023-03-23 PROCEDURE — 36415 COLL VENOUS BLD VENIPUNCTURE: CPT | Performed by: INTERNAL MEDICINE

## 2023-03-23 PROCEDURE — 80202 ASSAY OF VANCOMYCIN: CPT | Performed by: INTERNAL MEDICINE

## 2023-03-23 RX ADMIN — VANCOMYCIN HYDROCHLORIDE 2000 MG: 500 INJECTION, POWDER, LYOPHILIZED, FOR SOLUTION INTRAVENOUS at 01:03

## 2023-03-23 RX ADMIN — POLYETHYLENE GLYCOL 3350 17 G: 17 POWDER, FOR SOLUTION ORAL at 08:03

## 2023-03-23 RX ADMIN — VANCOMYCIN HYDROCHLORIDE 2000 MG: 500 INJECTION, POWDER, LYOPHILIZED, FOR SOLUTION INTRAVENOUS at 12:03

## 2023-03-23 RX ADMIN — COLCHICINE 0.6 MG: 0.6 TABLET, FILM COATED ORAL at 08:03

## 2023-03-23 RX ADMIN — CYCLOBENZAPRINE HYDROCHLORIDE 5 MG: 5 TABLET, FILM COATED ORAL at 07:03

## 2023-03-23 RX ADMIN — CEFEPIME HYDROCHLORIDE 2 G: 2 INJECTION, SOLUTION INTRAVENOUS at 03:03

## 2023-03-23 RX ADMIN — IBUPROFEN 800 MG: 400 TABLET ORAL at 02:03

## 2023-03-23 RX ADMIN — IBUPROFEN 800 MG: 400 TABLET ORAL at 08:03

## 2023-03-23 RX ADMIN — CYCLOBENZAPRINE HYDROCHLORIDE 5 MG: 5 TABLET, FILM COATED ORAL at 04:03

## 2023-03-23 RX ADMIN — SODIUM ZIRCONIUM CYCLOSILICATE 10 G: 10 POWDER, FOR SUSPENSION ORAL at 10:03

## 2023-03-23 RX ADMIN — MELATONIN TAB 3 MG 6 MG: 3 TAB at 09:03

## 2023-03-23 RX ADMIN — INSULIN ASPART 1 UNITS: 100 INJECTION, SOLUTION INTRAVENOUS; SUBCUTANEOUS at 09:03

## 2023-03-23 RX ADMIN — INSULIN DETEMIR 5 UNITS: 100 INJECTION, SOLUTION SUBCUTANEOUS at 08:03

## 2023-03-23 RX ADMIN — IBUPROFEN 800 MG: 400 TABLET ORAL at 09:03

## 2023-03-23 RX ADMIN — CEFEPIME HYDROCHLORIDE 2 G: 2 INJECTION, SOLUTION INTRAVENOUS at 10:03

## 2023-03-23 RX ADMIN — CEFEPIME HYDROCHLORIDE 2 G: 2 INJECTION, SOLUTION INTRAVENOUS at 06:03

## 2023-03-23 NOTE — SUBJECTIVE & OBJECTIVE
Interval History: No improvement in symptoms. Leg numbness improved with SCDs. Ortho did arthrocentesis of R knee today, only 2cc aspirated.     Review of Systems   Constitutional:  Negative for chills and fever.   Respiratory:  Negative for shortness of breath.    Cardiovascular:  Negative for chest pain.   Gastrointestinal:  Negative for abdominal pain.   Genitourinary:  Negative for difficulty urinating.   Musculoskeletal:  Positive for arthralgias. Negative for myalgias.   Skin:  Negative for rash.   Neurological:  Positive for weakness. Negative for numbness.   Psychiatric/Behavioral:  Negative for confusion.    Objective:     Vital Signs (Most Recent):  Temp: 97.8 °F (36.6 °C) (03/23/23 1443)  Pulse: 101 (03/23/23 1054)  Resp: 15 (03/23/23 1054)  BP: 132/67 (03/23/23 1054)  SpO2: (!) 94 % (03/23/23 1054)   Vital Signs (24h Range):  Temp:  [97.5 °F (36.4 °C)-99.1 °F (37.3 °C)] 97.8 °F (36.6 °C)  Pulse:  [] 101  Resp:  [14-20] 15  SpO2:  [94 %-98 %] 94 %  BP: (118-132)/(62-68) 132/67     Weight: 121.5 kg (267 lb 13.7 oz)  Body mass index is 41.95 kg/m².    Intake/Output Summary (Last 24 hours) at 3/23/2023 1551  Last data filed at 3/23/2023 1509  Gross per 24 hour   Intake 1080 ml   Output 3800 ml   Net -2720 ml      Physical Exam  Vitals and nursing note reviewed.   Constitutional:       General: She is not in acute distress.     Appearance: She is obese. She is not ill-appearing.   HENT:      Head: Normocephalic and atraumatic.      Nose: Nose normal.      Mouth/Throat:      Mouth: Mucous membranes are moist.   Cardiovascular:      Rate and Rhythm: Regular rhythm. Tachycardia present.   Pulmonary:      Effort: Pulmonary effort is normal.      Breath sounds: Normal breath sounds.      Comments: Room air  Abdominal:      General: Bowel sounds are normal.      Palpations: Abdomen is soft.   Musculoskeletal:      Right lower leg: Edema present.      Left lower leg: Edema present.      Comments: Swelling to R  knee. Both shoulders, right elbow, both knees, both ankles are tender with range of motion   Skin:     General: Skin is warm and dry.      Findings: No rash.   Neurological:      Mental Status: She is alert and oriented to person, place, and time.       Significant Labs: All pertinent labs within the past 24 hours have been reviewed.    Significant Imaging: I have reviewed all pertinent imaging results/findings within the past 24 hours.

## 2023-03-23 NOTE — NURSING
Patient resting in bed, respirations e/u, no family at bedside. Pt c/o pain 7/10 to right lower extremity and knee. Will administer pain medication, pt repositioned in bed. CLWR, BR upx3, bed low, locked in position. Safety maintained, no s/s of distress, will continue to monitor.

## 2023-03-23 NOTE — HOSPITAL COURSE
Ms Hedy Conway who was transferred to [facility] from [facility]. There she was admitted for multiple joint pains. MRI R knee showed meniscal tear and large effusion with synovitis. There was concern for septic arthritis, aspiration was attempted, but no fluid retrieved. She also had Strep pneumoniae bacteremia (3/13) and Pseudomonas pneumonia (3/15) and is currently being treated with cefepime. She is also receiving vancomycin with concerns for septic joint. She did require 1U RBC transfusion and has had vaginal bleeding. Pelvic US 3/20 showed thickened endometrium, and she will need outpatient GYN follow up. Her iron panel indicates mixed iron deficiency and anemia of chronic disease. She saw Neurology and Orthopaedics. Orthopaedics doubts multiple septic joints simultaneously and suspects Rheumatologic condition. S/p R knee arthrocentesis on 3/23 with culture no growth. Sed rate, CRP remain elevated. Uric acid normal, RF/CCP/SILVIA/hepatitis panel normal. MRI left shoulder with large complex joint effusion with synovitis, high grade partial thickness tear of supraspinatus and infraspinatus, near complete tear of intraarticular bicpet tendon with tenosynovitis. MRI right wrist with distal ulna/radius/carpal osteitis, complex effusions, complex fluid collections, tenosynovitis. S/p arthrocentesis of both L shoulder and R wrist by IR on 3/28- both have high neutrophil count, suggesting septic arthritis. No growth on cultures, but she has been on antibiotics for >1 week. MRI hip shows abnormal L SI joint concerning for septic arthritis. MRI lumbar spine shows R L2-3 facet arthropathy with enhancement. Ortho planning washout on 3/30/23. Neurosurgery also consulted for lumbar spine findings; no surgery needed. Continue ceftriaxone 2g q 24h, anticipate atleast 4 wks IV abx. Medically Ready, awaiting REHAB placement. Pt discharged to rehab in stbale condition on 4/7.

## 2023-03-23 NOTE — NURSING
Notified Dr. Blum of pt bleeding from her vagina, and pt states that she hasn't had a menses since before chemo. Also, advised that pt's H/H has dropped from previous day. MD stated to consult Gyn at the times.

## 2023-03-23 NOTE — ASSESSMENT & PLAN NOTE
Patient's FSGs are controlled on current medication regimen.  Last A1c reviewed-   Lab Results   Component Value Date    HGBA1C 6.4 (H) 03/22/2023     Most recent fingerstick glucose reviewed-   Recent Labs   Lab 03/22/23  1603 03/22/23  1935 03/23/23  0758 03/23/23  1056   POCTGLUCOSE 248* 173* 158* 145*     Current correctional scale  Medium  Maintain anti-hyperglycemic dose as follows-   Antihyperglycemics (From admission, onward)    Start     Stop Route Frequency Ordered    03/22/23 0900  insulin detemir U-100 pen 5 Units         -- SubQ Daily 03/22/23 0541    03/22/23 0638  insulin aspart U-100 pen 0-5 Units         -- SubQ Before meals & nightly PRN 03/22/23 0541        Hold Oral hypoglycemics while patient is in the hospital.

## 2023-03-23 NOTE — PLAN OF CARE
Problem: Diabetes Comorbidity  Goal: Blood Glucose Level Within Targeted Range  Outcome: Ongoing, Progressing     Problem: Adjustment to Illness (Sepsis/Septic Shock)  Goal: Optimal Coping  Outcome: Ongoing, Progressing     Problem: Bleeding (Sepsis/Septic Shock)  Goal: Absence of Bleeding  Outcome: Ongoing, Progressing     Problem: Glycemic Control Impaired (Sepsis/Septic Shock)  Goal: Blood Glucose Level Within Desired Range  Outcome: Ongoing, Progressing     Problem: Infection Progression (Sepsis/Septic Shock)  Goal: Absence of Infection Signs and Symptoms  Outcome: Ongoing, Progressing     Problem: Nutrition Impaired (Sepsis/Septic Shock)  Goal: Optimal Nutrition Intake  Outcome: Ongoing, Progressing     Problem: Fluid Imbalance (Pneumonia)  Goal: Fluid Balance  Outcome: Ongoing, Progressing     Problem: Infection (Pneumonia)  Goal: Resolution of Infection Signs and Symptoms  Outcome: Ongoing, Progressing     Problem: Respiratory Compromise (Pneumonia)  Goal: Effective Oxygenation and Ventilation  Outcome: Ongoing, Progressing     Problem: Adult Inpatient Plan of Care  Goal: Plan of Care Review  Outcome: Ongoing, Progressing  Goal: Patient-Specific Goal (Individualized)  Outcome: Ongoing, Progressing  Goal: Absence of Hospital-Acquired Illness or Injury  Outcome: Ongoing, Progressing  Goal: Optimal Comfort and Wellbeing  Outcome: Ongoing, Progressing  Goal: Readiness for Transition of Care  Outcome: Ongoing, Progressing     Problem: Bariatric Environmental Safety  Goal: Safety Maintained with Care  Outcome: Ongoing, Progressing     Problem: Impaired Wound Healing  Goal: Optimal Wound Healing  Outcome: Ongoing, Progressing     Problem: Infection  Goal: Absence of Infection Signs and Symptoms  Outcome: Ongoing, Progressing     Problem: Skin Injury Risk Increased  Goal: Skin Health and Integrity  Outcome: Ongoing, Progressing     Problem: Pain Acute  Goal: Acceptable Pain Control and Functional Ability  Outcome:  Ongoing, Progressing

## 2023-03-23 NOTE — PLAN OF CARE
Problem: Diabetes Comorbidity  Goal: Blood Glucose Level Within Targeted Range  Outcome: Ongoing, Progressing     Problem: Adjustment to Illness (Sepsis/Septic Shock)  Goal: Optimal Coping  Outcome: Ongoing, Progressing     Problem: Bleeding (Sepsis/Septic Shock)  Goal: Absence of Bleeding  Outcome: Ongoing, Progressing     Problem: Glycemic Control Impaired (Sepsis/Septic Shock)  Goal: Blood Glucose Level Within Desired Range  Outcome: Ongoing, Progressing     Problem: Infection Progression (Sepsis/Septic Shock)  Goal: Absence of Infection Signs and Symptoms  Outcome: Ongoing, Progressing     Problem: Fluid Imbalance (Pneumonia)  Goal: Fluid Balance  Outcome: Ongoing, Progressing     Problem: Infection (Pneumonia)  Goal: Resolution of Infection Signs and Symptoms  Outcome: Ongoing, Progressing     Problem: Adult Inpatient Plan of Care  Goal: Plan of Care Review  Outcome: Ongoing, Progressing  Goal: Absence of Hospital-Acquired Illness or Injury  Outcome: Ongoing, Progressing  Goal: Optimal Comfort and Wellbeing  Outcome: Ongoing, Progressing     Problem: Respiratory Compromise (Pneumonia)  Goal: Effective Oxygenation and Ventilation  Outcome: Ongoing, Progressing     Problem: Impaired Wound Healing  Goal: Optimal Wound Healing  Outcome: Ongoing, Progressing     Problem: Infection  Goal: Absence of Infection Signs and Symptoms  Outcome: Ongoing, Progressing

## 2023-03-23 NOTE — PROGRESS NOTES
Pt reports joints feel a little better with Colchicine and ibuprofen    AFVSS  WBC normal    ESR and CRP elevated  Uric acid WNL  RF pending  SILVIA pending    Hip and shoulder Xrays WNL    Discussed polyarthralgia with Pt and low suspicion for multiple 8 septic joints. Agreeable to aspiration R knee    2 attempts at aspiration right knee, only obtained 2cc serosanguinous fluid. Not enough for cell count. Sent for cultures.  Low suspicion for infected joints.  Rec Hem/Onc eval and Rheumatology eval  Rec trial oral prednisone  If joint fluid need to be obtained, IR will need to be consulted for guided aspiration attempt.

## 2023-03-23 NOTE — PROGRESS NOTES
[facility]  Progress Note    Patient Name: Hedy Conway  MRN: [id number]  Patient Class: IP- Inpatient   Admission Date: 3/21/2023  Length of Stay: 2 days  Attending Physician: Karla Garber MD  Primary Care Provider: John Lantiuga PA-C        Subjective:     Principal Problem:<principal problem not specified>        HPI:  47-year-old  female with medical history significant for type 2 diabetes mellitus, hypertension, large cell diffuse non-Hodgkin lymphoma status post chemotherapy and stem cell transplant 2011 was transferred from outside hospital with suspicion of septic arthritis.  Of note she voiced migratory pain in her left-right knee-both shoulders that started 2 weeks ago, associated with generalized weakness, cleansing inability to move right lower extremity and only have movement without gravity on the left lower extremity without associated trauma, she voiced being on 3 different antibiotics at the outside hospital for both pneumonia and urinary tract infection, and this could explain for been afebrile although she voiced subjective fever at the onset of her symptoms she denied cough, shortness of breath, orthopnea, paroxysmal nocturnal dyspnea or pedal swelling.  She denied prior diarrhea although she voiced upper respiratory tract symptoms,treatment for pneumonia prior to onset of these symptoms.  She denied any urinary symptoms at this time of dysuria, frequency, urgency, straining at micturition or hematuria.  No recent travel, no sick contacts, no tick bite.  She had been sent here for possible knee aspiration.      Overview/Hospital Course:  Ms Hedy Conway who was transferred to [facility] from [facility]. There she was admitted for multiple joint pains. MRI R knee showed meniscal tear and large effusion with synovitis. There was concern for septic arthritis, aspiration was attempted, but no fluid retrieved. She also had Strep pneumoniae  bacteremia (3/13) and Pseudomonas pneumonia (3/15) and is currently being treated with cefepime. She is also receiving vancomycin with concerns for septic joint. She did require 1U RBC transfusion and has had vaginal bleeding. Pelvic US 3/20 showed thickened endometrium, and she will need outpatient GYN follow up. Her iron panel indicates mixed iron deficiency and anemia of chronic disease. She saw Neurology and Orthopaedics. Orthopaedics doubts multiple septic joints simultaneously and suspects Rheumatologic condition. S/p R knee arthrocentesis on 3/23 with culture pending. Sed rate, CRP remain elevated. CCP, RF, SILVIA, hepatitis panel, parvovirus Ab pending.       Interval History: No improvement in symptoms. Leg numbness improved with SCDs. Ortho did arthrocentesis of R knee today, only 2cc aspirated.     Review of Systems   Constitutional:  Negative for chills and fever.   Respiratory:  Negative for shortness of breath.    Cardiovascular:  Negative for chest pain.   Gastrointestinal:  Negative for abdominal pain.   Genitourinary:  Negative for difficulty urinating.   Musculoskeletal:  Positive for arthralgias. Negative for myalgias.   Skin:  Negative for rash.   Neurological:  Positive for weakness. Negative for numbness.   Psychiatric/Behavioral:  Negative for confusion.    Objective:     Vital Signs (Most Recent):  Temp: 97.8 °F (36.6 °C) (03/23/23 1443)  Pulse: 101 (03/23/23 1054)  Resp: 15 (03/23/23 1054)  BP: 132/67 (03/23/23 1054)  SpO2: (!) 94 % (03/23/23 1054)   Vital Signs (24h Range):  Temp:  [97.5 °F (36.4 °C)-99.1 °F (37.3 °C)] 97.8 °F (36.6 °C)  Pulse:  [] 101  Resp:  [14-20] 15  SpO2:  [94 %-98 %] 94 %  BP: (118-132)/(62-68) 132/67     Weight: 121.5 kg (267 lb 13.7 oz)  Body mass index is 41.95 kg/m².    Intake/Output Summary (Last 24 hours) at 3/23/2023 1551  Last data filed at 3/23/2023 1509  Gross per 24 hour   Intake 1080 ml   Output 3800 ml   Net -2720 ml      Physical Exam  Vitals and  nursing note reviewed.   Constitutional:       General: She is not in acute distress.     Appearance: She is obese. She is not ill-appearing.   HENT:      Head: Normocephalic and atraumatic.      Nose: Nose normal.      Mouth/Throat:      Mouth: Mucous membranes are moist.   Cardiovascular:      Rate and Rhythm: Regular rhythm. Tachycardia present.   Pulmonary:      Effort: Pulmonary effort is normal.      Breath sounds: Normal breath sounds.      Comments: Room air  Abdominal:      General: Bowel sounds are normal.      Palpations: Abdomen is soft.   Musculoskeletal:      Right lower leg: Edema present.      Left lower leg: Edema present.      Comments: Swelling to R knee. Both shoulders, right elbow, both knees, both ankles are tender with range of motion   Skin:     General: Skin is warm and dry.      Findings: No rash.   Neurological:      Mental Status: She is alert and oriented to person, place, and time.       Significant Labs: All pertinent labs within the past 24 hours have been reviewed.    Significant Imaging: I have reviewed all pertinent imaging results/findings within the past 24 hours.      Assessment/Plan:      Subclinical hypothyroidism  Lab Results   Component Value Date    TSH 14.200 (H) 03/22/2023     FT4 within normal limits  Repeat as outpatient when acute illness resolved      Anemia of chronic disease  Anemia of chronic disease present (ferritin elevated) but did also have vaginal bleeding. TSAT 14- some degree iron deficiency    Weakness of both lower extremities  Presented with bilateral lower extremity weakness with no significant sensory deficit. She denied preceding diarrhea/GI symptoms, although she had upper respiratory tract symptoms  - Neurology consulted  - lumbar MRI when MRI is available  - PT, OT consulted       Postmenopausal bleeding  Noted at OSH. TVUS with thickened endometrial stripe  - needs outpatient GYN follow up       Polyarthralgia  Present since admission to [facility].  MRI noted R knee effusion but has pain in multiple joints. ESR, CRP elevated, but also had Strep pneumoniae bacteremia and Pseudomonas pneumonia, so unclear if elevated inflammatory markers are related to arthralgias or bacterial infections.   - Ortho consulted, highly doubt multiple septic joints at once. S/p R knee arthrocentesis on 3/23 with culture pending. Only 2cc aspirated    Lab Results   Component Value Date    URICACID 2.8 03/22/2023   Doubt gout  RF undetectable    CCP, SILVIA are all pending. TSH elevated but free T4 normal, so less likely hypothyroidism. Acute hepatitis panel and parvovirus Ab ordered.     - started colchicine and ibuprofen on 3/22 with minimal improvement thus far  - Rheumatology not available at this hospital- will discuss with them after labs are returned. Joints are painful but no synovitis on exam except for R knee effusion.   - PT, OT consulted as well       Type 2 diabetes mellitus without complication, without long-term current use of insulin  Patient's FSGs are controlled on current medication regimen.  Last A1c reviewed-   Lab Results   Component Value Date    HGBA1C 6.4 (H) 03/22/2023     Most recent fingerstick glucose reviewed-   Recent Labs   Lab 03/22/23  1603 03/22/23  1935 03/23/23  0758 03/23/23  1056   POCTGLUCOSE 248* 173* 158* 145*     Current correctional scale  Medium  Maintain anti-hyperglycemic dose as follows-   Antihyperglycemics (From admission, onward)    Start     Stop Route Frequency Ordered    03/22/23 0900  insulin detemir U-100 pen 5 Units         -- SubQ Daily 03/22/23 0541    03/22/23 0638  insulin aspart U-100 pen 0-5 Units         -- SubQ Before meals & nightly PRN 03/22/23 0541        Hold Oral hypoglycemics while patient is in the hospital.    Streptococcal bacteremia  Blood cultures 3/13 with Strep bacteremia. Currently being treated with cefepime which also treats Pseudomonas pneumonia     Pneumonia of both lungs due to infectious organism  She  voiced cough and subjective fever on admission. CXR showed basal consolidation, concerning for pneumonia. Sputum culture with Pseudomonas  - plan cefepime to complete 5 days    Stem cells transplant status  Not currently on immunosuppresants  Follow up with oncology      Large cell (diffuse) non-Hodgkin's lymphoma  In remission, follow up with oncology  No new enlarged lymph nodes noted         VTE Risk Mitigation (From admission, onward)         Ordered     Reason for No Pharmacological VTE Prophylaxis  Once        Question:  Reasons:  Answer:  Active Bleeding    03/21/23 2230     IP VTE HIGH RISK PATIENT  Once         03/21/23 2230     Place sequential compression device  Until discontinued         03/21/23 2230                Discharge Planning   JESSE: 3/27/2023     Code Status: Full Code   Is the patient medically ready for discharge?:     Reason for patient still in hospital (select all that apply): Patient trending condition  Discharge Plan A: Skilled Nursing Facility                  Kalra Garber MD  Department of Hospital Medicine   [facility]

## 2023-03-23 NOTE — ASSESSMENT & PLAN NOTE
Presented with bilateral lower extremity weakness with no significant sensory deficit. She denied preceding diarrhea/GI symptoms, although she had upper respiratory tract symptoms  - Neurology consulted  - lumbar MRI when MRI is available  - PT, OT consulted

## 2023-03-23 NOTE — ASSESSMENT & PLAN NOTE
Anemia of chronic disease present (ferritin elevated) but did also have vaginal bleeding. TSAT 14- some degree iron deficiency

## 2023-03-23 NOTE — ASSESSMENT & PLAN NOTE
She voiced cough and subjective fever on admission. CXR showed basal consolidation, concerning for pneumonia. Sputum culture with Pseudomonas  - plan cefepime to complete 5 days

## 2023-03-23 NOTE — ASSESSMENT & PLAN NOTE
Lab Results   Component Value Date    TSH 14.200 (H) 03/22/2023     FT4 within normal limits  Repeat as outpatient when acute illness resolved

## 2023-03-23 NOTE — ASSESSMENT & PLAN NOTE
Blood cultures 3/13 with Strep bacteremia. Currently being treated with cefepime which also treats Pseudomonas pneumonia

## 2023-03-23 NOTE — ASSESSMENT & PLAN NOTE
Present since admission to [facility]. MRI noted R knee effusion but has pain in multiple joints. ESR, CRP elevated, but also had Strep pneumoniae bacteremia and Pseudomonas pneumonia, so unclear if elevated inflammatory markers are related to arthralgias or bacterial infections.   - Ortho consulted, highly doubt multiple septic joints at once. S/p R knee arthrocentesis on 3/23 with culture pending. Only 2cc aspirated    Lab Results   Component Value Date    URICACID 2.8 03/22/2023   Doubt gout  RF undetectable    CCP, SILVIA are all pending. TSH elevated but free T4 normal, so less likely hypothyroidism. Acute hepatitis panel and parvovirus Ab ordered.     - started colchicine and ibuprofen on 3/22 with minimal improvement thus far  - Rheumatology not available at this hospital- will discuss with them after labs are returned. Joints are painful but no synovitis on exam except for R knee effusion.   - PT, OT consulted as well

## 2023-03-23 NOTE — PROGRESS NOTES
Pharmacokinetic Assessment Follow Up: IV Vancomycin    Vancomycin serum concentration assessment(s):    The trough level was drawn correctly and can be used to guide therapy at this time. The measurement is within the desired definitive target range of 10 to 20 mcg/mL.    Vancomycin Regimen Plan:    Continue regimen to Vancomycin 2000 mg IV every 12 hours with next serum trough concentration measured at 1100 prior to 4th dose on 3/25    Drug levels (last 3 results):  Recent Labs   Lab Result Units 03/23/23  1127   Vancomycin-Trough ug/mL 14.6       Pharmacy will continue to follow and monitor vancomycin.    Please contact pharmacy at extension [phone] for questions regarding this assessment.    Thank you for the consult,   William Freitas       Patient brief summary:  Hedy Conway is a 47 y.o. female initiated on antimicrobial therapy with IV Vancomycin for treatment of bone/joint infection    The patient's current regimen is 2000 mg q12h    Drug Allergies:   Review of patient's allergies indicates:   Allergen Reactions    Tetanus vaccines and toxoid Rash       Actual Body Weight:   121 kg    Renal Function:   Estimated Creatinine Clearance: 155 mL/min (based on SCr of 0.6 mg/dL).,     Dialysis Method (if applicable):  N/A    CBC (last 72 hours):  Recent Labs   Lab Result Units 03/21/23  0549 03/21/23  1210 03/22/23  0424 03/22/23  0610 03/23/23  0444   WBC K/uL 5.91 7.25 5.22  --  5.35   Hemoglobin g/dL 7.8* 8.6* 7.4*  --  7.6*   Hemoglobin A1C %  --   --   --  6.4*  --    Hematocrit % 26.2* 28.5* 25.0*  --  24.9*   Platelets K/uL 220 256 247  --  SEE COMMENT   Gran % % 63.7 73.6* 60.3  --  52.1   Lymph % % 25.5 16.3* 28.0  --  33.3   Mono % % 8.5 8.1 9.2  --  11.4   Eosinophil % % 0.2 0.0 0.2  --  0.6   Basophil % % 0.2 0.1 0.2  --  0.4   Differential Method  Automated Automated Automated  --  Automated       Metabolic Panel (last 72 hours):  Recent Labs   Lab Result Units 03/21/23  0549 03/22/23  0424  03/23/23  0444 03/23/23  0510   Sodium mmol/L 134* 135* 136  --    Potassium mmol/L 3.8 3.8 5.4*  --    Chloride mmol/L 100 103 103  --    CO2 mmol/L 24 26 22*  --    Glucose mg/dL 189* 190* 198*  --    BUN mg/dL 14 13 13  --    Creatinine mg/dL 0.5 0.5 0.6  --    Creatinine, Urine mg/dL  --   --   --  12.9*   Albumin g/dL  --  1.8* 1.9*  --    Total Bilirubin mg/dL  --  0.7 0.9  --    Alkaline Phosphatase U/L  --  78 79  --    AST U/L  --  17 25  --    ALT U/L  --  26 31  --    Magnesium mg/dL 1.8  --   --   --        Vancomycin Administrations:  vancomycin given in the last 96 hours                     vancomycin (VANCOCIN) 2,000 mg in dextrose 5 % (D5W) 500 mL IVPB (mg) 2,000 mg New Bag 03/23/23 1236     2,000 mg New Bag  0121     2,000 mg New Bag 03/22/23 1327    vancomycin (VANCOCIN) 2,250 mg in dextrose 5 % (D5W) 500 mL IVPB (mg) 2,250 mg New Bag 03/21/23 1449                    Microbiologic Results:  Microbiology Results (last 7 days)       Procedure Component Value Units Date/Time    Culture, Body Fluid (Aerobic) w/ GS [[id number]] Collected: 03/23/23 1244    Order Status: Completed Specimen: Body Fluid from Knee Updated: 03/23/23 1402     Gram Stain Result Few WBC's      No organisms seen    Blood culture [[id number]] Collected: 03/22/23 0610    Order Status: Completed Specimen: Blood from Peripheral, Right Arm Updated: 03/23/23 0903     Blood Culture, Routine No Growth to date      No Growth to date

## 2023-03-24 LAB
ALBUMIN SERPL BCP-MCNC: 1.9 G/DL (ref 3.5–5.2)
ALP SERPL-CCNC: 82 U/L (ref 55–135)
ALT SERPL W/O P-5'-P-CCNC: 41 U/L (ref 10–44)
ANA SER QL IF: NORMAL
ANION GAP SERPL CALC-SCNC: 6 MMOL/L (ref 8–16)
AST SERPL-CCNC: 30 U/L (ref 10–40)
BASOPHILS # BLD AUTO: 0.02 K/UL (ref 0–0.2)
BASOPHILS NFR BLD: 0.4 % (ref 0–1.9)
BILIRUB SERPL-MCNC: 0.9 MG/DL (ref 0.1–1)
BUN SERPL-MCNC: 13 MG/DL (ref 6–20)
CALCIUM SERPL-MCNC: 8.8 MG/DL (ref 8.7–10.5)
CHLORIDE SERPL-SCNC: 102 MMOL/L (ref 95–110)
CO2 SERPL-SCNC: 25 MMOL/L (ref 23–29)
CREAT SERPL-MCNC: 0.5 MG/DL (ref 0.5–1.4)
DIFFERENTIAL METHOD: ABNORMAL
EOSINOPHIL # BLD AUTO: 0 K/UL (ref 0–0.5)
EOSINOPHIL NFR BLD: 0.6 % (ref 0–8)
ERYTHROCYTE [DISTWIDTH] IN BLOOD BY AUTOMATED COUNT: 18 % (ref 11.5–14.5)
EST. GFR  (NO RACE VARIABLE): >60 ML/MIN/1.73 M^2
GLUCOSE SERPL-MCNC: 185 MG/DL (ref 70–110)
HAV IGM SERPL QL IA: NORMAL
HBV CORE IGM SERPL QL IA: NORMAL
HBV SURFACE AG SERPL QL IA: NORMAL
HCT VFR BLD AUTO: 24.7 % (ref 37–48.5)
HCV AB SERPL QL IA: NORMAL
HGB BLD-MCNC: 7.2 G/DL (ref 12–16)
IMM GRANULOCYTES # BLD AUTO: 0.06 K/UL (ref 0–0.04)
IMM GRANULOCYTES NFR BLD AUTO: 1.2 % (ref 0–0.5)
INTERPRETATION SERPL IFE-IMP: NORMAL
LYMPHOCYTES # BLD AUTO: 1.4 K/UL (ref 1–4.8)
LYMPHOCYTES NFR BLD: 28.7 % (ref 18–48)
MCH RBC QN AUTO: 23.8 PG (ref 27–31)
MCHC RBC AUTO-ENTMCNC: 29.1 G/DL (ref 32–36)
MCV RBC AUTO: 82 FL (ref 82–98)
MONOCYTES # BLD AUTO: 0.6 K/UL (ref 0.3–1)
MONOCYTES NFR BLD: 11.3 % (ref 4–15)
NEUTROPHILS # BLD AUTO: 2.8 K/UL (ref 1.8–7.7)
NEUTROPHILS NFR BLD: 57.8 % (ref 38–73)
NRBC BLD-RTO: 0 /100 WBC
PLATELET # BLD AUTO: 247 K/UL (ref 150–450)
PMV BLD AUTO: 10.2 FL (ref 9.2–12.9)
POCT GLUCOSE: 134 MG/DL (ref 70–110)
POCT GLUCOSE: 151 MG/DL (ref 70–110)
POCT GLUCOSE: 158 MG/DL (ref 70–110)
POCT GLUCOSE: 207 MG/DL (ref 70–110)
POTASSIUM SERPL-SCNC: 3.6 MMOL/L (ref 3.5–5.1)
PROT SERPL-MCNC: 4.8 G/DL (ref 6–8.4)
RBC # BLD AUTO: 3.02 M/UL (ref 4–5.4)
SODIUM SERPL-SCNC: 133 MMOL/L (ref 136–145)
WBC # BLD AUTO: 4.88 K/UL (ref 3.9–12.7)

## 2023-03-24 PROCEDURE — 25000003 PHARM REV CODE 250: Performed by: HOSPITALIST

## 2023-03-24 PROCEDURE — 85025 COMPLETE CBC W/AUTO DIFF WBC: CPT | Performed by: HOSPITALIST

## 2023-03-24 PROCEDURE — 11000001 HC ACUTE MED/SURG PRIVATE ROOM

## 2023-03-24 PROCEDURE — 25000003 PHARM REV CODE 250: Performed by: ORTHOPAEDIC SURGERY

## 2023-03-24 PROCEDURE — 63600175 PHARM REV CODE 636 W HCPCS: Performed by: ORTHOPAEDIC SURGERY

## 2023-03-24 PROCEDURE — 97112 NEUROMUSCULAR REEDUCATION: CPT

## 2023-03-24 PROCEDURE — 80074 ACUTE HEPATITIS PANEL: CPT | Performed by: HOSPITALIST

## 2023-03-24 PROCEDURE — 80053 COMPREHEN METABOLIC PANEL: CPT | Performed by: HOSPITALIST

## 2023-03-24 PROCEDURE — 86747 PARVOVIRUS ANTIBODY: CPT | Mod: 91 | Performed by: HOSPITALIST

## 2023-03-24 PROCEDURE — 63600175 PHARM REV CODE 636 W HCPCS: Mod: TB,JG | Performed by: HOSPITALIST

## 2023-03-24 PROCEDURE — 25000003 PHARM REV CODE 250: Performed by: INTERNAL MEDICINE

## 2023-03-24 PROCEDURE — 36415 COLL VENOUS BLD VENIPUNCTURE: CPT | Performed by: HOSPITALIST

## 2023-03-24 PROCEDURE — 97166 OT EVAL MOD COMPLEX 45 MIN: CPT

## 2023-03-24 PROCEDURE — 63600175 PHARM REV CODE 636 W HCPCS: Performed by: INTERNAL MEDICINE

## 2023-03-24 PROCEDURE — 97530 THERAPEUTIC ACTIVITIES: CPT

## 2023-03-24 RX ORDER — PREDNISONE 5 MG/1
10 TABLET ORAL DAILY
Status: DISCONTINUED | OUTPATIENT
Start: 2023-03-27 | End: 2023-03-26

## 2023-03-24 RX ORDER — PREDNISONE 20 MG/1
20 TABLET ORAL DAILY
Status: COMPLETED | OUTPATIENT
Start: 2023-03-24 | End: 2023-03-26

## 2023-03-24 RX ADMIN — CEFEPIME HYDROCHLORIDE 2 G: 2 INJECTION, SOLUTION INTRAVENOUS at 06:03

## 2023-03-24 RX ADMIN — MELATONIN TAB 3 MG 6 MG: 3 TAB at 08:03

## 2023-03-24 RX ADMIN — CEFEPIME HYDROCHLORIDE 2 G: 2 INJECTION, SOLUTION INTRAVENOUS at 03:03

## 2023-03-24 RX ADMIN — INSULIN DETEMIR 5 UNITS: 100 INJECTION, SOLUTION SUBCUTANEOUS at 08:03

## 2023-03-24 RX ADMIN — INSULIN ASPART 1 UNITS: 100 INJECTION, SOLUTION INTRAVENOUS; SUBCUTANEOUS at 10:03

## 2023-03-24 RX ADMIN — CYCLOBENZAPRINE HYDROCHLORIDE 5 MG: 5 TABLET, FILM COATED ORAL at 05:03

## 2023-03-24 RX ADMIN — CYCLOBENZAPRINE HYDROCHLORIDE 5 MG: 5 TABLET, FILM COATED ORAL at 08:03

## 2023-03-24 RX ADMIN — VANCOMYCIN HYDROCHLORIDE 2000 MG: 500 INJECTION, POWDER, LYOPHILIZED, FOR SOLUTION INTRAVENOUS at 12:03

## 2023-03-24 RX ADMIN — ACETAMINOPHEN 650 MG: 325 TABLET ORAL at 08:03

## 2023-03-24 RX ADMIN — CEFEPIME HYDROCHLORIDE 2 G: 2 INJECTION, SOLUTION INTRAVENOUS at 10:03

## 2023-03-24 RX ADMIN — COLCHICINE 0.6 MG: 0.6 TABLET, FILM COATED ORAL at 08:03

## 2023-03-24 RX ADMIN — POLYETHYLENE GLYCOL 3350 17 G: 17 POWDER, FOR SOLUTION ORAL at 08:03

## 2023-03-24 RX ADMIN — PREDNISONE 20 MG: 20 TABLET ORAL at 04:03

## 2023-03-24 NOTE — PROGRESS NOTES
[facility] Medicine  Progress Note    Patient Name: Hedy Conway  MRN: [id number]  Patient Class: IP- Inpatient   Admission Date: 3/21/2023  Length of Stay: 3 days  Attending Physician: Karla Garber MD  Primary Care Provider: John Lantigua PA-C        Subjective:     Principal Problem:Polyarthralgia        HPI:  47-year-old  female with medical history significant for type 2 diabetes mellitus, hypertension, large cell diffuse non-Hodgkin lymphoma status post chemotherapy and stem cell transplant 2011 was transferred from outside hospital with suspicion of septic arthritis.  Of note she voiced migratory pain in her left-right knee-both shoulders that started 2 weeks ago, associated with generalized weakness, cleansing inability to move right lower extremity and only have movement without gravity on the left lower extremity without associated trauma, she voiced being on 3 different antibiotics at the outside hospital for both pneumonia and urinary tract infection, and this could explain for been afebrile although she voiced subjective fever at the onset of her symptoms she denied cough, shortness of breath, orthopnea, paroxysmal nocturnal dyspnea or pedal swelling.  She denied prior diarrhea although she voiced upper respiratory tract symptoms,treatment for pneumonia prior to onset of these symptoms.  She denied any urinary symptoms at this time of dysuria, frequency, urgency, straining at micturition or hematuria.  No recent travel, no sick contacts, no tick bite.  She had been sent here for possible knee aspiration.      Overview/Hospital Course:  Ms Hedy Conway who was transferred to [facility] from [facility]. There she was admitted for multiple joint pains. MRI R knee showed meniscal tear and large effusion with synovitis. There was concern for septic arthritis, aspiration was attempted, but no fluid retrieved. She also had Strep pneumoniae bacteremia (3/13) and  Pseudomonas pneumonia (3/15) and is currently being treated with cefepime. She is also receiving vancomycin with concerns for septic joint. She did require 1U RBC transfusion and has had vaginal bleeding. Pelvic US 3/20 showed thickened endometrium, and she will need outpatient GYN follow up. Her iron panel indicates mixed iron deficiency and anemia of chronic disease. She saw Neurology and Orthopaedics. Orthopaedics doubts multiple septic joints simultaneously and suspects Rheumatologic condition. S/p R knee arthrocentesis on 3/23 with culture pending. Sed rate, CRP remain elevated. Uric acid normal, RF normal. CCP, SILVIA, hepatitis panel, parvovirus Ab pending. PT, OT consulted.       Interval History: Still has joint pains    Review of Systems   Constitutional:  Negative for chills and fever.   Respiratory:  Negative for shortness of breath.    Cardiovascular:  Negative for chest pain.   Gastrointestinal:  Negative for abdominal pain.   Genitourinary:  Negative for difficulty urinating.   Musculoskeletal:  Positive for arthralgias. Negative for myalgias.   Skin:  Negative for rash.   Neurological:  Positive for weakness. Negative for numbness.   Psychiatric/Behavioral:  Negative for confusion.    Objective:     Vital Signs (Most Recent):  Temp: 98.7 °F (37.1 °C) (03/24/23 0753)  Pulse: 104 (03/24/23 0753)  Resp: 18 (03/24/23 0753)  BP: 125/60 (03/24/23 0753)  SpO2: 96 % (03/24/23 0753)   Vital Signs (24h Range):  Temp:  [97.8 °F (36.6 °C)-98.7 °F (37.1 °C)] 98.7 °F (37.1 °C)  Pulse:  [] 104  Resp:  [15-18] 18  SpO2:  [96 %-97 %] 96 %  BP: (103-133)/(59-67) 125/60     Weight: 121.5 kg (267 lb 13.7 oz)  Body mass index is 41.95 kg/m².    Intake/Output Summary (Last 24 hours) at 3/24/2023 1127  Last data filed at 3/24/2023 1057  Gross per 24 hour   Intake 660 ml   Output 3351 ml   Net -2691 ml        Physical Exam  Vitals and nursing note reviewed.   Constitutional:       General: She is not in acute distress.      Appearance: She is obese. She is not ill-appearing.   HENT:      Head: Normocephalic and atraumatic.      Nose: Nose normal.      Mouth/Throat:      Mouth: Mucous membranes are moist.   Cardiovascular:      Rate and Rhythm: Regular rhythm. Tachycardia present.   Pulmonary:      Effort: Pulmonary effort is normal.      Breath sounds: Normal breath sounds.      Comments: Room air  Abdominal:      General: Bowel sounds are normal.      Palpations: Abdomen is soft.   Musculoskeletal:      Right lower leg: Edema present.      Left lower leg: Edema present.      Comments: Swelling to R knee. Both shoulders, right elbow, both knees, both ankles are tender with range of motion   Skin:     General: Skin is warm and dry.      Findings: No rash.   Neurological:      Mental Status: She is alert and oriented to person, place, and time.       Significant Labs: All pertinent labs within the past 24 hours have been reviewed.    Significant Imaging: I have reviewed all pertinent imaging results/findings within the past 24 hours.      Assessment/Plan:      * Polyarthralgia  Present since admission to [facility]. MRI noted R knee effusion but has pain in multiple joints. ESR, CRP elevated, but also had Strep pneumoniae bacteremia and Pseudomonas pneumonia, so unclear if elevated inflammatory markers are related to arthralgias or bacterial infections.   - Ortho consulted, highly doubt multiple septic joints at once. S/p R knee arthrocentesis on 3/23 with culture pending. Only 2cc aspirated    Lab Results   Component Value Date    URICACID 2.8 03/22/2023   Doubt gout  RF undetectable    CCP, SILVIA, acute hepatitis panel, parvovirus pending. RF normal. TSH elevated but free T4 normal, so less likely hypothyroidism.    - started colchicine and ibuprofen on 3/22 with minimal improvement thus far  - Rheumatology not available at this hospital- will discuss with them after labs are returned. Joints are painful but no synovitis on exam except  for R knee effusion.   - PT, OT consulted as well       Subclinical hypothyroidism  Lab Results   Component Value Date    TSH 14.200 (H) 03/22/2023     FT4 within normal limits  Repeat as outpatient when acute illness resolved      Anemia of chronic disease  Anemia of chronic disease present (ferritin elevated) but did also have vaginal bleeding. TSAT 14- some degree iron deficiency    Weakness of both lower extremities  Presented with bilateral lower extremity weakness with no significant sensory deficit. She denied preceding diarrhea/GI symptoms, although she had upper respiratory tract symptoms  - Neurology consulted  - PT, OT consulted       Postmenopausal bleeding  Noted at OSH. TVUS with thickened endometrial stripe  - needs outpatient GYN follow up       Type 2 diabetes mellitus without complication, without long-term current use of insulin  Patient's FSGs are controlled on current medication regimen.  Last A1c reviewed-   Lab Results   Component Value Date    HGBA1C 6.4 (H) 03/22/2023     Most recent fingerstick glucose reviewed-   Recent Labs   Lab 03/23/23  1632 03/23/23  1956 03/24/23  0752   POCTGLUCOSE 138* 214* 158*     Current correctional scale  Medium  Maintain anti-hyperglycemic dose as follows-   Antihyperglycemics (From admission, onward)    Start     Stop Route Frequency Ordered    03/22/23 0900  insulin detemir U-100 pen 5 Units         -- SubQ Daily 03/22/23 0541    03/22/23 0638  insulin aspart U-100 pen 0-5 Units         -- SubQ Before meals & nightly PRN 03/22/23 0541        Hold Oral hypoglycemics while patient is in the hospital.    Streptococcal bacteremia  Blood cultures 3/13 with Strep bacteremia. Currently being treated with cefepime which also treats Pseudomonas pneumonia     Pneumonia of both lungs due to infectious organism  She voiced cough and subjective fever on admission. CXR showed basal consolidation, concerning for pneumonia. Sputum culture with Pseudomonas  - plan cefepime  to complete 5 days    Stem cells transplant status  Not currently on immunosuppresants  Follow up with oncology      Large cell (diffuse) non-Hodgkin's lymphoma  In remission, follow up with oncology  No new enlarged lymph nodes noted         VTE Risk Mitigation (From admission, onward)         Ordered     Reason for No Pharmacological VTE Prophylaxis  Once        Question:  Reasons:  Answer:  Active Bleeding    03/21/23 2230     IP VTE HIGH RISK PATIENT  Once         03/21/23 2230     Place sequential compression device  Until discontinued         03/21/23 2230                Discharge Planning   JESSE: 3/27/2023     Code Status: Full Code   Is the patient medically ready for discharge?:     Reason for patient still in hospital (select all that apply): Patient trending condition  Discharge Plan A: Skilled Nursing Facility                  Karla Garber MD  Department of Hospital Medicine   [facility]

## 2023-03-24 NOTE — ASSESSMENT & PLAN NOTE
Presented with bilateral lower extremity weakness with no significant sensory deficit. She denied preceding diarrhea/GI symptoms, although she had upper respiratory tract symptoms  - Neurology consulted  - PT, OT consulted

## 2023-03-24 NOTE — SUBJECTIVE & OBJECTIVE
Interval History: Still has joint pains    Review of Systems   Constitutional:  Negative for chills and fever.   Respiratory:  Negative for shortness of breath.    Cardiovascular:  Negative for chest pain.   Gastrointestinal:  Negative for abdominal pain.   Genitourinary:  Negative for difficulty urinating.   Musculoskeletal:  Positive for arthralgias. Negative for myalgias.   Skin:  Negative for rash.   Neurological:  Positive for weakness. Negative for numbness.   Psychiatric/Behavioral:  Negative for confusion.    Objective:     Vital Signs (Most Recent):  Temp: 98.7 °F (37.1 °C) (03/24/23 0753)  Pulse: 104 (03/24/23 0753)  Resp: 18 (03/24/23 0753)  BP: 125/60 (03/24/23 0753)  SpO2: 96 % (03/24/23 0753)   Vital Signs (24h Range):  Temp:  [97.8 °F (36.6 °C)-98.7 °F (37.1 °C)] 98.7 °F (37.1 °C)  Pulse:  [] 104  Resp:  [15-18] 18  SpO2:  [96 %-97 %] 96 %  BP: (103-133)/(59-67) 125/60     Weight: 121.5 kg (267 lb 13.7 oz)  Body mass index is 41.95 kg/m².    Intake/Output Summary (Last 24 hours) at 3/24/2023 1127  Last data filed at 3/24/2023 1057  Gross per 24 hour   Intake 660 ml   Output 3351 ml   Net -2691 ml        Physical Exam  Vitals and nursing note reviewed.   Constitutional:       General: She is not in acute distress.     Appearance: She is obese. She is not ill-appearing.   HENT:      Head: Normocephalic and atraumatic.      Nose: Nose normal.      Mouth/Throat:      Mouth: Mucous membranes are moist.   Cardiovascular:      Rate and Rhythm: Regular rhythm. Tachycardia present.   Pulmonary:      Effort: Pulmonary effort is normal.      Breath sounds: Normal breath sounds.      Comments: Room air  Abdominal:      General: Bowel sounds are normal.      Palpations: Abdomen is soft.   Musculoskeletal:      Right lower leg: Edema present.      Left lower leg: Edema present.      Comments: Swelling to R knee. Both shoulders, right elbow, both knees, both ankles are tender with range of motion   Skin:      General: Skin is warm and dry.      Findings: No rash.   Neurological:      Mental Status: She is alert and oriented to person, place, and time.       Significant Labs: All pertinent labs within the past 24 hours have been reviewed.    Significant Imaging: I have reviewed all pertinent imaging results/findings within the past 24 hours.

## 2023-03-24 NOTE — PT/OT/SLP EVAL
"Occupational Therapy   Evaluation    Name: Hedy Conway  MRN: [id number]  Admitting Diagnosis: Polyarthralgia  Recent Surgery: * No surgery found *      Recommendations:     Discharge Recommendations: rehabilitation facility, nursing facility, skilled (Rehab > SNF)  Discharge Equipment Recommendations:  hospital bed, slide board, wheelchair (Patient states strong dislike for slide board.)  Barriers to discharge:   Condition trending, high risk of untrained caregiver strain.     Assessment:     Hedy Conway is a 47 y.o. female with a medical diagnosis of Polyarthralgia, and presents with pain with PASSIVE ROM: Right wrist and knee, as well as Left shoulder, and hip.  BLE edema per chart, no swelling, or redness observed during bed level eval, however Patient VERY guarded of RLE and LUE. Performance deficits affecting function: weakness, impaired endurance, decreased ROM, impaired coordination, decreased upper extremity function, decreased lower extremity function, impaired self care skills, impaired functional mobility, impaired balance, pain, decreased safety awareness, edema, impaired fine motor, impaired joint extensibility.      Rehab Prognosis: Fair (+); patient would benefit from acute skilled OT services to address these deficits and reach maximum level of function.       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   Patient/Family Comments/goals: Pain management.     Occupational Profile:  Living Environment: Patient resides with Spouse " "and 25 yo Daughter in RV while home renovations ongoing. (I) all ADLs, no DME, (+) driving prior to onset of current disabling symptoms "Exactly 3 weeks ago tomorrow" (Sat 03/11/23) per Patient. On that date Patient reports complete inability to rise from toilet, later to rise form table stating "I prayed to be able to get up. I thought I was going to die there." There after, Patient requiring total assist from Spouse with inclusion of bed baths only, no OOB, and consistent high limb guarding 2*m pain.   Equipment Used at Home: CPAP, nebulizer, glucometer, per chart now owner of w/c, slide board and hospital bed.   Assistance upon Discharge: Spouse primarily per Patient.    Pain/Comfort:  Pain Rating 1:  (2/10 at rest, 8/10 with R knee and L shouder palpation.)  Pain Addressed 1: Reposition, Distraction, Cessation of Activity, Nurse notified    Patients cultural, spiritual, Nondenominational conflicts given the current situation: no    Objective:     Communicated with: RN prior to session.  Patient found supine with peripheral IV (Patient requests removasl of perssure boots 2* leg cramping during wear at rest in bed.) upon OT entry to room.    General Precautions: Standard, fall (edema dev/skin and joint integriyt risks 2* current extended immobility levels.)  Orthopedic Precautions: N/A  Braces: N/A  Respiratory Status: Room air    Occupational Performance:    Bed Mobility:    Patient completed Rolling/Turning to Left <> Right with total assistance x2   persons  Patient completed Scooting/Bridging with  total assistance x2   persons  Patient completed Supine to Sit with  total assistance x2   persons  Patient completed Sit to Supine with  total assistance x2  persons.     Functional Mobility/Transfers: UT 2* Patient stated anxiety. Per Patient "They came in before and wanted to put me in that chair!"  "I said no." The last time the therapist was gone and 3 nurses couldn't put me back" re: last ([facility] " "[facility]hospital visit.     Functional Mobility: Patient refusing all OOB/EOB in absolute terms. 2 staff (OT and Nursing present) Poor Patient responses to reassurance. Patient unable ot tolerate BLE repositioning od movement assist, all trials met with outcries and Patient reporting pain escalation from   "2"/10 "to about an *8 OR A 9". OT will follow up POC  dev underway. Inpatient post acute program support recommended vs HH.     Activities of Daily Living:  Feeding:  ref x3, breakfast ray still at bedside at time of mid day visit (cold, untouched).  Upper Body Dressing: max a limb guarding L shoulder noteworthy.   Lower Body Dressing:total assist of 2, high limb guarding (Right knee/ left hip), free floating anxiety responses contributory.   Toileting: Dep bed level, staff assist of 2 required at sessions end for Right side off loading pressure relief with pillow support Knee to shoulder  (3 pillows, bolster not present) with assist of OT and arriving PCT.     Patient presents with reduced mobility, UB strength, pain management and stamina deficits, which impact the patient's ability to complete functional tasks & activities volitionally, safely and in a timely manner.     Pt could benefit from a collaborative therapeutic program to improve quality of life and sustain focus on impairment resoltion. Patient outlook for progression towards goals (+) at this time.       Cognitive/Visual Perceptual:Intact, A+OX4, able to follow complex direction, able to make complex needs known, congruent information integration at time of eval. Patient cooperative / active in own POC.  Behavior: Stating wish to engage however over ridding anxiety response and suboptimally regulated pain are marked hindrance at present.   Upper Body Physical Exam:  RESPIRATORY: non-labored / normal effort / rate. (-) accessory MM engagement.    UB SKIN: Observable UB skin warm and dry.   Sensation: Norm in UB extremities light " touch/localization.  Posture: Rounded shoulders, cervical flexion / thoracic  kyphosis encountered, correction provided during bed level eval.    BUE AROM WNL  RUE MMT  3+-/5, L shoulder 1+/5, high guarding/bracing (-)ly impacting movements, elbow to wrists WFL, MMT 3-/5  UB GM/FM coordination impaired, L hand extension pattern dev noted/mild.   UT standing bedside as indicated above.   (-) UB edema      Treatment & Education:  Discussed role of OT,eval and collaborative POC dev completed. Patient states understanding and agreement with all herein.   Interventions:   UE ROM/MMT assessment  Bed mobility training / assessment  Functional mobility assessment  Sitting balance assessment undertaken/partial/halted 2* behavior disturbances, Patient unable to tolerate.   Educated on importance of sitting OOB in bedside chair to promote increased strength, endurance & proper breathing.  Intro UB seated  A/AAROM constructs BUE all joints, all planes seated (AAROM R shoulder </= 90 degrees, no c/o painL shoulder <5 degrees 2* pain) 1x10, x3 daily recommended for (I) ex as suggested to counter (-) effects of limited mobility inherent in acute setting.        AMPA 6 Click ADL:  AMPAC Total Score: 8      Patient left HOB elevated with all lines intact, call button in reach, bed alarm on, RN notified, and PCT present. Patient requesting no pressure boots 2* leg cramping am.     GOALS:   Multidisciplinary Problems       Occupational Therapy Goals          Problem: Occupational Therapy    Goal Priority Disciplines Outcome Interventions   Occupational Therapy Goal     OT, PT/OT Ongoing, Progressing    Description: Goals to be met by 04/07/23:  Patient will demonstrate recovery of:   Set up self feeding fullest upright sitting per standard aspiration precautions.  Mod a UB dress seated OOB  Max assist LB dress w/o recoil from pain during palpations/positioning assist.   Min a grooming seated.  Mod a toilet t/f to [facility]  Mod a toilet  hygiene OOB.   SBA seated UB AROM ex all joints/all planes 1x10 to bolster recovery of core and UB strength, pain free active range of motion, and normalization of ADL related daily routines.                         History:     Past Medical History:   Diagnosis Date    Acute carpal tunnel syndrome of left wrist     Biliary colic     Diabetes mellitus     Encounter for blood transfusion     History of chemotherapy     Incisional hernia     Large cell (diffuse) non-Hodgkin's lymphoma     Stem cells transplant status          Past Surgical History:   Procedure Laterality Date    BREAST BIOPSY Left     lymph node biopsy, benign    BREAST SURGERY      CHOLECYSTECTOMY      COLD KNIFE CONIZATION OF CERVIX N/A 3/8/2021    Procedure: CONE BIOPSY, CERVIX, USING COLD KNIFE;  Surgeon: Evelyn Wheeler MD;  Location: [facility] OR;  Service: OB/GYN;  Laterality: N/A;    COLONOSCOPY N/A 12/21/2022    Procedure: COLONOSCOPY;  Surgeon: Annamarie Jane MD;  Location: [facility];  Service: Endoscopy;  Laterality: N/A;    HERNIA REPAIR      incisional    LUNG BIOPSY      lymphnode biopsy      MEDIPORT REMOVAL Left 1/30/2020    Procedure: REMOVAL, CATHETER, CENTRAL VENOUS, TUNNELED, WITH PORT;  Surgeon: Luis Bogran-Reyes, MD;  Location: [facility];  Service: General;  Laterality: Left;    PORTACATH PLACEMENT Left     REVISION OF SCAR  6/22/2021    Procedure: REVISION, SCAR;  Surgeon: Otis Grimes MD;  Location: [address];  Service: General;;    TUBAL LIGATION      UMBILICAL HERNIA REPAIR         Time Tracking:     OT Date of Treatment: 03/24/23  OT Start Time: 1131  OT Stop Time: 1202  OT Total Time (min): 31 min    Billable Minutes:Evaluation 20  Therapeutic [id number]    3/24/2023

## 2023-03-24 NOTE — PLAN OF CARE
Multidisciplinary team rounds with patient at bedside. Reviewed goals and plan of care for today. Awaiting final labs. PT/OT to eval. Pt questions and concerns addressed.     2:45pm Per therapy, recommendations for inpatient rehab vs. Snf. IPR referrals sent to [facility] via care port for review. SNF referrals sent to [facility] and [facility] via care port.     2:57pm Per care port, [facility] rehab, pt denied  earlier in the week (when she was at [facility]) as she was not able to tolerate three hours of aggressive per day. Can re-evaluate Monday, but it doesn't seem as if she as made much progress with therapy.    3:08pm Received call from Melissa with [facility], stated pt has $5500 deductible for snf benefit for days 1-20, then 20% co-ins on day 21. [place] to follow up with patient.      03/24/23 1100   Discharge Reassessment   Assessment Type Discharge Planning Reassessment   Did the patient's condition or plan change since previous assessment? No   Discharge Plan discussed with: Patient   Communicated JESSE with patient/caregiver Date not available/Unable to determine   Discharge Plan A Rehab   Discharge Plan B Skilled Nursing Facility   DME Needed Upon Discharge  other (see comments)  (TBD)   Discharge Barriers Identified None   Why the patient remains in the hospital Requires continued medical care   Post-Acute Status   Post-Acute Authorization Placement   Post-Acute Placement Status Pending post-acute provider review/more information requested   Coverage [employer]   Discharge Delays None known at this time

## 2023-03-24 NOTE — PROGRESS NOTES
Patient complaints and exam remained unchanged.  She continues to have pain in the right wrist, left shoulder, left hip and right knee.  No significant improvement with colchicine and ibuprofen    Afebrile vital signs stable   Leukocytosis resolved   Blood cultures at this facility no growth, previous facility blood cultures showed Streptococcus pneumoniae.  Culture from right knee aspiration yesterday no growth to date  Rheumatologic labs normal so far, SILVIA pending    Rheumatology consult not answered, not available.  Oncology has not seen patient.    Exam is unchanged.  She has pain with range of motion in the right wrist, left shoulder, left hip and right knee.  No swelling, no erythema.    Radiographs right wrist, both shoulders, both hips and knee normal   MRI right knee at outside facility shows small joint effusion and small meniscus tear    The patient has polyarthralgia.  Rheumatologic studies so far normal.  Rheumatology and oncology not available at this facility   MRI left shoulder, right wrist and left hip ordered today.  If joint effusion is found, consult Interventional Radiology for aspiration and studies.  Because patient has failed to improve and there is no evidence of infection in my opinion, I have ordered prednisone.  I have discussed the risks of prednisone and the patient with diabetes and recent infections and she is agreeable as she would like something done for the pain.

## 2023-03-24 NOTE — ASSESSMENT & PLAN NOTE
Patient's FSGs are controlled on current medication regimen.  Last A1c reviewed-   Lab Results   Component Value Date    HGBA1C 6.4 (H) 03/22/2023     Most recent fingerstick glucose reviewed-   Recent Labs   Lab 03/23/23  1632 03/23/23  1956 03/24/23  0752   POCTGLUCOSE 138* 214* 158*     Current correctional scale  Medium  Maintain anti-hyperglycemic dose as follows-   Antihyperglycemics (From admission, onward)    Start     Stop Route Frequency Ordered    03/22/23 0900  insulin detemir U-100 pen 5 Units         -- SubQ Daily 03/22/23 0541    03/22/23 0638  insulin aspart U-100 pen 0-5 Units         -- SubQ Before meals & nightly PRN 03/22/23 0541        Hold Oral hypoglycemics while patient is in the hospital.

## 2023-03-24 NOTE — ASSESSMENT & PLAN NOTE
Present since admission to [facility]. MRI noted R knee effusion but has pain in multiple joints. ESR, CRP elevated, but also had Strep pneumoniae bacteremia and Pseudomonas pneumonia, so unclear if elevated inflammatory markers are related to arthralgias or bacterial infections.   - Ortho consulted, highly doubt multiple septic joints at once. S/p R knee arthrocentesis on 3/23 with culture pending. Only 2cc aspirated    Lab Results   Component Value Date    URICACID 2.8 03/22/2023   Doubt gout  RF undetectable    CCP, SILVIA, acute hepatitis panel, parvovirus pending. RF normal. TSH elevated but free T4 normal, so less likely hypothyroidism.    - started colchicine and ibuprofen on 3/22 with minimal improvement thus far  - Rheumatology not available at this hospital- will discuss with them after labs are returned. Joints are painful but no synovitis on exam except for R knee effusion.   - PT, OT consulted as well

## 2023-03-24 NOTE — PLAN OF CARE
Problem: Occupational Therapy  Goal: Occupational Therapy Goal  Description: Goals to be met by 04/07/23:  Patient will demonstrate recovery of:   Set up self feeding fullest upright sitting per standard aspiration precautions.  Mod a UB dress seated OOB  Max assist LB dress w/o recoil from pain during palpations/positioning assist.   Min a grooming seated.  Mod a toilet t/f to [facility]  Mod a toilet hygiene OOB.   SBA seated UB AROM ex all joints/all planes 1x10 to bolster recovery of core and UB strength, pain free active range of motion, and normalization of ADL related daily routines.    Outcome: Ongoing, Progressing

## 2023-03-25 ENCOUNTER — DOCUMENTATION ONLY (OUTPATIENT)
Dept: RADIOLOGY | Facility: HOSPITAL | Age: 48
End: 2023-03-25
Payer: COMMERCIAL

## 2023-03-25 PROBLEM — M13.0 POLYARTHRITIS: Status: ACTIVE | Noted: 2023-03-14

## 2023-03-25 LAB
ANION GAP SERPL CALC-SCNC: 11 MMOL/L (ref 8–16)
BASOPHILS # BLD AUTO: 0 K/UL (ref 0–0.2)
BASOPHILS NFR BLD: 0 % (ref 0–1.9)
BUN SERPL-MCNC: 12 MG/DL (ref 6–20)
CALCIUM SERPL-MCNC: 9.3 MG/DL (ref 8.7–10.5)
CHLORIDE SERPL-SCNC: 100 MMOL/L (ref 95–110)
CO2 SERPL-SCNC: 26 MMOL/L (ref 23–29)
CREAT SERPL-MCNC: 0.5 MG/DL (ref 0.5–1.4)
DIFFERENTIAL METHOD: ABNORMAL
EOSINOPHIL # BLD AUTO: 0 K/UL (ref 0–0.5)
EOSINOPHIL NFR BLD: 0.2 % (ref 0–8)
ERYTHROCYTE [DISTWIDTH] IN BLOOD BY AUTOMATED COUNT: 17.3 % (ref 11.5–14.5)
EST. GFR  (NO RACE VARIABLE): >60 ML/MIN/1.73 M^2
GLUCOSE SERPL-MCNC: 157 MG/DL (ref 70–110)
HCT VFR BLD AUTO: 24 % (ref 37–48.5)
HGB BLD-MCNC: 7.2 G/DL (ref 12–16)
IMM GRANULOCYTES # BLD AUTO: 0.05 K/UL (ref 0–0.04)
IMM GRANULOCYTES NFR BLD AUTO: 1.1 % (ref 0–0.5)
LYMPHOCYTES # BLD AUTO: 1.9 K/UL (ref 1–4.8)
LYMPHOCYTES NFR BLD: 40.1 % (ref 18–48)
MCH RBC QN AUTO: 23.8 PG (ref 27–31)
MCHC RBC AUTO-ENTMCNC: 30 G/DL (ref 32–36)
MCV RBC AUTO: 80 FL (ref 82–98)
MONOCYTES # BLD AUTO: 0.6 K/UL (ref 0.3–1)
MONOCYTES NFR BLD: 12.2 % (ref 4–15)
NEUTROPHILS # BLD AUTO: 2.2 K/UL (ref 1.8–7.7)
NEUTROPHILS NFR BLD: 46.4 % (ref 38–73)
NRBC BLD-RTO: 0 /100 WBC
PLATELET # BLD AUTO: 233 K/UL (ref 150–450)
PMV BLD AUTO: 9.7 FL (ref 9.2–12.9)
POCT GLUCOSE: 136 MG/DL (ref 70–110)
POCT GLUCOSE: 136 MG/DL (ref 70–110)
POCT GLUCOSE: 157 MG/DL (ref 70–110)
POCT GLUCOSE: 193 MG/DL (ref 70–110)
POTASSIUM SERPL-SCNC: 4 MMOL/L (ref 3.5–5.1)
RBC # BLD AUTO: 3.02 M/UL (ref 4–5.4)
SODIUM SERPL-SCNC: 137 MMOL/L (ref 136–145)
WBC # BLD AUTO: 4.74 K/UL (ref 3.9–12.7)

## 2023-03-25 PROCEDURE — 25000003 PHARM REV CODE 250: Performed by: HOSPITALIST

## 2023-03-25 PROCEDURE — 63600175 PHARM REV CODE 636 W HCPCS: Mod: TB,JG | Performed by: HOSPITALIST

## 2023-03-25 PROCEDURE — 36415 COLL VENOUS BLD VENIPUNCTURE: CPT | Performed by: HOSPITALIST

## 2023-03-25 PROCEDURE — 97110 THERAPEUTIC EXERCISES: CPT | Performed by: PHYSICAL THERAPIST

## 2023-03-25 PROCEDURE — 80048 BASIC METABOLIC PNL TOTAL CA: CPT | Performed by: HOSPITALIST

## 2023-03-25 PROCEDURE — 25000003 PHARM REV CODE 250: Performed by: ORTHOPAEDIC SURGERY

## 2023-03-25 PROCEDURE — 97162 PT EVAL MOD COMPLEX 30 MIN: CPT | Performed by: PHYSICAL THERAPIST

## 2023-03-25 PROCEDURE — 63600175 PHARM REV CODE 636 W HCPCS: Performed by: ORTHOPAEDIC SURGERY

## 2023-03-25 PROCEDURE — 85025 COMPLETE CBC W/AUTO DIFF WBC: CPT | Performed by: HOSPITALIST

## 2023-03-25 PROCEDURE — 11000001 HC ACUTE MED/SURG PRIVATE ROOM

## 2023-03-25 RX ADMIN — CEFEPIME HYDROCHLORIDE 2 G: 2 INJECTION, SOLUTION INTRAVENOUS at 06:03

## 2023-03-25 RX ADMIN — MELATONIN TAB 3 MG 6 MG: 3 TAB at 10:03

## 2023-03-25 RX ADMIN — CEFEPIME HYDROCHLORIDE 2 G: 2 INJECTION, SOLUTION INTRAVENOUS at 02:03

## 2023-03-25 RX ADMIN — PREDNISONE 20 MG: 20 TABLET ORAL at 08:03

## 2023-03-25 RX ADMIN — CYCLOBENZAPRINE HYDROCHLORIDE 5 MG: 5 TABLET, FILM COATED ORAL at 10:03

## 2023-03-25 RX ADMIN — CEFEPIME HYDROCHLORIDE 2 G: 2 INJECTION, SOLUTION INTRAVENOUS at 10:03

## 2023-03-25 RX ADMIN — POLYETHYLENE GLYCOL 3350 17 G: 17 POWDER, FOR SOLUTION ORAL at 08:03

## 2023-03-25 RX ADMIN — ACETAMINOPHEN 650 MG: 325 TABLET ORAL at 10:03

## 2023-03-25 RX ADMIN — COLCHICINE 0.6 MG: 0.6 TABLET, FILM COATED ORAL at 08:03

## 2023-03-25 RX ADMIN — INSULIN DETEMIR 5 UNITS: 100 INJECTION, SOLUTION SUBCUTANEOUS at 08:03

## 2023-03-25 NOTE — PLAN OF CARE
Problem: Physical Therapy  Goal: Physical Therapy Goal  Description: Goals to be met by:  2023    Patient will increase functional independence with mobility by performin. Supine to sit with Modified [place]  2. Sit to supine with Modified Bradley  3. Sit to stand transfer with Modified Bradley  4. Gait  x 300 feet with Modified Bradley using Rolling Walker.    Recommend: SNF at time of discharge to home with family.         Outcome: Ongoing, Progressing

## 2023-03-25 NOTE — PLAN OF CARE
Problem: Fluid Imbalance (Pneumonia)  Goal: Fluid Balance  Outcome: Ongoing, Progressing     Problem: Infection (Pneumonia)  Goal: Resolution of Infection Signs and Symptoms  Outcome: Ongoing, Progressing     Problem: Skin Injury Risk Increased  Goal: Skin Health and Integrity  Outcome: Ongoing, Progressing     Problem: Pain Acute  Goal: Acceptable Pain Control and Functional Ability  Outcome: Ongoing, Progressing

## 2023-03-25 NOTE — SUBJECTIVE & OBJECTIVE
Interval History: Joint pains are slightly better after starting prednisone + colchicine     Review of Systems   Constitutional:  Negative for chills and fever.   Respiratory:  Negative for shortness of breath.    Cardiovascular:  Negative for chest pain.   Gastrointestinal:  Negative for abdominal pain.   Genitourinary:  Negative for difficulty urinating.   Musculoskeletal:  Positive for arthralgias. Negative for myalgias.   Skin:  Negative for rash.   Neurological:  Positive for weakness. Negative for numbness.   Psychiatric/Behavioral:  Negative for confusion.    Objective:     Vital Signs (Most Recent):  Temp: 98 °F (36.7 °C) (03/25/23 1150)  Pulse: 109 (03/25/23 1150)  Resp: 17 (03/25/23 1150)  BP: 126/71 (03/25/23 1150)  SpO2: 97 % (03/25/23 1150)   Vital Signs (24h Range):  Temp:  [97.7 °F (36.5 °C)-99.8 °F (37.7 °C)] 98 °F (36.7 °C)  Pulse:  [] 109  Resp:  [16-20] 17  SpO2:  [95 %-99 %] 97 %  BP: (112-131)/(61-77) 126/71     Weight: 121.5 kg (267 lb 13.7 oz)  Body mass index is 41.95 kg/m².    Intake/Output Summary (Last 24 hours) at 3/25/2023 1553  Last data filed at 3/25/2023 1150  Gross per 24 hour   Intake 480 ml   Output 3650 ml   Net -3170 ml        Physical Exam  Vitals and nursing note reviewed.   Constitutional:       General: She is not in acute distress.     Appearance: She is obese. She is not ill-appearing.   HENT:      Head: Normocephalic and atraumatic.      Nose: Nose normal.      Mouth/Throat:      Mouth: Mucous membranes are moist.   Cardiovascular:      Rate and Rhythm: Regular rhythm. Tachycardia present.   Pulmonary:      Effort: Pulmonary effort is normal.      Breath sounds: Normal breath sounds.      Comments: Room air  Abdominal:      General: Bowel sounds are normal.      Palpations: Abdomen is soft.   Musculoskeletal:      Right lower leg: Edema present.      Left lower leg: Edema present.      Comments: Swelling to R knee. Both shoulders, right elbow, both knees, both ankles  are tender with range of motion   Skin:     General: Skin is warm and dry.      Findings: No rash.   Neurological:      Mental Status: She is alert and oriented to person, place, and time.       Significant Labs: All pertinent labs within the past 24 hours have been reviewed.    Significant Imaging: I have reviewed all pertinent imaging results/findings within the past 24 hours.

## 2023-03-25 NOTE — ASSESSMENT & PLAN NOTE
Presented with bilateral lower extremity weakness with no significant sensory deficit. She denied preceding diarrhea/GI symptoms, although she had upper respiratory tract symptoms  - Neurology consulted  - seems as though this is secondary to arthritis as being worked up above   - PT, OT consulted

## 2023-03-25 NOTE — PROGRESS NOTES
[facility] Medicine  Progress Note    Patient Name: Hedy Conway  MRN: [id number]  Patient Class: IP- Inpatient   Admission Date: 3/21/2023  Length of Stay: 4 days  Attending Physician: Karla Garber MD  Primary Care Provider: John Lantigua PA-C        Subjective:     Principal Problem:Polyarthritis        HPI:  47-year-old  female with medical history significant for type 2 diabetes mellitus, hypertension, large cell diffuse non-Hodgkin lymphoma status post chemotherapy and stem cell transplant 2011 was transferred from outside hospital with suspicion of septic arthritis.  Of note she voiced migratory pain in her left-right knee-both shoulders that started 2 weeks ago, associated with generalized weakness, cleansing inability to move right lower extremity and only have movement without gravity on the left lower extremity without associated trauma, she voiced being on 3 different antibiotics at the outside hospital for both pneumonia and urinary tract infection, and this could explain for been afebrile although she voiced subjective fever at the onset of her symptoms she denied cough, shortness of breath, orthopnea, paroxysmal nocturnal dyspnea or pedal swelling.  She denied prior diarrhea although she voiced upper respiratory tract symptoms,treatment for pneumonia prior to onset of these symptoms.  She denied any urinary symptoms at this time of dysuria, frequency, urgency, straining at micturition or hematuria.  No recent travel, no sick contacts, no tick bite.  She had been sent here for possible knee aspiration.      Overview/Hospital Course:  Ms Hedy Conway who was transferred to [facility] from [facility]. There she was admitted for multiple joint pains. MRI R knee showed meniscal tear and large effusion with synovitis. There was concern for septic arthritis, aspiration was attempted, but no fluid retrieved. She also had Strep pneumoniae bacteremia (3/13) and  Pseudomonas pneumonia (3/15) and is currently being treated with cefepime. She is also receiving vancomycin with concerns for septic joint. She did require 1U RBC transfusion and has had vaginal bleeding. Pelvic US 3/20 showed thickened endometrium, and she will need outpatient GYN follow up. Her iron panel indicates mixed iron deficiency and anemia of chronic disease. She saw Neurology and Orthopaedics. Orthopaedics doubts multiple septic joints simultaneously and suspects Rheumatologic condition. S/p R knee arthrocentesis on 3/23 with culture no growth. Sed rate, CRP remain elevated. Uric acid normal, RF/CCP/SILVIA/hepatitis panel normal. MRI shoulder with large complex joint effusion with synovitis, high grade partial thickness tear of supraspinatus and infraspinatus, near complete tear of intraarticular bicpet tendon with tenosynovitis. MRI right wrist with distal ulna/radius/carpal osteitis, complex effusions, complex fluid collections, tenosynovitis.       Interval History: Joint pains are slightly better after starting prednisone + colchicine     Review of Systems   Constitutional:  Negative for chills and fever.   Respiratory:  Negative for shortness of breath.    Cardiovascular:  Negative for chest pain.   Gastrointestinal:  Negative for abdominal pain.   Genitourinary:  Negative for difficulty urinating.   Musculoskeletal:  Positive for arthralgias. Negative for myalgias.   Skin:  Negative for rash.   Neurological:  Positive for weakness. Negative for numbness.   Psychiatric/Behavioral:  Negative for confusion.    Objective:     Vital Signs (Most Recent):  Temp: 98 °F (36.7 °C) (03/25/23 1150)  Pulse: 109 (03/25/23 1150)  Resp: 17 (03/25/23 1150)  BP: 126/71 (03/25/23 1150)  SpO2: 97 % (03/25/23 1150)   Vital Signs (24h Range):  Temp:  [97.7 °F (36.5 °C)-99.8 °F (37.7 °C)] 98 °F (36.7 °C)  Pulse:  [] 109  Resp:  [16-20] 17  SpO2:  [95 %-99 %] 97 %  BP: (112-131)/(61-77) 126/71     Weight: 121.5 kg (267  lb 13.7 oz)  Body mass index is 41.95 kg/m².    Intake/Output Summary (Last 24 hours) at 3/25/2023 1553  Last data filed at 3/25/2023 1150  Gross per 24 hour   Intake 480 ml   Output 3650 ml   Net -3170 ml        Physical Exam  Vitals and nursing note reviewed.   Constitutional:       General: She is not in acute distress.     Appearance: She is obese. She is not ill-appearing.   HENT:      Head: Normocephalic and atraumatic.      Nose: Nose normal.      Mouth/Throat:      Mouth: Mucous membranes are moist.   Cardiovascular:      Rate and Rhythm: Regular rhythm. Tachycardia present.   Pulmonary:      Effort: Pulmonary effort is normal.      Breath sounds: Normal breath sounds.      Comments: Room air  Abdominal:      General: Bowel sounds are normal.      Palpations: Abdomen is soft.   Musculoskeletal:      Right lower leg: Edema present.      Left lower leg: Edema present.      Comments: Swelling to R knee. Both shoulders, right elbow, both knees, both ankles are tender with range of motion   Skin:     General: Skin is warm and dry.      Findings: No rash.   Neurological:      Mental Status: She is alert and oriented to person, place, and time.       Significant Labs: All pertinent labs within the past 24 hours have been reviewed.    Significant Imaging: I have reviewed all pertinent imaging results/findings within the past 24 hours.      Assessment/Plan:      * Polyarthritis  Present since admission to [facility]. MRI noted R knee effusion. ESR, CRP elevated  Lab Results   Component Value Date    URICACID 2.8 03/22/2023   Doubt gout. RF, CCP, SILVIA, acute hep panel normal. Parvovirus pending. RF normal. TSH elevated but free T4 normal, so less likely hypothyroidism.    - started colchicine and ibuprofen on 3/22, started prednisone on 3/24 with minimal improvement thus far    - MRI L shoulder: complex effusion with synovitis--> plan IR consult for arthrocentesis (will put in labs for this on 3/26)  - MRI R wrist:  osteitis, complex effusions and fluid collections--> discussed with Ortho, remain NPO at [facility] just in case washout needed on 3/26    - would be odd for her to have septic arthritis of multiple joints at once. Fluid from R knee no growth  - Rheumatology not available at this hospital   - PT, OT consulted as well       Subclinical hypothyroidism  Lab Results   Component Value Date    TSH 14.200 (H) 03/22/2023     FT4 within normal limits  Repeat as outpatient when acute illness resolved      Anemia of chronic disease  Anemia of chronic disease present (ferritin elevated) but did also have vaginal bleeding. TSAT 14- some degree iron deficiency    Weakness of both lower extremities  Presented with bilateral lower extremity weakness with no significant sensory deficit. She denied preceding diarrhea/GI symptoms, although she had upper respiratory tract symptoms  - Neurology consulted  - seems as though this is secondary to arthritis as being worked up above   - PT, OT consulted       Postmenopausal bleeding  Noted at OSH. TVUS with thickened endometrial stripe  - needs outpatient GYN follow up       Type 2 diabetes mellitus without complication, without long-term current use of insulin  Patient's FSGs are controlled on current medication regimen.  Last A1c reviewed-   Lab Results   Component Value Date    HGBA1C 6.4 (H) 03/22/2023     Most recent fingerstick glucose reviewed-   Recent Labs   Lab 03/24/23  1620 03/24/23 2000 03/25/23  0749 03/25/23  1147   POCTGLUCOSE 151* 207* 136* 193*     Current correctional scale  Medium  Maintain anti-hyperglycemic dose as follows-   Antihyperglycemics (From admission, onward)    Start     Stop Route Frequency Ordered    03/22/23 0900  insulin detemir U-100 pen 5 Units         -- SubQ Daily 03/22/23 0541    03/22/23 0638  insulin aspart U-100 pen 0-5 Units         -- SubQ Before meals & nightly PRN 03/22/23 0541        Hold Oral hypoglycemics while patient is in the  hospitals.    Streptococcal bacteremia  Blood cultures 3/13 with Strep bacteremia. Currently being treated with cefepime which also treats Pseudomonas pneumonia - end 3/26    Pneumonia of both lungs due to infectious organism  She voiced cough and subjective fever on admission. CXR showed basal consolidation, concerning for pneumonia. Sputum culture with Pseudomonas  - plan cefepime to complete 5 days (end 3/26)    Stem cells transplant status  Not currently on immunosuppresants  Follow up with oncology      Large cell (diffuse) non-Hodgkin's lymphoma  In remission, follow up with oncology  No new enlarged lymph nodes noted         VTE Risk Mitigation (From admission, onward)         Ordered     Reason for No Pharmacological VTE Prophylaxis  Once        Question:  Reasons:  Answer:  Active Bleeding    03/21/23 2230     IP VTE HIGH RISK PATIENT  Once         03/21/23 2230     Place sequential compression device  Until discontinued         03/21/23 2230                Discharge Planning   JESSE: 3/27/2023     Code Status: Full Code   Is the patient medically ready for discharge?:     Reason for patient still in hospital (select all that apply): Patient trending condition  Discharge Plan A: Rehab   Discharge Delays: None known at this time              Karla Garber MD  Department of Hospital Medicine   [facility]

## 2023-03-25 NOTE — PROGRESS NOTES
Orthopedic Surgery Progress Note    Subjective:  Slight improvement in pain with prednisone. Pain in right wrist, left shoulder, left hip, right knee.     Objective:  Vital signs in last 24 hours:  Temp:  [97.7 °F (36.5 °C)-99.8 °F (37.7 °C)] 98 °F (36.7 °C)  Pulse:  [] 109  Resp:  [16-20] 17  SpO2:  [95 %-99 %] 97 %  BP: (112-131)/(61-77) 126/71    Physical Exam:   NAD, AAOx3, nonlabored respirations.   Pain and limited ROM of R wrist, L shoulder, L hip, R knee. Slight swelling, no erythema.     Rheumatologic workup negative.     Blood cultures negative.   R knee aspirate negative.     Data Review  CBC:   Lab Results   Component Value Date    WBC 4.74 03/25/2023    RBC 3.02 (L) 03/25/2023    HGB 7.2 (L) 03/25/2023    HCT 24.0 (L) 03/25/2023     03/25/2023       Assessment/Plan: Polyarthralgias of unknown etiology.   - Pending MRIs for further evaluation. Plan to consult IR for aspiration if fluid is present.     Amira Garcia MD  Orthopedics  [facility]

## 2023-03-25 NOTE — PT/OT/SLP EVAL
Physical Therapy Evaluation    Patient Name:  Hedy Conway   MRN:  [id number]    Recommendations:     Discharge Recommendations: nursing facility, skilled   Discharge Equipment Recommendations:  (- TBD at next level of care.)   Barriers to discharge: None    Assessment:     Hedy Conway is a 47 y.o. female admitted with a medical diagnosis of Polyarthralgia.  She presents with the following impairments/functional limitations: weakness, impaired endurance, impaired functional mobility, gait instability, impaired balance, decreased lower extremity function, decreased safety awareness, pain, decreased ROM, edema .    Rehab Prognosis: Good; patient would benefit from acute skilled PT services to address these deficits and reach maximum level of function.    Recent Surgery: * No surgery found *      Plan:     During this hospitalization, patient to be seen daily to address the identified rehab impairments via gait training, therapeutic activities, therapeutic exercises and progress toward the following goals:    Plan of Care Expires:  04/07/23    Subjective     Chief Complaint: I haven't been out of bed for 4 days.    Patient/Family Comments/goals: to go to rehab    Pain/Comfort:  Pain Rating 1: 10/10  Location - Orientation 1: generalized  Pain Addressed 1: Distraction    Patients cultural, spiritual, Mandaeism conflicts given the current situation: no    Living Environment:  Patient lives with Spouse and 26 year old Daughter in a Camper with 2 Steps to Enter with 1 hand rails.   Prior to admission, patients level of function was Independent.  Equipment used at home: CPAP, nebulizer, glucometer.  DME owned (not currently used): none.  Upon discharge, patient will have assistance from Spouse and Self.    Objective:     Communicated with Nurse prior to session.  Patient found supine with telemetry, SCD, peripheral IV, PureWick, pressure relief boots  upon PT entry to room.    General Precautions: Standard,  fall  Orthopedic Precautions:Full weight bearing   Braces: N/A  Respiratory Status: Room air    Exams:  Cognitive Exam:  Patient is oriented to Person, Place, and Situation  Gross Motor Coordination:  WFL, but with increase initiation times 2* increase apprehension about increase pain with AROM   Postural Exam:  Patient presented with the following abnormalities:    -       Rounded shoulders  -       Forward head  -       Abnormal trunk flexion  Skin Integrity/Edema:      -       Skin integrity: Visible skin intact  -       Edema: Mild Tariq Lower Extremity  RLE ROM: PROM = WFL, AROM = 25% 2* increase apprehension about increase Pain  RLE Strength: Deficits: Quad = 4-/5 2* increase muscle guarding  LLE ROM: PROM = WFL, AROM = 50% 2* increase apprehension about increase Pain  LLE Strength: Deficits: Quad = 4-/5 2* increase muscle guarding    Functional Mobility:  Pt is self limiting and requires MAX VC's to increase participation with Functional Task.    Bed Mobility:     Supine to Sit: maximal assistance  Sit to Supine: maximal assistance  Transfers:     Sit to Stand:  maximal assistance with hand-held assist; (x's 2 trials)  Gait: Patient able to shift weight with TC's, but unable to lift either extremity Off-the-floor 2* increase apprehension about falling.  Standing with HHA (Max Assist) x 45 sec.       AM-PAC 6 CLICK MOBILITY  Total Score:11       Treatment & Education:  Lower Extremity Exercises.  Patient required Min A to move B LE Joints through full ROM.    Patient educated on the purpose of therapeutic exercise.    Patient verbalized acceptance/understanding of instructions, expectations, and limitations(for safety).  Patient performed: 1 sets of 10 reps (each) of B LE There Ex: AP, LAQ, Hip abd/add, Hip flexion while sitting up on EOB.       Patient required still requires verbal cues/tactile cues to ensure correct sequence, to maintain proper form, and to allow for self-correction.  Pt reported no  complaints or problems with exercise activity.      Patient left supine with all lines intact and call button in reach.    GOALS:   Multidisciplinary Problems       Physical Therapy Goals          Problem: Physical Therapy    Goal Priority Disciplines Outcome Goal Variances Interventions   Physical Therapy Goal     PT, PT/OT Ongoing, Progressing     Description: Goals to be met by:  2023    Patient will increase functional independence with mobility by performin. Supine to sit with Modified [place]  2. Sit to supine with Modified [place]  3. Sit to stand transfer with Modified [place]  4. Gait  x 300 feet with Modified [place] using Rolling Walker.    Recommend: SNF at time of discharge to home with family.                              History:     Past Medical History:   Diagnosis Date    Acute carpal tunnel syndrome of left wrist     Biliary colic     Diabetes mellitus     Encounter for blood transfusion     History of chemotherapy     Incisional hernia     Large cell (diffuse) non-Hodgkin's lymphoma     Stem cells transplant status        Past Surgical History:   Procedure Laterality Date    BREAST BIOPSY Left     lymph node biopsy, benign    BREAST SURGERY      CHOLECYSTECTOMY      COLD KNIFE CONIZATION OF CERVIX N/A 3/8/2021    Procedure: CONE BIOPSY, CERVIX, USING COLD KNIFE;  Surgeon: Evelyn Wheeler MD;  Location: [facility];  Service: OB/GYN;  Laterality: N/A;    COLONOSCOPY N/A 2022    Procedure: COLONOSCOPY;  Surgeon: Annamarie Jane MD;  Location: [facility];  Service: Endoscopy;  Laterality: N/A;    HERNIA REPAIR      incisional    LUNG BIOPSY      lymphnode biopsy      MEDIPORT REMOVAL Left 2020    Procedure: REMOVAL, CATHETER, CENTRAL VENOUS, TUNNELED, WITH PORT;  Surgeon: Luis Bogran-Reyes, MD;  Location: [facility];  Service: General;  Laterality: Left;    PORTACATH PLACEMENT Left     REVISION OF SCAR  2021    Procedure: REVISION, SCAR;  Surgeon: Otis ARTHUR  MD Sydney;  Location: [facility] [address] [place] [address];  Service: General;;    TUBAL LIGATION      UMBILICAL HERNIA REPAIR         Time Tracking:     PT Received On: 03/25/23  PT Start Time: 0900     PT Stop Time: 0930  PT Total Time (min): 30 min     Billable Minutes: Evaluation 15 min and Therapeutic Exercise 15 min    Victor Manuel Wilson, PT  03/25/2023

## 2023-03-25 NOTE — PT/OT/SLP PROGRESS
Occupational Therapy      Patient Name:  Hedy Conway   MRN:  [id number]    Patient not seen today secondary to  (ROXY to MRI). Will follow-up later as able.    3/25/2023

## 2023-03-25 NOTE — ASSESSMENT & PLAN NOTE
Patient's FSGs are controlled on current medication regimen.  Last A1c reviewed-   Lab Results   Component Value Date    HGBA1C 6.4 (H) 03/22/2023     Most recent fingerstick glucose reviewed-   Recent Labs   Lab 03/24/23  1620 03/24/23 2000 03/25/23  0749 03/25/23  1147   POCTGLUCOSE 151* 207* 136* 193*     Current correctional scale  Medium  Maintain anti-hyperglycemic dose as follows-   Antihyperglycemics (From admission, onward)    Start     Stop Route Frequency Ordered    03/22/23 0900  insulin detemir U-100 pen 5 Units         -- SubQ Daily 03/22/23 0541    03/22/23 0638  insulin aspart U-100 pen 0-5 Units         -- SubQ Before meals & nightly PRN 03/22/23 0541        Hold Oral hypoglycemics while patient is in the hospital.

## 2023-03-25 NOTE — NURSING
[facility] [place]  Nurses Note -- 4 Eyes      3/25/2023       Skin assessed on: Q Shift      [x] No Pressure Injuries Present    [x]Prevention Measures Documented    [] Yes LDA  for Pressure Injury Previously documented     [] Yes New Pressure Injury Discovered   [] LDA for New Pressure Injury Added      Attending RN:  Alesia Riojas, MIGUEL     Second RN:  Ciarra Gonzalez RN

## 2023-03-25 NOTE — PROGRESS NOTES
Able to scan MRI of Right Wrist and Left Shoulder.  Patient unable to tolerate laying on table any longer. Will try to get back back down on Monday 3/27 to do MRI of Left Hip.  Nurse notified that we were only able to complete 2 of 3 studies.  Patient sent back to her room.

## 2023-03-25 NOTE — ASSESSMENT & PLAN NOTE
Blood cultures 3/13 with Strep bacteremia. Currently being treated with cefepime which also treats Pseudomonas pneumonia - end 3/26

## 2023-03-25 NOTE — ASSESSMENT & PLAN NOTE
She voiced cough and subjective fever on admission. CXR showed basal consolidation, concerning for pneumonia. Sputum culture with Pseudomonas  - plan cefepime to complete 5 days (end 3/26)

## 2023-03-25 NOTE — NURSING
[facility] [place]  Nurses Note -- 4 Eyes      3/25/2023       Skin assessed on: Q Shift      [x] No Pressure Injuries Present    [x]Prevention Measures Documented    [] Yes LDA  for Pressure Injury Previously documented     [] Yes New Pressure Injury Discovered   [] LDA for New Pressure Injury Added      Attending RN:  Ciarra Gonzalez RN     Second RN:  Alesia Riojas RN

## 2023-03-25 NOTE — PLAN OF CARE
Problem: Diabetes Comorbidity  Goal: Blood Glucose Level Within Targeted Range  Outcome: Ongoing, Progressing  Intervention: Monitor and Manage Glycemia  Flowsheets (Taken 3/25/2023 0845)  Glycemic Management:   blood glucose monitored   oral hydration promoted   supplemental insulin given

## 2023-03-25 NOTE — ASSESSMENT & PLAN NOTE
Present since admission to [facility]. MRI noted R knee effusion. ESR, CRP elevated  Lab Results   Component Value Date    URICACID 2.8 03/22/2023   Doubt gout. RF, CCP, SILVIA, acute hep panel normal. Parvovirus pending. RF normal. TSH elevated but free T4 normal, so less likely hypothyroidism.    - started colchicine and ibuprofen on 3/22, started prednisone on 3/24 with minimal improvement thus far    - MRI L shoulder: complex effusion with synovitis--> plan IR consult for arthrocentesis (will put in labs for this on 3/26)  - MRI R wrist: osteitis, complex effusions and fluid collections--> discussed with Ortho, remain NPO at [facility] just in case washout needed on 3/26    - would be odd for her to have septic arthritis of multiple joints at once. Fluid from R knee no growth  - Rheumatology not available at this hospital   - PT, OT consulted as well

## 2023-03-26 LAB
ANION GAP SERPL CALC-SCNC: 12 MMOL/L (ref 8–16)
BACTERIA BLD CULT: NORMAL
BASOPHILS # BLD AUTO: 0.01 K/UL (ref 0–0.2)
BASOPHILS NFR BLD: 0.2 % (ref 0–1.9)
BUN SERPL-MCNC: 17 MG/DL (ref 6–20)
CALCIUM SERPL-MCNC: 8.6 MG/DL (ref 8.7–10.5)
CHLORIDE SERPL-SCNC: 101 MMOL/L (ref 95–110)
CO2 SERPL-SCNC: 27 MMOL/L (ref 23–29)
CREAT SERPL-MCNC: 0.5 MG/DL (ref 0.5–1.4)
DIFFERENTIAL METHOD: ABNORMAL
EOSINOPHIL # BLD AUTO: 0 K/UL (ref 0–0.5)
EOSINOPHIL NFR BLD: 0.5 % (ref 0–8)
ERYTHROCYTE [DISTWIDTH] IN BLOOD BY AUTOMATED COUNT: 17.5 % (ref 11.5–14.5)
EST. GFR  (NO RACE VARIABLE): >60 ML/MIN/1.73 M^2
GLUCOSE SERPL-MCNC: 139 MG/DL (ref 70–110)
HCT VFR BLD AUTO: 25.1 % (ref 37–48.5)
HGB BLD-MCNC: 7.4 G/DL (ref 12–16)
IMM GRANULOCYTES # BLD AUTO: 0.06 K/UL (ref 0–0.04)
IMM GRANULOCYTES NFR BLD AUTO: 1.5 % (ref 0–0.5)
LYMPHOCYTES # BLD AUTO: 1.6 K/UL (ref 1–4.8)
LYMPHOCYTES NFR BLD: 39.2 % (ref 18–48)
MCH RBC QN AUTO: 23.8 PG (ref 27–31)
MCHC RBC AUTO-ENTMCNC: 29.5 G/DL (ref 32–36)
MCV RBC AUTO: 81 FL (ref 82–98)
MONOCYTES # BLD AUTO: 0.5 K/UL (ref 0.3–1)
MONOCYTES NFR BLD: 12.6 % (ref 4–15)
NEUTROPHILS # BLD AUTO: 1.9 K/UL (ref 1.8–7.7)
NEUTROPHILS NFR BLD: 46 % (ref 38–73)
NRBC BLD-RTO: 0 /100 WBC
PLATELET # BLD AUTO: 241 K/UL (ref 150–450)
PMV BLD AUTO: 10.4 FL (ref 9.2–12.9)
POCT GLUCOSE: 139 MG/DL (ref 70–110)
POCT GLUCOSE: 142 MG/DL (ref 70–110)
POCT GLUCOSE: 220 MG/DL (ref 70–110)
POCT GLUCOSE: 267 MG/DL (ref 70–110)
POTASSIUM SERPL-SCNC: 4 MMOL/L (ref 3.5–5.1)
RBC # BLD AUTO: 3.11 M/UL (ref 4–5.4)
SODIUM SERPL-SCNC: 140 MMOL/L (ref 136–145)
WBC # BLD AUTO: 4.13 K/UL (ref 3.9–12.7)

## 2023-03-26 PROCEDURE — 85025 COMPLETE CBC W/AUTO DIFF WBC: CPT | Performed by: HOSPITALIST

## 2023-03-26 PROCEDURE — 63600175 PHARM REV CODE 636 W HCPCS: Mod: TB,JG | Performed by: HOSPITALIST

## 2023-03-26 PROCEDURE — 36415 COLL VENOUS BLD VENIPUNCTURE: CPT | Performed by: HOSPITALIST

## 2023-03-26 PROCEDURE — 25000003 PHARM REV CODE 250: Performed by: ORTHOPAEDIC SURGERY

## 2023-03-26 PROCEDURE — 11000001 HC ACUTE MED/SURG PRIVATE ROOM

## 2023-03-26 PROCEDURE — 25000003 PHARM REV CODE 250: Performed by: HOSPITALIST

## 2023-03-26 PROCEDURE — 63600175 PHARM REV CODE 636 W HCPCS: Performed by: ORTHOPAEDIC SURGERY

## 2023-03-26 PROCEDURE — 97530 THERAPEUTIC ACTIVITIES: CPT | Mod: CQ

## 2023-03-26 PROCEDURE — 97535 SELF CARE MNGMENT TRAINING: CPT

## 2023-03-26 PROCEDURE — 80048 BASIC METABOLIC PNL TOTAL CA: CPT | Performed by: HOSPITALIST

## 2023-03-26 PROCEDURE — 97110 THERAPEUTIC EXERCISES: CPT | Mod: CQ

## 2023-03-26 PROCEDURE — 97110 THERAPEUTIC EXERCISES: CPT

## 2023-03-26 RX ORDER — HYDROCODONE BITARTRATE AND ACETAMINOPHEN 5; 325 MG/1; MG/1
1 TABLET ORAL EVERY 6 HOURS PRN
Status: DISCONTINUED | OUTPATIENT
Start: 2023-03-26 | End: 2023-03-31

## 2023-03-26 RX ADMIN — VANCOMYCIN HYDROCHLORIDE 2000 MG: 500 INJECTION, POWDER, LYOPHILIZED, FOR SOLUTION INTRAVENOUS at 03:03

## 2023-03-26 RX ADMIN — INSULIN DETEMIR 5 UNITS: 100 INJECTION, SOLUTION SUBCUTANEOUS at 09:03

## 2023-03-26 RX ADMIN — HYDROCODONE BITARTRATE AND ACETAMINOPHEN 1 TABLET: 5; 325 TABLET ORAL at 08:03

## 2023-03-26 RX ADMIN — POLYETHYLENE GLYCOL 3350 17 G: 17 POWDER, FOR SOLUTION ORAL at 01:03

## 2023-03-26 RX ADMIN — HYDROCODONE BITARTRATE AND ACETAMINOPHEN 1 TABLET: 5; 325 TABLET ORAL at 01:03

## 2023-03-26 RX ADMIN — CYCLOBENZAPRINE HYDROCHLORIDE 5 MG: 5 TABLET, FILM COATED ORAL at 07:03

## 2023-03-26 RX ADMIN — COLCHICINE 0.6 MG: 0.6 TABLET, FILM COATED ORAL at 09:03

## 2023-03-26 RX ADMIN — CEFEPIME HYDROCHLORIDE 2 G: 2 INJECTION, SOLUTION INTRAVENOUS at 08:03

## 2023-03-26 RX ADMIN — MELATONIN TAB 3 MG 6 MG: 3 TAB at 08:03

## 2023-03-26 RX ADMIN — CEFEPIME HYDROCHLORIDE 2 G: 2 INJECTION, SOLUTION INTRAVENOUS at 09:03

## 2023-03-26 RX ADMIN — ACETAMINOPHEN 650 MG: 325 TABLET ORAL at 07:03

## 2023-03-26 RX ADMIN — PREDNISONE 20 MG: 20 TABLET ORAL at 09:03

## 2023-03-26 RX ADMIN — CYCLOBENZAPRINE HYDROCHLORIDE 5 MG: 5 TABLET, FILM COATED ORAL at 08:03

## 2023-03-26 RX ADMIN — CEFEPIME HYDROCHLORIDE 2 G: 2 INJECTION, SOLUTION INTRAVENOUS at 02:03

## 2023-03-26 NOTE — SUBJECTIVE & OBJECTIVE
Interval History: Joint pains still present    Review of Systems   Constitutional:  Negative for chills and fever.   Respiratory:  Negative for shortness of breath.    Cardiovascular:  Negative for chest pain.   Gastrointestinal:  Negative for abdominal pain.   Genitourinary:  Negative for difficulty urinating.   Musculoskeletal:  Positive for arthralgias. Negative for myalgias.   Skin:  Negative for rash.   Neurological:  Positive for weakness. Negative for numbness.   Psychiatric/Behavioral:  Negative for confusion.    Objective:     Vital Signs (Most Recent):  Temp: 97.8 °F (36.6 °C) (03/26/23 1203)  Pulse: 97 (03/26/23 1203)  Resp: 18 (03/26/23 1342)  BP: 114/67 (03/26/23 1203)  SpO2: 97 % (03/26/23 1203)   Vital Signs (24h Range):  Temp:  [97.8 °F (36.6 °C)-98.3 °F (36.8 °C)] 97.8 °F (36.6 °C)  Pulse:  [] 97  Resp:  [18-20] 18  SpO2:  [95 %-98 %] 97 %  BP: (104-128)/(56-71) 114/67     Weight: 121.5 kg (267 lb 13.7 oz)  Body mass index is 41.95 kg/m².    Intake/Output Summary (Last 24 hours) at 3/26/2023 1658  Last data filed at 3/26/2023 1230  Gross per 24 hour   Intake 480 ml   Output 2500 ml   Net -2020 ml        Physical Exam  Vitals and nursing note reviewed.   Constitutional:       General: She is not in acute distress.     Appearance: She is obese. She is not ill-appearing.   HENT:      Head: Normocephalic and atraumatic.      Nose: Nose normal.      Mouth/Throat:      Mouth: Mucous membranes are moist.   Cardiovascular:      Rate and Rhythm: Regular rhythm. Tachycardia present.   Pulmonary:      Effort: Pulmonary effort is normal.      Breath sounds: Normal breath sounds.      Comments: Room air  Abdominal:      General: Bowel sounds are normal.      Palpations: Abdomen is soft.   Musculoskeletal:      Right lower leg: Edema present.      Left lower leg: Edema present.   Skin:     General: Skin is warm and dry.      Findings: No rash.   Neurological:      Mental Status: She is alert and oriented to  person, place, and time.       Significant Labs: All pertinent labs within the past 24 hours have been reviewed.    Significant Imaging: I have reviewed all pertinent imaging results/findings within the past 24 hours.

## 2023-03-26 NOTE — PLAN OF CARE
Problem: Physical Therapy  Goal: Physical Therapy Goal  Description: Goals to be met by:  2023    Patient will increase functional independence with mobility by performin. Supine to sit with Modified [place]  2. Sit to supine with Modified [place]  3. Sit to stand transfer with Modified [place]  4. Gait  x 300 feet with Modified [place] using Rolling Walker.    Recommend: SNF at time of discharge to home with family.         Outcome: Ongoing, Progressing   Pt tolerated treatment fairly today. Limited mostly 2/2 pain. Pt required Mod A of 2 persons for supine>sit, Max A of 2 persons for sit>supine, Max A of 2 persons for sit<>stand transfer and Dep A of 2 persons to scoot along EOB. Pt is a fall risk and requires assistance for all OOB activity. Pt was able to to participate in seated therex EOB with mild exacerbation of pain. Pt would continue to benefit from skilled PT services to address pt's deficits and improve current level of function

## 2023-03-26 NOTE — PT/OT/SLP PROGRESS
Occupational Therapy   Treatment    Name: Hedy Conway  MRN: [id number]  Admitting Diagnosis:  Polyarthritis       Recommendations:     Discharge Recommendations: rehabilitation facility  Discharge Equipment Recommendations:   (TBD at next level of care.)  Barriers to discharge:   (Pt significantly limited by pain and weaknessm requiring x 2 person assist for standing and is unable to ambualte at this time; pt will benefit from multidisciplinary approach in an IPR setting for returning to PLOF as she was (I) and working PTA.)    Assessment:     Hedy Conway is a 47 y.o. female with a medical diagnosis of Polyarthritis.  Performance deficits affecting function are weakness, impaired endurance, impaired self care skills, impaired functional mobility, gait instability, impaired balance, decreased upper extremity function, decreased lower extremity function, decreased safety awareness, decreased ROM, pain, impaired skin, edema.     Pt very pleasant and willing to participate in tx session this date; pt functionally limited by pain but was able to perform supine>sit w/ mod A x 2 persons to stand w/ max A x 2 persons, tolerating ~45 sec of static standing. Pt tolerated BUE AROM fairly well w/ assist as needed for completing full range; crepitus noted to R wrist which is significantly limited. Total A required for bed level macho-care after pt had a BM mid session. Pt tolerated tx session well and will continue to benefit from skilled acute OT services to maximize functional capacity for safe performance w/ ADLs and functional mobility.     PTA, pt was (I) w/ all ADLs and functional mobility; pt will benefit from an aggressive multidisciplinary approach in an IPR setting for returning to PLOF. Despite pain and functional limitations, pt is motivated and determined.     Rehab Prognosis:  Good; patient would benefit from acute skilled OT services to address these deficits and reach maximum level of function.    "    Plan:     Patient to be seen 5 x/week to address the above listed problems via self-care/home management, therapeutic activities, therapeutic exercises  Plan of Care Expires: 04/07/23  Plan of Care Reviewed with: patient    Subjective     Chief Complaint: "It's a 5 right now but it gets worse when I move."   Patient/Family Comments/goals: Agreeable to participate   Pain/Comfort:  Pain Rating 1: 5/10  Location 1:  (R wrist; L shoulder; B knees (R>L knee))  Pain Addressed 1: Reposition, Distraction, Cessation of Activity, Nurse notified    Objective:     Communicated with: Nurse prior to session.  Patient found HOB elevated with telemetry, peripheral IV, PureWick, bed alarm upon OT entry to room.    General Precautions: Standard, fall    Orthopedic Precautions:N/A  Braces: N/A  Respiratory Status: Room air     Occupational Performance:     Bed Mobility:    Patient completed Rolling/Turning to Left with  minimum assistance x 2 trials for completing rear macho-care   Patient completed Rolling/Turning to Right with minimum assistance x 2 trial for completing rear macho-care   Patient completed Scooting  hips to EOB with dependent x 2 persons; scooting laterally to HOB w/ dep A x 2 persons  Patient completed Supine to Sit with moderate assistance and 2 persons w/ HOB elevated   Patient completed Sit to Supine with maximal assistance and 2 persons     Functional Mobility/Transfers:  Patient completed Sit <> Stand Transfer with maximal assistance and of 2 persons  with  hand-held assist   Pt w/ flexed posture, requiring cueing for shifting hips forward and extending trunk; pt was able to tolerate standing ~45 sec while significant other assisted w/ readjusting padding/sheets on bed   Functional Mobility: Unable to take steps at this time.     Activities of Daily Living:  Upper Body Dressing: minimum assistance for donning gown over back   Lower Body Dressing: total assistance for donning socks and brief at bed level "   Toileting: total assistance for providing extensive macho-care after pt had a BM accident        [facility] 6 Click ADL: 15    Treatment & Education:  -Pt performed ADLs and functional mobility as noted above.   -Pt educated on importance of OOB activity to prevent further debility and weakness due to excessive bed rest.   -Pt was able to perform BUE AROM for 1 set x 15 reps while sitting EOB, unsupported; pt required some active assist and support for L shoulder:   -elbow flexion/ext    -shoulder flexion/ext    -wrist flex/ext (crepitus noted to R wrist)   -forward punches   -Questions and concerns addressed within scope.     Patient left HOB elevated with all lines intact, call button in reach, bed alarm on, and spouse present and BLE pressure relief boots on.     GOALS:   Multidisciplinary Problems       Occupational Therapy Goals          Problem: Occupational Therapy    Goal Priority Disciplines Outcome Interventions   Occupational Therapy Goal     OT, PT/OT Ongoing, Progressing    Description: Goals to be met by 04/07/23:  Patient will demonstrate recovery of:   Set up self feeding fullest upright sitting per standard aspiration precautions.  Mod a UB dress seated OOB  Max assist LB dress w/o recoil from pain during palpations/positioning assist.   Min a grooming seated.  Mod a toilet t/f to BSC  Mod a toilet hygiene OOB.   SBA seated UB AROM ex all joints/all planes 1x10 to bolster recovery of core and UB strength, pain free active range of motion, and normalization of ADL related daily routines.                         Time Tracking:     OT Date of Treatment: 03/26/23  OT Start Time: 1115  OT Stop Time: 1153  OT Total Time (min): 38 min    Billable Minutes:Self Care/Home Management 28  Therapeutic Exercise 10  Total Time 38 (co-tx w/ PT)     OT/CHANDLER: OT          3/26/2023

## 2023-03-26 NOTE — ASSESSMENT & PLAN NOTE
Patient's FSGs are controlled on current medication regimen.  Last A1c reviewed-   Lab Results   Component Value Date    HGBA1C 6.4 (H) 03/22/2023     Most recent fingerstick glucose reviewed-   Recent Labs   Lab 03/25/23  1959 03/26/23  0814 03/26/23  1200   POCTGLUCOSE 157* 139* 142*     Current correctional scale  Medium  Maintain anti-hyperglycemic dose as follows-   Antihyperglycemics (From admission, onward)    Start     Stop Route Frequency Ordered    03/22/23 0900  insulin detemir U-100 pen 5 Units         -- SubQ Daily 03/22/23 0541    03/22/23 0638  insulin aspart U-100 pen 0-5 Units         -- SubQ Before meals & nightly PRN 03/22/23 0541        Hold Oral hypoglycemics while patient is in the hospital.

## 2023-03-26 NOTE — PROGRESS NOTES
Orthopedic Surgery Progress Note    Subjective:  No acute issues. Relatively unchanged.     Objective:  Vital signs in last 24 hours:  Temp:  [98 °F (36.7 °C)-98.7 °F (37.1 °C)] 98.3 °F (36.8 °C)  Pulse:  [] 99  Resp:  [17-20] 18  SpO2:  [95 %-98 %] 98 %  BP: (104-147)/(56-77) 128/71    Physical Exam:   NAD, AAOx3, nonlabored respirations.   Pain and limited ROM of R wrist, L shoulder, L hip, R knee. Slight swelling, no erythema, no warmth.     Rheumatologic workup negative.     Blood cultures negative.   R knee aspirate negative.     MRI L shoulder shows joint effusion, synovitis, high grade partial thickness tear of supra and infraspinatus, labrum tearing, biceps tendon tear, chondral thinning    MRI R wrist shows patchy osteitis, high grade chondral loss, carpal joint effusions, volar fluid collection along flexor tendons.     Data Review  CBC:   Lab Results   Component Value Date    WBC 4.13 03/26/2023    RBC 3.11 (L) 03/26/2023    HGB 7.4 (L) 03/26/2023    HCT 25.1 (L) 03/26/2023     03/26/2023       Assessment/Plan: Polyarthralgias of unknown etiology.   - MRI findings show internal derangement which can certainly cause her symptoms, however still unclear if there is an additional infectious etiology.   - Will plan to have IR aspirate the L shoulder and R wrist and send for serology and culture.     Amira Garcia MD  Orthopedics  [facility]

## 2023-03-26 NOTE — PROGRESS NOTES
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  Of note she voiced migratory pain in her left-right knee-both shoulders that started 2 weeks ago, associated with generalized weakness, cleansing inability to move right lower extremity and only have movement without gravity on the left lower extremity without associated trauma, she voiced being on 3 different antibiotics at the outside hospital for both pneumonia and urinary tract infection, and this could explain for been afebrile although she voiced subjective fever at the onset of her symptoms she denied cough, shortness of breath, orthopnea, paroxysmal nocturnal dyspnea or pedal swelling.  She denied prior diarrhea although she voiced upper respiratory tract symptoms,treatment for pneumonia prior to onset of these symptoms.  She denied any urinary symptoms at this time of dysuria, frequency, urgency, straining at micturition or hematuria.  No recent travel, no sick contacts, no tick bite.  She had been sent here for possible knee aspiration.      Overview/Hospital Course:  Ms Hedy Conway who was transferred to [facility] from [facility]. There she was admitted for multiple joint pains. MRI R knee showed meniscal tear and large effusion with synovitis. There was concern for septic arthritis, aspiration was attempted, but no fluid retrieved. She also had Strep pneumoniae bacteremia (3/13) and  Pseudomonas pneumonia (3/15) and is currently being treated with cefepime. She is also receiving vancomycin with concerns for septic joint. She did require 1U RBC transfusion and has had vaginal bleeding. Pelvic US 3/20 showed thickened endometrium, and she will need outpatient GYN follow up. Her iron panel indicates mixed iron deficiency and anemia of chronic disease. She saw Neurology and Orthopaedics. Orthopaedics doubts multiple septic joints simultaneously and suspects Rheumatologic condition. S/p R knee arthrocentesis on 3/23 with culture no growth. Sed rate, CRP remain elevated. Uric acid normal, RF/CCP/SILVIA/hepatitis panel normal. MRI left shoulder with large complex joint effusion with synovitis, high grade partial thickness tear of supraspinatus and infraspinatus, near complete tear of intraarticular bicpet tendon with tenosynovitis. MRI right wrist with distal ulna/radius/carpal osteitis, complex effusions, complex fluid collections, tenosynovitis. Plan arthrocentesis of both L shoulder and R wrist by IR.       Interval History: Joint pains still present    Review of Systems   Constitutional:  Negative for chills and fever.   Respiratory:  Negative for shortness of breath.    Cardiovascular:  Negative for chest pain.   Gastrointestinal:  Negative for abdominal pain.   Genitourinary:  Negative for difficulty urinating.   Musculoskeletal:  Positive for arthralgias. Negative for myalgias.   Skin:  Negative for rash.   Neurological:  Positive for weakness. Negative for numbness.   Psychiatric/Behavioral:  Negative for confusion.    Objective:     Vital Signs (Most Recent):  Temp: 97.8 °F (36.6 °C) (03/26/23 1203)  Pulse: 97 (03/26/23 1203)  Resp: 18 (03/26/23 1342)  BP: 114/67 (03/26/23 1203)  SpO2: 97 % (03/26/23 1203)   Vital Signs (24h Range):  Temp:  [97.8 °F (36.6 °C)-98.3 °F (36.8 °C)] 97.8 °F (36.6 °C)  Pulse:  [] 97  Resp:  [18-20] 18  SpO2:  [95 %-98 %] 97 %  BP: (104-128)/(56-71) 114/67      Weight: 121.5 kg (267 lb 13.7 oz)  Body mass index is 41.95 kg/m².    Intake/Output Summary (Last 24 hours) at 3/26/2023 1658  Last data filed at 3/26/2023 1230  Gross per 24 hour   Intake 480 ml   Output 2500 ml   Net -2020 ml        Physical Exam  Vitals and nursing note reviewed.   Constitutional:       General: She is not in acute distress.     Appearance: She is obese. She is not ill-appearing.   HENT:      Head: Normocephalic and atraumatic.      Nose: Nose normal.      Mouth/Throat:      Mouth: Mucous membranes are moist.   Cardiovascular:      Rate and Rhythm: Regular rhythm. Tachycardia present.   Pulmonary:      Effort: Pulmonary effort is normal.      Breath sounds: Normal breath sounds.      Comments: Room air  Abdominal:      General: Bowel sounds are normal.      Palpations: Abdomen is soft.   Musculoskeletal:      Right lower leg: Edema present.      Left lower leg: Edema present.   Skin:     General: Skin is warm and dry.      Findings: No rash.   Neurological:      Mental Status: She is alert and oriented to person, place, and time.       Significant Labs: All pertinent labs within the past 24 hours have been reviewed.    Significant Imaging: I have reviewed all pertinent imaging results/findings within the past 24 hours.      Assessment/Plan:      * Polyarthritis  Present since admission to [facility]. MRI noted R knee effusion. ESR, CRP elevated  Lab Results   Component Value Date    URICACID 2.8 03/22/2023   Doubt gout. RF, CCP, SILVIA, acute hep panel normal. Parvovirus pending. RF normal. TSH elevated but free T4 normal, so less likely hypothyroidism.    - started colchicine and ibuprofen on 3/22, started prednisone on 3/24 with minimal improvement thus far-- will DC while infectious workup is pending     - MRI L shoulder: complex effusion with synovitis  - MRI R wrist: osteitis, complex effusions and fluid collections  --> discussed with Ortho, plan IR arthrocenteses on 3/27/23. Cell count and  diff, gram stain, aerobic/anaerobic, fungal, AFB cultures and crystal analysis ordered for both joints     - would be odd for her to have septic arthritis of multiple joints at once. Fluid from R knee no growth. ID consult  - Rheumatology not available at this hospital. If fluid inflammatory but not infectious, will discuss with Rheum at [facility]  - PT, OT consulted as well       Subclinical hypothyroidism  Lab Results   Component Value Date    TSH 14.200 (H) 03/22/2023     FT4 within normal limits  Repeat as outpatient when acute illness resolved      Anemia of chronic disease  Anemia of chronic disease present (ferritin elevated) but did also have vaginal bleeding. TSAT 14- some degree iron deficiency    Weakness of both lower extremities  Presented with bilateral lower extremity weakness with no significant sensory deficit. She denied preceding diarrhea/GI symptoms, although she had upper respiratory tract symptoms  - Neurology consulted  - seems as though this is secondary to arthritis as being worked up above   - PT, OT consulted       Postmenopausal bleeding  Noted at OSH. TVUS with thickened endometrial stripe  - needs outpatient GYN follow up       Type 2 diabetes mellitus without complication, without long-term current use of insulin  Patient's FSGs are controlled on current medication regimen.  Last A1c reviewed-   Lab Results   Component Value Date    HGBA1C 6.4 (H) 03/22/2023     Most recent fingerstick glucose reviewed-   Recent Labs   Lab 03/25/23  1959 03/26/23  0814 03/26/23  1200   POCTGLUCOSE 157* 139* 142*     Current correctional scale  Medium  Maintain anti-hyperglycemic dose as follows-   Antihyperglycemics (From admission, onward)    Start     Stop Route Frequency Ordered    03/22/23 0900  insulin detemir U-100 pen 5 Units         -- SubQ Daily 03/22/23 0541    03/22/23 0638  insulin aspart U-100 pen 0-5 Units         -- SubQ Before meals & nightly PRN 03/22/23 0541        Hold Oral hypoglycemics  while patient is in the hospital.    Streptococcal bacteremia  Blood cultures 3/13 with Strep bacteremia. Currently being treated with cefepime which also treats Pseudomonas pneumonia - end 3/26    Pneumonia of both lungs due to infectious organism  She voiced cough and subjective fever on admission. CXR showed basal consolidation, concerning for pneumonia. Sputum culture with Pseudomonas  - plan cefepime to complete 5 days (end 3/26)    Stem cells transplant status  Not currently on immunosuppresants  Follow up with oncology      Large cell (diffuse) non-Hodgkin's lymphoma  In remission, follow up with oncology  No new enlarged lymph nodes noted         VTE Risk Mitigation (From admission, onward)         Ordered     Reason for No Pharmacological VTE Prophylaxis  Once        Question:  Reasons:  Answer:  Active Bleeding    03/21/23 2230     IP VTE HIGH RISK PATIENT  Once         03/21/23 2230     Place sequential compression device  Until discontinued         03/21/23 2230                Discharge Planning   JESSE: 3/27/2023     Code Status: Full Code   Is the patient medically ready for discharge?:     Reason for patient still in hospital (select all that apply): Patient trending condition  Discharge Plan A: Rehab   Discharge Delays: None known at this time              Karla Garber MD  Department of Hospital Medicine   [facility]

## 2023-03-26 NOTE — PROGRESS NOTES
Pharmacokinetic Initial Assessment: IV Vancomycin    Assessment/Plan:    Initiate intravenous vancomycin with loading dose of 2000 mg once followed by a maintenance dose of vancomycin 2000 mg IV every 12 hours  Desired empiric serum trough concentration is 10 to 20 mcg/mL  Draw vancomycin trough level 60 min prior to fourth dose on 3/28/23 at approximately 02:00  Pharmacy will continue to follow and monitor vancomycin.      Please contact pharmacy at extension [phone] with any questions regarding this assessment.     Thank you for the consult,   Palma Mullins       Patient brief summary:  Hedy Conway is a 47 y.o. female initiated on antimicrobial therapy with IV Vancomycin for treatment of suspected bone/joint infection    Drug Allergies:   Review of patient's allergies indicates:   Allergen Reactions    Tetanus vaccines and toxoid Rash       Actual Body Weight:   121.5 kg    Renal Function:   Estimated Creatinine Clearance: 185.9 mL/min (based on SCr of 0.5 mg/dL).,     Dialysis Method (if applicable):  N/A    CBC (last 72 hours):  Recent Labs   Lab Result Units 03/24/23 0349 03/25/23 0429 03/26/23 0427   WBC K/uL 4.88 4.74 4.13   Hemoglobin g/dL 7.2* 7.2* 7.4*   Hematocrit % 24.7* 24.0* 25.1*   Platelets K/uL 247 233 241   Gran % % 57.8 46.4 46.0   Lymph % % 28.7 40.1 39.2   Mono % % 11.3 12.2 12.6   Eosinophil % % 0.6 0.2 0.5   Basophil % % 0.4 0.0 0.2   Differential Method  Automated Automated Automated       Metabolic Panel (last 72 hours):  Recent Labs   Lab Result Units 03/24/23 0349 03/25/23 0429 03/26/23 0427   Sodium mmol/L 133* 137 140   Potassium mmol/L 3.6 4.0 4.0   Chloride mmol/L 102 100 101   CO2 mmol/L 25 26 27   Glucose mg/dL 185* 157* 139*   BUN mg/dL 13 12 17   Creatinine mg/dL 0.5 0.5 0.5   Albumin g/dL 1.9*  --   --    Total Bilirubin mg/dL 0.9  --   --    Alkaline Phosphatase U/L 82  --   --    AST U/L 30  --   --    ALT U/L 41  --   --        Drug levels (last 3 results):  No  results for input(s): VANCOMYCINRA, VANCORANDOM, VANCOMYCINPE, VANCOPEAK, VANCOMYCINTR, VANCOTROUGH in the last 72 hours.    Microbiologic Results:  Microbiology Results (last 7 days)       Procedure Component Value Units Date/Time    Culture, Anaerobe [[id number]]     Order Status: No result Specimen: Shoulder, Left     Aerobic culture [[id number]]     Order Status: No result Specimen: Shoulder, Left     Culture, Anaerobe [[id number]]     Order Status: No result Specimen: Arm from Wrist, Right     Aerobic culture [[id number]]     Order Status: No result Specimen: Arm from Wrist, Right     AFB Culture & Smear [[id number]]     Order Status: No result Specimen: Shoulder, Left     AFB Culture & Smear [[id number]]     Order Status: No result Specimen: Arm from Wrist, Right     Fungus culture [[id number]]     Order Status: No result Specimen: Shoulder, Left     Fungus culture [[id number]]     Order Status: No result Specimen: Arm from Wrist, Right     Gram stain [[id number]]     Order Status: No result Specimen: Shoulder, Left     Gram stain [[id number]]     Order Status: No result Specimen: Arm from Wrist, Right     Culture, Body Fluid (Aerobic) w/ GS [[id number]] Collected: 03/23/23 1244    Order Status: Completed Specimen: Body Fluid from Knee Updated: 03/26/23 0933     AEROBIC CULTURE - FLUID No growth     Gram Stain Result Few WBC's      No organisms seen    Blood culture [[id number]] Collected: 03/22/23 0610    Order Status: Completed Specimen: Blood from Peripheral, Right Arm Updated: 03/26/23 0903     Blood Culture, Routine No Growth after 4 days.

## 2023-03-26 NOTE — PT/OT/SLP PROGRESS
Physical Therapy Treatment    Patient Name:  Hedy Conway   MRN:  [id number]    Recommendations:     Discharge Recommendations: nursing facility, skilled  Discharge Equipment Recommendations:  (TBD at next level of care)  Barriers to discharge: None    Assessment:     Hedy Conway is a 47 y.o. female admitted with a medical diagnosis of Polyarthritis.  She presents with the following impairments/functional limitations: weakness, impaired endurance, impaired self care skills, impaired functional mobility, gait instability, impaired balance, pain, decreased safety awareness, edema, decreased lower extremity function, decreased upper extremity function, decreased coordination, decreased ROM.    Pt tolerated treatment fairly today. Limited mostly 2/2 pain. Pt required Mod A of 2 persons for supine>sit, Max A of 2 persons for sit>supine, Max A of 2 persons for sit<>stand transfer and Dep A of 2 persons to scoot along EOB. Pt is a fall risk and requires assistance for all OOB activity. Pt was able to to participate in seated therex EOB with mild exacerbation of pain. Pt would continue to benefit from skilled PT services to address pt's deficits and improve current level of function.     Rehab Prognosis: Fair; patient would benefit from acute skilled PT services to address these deficits and reach maximum level of function.    Recent Surgery: * No surgery found *      Plan:     During this hospitalization, patient to be seen daily to address the identified rehab impairments via gait training, therapeutic activities, therapeutic exercises and progress toward the following goals:    Plan of Care Expires:  04/07/23    Subjective     Chief Complaint: pain  Patient/Family Comments/goals: Pt agreeable to try to work with therapy and do what she can.   Pain/Comfort:  Pain Rating 1: 5/10  Location - Side 1: Bilateral  Location 1: B knees (R wrist and R shoulder)  Pain Addressed 1: Reposition, Pre-medicate for activity,  "Distraction, Cessation of Activity, Nurse notified      Objective:     Communicated with pt's nurseWhit prior to session.  Patient found HOB elevated with male family member present (?)with telemetry, peripheral IV, PureWick, bed alarm upon PT entry to room.     General Precautions: Standard, fall  Orthopedic Precautions: Full weight bearing  Braces: N/A  Respiratory Status: Room air     Functional Mobility:  Bed Mobility:     Rolling Left:  Min A x2 trials  Rolling Right: Min A x2 trials   Scooting: Dep A of 2 to scoot to EOB for foot placement, Dep A of 2 persons to scoot along EOB x3 scoots, and dep A of 2 persons to scoot towards HOB in supine.   Supine to Sit: moderate assistance and of 2 persons  Sit to Supine: maximal assistance and of 2 persons  Transfers:  x1 from elevated EOB   Sit to Stand:  maximal assistance and of 2 persons with rolling walker  Balance: good sitting and poor standing. Pt stood for ~45" with B HHA, Max A of 2 persons      AM-PAC 6 CLICK MOBILITY  Turning over in bed (including adjusting bedclothes, sheets and blankets)?: 2  Sitting down on and standing up from a chair with arms (e.g., wheelchair, bedside commode, etc.): 2  Moving from lying on back to sitting on the side of the bed?: 2  Moving to and from a bed to a chair (including a wheelchair)?: 1  Need to walk in hospital room?: 1  Climbing 3-5 steps with a railing?: 1  Basic Mobility Total Score: 9       Treatment & Education:  Seated therex to BLEs x10 reps: AA Marching, LAQs, hip abduction/adduction, heel raises/toe raises    Pt educated on PTA role/POC.   Importance of OOB activity with staff assistance.  Importance of sitting up in the chair throughout the day as tolerated, especially for meals when able   Safety during functional t/f and mobility with use of therapy staff  White board updated   Multiple self-care tasks/functional mobility completed- assistance level noted above   All questions/concerns answered " within scope of practice    Pt encouraged to use call button for assistance for all OOB/OOchair activity 2/2 fall risk. Pt verbalized understanding. Tray table and all needs within reach.      Patient left  HOB elevated, BLEs elevated, RUE elevated with tray table and all lines within reach  with all lines intact, call button in reach, bed alarm on, pt's nurse, Whit notified, and male family (?) present..    GOALS:   Multidisciplinary Problems       Physical Therapy Goals          Problem: Physical Therapy    Goal Priority Disciplines Outcome Goal Variances Interventions   Physical Therapy Goal     PT, PT/OT Ongoing, Progressing     Description: Goals to be met by:  2023    Patient will increase functional independence with mobility by performin. Supine to sit with Modified [place]  2. Sit to supine with Modified [place]  3. Sit to stand transfer with Modified [place]  4. Gait  x 300 feet with Modified [place] using Rolling Walker.    Recommend: SNF at time of discharge to home with family.                              Time Tracking:     PT Received On: 23  PT Start Time: 1115     PT Stop Time: 1153  PT Total Time (min): 38 min COTX  Co-treatment with LIN/ OT secondary to need for two skilled therapists for safety of clinicians and patient.      Billable Minutes: Therapeutic Activity 23 and Therapeutic Exercise 15    Treatment Type: Treatment  PT/PTA: PTA     Number of PTA visits since last PT visit: 2023

## 2023-03-26 NOTE — ASSESSMENT & PLAN NOTE
Present since admission to [facility]. MRI noted R knee effusion. ESR, CRP elevated  Lab Results   Component Value Date    URICACID 2.8 03/22/2023   Doubt gout. RF, CCP, SILVIA, acute hep panel normal. Parvovirus pending. RF normal. TSH elevated but free T4 normal, so less likely hypothyroidism.    - started colchicine and ibuprofen on 3/22, started prednisone on 3/24 with minimal improvement thus far-- will DC while infectious workup is pending     - MRI L shoulder: complex effusion with synovitis  - MRI R wrist: osteitis, complex effusions and fluid collections  --> discussed with Ortho, plan IR arthrocenteses on 3/27/23. Cell count and diff, gram stain, aerobic/anaerobic, fungal, AFB cultures and crystal analysis ordered for both joints     - would be odd for her to have septic arthritis of multiple joints at once. Fluid from R knee no growth. ID consult  - Rheumatology not available at this hospital. If fluid inflammatory but not infectious, [place] [facility]  - PT, OT consulted as well

## 2023-03-27 LAB
ANION GAP SERPL CALC-SCNC: 11 MMOL/L (ref 8–16)
APPEARANCE FLD: NORMAL
APPEARANCE FLD: NORMAL
BACTERIA FLD AEROBE CULT: NO GROWTH
BASOPHILS # BLD AUTO: 0.02 K/UL (ref 0–0.2)
BASOPHILS NFR BLD: 0.4 % (ref 0–1.9)
BODY FLD TYPE: NORMAL
BODY FLD TYPE: NORMAL
BUN SERPL-MCNC: 14 MG/DL (ref 6–20)
CALCIUM SERPL-MCNC: 8.7 MG/DL (ref 8.7–10.5)
CHLORIDE SERPL-SCNC: 101 MMOL/L (ref 95–110)
CO2 SERPL-SCNC: 26 MMOL/L (ref 23–29)
COLOR FLD: NORMAL
COLOR FLD: YELLOW
CREAT SERPL-MCNC: 0.6 MG/DL (ref 0.5–1.4)
CRP SERPL-MCNC: 76.2 MG/L (ref 0–8.2)
DIFFERENTIAL METHOD: ABNORMAL
EOSINOPHIL # BLD AUTO: 0 K/UL (ref 0–0.5)
EOSINOPHIL NFR BLD: 0.4 % (ref 0–8)
ERYTHROCYTE [DISTWIDTH] IN BLOOD BY AUTOMATED COUNT: 17.6 % (ref 11.5–14.5)
ERYTHROCYTE [SEDIMENTATION RATE] IN BLOOD BY WESTERGREN METHOD: 120 MM/HR (ref 0–20)
EST. GFR  (NO RACE VARIABLE): >60 ML/MIN/1.73 M^2
GLUCOSE SERPL-MCNC: 179 MG/DL (ref 70–110)
GRAM STN SPEC: NORMAL
GRAM STN SPEC: NORMAL
HCT VFR BLD AUTO: 27.4 % (ref 37–48.5)
HGB BLD-MCNC: 8 G/DL (ref 12–16)
IMM GRANULOCYTES # BLD AUTO: 0.08 K/UL (ref 0–0.04)
IMM GRANULOCYTES NFR BLD AUTO: 1.7 % (ref 0–0.5)
LYMPHOCYTES # BLD AUTO: 1.8 K/UL (ref 1–4.8)
LYMPHOCYTES NFR BLD: 37.1 % (ref 18–48)
LYMPHOCYTES NFR FLD MANUAL: 2 %
LYMPHOCYTES NFR FLD MANUAL: 6 %
MCH RBC QN AUTO: 24 PG (ref 27–31)
MCHC RBC AUTO-ENTMCNC: 29.2 G/DL (ref 32–36)
MCV RBC AUTO: 82 FL (ref 82–98)
MESOTHL CELL NFR FLD MANUAL: 2 %
MONOCYTES # BLD AUTO: 0.6 K/UL (ref 0.3–1)
MONOCYTES NFR BLD: 12.3 % (ref 4–15)
MONOS+MACROS NFR FLD MANUAL: 3 %
MONOS+MACROS NFR FLD MANUAL: 3 %
NEUTROPHILS # BLD AUTO: 2.3 K/UL (ref 1.8–7.7)
NEUTROPHILS NFR BLD: 48.1 % (ref 38–73)
NEUTROPHILS NFR FLD MANUAL: 89 %
NEUTROPHILS NFR FLD MANUAL: 95 %
NRBC BLD-RTO: 0 /100 WBC
PARVOVIRUS B19 ABS IGG & IGM: NORMAL
PARVOVIRUS B19 IGG ANTIBODY: NEGATIVE
PARVOVIRUS B19 IGM ANTIBODY: NEGATIVE
PLATELET # BLD AUTO: 241 K/UL (ref 150–450)
PMV BLD AUTO: 10.3 FL (ref 9.2–12.9)
POCT GLUCOSE: 121 MG/DL (ref 70–110)
POCT GLUCOSE: 145 MG/DL (ref 70–110)
POCT GLUCOSE: 153 MG/DL (ref 70–110)
POCT GLUCOSE: 158 MG/DL (ref 70–110)
POTASSIUM SERPL-SCNC: 4.2 MMOL/L (ref 3.5–5.1)
PROT PATTERN UR ELPH-IMP: NORMAL
RBC # BLD AUTO: 3.33 M/UL (ref 4–5.4)
SODIUM SERPL-SCNC: 138 MMOL/L (ref 136–145)
WBC # BLD AUTO: 4.8 K/UL (ref 3.9–12.7)
WBC # FLD: NORMAL /CU MM
WBC # FLD: NORMAL /CU MM

## 2023-03-27 PROCEDURE — 89051 BODY FLUID CELL COUNT: CPT | Mod: 91 | Performed by: HOSPITALIST

## 2023-03-27 PROCEDURE — 85025 COMPLETE CBC W/AUTO DIFF WBC: CPT | Performed by: HOSPITALIST

## 2023-03-27 PROCEDURE — 87075 CULTR BACTERIA EXCEPT BLOOD: CPT | Performed by: HOSPITALIST

## 2023-03-27 PROCEDURE — 97110 THERAPEUTIC EXERCISES: CPT

## 2023-03-27 PROCEDURE — 89060 EXAM SYNOVIAL FLUID CRYSTALS: CPT | Mod: 91 | Performed by: HOSPITALIST

## 2023-03-27 PROCEDURE — 25000003 PHARM REV CODE 250: Performed by: HOSPITALIST

## 2023-03-27 PROCEDURE — 36415 COLL VENOUS BLD VENIPUNCTURE: CPT | Performed by: HOSPITALIST

## 2023-03-27 PROCEDURE — 63600175 PHARM REV CODE 636 W HCPCS: Mod: TB,JG | Performed by: HOSPITALIST

## 2023-03-27 PROCEDURE — 99223 1ST HOSP IP/OBS HIGH 75: CPT | Mod: ,,, | Performed by: INTERNAL MEDICINE

## 2023-03-27 PROCEDURE — 97110 THERAPEUTIC EXERCISES: CPT | Mod: CQ

## 2023-03-27 PROCEDURE — 86140 C-REACTIVE PROTEIN: CPT | Performed by: HOSPITALIST

## 2023-03-27 PROCEDURE — 87102 FUNGUS ISOLATION CULTURE: CPT | Performed by: HOSPITALIST

## 2023-03-27 PROCEDURE — 25000003 PHARM REV CODE 250: Performed by: RADIOLOGY

## 2023-03-27 PROCEDURE — 87116 MYCOBACTERIA CULTURE: CPT | Performed by: HOSPITALIST

## 2023-03-27 PROCEDURE — 85652 RBC SED RATE AUTOMATED: CPT | Performed by: HOSPITALIST

## 2023-03-27 PROCEDURE — 87205 SMEAR GRAM STAIN: CPT | Performed by: HOSPITALIST

## 2023-03-27 PROCEDURE — 87070 CULTURE OTHR SPECIMN AEROBIC: CPT | Performed by: HOSPITALIST

## 2023-03-27 PROCEDURE — 87206 SMEAR FLUORESCENT/ACID STAI: CPT | Performed by: HOSPITALIST

## 2023-03-27 PROCEDURE — 97530 THERAPEUTIC ACTIVITIES: CPT | Mod: CQ

## 2023-03-27 PROCEDURE — 99223 PR INITIAL HOSPITAL CARE,LEVL III: ICD-10-PCS | Mod: ,,, | Performed by: INTERNAL MEDICINE

## 2023-03-27 PROCEDURE — 11000001 HC ACUTE MED/SURG PRIVATE ROOM

## 2023-03-27 PROCEDURE — 97530 THERAPEUTIC ACTIVITIES: CPT

## 2023-03-27 PROCEDURE — 80048 BASIC METABOLIC PNL TOTAL CA: CPT | Performed by: HOSPITALIST

## 2023-03-27 RX ORDER — CEFTRIAXONE 2 G/50ML
2 INJECTION, SOLUTION INTRAVENOUS
Status: DISCONTINUED | OUTPATIENT
Start: 2023-03-28 | End: 2023-04-07 | Stop reason: HOSPADM

## 2023-03-27 RX ORDER — LIDOCAINE HYDROCHLORIDE 10 MG/ML
INJECTION INFILTRATION; PERINEURAL
Status: COMPLETED | OUTPATIENT
Start: 2023-03-27 | End: 2023-03-27

## 2023-03-27 RX ADMIN — HYDROCODONE BITARTRATE AND ACETAMINOPHEN 1 TABLET: 5; 325 TABLET ORAL at 08:03

## 2023-03-27 RX ADMIN — MELATONIN TAB 3 MG 6 MG: 3 TAB at 08:03

## 2023-03-27 RX ADMIN — HYDROCODONE BITARTRATE AND ACETAMINOPHEN 1 TABLET: 5; 325 TABLET ORAL at 11:03

## 2023-03-27 RX ADMIN — VANCOMYCIN HYDROCHLORIDE 2000 MG: 500 INJECTION, POWDER, LYOPHILIZED, FOR SOLUTION INTRAVENOUS at 02:03

## 2023-03-27 RX ADMIN — INSULIN DETEMIR 5 UNITS: 100 INJECTION, SOLUTION SUBCUTANEOUS at 09:03

## 2023-03-27 RX ADMIN — CEFEPIME HYDROCHLORIDE 2 G: 2 INJECTION, SOLUTION INTRAVENOUS at 05:03

## 2023-03-27 RX ADMIN — LIDOCAINE HYDROCHLORIDE 10 ML: 10 INJECTION, SOLUTION INFILTRATION; PERINEURAL at 03:03

## 2023-03-27 RX ADMIN — CYCLOBENZAPRINE HYDROCHLORIDE 5 MG: 5 TABLET, FILM COATED ORAL at 05:03

## 2023-03-27 RX ADMIN — CYCLOBENZAPRINE HYDROCHLORIDE 5 MG: 5 TABLET, FILM COATED ORAL at 08:03

## 2023-03-27 NOTE — CONSULTS
[facility] Surg  Infectious Disease  Consult Note    Patient Name: Hedy Conway  MRN: [id number]  Admission Date: 3/21/2023  Hospital Length of Stay: 6 days  Attending Physician: Karla Garber MD  Primary Care Provider: John Lantigua PA-C     Isolation Status: No active isolations    Patient information was obtained from patient, past medical records and ER records.      Inpatient consult to Infectious Diseases  Consult performed by: Yuridia Campbell MD  Consult ordered by: Karla Garber MD        Assessment/Plan:     ID  Streptococcal bacteremia  46y/o F pt with hx of T2DM, HTN, large cell diffuse non-Hodgkin lymphoma s/p chemotherapy and stem cell transplant 2011 admitted to OSH on 3/14 for possible sepsis (PNA vs UTI) after presenting for eval of 2 wk hx of migratory polyarthralgias, fevers and weakness, found to have strep pneumoniae bacteremia, pneumonia due to pseudomonas and was ultimately transferred to [facility] 3/22 for arthrocentesis due to concern for septic arthritis.    Pt reports she was treated for pneumonia with abx and steroids approx 1.5 weeks prior to hospital admission. 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 Symptoms progressed and notes she became very weak and confused prompting her to present to OSH. She is not sure if she experienced fevers, sweats or chills. Does not think she had a worsening cough or SOB. She is still complaining of Rt wrist, L shoulder, R knee, L hip pain.    Septic workup remarkable for BCx  (3/13) positive for strep pna, RCx (3/15) positive for pseudomonas, MRI R knee showed meniscal tear and large effusion with synovitis, MRI left shoulder with large complex joint effusion with synovitis, high grade partial thickness tear of supraspinatus and infraspinatus, near complete tear of  intraarticular biceps tendon with tenosynovitis. MRI right wrist with distal ulna/radius/carpal osteitis, complex effusions, complex fluid collections, tenosynovitis. Underwent unsucceful attempt at rt knee arthrocentesis 3/19. Transferred for ortho eval 3/22.  Arthrocentesis 3/23 yielded minimal fluid; not enough to send for cell counts and cx remained neg.     Painful joints and MRI findings concerning for septic joints in the setting of strep pneumoniae bacteremia. Agree with arthrocentesis to r/o septic joints.   --continue empiric vanc  --would switch cefepime to ceftriaxone  --appreciate ortho and IR assistance; will f/u cell count with diff, and cultures    Pneumonia due to Pseudomonas   S/p pip-tazo 3/13-3/16, levaquin 3/19-3/21, cefepime 3/22-3/27  --has completed course for pneumonia  --would switch to ceftriaxone for possible septic arthritis pending cx results.    Thank you for your consult. I will follow-up with patient. Please contact us if you have any additional questions.    Yuridia Campbell MD  Infectious Disease  [facility] - Med Surg    Subjective:     Principal Problem: Polyarthritis    HPI:     Ms. 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     Pt reports she was treated for pneumonia with abx and steroids approx 1.5 weeks prior to hospital admission. 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 Symptoms progressed and notes she became very weak and confused prompting her  "to present to OSH. She is not sure if she experienced fevers, sweats or chills. Does not think she had a worsening cough or SOB. She is still complaining of Rt wrist, L shoulder, R knee, L hip pain.    Septic workup remarkable for BCx  (3/13) positive for strep pna, RCx (3/15) positive for pseudomonas, MRI R knee showed meniscal tear and large effusion with synovitis, MRI left shoulder with large complex joint effusion with synovitis, high grade partial thickness tear of supraspinatus and infraspinatus, near complete tear of intraarticular bicpet tendon with tenosynovitis. MRI right wrist with distal ulna/radius/carpal osteitis, complex effusions, complex fluid collections, tenosynovitis. Underwent unsucceful attempt at rt knee arthrocentesis 3/19. Transferred for ortho eval 3/22.  Arthrocentesis 3/23 yielded minimal fluid; not enough to send for cell counts and cx remained neg.     Id consulted for "high inflammatory markers, multiple joints with synovitis. could she have multiple septic joints? strep pneumo bacteremia on admit and pseudomonas pneumonia."      Past Medical History:   Diagnosis Date    Acute carpal tunnel syndrome of left wrist     Biliary colic     Diabetes mellitus     Encounter for blood transfusion     History of chemotherapy     Incisional hernia     Large cell (diffuse) non-Hodgkin's lymphoma     Stem cells transplant status        Past Surgical History:   Procedure Laterality Date    BREAST BIOPSY Left     lymph node biopsy, benign    BREAST SURGERY      CHOLECYSTECTOMY      COLD KNIFE CONIZATION OF CERVIX N/A 3/8/2021    Procedure: CONE BIOPSY, CERVIX, USING COLD KNIFE;  Surgeon: Evelyn Wheeler MD;  Location: [facility];  Service: OB/GYN;  Laterality: N/A;    COLONOSCOPY N/A 12/21/2022    Procedure: COLONOSCOPY;  Surgeon: Annamarie Jane MD;  Location: [facility];  Service: Endoscopy;  Laterality: N/A;    HERNIA REPAIR      incisional    LUNG BIOPSY      lymphnode biopsy      MEDIPORT " REMOVAL Left 1/30/2020    Procedure: REMOVAL, CATHETER, CENTRAL VENOUS, TUNNELED, WITH PORT;  Surgeon: Luis Bogran-Reyes, MD;  [facility];  Service: General;  Laterality: Left;    PORTACATH PLACEMENT Left     REVISION OF SCAR  6/22/2021    Procedure: REVISION, SCAR;  Surgeon: Otis Grimes MD;  Location: [facility] OR 2ND FLR;  Service: General;;    TUBAL LIGATION      UMBILICAL HERNIA REPAIR         Review of patient's allergies indicates:   Allergen Reactions    Tetanus vaccines and toxoid Rash       Medications:  Medications Prior to Admission   Medication Sig    celecoxib (CELEBREX) 200 MG capsule Take 200 mg by mouth once daily. Pt stated she was recently taken off this medication    albuterol (PROVENTIL/VENTOLIN HFA) 90 mcg/actuation inhaler Ventolin HFA 90 mcg/actuation aerosol inhaler   INHALE 2 PUFFS BY MOUTH 3 TIMES DAILY AS NEEDED.    albuterol-ipratropium (DUO-NEB) 2.5 mg-0.5 mg/3 mL nebulizer solution ipratropium 0.5 mg-albuterol 3 mg (2.5 mg base)/3 mL nebulization soln   USE 1 VIAL IN NEBULIZER EVERY 6 TO 8 HOURS AS NEEDED    bisoprolol (ZEBETA) 5 MG tablet Take 5 mg by mouth once daily.    blood sugar diagnostic Strp USE TO CHECK BLOOD SUGAR TWICE A DAY    budesonide (PULMICORT) 0.5 mg/2 mL nebulizer solution USE 1 VIAL IN NEBULIZER TWICE DAILY (OK TO MIX WITH ALBUTEROL. RINSE MOUTH AFTER USE)    budesonide (PULMICORT) 0.5 mg/2 mL nebulizer solution budesonide 0.5 mg/2 mL suspension for nebulization   USE 1 VIAL IN NEBULIZER TWICE DAILY (OK TO MIX WITH ALBUTEROL. RINSE MOUTH AFTER USE)    conjugated estrogens (PREMARIN) vaginal cream Place 0.5 g vaginally once daily. Place 0.5 vaginally for 4 weeks, then transition to twice weekly use.    cyclobenzaprine (FLEXERIL) 10 MG tablet Take 10 mg by mouth 3 (three) times daily as needed. Take for 5 days    fluticasone (FLONASE) 50 mcg/actuation nasal spray 1 spray by Each Nare route once daily.    HYDROcodone-acetaminophen (NORCO) 5-325 mg per tablet  Take 1 tablet by mouth every 6 (six) hours as needed.    melatonin 10 mg Cap Take 10 mg by mouth nightly as needed.    metFORMIN (GLUCOPHAGE) 500 MG tablet Take 500 mg by mouth once daily.    montelukast (SINGULAIR) 10 mg tablet Take 10 mg by mouth once daily.    naproxen (NAPROSYN) 500 MG tablet Take 500 mg by mouth 2 (two) times daily as needed. Take for 5 days    ondansetron (ZOFRAN) 4 MG tablet Take 4 mg by mouth every 8 (eight) hours as needed.    pantoprazole (PROTONIX) 40 MG tablet Take 1 tablet (40 mg total) by mouth once daily.    zinc sulfate (ZINCATE) 220 (50) mg capsule Take 220 mg by mouth 3 (three) times daily.     Antibiotics (From admission, onward)      Start     Stop Route Frequency Ordered    03/28/23 0900  cefTRIAXone 2 gram/50 mL IVPB 2 g         -- IV Every 24 hours (non-standard times) 03/27/23 1205    03/27/23 0300  vancomycin (VANCOCIN) 2,000 mg in dextrose 5 % (D5W) 500 mL IVPB         -- IV Every 12 hours (non-standard times) 03/26/23 1801    03/26/23 1617  vancomycin - pharmacy to dose  (vancomycin IVPB)        See Hyperspace for full Linked Orders Report.    -- IV pharmacy to manage frequency 03/26/23 1517          Antifungals (From admission, onward)      None          Antivirals (From admission, onward)      None             Immunization History   Administered Date(s) Administered    COVID-19, MRNA, LN-S, PF (Pfizer) (Purple Cap) 03/10/2021, 04/01/2021    Influenza 12/21/2018    Influenza (FLUBLOK) - Quadrivalent - Recombinant - PF *Preferred* (egg allergy) 12/21/2018    Influenza - Intradermal - Trivalent - PF 10/15/2012    Influenza - Quadrivalent - PF *Preferred* (6 months and older) 10/04/2011, 09/18/2014, 10/25/2016, 12/21/2018, 09/22/2020, 10/21/2021    Influenza - Trivalent (ADULT) 10/13/2010    PPD Test 10/25/2016    Pneumococcal Polysaccharide - 23 Valent 01/14/2020       Family History       Problem Relation (Age of Onset)    Breast cancer Maternal Aunt, Maternal Aunt     Cancer Paternal Grandmother    Colon cancer Paternal Grandmother    Diabetes Mother, Father    Heart block Mother    Heart failure Father    Hypertension Mother, Father    Kidney disease Mother    Lung cancer Father          Social History     Socioeconomic History    Marital status:     Years of education: 12th   Tobacco Use    Smoking status: Never    Smokeless tobacco: Never   Substance and Sexual Activity    Alcohol use: Not Currently    Drug use: No    Sexual activity: Yes     Partners: Male     Birth control/protection: See Surgical Hx     Comment: with one partner for 24 years     Review of Systems   Constitutional:  Positive for fatigue. Negative for chills and fever.   HENT:  Negative for sinus pressure, sore throat and trouble swallowing.    Respiratory:  Negative for cough and shortness of breath.    Cardiovascular:  Negative for chest pain and leg swelling.   Gastrointestinal:  Negative for abdominal pain, diarrhea, nausea and vomiting.   Genitourinary:  Negative for difficulty urinating and dysuria.   Musculoskeletal:  Positive for arthralgias, gait problem and joint swelling. Negative for back pain, neck pain and neck stiffness.   Skin:  Negative for color change and wound.   Allergic/Immunologic: Negative for immunocompromised state.   Neurological:  Negative for dizziness and headaches.   Psychiatric/Behavioral:  Positive for confusion.    Objective:     Vital Signs (Most Recent):  Temp: 98.4 °F (36.9 °C) (03/27/23 1147)  Pulse: 98 (03/27/23 1147)  Resp: 18 (03/27/23 1147)  BP: 129/75 (03/27/23 1147)  SpO2: 97 % (03/27/23 1147) Vital Signs (24h Range):  Temp:  [97.8 °F (36.6 °C)-98.4 °F (36.9 °C)] 98.4 °F (36.9 °C)  Pulse:  [] 98  Resp:  [16-25] 18  SpO2:  [95 %-98 %] 97 %  BP: (117-143)/(57-75) 129/75     Weight: 121.5 kg (267 lb 13.7 oz)  Body mass index is 41.95 kg/m².    Estimated Creatinine Clearance: 155 mL/min (based on SCr of 0.6 mg/dL).    Physical Exam  Vitals reviewed.    Constitutional:       Appearance: Normal appearance.   Eyes:      Conjunctiva/sclera: Conjunctivae normal.      Pupils: Pupils are equal, round, and reactive to light.   Cardiovascular:      Rate and Rhythm: Normal rate and regular rhythm.      Heart sounds: No murmur heard.  Pulmonary:      Effort: Pulmonary effort is normal. No respiratory distress.      Breath sounds: Normal breath sounds. No wheezing.   Abdominal:      General: Abdomen is flat.      Palpations: Abdomen is soft.   Musculoskeletal:      Right lower leg: No edema.      Left lower leg: No edema.      Comments: Limited ROM R wrist and knee, Lt shoulder due to pain. No warmth or erythema, but joints do appear somewhat edematous.    Neurological:      Mental Status: She is alert and oriented to person, place, and time.       Significant Labs: Blood Culture:   Recent Labs   Lab 03/13/23  1849 03/16/23  0849 03/22/23  0610   LABBLOO No growth after 5 days.  Gram stain aer bottle: Gram positive cocci in chains resembling Strep  Results called to and read back by: Nurys Gutiérrez RN  03/14/2023  15:50  STREPTOCOCCUS PNEUMONIAE* No growth after 5 days.  No growth after 5 days. No Growth after 4 days.     Wound Culture: No results for input(s): LABAERO in the last 4320 hours.  All pertinent labs within the past 24 hours have been reviewed.    Significant Imaging: I have reviewed all pertinent imaging results/findings within the past 24 hours.

## 2023-03-27 NOTE — SUBJECTIVE & OBJECTIVE
Past Medical History:   Diagnosis Date    Acute carpal tunnel syndrome of left wrist     Biliary colic     Diabetes mellitus     Encounter for blood transfusion     History of chemotherapy     Incisional hernia     Large cell (diffuse) non-Hodgkin's lymphoma     Stem cells transplant status        Past Surgical History:   Procedure Laterality Date    BREAST BIOPSY Left     lymph node biopsy, benign    BREAST SURGERY      CHOLECYSTECTOMY      COLD KNIFE CONIZATION OF CERVIX N/A 3/8/2021    Procedure: CONE BIOPSY, CERVIX, USING COLD KNIFE;  Surgeon: Evelyn Wheeler MD;  Location: [facility] OR;  Service: OB/GYN;  Laterality: N/A;    COLONOSCOPY N/A 12/21/2022    Procedure: COLONOSCOPY;  Surgeon: Annamarie Jane MD;  Location: [facility];  Service: Endoscopy;  Laterality: N/A;    HERNIA REPAIR      incisional    LUNG BIOPSY      lymphnode biopsy      MEDIPORT REMOVAL Left 1/30/2020    Procedure: REMOVAL, CATHETER, CENTRAL VENOUS, TUNNELED, WITH PORT;  Surgeon: Luis Bogran-Reyes, MD;  Location: [facility] OR;  Service: General;  Laterality: Left;    PORTACATH PLACEMENT Left     REVISION OF SCAR  6/22/2021    Procedure: REVISION, SCAR;  Surgeon: Otis Grimes MD;  Location: [address];  Service: General;;    TUBAL LIGATION      UMBILICAL HERNIA REPAIR         Review of patient's allergies indicates:   Allergen Reactions    Tetanus vaccines and toxoid Rash       Medications:  Medications Prior to Admission   Medication Sig    celecoxib (CELEBREX) 200 MG capsule Take 200 mg by mouth once daily. Pt stated she was recently taken off this medication    albuterol (PROVENTIL/VENTOLIN HFA) 90 mcg/actuation inhaler Ventolin HFA 90 mcg/actuation aerosol inhaler   INHALE 2 PUFFS BY MOUTH 3 TIMES DAILY AS NEEDED.    albuterol-ipratropium (DUO-NEB) 2.5 mg-0.5 mg/3 mL nebulizer solution ipratropium 0.5 mg-albuterol 3 mg (2.5 mg base)/3 mL nebulization soln   USE 1 VIAL IN NEBULIZER EVERY 6 TO 8 HOURS AS NEEDED    bisoprolol (ZEBETA) 5  MG tablet Take 5 mg by mouth once daily.    blood sugar diagnostic Strp USE TO CHECK BLOOD SUGAR TWICE A DAY    budesonide (PULMICORT) 0.5 mg/2 mL nebulizer solution USE 1 VIAL IN NEBULIZER TWICE DAILY (OK TO MIX WITH ALBUTEROL. RINSE MOUTH AFTER USE)    budesonide (PULMICORT) 0.5 mg/2 mL nebulizer solution budesonide 0.5 mg/2 mL suspension for nebulization   USE 1 VIAL IN NEBULIZER TWICE DAILY (OK TO MIX WITH ALBUTEROL. RINSE MOUTH AFTER USE)    conjugated estrogens (PREMARIN) vaginal cream Place 0.5 g vaginally once daily. Place 0.5 vaginally for 4 weeks, then transition to twice weekly use.    cyclobenzaprine (FLEXERIL) 10 MG tablet Take 10 mg by mouth 3 (three) times daily as needed. Take for 5 days    fluticasone (FLONASE) 50 mcg/actuation nasal spray 1 spray by Each Nare route once daily.    HYDROcodone-acetaminophen (NORCO) 5-325 mg per tablet Take 1 tablet by mouth every 6 (six) hours as needed.    melatonin 10 mg Cap Take 10 mg by mouth nightly as needed.    metFORMIN (GLUCOPHAGE) 500 MG tablet Take 500 mg by mouth once daily.    montelukast (SINGULAIR) 10 mg tablet Take 10 mg by mouth once daily.    naproxen (NAPROSYN) 500 MG tablet Take 500 mg by mouth 2 (two) times daily as needed. Take for 5 days    ondansetron (ZOFRAN) 4 MG tablet Take 4 mg by mouth every 8 (eight) hours as needed.    pantoprazole (PROTONIX) 40 MG tablet Take 1 tablet (40 mg total) by mouth once daily.    zinc sulfate (ZINCATE) 220 (50) mg capsule Take 220 mg by mouth 3 (three) times daily.     Antibiotics (From admission, onward)      Start     Stop Route Frequency Ordered    03/28/23 0900  cefTRIAXone 2 gram/50 mL IVPB 2 g         -- IV Every 24 hours (non-standard times) 03/27/23 1205    03/27/23 0300  vancomycin (VANCOCIN) 2,000 mg in dextrose 5 % (D5W) 500 mL IVPB         -- IV Every 12 hours (non-standard times) 03/26/23 1801    03/26/23 1617  vancomycin - pharmacy to dose  (vancomycin IVPB)        See Hyperspace for full Linked  Orders Report.    -- IV pharmacy to manage frequency 03/26/23 1517          Antifungals (From admission, onward)      None          Antivirals (From admission, onward)      None             Immunization History   Administered Date(s) Administered    COVID-19, MRNA, LN-S, PF (Pfizer) (Purple Cap) 03/10/2021, 04/01/2021    Influenza 12/21/2018    Influenza (FLUBLOK) - Quadrivalent - Recombinant - PF *Preferred* (egg allergy) 12/21/2018    Influenza - Intradermal - Trivalent - PF 10/15/2012    Influenza - Quadrivalent - PF *Preferred* (6 months and older) 10/04/2011, 09/18/2014, 10/25/2016, 12/21/2018, 09/22/2020, 10/21/2021    Influenza - Trivalent (ADULT) 10/13/2010    PPD Test 10/25/2016    Pneumococcal Polysaccharide - 23 Valent 01/14/2020       Family History       Problem Relation (Age of Onset)    Breast cancer Maternal Aunt, Maternal Aunt    Cancer Paternal Grandmother    Colon cancer Paternal Grandmother    Diabetes Mother, Father    Heart block Mother    Heart failure Father    Hypertension Mother, Father    Kidney disease Mother    Lung cancer Father          Social History     Socioeconomic History    Marital status:     Years of education: 12th   Tobacco Use    Smoking status: Never    Smokeless tobacco: Never   Substance and Sexual Activity    Alcohol use: Not Currently    Drug use: No    Sexual activity: Yes     Partners: Male     Birth control/protection: See Surgical Hx     Comment: with one partner for 24 years     Review of Systems   Constitutional:  Positive for fatigue. Negative for chills and fever.   HENT:  Negative for sinus pressure, sore throat and trouble swallowing.    Respiratory:  Negative for cough and shortness of breath.    Cardiovascular:  Negative for chest pain and leg swelling.   Gastrointestinal:  Negative for abdominal pain, diarrhea, nausea and vomiting.   Genitourinary:  Negative for difficulty urinating and dysuria.   Musculoskeletal:  Positive for arthralgias, gait  problem and joint swelling. Negative for back pain, neck pain and neck stiffness.   Skin:  Negative for color change and wound.   Allergic/Immunologic: Negative for immunocompromised state.   Neurological:  Negative for dizziness and headaches.   Psychiatric/Behavioral:  Positive for confusion.    Objective:     Vital Signs (Most Recent):  Temp: 98.4 °F (36.9 °C) (03/27/23 1147)  Pulse: 98 (03/27/23 1147)  Resp: 18 (03/27/23 1147)  BP: 129/75 (03/27/23 1147)  SpO2: 97 % (03/27/23 1147) Vital Signs (24h Range):  Temp:  [97.8 °F (36.6 °C)-98.4 °F (36.9 °C)] 98.4 °F (36.9 °C)  Pulse:  [] 98  Resp:  [16-25] 18  SpO2:  [95 %-98 %] 97 %  BP: (117-143)/(57-75) 129/75     Weight: 121.5 kg (267 lb 13.7 oz)  Body mass index is 41.95 kg/m².    Estimated Creatinine Clearance: 155 mL/min (based on SCr of 0.6 mg/dL).    Physical Exam  Vitals reviewed.   Constitutional:       Appearance: Normal appearance.   Eyes:      Conjunctiva/sclera: Conjunctivae normal.      Pupils: Pupils are equal, round, and reactive to light.   Cardiovascular:      Rate and Rhythm: Normal rate and regular rhythm.      Heart sounds: No murmur heard.  Pulmonary:      Effort: Pulmonary effort is normal. No respiratory distress.      Breath sounds: Normal breath sounds. No wheezing.   Abdominal:      General: Abdomen is flat.      Palpations: Abdomen is soft.   Musculoskeletal:      Right lower leg: No edema.      Left lower leg: No edema.      Comments: Limited ROM R wrist and knee, Lt shoulder due to pain. No warmth or erythema, but joints do appear somewhat edematous.    Neurological:      Mental Status: She is alert and oriented to person, place, and time.       Significant Labs: Blood Culture:   Recent Labs   Lab 03/13/23  1849 03/16/23  0849 03/22/23  0610   LABBLOO No growth after 5 days.  Gram stain aer bottle: Gram positive cocci in chains resembling Strep  Results called to and read back by: Nurys Gutiérrez RN  03/14/2023  15:50   STREPTOCOCCUS PNEUMONIAE* No growth after 5 days.  No growth after 5 days. No Growth after 4 days.     Wound Culture: No results for input(s): LABAERO in the last 4320 hours.  All pertinent labs within the past 24 hours have been reviewed.    Significant Imaging: I have reviewed all pertinent imaging results/findings within the past 24 hours.

## 2023-03-27 NOTE — ASSESSMENT & PLAN NOTE
Blood cultures 3/13 with Strep bacteremia. Currently being treated with ceftriaxone. This may have seeded her joints causing septic arthritis. Arthrocentesis pending. ID consulted

## 2023-03-27 NOTE — PT/OT/SLP PROGRESS
Physical Therapy Treatment    Patient Name:  Hedy Conway   MRN:  [id number]    Recommendations:     Discharge Recommendations: nursing facility, skilled  Discharge Equipment Recommendations:  (TBD at next level of care)  Barriers to discharge: None    Assessment:     Hedy Conway is a 47 y.o. female admitted with a medical diagnosis of Polyarthritis.  She presents with the following impairments/functional limitations: weakness, impaired endurance, decreased ROM, pain, impaired functional mobility, gait instability, decreased lower extremity function, decreased upper extremity function, impaired self care skills, impaired balance, decreased safety awareness, impaired skin, edema, decreased coordination, orthopedic precautions .    Rehab Prognosis: Good; patient would benefit from acute skilled PT services to address these deficits and reach maximum level of function.    Recent Surgery: * No surgery found *      Plan:     During this hospitalization, patient to be seen daily to address the identified rehab impairments via gait training, therapeutic activities, therapeutic exercises and progress toward the following goals:    Plan of Care Expires:  04/07/23    Subjective     Chief Complaint: pain   Patient/Family Comments/goals: pt is pleasant and agreeable to therapy   Pain/Comfort:  Location - Side 1: Bilateral  Location - Orientation 1: generalized  Pain Addressed 1: Pre-medicate for activity, Reposition, Cessation of Activity, Nurse notified      Objective:     Communicated with nurse prior to session.  Patient found HOB elevated with telemetry, peripheral IV, PureWick, bed alarm upon PT entry to room.     General Precautions: Standard, fall  Orthopedic Precautions: N/A  Braces: N/A  Respiratory Status: Room air     Functional Mobility: pt with slow movements, required extra time and frequent rest breaks 2* to pain .  Bed Mobility:     Scooting: contact guard assistance to scoot anteriorly,  maximal  assistance, and of 2 persons to scoot laterally along EOB .   Supine to Sit: moderate assistance and of 2 persons, HOB elevated, bedside rail   Sit to Supine: maximal assistance and of 2 persons  Transfers:     Sit to Stand: x 2 trials from elevated bed  maximal assistance and of 2 persons with LUE push off and bedside rail and RUE resting on bed rail. Pt able to tolerate static standing 45 s and 1 min with each trial . Pt with c/o B knees pain (R> L) with WB, V/T cues to improve upright posture .   Balance:  Good in sitting, poor in standing.       AM-PAC 6 CLICK MOBILITY  Turning over in bed (including adjusting bedclothes, sheets and blankets)?: 2  Sitting down on and standing up from a chair with arms (e.g., wheelchair, bedside commode, etc.): 2  Moving from lying on back to sitting on the side of the bed?: 2  Moving to and from a bed to a chair (including a wheelchair)?: 1  Need to walk in hospital room?: 1  Climbing 3-5 steps with a railing?: 1  Basic Mobility Total Score: 9       Treatment & Education:   Lower Extremity Exercises.   Patient educated on the purpose of therapeutic exercise.    Patient verbalized acceptance/understanding of instructions, expectations, and limitations(for safety).  Patient performed: 10 reps (each) of B LE There Ex: AP( 10 reps x 2) , LAQ, Hip abd/add  while sitting up on EOB.    Instructed pt to perform BLE AP, QS , GS while in bed throughout the day. Pt verbalized understanding.      Patient required  verbal cues/tactile cues to ensure correct sequence, to maintain proper form, and to allow for self-correction.    Patient left with bed in chair position with BLE/BUE  elevated, heels offloading   all lines intact, call button in reach, bed alarm on, nurse notified.     GOALS:   Multidisciplinary Problems       Physical Therapy Goals          Problem: Physical Therapy    Goal Priority Disciplines Outcome Goal Variances Interventions   Physical Therapy Goal     PT, PT/OT Ongoing,  Progressing     Description: Goals to be met by:  2023    Patient will increase functional independence with mobility by performin. Supine to sit with Modified Carter  2. Sit to supine with Modified Carter  3. Sit to stand transfer with Modified Carter  4. Gait  x 300 feet with Modified Carter using Rolling Walker.    Recommend: SNF at time of discharge to home with family.                              Time Tracking:     PT Received On: 23  PT Start Time: 1212     PT Stop Time: 1245  PT Total Time (min): 33 min     Billable Minutes: Therapeutic Activity 18, Therapeutic Exercise 15, and Total Time 33 min with OT    Treatment Type: Treatment  PT/PTA: PTA     Number of PTA visits since last PT visit: 2     2023

## 2023-03-27 NOTE — DISCHARGE SUMMARY
Principal Problem:Sepsis           HPI:  Hedy Conway is a 47 y.o. female with PMH: Non-Hodgkin's Lymphoma (2011), DM on metformin, HTN, Arthritis, COPD who was sent to the ED by PCP for further eval of abnormal lab work.   Patient reports that last Friday night (03/03) she went to bed in her normal state of health, woke up the next morning (03/04) and could not walk due to L thigh pain. She went to [facility] ED and was dx with a pulled muscle (Sat). She also reports subjective fever with tmax of 101.3F for three days while home. She followed up with her PCP-John Lantigua, on Wed (03/08) and was treated symptomatically and told to return if symptoms worsen. She went to bed Wed evening; felt a pop in her R knee and now with R wrist pain prompting ED visit at [facility] on [place]/09/23. She was again dx with muscle pain.   She was seen by Dr. Mark Anthony gongora, for R knee pain and was told that she possibly has torn cartilage in her knee. She also received a steroid injection in her R wrist. She is awaiting a MRI of her R knee and continues with pain.  Symptoms have progressively worsened, noting that she cannot walk and has been having to wear diapers for the past 5 days. She went back to the ED at [facility] on Friday and was dx with a UTI. She was discharged home with an antibiotic that starts with a C.   Since this time she has had Progressive weakness, fatigue, hallucinations, and night sweats.  Denies abdominal pain, NVD. Denies back pain.   She notes a + productive cough with blood tinged sputum. Denies associated cp or sob.  LBM X 1 week ago>Taking norco for pain at home. Denies rectal bleeding or Black stool  She presents pale, diaphoretic and tachycardic.     ER work up showed concern for UTI and bibasilar PNA. She does also report cough and NAIR on further questioning. Started on Vanc and Zosyn for sepsis with WBC of 21K. Initial LA elevated, resolved with IVF in ED. Cr improved from 1.1 to 0.8.  Interestingly t bili 3.3m alk phos 151. CT showed hepatomegaly. US without CBD dilation and gallbladder surgically removed. Denies abd pain. She is mildly jaundiced on exam. On rounds this afternoon her only complaint is diffuse body and joint pain and requesting pain medication.         Overview/Hospital Course:  Admitted for sepsis secondary to bacteremia and pneumonia.  Currently on IV azithromycin, vancomycin and zosyn.  Hg 6.3 on am labs and patient with weakness/tachycardia.  2 units of pRBC ordered.       Leukocytosis slowly improving.  H/h stable with blood transfusion and currently 8.2.  Deescalate abx; group A strept seen on 1 out of 2 blood cultures.  Sensitive to rocephin.  Will cover for pneumonia with rocephin and azithromycin.  Encouraging patient to participate with therapy.  She has generalized pain.  Gabapentin started.       Patient still with generalized pain.  ESR and CRP ordered.  Leukocytosis continues to improve. Continue IV abx for strep bacteremia secondary to PNA infection.  Encouraging patient to work with therapy.       3/18  Patient still c/o pain and swelling to right wrist and right knee. Left hip pain improved. She was able to get up on the side of the bed. No fever. WBC ct improved.      3/19  Patient is day 6 on rocephin today. She has had some improvement of her right wrist swelling and has been able to at least bear weight on her right knee. However, because of persistent swelling and her total clinical picture, I reached out to ID. The recommendation was to have an aspirate of her joints to determine duration of abx in the event that her bacteremia was secondary to migratory septic arthritis. Aspiration of the right wrist and right knee at bedside - see a/p. She remains afebrile, pain improved, no hypoxia, slight cough.     3/20 Leukocytosis improving. CT wrist with diffuse superficial soft tissue swelling and concern for carpal joint effusion concerning for septic arthritis.   MRI knee pending.  Working with therapy as tolerated.  Continue IV abxs for pneumonia, bacteremia and possible septic arthritis.       3/21 Found to have large joint synovitis.  Plan for transfer for ortho and ID.  Concern for septic arthritis.  Patient with vaginal bleeding (stable h/h and bp).  Obgyn consulted.                Review of Systems   Constitutional:  Positive for fatigue. Negative for activity change, fever and unexpected weight change.   HENT:  Negative for congestion, ear pain, hearing loss, rhinorrhea, sore throat and tinnitus.    Eyes:  Negative for pain, redness and visual disturbance.   Respiratory:  Positive for cough (improving). Negative for shortness of breath (NAIR) and wheezing.    Cardiovascular:  Negative for chest pain, palpitations and leg swelling.   Gastrointestinal:  Negative for abdominal pain, blood in stool, constipation (1 week since last BM), diarrhea, nausea and vomiting.   Genitourinary:  Negative for dysuria, frequency, pelvic pain and urgency.        Dark colored urine but denies dysuria   Musculoskeletal:  Positive for arthralgias and myalgias. Negative for back pain, joint swelling and neck pain.   Skin:  Negative for color change, rash and wound.   Neurological:  Negative for dizziness, tremors, weakness, light-headedness and headaches.   Objective:      Vital Signs (Most Recent):  Temp: 98.5 °F (36.9 °C) (03/21/23 1418)  Pulse: 103 (03/21/23 1418)  Resp: (!) 22 (03/21/23 1418)  BP: 127/64 (03/21/23 1418)  SpO2: 97 % (03/21/23 1418)    Vital Signs (24h Range):  Temp:  [98.1 °F (36.7 °C)-99.4 °F (37.4 °C)] 98.5 °F (36.9 °C)  Pulse:  [] 103  Resp:  [16-24] 22  SpO2:  [95 %-97 %] 97 %  BP: (100-141)/(55-72) 127/64      Weight: 130.8 kg (288 lb 5.8 oz)  Body mass index is 45.16 kg/m².     Physical Exam  Vitals and nursing note reviewed.   Constitutional:       General: She is not in acute distress.     Appearance: She is well-developed. She is obese.   HENT:      Head:  Normocephalic and atraumatic.      Right Ear: External ear normal.      Left Ear: External ear normal.      Nose: Nose normal.      Mouth/Throat:      Pharynx: Posterior oropharyngeal erythema (but no edema.  Pt states this happens to her intermittently) present.   Eyes:      General: No scleral icterus.        Right eye: No discharge.         Left eye: No discharge.      Extraocular Movements: Extraocular movements intact.      Conjunctiva/sclera: Conjunctivae normal.   Neck:      Thyroid: No thyromegaly.   Cardiovascular:      Rate and Rhythm: Normal rate and regular rhythm.      Heart sounds: No murmur heard.  Pulmonary:      Effort: Pulmonary effort is normal. No respiratory distress.      Breath sounds: No wheezing or rhonchi (soft rhonchi b/l bases).   Abdominal:      General: Bowel sounds are normal. There is no distension.      Palpations: Abdomen is soft.      Tenderness: There is no abdominal tenderness.   Musculoskeletal:         General: Swelling (right wrist, right knee) present.      Right lower leg: No edema.      Left lower leg: No edema.   Skin:     General: Skin is warm and dry.      Coloration: Skin is not jaundiced.   Neurological:      Mental Status: She is alert and oriented to person, place, and time.      Comments: Neg kernigs/brudzinskis. But cannot bend right knee   Psychiatric:         Behavior: Behavior normal.         Thought Content: Thought content normal.            Significant Labs: All pertinent labs within the past 24 hours have been reviewed.  Blood Culture:   No results for input(s): LABBLOO in the last 48 hours.     CBC:        Recent Labs   Lab 03/21/23  0549 03/21/23  1210   WBC 5.91 7.25   HGB 7.8* 8.6*   HCT 26.2* 28.5*    256                  Significant Imaging: I have reviewed all pertinent imaging results/findings within the past 24 hours.  Abdominal u/s:  Surgically removed gallbladder with no biliary dilatation.  Enlarged heterogeneous liver.  Splenomegaly      CT AP  1. Bibasilar patchy alveolar infiltrates could represent pneumonia.  Minimal consolidation in the lingula and left lower lobe also.  Recommend follow-up.  2. Hepatosplenomegaly.  3. Nonobstructing nephrolithiasis of the left kidney.  4. Few stable mildly enlarged retroperitoneal lymph nodes.     CXR  Suboptimal inspiration.  Cardiac silhouette is within normal limits.  Bibasilar patchy alveolar infiltrates could represent pneumonia.  This is minimally improved from prior study.  No effusion or pneumothorax.     No acute osseous abnormality.  Remote rib fractures on the left.     CT:  Diffuse superficial soft tissue swelling compatible cellulitis.  Accompany findings concerning for accompanying deep soft tissue infection.  Carpal joint effusion for which septic arthritis is not excluded.  Flexor pollicis longus tenosynovitis, for septic tenosynovitis is not excluded.  Apparent small fluid collection within the superficial soft tissues over the dorsal ulnar aspect of the wrist for which abscess is not excluded.     MRI right knee 3/20  Radial tear of medial meniscus, with peripheral extrusion and reactive joint line edema.     Tricompartmental cartilage loss, difficult to evaluate given motion artifact.     Bone marrow signal abnormalities as described.  Red marrow reconversion is favored given history of anemia, COPD, and obesity.  No erosions.     Large joint effusion with synovitis.  Septic arthritis cannot be excluded.  Consider arthrocentesis.     Extensive soft tissue edema about the knee, which could be inflammatory or infectious.  No organized fluid collections.                    Assessment/Plan:      * Sepsis  This patient does have evidence of infective focus  My overall impression is sepsis.  Source: Respiratory and Bacteremia septic joint  Antibiotics given-             Antibiotics (72h ago, onward)     Start     Stop Route Frequency Ordered     03/19/23 0928   levoFLOXacin tablet 750 mg          -- Oral Daily 03/19/23 0908     03/16/23 1200   cefTRIAXone (ROCEPHIN) 1 g/50 mL D5W IVPB         -- IV Every 24 hours (non-standard times) 03/16/23 1138          Latest lactate reviewed-  No results for input(s): LACTATE in the last 72 hours.  Organ dysfunction indicated by Thrombocytopenia      Fluid challenge Not needed - patient is not hypotensive       Post- resuscitation assessment No - Post resuscitation assessment not needed         Will Not start Pressors- Levophed for MAP of 65  Source control achieved by: Vanc, Zosyn and azithromycin   IVF resuscitation already occurred in ED     Resolved and descalate abx 3/16              Postmenopausal bleeding  Pelvic U/S abnormal thickening of the endometrium.  Consider hyperplasia or neoplasia  Hyperechoic nodule within the endometrial cavity near the internal cervical os which with represents a polyp or submucosal fibroid.     OBGYN guiding care  H/h stable         COPD (chronic obstructive pulmonary disease)  xopenex prn  Scheduled breo         Septic arthritis  Diffuse myalgias and arthralgias  Elevated CPK at presentation  Gabapentin  MRI knee: large joint infusion  3/20 CT wrist with cellulitis and effusion in carpal jt concerning for septic arthritis (Elevated CRP/ESR).       Transfer to [facility] for ortho and ID services.          Type 2 diabetes mellitus without complication, without long-term current use of insulin  Patient's FSGs are uncontrolled due to hyperglycemia on current medication regimen.  Last A1c reviewed-         Lab Results   Component Value Date     HGBA1C 6.4 (H) 03/15/2023      Most recent fingerstick glucose reviewed-          Recent Labs   Lab 03/20/23  1614 03/20/23  2022 03/21/23  0715 03/21/23  1419   POCTGLUCOSE 153* 206* 169* 159*      Current correctional scale  Medium  Maintain anti-hyperglycemic dose as follows-             Antihyperglycemics (From admission, onward)     Start     Stop Route Frequency Ordered     03/18/23 2100    insulin detemir U-100 pen 10 Units         -- SubQ Nightly 03/18/23 1247     03/15/23 2054   insulin aspart U-100 pen 0-15 Units         -- SubQ Before meals & nightly PRN 03/15/23 1955          Hold Oral hypoglycemics while patient is in the hospital.     Continue detemir 10     Abnormal urinalysis  Received broad spectrum abx but low suspicion for UTI  Urine culture negative         Hyperbilirubinemia  Unclear etiology  Imaging is unrevelaing  Repeat lab shows down trending         Microcytic anemia  H/H trended down  No evidence of acute bleeding but stool for occult ordered (currently with constipation)  Daily labs  Iron level low but will defer IV iron in acute infection.  Ferrous sulfate ordered.      This is a chronic problem for pt and she receives IV injections (unsure name)     H/h stable         Streptococcal bacteremia  Suspect related to PNA - though sputum culture with pseudomonas  Sensitive to rocephin  WBC ct improved. ESR and CRP improved.  Right knee and wrist still swollen painful which draws concern for possible septic arthritis. Check CT because of no MRI today. Will consider MRI on Monday. She is able to bear weight now.  No meningeal signs.     3/19  I tapped her wrist without aspirate, and Dr. Gloria tapped her knee. Neither was productive of a large amount of fluid. This makes septic joint very unlikely. She did have an injection of her right wrist which should explain her localized swelling/effusion seen yesterday. Today, her swelling is improved. With above information, and recs from ID, she needs 7 days of Rocephin from neg culture, will order repeat esr/crp for tomorrow. As we do not have ortho here, and Dr. Hopson saw patient, may consider d/c home and f/u with him for joint aspiration and continued oral abx until negative eval with ortho. She has clinically improved. MRI of right knee ordered for tomorrow to gain more information as to why she cannot bear weight.           Pneumonia of  both lungs due to infectious organism  Noted on imaging  No hypoxia  Nebs scheduled  Deescalate abx to IV rocephin and IV azithromycin (completed 4 days)     3/20  Sputum cx with pseudomonas - levaquin day 2     3/21 levaquin day 3         Thrombocytopenia  Has had in the past as well but currently in relation to sepsis  Daily labs        Large cell (diffuse) non-Hodgkin's lymphoma  Diagnosed In 2011                VTE Risk Mitigation (From admission, onward)           Ordered       IP VTE HIGH RISK PATIENT  Once         03/14/23 1046       Place sequential compression device  Until discontinued         03/14/23 1046                    Discharge Planning   JESSE:      Code Status: Full Code   Is the patient medically ready for discharge?:     Reason for patient still in hospital (select all that apply): Patient trending condition Pending transfer.    Discharge Plan A: Skilled Nursing Facility   Discharge Delays: None known at this time

## 2023-03-27 NOTE — ASSESSMENT & PLAN NOTE
She voiced cough and subjective fever on admission. CXR showed basal consolidation, concerning for pneumonia. Sputum culture with Pseudomonas. Completed treatment. This is resolved.

## 2023-03-27 NOTE — PLAN OF CARE
Problem: Diabetes Comorbidity  Goal: Blood Glucose Level Within Targeted Range  Outcome: Ongoing, Progressing     Problem: Adjustment to Illness (Sepsis/Septic Shock)  Goal: Optimal Coping  Outcome: Ongoing, Progressing     Problem: Bleeding (Sepsis/Septic Shock)  Goal: Absence of Bleeding  Outcome: Ongoing, Progressing       Patient remained free from falls/injury.  No acute changes in status.  Patient remained NPO since midnight.  CHG bath.  Bed locked in lowest position.  Side rails up x2.  Call bell within reach.  Purposeful rounding maintained throughout shift.

## 2023-03-27 NOTE — PLAN OF CARE
Multidisciplinary teams rounds with patient at bedside. Reviewed plan for today and goals of care. IR procedure today and ID consulted. PT/OT continue to follow. Recommendations for IPR. Referrals in care port, continuing to follow. [place] to follow for dc needs.    03/27/23 1100   Discharge Reassessment   Assessment Type Discharge Planning Reassessment   Did the patient's condition or plan change since previous assessment? No   Discharge Plan discussed with: Patient   Communicated JESSE with patient/caregiver Date not available/Unable to determine   Discharge Plan A Rehab   Discharge Plan B Home with family;Home Health   DME Needed Upon Discharge  none   Discharge Barriers Identified None   Why the patient remains in the hospital Requires continued medical care   Post-Acute Status   Post-Acute Authorization Placement   Post-Acute Placement Status Pending medical clearance/testing   Coverage [employer]   Discharge Delays None known at this time

## 2023-03-27 NOTE — PROGRESS NOTES
[facility] Hospital Medicine  Progress Note    Patient Name: Hedy Conway  MRN: [id number]  Patient Class: IP- Inpatient   Admission Date: 3/21/2023  Length of Stay: 6 days  Attending Physician: Karla Garebr MD  Primary Care Provider: John Lantigua PA-C        Subjective:     Principal Problem:Polyarthritis        HPI:  47-year-old  female with medical history significant for type 2 diabetes mellitus, hypertension, large cell diffuse non-Hodgkin lymphoma status post chemotherapy and stem cell transplant 2011 was transferred from outside hospital with suspicion of septic arthritis.  Of note she voiced migratory pain in her left-right knee-both shoulders that started 2 weeks ago, associated with generalized weakness, cleansing inability to move right lower extremity and only have movement without gravity on the left lower extremity without associated trauma, she voiced being on 3 different antibiotics at the outside hospital for both pneumonia and urinary tract infection, and this could explain for been afebrile although she voiced subjective fever at the onset of her symptoms she denied cough, shortness of breath, orthopnea, paroxysmal nocturnal dyspnea or pedal swelling.  She denied prior diarrhea although she voiced upper respiratory tract symptoms,treatment for pneumonia prior to onset of these symptoms.  She denied any urinary symptoms at this time of dysuria, frequency, urgency, straining at micturition or hematuria.  No recent travel, no sick contacts, no tick bite.  She had been sent here for possible knee aspiration.      Overview/Hospital Course:  Ms Hedy Conway who was transferred to [facility] from [facility]. There she was admitted for multiple joint pains. MRI R knee showed meniscal tear and large effusion with synovitis. There was concern for septic arthritis, aspiration was attempted, but no fluid retrieved. She also had Strep pneumoniae bacteremia (3/13) and  Pseudomonas pneumonia (3/15) and is currently being treated with cefepime. She is also receiving vancomycin with concerns for septic joint. She did require 1U RBC transfusion and has had vaginal bleeding. Pelvic US 3/20 showed thickened endometrium, and she will need outpatient GYN follow up. Her iron panel indicates mixed iron deficiency and anemia of chronic disease. She saw Neurology and Orthopaedics. Orthopaedics doubts multiple septic joints simultaneously and suspects Rheumatologic condition. S/p R knee arthrocentesis on 3/23 with culture no growth. Sed rate, CRP remain elevated. Uric acid normal, RF/CCP/SILVIA/hepatitis panel normal. MRI left shoulder with large complex joint effusion with synovitis, high grade partial thickness tear of supraspinatus and infraspinatus, near complete tear of intraarticular bicpet tendon with tenosynovitis. MRI right wrist with distal ulna/radius/carpal osteitis, complex effusions, complex fluid collections, tenosynovitis. Plan arthrocentesis of both L shoulder and R wrist by IR.       Interval History: Joint pains still present. Plan for IR aspiration today     Review of Systems   Constitutional:  Negative for chills and fever.   Respiratory:  Negative for shortness of breath.    Cardiovascular:  Negative for chest pain.   Gastrointestinal:  Negative for abdominal pain.   Genitourinary:  Negative for difficulty urinating.   Musculoskeletal:  Positive for arthralgias. Negative for myalgias.   Skin:  Negative for rash.   Neurological:  Positive for weakness. Negative for numbness.   Psychiatric/Behavioral:  Negative for confusion.    Objective:     Vital Signs (Most Recent):  Temp: 98.4 °F (36.9 °C) (03/27/23 1147)  Pulse: 98 (03/27/23 1147)  Resp: 18 (03/27/23 1147)  BP: 129/75 (03/27/23 1147)  SpO2: 97 % (03/27/23 1147)   Vital Signs (24h Range):  Temp:  [97.8 °F (36.6 °C)-98.4 °F (36.9 °C)] 98.4 °F (36.9 °C)  Pulse:  [] 98  Resp:  [16-25] 18  SpO2:  [95 %-98 %] 97 %  BP:  (117-143)/(57-75) 129/75     Weight: 121.5 kg (267 lb 13.7 oz)  Body mass index is 41.95 kg/m².    Intake/Output Summary (Last 24 hours) at 3/27/2023 1205  Last data filed at 3/27/2023 0556  Gross per 24 hour   Intake 1320 ml   Output 2200 ml   Net -880 ml        Physical Exam  Vitals and nursing note reviewed.   Constitutional:       General: She is not in acute distress.     Appearance: She is obese. She is not ill-appearing.   HENT:      Head: Normocephalic and atraumatic.      Nose: Nose normal.      Mouth/Throat:      Mouth: Mucous membranes are moist.   Cardiovascular:      Rate and Rhythm: Regular rhythm. Tachycardia present.   Pulmonary:      Effort: Pulmonary effort is normal.      Breath sounds: Normal breath sounds.      Comments: Room air  Abdominal:      General: Bowel sounds are normal.      Palpations: Abdomen is soft.   Musculoskeletal:      Right lower leg: Edema present.      Left lower leg: Edema present.   Skin:     General: Skin is warm and dry.      Findings: No rash.   Neurological:      Mental Status: She is alert and oriented to person, place, and time.       Significant Labs: All pertinent labs within the past 24 hours have been reviewed.    Significant Imaging: I have reviewed all pertinent imaging results/findings within the past 24 hours.      Assessment/Plan:      * Polyarthritis  Present since admission to [facility]. MRI noted R knee effusion. ESR, CRP elevated  Lab Results   Component Value Date    URICACID 2.8 03/22/2023   Doubt gout. RF, CCP, SILVIA, acute hep panel normal. Parvovirus pending. RF normal. TSH elevated but free T4 normal, so less likely hypothyroidism.    - started colchicine and ibuprofen on 3/22, started prednisone on 3/24 with minimal improvement thus far-- will DC while infectious workup is pending     - MRI L shoulder: complex effusion with synovitis  - MRI R wrist: osteitis, complex effusions and fluid collections  --> discussed with Ortho, plan IR arthrocenteses on  3/27/23. Cell count and diff, gram stain, aerobic/anaerobic, fungal, AFB cultures and crystal analysis ordered for both joints. ID consulted     - Rheumatology not available at this hospital. If fluid inflammatory but not infectious, will discuss with Rheum at [facility]  - PT, OT consulted as well       Subclinical hypothyroidism  Lab Results   Component Value Date    TSH 14.200 (H) 03/22/2023     FT4 within normal limits  Repeat as outpatient when acute illness resolved      Anemia of chronic disease  Anemia of chronic disease present (ferritin elevated) but did also have vaginal bleeding. TSAT 14- some degree iron deficiency    Weakness of both lower extremities  Presented with bilateral lower extremity weakness with no significant sensory deficit. She denied preceding diarrhea/GI symptoms, although she had upper respiratory tract symptoms  - Neurology consulted  - seems as though this is secondary to arthritis as being worked up above   - PT, OT consulted       Postmenopausal bleeding  Noted at OSH. TVUS with thickened endometrial stripe  - needs outpatient GYN follow up       Type 2 diabetes mellitus without complication, without long-term current use of insulin  Patient's FSGs are controlled on current medication regimen.  Last A1c reviewed-   Lab Results   Component Value Date    HGBA1C 6.4 (H) 03/22/2023     Most recent fingerstick glucose reviewed-   Recent Labs   Lab 03/26/23  1657 03/26/23 2014 03/27/23  0731 03/27/23  1148   POCTGLUCOSE 220* 267* 145* 121*     Current correctional scale  Medium  Maintain anti-hyperglycemic dose as follows-   Antihyperglycemics (From admission, onward)    Start     Stop Route Frequency Ordered    03/22/23 0900  insulin detemir U-100 pen 5 Units         -- SubQ Daily 03/22/23 0541    03/22/23 0638  insulin aspart U-100 pen 0-5 Units         -- SubQ Before meals & nightly PRN 03/22/23 0541        Hold Oral hypoglycemics while patient is in the hospital.    Streptococcal  bacteremia  Blood cultures 3/13 with Strep bacteremia. Currently being treated with ceftriaxone. This may have seeded her joints causing septic arthritis. Arthrocentesis pending. ID consulted     Pneumonia of both lungs due to infectious organism  She voiced cough and subjective fever on admission. CXR showed basal consolidation, concerning for pneumonia. Sputum culture with Pseudomonas. Completed treatment. This is resolved.     Stem cells transplant status  Not currently on immunosuppresants  Follow up with oncology      Large cell (diffuse) non-Hodgkin's lymphoma  In remission, follow up with oncology  No new enlarged lymph nodes noted         VTE Risk Mitigation (From admission, onward)         Ordered     Reason for No Pharmacological VTE Prophylaxis  Once        Question:  Reasons:  Answer:  Active Bleeding    03/21/23 2230     IP VTE HIGH RISK PATIENT  Once         03/21/23 2230     Place sequential compression device  Until discontinued         03/21/23 2230                Discharge Planning   JESSE: 3/27/2023     Code Status: Full Code   Is the patient medically ready for discharge?:     Reason for patient still in hospital (select all that apply): Patient trending condition  Discharge Plan A: Rehab   Discharge Delays: None known at this time              Karla Garber MD  Department of Hospital Medicine   [facility] [place] [facility]

## 2023-03-27 NOTE — HPI
"    Ms. Conway is a 48y/o F pt with hx of T2DM, HTN, large cell diffuse non-Hodgkin lymphoma s/p chemotherapy and stem cell transplant 2011 admitted to OSH on 3/14 for possible sepsis (PNA vs UTI) after presenting for eval of 2 wk hx of migratory polyarthralgias and weakness, found to have  strep pneumoniae bacteremia, pneumonia due to pseudomonas and was ultimately transferred to [facility] 3/22 for arthrocentesis.     Pt reports she was treated for pneumonia with abx and steroids approx 1.5 weeks prior to hospital admission. 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 Symptoms progressed and notes she became very weak and confused prompting her to present to OSH. She is not sure if she experienced fevers, sweats or chills. Does not think she had a worsening cough or SOB. She is still complaining of Rt wrist, L shoulder, R knee, L hip pain.    Septic workup remarkable for BCx  (3/13) positive for strep pna, RCx (3/15) positive for pseudomonas, MRI R knee showed meniscal tear and large effusion with synovitis, MRI left shoulder with large complex joint effusion with synovitis, high grade partial thickness tear of supraspinatus and infraspinatus, near complete tear of intraarticular bicpet tendon with tenosynovitis. MRI right wrist with distal ulna/radius/carpal osteitis, complex effusions, complex fluid collections, tenosynovitis. Underwent unsucceful attempt at rt knee arthrocentesis 3/19. Transferred for ortho eval 3/22.  Arthrocentesis 3/23 yielded minimal fluid; not enough to send for cell counts and cx remained neg.     Id consulted for "high inflammatory markers, multiple joints with synovitis. could she have multiple septic joints? strep pneumo bacteremia on admit and pseudomonas pneumonia."  "

## 2023-03-27 NOTE — BRIEF OP NOTE
Radiology Post-Procedure Note    Pre Op Diagnosis: Suspected septic polyarthralgia  Post Op Diagnosis: Same    Procedure: 1. Fluoroscopic-guided Lt glenohumeral arthrocentesis 2. US-guided Rt radiocarpal arthrocentesis    Procedure performed by: Neil Cornelius MD    Written Informed Consent Obtained: Yes  Specimen Removed: YES, 10-cc of cloudy synovial fluid from Lt shoulder and 1-cc of serosanguinous fluid from Rt wrist  Estimated Blood Loss: Minimal    Findings:   Successful fluoroscopic-guided Lt glenohumeral arthrocentesis and US-guided Rt radiocarpal arthrocentesis with local anesthetic only. Patient tolerated the procedure well. No immediate post-procedural complications noted.     No post-op activity, diet or medication restrictions.    Thank you for considering IR for the care of your patient.     Neil Cornelius MD  Interventional Radiology

## 2023-03-27 NOTE — ASSESSMENT & PLAN NOTE
Patient's FSGs are controlled on current medication regimen.  Last A1c reviewed-   Lab Results   Component Value Date    HGBA1C 6.4 (H) 03/22/2023     Most recent fingerstick glucose reviewed-   Recent Labs   Lab 03/26/23  1657 03/26/23 2014 03/27/23  0731 03/27/23  1148   POCTGLUCOSE 220* 267* 145* 121*     Current correctional scale  Medium  Maintain anti-hyperglycemic dose as follows-   Antihyperglycemics (From admission, onward)    Start     Stop Route Frequency Ordered    03/22/23 0900  insulin detemir U-100 pen 5 Units         -- SubQ Daily 03/22/23 0541    03/22/23 0638  insulin aspart U-100 pen 0-5 Units         -- SubQ Before meals & nightly PRN 03/22/23 0541        Hold Oral hypoglycemics while patient is in the hospital.

## 2023-03-27 NOTE — SUBJECTIVE & OBJECTIVE
Interval History: Joint pains still present. Plan for IR aspiration today     Review of Systems   Constitutional:  Negative for chills and fever.   Respiratory:  Negative for shortness of breath.    Cardiovascular:  Negative for chest pain.   Gastrointestinal:  Negative for abdominal pain.   Genitourinary:  Negative for difficulty urinating.   Musculoskeletal:  Positive for arthralgias. Negative for myalgias.   Skin:  Negative for rash.   Neurological:  Positive for weakness. Negative for numbness.   Psychiatric/Behavioral:  Negative for confusion.    Objective:     Vital Signs (Most Recent):  Temp: 98.4 °F (36.9 °C) (03/27/23 1147)  Pulse: 98 (03/27/23 1147)  Resp: 18 (03/27/23 1147)  BP: 129/75 (03/27/23 1147)  SpO2: 97 % (03/27/23 1147)   Vital Signs (24h Range):  Temp:  [97.8 °F (36.6 °C)-98.4 °F (36.9 °C)] 98.4 °F (36.9 °C)  Pulse:  [] 98  Resp:  [16-25] 18  SpO2:  [95 %-98 %] 97 %  BP: (117-143)/(57-75) 129/75     Weight: 121.5 kg (267 lb 13.7 oz)  Body mass index is 41.95 kg/m².    Intake/Output Summary (Last 24 hours) at 3/27/2023 1205  Last data filed at 3/27/2023 0556  Gross per 24 hour   Intake 1320 ml   Output 2200 ml   Net -880 ml        Physical Exam  Vitals and nursing note reviewed.   Constitutional:       General: She is not in acute distress.     Appearance: She is obese. She is not ill-appearing.   HENT:      Head: Normocephalic and atraumatic.      Nose: Nose normal.      Mouth/Throat:      Mouth: Mucous membranes are moist.   Cardiovascular:      Rate and Rhythm: Regular rhythm. Tachycardia present.   Pulmonary:      Effort: Pulmonary effort is normal.      Breath sounds: Normal breath sounds.      Comments: Room air  Abdominal:      General: Bowel sounds are normal.      Palpations: Abdomen is soft.   Musculoskeletal:      Right lower leg: Edema present.      Left lower leg: Edema present.   Skin:     General: Skin is warm and dry.      Findings: No rash.   Neurological:      Mental  Status: She is alert and oriented to person, place, and time.       Significant Labs: All pertinent labs within the past 24 hours have been reviewed.    Significant Imaging: I have reviewed all pertinent imaging results/findings within the past 24 hours.

## 2023-03-27 NOTE — PLAN OF CARE
Problem: Physical Therapy  Goal: Physical Therapy Goal  Description: Goals to be met by:  2023    Patient will increase functional independence with mobility by performin. Supine to sit with Modified Lewis  2. Sit to supine with Modified Lewis  3. Sit to stand transfer with Modified Lewis  4. Gait  x 300 feet with Modified Lewis using Rolling Walker.    Recommend: SNF at time of discharge to home with family.         Outcome: Ongoing, Progressing

## 2023-03-27 NOTE — ASSESSMENT & PLAN NOTE
Present since admission to [facility]. MRI noted R knee effusion. ESR, CRP elevated  Lab Results   Component Value Date    URICACID 2.8 03/22/2023   Doubt gout. RF, CCP, SILVIA, acute hep panel normal. Parvovirus pending. RF normal. TSH elevated but free T4 normal, so less likely hypothyroidism.    - started colchicine and ibuprofen on 3/22, started prednisone on 3/24 with minimal improvement thus far-- will DC while infectious workup is pending     - MRI L shoulder: complex effusion with synovitis  - MRI R wrist: osteitis, complex effusions and fluid collections  --> discussed with Ortho, plan IR arthrocenteses on 3/27/23. Cell count and diff, gram stain, aerobic/anaerobic, fungal, AFB cultures and crystal analysis ordered for both joints. ID consulted     - Rheumatology not available at this hospital. If fluid inflammatory but not infectious, will discuss with Rheum at [facility]  - PT, OT consulted as well

## 2023-03-27 NOTE — PT/OT/SLP PROGRESS
Occupational Therapy   Treatment    Name: Hedy Conway  MRN: [id number]  Admitting Diagnosis:  Polyarthritis       Recommendations:     Discharge Recommendations: rehabilitation facility  Discharge Equipment Recommendations:   (TBD at next level.)  Barriers to discharge:   (Pt significantly limited by pain and weaknessm requiring x 2 person assist for standing and is unable to ambualte at this time; pt will benefit from multidisciplinary approach in an IPR setting for returning to PLOF as she was (I) and working PTA.)    Assessment:     Hedy Conway is a 47 y.o. female with a medical diagnosis of Polyarthritis.  Performance deficits affecting function are weakness, impaired endurance, impaired self care skills, impaired functional mobility, gait instability, impaired balance, decreased upper extremity function, decreased lower extremity function, decreased coordination, decreased safety awareness, pain, decreased ROM, impaired fine motor, edema.     Pt very pleasant and willing to participate in tx session this date; pt functionally limited by pain but was able to perform supine>sit w/ mod A x 2 persons to stand w/ max A x 2 persons, tolerating ~40-50 sec of static standing for x 2 trials using bed rails for UE support. Pt tolerated BUE AROM fairly well w/ assist as needed for completing full range; crepitus still noted to R wrist which is significantly limited. Pt tolerated tx session well and will continue to benefit from skilled acute OT services to maximize functional capacity for safe performance w/ ADLs and functional mobility.      PTA, pt was (I) w/ all ADLs and functional mobility; pt will benefit from an aggressive multidisciplinary approach in an IPR setting for returning to PLOF. Despite pain and functional limitations, pt is motivated and determined.     Rehab Prognosis:  Good; patient would benefit from acute skilled OT services to address these deficits and reach maximum level of function.    "    Plan:     Patient to be seen 5 x/week to address the above listed problems via self-care/home management, therapeutic activities, therapeutic exercises  Plan of Care Expires: 04/07/23  Plan of Care Reviewed with: patient    Subjective     Chief Complaint: Pain in B knees when standing  Patient/Family Comments/goals:   Pain/Comfort:  Pain Rating 1:  ("It's not too bad.")  Location 1:  (L shoulder; R wrist; B knees)  Pain Addressed 1: Reposition, Cessation of Activity, Nurse notified, Pre-medicate for activity    Objective:     Communicated with: Nurse prior to session.  Patient found HOB elevated with telemetry, peripheral IV, PureWick, bed alarm upon OT entry to room.    General Precautions: Standard, fall    Orthopedic Precautions:N/A  Braces: N/A  Respiratory Status: Room air     Occupational Performance:     Bed Mobility:    Patient completed Scooting hips to EOB with contact guard assistance; max A x 2 persons for laterally scooting to HOB  Patient completed Supine to Sit with moderate assistance and 2 persons  Patient completed Sit to Supine with maximal assistance and 2 persons     Functional Mobility/Transfers:  Patient completed Sit <> Stand Transfer with maximal assistance and of 2 persons  with  use of bed rails   Pt was able to bear weight through R elbow 2* pain in wrist; pt was able to stand x 2 trials for 40-50 sec each with cueing for shifting hips anteriorly and adjusting flexed posture     Activities of Daily Living:  Upper Body Dressing: minimum assistance for donning gown over back while sitting at EOB, unsupported     AMPAC 6 Click ADL: 15    Treatment & Education:  -Pt performed ADLs and functional mobility as noted above.   -Pt educated on importance of OOB activity to prevent further debility and weakness due to excessive bed rest.   -Pt was able to perform BUE AROM for 1 set x 15 reps while sitting EOB, unsupported; pt required some active assist and support for L shoulder:              " -elbow flexion/ext               -shoulder flexion    -shoulder abd/add (w/ elbow flexed)              -wrist flex/ext (crepitus noted to R wrist)              -forward punches   -Questions and concerns addressed within scope.     Patient left HOB elevated with all lines intact, call button in reach, bed alarm on, and BLE pressure relief boots on.     GOALS:   Multidisciplinary Problems       Occupational Therapy Goals          Problem: Occupational Therapy    Goal Priority Disciplines Outcome Interventions   Occupational Therapy Goal     OT, PT/OT Ongoing, Progressing    Description: Goals to be met by 04/07/23:  Patient will demonstrate recovery of:   Set up self feeding fullest upright sitting per standard aspiration precautions.  Mod a UB dress seated OOB  Max assist LB dress w/o recoil from pain during palpations/positioning assist.   Min a grooming seated.  Mod a toilet t/f to BSC  Mod a toilet hygiene OOB.   SBA seated UB AROM ex all joints/all planes 1x10 to bolster recovery of core and UB strength, pain free active range of motion, and normalization of ADL related daily routines.                         Time Tracking:     OT Date of Treatment: 03/27/23  OT Start Time: 1212  OT Stop Time: 1244  OT Total Time (min): 32 min    Billable Minutes:Therapeutic Activity 22  Therapeutic Exercise 10  Total Time 32 (co-tx w/ PT)     OT/CHANDLER: OT          3/27/2023

## 2023-03-27 NOTE — PROGRESS NOTES
Vancomycin consult follow-up:    Patient reviewed, renal function stable, no new levels, continue current therapy; Next levels due: trough due 3/28/2023 at 0200

## 2023-03-27 NOTE — CONSULTS
Inpatient Radiology Pre-procedure Note    History of Present Illness:  Hedy Conway is a 47 y.o. female with pertinent PMHx of NHL s/p chemotherapy and SCT 2011 and recent dx and tx of Strep Pneumoniae bacteremia 3/13/23 and Pseudomonas Pneumonia who is admitted to [facility] with generalized weakness, migratory polyarthralgia's with limited ROM with new imaging concerning for septic polyarthralgia including Lt glenohumeral joint effusion and Rt radiocarpal/volar flexor tendons fluid collections requiring image-guided decompression and fluid-sampling for source control, diagnosis and treatment planning.    A new inpatient IR consult received for fluoroscopic-guided Lt glenohumeral and Rt radiocarpal arthrocenteses.    Admission H&P reviewed.  Past Medical History:   Diagnosis Date    Acute carpal tunnel syndrome of left wrist     Biliary colic     Diabetes mellitus     Encounter for blood transfusion     History of chemotherapy     Incisional hernia     Large cell (diffuse) non-Hodgkin's lymphoma     Stem cells transplant status      Past Surgical History:   Procedure Laterality Date    BREAST BIOPSY Left     lymph node biopsy, benign    BREAST SURGERY      CHOLECYSTECTOMY      COLD KNIFE CONIZATION OF CERVIX N/A 3/8/2021    Procedure: CONE BIOPSY, CERVIX, USING COLD KNIFE;  Surgeon: Evelyn Wheeler MD;  Location: [facility];  Service: OB/GYN;  Laterality: N/A;    COLONOSCOPY N/A 12/21/2022    Procedure: COLONOSCOPY;  Surgeon: Annamarie Jane MD;  [facility];  Service: Endoscopy;  Laterality: N/A;    HERNIA REPAIR      incisional    LUNG BIOPSY      lymphnode biopsy      MEDIPORT REMOVAL Left 1/30/2020    Procedure: REMOVAL, CATHETER, CENTRAL VENOUS, TUNNELED, WITH PORT;  Surgeon: Luis Bogran-Reyes, MD;  Location: [facility];  Service: General;  Laterality: Left;    PORTACATH PLACEMENT Left     REVISION OF SCAR  6/22/2021    Procedure: REVISION, SCAR;  Surgeon: Otis Grimes MD;  Location: [address]  [facility];  Service: General;;    TUBAL LIGATION      UMBILICAL HERNIA REPAIR       Review of Systems:   As documented in primary team H&P    Home Meds:   Prior to Admission medications    Medication Sig Start Date End Date Taking? Authorizing Provider   celecoxib (CELEBREX) 200 MG capsule Take 200 mg by mouth once daily. Pt stated she was recently taken off this medication   Yes Historical Provider   albuterol (PROVENTIL/VENTOLIN HFA) 90 mcg/actuation inhaler Ventolin HFA 90 mcg/actuation aerosol inhaler   INHALE 2 PUFFS BY MOUTH 3 TIMES DAILY AS NEEDED.    Historical Provider   albuterol-ipratropium (DUO-NEB) 2.5 mg-0.5 mg/3 mL nebulizer solution ipratropium 0.5 mg-albuterol 3 mg (2.5 mg base)/3 mL nebulization soln   USE 1 VIAL IN NEBULIZER EVERY 6 TO 8 HOURS AS NEEDED    Historical Provider   bisoprolol (ZEBETA) 5 MG tablet Take 5 mg by mouth once daily.    Historical Provider   blood sugar diagnostic Strp USE TO CHECK BLOOD SUGAR TWICE A DAY 4/13/21   Historical Provider   budesonide (PULMICORT) 0.5 mg/2 mL nebulizer solution USE 1 VIAL IN NEBULIZER TWICE DAILY (OK TO MIX WITH ALBUTEROL. RINSE MOUTH AFTER USE) 4/10/22   Historical Provider   budesonide (PULMICORT) 0.5 mg/2 mL nebulizer solution budesonide 0.5 mg/2 mL suspension for nebulization   USE 1 VIAL IN NEBULIZER TWICE DAILY (OK TO MIX WITH ALBUTEROL. RINSE MOUTH AFTER USE)    Historical Provider   conjugated estrogens (PREMARIN) vaginal cream Place 0.5 g vaginally once daily. Place 0.5 vaginally for 4 weeks, then transition to twice weekly use. 3/1/23   Kiera Hong MD   cyclobenzaprine (FLEXERIL) 10 MG tablet Take 10 mg by mouth 3 (three) times daily as needed. Take for 5 days 3/12/23   Historical Provider   fluticasone (FLONASE) 50 mcg/actuation nasal spray 1 spray by Each Nare route once daily.    Historical Provider   HYDROcodone-acetaminophen (NORCO) 5-325 mg per tablet Take 1 tablet by mouth every 6 (six) hours as needed. 3/10/23   Historical  Provider   melatonin 10 mg Cap Take 10 mg by mouth nightly as needed.    Historical Provider   metFORMIN (GLUCOPHAGE) 500 MG tablet Take 500 mg by mouth once daily. 5/9/19   Historical Provider   montelukast (SINGULAIR) 10 mg tablet Take 10 mg by mouth once daily. 7/29/21   Historical Provider   naproxen (NAPROSYN) 500 MG tablet Take 500 mg by mouth 2 (two) times daily as needed. Take for 5 days    Historical Provider   ondansetron (ZOFRAN) 4 MG tablet Take 4 mg by mouth every 8 (eight) hours as needed. 3/14/23   Historical Provider   pantoprazole (PROTONIX) 40 MG tablet Take 1 tablet (40 mg total) by mouth once daily. 1/4/23 3/14/23  Liu Kyle MD   zinc sulfate (ZINCATE) 220 (50) mg capsule Take 220 mg by mouth 3 (three) times daily.    Historical Provider     Scheduled Meds:    ceFEPime (MAXIPIME) IVPB  2 g Intravenous Q8H    insulin detemir U-100  5 Units Subcutaneous Daily    polyethylene glycol  17 g Oral Daily    vancomycin (VANCOCIN) IVPB  2,000 mg Intravenous Q12H     Continuous Infusions:   PRN Meds:acetaminophen, aluminum-magnesium hydroxide-simethicone, cyclobenzaprine, dextrose 10%, dextrose 10%, dextrose 10%, dextrose 10%, dextrose, dextrose, glucagon (human recombinant), glucagon (human recombinant), HYDROcodone-acetaminophen, insulin aspart U-100, melatonin, naloxone, ondansetron, potassium bicarbonate, prochlorperazine, simethicone, sodium chloride 0.9%, Pharmacy to dose Vancomycin consult **AND** vancomycin - pharmacy to dose    Anticoagulants/Antiplatelets: no anticoagulation    Allergies:   Review of patient's allergies indicates:   Allergen Reactions    Tetanus vaccines and toxoid Rash     Sedation Hx: have not been any systemic reactions    Labs:  No results for input(s): INR in the last 168 hours.    Invalid input(s):  PT,  PTT    Recent Labs   Lab 03/27/23  0407   WBC 4.80   HGB 8.0*   HCT 27.4*   MCV 82         Recent Labs   Lab 03/21/23  0549 03/22/23  0424 03/24/23  0342  03/25/23  0429 03/27/23  0407   *   < > 185*   < > 179*   *   < > 133*   < > 138   K 3.8   < > 3.6   < > 4.2      < > 102   < > 101   CO2 24   < > 25   < > 26   BUN 14   < > 13   < > 14   CREATININE 0.5   < > 0.5   < > 0.6   CALCIUM 9.1   < > 8.8   < > 8.7   MG 1.8  --   --   --   --    ALT  --    < > 41  --   --    AST  --    < > 30  --   --    ALBUMIN  --    < > 1.9*  --   --    BILITOT  --    < > 0.9  --   --     < > = values in this interval not displayed.     Vitals:  Temp: 98.4 °F (36.9 °C) (03/27/23 0729)  Pulse: 101 (03/27/23 0729)  Resp: 18 (03/27/23 0729)  BP: 134/71 (03/27/23 0729)  SpO2: 97 % (03/27/23 0729)     Physical Exam:  General: no acute distress  Mental Status: alert and oriented to person, place and time  HEENT: normocephalic, atraumatic  Chest: unlabored breathing  Heart: regular heart rate  Abdomen: nondistended  Extremity: limited ROM of Lt shoulder and Rt wrist    A/P:  47 y.o. female with pertinent PMHx of NHL s/p chemotherapy and SCT 2011 and recent dx and tx of Strep Pneumoniae bacteremia 3/13/23 and Pseudomonas Pneumonia who is admitted to [facility] with generalized weakness, migratory polyarthralgia's with limited ROM with new imaging concerning for septic polyarthralgia including Lt glenohumeral joint effusion and Rt radiocarpal/volar flexor tendons fluid collections requiring image-guided decompression and fluid-sampling for source control, diagnosis and treatment planning.    Suspected septic polyarthralgia - Will attempt fluoroscopic-guided Lt glenohumeral arthrocentesis with local anesthetic only. Will assess for potential US-guided Rt radiocarpal arthrocenteses and, if fluid visible, will proceed with Rt radiocarpal arthrocentesis with local anesthetic as well.    Please place all pertinent fluid studies in epic prior to the procedure to ensure that the studies the ordering provider/team deem appropriate are performed.    Risks (including, but not limited to,  pain, bleeding, infection, damage to nearby structures, failure to obtain sufficient material for a diagnosis, the need for additional procedures, and death), benefits, and alternatives were discussed with the patient. All questions were answered to the best of my abilities. The patient wishes to proceed with the procedure. Written informed consent was obtained.    Thank you for considering IR for the care of your patient.     Neil Cornelius MD  Interventional Radiology

## 2023-03-27 NOTE — ASSESSMENT & PLAN NOTE
48y/o F pt with hx of T2DM, HTN, large cell diffuse non-Hodgkin lymphoma s/p chemotherapy and stem cell transplant 2011 admitted to OSH on 3/14 for possible sepsis (PNA vs UTI) after presenting for eval of 2 wk hx of migratory polyarthralgias, fevers and weakness, found to have strep pneumoniae bacteremia, pneumonia due to pseudomonas and was ultimately transferred to [facility] 3/22 for arthrocentesis due to concern for septic arthritis.    Pt reports she was treated for pneumonia with abx and steroids approx 1.5 weeks prior to hospital admission. 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 Symptoms progressed and notes she became very weak and confused prompting her to present to OSH. She is not sure if she experienced fevers, sweats or chills. Does not think she had a worsening cough or SOB. She is still complaining of Rt wrist, L shoulder, R knee, L hip pain.    Septic workup remarkable for BCx  (3/13) positive for strep pna, RCx (3/15) positive for pseudomonas, MRI R knee showed meniscal tear and large effusion with synovitis, MRI left shoulder with large complex joint effusion with synovitis, high grade partial thickness tear of supraspinatus and infraspinatus, near complete tear of intraarticular bicpet tendon with tenosynovitis. MRI right wrist with distal ulna/radius/carpal osteitis, complex effusions, complex fluid collections, tenosynovitis. Underwent unsucceful attempt at rt knee arthrocentesis 3/19. Transferred for ortho eval 3/22.  Arthrocentesis 3/23 yielded minimal fluid; not enough to send for cell counts and cx remained neg.     Painful joints and MRI findings concerning for septic joints in the setting of strep pneumoniae bacteremia. Agree with arthrocentesis to r/o septic joints.   --continue empiric vanc  --would switch cefepime to  ceftriaxone  --appreciate IR assistance; will f/u cell count with diff, and cultures

## 2023-03-28 PROBLEM — J18.9 PNEUMONIA OF BOTH LUNGS DUE TO INFECTIOUS ORGANISM: Status: RESOLVED | Noted: 2021-06-11 | Resolved: 2023-03-28

## 2023-03-28 LAB
BODY FLD TYPE: NORMAL
BODY FLD TYPE: NORMAL
CRYSTALS FLD MICRO: NEGATIVE
CRYSTALS FLD MICRO: NEGATIVE
GRAM STN SPEC: NORMAL
GRAM STN SPEC: NORMAL
PATHOLOGIST INTERPRETATION UPE: NORMAL
POCT GLUCOSE: 132 MG/DL (ref 70–110)
POCT GLUCOSE: 162 MG/DL (ref 70–110)
POCT GLUCOSE: 184 MG/DL (ref 70–110)
POCT GLUCOSE: 211 MG/DL (ref 70–110)
VANCOMYCIN TROUGH SERPL-MCNC: 13.4 UG/ML (ref 10–22)

## 2023-03-28 PROCEDURE — 63600175 PHARM REV CODE 636 W HCPCS: Performed by: HOSPITALIST

## 2023-03-28 PROCEDURE — A9585 GADOBUTROL INJECTION: HCPCS | Performed by: HOSPITALIST

## 2023-03-28 PROCEDURE — 36569 INSJ PICC 5 YR+ W/O IMAGING: CPT

## 2023-03-28 PROCEDURE — 97110 THERAPEUTIC EXERCISES: CPT

## 2023-03-28 PROCEDURE — 80202 ASSAY OF VANCOMYCIN: CPT | Performed by: HOSPITALIST

## 2023-03-28 PROCEDURE — 25500020 PHARM REV CODE 255: Performed by: HOSPITALIST

## 2023-03-28 PROCEDURE — 11000001 HC ACUTE MED/SURG PRIVATE ROOM

## 2023-03-28 PROCEDURE — 25000003 PHARM REV CODE 250: Performed by: HOSPITALIST

## 2023-03-28 PROCEDURE — 97530 THERAPEUTIC ACTIVITIES: CPT

## 2023-03-28 PROCEDURE — 36415 COLL VENOUS BLD VENIPUNCTURE: CPT | Performed by: HOSPITALIST

## 2023-03-28 PROCEDURE — C1751 CATH, INF, PER/CENT/MIDLINE: HCPCS

## 2023-03-28 PROCEDURE — 63600175 PHARM REV CODE 636 W HCPCS: Performed by: STUDENT IN AN ORGANIZED HEALTH CARE EDUCATION/TRAINING PROGRAM

## 2023-03-28 RX ORDER — SODIUM CHLORIDE 0.9 % (FLUSH) 0.9 %
10 SYRINGE (ML) INJECTION
Status: DISCONTINUED | OUTPATIENT
Start: 2023-03-28 | End: 2023-04-07 | Stop reason: HOSPADM

## 2023-03-28 RX ORDER — SODIUM CHLORIDE 0.9 % (FLUSH) 0.9 %
10 SYRINGE (ML) INJECTION EVERY 6 HOURS
Status: DISCONTINUED | OUTPATIENT
Start: 2023-03-29 | End: 2023-04-07 | Stop reason: HOSPADM

## 2023-03-28 RX ORDER — GADOBUTROL 604.72 MG/ML
10 INJECTION INTRAVENOUS
Status: COMPLETED | OUTPATIENT
Start: 2023-03-28 | End: 2023-03-28

## 2023-03-28 RX ADMIN — HYDROCODONE BITARTRATE AND ACETAMINOPHEN 1 TABLET: 5; 325 TABLET ORAL at 08:03

## 2023-03-28 RX ADMIN — HYDROCODONE BITARTRATE AND ACETAMINOPHEN 1 TABLET: 5; 325 TABLET ORAL at 09:03

## 2023-03-28 RX ADMIN — GADOBUTROL 10 ML: 604.72 INJECTION INTRAVENOUS at 10:03

## 2023-03-28 RX ADMIN — VANCOMYCIN HYDROCHLORIDE 2000 MG: 500 INJECTION, POWDER, LYOPHILIZED, FOR SOLUTION INTRAVENOUS at 02:03

## 2023-03-28 RX ADMIN — MELATONIN TAB 3 MG 6 MG: 3 TAB at 09:03

## 2023-03-28 RX ADMIN — CEFTRIAXONE 2 G: 2 INJECTION, SOLUTION INTRAVENOUS at 11:03

## 2023-03-28 RX ADMIN — INSULIN ASPART 2 UNITS: 100 INJECTION, SOLUTION INTRAVENOUS; SUBCUTANEOUS at 05:03

## 2023-03-28 RX ADMIN — VANCOMYCIN HYDROCHLORIDE 2000 MG: 500 INJECTION, POWDER, LYOPHILIZED, FOR SOLUTION INTRAVENOUS at 03:03

## 2023-03-28 RX ADMIN — INSULIN DETEMIR 5 UNITS: 100 INJECTION, SOLUTION SUBCUTANEOUS at 08:03

## 2023-03-28 RX ADMIN — CYCLOBENZAPRINE HYDROCHLORIDE 5 MG: 5 TABLET, FILM COATED ORAL at 09:03

## 2023-03-28 RX ADMIN — HYDROCODONE BITARTRATE AND ACETAMINOPHEN 1 TABLET: 5; 325 TABLET ORAL at 03:03

## 2023-03-28 RX ADMIN — CYCLOBENZAPRINE HYDROCHLORIDE 5 MG: 5 TABLET, FILM COATED ORAL at 08:03

## 2023-03-28 NOTE — PROGRESS NOTES
[facility] Medicine  Progress Note    Patient Name: Hedy Conway  MRN: [id number]  Patient Class: IP- Inpatient   Admission Date: 3/21/2023  Length of Stay: 7 days  Attending Physician: Karla Garber MD  Primary Care Provider: John Lantigua PA-C        Subjective:     Principal Problem:Polyarthritis        HPI:  47-year-old  female with medical history significant for type 2 diabetes mellitus, hypertension, large cell diffuse non-Hodgkin lymphoma status post chemotherapy and stem cell transplant 2011 was transferred from outside hospital with suspicion of septic arthritis.  Of note she voiced migratory pain in her left-right knee-both shoulders that started 2 weeks ago, associated with generalized weakness, cleansing inability to move right lower extremity and only have movement without gravity on the left lower extremity without associated trauma, she voiced being on 3 different antibiotics at the outside hospital for both pneumonia and urinary tract infection, and this could explain for been afebrile although she voiced subjective fever at the onset of her symptoms she denied cough, shortness of breath, orthopnea, paroxysmal nocturnal dyspnea or pedal swelling.  She denied prior diarrhea although she voiced upper respiratory tract symptoms,treatment for pneumonia prior to onset of these symptoms.  She denied any urinary symptoms at this time of dysuria, frequency, urgency, straining at micturition or hematuria.  No recent travel, no sick contacts, no tick bite.  She had been sent here for possible knee aspiration.      Overview/Hospital Course:  Ms Hedy Conway who was transferred to [facility] from [facility]. There she was admitted for multiple joint pains. MRI R knee showed meniscal tear and large effusion with synovitis. There was concern for septic arthritis, aspiration was attempted, but no fluid retrieved. She also had Strep pneumoniae bacteremia (3/13) and  Pseudomonas pneumonia (3/15) and is currently being treated with cefepime. She is also receiving vancomycin with concerns for septic joint. She did require 1U RBC transfusion and has had vaginal bleeding. Pelvic US 3/20 showed thickened endometrium, and she will need outpatient GYN follow up. Her iron panel indicates mixed iron deficiency and anemia of chronic disease. She saw Neurology and Orthopaedics. Orthopaedics doubts multiple septic joints simultaneously and suspects Rheumatologic condition. S/p R knee arthrocentesis on 3/23 with culture no growth. Sed rate, CRP remain elevated. Uric acid normal, RF/CCP/SILVIA/hepatitis panel normal. MRI left shoulder with large complex joint effusion with synovitis, high grade partial thickness tear of supraspinatus and infraspinatus, near complete tear of intraarticular bicpet tendon with tenosynovitis. MRI right wrist with distal ulna/radius/carpal osteitis, complex effusions, complex fluid collections, tenosynovitis. S/p arthrocentesis of both L shoulder and R wrist by IR on 3/28 with cultures pending.       Interval History: Joint pains still present. Going for MRIs now. No other complaints     Review of Systems   Constitutional:  Negative for chills and fever.   Respiratory:  Negative for shortness of breath.    Cardiovascular:  Negative for chest pain.   Gastrointestinal:  Negative for abdominal pain.   Genitourinary:  Negative for difficulty urinating.   Musculoskeletal:  Positive for arthralgias. Negative for myalgias.   Skin:  Negative for rash.   Neurological:  Positive for weakness. Negative for numbness.   Psychiatric/Behavioral:  Negative for confusion.    Objective:     Vital Signs (Most Recent):  Temp: 98.2 °F (36.8 °C) (03/28/23 0730)  Pulse: 97 (03/28/23 0730)  Resp: 20 (03/28/23 0843)  BP: 126/69 (03/28/23 0730)  SpO2: 97 % (03/28/23 0730)   Vital Signs (24h Range):  Temp:  [98.1 °F (36.7 °C)-98.6 °F (37 °C)] 98.2 °F (36.8 °C)  Pulse:  [] 97  Resp:   [16-20] 20  SpO2:  [93 %-99 %] 97 %  BP: (120-129)/(58-75) 126/69     Weight: 121.5 kg (267 lb 13.7 oz)  Body mass index is 41.95 kg/m².    Intake/Output Summary (Last 24 hours) at 3/28/2023 0914  Last data filed at 3/28/2023 0423  Gross per 24 hour   Intake 850 ml   Output 1650 ml   Net -800 ml        Physical Exam  Vitals and nursing note reviewed.   Constitutional:       General: She is not in acute distress.     Appearance: She is obese. She is not ill-appearing.   HENT:      Head: Normocephalic and atraumatic.      Nose: Nose normal.      Mouth/Throat:      Mouth: Mucous membranes are moist.   Cardiovascular:      Rate and Rhythm: Regular rhythm. Tachycardia present.   Pulmonary:      Effort: Pulmonary effort is normal.      Breath sounds: Normal breath sounds.      Comments: Room air  Abdominal:      General: Bowel sounds are normal.      Palpations: Abdomen is soft.   Musculoskeletal:      Right lower leg: No edema.      Left lower leg: No edema.      Comments: R wrist swelling. R knee swellling   Skin:     General: Skin is warm and dry.      Findings: No rash.   Neurological:      Mental Status: She is alert and oriented to person, place, and time.       Significant Labs: All pertinent labs within the past 24 hours have been reviewed.    Significant Imaging: I have reviewed all pertinent imaging results/findings within the past 24 hours.      Assessment/Plan:      * Polyarthritis  Present since admission to [facility]. MRI noted R knee effusion. ESR, CRP elevated.   Doubt gout. RF, CCP, SILVIA, acute hep panel normal. Parvovirus pending. RF normal. TSH elevated but free T4 normal, so less likely hypothyroidism.    - MRI L shoulder: complex effusion with synovitis  - MRI R wrist: osteitis, complex effusions and fluid collections  - discussed with Ortho  - s/p R wrist and L shoulder arthrocenteses on 3/27/23   - R wrist only 2cc, cell count not performed but PMN predominant. Cultures unable to be sent   - L shoulder  WBC 47K with PMN predominance, cultures pending   - ID following, remains on CTX and vanc   - PT, OT consulted as well       Subclinical hypothyroidism  Lab Results   Component Value Date    TSH 14.200 (H) 03/22/2023     FT4 within normal limits  Repeat as outpatient when acute illness resolved      Anemia of chronic disease  Anemia of chronic disease present (ferritin elevated) but did also have vaginal bleeding. TSAT 14- some degree iron deficiency    Weakness of both lower extremities  Presented with bilateral lower extremity weakness with no significant sensory deficit. She denied preceding diarrhea/GI symptoms, although she had upper respiratory tract symptoms  - Neurology consulted  - seems as though this is secondary to arthritis as being worked up above   - PT, OT consulted       Postmenopausal bleeding  Noted at OSH. TVUS with thickened endometrial stripe  - needs outpatient GYN follow up       Type 2 diabetes mellitus without complication, without long-term current use of insulin  Patient's FSGs are controlled on current medication regimen.  Last A1c reviewed-   Lab Results   Component Value Date    HGBA1C 6.4 (H) 03/22/2023     Most recent fingerstick glucose reviewed-   Recent Labs   Lab 03/27/23  1148 03/27/23  1802 03/27/23  1931 03/28/23  0731   POCTGLUCOSE 121* 158* 153* 162*     Current correctional scale  Medium  Maintain anti-hyperglycemic dose as follows-   Antihyperglycemics (From admission, onward)    Start     Stop Route Frequency Ordered    03/22/23 0900  insulin detemir U-100 pen 5 Units         -- SubQ Daily 03/22/23 0541    03/22/23 0638  insulin aspart U-100 pen 0-5 Units         -- SubQ Before meals & nightly PRN 03/22/23 0541        Hold Oral hypoglycemics while patient is in the hospital.    Streptococcal bacteremia  Blood cultures 3/13 with Strep bacteremia. Currently being treated with ceftriaxone. This may have seeded her joints causing septic arthritis. Arthrocentesis R wrist and L  shoulder completed on 3/27 with cultures pending. ID consulted     Stem cells transplant status  Not currently on immunosuppresants  Follow up with oncology      Large cell (diffuse) non-Hodgkin's lymphoma  In remission, follow up with oncology  No new enlarged lymph nodes noted         VTE Risk Mitigation (From admission, onward)         Ordered     Reason for No Pharmacological VTE Prophylaxis  Once        Question:  Reasons:  Answer:  Active Bleeding    03/21/23 2230     IP VTE HIGH RISK PATIENT  Once         03/21/23 2230     Place sequential compression device  Until discontinued         03/21/23 2230                Discharge Planning   JESSE: 3/27/2023     Code Status: Full Code   Is the patient medically ready for discharge?:     Reason for patient still in hospital (select all that apply): Patient trending condition  Discharge Plan A: Rehab   Discharge Delays: None known at this time              Karla Garber MD  Department of Hospital Medicine   [facility]

## 2023-03-28 NOTE — PROGRESS NOTES
Pharmacokinetic Assessment Follow Up: IV Vancomycin    Vancomycin serum concentration assessment(s):    The trough level was drawn correctly and can be used to guide therapy at this time. The measurement is within the desired definitive target range of 10 to 20 mcg/mL.    Vancomycin Regimen Plan:    Continue regimen to Vancomycin 2000 mg IV every 12 hours with next serum trough concentration measured at 02:00 prior to fourth dose on 3/30/23    Drug levels (last 3 results):  Recent Labs   Lab Result Units 03/28/23  0231   Vancomycin-Trough ug/mL 13.4       Pharmacy will continue to follow and monitor vancomycin.    Please contact pharmacy at extension [phone] for questions regarding this assessment.    Thank you for the consult,   Peggy Hinojosa       Patient brief summary:  Hedy Conway is a 47 y.o. female initiated on antimicrobial therapy with IV Vancomycin for treatment of bone/joint infection    The patient's current regimen is Vancomycin 2000 mg IV q12h    Drug Allergies:   Review of patient's allergies indicates:   Allergen Reactions    Tetanus vaccines and toxoid Rash       Actual Body Weight:   121.5 kg    Renal Function:   Estimated Creatinine Clearance: 155 mL/min (based on SCr of 0.6 mg/dL).,     Dialysis Method (if applicable):  N/A    CBC (last 72 hours):  Recent Labs   Lab Result Units 03/26/23  0427 03/27/23  0407   WBC K/uL 4.13 4.80   Hemoglobin g/dL 7.4* 8.0*   Hematocrit % 25.1* 27.4*   Platelets K/uL 241 241   Gran % % 46.0 48.1   Lymph % % 39.2 37.1   Mono % % 12.6 12.3   Eosinophil % % 0.5 0.4   Basophil % % 0.2 0.4   Differential Method  Automated Automated       Metabolic Panel (last 72 hours):  Recent Labs   Lab Result Units 03/26/23  0427 03/27/23  0407   Sodium mmol/L 140 138   Potassium mmol/L 4.0 4.2   Chloride mmol/L 101 101   CO2 mmol/L 27 26   Glucose mg/dL 139* 179*   BUN mg/dL 17 14   Creatinine mg/dL 0.5 0.6       Vancomycin Administrations:  vancomycin given in the last 96 hours                      vancomycin (VANCOCIN) 2,000 mg in dextrose 5 % (D5W) 500 mL IVPB (mg) 2,000 mg New Bag 03/28/23 0340     2,000 mg New Bag 03/27/23 1437     2,000 mg New Bag  0243    vancomycin (VANCOCIN) 2,000 mg in dextrose 5 % (D5W) 500 mL IVPB (mg) 2,000 mg New Bag 03/26/23 1533                    Microbiologic Results:  Microbiology Results (last 7 days)       Procedure Component Value Units Date/Time    Gram stain [[id number]] Collected: 03/27/23 1551    Order Status: Sent Specimen: Shoulder, Left Updated: 03/27/23 1633    AFB Culture & Smear [[id number]] Collected: 03/27/23 1551    Order Status: Sent Specimen: Shoulder, Left Updated: 03/27/23 1633    Aerobic culture [[id number]] Collected: 03/27/23 1551    Order Status: Sent Specimen: Shoulder, Left Updated: 03/27/23 1632    Fungus culture [[id number]] Collected: 03/27/23 1551    Order Status: Sent Specimen: Shoulder, Left Updated: 03/27/23 1632    Culture, Anaerobe [[id number]] Collected: 03/27/23 1551    Order Status: Sent Specimen: Shoulder, Left Updated: 03/27/23 1632    Aerobic culture [[id number]] Collected: 03/27/23 1619    Order Status: Canceled Specimen: Arm from Wrist, Right     Culture, Anaerobe [[id number]] Collected: 03/27/23 1618    Order Status: Canceled Specimen: Other from Wrist, Right     AFB Culture & Smear [[id number]] Collected: 03/27/23 1618    Order Status: Canceled Specimen: Other from Wrist, Right     Fungus culture [[id number]] Collected: 03/27/23 1618    Order Status: Canceled Specimen: Other from Wrist, Right     Gram stain [[id number]] Collected: 03/27/23 1618    Order Status: Canceled Specimen: Wound from Wrist, Right     Culture, Body Fluid (Aerobic) w/ GS [[id number]] Collected: 03/23/23 1244    Order Status: Completed Specimen: Body Fluid from Knee Updated: 03/27/23 0733     AEROBIC CULTURE - FLUID No growth     Gram Stain Result Few WBC's      No organisms seen    Blood culture [[id number]] Collected: 03/22/23 0610    Order Status:  Completed Specimen: Blood from Peripheral, Right Arm Updated: 03/26/23 0903     Blood Culture, Routine No Growth after 4 days.

## 2023-03-28 NOTE — SUBJECTIVE & OBJECTIVE
Interval History: No acute events overnight. Still complaining of diffuse arthralgias.     Review of Systems   Constitutional:  Positive for fatigue. Negative for chills and fever.   Respiratory:  Negative for cough and shortness of breath.    Gastrointestinal:  Negative for diarrhea.   Genitourinary:  Negative for dysuria.   Musculoskeletal:  Positive for arthralgias, joint swelling and myalgias. Negative for back pain and neck pain.   Skin:  Negative for wound.   All other systems reviewed and are negative.  Objective:     Vital Signs (Most Recent):  Temp: 98.2 °F (36.8 °C) (03/28/23 0730)  Pulse: 97 (03/28/23 0730)  Resp: 20 (03/28/23 0843)  BP: 126/69 (03/28/23 0730)  SpO2: 97 % (03/28/23 0730) Vital Signs (24h Range):  Temp:  [98.1 °F (36.7 °C)-98.6 °F (37 °C)] 98.2 °F (36.8 °C)  Pulse:  [] 97  Resp:  [16-20] 20  SpO2:  [93 %-99 %] 97 %  BP: (120-129)/(58-75) 126/69     Weight: 121.5 kg (267 lb 13.7 oz)  Body mass index is 41.95 kg/m².    Estimated Creatinine Clearance: 155 mL/min (based on SCr of 0.6 mg/dL).    Physical Exam  Vitals reviewed.   Constitutional:       Appearance: Normal appearance.   Eyes:      Conjunctiva/sclera: Conjunctivae normal.      Pupils: Pupils are equal, round, and reactive to light.   Cardiovascular:      Rate and Rhythm: Normal rate and regular rhythm.      Heart sounds: No murmur heard.  Pulmonary:      Effort: Pulmonary effort is normal. No respiratory distress.      Breath sounds: Normal breath sounds. No wheezing.   Abdominal:      General: Abdomen is flat.      Palpations: Abdomen is soft.   Musculoskeletal:      Right lower leg: No edema.      Left lower leg: No edema.      Comments: Limited ROM R wrist and knee, Lt shoulder due to pain. No warmth or erythema, but joints do appear somewhat edematous.    Neurological:      Mental Status: She is alert and oriented to person, place, and time.       Significant Labs: Blood Culture:   Recent Labs   Lab 03/13/23  2250  03/16/23  0849 03/22/23  0610   LABBLOO No growth after 5 days.  Gram stain aer bottle: Gram positive cocci in chains resembling Strep  Results called to and read back by: Nurys Gutiérrez RN  03/14/2023  15:50  STREPTOCOCCUS PNEUMONIAE* No growth after 5 days.  No growth after 5 days. No Growth after 4 days.     Wound Culture: No results for input(s): LABAERO in the last 4320 hours.  All pertinent labs within the past 24 hours have been reviewed.    Significant Imaging: I have reviewed all pertinent imaging results/findings within the past 24 hours.

## 2023-03-28 NOTE — PROGRESS NOTES
Findings from left shoulder aspiration noted. Elevated WBC, cultures NGTD but has been on antibiotics    AFVSS  CRP improved yet remains elevated, ESR no improvement    Will assess Pt in am and discuss option of wash out multiple joints with patient.

## 2023-03-28 NOTE — PLAN OF CARE
Problem: Diabetes Comorbidity  Goal: Blood Glucose Level Within Targeted Range  Outcome: Ongoing, Progressing     Problem: Adjustment to Illness (Sepsis/Septic Shock)  Goal: Optimal Coping  Outcome: Ongoing, Progressing     Problem: Bleeding (Sepsis/Septic Shock)  Goal: Absence of Bleeding  Outcome: Ongoing, Progressing       Patient remained free from falls/injury.  No acute changes in status. Bed locked in lowest position.  Side rails up x2.  Call bell within reach.  Purposeful rounding maintained throughout shift.

## 2023-03-28 NOTE — ASSESSMENT & PLAN NOTE
Blood cultures 3/13 with Strep bacteremia. Currently being treated with ceftriaxone. This may have seeded her joints causing septic arthritis. Arthrocentesis R wrist and L shoulder completed on 3/27 with cultures pending. ID consulted

## 2023-03-28 NOTE — PLAN OF CARE
Plan A IPR Plan B SNF     Spoke with Guido with [facility], stated patient medically accepted, will continue to follow to confirm pt can tolerate 3 hours of therapy. Updated can be sent via email to [email or web address].    Received call from Simran with [facility], sated patient medically accepted, pending financial review and patient preference.

## 2023-03-28 NOTE — ASSESSMENT & PLAN NOTE
Patient's FSGs are controlled on current medication regimen.  Last A1c reviewed-   Lab Results   Component Value Date    HGBA1C 6.4 (H) 03/22/2023     Most recent fingerstick glucose reviewed-   Recent Labs   Lab 03/27/23  1148 03/27/23  1802 03/27/23  1931 03/28/23  0731   POCTGLUCOSE 121* 158* 153* 162*     Current correctional scale  Medium  Maintain anti-hyperglycemic dose as follows-   Antihyperglycemics (From admission, onward)    Start     Stop Route Frequency Ordered    03/22/23 0900  insulin detemir U-100 pen 5 Units         -- SubQ Daily 03/22/23 0541    03/22/23 0638  insulin aspart U-100 pen 0-5 Units         -- SubQ Before meals & nightly PRN 03/22/23 0541        Hold Oral hypoglycemics while patient is in the hospital.

## 2023-03-28 NOTE — PT/OT/SLP PROGRESS
"Occupational Therapy   Treatment    Name: Hedy Conway  MRN: [id number]  Admitting Diagnosis:  Polyarthritis       Recommendations:     Discharge Recommendations: rehabilitation facility  Discharge Equipment Recommendations:   (TBD at next level.)  Barriers to discharge:   (Pt significantly limited by pain and weaknessm requiring x 2 person assist for standing and is unable to ambualte at this time; pt will benefit from multidisciplinary approach in an IPR setting for returning to PLOF as she was (I) and working PTA.)    Assessment:     Hedy Conway is a 47 y.o. female with a medical diagnosis of Polyarthritis.  Performance deficits affecting function are weakness, impaired endurance, impaired self care skills, impaired balance, gait instability, impaired functional mobility, decreased upper extremity function, decreased lower extremity function, decreased coordination, decreased safety awareness, pain, decreased ROM, edema.     Pt functionally limited by significant pain, reporting that she had "a long day" of testing/imaging. Despite pain, pt was able to perform supine>sit w/ mod A x 2 persons to stand w/ max A x 2 persons, tolerating ~30 sec of static standing for x 2 trials using BUE support on RW. Pt tolerated BUE AROM fairly well w/ assist as needed for completing full range; crepitus still noted to R wrist which is significantly limited; pt now w/ more pain due to IV line.  Pt tolerated tx session well and will continue to benefit from skilled acute OT services to maximize functional capacity for safe performance w/ ADLs and functional mobility.     PTA, pt was (I) w/ all ADLs and functional mobility; pt will benefit from an aggressive multidisciplinary approach in an IPR setting for returning to PLOF. Despite pain and functional limitations, pt is motivated and determined.     Rehab Prognosis:  Good; patient would benefit from acute skilled OT services to address these deficits and reach maximum level " "of function.       Plan:     Patient to be seen 5 x/week to address the above listed problems via self-care/home management, therapeutic activities, therapeutic exercises  Plan of Care Expires: 04/07/23  Plan of Care Reviewed with: patient    Subjective     Chief Complaint: "I been rolled and turned all day; laying in that MRI machine made me hurt."  Patient/Family Comments/goals: None stated   Pain/Comfort:  Pain Rating 1:  (5/10 for R wrist; pain in B knees when standing)  Pain Addressed 1: Reposition, Distraction, Cessation of Activity, Nurse notified    Objective:     Communicated with: NurseAntoinette, prior to session.  Patient found HOB elevated with telemetry, peripheral IV, PureWick, bed alarm upon OT entry to room.    General Precautions: Standard, fall    Orthopedic Precautions:N/A  Braces: N/A  Respiratory Status: Room air     Occupational Performance:     Bed Mobility:    Patient completed Scooting hips to EOB with contact guard assistance; max A x 2 persons for laterally scooting to HOB  Patient completed Supine to Sit with moderate assistance and 2 persons w/ HOB elevated   Patient completed Sit to Supine with maximal assistance and 2 persons      Functional Mobility/Transfers:  Patient completed Sit <> Stand Transfer with maximal assistance and of 2 persons  with  rolling walker   Functional Mobility: Unable to take steps at this time.     Activities of Daily Living:  Upper Body Dressing: minimum assistance for donning gown over back; LUE threaded prior to RUE 2* pain       [facility] 6 Click ADL: 15    Treatment & Education:  -Pt performed ADLs and functional mobility as noted above.   -Pt educated on importance of OOB activity to prevent further debility and weakness due to excessive bed rest.   -Pt was able to perform BUE AROM for 1 set x 15 reps while sitting EOB, unsupported; pt required some active assist and support for L shoulder:              -elbow flexion/ext               -shoulder flexion; OT " provided support               -shoulder abd/add (w/ elbow flexed); OT provided support    -horizontal abd/add; OT provided assist               -wrist flex/ext NOT performed due to pain w/ IV placement               -forward punches   -Questions and concerns addressed within scope.     Patient left HOB elevated with all lines intact, call button in reach, and bed alarm on    GOALS:   Multidisciplinary Problems       Occupational Therapy Goals          Problem: Occupational Therapy    Goal Priority Disciplines Outcome Interventions   Occupational Therapy Goal     OT, PT/OT Ongoing, Progressing    Description: Goals to be met by 04/07/23:    Patient will increase functional independence with ADLs by performing:    Grooming while seated with Modified [place].  Toileting from bedside commode with Moderate Assistance for hygiene and clothing management.   Sit to stand w/ Minimum Assistance.   Step transfer with Moderate Assistance  Toilet transfer to bedside commode with Moderate Assistance.  Upper extremity exercise program x15 reps per handout, with assistance as needed.                           Time Tracking:     OT Date of Treatment: 03/28/23  OT Start Time: 1400  OT Stop Time: 1423  OT Total Time (min): 23 min    Billable Minutes:Therapeutic Activity 15  Therapeutic Exercise 8  Total Time 23 (co-tx w/ PT)     OT/CHANDLER: OT          3/28/2023

## 2023-03-28 NOTE — PLAN OF CARE
Patient off the floor a the time of eval.   Chart reviewed.      Streptococcal bacteremia  48y/o F pt with hx of T2DM admitted to OSH on 3/14 for presumed sepsis after presenting for eval of 2 wk hx of migratory polyarthralgias, fevers and weakness, found to have strep pneumoniae bacteremia, pneumonia due to pseudomonas and was ultimately transferred to [facility] 3/22 for arthrocentesis due to concern for septic arthritis.      Painful joints and MRI findings concerning for septic joints in the setting of strep pneumoniae bacteremia. S/p IR arthrocentesis Lt shoulder (WBC >47k/ 95% segs) and Rt wrist (89% segs) on 3/27 with findings suggestive of septic arthritis.      --continue empiric vanc and ceftriaxone pending cultures  --f/u ortho recs; would likely benefit from washout  --f/u MRI results  --f/u synovial cultures and tailor abx    ID will follow.  Yuridia Campbell MD  Infectious Disease

## 2023-03-28 NOTE — PT/OT/SLP PROGRESS
Physical Therapy Treatment    Patient Name:  Hedy Conway   MRN:  [id number]    Recommendations:     Discharge Recommendations: rehabilitation facility  Discharge Equipment Recommendations:  (TBD at next level of care)  Barriers to discharge home:  Pt unable to ambulate at this time.    Assessment:     Hedy Conway is a 47 y.o. female admitted with a medical diagnosis of Polyarthritis.  She presents with the following impairments/functional limitations: weakness, impaired endurance, decreased ROM, pain, impaired functional mobility, gait instability, decreased lower extremity function, decreased upper extremity function, impaired self care skills, impaired balance, decreased safety awareness, impaired skin, edema, decreased coordination, orthopedic precautions.    Rehab Prognosis: Good; patient would benefit from acute skilled PT services to address these deficits and reach maximum level of function.    Recent Surgery: * No surgery found *      Plan:     During this hospitalization, patient to be seen 5 x/week to address the identified rehab impairments via gait training, therapeutic activities, therapeutic exercises and progress toward the following goals:    Plan of Care Expires:  04/07/23    Subjective     Chief Complaint: pain  Patient/Family Comments/goals: Pt agreeable to therapy with encouragement.   Pain/Comfort:  Pain Rating 1:  (Pt c/o R wrist, L shoulder, R knee, and L hip pain with movement.)  Pain Addressed 1: Pre-medicate for activity, Reposition, Distraction, Cessation of Activity, Nurse notified      Objective:     Patient found HOB elevated with peripheral IV, PureWick upon PT entry to room.     General Precautions: Standard, fall, DM  Orthopedic Precautions: N/A  Braces: N/A  Respiratory Status: Room air     Functional Mobility:  Pt feeling tired with multiple MRI imaging today.  Pt still limited by pain mostly to R wrist, L shoulder, and R knee.  Bed Mobility:     Scooting: CGA to scoot  EOB; max A of 2 persons for lateral scooting  Supine to Sit: moderate assistance and of 2 persons  Sit to Supine: max assistance and of 2 persons  Transfers:     Sit to Stand:  maximal assistance and of 2 persons with rolling walker and bed elevated x2 trials; Pt with severe forward flexed trunk and B forearm resting on RW, unable to tolerate standing with BUE support on RW 2* pain.  Pt unable to stand upright and with decreased standing tolerance due to pain, unable to initiate any gait training at this time.   Balance: Pt with fair static sit balance and fair- static sit balance.       AM-PAC 6 CLICK MOBILITY  Turning over in bed (including adjusting bedclothes, sheets and blankets)?: 2  Sitting down on and standing up from a chair with arms (e.g., wheelchair, bedside commode, etc.): 2  Moving from lying on back to sitting on the side of the bed?: 2  Moving to and from a bed to a chair (including a wheelchair)?: 1  Need to walk in hospital room?: 1  Climbing 3-5 steps with a railing?: 1  Basic Mobility Total Score: 9       Treatment & Education:  BLE seated therex as tolerated: hip flex, LAQ, and AP    BLE supine therex x10 reps: AP, QS, GS, HS (AAROM), hip abd/add (AAROM)    Balance Training  Static Standing:  Patient performed static standing on level surface  using rolling walker with Maximal Assistance of 2 persons and maximal verbal cues for trunk extension and BUE WB on RW x2 trials.       Pt educated on supine LE therex and encouraged to perform daily.  Pt re-educated on the importance of participation with acute skilled PT services.      Patient left HOB elevated and B heels offloaded with all lines intact, call button in reach, and bed alarm on.  Tray table close by.     GOALS:   Multidisciplinary Problems       Physical Therapy Goals          Problem: Physical Therapy    Goal Priority Disciplines Outcome Goal Variances Interventions   Physical Therapy Goal     PT, PT/OT Ongoing, Progressing      Description: Goals to be met by:  2023    Patient will increase functional independence with mobility by performin. Supine to sit with Modified [place]  2. Sit to stand transfer with Modified [place]  3. Gait >50 feet with Modified [place] using Rolling Walker  4. BLE seated/supine LE therex x15 reps independently                              Time Tracking:     PT Received On: 23  PT Start Time: 1401     PT Stop Time: 1433  PT Total Time (min): 32 min     Billable Minutes: Therapeutic Activity 16 min and Therapeutic Exercise 16 min co-tx with OT    Treatment Type: Treatment  PT/PTA: PT     Number of PTA visits since last PT visit: 0     2023

## 2023-03-28 NOTE — PROGRESS NOTES
Follow Up    Em[facility],   In review    [facility]  Left Message    Rebecca [facility]  No male beds    [facility]  In review, not sure if they can administer abx

## 2023-03-28 NOTE — ASSESSMENT & PLAN NOTE
Present since admission to [facility]. MRI noted R knee effusion. ESR, CRP elevated.   Doubt gout. RF, CCP, SILVIA, acute hep panel normal. Parvovirus pending. RF normal. TSH elevated but free T4 normal, so less likely hypothyroidism.    - MRI L shoulder: complex effusion with synovitis  - MRI R wrist: osteitis, complex effusions and fluid collections  - discussed with Ortho  - s/p R wrist and L shoulder arthrocenteses on 3/27/23   - R wrist only 2cc, cell count not performed but PMN predominant. Cultures unable to be sent   - L shoulder WBC 47K with PMN predominance, cultures pending   - ID following, remains on CTX and vanc   - PT, OT consulted as well

## 2023-03-28 NOTE — PLAN OF CARE
Problem: Occupational Therapy  Goal: Occupational Therapy Goal  Description: Goals to be met by 04/07/23:    Patient will increase functional independence with ADLs by performing:    Grooming while seated with Modified [place].  Toileting from bedside commode with Moderate Assistance for hygiene and clothing management.   Sit to stand w/ Minimum Assistance.   Step transfer with Moderate Assistance  Toilet transfer to bedside commode with Moderate Assistance.  Upper extremity exercise program x15 reps per handout, with assistance as needed.      Outcome: Ongoing, Progressing

## 2023-03-28 NOTE — SUBJECTIVE & OBJECTIVE
Interval History: Joint pains still present. Going for MRIs now. No other complaints     Review of Systems   Constitutional:  Negative for chills and fever.   Respiratory:  Negative for shortness of breath.    Cardiovascular:  Negative for chest pain.   Gastrointestinal:  Negative for abdominal pain.   Genitourinary:  Negative for difficulty urinating.   Musculoskeletal:  Positive for arthralgias. Negative for myalgias.   Skin:  Negative for rash.   Neurological:  Positive for weakness. Negative for numbness.   Psychiatric/Behavioral:  Negative for confusion.    Objective:     Vital Signs (Most Recent):  Temp: 98.2 °F (36.8 °C) (03/28/23 0730)  Pulse: 97 (03/28/23 0730)  Resp: 20 (03/28/23 0843)  BP: 126/69 (03/28/23 0730)  SpO2: 97 % (03/28/23 0730)   Vital Signs (24h Range):  Temp:  [98.1 °F (36.7 °C)-98.6 °F (37 °C)] 98.2 °F (36.8 °C)  Pulse:  [] 97  Resp:  [16-20] 20  SpO2:  [93 %-99 %] 97 %  BP: (120-129)/(58-75) 126/69     Weight: 121.5 kg (267 lb 13.7 oz)  Body mass index is 41.95 kg/m².    Intake/Output Summary (Last 24 hours) at 3/28/2023 0914  Last data filed at 3/28/2023 0423  Gross per 24 hour   Intake 850 ml   Output 1650 ml   Net -800 ml        Physical Exam  Vitals and nursing note reviewed.   Constitutional:       General: She is not in acute distress.     Appearance: She is obese. She is not ill-appearing.   HENT:      Head: Normocephalic and atraumatic.      Nose: Nose normal.      Mouth/Throat:      Mouth: Mucous membranes are moist.   Cardiovascular:      Rate and Rhythm: Regular rhythm. Tachycardia present.   Pulmonary:      Effort: Pulmonary effort is normal.      Breath sounds: Normal breath sounds.      Comments: Room air  Abdominal:      General: Bowel sounds are normal.      Palpations: Abdomen is soft.   Musculoskeletal:      Right lower leg: No edema.      Left lower leg: No edema.      Comments: R wrist swelling. R knee swellling   Skin:     General: Skin is warm and dry.       Findings: No rash.   Neurological:      Mental Status: She is alert and oriented to person, place, and time.       Significant Labs: All pertinent labs within the past 24 hours have been reviewed.    Significant Imaging: I have reviewed all pertinent imaging results/findings within the past 24 hours.

## 2023-03-29 ENCOUNTER — ANESTHESIA EVENT (OUTPATIENT)
Dept: SURGERY | Facility: HOSPITAL | Age: 48
DRG: 485 | End: 2023-03-29
Payer: COMMERCIAL

## 2023-03-29 PROBLEM — M47.816 FACET ARTHROPATHY, LUMBAR: Status: ACTIVE | Noted: 2023-03-29

## 2023-03-29 LAB
POCT GLUCOSE: 135 MG/DL (ref 70–110)
POCT GLUCOSE: 149 MG/DL (ref 70–110)
POCT GLUCOSE: 154 MG/DL (ref 70–110)
POCT GLUCOSE: 167 MG/DL (ref 70–110)

## 2023-03-29 PROCEDURE — 25000003 PHARM REV CODE 250: Performed by: HOSPITALIST

## 2023-03-29 PROCEDURE — 63600175 PHARM REV CODE 636 W HCPCS: Performed by: HOSPITALIST

## 2023-03-29 PROCEDURE — A4216 STERILE WATER/SALINE, 10 ML: HCPCS | Performed by: HOSPITALIST

## 2023-03-29 PROCEDURE — 97110 THERAPEUTIC EXERCISES: CPT | Mod: CO

## 2023-03-29 PROCEDURE — 11000001 HC ACUTE MED/SURG PRIVATE ROOM

## 2023-03-29 PROCEDURE — 99232 SBSQ HOSP IP/OBS MODERATE 35: CPT | Mod: ,,, | Performed by: INTERNAL MEDICINE

## 2023-03-29 PROCEDURE — 97530 THERAPEUTIC ACTIVITIES: CPT | Mod: CQ

## 2023-03-29 PROCEDURE — 99222 1ST HOSP IP/OBS MODERATE 55: CPT | Mod: ,,, | Performed by: PHYSICIAN ASSISTANT

## 2023-03-29 PROCEDURE — 99232 PR SUBSEQUENT HOSPITAL CARE,LEVL II: ICD-10-PCS | Mod: ,,, | Performed by: INTERNAL MEDICINE

## 2023-03-29 PROCEDURE — 99222 PR INITIAL HOSPITAL CARE,LEVL II: ICD-10-PCS | Mod: ,,, | Performed by: PHYSICIAN ASSISTANT

## 2023-03-29 PROCEDURE — 97530 THERAPEUTIC ACTIVITIES: CPT | Mod: CO

## 2023-03-29 PROCEDURE — 97110 THERAPEUTIC EXERCISES: CPT | Mod: CQ

## 2023-03-29 RX ADMIN — HYDROCODONE BITARTRATE AND ACETAMINOPHEN 1 TABLET: 5; 325 TABLET ORAL at 10:03

## 2023-03-29 RX ADMIN — HYDROCODONE BITARTRATE AND ACETAMINOPHEN 1 TABLET: 5; 325 TABLET ORAL at 01:03

## 2023-03-29 RX ADMIN — CYCLOBENZAPRINE HYDROCHLORIDE 5 MG: 5 TABLET, FILM COATED ORAL at 10:03

## 2023-03-29 RX ADMIN — Medication 10 ML: at 06:03

## 2023-03-29 RX ADMIN — HYDROCODONE BITARTRATE AND ACETAMINOPHEN 1 TABLET: 5; 325 TABLET ORAL at 06:03

## 2023-03-29 RX ADMIN — CEFTRIAXONE 2 G: 2 INJECTION, SOLUTION INTRAVENOUS at 09:03

## 2023-03-29 RX ADMIN — Medication 10 ML: at 12:03

## 2023-03-29 RX ADMIN — VANCOMYCIN HYDROCHLORIDE 2000 MG: 500 INJECTION, POWDER, LYOPHILIZED, FOR SOLUTION INTRAVENOUS at 02:03

## 2023-03-29 RX ADMIN — CYCLOBENZAPRINE HYDROCHLORIDE 5 MG: 5 TABLET, FILM COATED ORAL at 09:03

## 2023-03-29 RX ADMIN — INSULIN DETEMIR 5 UNITS: 100 INJECTION, SOLUTION SUBCUTANEOUS at 09:03

## 2023-03-29 RX ADMIN — MELATONIN TAB 3 MG 6 MG: 3 TAB at 10:03

## 2023-03-29 RX ADMIN — Medication 10 ML: at 11:03

## 2023-03-29 NOTE — SUBJECTIVE & OBJECTIVE
Interval History: Joint pains still present.     Review of Systems   Constitutional:  Negative for chills and fever.   Respiratory:  Negative for shortness of breath.    Cardiovascular:  Negative for chest pain.   Gastrointestinal:  Negative for abdominal pain.   Genitourinary:  Negative for difficulty urinating.   Musculoskeletal:  Positive for arthralgias. Negative for myalgias.   Skin:  Negative for rash.   Neurological:  Positive for weakness. Negative for numbness.   Psychiatric/Behavioral:  Negative for confusion.    Objective:     Vital Signs (Most Recent):  Temp: 98.2 °F (36.8 °C) (03/29/23 0735)  Pulse: 102 (03/29/23 0735)  Resp: 17 (03/29/23 0735)  BP: 111/66 (03/29/23 0735)  SpO2: 97 % (03/29/23 0735)   Vital Signs (24h Range):  Temp:  [97.7 °F (36.5 °C)-98.7 °F (37.1 °C)] 98.2 °F (36.8 °C)  Pulse:  [] 102  Resp:  [17-22] 17  SpO2:  [95 %-98 %] 97 %  BP: (111-146)/(65-79) 111/66     Weight: 121.5 kg (267 lb 13.7 oz)  Body mass index is 41.95 kg/m².    Intake/Output Summary (Last 24 hours) at 3/29/2023 0957  Last data filed at 3/29/2023 0900  Gross per 24 hour   Intake 790 ml   Output 3450 ml   Net -2660 ml        Physical Exam  Vitals and nursing note reviewed.   Constitutional:       General: She is not in acute distress.     Appearance: She is obese. She is not ill-appearing.   HENT:      Head: Normocephalic and atraumatic.      Nose: Nose normal.      Mouth/Throat:      Mouth: Mucous membranes are moist.   Cardiovascular:      Rate and Rhythm: Regular rhythm. Tachycardia present.   Pulmonary:      Effort: Pulmonary effort is normal.      Breath sounds: Normal breath sounds.      Comments: Room air  Abdominal:      General: Bowel sounds are normal.      Palpations: Abdomen is soft.   Musculoskeletal:      Right lower leg: No edema.      Left lower leg: No edema.      Comments: R wrist swelling. R knee swellling   Skin:     General: Skin is warm and dry.      Findings: No rash.   Neurological:       Mental Status: She is alert and oriented to person, place, and time.       Significant Labs: All pertinent labs within the past 24 hours have been reviewed.    Significant Imaging: I have reviewed all pertinent imaging results/findings within the past 24 hours.

## 2023-03-29 NOTE — PLAN OF CARE
Problem: Diabetes Comorbidity  Goal: Blood Glucose Level Within Targeted Range  Intervention: Monitor and Manage Glycemia  Flowsheets (Taken 3/29/2023 0310)  Glycemic Management:   blood glucose monitored   supplemental insulin given     Problem: Infection (Pneumonia)  Goal: Resolution of Infection Signs and Symptoms  Intervention: Prevent Infection Progression  Flowsheets (Taken 3/29/2023 0310)  Infection Management: (IV Vanc 2g/Rocephin 2g) --     Problem: Impaired Wound Healing  Goal: Optimal Wound Healing  Intervention: Promote Wound Healing  Flowsheets (Taken 3/29/2023 0310)  Oral Nutrition Promotion: (PT/OT) physical activity promoted  Activity Management: Leg kicks - L2  Pain Management Interventions: (Hydrocodone prn) medication offered

## 2023-03-29 NOTE — PROGRESS NOTES
Patient reports very much improvement in left shoulder pain and range of motion after aspiration performed Friday.  Patient reports no improvement in right knee pain or right wrist pain and now worsening left knee pain.    Right knee aspiration, right wrist aspiration and left shoulder aspiration all remain no growth to date.  Fluid suspicious for possible sepsis due to elevated WBC count with left shift.    Patient remains afebrile   WBC normal    Exam today shows almost full range of motion left shoulder with minimal to no pain.    Right wrist remains painful to range of motion with diffuse dorsal tenderness.    Bilateral knee pain with any range of motion.  Diffuse tenderness.  No erythema.  Neurologically intact.    MRI lumbar spine shows changes in spine with edema in the left sacroiliac joint which is where her pain began 3 weeks ago.  Left hip joint shows no effusion   MRI right wrist shows small amount of fluid  MRI I left shoulder shows with effusion present    Patient has odd presentation sepsis at outlying facility with positive blood cultures Streptococcus pneumonia.  All cultures at this facility have been normal.  Patient has remained afebrile with normal white blood cell count at this facility.  Aspiration show possible infection in multiple joints with all cultures no growth to date.  Left SI joint shows edema in the surrounding bone.  Neurosurgery has been consulted regarding spine and sacroiliac joint.    After discussing options the patient with continued antibiotics or arthroscopy and open arthrotomy of multiple joints, patient would like to proceed with arthroscopy of both knees and open arthrotomy of the right wrist.  The left shoulder has improved a great deal after aspiration and she does not desire surgery on the left shoulder at this time.  Plan will be to do surgery tomorrow, bilateral knee arthroscopy with synovectomy and washout, open arthrotomy dorsal right wrist joint.,  consents  signed after after discussing risks.  NPO past midnight.

## 2023-03-29 NOTE — PT/OT/SLP PROGRESS
Physical Therapy Treatment    Patient Name:  Hedy Conway   MRN:  [id number]    Recommendations:     Discharge Recommendations: rehabilitation facility  Discharge Equipment Recommendations:  (TBD at next level of care)  Barriers to discharge: None    Assessment:     Hedy Conway is a 47 y.o. female admitted with a medical diagnosis of Polyarthritis.  She presents with the following impairments/functional limitations: weakness, impaired endurance, impaired functional mobility, gait instability, impaired self care skills, decreased lower extremity function, decreased upper extremity function, decreased ROM, decreased coordination, pain, impaired balance, decreased safety awareness, impaired skin, edema, impaired joint extensibility, impaired muscle length .    Rehab Prognosis: Good; patient would benefit from acute skilled PT services to address these deficits and reach maximum level of function.    Recent Surgery: * No surgery found *      Plan:     During this hospitalization, patient to be seen 5 x/week to address the identified rehab impairments via gait training, therapeutic activities, therapeutic exercises and progress toward the following goals:    Plan of Care Expires:  04/07/23    Subjective     Chief Complaint: Pain   Patient/Family Comments/goals: pt is pleasant and agreeable to therapy   Pain/Comfort:  Location - Side 1: Bilateral ,R wrist, L shoulder and B knees pain   Location - Orientation 1: generalized  Pain Addressed 1: Pre-medicate for activity, Reposition, Nurse notified, Cessation of Activity      Objective:     Communicated with nurse prior to session.  Patient found left sidelying with foam wedge telemetry, bed alarm upon PT entry to room.     General Precautions: Standard, fall  Orthopedic Precautions: N/A  Braces: N/A  Respiratory Status: Room air     Functional Mobility: pt with slow movements, required extra time and frequent rest breaks 2* to pain .  Bed Mobility:     Rolling to R :  max A X 2 .   Scooting: MOD  assistance to scoot anteriorly,  maximal assistance, and of 2 persons to scoot laterally along EOB .   Supine to Sit: max  assistance and of 2 persons, HOB elevated, bedside rail   Sit to Supine: maximal assistance and of 2 persons  Transfers:     Sit to Stand: x 2 trials from elevated bed  maximal assistance and of 2 persons with LUE push off and bedside rail and RUE resting on bed rail. Pt only able to clear buttock off the bed . Pt with c/o B knees pain with WB, despite max V/T cues , pt unable to achieve  upright posture .   Balance:  Good in sitting, poor in standing.        AM-PAC 6 CLICK MOBILITY  Turning over in bed (including adjusting bedclothes, sheets and blankets)?: 2  Sitting down on and standing up from a chair with arms (e.g., wheelchair, bedside commode, etc.): 2  Moving from lying on back to sitting on the side of the bed?: 2  Moving to and from a bed to a chair (including a wheelchair)?: 1  Need to walk in hospital room?: 1  Climbing 3-5 steps with a railing?: 1  Basic Mobility Total Score: 9       Treatment & Education:  Lower Extremity Exercises.   Patient educated on the purpose of therapeutic exercise.    Patient verbalized acceptance/understanding of instructions, expectations, and limitations(for safety).  Patient performed: 15  reps (each) of B LE There Ex: AP, LAQ, Hip abd/add while sitting up on EOB.       Patient required  verbal cues/tactile cues to ensure correct sequence, to maintain proper form, and to allow for self-correction.  Encouraged pt to perform BLE AP, QS, GS in bed throughout the day.     Patient left right sidelying with(HOB sonny foam wedge to offload L side, BLE/BUE elevated, heels offloading  all lines intact, call button in reach, bed alarm on, and nurse/PCT  notified..    GOALS:   Multidisciplinary Problems       Physical Therapy Goals          Problem: Physical Therapy    Goal Priority Disciplines Outcome Goal Variances Interventions    Physical Therapy Goal     PT, PT/OT Ongoing, Progressing     Description: Goals to be met by:  2023    Patient will increase functional independence with mobility by performin. Supine to sit with Modified [place]  2. Sit to stand transfer with Modified [place]  3. Gait >50 feet with Modified [place] using Rolling Walker  4. BLE seated/supine LE therex x15 reps independently                              Time Tracking:     PT Received On: 23  PT Start Time: 1130     PT Stop Time: 1209  PT Total Time (min): 39 min     Billable Minutes: Therapeutic Activity 23, Therapeutic Exercise 15 , and Total Time 39 min with OT     Treatment Type: Treatment  PT/PTA: PTA     Number of PTA visits since last PT visit: 2023

## 2023-03-29 NOTE — PROGRESS NOTES
Vancomycin consult follow-up:    Patient reviewed, renal function stable, no new levels, continue current therapy; Next levels due: trough due 3/30/2023 at 0200

## 2023-03-29 NOTE — PLAN OF CARE
Problem: Physical Therapy  Goal: Physical Therapy Goal  Description: Goals to be met by:  2023    Patient will increase functional independence with mobility by performin. Supine to sit with Modified [place]  2. Sit to stand transfer with Modified [place]  3. Gait >50 feet with Modified [place] using Rolling Walker  4. BLE seated/supine LE therex x15 reps independently         Outcome: Ongoing, Progressing

## 2023-03-29 NOTE — PROGRESS NOTES
"  [facility] - Med Surg  Adult Nutrition  Consult Note    SUMMARY     Recommendations    1. Monitor PO intake   2. Monitor weight changes; check weekly weights.   3. RD to follow up  Goals: 1. Pt to meet % EEN/EPN by RD follow up  Nutrition Goal Status: new  Communication of RD Recs:  (POC)    Assessment and Plan    Nutrition Problem  Obesity    Related to (etiology):   Poor energy intake    Signs and Symptoms (as evidenced by):   BMI of 41.82 - morbid obesity  Type 2 diabetes mellitus    Interventions/Recommendations (treatment strategy):  Carbohydrate controlled diet  Collaboration with medical providers    Nutrition Diagnosis Status:   New     Reason for Assessment    Reason For Assessment: length of stay  Diagnosis: other (see comments) (polyarthritis)  Relevant Medical History: Acute carpal tunnel syndrome of left wrist, DM, chemotherapy  Interdisciplinary Rounds: did not attend  General Information Comments: RD consult 2/2 LOS > 8 days. Pt seen eating luch. States she has a decrease in appetite x 3 days. States she has constipaton and denies nausea, vomiting and diarrhea. Pt also notices weight loss;  lb.  lbs. Pt is morbidly obese with BMI 41.95. Continue to monitor PO intake and weight changes. Consider diabetic education prior to discharge. Pt is not visibly malnourished. LBM 3/29/23.  Nutrition Discharge Planning: Diabetic Diet    Nutrition Risk Screen    Nutrition Risk Screen: no indicators present    Nutrition/Diet History    Spiritual, Cultural Beliefs, Orthodoxy Practices, Values that Affect Care: no  Food Allergies: NKFA    Anthropometrics    Temp: 98.1 °F (36.7 °C)  Height Method: Stated  Height: 5' 7" (170.2 cm)  Height (inches): 67 in  Weight Method: Bed Scale  Weight: 121.1 kg (267 lb)  Weight (lb): 267 lb  Ideal Body Weight (IBW), Female: 135 lb  % Ideal Body Weight, Female (lb): 198.41 %  BMI (Calculated): 41.8  BMI Grade: greater than 40 - morbid obesity   "     Lab/Procedures/Meds    Pertinent Labs Reviewed: reviewed  Pertinent Labs Comments: glu 179, crp 76.2  Pertinent Medications Reviewed: reviewed  Current Outpatient Medications   Medication Instructions    albuterol (PROVENTIL/VENTOLIN HFA) 90 mcg/actuation inhaler Ventolin HFA 90 mcg/actuation aerosol inhaler   INHALE 2 PUFFS BY MOUTH 3 TIMES DAILY AS NEEDED.    albuterol-ipratropium (DUO-NEB) 2.5 mg-0.5 mg/3 mL nebulizer solution ipratropium 0.5 mg-albuterol 3 mg (2.5 mg base)/3 mL nebulization soln   USE 1 VIAL IN NEBULIZER EVERY 6 TO 8 HOURS AS NEEDED    bisoprolol (ZEBETA) 5 mg, Oral, Daily    blood sugar diagnostic Strp USE TO CHECK BLOOD SUGAR TWICE A DAY    cyclobenzaprine (FLEXERIL) 10 mg, Oral, 3 times daily PRN, Take for 5 days    fluticasone (FLONASE) 50 mcg/actuation nasal spray 1 spray, Each Nostril, Daily    HYDROcodone-acetaminophen (NORCO) 5-325 mg per tablet 1 tablet, Oral, Every 6 hours PRN    melatonin 10 mg, Oral, Nightly PRN    metFORMIN (GLUCOPHAGE) 500 mg, Oral, Daily    montelukast (SINGULAIR) 10 mg, Oral, Daily    naproxen (NAPROSYN) 500 mg, Oral, 2 times daily PRN, Take for 5 days    ondansetron (ZOFRAN) 4 mg, Oral, Every 8 hours PRN    pantoprazole (PROTONIX) 40 mg, Oral, Daily    PREMARIN 0.5 g, Vaginal, Daily, Place 0.5 vaginally for 4 weeks, then transition to twice weekly use.    zinc sulfate (ZINCATE) 220 mg, Oral, 3 times daily        Estimated/Assessed Needs    Weight Used For Calorie Calculations: 61.2 kg (135 lb)  Energy Calorie Requirements (kcal): 1401 kcal  Energy Need Method: [place] (1.1 x IBW)  Protein Requirements: 61 g  Weight Used For Protein Calculations: 61.2 kg (135 lb)        RDA Method (mL): 1401         Evaluation of Received Nutrient/Fluid Intake    I/O: -14 L  Energy Calories Required: not meeting needs  Protein Required: not meeting needs  Fluid Required: not meeting needs  Comments: LBM 3/29/23  % Intake of Estimated Energy Needs: 50 - 75 %  % Meal  Intake: 50 - 75 %    Nutrition Risk    Level of Risk/Frequency of Follow-up: low - moderate       Monitor and Evaluation    Food and Nutrient Intake: energy intake, food and beverage intake  Food and Nutrient Adminstration: diet order  Knowledge/Beliefs/Attitudes: food and nutrition knowledge/skill, beliefs and attitudes  Physical Activity and Function: nutrition-related ADLs and IADLs, factors affecting access to physical activity  Anthropometric Measurements: weight, weight change, body mass index  Biochemical Data, Medical Tests and Procedures: electrolyte and renal panel, gastrointestinal profile, glucose/endocrine profile, inflammatory profile, lipid profile  Nutrition-Focused Physical Findings: overall appearance       Nutrition Follow-Up    RD Follow-up?: Yes    Radha Joseph, Registration Eligible, Provisional LDN

## 2023-03-29 NOTE — PROCEDURES
"Hedy Conway is a 47 y.o. female patient.    Temp: 98 °F (36.7 °C) (03/28/23 1929)  Pulse: (!) 118 (03/28/23 1929)  Resp: 18 (03/28/23 1929)  BP: (!) 146/70 (03/28/23 1929)  SpO2: 95 % (03/28/23 1929)  Weight: 121.5 kg (267 lb 13.7 oz) (03/21/23 2205)  Height: 5' 7" (170.2 cm) (03/21/23 2205)    PICC  Date/Time: 3/28/2023 7:40 PM  Performed by: Angel Chiu RN  Consent Done: Yes  Time out: Immediately prior to procedure a time out was called to verify the correct patient, procedure, equipment, support staff and site/side marked as required  Indications: med administration and vascular access  Anesthesia: local infiltration  Local anesthetic: lidocaine 1% without epinephrine  Anesthetic Total (mL): 3  Preparation: skin prepped with ChloraPrep  Skin prep agent dried: skin prep agent completely dried prior to procedure  Sterile barriers: all five maximum sterile barriers used - cap, mask, sterile gown, sterile gloves, and large sterile sheet  Hand hygiene: hand hygiene performed prior to central venous catheter insertion  Location details: right basilic  Catheter type: double lumen  Catheter size: 5 Fr  Catheter Length: 37cm    Ultrasound guidance: yes  Vessel Caliber: medium, compressibility normal  Needle advanced into vessel with real time Ultrasound guidance.  Guidewire confirmed in vessel.  Sterile sheath used.  Number of attempts: 1  Post-procedure: blood return through all ports, chlorhexidine patch and sterile dressing applied    Assessment: placement verified by x-ray, tip termination and successful placement  Comments: ONEYDAE PICC placed for long-term IV abx. Pt. Currently on vancomycin and ceftriaxone. MD requested picc for lab draws.       Name angel chiu,   3/28/2023    "

## 2023-03-29 NOTE — HPI
Hedy Conway is a 47 y.o. female with dm, HTN, h/o non-Hodgkin's lymphoma status post chemotherapy and stem cell transplant in 2011  who presented to [facility] as a transfer from [facility] for possible septic arthritis.  She complained multi joint pain over the last few weeks including severe knee pain that prohibited her from walking.  She is also had left shoulder, right wrist, left hip, and b/l knee pain.  She was found to be septic with positive Streptococcus on blood culture as well as positive for pneumonia and UTI.  Orthopedics is also following the patient and plans to washout both knees and her right wrist tomorrow morning.  Neurosurgery is consulted for enhancing inflammation in the right L2-3 facet and left SI joint.

## 2023-03-29 NOTE — NURSING
Patient was encouraged to reposition to off load every 2 hours.  Patient states the importance of this but refused

## 2023-03-29 NOTE — ASSESSMENT & PLAN NOTE
Blood cultures 3/13 with Strep bacteremia. Currently being treated with ceftriaxone. This may have seeded her joints causing septic arthritis. Arthrocentesis R wrist and L shoulder completed on 3/27 with elevated neutrophils. Cultures no growth to date but has been on antibiotics for over a week. ID consulted

## 2023-03-29 NOTE — PROGRESS NOTES
Follow up    [facility], out of network    [facility], cannot meet patient needs    [facility], no answer    [facility], Submitted referral via [phone], attn: Cinthya

## 2023-03-29 NOTE — SUBJECTIVE & OBJECTIVE
Medications Prior to Admission   Medication Sig Dispense Refill Last Dose    albuterol (PROVENTIL/VENTOLIN HFA) 90 mcg/actuation inhaler Ventolin HFA 90 mcg/actuation aerosol inhaler   INHALE 2 PUFFS BY MOUTH 3 TIMES DAILY AS NEEDED.       albuterol-ipratropium (DUO-NEB) 2.5 mg-0.5 mg/3 mL nebulizer solution ipratropium 0.5 mg-albuterol 3 mg (2.5 mg base)/3 mL nebulization soln   USE 1 VIAL IN NEBULIZER EVERY 6 TO 8 HOURS AS NEEDED       bisoprolol (ZEBETA) 5 MG tablet Take 5 mg by mouth once daily.       blood sugar diagnostic Strp USE TO CHECK BLOOD SUGAR TWICE A DAY       conjugated estrogens (PREMARIN) vaginal cream Place 0.5 g vaginally once daily. Place 0.5 vaginally for 4 weeks, then transition to twice weekly use. 30 g 1     cyclobenzaprine (FLEXERIL) 10 MG tablet Take 10 mg by mouth 3 (three) times daily as needed. Take for 5 days       fluticasone (FLONASE) 50 mcg/actuation nasal spray 1 spray by Each Nare route once daily.       HYDROcodone-acetaminophen (NORCO) 5-325 mg per tablet Take 1 tablet by mouth every 6 (six) hours as needed.       melatonin 10 mg Cap Take 10 mg by mouth nightly as needed.       metFORMIN (GLUCOPHAGE) 500 MG tablet Take 500 mg by mouth once daily.  3     montelukast (SINGULAIR) 10 mg tablet Take 10 mg by mouth once daily.       naproxen (NAPROSYN) 500 MG tablet Take 500 mg by mouth 2 (two) times daily as needed. Take for 5 days       ondansetron (ZOFRAN) 4 MG tablet Take 4 mg by mouth every 8 (eight) hours as needed.       pantoprazole (PROTONIX) 40 MG tablet Take 1 tablet (40 mg total) by mouth once daily. 30 tablet 0     zinc sulfate (ZINCATE) 220 (50) mg capsule Take 220 mg by mouth 3 (three) times daily.          Review of patient's allergies indicates:   Allergen Reactions    Tetanus vaccines and toxoid Rash       Past Medical History:   Diagnosis Date    Acute carpal tunnel syndrome of left wrist     Biliary colic     Diabetes mellitus     Encounter for blood transfusion      History of chemotherapy     Incisional hernia     Large cell (diffuse) non-Hodgkin's lymphoma     Stem cells transplant status      Past Surgical History:   Procedure Laterality Date    BREAST BIOPSY Left     lymph node biopsy, benign    BREAST SURGERY      CHOLECYSTECTOMY      COLD KNIFE CONIZATION OF CERVIX N/A 3/8/2021    Procedure: CONE BIOPSY, CERVIX, USING COLD KNIFE;  Surgeon: Evelyn Wheeler MD;  Location: [facility];  Service: OB/GYN;  Laterality: N/A;    COLONOSCOPY N/A 12/21/2022    Procedure: COLONOSCOPY;  Surgeon: Annamarie Jane MD;  Location: [facility];  Service: Endoscopy;  Laterality: N/A;    HERNIA REPAIR      incisional    LUNG BIOPSY      lymphnode biopsy      MEDIPORT REMOVAL Left 1/30/2020    Procedure: REMOVAL, CATHETER, CENTRAL VENOUS, TUNNELED, WITH PORT;  Surgeon: Luis Bogran-Reyes, MD;  Location: [facility];  Service: General;  Laterality: Left;    PORTACATH PLACEMENT Left     REVISION OF SCAR  6/22/2021    Procedure: REVISION, SCAR;  Surgeon: Otis Grimes MD;  Location: [address];  Service: General;;    TUBAL LIGATION      UMBILICAL HERNIA REPAIR       Family History       Problem Relation (Age of Onset)    Breast cancer Maternal Aunt, Maternal Aunt    Cancer Paternal Grandmother    Colon cancer Paternal Grandmother    Diabetes Mother, Father    Heart block Mother    Heart failure Father    Hypertension Mother, Father    Kidney disease Mother    Lung cancer Father          Tobacco Use    Smoking status: Never    Smokeless tobacco: Never   Substance and Sexual Activity    Alcohol use: Not Currently    Drug use: No    Sexual activity: Yes     Partners: Male     Birth control/protection: See Surgical Hx     Comment: with one partner for 24 years     Review of Systems   Constitutional:  Negative for fever and unexpected weight change.   Gastrointestinal:  Positive for constipation.   Genitourinary:  Negative for difficulty urinating.   Musculoskeletal:  Positive for arthralgias,  gait problem, joint swelling, myalgias, neck pain and neck stiffness.   Skin:  Negative for wound.   Neurological:  Negative for weakness and numbness.   Objective:     Weight: 121.5 kg (267 lb 13.7 oz)  Body mass index is 41.95 kg/m².  Vital Signs (Most Recent):  Temp: 98.1 °F (36.7 °C) (03/29/23 1139)  Pulse: 107 (03/29/23 1139)  Resp: 18 (03/29/23 1345)  BP: 125/75 (03/29/23 1139)  SpO2: 98 % (03/29/23 1139) Vital Signs (24h Range):  Temp:  [97.7 °F (36.5 °C)-98.7 °F (37.1 °C)] 98.1 °F (36.7 °C)  Pulse:  [] 107  Resp:  [17-22] 18  SpO2:  [95 %-98 %] 98 %  BP: (111-146)/(65-79) 125/75     Date 03/29/23 0700 - 03/30/23 0659   Shift 5728-8025 7376-3925 2808-5860 24 Hour Total   INTAKE   P.O. 195   195   Shift Total(mL/kg) 195(1.6)   195(1.6)   OUTPUT   Urine(mL/kg/hr) 1200   1200   Shift Total(mL/kg) 1200(9.9)   1200(9.9)   Weight (kg) 121.5 121.5 121.5 121.5                   Female External Urinary Catheter 03/21/23 0516 (Active)   Skin no redness;no breakdown 03/28/23 0725   Tolerance no signs/symptoms of discomfort 03/28/23 2044   Suction Continuous suction at 40 mmHg 03/28/23 0725   Date of last wick change 03/27/23 03/27/23 1901   Time of last wick change 1901 03/27/23 1901   Output (mL) 800 mL 03/29/23 1100       Physical Exam    Neurosurgery Physical Exam  General: well developed, well nourished, no distress  Neurologic: Alert and oriented. Thought content appropriate.  GCS: Motor: 6/Verbal: 5/Eyes: 4 GCS Total: 15  Cranial nerves: II-XII grossly intact  Neck: supple, without obvious masses or lesions  Skin: grossly intact in all 4 extremities without obvious rashes or lesions    Gait - lying in bed with knees propped on pillows and pure wick in place      Motor Strength: ** very limited strength exam due to reported pain in her left shoulder and b/l knees.  He is able to provide some resistance with b/l hip flexion, but not able to fully lift her legs off the bed.  Similar findings with knee flexion.  Unable to evaluate knee extension 2/2 pain  Strength  Deltoids Triceps Biceps   Hand    Upper: R 5/5 5/5 5/5   4/5    L *5-/5 5/5 5/5   4/5     Iliopsoas Quadriceps Knee  Flexion Tibialis  anterior Gastro- cnemius EHL   Lower: R *4/5 * *4/5 5/5 5/5 5/5    L *4/5 * *4/5 5/5 5/5 5/5   Sensory: intact to light touch B/L UE and LE    Rowland's - Negative      Ankle Clonus - Negative           Midline bony tenderness:  Patient declined this portion of the exam    Significant Labs:  No results for input(s): GLU, NA, K, CL, CO2, BUN, CREATININE, CALCIUM, MG in the last 48 hours.  No results for input(s): WBC, HGB, HCT, PLT in the last 48 hours.  No results for input(s): LABPT, INR, APTT in the last 48 hours.  Microbiology Results (last 7 days)       Procedure Component Value Units Date/Time    AFB Culture & Smear [[id number]] Collected: 03/27/23 1551    Order Status: Completed Specimen: Shoulder, Left Updated: 03/29/23 1415     AFB CULTURE STAIN No acid fast bacilli seen.    Aerobic culture [[id number]] Collected: 03/27/23 1551    Order Status: Completed Specimen: Shoulder, Left Updated: 03/29/23 0826     Aerobic Bacterial Culture No growth    Culture, Anaerobe [[id number]] Collected: 03/27/23 1551    Order Status: Completed Specimen: Shoulder, Left Updated: 03/29/23 0654     Anaerobic Culture Culture in progress    Gram stain [[id number]] Collected: 03/27/23 1551    Order Status: Completed Specimen: Shoulder, Left Updated: 03/28/23 0802     Gram Stain Result Rare WBC's      No organisms seen    Fungus culture [[id number]] Collected: 03/27/23 1551    Order Status: Sent Specimen: Shoulder, Left Updated: 03/27/23 1632    Aerobic culture [[id number]] Collected: 03/27/23 1619    Order Status: Canceled Specimen: Arm from Wrist, Right     Culture, Anaerobe [[id number]] Collected: 03/27/23 1618    Order Status: Canceled Specimen: Other from Wrist, Right     AFB Culture & Smear [[id number]] Collected: 03/27/23 3371    Order Status:  Canceled Specimen: Other from Wrist, Right     Fungus culture [[id number]] Collected: 03/27/23 1618    Order Status: Canceled Specimen: Other from Wrist, Right     Gram stain [[id number]] Collected: 03/27/23 1618    Order Status: Canceled Specimen: Wound from Wrist, Right     Culture, Body Fluid (Aerobic) w/ GS [[id number]] Collected: 03/23/23 1244    Order Status: Completed Specimen: Body Fluid from Knee Updated: 03/27/23 0733     AEROBIC CULTURE - FLUID No growth     Gram Stain Result Few WBC's      No organisms seen    Blood culture [[id number]] Collected: 03/22/23 0610    Order Status: Completed Specimen: Blood from Peripheral, Right Arm Updated: 03/26/23 0903     Blood Culture, Routine No Growth after 4 days.              Significant Diagnostics:  I have personally reviewed imaging and agree with the findings.     MRI lumbar spine w/wo contrast 3/28/23  Right sided L2-3 facet joint arthropathy with enhancement.  Given the findings in this patient involving multiple additional joints, septic arthritis of the facet is not excluded although degenerative change could have this appearance.     Multilevel additional degenerative changes with areas of foraminal stenosis as detailed above.     No evidence of spondylo discitis.

## 2023-03-29 NOTE — CONSULTS
[facility] Surg  Neurosurgery  Consult Note    Inpatient consult to Neurosurgery  Consult performed by: Paulina Bell PA-C  Consult ordered by: Karla Garber MD  Reason for consult: facet arthropathy         Subjective:     Chief Complaint/Reason for Admission: sepsis     History of Present Illness: Hedy Conway is a 47 y.o. female with dm, HTN, h/o non-Hodgkin's lymphoma status post chemotherapy and stem cell transplant in 2011  who presented to [facility] as a transfer from [facility] for possible septic arthritis.  She complained multi joint pain over the last few weeks including severe knee pain that prohibited her from walking.  She is also had left shoulder, right wrist, left hip, and b/l knee pain.  She was found to be septic with positive Streptococcus on blood culture as well as positive for pneumonia and UTI.  Orthopedics is also following the patient and plans to washout both knees and her right wrist tomorrow morning.  Neurosurgery is consulted for enhancing inflammation in the right L2-3 facet and left SI joint.      Medications Prior to Admission   Medication Sig Dispense Refill Last Dose    albuterol (PROVENTIL/VENTOLIN HFA) 90 mcg/actuation inhaler Ventolin HFA 90 mcg/actuation aerosol inhaler   INHALE 2 PUFFS BY MOUTH 3 TIMES DAILY AS NEEDED.       albuterol-ipratropium (DUO-NEB) 2.5 mg-0.5 mg/3 mL nebulizer solution ipratropium 0.5 mg-albuterol 3 mg (2.5 mg base)/3 mL nebulization soln   USE 1 VIAL IN NEBULIZER EVERY 6 TO 8 HOURS AS NEEDED       bisoprolol (ZEBETA) 5 MG tablet Take 5 mg by mouth once daily.       blood sugar diagnostic Strp USE TO CHECK BLOOD SUGAR TWICE A DAY       conjugated estrogens (PREMARIN) vaginal cream Place 0.5 g vaginally once daily. Place 0.5 vaginally for 4 weeks, then transition to twice weekly use. 30 g 1     cyclobenzaprine (FLEXERIL) 10 MG tablet Take 10 mg by mouth 3 (three) times daily as needed. Take for 5 days        fluticasone (FLONASE) 50 mcg/actuation nasal spray 1 spray by Each Nare route once daily.       HYDROcodone-acetaminophen (NORCO) 5-325 mg per tablet Take 1 tablet by mouth every 6 (six) hours as needed.       melatonin 10 mg Cap Take 10 mg by mouth nightly as needed.       metFORMIN (GLUCOPHAGE) 500 MG tablet Take 500 mg by mouth once daily.  3     montelukast (SINGULAIR) 10 mg tablet Take 10 mg by mouth once daily.       naproxen (NAPROSYN) 500 MG tablet Take 500 mg by mouth 2 (two) times daily as needed. Take for 5 days       ondansetron (ZOFRAN) 4 MG tablet Take 4 mg by mouth every 8 (eight) hours as needed.       pantoprazole (PROTONIX) 40 MG tablet Take 1 tablet (40 mg total) by mouth once daily. 30 tablet 0     zinc sulfate (ZINCATE) 220 (50) mg capsule Take 220 mg by mouth 3 (three) times daily.          Review of patient's allergies indicates:   Allergen Reactions    Tetanus vaccines and toxoid Rash       Past Medical History:   Diagnosis Date    Acute carpal tunnel syndrome of left wrist     Biliary colic     Diabetes mellitus     Encounter for blood transfusion     History of chemotherapy     Incisional hernia     Large cell (diffuse) non-Hodgkin's lymphoma     Stem cells transplant status      Past Surgical History:   Procedure Laterality Date    BREAST BIOPSY Left     lymph node biopsy, benign    BREAST SURGERY      CHOLECYSTECTOMY      COLD KNIFE CONIZATION OF CERVIX N/A 3/8/2021    Procedure: CONE BIOPSY, CERVIX, USING COLD KNIFE;  Surgeon: Evelyn Wheeler MD;  Location: [facility];  Service: OB/GYN;  Laterality: N/A;    COLONOSCOPY N/A 12/21/2022    Procedure: COLONOSCOPY;  Surgeon: Annamarie Jane MD;  Location: [facility];  Service: Endoscopy;  Laterality: N/A;    HERNIA REPAIR      incisional    LUNG BIOPSY      lymphnode biopsy      MEDIPORT REMOVAL Left 1/30/2020    Procedure: REMOVAL, CATHETER, CENTRAL VENOUS, TUNNELED, WITH PORT;  Surgeon: Luis Bogran-Reyes, MD;   Location: [facility] OR;  Service: General;  Laterality: Left;    PORTACATH PLACEMENT Left     REVISION OF SCAR  6/22/2021    Procedure: REVISION, SCAR;  Surgeon: Otis Grimes MD;  [facility];  Service: General;;    TUBAL LIGATION      UMBILICAL HERNIA REPAIR       Family History       Problem Relation (Age of Onset)    Breast cancer Maternal Aunt, Maternal Aunt    Cancer Paternal Grandmother    Colon cancer Paternal Grandmother    Diabetes Mother, Father    Heart block Mother    Heart failure Father    Hypertension Mother, Father    Kidney disease Mother    Lung cancer Father          Tobacco Use    Smoking status: Never    Smokeless tobacco: Never   Substance and Sexual Activity    Alcohol use: Not Currently    Drug use: No    Sexual activity: Yes     Partners: Male     Birth control/protection: See Surgical Hx     Comment: with one partner for 24 years     Review of Systems   Constitutional:  Negative for fever and unexpected weight change.   Gastrointestinal:  Positive for constipation.   Genitourinary:  Negative for difficulty urinating.   Musculoskeletal:  Positive for arthralgias, gait problem, joint swelling, myalgias, neck pain and neck stiffness.   Skin:  Negative for wound.   Neurological:  Negative for weakness and numbness.   Objective:     Weight: 121.5 kg (267 lb 13.7 oz)  Body mass index is 41.95 kg/m².  Vital Signs (Most Recent):  Temp: 98.1 °F (36.7 °C) (03/29/23 1139)  Pulse: 107 (03/29/23 1139)  Resp: 18 (03/29/23 1345)  BP: 125/75 (03/29/23 1139)  SpO2: 98 % (03/29/23 1139) Vital Signs (24h Range):  Temp:  [97.7 °F (36.5 °C)-98.7 °F (37.1 °C)] 98.1 °F (36.7 °C)  Pulse:  [] 107  Resp:  [17-22] 18  SpO2:  [95 %-98 %] 98 %  BP: (111-146)/(65-79) 125/75     Date 03/29/23 0700 - 03/30/23 0659   Shift 6724-2838 9957-1824 5455-5357 24 Hour Total   INTAKE   P.O. 195   195   Shift Total(mL/kg) 195(1.6)   195(1.6)   OUTPUT   Urine(mL/kg/hr) 1200   1200   Shift Total(mL/kg)  1200(9.9)   1200(9.9)   Weight (kg) 121.5 121.5 121.5 121.5                   Female External Urinary Catheter 03/21/23 0516 (Active)   Skin no redness;no breakdown 03/28/23 0725   Tolerance no signs/symptoms of discomfort 03/28/23 2044   Suction Continuous suction at 40 mmHg 03/28/23 0725   Date of last wick change 03/27/23 03/27/23 1901   Time of last wick change 1901 03/27/23 1901   Output (mL) 800 mL 03/29/23 1100       Physical Exam    Neurosurgery Physical Exam  General: well developed, well nourished, no distress  Neurologic: Alert and oriented. Thought content appropriate.  GCS: Motor: 6/Verbal: 5/Eyes: 4 GCS Total: 15  Cranial nerves: II-XII grossly intact  Neck: supple, without obvious masses or lesions  Skin: grossly intact in all 4 extremities without obvious rashes or lesions    Gait - lying in bed with knees propped on pillows and pure wick in place      Motor Strength: ** very limited strength exam due to reported pain in her left shoulder and b/l knees.  He is able to provide some resistance with b/l hip flexion, but not able to fully lift her legs off the bed.  Similar findings with knee flexion. Unable to evaluate knee extension 2/2 pain  Strength  Deltoids Triceps Biceps   Hand    Upper: R 5/5 5/5 5/5   4/5    L *5-/5 5/5 5/5   4/5     Iliopsoas Quadriceps Knee  Flexion Tibialis  anterior Gastro- cnemius EHL   Lower: R *4/5 * *4/5 5/5 5/5 5/5    L *4/5 * *4/5 5/5 5/5 5/5   Sensory: intact to light touch B/L UE and LE    Rowland's - Negative      Ankle Clonus - Negative           Midline bony tenderness:  Patient declined this portion of the exam    Significant Labs:  No results for input(s): GLU, NA, K, CL, CO2, BUN, CREATININE, CALCIUM, MG in the last 48 hours.  No results for input(s): WBC, HGB, HCT, PLT in the last 48 hours.  No results for input(s): LABPT, INR, APTT in the last 48 hours.  Microbiology Results (last 7 days)       Procedure Component Value Units Date/Time    AFB Culture &  Smear [[id number]] Collected: 03/27/23 1551    Order Status: Completed Specimen: Shoulder, Left Updated: 03/29/23 1415     AFB CULTURE STAIN No acid fast bacilli seen.    Aerobic culture [[id number]] Collected: 03/27/23 1551    Order Status: Completed Specimen: Shoulder, Left Updated: 03/29/23 0826     Aerobic Bacterial Culture No growth    Culture, Anaerobe [[id number]] Collected: 03/27/23 1551    Order Status: Completed Specimen: Shoulder, Left Updated: 03/29/23 0654     Anaerobic Culture Culture in progress    Gram stain [[id number]] Collected: 03/27/23 1551    Order Status: Completed Specimen: Shoulder, Left Updated: 03/28/23 0802     Gram Stain Result Rare WBC's      No organisms seen    Fungus culture [[id number]] Collected: 03/27/23 1551    Order Status: Sent Specimen: Shoulder, Left Updated: 03/27/23 1632    Aerobic culture [[id number]] Collected: 03/27/23 1619    Order Status: Canceled Specimen: Arm from Wrist, Right     Culture, Anaerobe [[id number]] Collected: 03/27/23 1618    Order Status: Canceled Specimen: Other from Wrist, Right     AFB Culture & Smear [[id number]] Collected: 03/27/23 1618    Order Status: Canceled Specimen: Other from Wrist, Right     Fungus culture [[id number]] Collected: 03/27/23 1618    Order Status: Canceled Specimen: Other from Wrist, Right     Gram stain [[id number]] Collected: 03/27/23 1618    Order Status: Canceled Specimen: Wound from Wrist, Right     Culture, Body Fluid (Aerobic) w/ GS [[id number]] Collected: 03/23/23 1244    Order Status: Completed Specimen: Body Fluid from Knee Updated: 03/27/23 0733     AEROBIC CULTURE - FLUID No growth     Gram Stain Result Few WBC's      No organisms seen    Blood culture [[id number]] Collected: 03/22/23 0610    Order Status: Completed Specimen: Blood from Peripheral, Right Arm Updated: 03/26/23 0903     Blood Culture, Routine No Growth after 4 days.              Significant Diagnostics:  I have personally reviewed imaging and agree with the  findings.     MRI lumbar spine w/wo contrast 3/28/23  Right sided L2-3 facet joint arthropathy with enhancement.  Given the findings in this patient involving multiple additional joints, septic arthritis of the facet is not excluded although degenerative change could have this appearance.     Multilevel additional degenerative changes with areas of foraminal stenosis as detailed above.     No evidence of spondylo discitis.    Assessment/Plan:     Facet arthropathy, lumbar  Hedy Conway is a 47 y.o. female with DM, HTN, and h/o non-hodgkin's lymphoma being treated for sepsis, pneumonia, and possible septic arthritis. Neurosurgery was consulted for evaluation of enhancing inflammation on the right L2-3 facet joint.     Patient with minimal back and hip pain today but severe bilateral knee and right wrist pain with planned orthopedic washout tomorrow. Lumbar MRI finding could represent septic arthritis vs degenerative facet arthropathy. Exam was limited by knee pain, but she is grossly intact with no hyperreflexia.   -No acute neurosurgical intervention recommended at this time  -Recommend IV Abx per ID  -FU as needed for new or worsening symptoms     Case discussed with Dr. Toscano         Thank you for your consult. I will sign off. Please contact us if you have any additional questions.    Paulina Bell PA-C  Neurosurgery  [facility] [place] - Med Surg

## 2023-03-29 NOTE — PLAN OF CARE
Recommendations    1. Monitor PO intake   2. Monitor weight changes; check weekly weights.   3. RD to follow up  Goals: 1. Pt to meet % EEN/EPN by RD follow up  Nutrition Goal Status: new  Communication of RD Recs:  (POC)    Assessment and Plan    Nutrition Problem  Obesity    Related to (etiology):   Poor energy intake    Signs and Symptoms (as evidenced by):   BMI of 41.82 - morbid obesity  Type 2 diabetes mellitus    Interventions/Recommendations (treatment strategy):  Carbohydrate controlled diet  Collaboration with medical providers    Nutrition Diagnosis Status:   New

## 2023-03-29 NOTE — ASSESSMENT & PLAN NOTE
Hedy Conway is a 47 y.o. female with DM, HTN, and h/o non-hodgkin's lymphoma being treated for sepsis, pneumonia, and possible septic arthritis. Neurosurgery was consulted for evaluation of enhancing inflammation on the right L2-3 facet joint.     Patient with minimal back and hip pain today but severe bilateral knee and right wrist pain with planned orthopedic washout tomorrow. Lumbar MRI finding could represent septic arthritis vs degenerative facet arthropathy.   -No acute neurosurgical intervention recommended at this time  -Recommend IV Abx per ID  -FU as needed for new or worsening symptoms     Case discussed with Dr. Toscano

## 2023-03-29 NOTE — PLAN OF CARE
Plan A [facility], continuing to follow pt will need to tolerate therapy. Plan B for SNF, pt does have limited benefit. Received call from Minda, with [facility], unable to meet patient needs. Spoke with patient at bedside, [place] to continue to follow.    03/29/23 0828   Discharge Reassessment   Assessment Type Discharge Planning Reassessment   Did the patient's condition or plan change since previous assessment? No   Discharge Plan discussed with: Patient   Communicated JESSE with patient/caregiver Date not available/Unable to determine   Discharge Plan A Rehab   Discharge Plan B Skilled Nursing Facility;Home with family   DME Needed Upon Discharge  none   Discharge Barriers Identified None   Why the patient remains in the hospital Requires continued medical care   Post-Acute Status   Post-Acute Authorization Placement   Post-Acute Placement Status Pending medical clearance/testing   Discharge Delays None known at this time

## 2023-03-29 NOTE — PHARMACY MED REC
"[facility]           Pharmacy  Admission Medication History     The home medication history was taken by Nael Luther PharmD.      Medication history obtained from Medications listed below were obtained from: Patient/family    Based on information gathered for medication list, you may go to "Admission" then "Reconcile Home Medications" tabs to review and/or act upon those items.     The home medication list has been updated by the Pharmacy department.   Please read ALL comments highlighted in yellow.   Please address this information as you see fit.    Feel free to contact us if you have any questions or require assistance.        No current facility-administered medications on file prior to encounter.     Current Outpatient Medications on File Prior to Encounter   Medication Sig Dispense Refill    albuterol (PROVENTIL/VENTOLIN HFA) 90 mcg/actuation inhaler Ventolin HFA 90 mcg/actuation aerosol inhaler   INHALE 2 PUFFS BY MOUTH 3 TIMES DAILY AS NEEDED.      albuterol-ipratropium (DUO-NEB) 2.5 mg-0.5 mg/3 mL nebulizer solution ipratropium 0.5 mg-albuterol 3 mg (2.5 mg base)/3 mL nebulization soln   USE 1 VIAL IN NEBULIZER EVERY 6 TO 8 HOURS AS NEEDED      bisoprolol (ZEBETA) 5 MG tablet Take 5 mg by mouth once daily.      blood sugar diagnostic Strp USE TO CHECK BLOOD SUGAR TWICE A DAY      conjugated estrogens (PREMARIN) vaginal cream Place 0.5 g vaginally once daily. Place 0.5 vaginally for 4 weeks, then transition to twice weekly use. 30 g 1    cyclobenzaprine (FLEXERIL) 10 MG tablet Take 10 mg by mouth 3 (three) times daily as needed. Take for 5 days      fluticasone (FLONASE) 50 mcg/actuation nasal spray 1 spray by Each Nare route once daily.      HYDROcodone-acetaminophen (NORCO) 5-325 mg per tablet Take 1 tablet by mouth every 6 (six) hours as needed.      melatonin 10 mg Cap Take 10 mg by mouth nightly as needed.      metFORMIN (GLUCOPHAGE) 500 MG tablet Take 500 mg by mouth once " daily.  3    montelukast (SINGULAIR) 10 mg tablet Take 10 mg by mouth once daily.      naproxen (NAPROSYN) 500 MG tablet Take 500 mg by mouth 2 (two) times daily as needed. Take for 5 days      ondansetron (ZOFRAN) 4 MG tablet Take 4 mg by mouth every 8 (eight) hours as needed.      pantoprazole (PROTONIX) 40 MG tablet Take 1 tablet (40 mg total) by mouth once daily. 30 tablet 0    zinc sulfate (ZINCATE) 220 (50) mg capsule Take 220 mg by mouth 3 (three) times daily.      [DISCONTINUED] celecoxib (CELEBREX) 200 MG capsule Take 200 mg by mouth once daily. Pt stated she was recently taken off this medication      [DISCONTINUED] budesonide (PULMICORT) 0.5 mg/2 mL nebulizer solution USE 1 VIAL IN NEBULIZER TWICE DAILY (OK TO MIX WITH ALBUTEROL. RINSE MOUTH AFTER USE)      [DISCONTINUED] budesonide (PULMICORT) 0.5 mg/2 mL nebulizer solution budesonide 0.5 mg/2 mL suspension for nebulization   USE 1 VIAL IN NEBULIZER TWICE DAILY (OK TO MIX WITH ALBUTEROL. RINSE MOUTH AFTER USE)         Please address this information as you see fit.  Feel free to contact us if you have any questions or require assistance.    Nael Luther, PharmD  [phone]                 .

## 2023-03-29 NOTE — ASSESSMENT & PLAN NOTE
Present since admission to [facility]. MRI noted R knee effusion. ESR, CRP elevated. Doubt gout. RF, CCP, SILVIA, acute hep panel normal. Parvovirus negative. RF normal. TSH elevated but free T4 normal, so less likely hypothyroidism. Overall this is concerning for septic arthritis affecting multiple joints as a result of bacteremia.     - MRI L shoulder: complex effusion with synovitis  - MRI R wrist: osteitis, complex effusions and fluid collections  - MRI L hip: abnormal SI joint concerning for septic arthritis   - MRI lumbar spine: R sided L2-3 facet joint arthropathy concerning for septic arthritis    - Orthopaedics consulted   - s/p R wrist and L shoulder arthrocenteses on 3/27/23   - R wrist only 2cc, cell count not performed but PMN predominant. Cultures unable to be sent   - L shoulder WBC 47K with PMN predominance, cultures NGTD but has been on antibiotics for >1 week  - pending Ortho decision regarding washout    - Neurosurgery consulted for SI joint and L2-3 findings on MRI    - ID following, remains on CTX and vanc   - PT, OT consulted as well - likely will need rehab vs SNF when ready

## 2023-03-29 NOTE — ANESTHESIA PREPROCEDURE EVALUATION
03/29/2023  Hedy Conway is a 47 y.o., female.  To undergo Procedure(s) (LRB):  ARTHROSCOPY, KNEE (Bilateral)  OPEN  ARTHROTOMY WRIST (Right)     Denies CP/SOB/GERD/MI/CVA/URI symptoms.  METS > 4  NPO > 8 instructions given.    Past Medical History:  Past Medical History:   Diagnosis Date    Acute carpal tunnel syndrome of left wrist     Biliary colic     Diabetes mellitus     Encounter for blood transfusion     History of chemotherapy     Incisional hernia     Large cell (diffuse) non-Hodgkin's lymphoma     Stem cells transplant status        Past Surgical History:  Past Surgical History:   Procedure Laterality Date    BREAST BIOPSY Left     lymph node biopsy, benign    BREAST SURGERY      CHOLECYSTECTOMY      COLD KNIFE CONIZATION OF CERVIX N/A 3/8/2021    Procedure: CONE BIOPSY, CERVIX, USING COLD KNIFE;  Surgeon: Evelyn Wheeler MD;  Location: [facility];  Service: OB/GYN;  Laterality: N/A;    COLONOSCOPY N/A 12/21/2022    Procedure: COLONOSCOPY;  Surgeon: Annamarie Jane MD;  Location: [facility];  Service: Endoscopy;  Laterality: N/A;    HERNIA REPAIR      incisional    LUNG BIOPSY      lymphnode biopsy      MEDIPORT REMOVAL Left 1/30/2020    Procedure: REMOVAL, CATHETER, CENTRAL VENOUS, TUNNELED, WITH PORT;  Surgeon: Luis Bogran-Reyes, MD;  Location: [facility];  Service: General;  Laterality: Left;    PORTACATH PLACEMENT Left     REVISION OF SCAR  6/22/2021    Procedure: REVISION, SCAR;  Surgeon: Otis Grimes MD;  Location: [address];  Service: General;;    TUBAL LIGATION      UMBILICAL HERNIA REPAIR         Social History:  Social History     Socioeconomic History    Marital status:     Years of education: 12th   Tobacco Use    Smoking status: Never    Smokeless tobacco: Never   Substance and Sexual Activity    Alcohol use: Not Currently    Drug use: No     Sexual activity: Yes     Partners: Male     Birth control/protection: See Surgical Hx     Comment: with one partner for 24 years       Medications:  No current facility-administered medications on file prior to encounter.     Current Outpatient Medications on File Prior to Encounter   Medication Sig Dispense Refill    albuterol (PROVENTIL/VENTOLIN HFA) 90 mcg/actuation inhaler Ventolin HFA 90 mcg/actuation aerosol inhaler   INHALE 2 PUFFS BY MOUTH 3 TIMES DAILY AS NEEDED.      albuterol-ipratropium (DUO-NEB) 2.5 mg-0.5 mg/3 mL nebulizer solution ipratropium 0.5 mg-albuterol 3 mg (2.5 mg base)/3 mL nebulization soln   USE 1 VIAL IN NEBULIZER EVERY 6 TO 8 HOURS AS NEEDED      bisoprolol (ZEBETA) 5 MG tablet Take 5 mg by mouth once daily.      blood sugar diagnostic Strp USE TO CHECK BLOOD SUGAR TWICE A DAY      conjugated estrogens (PREMARIN) vaginal cream Place 0.5 g vaginally once daily. Place 0.5 vaginally for 4 weeks, then transition to twice weekly use. 30 g 1    cyclobenzaprine (FLEXERIL) 10 MG tablet Take 10 mg by mouth 3 (three) times daily as needed. Take for 5 days      fluticasone (FLONASE) 50 mcg/actuation nasal spray 1 spray by Each Nare route once daily.      HYDROcodone-acetaminophen (NORCO) 5-325 mg per tablet Take 1 tablet by mouth every 6 (six) hours as needed.      melatonin 10 mg Cap Take 10 mg by mouth nightly as needed.      metFORMIN (GLUCOPHAGE) 500 MG tablet Take 500 mg by mouth once daily.  3    montelukast (SINGULAIR) 10 mg tablet Take 10 mg by mouth once daily.      naproxen (NAPROSYN) 500 MG tablet Take 500 mg by mouth 2 (two) times daily as needed. Take for 5 days      ondansetron (ZOFRAN) 4 MG tablet Take 4 mg by mouth every 8 (eight) hours as needed.      pantoprazole (PROTONIX) 40 MG tablet Take 1 tablet (40 mg total) by mouth once daily. 30 tablet 0    zinc sulfate (ZINCATE) 220 (50) mg capsule Take 220 mg by mouth 3 (three) times daily.         Allergies:  Review  of patient's allergies indicates:   Allergen Reactions    Tetanus vaccines and toxoid Rash       Active Problems:  Patient Active Problem List   Diagnosis    Large cell (diffuse) non-Hodgkin's lymphoma    Stem cells transplant status    Abnormal CT of the chest    Splenomegaly    Multiple lung nodules on CT    Pulmonary nodules    Lung nodule    Port-A-Cath in place    Dysplasia of cervix, high grade ANGIE 2    S/P CKC on 3/8/2021    Streptococcal bacteremia    Type 2 diabetes mellitus without complication, without long-term current use of insulin    Polyarthritis    COPD (chronic obstructive pulmonary disease)    Postmenopausal bleeding    Weakness of both lower extremities    Anemia of chronic disease    Subclinical hypothyroidism    Facet arthropathy, lumbar       Diagnostic Studies:   Latest Reference Range & Units 03/27/23 04:07   WBC 3.90 - 12.70 K/uL 4.80   RBC 4.00 - 5.40 M/uL 3.33 (L)   Hemoglobin 12.0 - 16.0 g/dL 8.0 (L)   Hematocrit 37.0 - 48.5 % 27.4 (L)   MCV 82 - 98 fL 82   MCH 27.0 - 31.0 pg 24.0 (L)   MCHC 32.0 - 36.0 g/dL 29.2 (L)   RDW 11.5 - 14.5 % 17.6 (H)   Platelets 150 - 450 K/uL 241   MPV 9.2 - 12.9 fL 10.3      Latest Reference Range & Units 03/27/23 04:07   Sodium 136 - 145 mmol/L 138   Potassium 3.5 - 5.1 mmol/L 4.2   Chloride 95 - 110 mmol/L 101   CO2 23 - 29 mmol/L 26   Anion Gap 8 - 16 mmol/L 11   BUN 6 - 20 mg/dL 14   Creatinine 0.5 - 1.4 mg/dL 0.6   eGFR >60 mL/min/1.73 m^2 >60   Glucose 70 - 110 mg/dL 179 (H)   Calcium 8.7 - 10.5 mg/dL 8.7     EKG (3/13/23):  Sinus tachycardia    TTE (6/14/21):   The left ventricle is normal in size with concentric remodeling and normal systolic function.   Normal right ventricular size with normal right ventricular systolic function.   Mild aortic regurgitation.   Mild mitral regurgitation.   Moderate tricuspid regurgitation.   Mild pulmonic regurgitation.   The estimated PA systolic pressure is 53 mmHg.   There is  pulmonary hypertension.   No pericardial effusion.    24 Hour Vitals:  Temp:  [36.5 °C (97.7 °F)-37.1 °C (98.7 °F)] 36.7 °C (98.1 °F)  Pulse:  [] 107  Resp:  [17-22] 18  SpO2:  [95 %-98 %] 98 %  BP: (111-146)/(65-79) 125/75   See Nursing Charting For Additional Vitals      Pre-op Assessment    I have reviewed the Patient Summary Reports.     I have reviewed the Nursing Notes.       Review of Systems  Anesthesia Hx:  No problems with previous Anesthesia   Denies Personal Hx of Anesthesia complications.   Social:  Non-Smoker, No Alcohol Use    Hematology/Oncology:         -- Anemia: Hematology Comments: Non-hodgkins lymphoma   Cardiovascular:   Exercise tolerance: good ECG has been reviewed. pHTN   Pulmonary:   COPD, mild    Hepatic/GI:  Hepatic/GI Normal    Neurological:  Neurology Normal    Endocrine:   Diabetes Hypothyroidism  Morbid Obesity / BMI > 40      Physical Exam  General: Well nourished and Cooperative    Airway:  Mallampati: III   Mouth Opening: Normal  TM Distance: Normal      Dental:  Intact    Chest/Lungs:  Clear to auscultation, Normal Respiratory Rate    Heart:  Rate: Normal  Rhythm: Regular Rhythm        Anesthesia Plan  Type of Anesthesia, risks & benefits discussed:    Anesthesia Type: Gen ETT, Gen Supraglottic Airway  Intra-op Monitoring Plan: Standard ASA Monitors  Post Op Pain Control Plan: multimodal analgesia and IV/PO Opioids PRN  Induction:  IV  Airway Plan: Direct and Video, Post-Induction  Informed Consent: Informed consent signed with the Patient and all parties understand the risks and agree with anesthesia plan.  All questions answered. Patient consented to blood products? Yes  ASA Score: 3  Anesthesia Plan Notes: Paper consents placed in patients chart.    Ready For Surgery From Anesthesia Perspective.     .

## 2023-03-29 NOTE — PLAN OF CARE
Problem: Occupational Therapy  Goal: Occupational Therapy Goal  Description: Goals to be met by 04/07/23:    Patient will increase functional independence with ADLs by performing:    Grooming while seated with Modified [place].  Toileting from bedside commode with Moderate Assistance for hygiene and clothing management.   Sit to stand w/ Minimum Assistance.   Step transfer with Moderate Assistance  Toilet transfer to bedside commode with Moderate Assistance.  Upper extremity exercise program x15 reps per handout, with assistance as needed.      Outcome: Ongoing, Progressing

## 2023-03-29 NOTE — PROGRESS NOTES
[facility] Medicine  Progress Note    Patient Name: Hedy Conway  MRN: [id number]  Patient Class: IP- Inpatient   Admission Date: 3/21/2023  Length of Stay: 8 days  Attending Physician: Karla Garber MD  Primary Care Provider: John Lantigua PA-C        Subjective:     Principal Problem:Polyarthritis        HPI:  47-year-old  female with medical history significant for type 2 diabetes mellitus, hypertension, large cell diffuse non-Hodgkin lymphoma status post chemotherapy and stem cell transplant 2011 was transferred from outside hospital with suspicion of septic arthritis.  Of note she voiced migratory pain in her left-right knee-both shoulders that started 2 weeks ago, associated with generalized weakness, cleansing inability to move right lower extremity and only have movement without gravity on the left lower extremity without associated trauma, she voiced being on 3 different antibiotics at the outside hospital for both pneumonia and urinary tract infection, and this could explain for been afebrile although she voiced subjective fever at the onset of her symptoms she denied cough, shortness of breath, orthopnea, paroxysmal nocturnal dyspnea or pedal swelling.  She denied prior diarrhea although she voiced upper respiratory tract symptoms,treatment for pneumonia prior to onset of these symptoms.  She denied any urinary symptoms at this time of dysuria, frequency, urgency, straining at micturition or hematuria.  No recent travel, no sick contacts, no tick bite.  She had been sent here for possible knee aspiration.      Overview/Hospital Course:  Ms Hedy Conway who was transferred to [facility] from [facility]. There she was admitted for multiple joint pains. MRI R knee showed meniscal tear and large effusion with synovitis. There was concern for septic arthritis, aspiration was attempted, but no fluid retrieved. She also had Strep pneumoniae bacteremia (3/13) and  Pseudomonas pneumonia (3/15) and is currently being treated with cefepime. She is also receiving vancomycin with concerns for septic joint. She did require 1U RBC transfusion and has had vaginal bleeding. Pelvic US 3/20 showed thickened endometrium, and she will need outpatient GYN follow up. Her iron panel indicates mixed iron deficiency and anemia of chronic disease. She saw Neurology and Orthopaedics. Orthopaedics doubts multiple septic joints simultaneously and suspects Rheumatologic condition. S/p R knee arthrocentesis on 3/23 with culture no growth. Sed rate, CRP remain elevated. Uric acid normal, RF/CCP/SILVIA/hepatitis panel normal. MRI left shoulder with large complex joint effusion with synovitis, high grade partial thickness tear of supraspinatus and infraspinatus, near complete tear of intraarticular bicpet tendon with tenosynovitis. MRI right wrist with distal ulna/radius/carpal osteitis, complex effusions, complex fluid collections, tenosynovitis. S/p arthrocentesis of both L shoulder and R wrist by IR on 3/28- both have high neutrophil count, suggesting septic arthritis. No growth on cultures, but she has been on antibiotics for >1 week. MRI hip shows abnormal L SI joint concerning for septic arthritis. MRI lumbar spine shows R L2-3 facet arthropathy with enhancement. Pending Ortho decision regarding washout. Neurosurgery also consulted for lumbar spine findings.       Interval History: Joint pains still present.     Review of Systems   Constitutional:  Negative for chills and fever.   Respiratory:  Negative for shortness of breath.    Cardiovascular:  Negative for chest pain.   Gastrointestinal:  Negative for abdominal pain.   Genitourinary:  Negative for difficulty urinating.   Musculoskeletal:  Positive for arthralgias. Negative for myalgias.   Skin:  Negative for rash.   Neurological:  Positive for weakness. Negative for numbness.   Psychiatric/Behavioral:  Negative for confusion.    Objective:      Vital Signs (Most Recent):  Temp: 98.2 °F (36.8 °C) (03/29/23 0735)  Pulse: 102 (03/29/23 0735)  Resp: 17 (03/29/23 0735)  BP: 111/66 (03/29/23 0735)  SpO2: 97 % (03/29/23 0735)   Vital Signs (24h Range):  Temp:  [97.7 °F (36.5 °C)-98.7 °F (37.1 °C)] 98.2 °F (36.8 °C)  Pulse:  [] 102  Resp:  [17-22] 17  SpO2:  [95 %-98 %] 97 %  BP: (111-146)/(65-79) 111/66     Weight: 121.5 kg (267 lb 13.7 oz)  Body mass index is 41.95 kg/m².    Intake/Output Summary (Last 24 hours) at 3/29/2023 0957  Last data filed at 3/29/2023 0900  Gross per 24 hour   Intake 790 ml   Output 3450 ml   Net -2660 ml        Physical Exam  Vitals and nursing note reviewed.   Constitutional:       General: She is not in acute distress.     Appearance: She is obese. She is not ill-appearing.   HENT:      Head: Normocephalic and atraumatic.      Nose: Nose normal.      Mouth/Throat:      Mouth: Mucous membranes are moist.   Cardiovascular:      Rate and Rhythm: Regular rhythm. Tachycardia present.   Pulmonary:      Effort: Pulmonary effort is normal.      Breath sounds: Normal breath sounds.      Comments: Room air  Abdominal:      General: Bowel sounds are normal.      Palpations: Abdomen is soft.   Musculoskeletal:      Right lower leg: No edema.      Left lower leg: No edema.      Comments: R wrist swelling. R knee swellling   Skin:     General: Skin is warm and dry.      Findings: No rash.   Neurological:      Mental Status: She is alert and oriented to person, place, and time.       Significant Labs: All pertinent labs within the past 24 hours have been reviewed.    Significant Imaging: I have reviewed all pertinent imaging results/findings within the past 24 hours.      Assessment/Plan:      * Polyarthritis  Present since admission to [facility]. MRI noted R knee effusion. ESR, CRP elevated. Doubt gout. RF, CCP, SILVIA, acute hep panel normal. Parvovirus negative. RF normal. TSH elevated but free T4 normal, so less likely hypothyroidism.  Overall this is concerning for septic arthritis affecting multiple joints as a result of bacteremia.     - MRI L shoulder: complex effusion with synovitis  - MRI R wrist: osteitis, complex effusions and fluid collections  - MRI L hip: abnormal SI joint concerning for septic arthritis   - MRI lumbar spine: R sided L2-3 facet joint arthropathy concerning for septic arthritis    - Orthopaedics consulted   - s/p R wrist and L shoulder arthrocenteses on 3/27/23   - R wrist only 2cc, cell count not performed but PMN predominant. Cultures unable to be sent   - L shoulder WBC 47K with PMN predominance, cultures NGTD but has been on antibiotics for >1 week  - pending Ortho decision regarding washout    - Neurosurgery consulted for SI joint and L2-3 findings on MRI    - ID following, remains on CTX and vanc   - PT, OT consulted as well - likely will need rehab vs SNF when ready       Subclinical hypothyroidism  Lab Results   Component Value Date    TSH 14.200 (H) 03/22/2023     FT4 within normal limits  Repeat as outpatient when acute illness resolved      Anemia of chronic disease  Anemia of chronic disease present (ferritin elevated) but did also have vaginal bleeding. TSAT 14- some degree iron deficiency    Weakness of both lower extremities  Presented with bilateral lower extremity weakness with no significant sensory deficit. She denied preceding diarrhea/GI symptoms, although she had upper respiratory tract symptoms  - Neurology consulted  - seems as though this is secondary to arthritis as being worked up above   - PT, OT consulted       Postmenopausal bleeding  Noted at OSH. TVUS with thickened endometrial stripe  - needs outpatient GYN follow up       Type 2 diabetes mellitus without complication, without long-term current use of insulin  Patient's FSGs are controlled on current medication regimen.  Last A1c reviewed-   Lab Results   Component Value Date    HGBA1C 6.4 (H) 03/22/2023     Most recent fingerstick  glucose reviewed-   Recent Labs   Lab 03/28/23  1130 03/28/23  1642 03/28/23  1949 03/29/23  0736   POCTGLUCOSE 132* 211* 184* 154*     Current correctional scale  Medium  Maintain anti-hyperglycemic dose as follows-   Antihyperglycemics (From admission, onward)    Start     Stop Route Frequency Ordered    03/22/23 0900  insulin detemir U-100 pen 5 Units         -- SubQ Daily 03/22/23 0541    03/22/23 0638  insulin aspart U-100 pen 0-5 Units         -- SubQ Before meals & nightly PRN 03/22/23 0541        Hold Oral hypoglycemics while patient is in the hospital.    Streptococcal bacteremia  Blood cultures 3/13 with Strep bacteremia. Currently being treated with ceftriaxone. This may have seeded her joints causing septic arthritis. Arthrocentesis R wrist and L shoulder completed on 3/27 with elevated neutrophils. Cultures no growth to date but has been on antibiotics for over a week. ID consulted     Stem cells transplant status  Not currently on immunosuppresants  Follow up with oncology      Large cell (diffuse) non-Hodgkin's lymphoma  In remission, follow up with oncology  No new enlarged lymph nodes noted         VTE Risk Mitigation (From admission, onward)         Ordered     Reason for No Pharmacological VTE Prophylaxis  Once        Question:  Reasons:  Answer:  Active Bleeding    03/21/23 2230     IP VTE HIGH RISK PATIENT  Once         03/21/23 2230     Place sequential compression device  Until discontinued         03/21/23 2230                Discharge Planning   JESSE: 3/31/2023     Code Status: Full Code   Is the patient medically ready for discharge?:     Reason for patient still in hospital (select all that apply): Consult recommendations  Discharge Plan A: Rehab   Discharge Delays: None known at this time              Karla Garber MD  Department of Hospital Medicine   [facility]

## 2023-03-29 NOTE — PT/OT/SLP PROGRESS
Occupational Therapy   Treatment    Name: Hedy Conway  MRN: [id number]  Admitting Diagnosis:  Polyarthritis       Recommendations:     Discharge Recommendations: rehabilitation facility  Discharge Equipment Recommendations:   (TBD at next level.)  Barriers to discharge:  none       Assessment:     Hedy Conway is a 47 y.o. female with a medical diagnosis of Polyarthritis.  She presents with the following performance deficits affecting function: weakness, impaired endurance, impaired self care skills, impaired functional mobility, gait instability, impaired balance, decreased lower extremity function, decreased upper extremity function, decreased safety awareness, pain, decreased ROM, impaired joint extensibility, impaired muscle length, edema, impaired fine motor.     Rehab Prognosis:  Good; patient would benefit from acute skilled OT services to address these deficits and reach maximum level of function.       Plan:     Patient to be seen 5 x/week to address the above listed problems via self-care/home management, therapeutic activities, therapeutic exercises  Plan of Care Expires: 04/07/23  Plan of Care Reviewed with: patient    Subjective     Chief Complaint: pain; NPO for procedure  Patient/Family Comments/goals: Pt agreeable to therapy.  Pain/Comfort:  Pain Rating 1: other (did not rate with  number)  Location 1:( B knees, L shoulder, R wrist)  Pain Addressed 1: Pre-medicate for activity, Reposition, Distraction, Cessation of Activity, Nurse notified    Objective:     Communicated with: Nurse prior to session.  Patient found HOB elevated with telemetry, bed alarm, PICC line upon OT entry to room.    General Precautions: Standard, fall    Orthopedic Precautions:N/A  Braces: N/A  Respiratory Status: Room air     Occupational Performance:     Bed Mobility:    Patient completed Rolling/Turning to Right with maximal assistance and 2 persons  Patient completed Scooting anteriorly to EOB with mod A   Pt  completed Bridging to HOB with   maximal assistance x 2 persons  Patient completed Supine to Sit with maximal assistance and 2 persons  Patient completed Sit to Supine with maximal assistance and 2 persons     Functional Mobility/Transfers:  Patient completed Sit <> Stand Transfer with maximal assistance and of 2 persons  with  bilateral bed rails ( R forearm and LUE WB on bed rail) bed elevated x 2 trials. Pt only able to briefly clear buttocks from bed. Unable to come to full upright stand 2* to inability to WB fully with BLE due to knee pain and BUE (R wrist, R shoulder, and L shoulder) pain.    Functional Mobility: Unable at this time    Activities of Daily Living:  Not performed this date      [facility] 6 Click ADL: 15    Treatment & Education:  Bed mobility, functional transfer/mobility , and ADL completed as noted above. Pt requires increased time to perform all mobility tasks 2* pain. Max slow movements.  Pt educated on safety awareness with all OOB mobility and ADL .   LUE AAROM  x 10 reps with shoulder support from LIN: shoulder flex/ext with improved ROM noted, forward punches. LUE AROM  x 10 reps elbow flex/ext, digit I-V flex/ext x 15 reps  RUE AAROM x 10 reps with shoulder support from LIN: shoulder flex/ext ( pain with extension), forward punches, wrist flex/ext with minimal ROM and edema noted. RUE AROM  x 10 reps elbow flex/ext, digit I-V flex/ext ( unable to make full fist).  Educated pt on importance of frequent repositioning in bed with use of foam wedge and pressure relief boots.    Patient left right sidelying with foam wedge offloading L side, BUE/BLE elevated, heels offloaded, with all lines intact, call button in reach, bed alarm on, and nurse notified    GOALS:   Multidisciplinary Problems       Occupational Therapy Goals          Problem: Occupational Therapy    Goal Priority Disciplines Outcome Interventions   Occupational Therapy Goal     OT, PT/OT Ongoing, Progressing    Description:  Goals to be met by 04/07/23:    Patient will increase functional independence with ADLs by performing:    Grooming while seated with Modified [place].  Toileting from bedside commode with Moderate Assistance for hygiene and clothing management.   Sit to stand w/ Minimum Assistance.   Step transfer with Moderate Assistance  Toilet transfer to bedside commode with Moderate Assistance.  Upper extremity exercise program x15 reps per handout, with assistance as needed.                           Time Tracking:     OT Date of Treatment: 03/29/23  OT Start Time: 1130  OT Stop Time: 1209  OT Total Time (min): 39 min    Billable Minutes:Therapeutic Activity 23  Therapeutic Exercise 15 co-tx with PT    OT/CHANDLER: CHANDLER     Number of CHANDLER visits since last OT visit: 1    3/29/2023

## 2023-03-29 NOTE — ASSESSMENT & PLAN NOTE
Patient's FSGs are controlled on current medication regimen.  Last A1c reviewed-   Lab Results   Component Value Date    HGBA1C 6.4 (H) 03/22/2023     Most recent fingerstick glucose reviewed-   Recent Labs   Lab 03/28/23  1130 03/28/23  1642 03/28/23  1949 03/29/23  0736   POCTGLUCOSE 132* 211* 184* 154*     Current correctional scale  Medium  Maintain anti-hyperglycemic dose as follows-   Antihyperglycemics (From admission, onward)    Start     Stop Route Frequency Ordered    03/22/23 0900  insulin detemir U-100 pen 5 Units         -- SubQ Daily 03/22/23 0541    03/22/23 0638  insulin aspart U-100 pen 0-5 Units         -- SubQ Before meals & nightly PRN 03/22/23 0541        Hold Oral hypoglycemics while patient is in the hospital.

## 2023-03-29 NOTE — PROGRESS NOTES
[facility] - [facility] Surg  Infectious Disease  Progress Note    Patient Name: Hedy Conway  MRN: [id number]  Admission Date: 3/21/2023  Length of Stay: 8 days  Attending Physician: Karla Garber MD  Primary Care Provider: John Lantigua PA-C    Isolation Status: No active isolations  Assessment/Plan:      ID  Streptococcal bacteremia  48y/o F pt with hx of T2DM, HTN, large cell diffuse non-Hodgkin lymphoma s/p chemotherapy and stem cell transplant 2011 admitted to OSH on 3/14 for possible sepsis (PNA vs UTI) after presenting for eval of 2 wk hx of migratory polyarthralgias, fevers and weakness, found to have strep pneumoniae bacteremia, pneumonia due to pseudomonas and was ultimately transferred to [facility] 3/22 for arthrocentesis due to concern for septic arthritis.     Painful joints and MRI findings concerning for septic joints in the setting of strep pneumoniae bacteremia. S/p IR arthrocentesis Lt shoulder (WBC >47k/ 95% segs) and Rt wrist (89% segs) on 3/27 with findings concerning for septic arthritis. Synovial fluid cx ngtd.    MRI hip and lumbar spine with L2-3 enhancement, and possible septic L sacroiliitis.     Recommendations:  --f/u ortho and neurosurgery recs; would benefit from washout of likely septic joints  --continue vanc and ceftriaxone for now        Anticipated Disposition: tbd    Thank you for your consult. I will follow-up with patient. Please contact us if you have any additional questions.    Yuridia Campbell MD  Infectious Disease  [facility] - Med Surg    Subjective:     Principal Problem:Polyarthritis    HPI:     Ms. Conway is a 48y/o F pt with hx of T2DM, HTN, large cell diffuse non-Hodgkin lymphoma s/p chemotherapy and stem cell transplant 2011 admitted to OSH on 3/14 for possible sepsis (PNA vs UTI) after presenting for eval of 2 wk hx of migratory polyarthralgias and weakness, found to have  strep pneumoniae bacteremia, pneumonia due to pseudomonas and was ultimately  "transferred to [facility] 3/22 for arthrocentesis.     Pt reports she was treated for pneumonia with abx and steroids approx 1.5 weeks prior to hospital admission. 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 Symptoms progressed and notes she became very weak and confused prompting her to present to OSH. She is not sure if she experienced fevers, sweats or chills. Does not think she had a worsening cough or SOB. She is still complaining of Rt wrist, L shoulder, R knee, L hip pain.    Septic workup remarkable for BCx  (3/13) positive for strep pna, RCx (3/15) positive for pseudomonas, MRI R knee showed meniscal tear and large effusion with synovitis, MRI left shoulder with large complex joint effusion with synovitis, high grade partial thickness tear of supraspinatus and infraspinatus, near complete tear of intraarticular bicpet tendon with tenosynovitis. MRI right wrist with distal ulna/radius/carpal osteitis, complex effusions, complex fluid collections, tenosynovitis. Underwent unsucceful attempt at rt knee arthrocentesis 3/19. Transferred for ortho eval 3/22.  Arthrocentesis 3/23 yielded minimal fluid; not enough to send for cell counts and cx remained neg.     Id consulted for "high inflammatory markers, multiple joints with synovitis. could she have multiple septic joints? strep pneumo bacteremia on admit and pseudomonas pneumonia."    Interval History: No acute events overnight. Still complaining of diffuse arthralgias.     Review of Systems   Constitutional:  Positive for fatigue. Negative for chills and fever.   Respiratory:  Negative for cough and shortness of breath.    Gastrointestinal:  Negative for diarrhea.   Genitourinary:  Negative for dysuria.   Musculoskeletal:  Positive for arthralgias, joint swelling and myalgias. Negative for back pain and neck " pain.   Skin:  Negative for wound.   All other systems reviewed and are negative.  Objective:     Vital Signs (Most Recent):  Temp: 98.2 °F (36.8 °C) (03/28/23 0730)  Pulse: 97 (03/28/23 0730)  Resp: 20 (03/28/23 0843)  BP: 126/69 (03/28/23 0730)  SpO2: 97 % (03/28/23 0730) Vital Signs (24h Range):  Temp:  [98.1 °F (36.7 °C)-98.6 °F (37 °C)] 98.2 °F (36.8 °C)  Pulse:  [] 97  Resp:  [16-20] 20  SpO2:  [93 %-99 %] 97 %  BP: (120-129)/(58-75) 126/69     Weight: 121.5 kg (267 lb 13.7 oz)  Body mass index is 41.95 kg/m².    Estimated Creatinine Clearance: 155 mL/min (based on SCr of 0.6 mg/dL).    Physical Exam  Vitals reviewed.   Constitutional:       Appearance: Normal appearance.   Eyes:      Conjunctiva/sclera: Conjunctivae normal.      Pupils: Pupils are equal, round, and reactive to light.   Cardiovascular:      Rate and Rhythm: Normal rate and regular rhythm.      Heart sounds: No murmur heard.  Pulmonary:      Effort: Pulmonary effort is normal. No respiratory distress.      Breath sounds: Normal breath sounds. No wheezing.   Abdominal:      General: Abdomen is flat.      Palpations: Abdomen is soft.   Musculoskeletal:      Right lower leg: No edema.      Left lower leg: No edema.      Comments: Limited ROM R wrist and knee, Lt shoulder due to pain. No warmth or erythema, but joints do appear somewhat edematous.    Neurological:      Mental Status: She is alert and oriented to person, place, and time.       Significant Labs: Blood Culture:   Recent Labs   Lab 03/13/23  1849 03/16/23  0849 03/22/23  0610   LABBLOO No growth after 5 days.  Gram stain aer bottle: Gram positive cocci in chains resembling Strep  Results called to and read back by: Nurys Gutiérrez RN  03/14/2023  15:50  STREPTOCOCCUS PNEUMONIAE* No growth after 5 days.  No growth after 5 days. No Growth after 4 days.     Wound Culture: No results for input(s): LABAERO in the last 4320 hours.  All pertinent labs within the past 24 hours have  been reviewed.    Significant Imaging: I have reviewed all pertinent imaging results/findings within the past 24 hours.

## 2023-03-29 NOTE — PLAN OF CARE
Problem: Occupational Therapy  Goal: Occupational Therapy Goal  Description: Goals to be met by 04/07/23:    Patient will increase functional independence with ADLs by performing:    Grooming while seated with Modified [place].  Toileting from bedside commode with Moderate Assistance for hygiene and clothing management.   Sit to stand w/ Minimum Assistance.   Step transfer with Moderate Assistance  Toilet transfer to bedside commode with Moderate Assistance.  Upper extremity exercise program x15 reps per handout, with assistance as needed.      3/29/2023 1444 by CHANDLER Nevarez  Outcome: Ongoing, Progressing

## 2023-03-30 ENCOUNTER — ANESTHESIA (OUTPATIENT)
Dept: SURGERY | Facility: HOSPITAL | Age: 48
DRG: 485 | End: 2023-03-30
Payer: COMMERCIAL

## 2023-03-30 LAB
ANION GAP SERPL CALC-SCNC: 9 MMOL/L (ref 8–16)
BASOPHILS # BLD AUTO: 0.03 K/UL (ref 0–0.2)
BASOPHILS NFR BLD: 0.6 % (ref 0–1.9)
BUN SERPL-MCNC: 12 MG/DL (ref 6–20)
CALCIUM SERPL-MCNC: 8.9 MG/DL (ref 8.7–10.5)
CHLORIDE SERPL-SCNC: 101 MMOL/L (ref 95–110)
CO2 SERPL-SCNC: 28 MMOL/L (ref 23–29)
CREAT SERPL-MCNC: 0.5 MG/DL (ref 0.5–1.4)
DIFFERENTIAL METHOD: ABNORMAL
EOSINOPHIL # BLD AUTO: 0 K/UL (ref 0–0.5)
EOSINOPHIL NFR BLD: 0.6 % (ref 0–8)
ERYTHROCYTE [DISTWIDTH] IN BLOOD BY AUTOMATED COUNT: 18.2 % (ref 11.5–14.5)
EST. GFR  (NO RACE VARIABLE): >60 ML/MIN/1.73 M^2
GLUCOSE SERPL-MCNC: 149 MG/DL (ref 70–110)
HCT VFR BLD AUTO: 29.4 % (ref 37–48.5)
HGB BLD-MCNC: 8.7 G/DL (ref 12–16)
IMM GRANULOCYTES # BLD AUTO: 0.07 K/UL (ref 0–0.04)
IMM GRANULOCYTES NFR BLD AUTO: 1.4 % (ref 0–0.5)
LYMPHOCYTES # BLD AUTO: 2 K/UL (ref 1–4.8)
LYMPHOCYTES NFR BLD: 39.5 % (ref 18–48)
MCH RBC QN AUTO: 24.5 PG (ref 27–31)
MCHC RBC AUTO-ENTMCNC: 29.6 G/DL (ref 32–36)
MCV RBC AUTO: 83 FL (ref 82–98)
MONOCYTES # BLD AUTO: 0.6 K/UL (ref 0.3–1)
MONOCYTES NFR BLD: 12 % (ref 4–15)
NEUTROPHILS # BLD AUTO: 2.4 K/UL (ref 1.8–7.7)
NEUTROPHILS NFR BLD: 45.9 % (ref 38–73)
NRBC BLD-RTO: 0 /100 WBC
PLATELET # BLD AUTO: 176 K/UL (ref 150–450)
PMV BLD AUTO: 10.3 FL (ref 9.2–12.9)
POCT GLUCOSE: 135 MG/DL (ref 70–110)
POCT GLUCOSE: 150 MG/DL (ref 70–110)
POCT GLUCOSE: 206 MG/DL (ref 70–110)
POCT GLUCOSE: 216 MG/DL (ref 70–110)
POTASSIUM SERPL-SCNC: 4.1 MMOL/L (ref 3.5–5.1)
RBC # BLD AUTO: 3.55 M/UL (ref 4–5.4)
SODIUM SERPL-SCNC: 138 MMOL/L (ref 136–145)
VANCOMYCIN TROUGH SERPL-MCNC: 11.9 UG/ML (ref 10–22)
WBC # BLD AUTO: 5.17 K/UL (ref 3.9–12.7)

## 2023-03-30 PROCEDURE — A4216 STERILE WATER/SALINE, 10 ML: HCPCS | Performed by: HOSPITALIST

## 2023-03-30 PROCEDURE — 63600175 PHARM REV CODE 636 W HCPCS: Performed by: STUDENT IN AN ORGANIZED HEALTH CARE EDUCATION/TRAINING PROGRAM

## 2023-03-30 PROCEDURE — 36000710: Performed by: ORTHOPAEDIC SURGERY

## 2023-03-30 PROCEDURE — 25000003 PHARM REV CODE 250: Performed by: STUDENT IN AN ORGANIZED HEALTH CARE EDUCATION/TRAINING PROGRAM

## 2023-03-30 PROCEDURE — D9220A PRA ANESTHESIA: Mod: ANES,,, | Performed by: ANESTHESIOLOGY

## 2023-03-30 PROCEDURE — 97116 GAIT TRAINING THERAPY: CPT | Mod: CQ

## 2023-03-30 PROCEDURE — 71000033 HC RECOVERY, INTIAL HOUR: Performed by: ORTHOPAEDIC SURGERY

## 2023-03-30 PROCEDURE — 87075 CULTR BACTERIA EXCEPT BLOOD: CPT | Mod: 59 | Performed by: ORTHOPAEDIC SURGERY

## 2023-03-30 PROCEDURE — 87205 SMEAR GRAM STAIN: CPT | Performed by: ORTHOPAEDIC SURGERY

## 2023-03-30 PROCEDURE — 36415 COLL VENOUS BLD VENIPUNCTURE: CPT | Performed by: HOSPITALIST

## 2023-03-30 PROCEDURE — 11000001 HC ACUTE MED/SURG PRIVATE ROOM

## 2023-03-30 PROCEDURE — 25000003 PHARM REV CODE 250: Performed by: HOSPITALIST

## 2023-03-30 PROCEDURE — 37000008 HC ANESTHESIA 1ST 15 MINUTES: Performed by: ORTHOPAEDIC SURGERY

## 2023-03-30 PROCEDURE — 36000711: Performed by: ORTHOPAEDIC SURGERY

## 2023-03-30 PROCEDURE — 85025 COMPLETE CBC W/AUTO DIFF WBC: CPT | Performed by: HOSPITALIST

## 2023-03-30 PROCEDURE — 97110 THERAPEUTIC EXERCISES: CPT | Mod: CQ

## 2023-03-30 PROCEDURE — 37000009 HC ANESTHESIA EA ADD 15 MINS: Performed by: ORTHOPAEDIC SURGERY

## 2023-03-30 PROCEDURE — 97110 THERAPEUTIC EXERCISES: CPT

## 2023-03-30 PROCEDURE — 97530 THERAPEUTIC ACTIVITIES: CPT

## 2023-03-30 PROCEDURE — 63600175 PHARM REV CODE 636 W HCPCS: Performed by: ANESTHESIOLOGY

## 2023-03-30 PROCEDURE — 87070 CULTURE OTHR SPECIMN AEROBIC: CPT | Performed by: ORTHOPAEDIC SURGERY

## 2023-03-30 PROCEDURE — D9220A PRA ANESTHESIA: Mod: CRNA,,, | Performed by: STUDENT IN AN ORGANIZED HEALTH CARE EDUCATION/TRAINING PROGRAM

## 2023-03-30 PROCEDURE — 27201423 OPTIME MED/SURG SUP & DEVICES STERILE SUPPLY: Performed by: ORTHOPAEDIC SURGERY

## 2023-03-30 PROCEDURE — 80048 BASIC METABOLIC PNL TOTAL CA: CPT | Performed by: HOSPITALIST

## 2023-03-30 PROCEDURE — D9220A PRA ANESTHESIA: ICD-10-PCS | Mod: CRNA,,, | Performed by: STUDENT IN AN ORGANIZED HEALTH CARE EDUCATION/TRAINING PROGRAM

## 2023-03-30 PROCEDURE — D9220A PRA ANESTHESIA: ICD-10-PCS | Mod: ANES,,, | Performed by: ANESTHESIOLOGY

## 2023-03-30 PROCEDURE — 63600175 PHARM REV CODE 636 W HCPCS: Performed by: HOSPITALIST

## 2023-03-30 PROCEDURE — 80202 ASSAY OF VANCOMYCIN: CPT | Performed by: HOSPITALIST

## 2023-03-30 PROCEDURE — 97530 THERAPEUTIC ACTIVITIES: CPT | Mod: CQ

## 2023-03-30 RX ORDER — DEXAMETHASONE SODIUM PHOSPHATE 4 MG/ML
INJECTION, SOLUTION INTRA-ARTICULAR; INTRALESIONAL; INTRAMUSCULAR; INTRAVENOUS; SOFT TISSUE
Status: DISCONTINUED | OUTPATIENT
Start: 2023-03-30 | End: 2023-03-30

## 2023-03-30 RX ORDER — SODIUM CHLORIDE 0.9 % (FLUSH) 0.9 %
10 SYRINGE (ML) INJECTION
Status: DISCONTINUED | OUTPATIENT
Start: 2023-03-30 | End: 2023-03-30 | Stop reason: HOSPADM

## 2023-03-30 RX ORDER — FENTANYL CITRATE 50 UG/ML
INJECTION, SOLUTION INTRAMUSCULAR; INTRAVENOUS
Status: DISCONTINUED | OUTPATIENT
Start: 2023-03-30 | End: 2023-03-30

## 2023-03-30 RX ORDER — SUCCINYLCHOLINE CHLORIDE 20 MG/ML
INJECTION INTRAMUSCULAR; INTRAVENOUS
Status: DISCONTINUED | OUTPATIENT
Start: 2023-03-30 | End: 2023-03-30

## 2023-03-30 RX ORDER — PROPOFOL 10 MG/ML
VIAL (ML) INTRAVENOUS
Status: DISCONTINUED | OUTPATIENT
Start: 2023-03-30 | End: 2023-03-30

## 2023-03-30 RX ORDER — ROCURONIUM BROMIDE 10 MG/ML
INJECTION, SOLUTION INTRAVENOUS
Status: DISCONTINUED | OUTPATIENT
Start: 2023-03-30 | End: 2023-03-30

## 2023-03-30 RX ORDER — MIDAZOLAM HYDROCHLORIDE 1 MG/ML
INJECTION INTRAMUSCULAR; INTRAVENOUS
Status: DISCONTINUED | OUTPATIENT
Start: 2023-03-30 | End: 2023-03-30

## 2023-03-30 RX ORDER — FENTANYL CITRATE 50 UG/ML
25 INJECTION, SOLUTION INTRAMUSCULAR; INTRAVENOUS EVERY 5 MIN PRN
Status: COMPLETED | OUTPATIENT
Start: 2023-03-30 | End: 2023-03-30

## 2023-03-30 RX ORDER — KETOROLAC TROMETHAMINE 30 MG/ML
INJECTION, SOLUTION INTRAMUSCULAR; INTRAVENOUS
Status: DISCONTINUED | OUTPATIENT
Start: 2023-03-30 | End: 2023-03-30

## 2023-03-30 RX ORDER — LIDOCAINE HYDROCHLORIDE 20 MG/ML
INJECTION INTRAVENOUS
Status: DISCONTINUED | OUTPATIENT
Start: 2023-03-30 | End: 2023-03-30

## 2023-03-30 RX ORDER — KETAMINE HYDROCHLORIDE 100 MG/ML
INJECTION, SOLUTION INTRAMUSCULAR; INTRAVENOUS
Status: DISCONTINUED | OUTPATIENT
Start: 2023-03-30 | End: 2023-03-30

## 2023-03-30 RX ORDER — KETOROLAC TROMETHAMINE 30 MG/ML
15 INJECTION, SOLUTION INTRAMUSCULAR; INTRAVENOUS ONCE
Status: DISCONTINUED | OUTPATIENT
Start: 2023-03-30 | End: 2023-03-30 | Stop reason: HOSPADM

## 2023-03-30 RX ORDER — ONDANSETRON 2 MG/ML
INJECTION INTRAMUSCULAR; INTRAVENOUS
Status: DISCONTINUED | OUTPATIENT
Start: 2023-03-30 | End: 2023-03-30

## 2023-03-30 RX ADMIN — SUGAMMADEX 200 MG: 100 INJECTION, SOLUTION INTRAVENOUS at 08:03

## 2023-03-30 RX ADMIN — Medication 10 ML: at 12:03

## 2023-03-30 RX ADMIN — KETAMINE HYDROCHLORIDE 40 MG: 100 INJECTION, SOLUTION, CONCENTRATE INTRAMUSCULAR; INTRAVENOUS at 07:03

## 2023-03-30 RX ADMIN — FENTANYL CITRATE 25 MCG: 50 INJECTION INTRAMUSCULAR; INTRAVENOUS at 08:03

## 2023-03-30 RX ADMIN — HYDROCODONE BITARTRATE AND ACETAMINOPHEN 1 TABLET: 5; 325 TABLET ORAL at 04:03

## 2023-03-30 RX ADMIN — SUCCINYLCHOLINE CHLORIDE 120 MG: 20 INJECTION, SOLUTION INTRAMUSCULAR; INTRAVENOUS at 07:03

## 2023-03-30 RX ADMIN — ROCURONIUM BROMIDE 50 MG: 10 INJECTION, SOLUTION INTRAVENOUS at 07:03

## 2023-03-30 RX ADMIN — FENTANYL CITRATE 25 MCG: 50 INJECTION INTRAMUSCULAR; INTRAVENOUS at 09:03

## 2023-03-30 RX ADMIN — CYCLOBENZAPRINE HYDROCHLORIDE 5 MG: 5 TABLET, FILM COATED ORAL at 09:03

## 2023-03-30 RX ADMIN — ONDANSETRON 4 MG: 2 INJECTION, SOLUTION INTRAMUSCULAR; INTRAVENOUS at 08:03

## 2023-03-30 RX ADMIN — Medication 10 ML: at 11:03

## 2023-03-30 RX ADMIN — INSULIN ASPART 1 UNITS: 100 INJECTION, SOLUTION INTRAVENOUS; SUBCUTANEOUS at 10:03

## 2023-03-30 RX ADMIN — Medication 10 ML: at 06:03

## 2023-03-30 RX ADMIN — VANCOMYCIN HYDROCHLORIDE 1500 MG: 1.5 INJECTION, POWDER, LYOPHILIZED, FOR SOLUTION INTRAVENOUS at 10:03

## 2023-03-30 RX ADMIN — DEXAMETHASONE SODIUM PHOSPHATE 4 MG: 4 INJECTION, SOLUTION INTRAMUSCULAR; INTRAVENOUS at 07:03

## 2023-03-30 RX ADMIN — HYDROCODONE BITARTRATE AND ACETAMINOPHEN 1 TABLET: 5; 325 TABLET ORAL at 09:03

## 2023-03-30 RX ADMIN — VANCOMYCIN HYDROCHLORIDE 2000 MG: 500 INJECTION, POWDER, LYOPHILIZED, FOR SOLUTION INTRAVENOUS at 03:03

## 2023-03-30 RX ADMIN — HYDROCODONE BITARTRATE AND ACETAMINOPHEN 1 TABLET: 5; 325 TABLET ORAL at 10:03

## 2023-03-30 RX ADMIN — VANCOMYCIN HYDROCHLORIDE 1500 MG: 1.5 INJECTION, POWDER, LYOPHILIZED, FOR SOLUTION INTRAVENOUS at 11:03

## 2023-03-30 RX ADMIN — MELATONIN TAB 3 MG 6 MG: 3 TAB at 10:03

## 2023-03-30 RX ADMIN — CEFTRIAXONE 2 G: 2 INJECTION, SOLUTION INTRAVENOUS at 09:03

## 2023-03-30 RX ADMIN — FENTANYL CITRATE 100 MCG: 50 INJECTION, SOLUTION INTRAMUSCULAR; INTRAVENOUS at 07:03

## 2023-03-30 RX ADMIN — LIDOCAINE HYDROCHLORIDE 100 MG: 20 INJECTION, SOLUTION INTRAVENOUS at 07:03

## 2023-03-30 RX ADMIN — CYCLOBENZAPRINE HYDROCHLORIDE 5 MG: 5 TABLET, FILM COATED ORAL at 10:03

## 2023-03-30 RX ADMIN — PROPOFOL 150 MG: 10 INJECTION, EMULSION INTRAVENOUS at 07:03

## 2023-03-30 RX ADMIN — SODIUM CHLORIDE, SODIUM LACTATE, POTASSIUM CHLORIDE, AND CALCIUM CHLORIDE: .6; .31; .03; .02 INJECTION, SOLUTION INTRAVENOUS at 07:03

## 2023-03-30 RX ADMIN — KETOROLAC TROMETHAMINE 30 MG: 30 INJECTION, SOLUTION INTRAMUSCULAR; INTRAVENOUS at 08:03

## 2023-03-30 RX ADMIN — INSULIN DETEMIR 5 UNITS: 100 INJECTION, SOLUTION SUBCUTANEOUS at 08:03

## 2023-03-30 RX ADMIN — MIDAZOLAM HYDROCHLORIDE 2 MG: 1 INJECTION INTRAMUSCULAR; INTRAVENOUS at 07:03

## 2023-03-30 NOTE — PT/OT/SLP PROGRESS
Physical Therapy Treatment    Patient Name:  Hedy Conway   MRN:  [id number]    Recommendations:     Discharge Recommendations: rehabilitation facility  Discharge Equipment Recommendations:  (TBD at next level of care)  Barriers to discharge:     Assessment:     Hedy Conway is a 47 y.o. female admitted with a medical diagnosis of Polyarthritis.  She presents with the following impairments/functional limitations: weakness, impaired endurance, impaired self care skills, impaired functional mobility, gait instability, impaired balance, decreased coordination, decreased upper extremity function, decreased lower extremity function, decreased ROM, pain, decreased safety awareness, edema, orthopedic precautions, impaired muscle length .    Rehab Prognosis: Good; patient would benefit from acute skilled PT services to address these deficits and reach maximum level of function.    Recent Surgery: Procedure(s) (LRB):  ARTHROSCOPY, KNEE (Bilateral)  OPEN  ARTHROTOMY WRIST (Right) Day of Surgery    Plan:     During this hospitalization, patient to be seen 5 x/week to address the identified rehab impairments via gait training, therapeutic activities, therapeutic exercises and progress toward the following goals:    Plan of Care Expires:  04/07/23    Subjective     Chief Complaint: pain   Patient/Family Comments/goals: pt is agreeable to therapy  Pain/Comfort:  Pain Rating 1: 10/10  Location - Side 1: Bilateral  Location - Orientation 1: generalized  Pain Addressed 1: Pre-medicate for activity, Reposition, Nurse notified  Pain Rating Post-Intervention 1: 10/10      Objective:     Communicated with nurse Clark  prior to session.  Patient found HOB elevated with PICC line, bed alarm, telemetry, peripheral IV upon PT entry to room.     General Precautions: Standard, fall  Orthopedic Precautions: N/A  Braces: N/A  Respiratory Status: Room air     Functional Mobility:  Bed Mobility:     Scooting: contact guard assistance to  scoot anteriorly,  maximal assistance, and of 2 persons to scoot laterally along EOB .   Supine to Sit: moderate assistance and of 2 persons, HOB elevated, bedside rail   Sit to Supine: maximal assistance and of 2 persons  Transfers:     Sit to Stand: x 3 trials from elevated bed  mod assistance and of 2 persons with RW .   Pt with c/o B knees pain (R> L) with WB, V/T cues to improve upright posture .   Gait : pt  pivoting BLE to HOB with RW, MAX A x 2 .  Balance:  Good in sitting, poor in standing.       AM-PAC 6 CLICK MOBILITY  Turning over in bed (including adjusting bedclothes, sheets and blankets)?: 2  Sitting down on and standing up from a chair with arms (e.g., wheelchair, bedside commode, etc.): 2  Moving from lying on back to sitting on the side of the bed?: 2  Moving to and from a bed to a chair (including a wheelchair)?: 1  Need to walk in hospital room?: 2  Climbing 3-5 steps with a railing?: 1  Basic Mobility Total Score: 10       Treatment & Education:  Lower Extremity Exercises.   Patient educated on the purpose of therapeutic exercise.    Patient verbalized acceptance/understanding of instructions, expectations, and limitations(for safety).  Patient performed: 2 sets of 10 reps (each) of B LE There Ex: AP, LAQ, Hip abd/add, Hip flexion while sitting up on EOB.       Patient  still requires verbal cues/tactile cues to ensure correct sequence, to maintain proper form, and to allow for self-correction.    Patient left with bed in chair position with foam wedge to offload L side, BLE/BUE  elevated, heels offloading  all lines intact, call button in reach, bed alarm on, nurse notified, and OT  present..    GOALS:   Multidisciplinary Problems       Physical Therapy Goals          Problem: Physical Therapy    Goal Priority Disciplines Outcome Goal Variances Interventions   Physical Therapy Goal     PT, PT/OT Ongoing, Progressing     Description: Goals to be met by:  4/7/2023    Patient will increase  functional independence with mobility by performin. Supine to sit with Modified [place]  2. Sit to stand transfer with Modified [place]  3. Gait >50 feet with Modified [place] using Rolling Walker  4. BLE seated/supine LE therex x15 reps independently                              Time Tracking:     PT Received On: 23  PT Start Time: 953     PT Stop Time: 102  PT Total Time (min): 28 min     Billable Minutes: Therapeutic Activity 14, Therapeutic Exercise 14, and Total Time 28 min with OT     Treatment Type: Treatment  PT/PTA: PTA     Number of PTA visits since last PT visit: 2     2023

## 2023-03-30 NOTE — PLAN OF CARE
Problem: Physical Therapy  Goal: Physical Therapy Goal  Description: Goals to be met by:  2023    Patient will increase functional independence with mobility by performin. Supine to sit with Modified [place]  2. Sit to stand transfer with Modified [place]  3. Gait >50 feet with Modified [place] using Rolling Walker  4. BLE seated/supine LE therex x15 reps independently         Outcome: Ongoing, Progressing

## 2023-03-30 NOTE — ASSESSMENT & PLAN NOTE
Patient's FSGs are controlled on current medication regimen.  Last A1c reviewed-   Lab Results   Component Value Date    HGBA1C 6.4 (H) 03/22/2023     Most recent fingerstick glucose reviewed-   Recent Labs   Lab 03/29/23  1138 03/29/23  1633 03/29/23  1958 03/30/23  0720   POCTGLUCOSE 135* 149* 167* 216*     Current correctional scale  Medium  Maintain anti-hyperglycemic dose as follows-   Antihyperglycemics (From admission, onward)    Start     Stop Route Frequency Ordered    03/22/23 0900  insulin detemir U-100 pen 5 Units         -- SubQ Daily 03/22/23 0541    03/22/23 0638  insulin aspart U-100 pen 0-5 Units         -- SubQ Before meals & nightly PRN 03/22/23 0541        Hold Oral hypoglycemics while patient is in the hospital.

## 2023-03-30 NOTE — ASSESSMENT & PLAN NOTE
Present since admission to [facility]. MRI noted R knee effusion. ESR, CRP elevated. Doubt gout. RF, CCP, SILVIA, acute hep panel normal. Parvovirus negative. RF normal. TSH elevated but free T4 normal, so less likely hypothyroidism. Overall this is concerning for septic arthritis affecting multiple joints as a result of bacteremia.     - MRI L shoulder: complex effusion with synovitis  - MRI R wrist: osteitis, complex effusions and fluid collections  - MRI L hip: abnormal SI joint concerning for septic arthritis   - MRI lumbar spine: R sided L2-3 facet joint arthropathy concerning for septic arthritis    - Orthopaedics consulted   - s/p R wrist and L shoulder arthrocenteses on 3/27/23   - R wrist only 2cc, cell count not performed but PMN predominant. Cultures unable to be sent   - L shoulder WBC 47K with PMN predominance, cultures NGTD but has been on antibiotics for >1 week  - Ortho consulted- plan washout bilateral knees and R wrist on 3/30/23    - Neurosurgery consulted for SI joint and L2-3 findings on MRI-- surgery not needed    - ID following, remains on CTX and vanc   - PT, OT consulted as well - likely will need rehab vs SNF when ready

## 2023-03-30 NOTE — SUBJECTIVE & OBJECTIVE
Interval History: Joint pains still present. Understands plan for surgery this evening.     Review of Systems   Constitutional:  Negative for chills and fever.   Respiratory:  Negative for shortness of breath.    Cardiovascular:  Negative for chest pain.   Gastrointestinal:  Negative for abdominal pain.   Genitourinary:  Negative for difficulty urinating.   Musculoskeletal:  Positive for arthralgias. Negative for myalgias.   Skin:  Negative for rash.   Neurological:  Positive for weakness. Negative for numbness.   Psychiatric/Behavioral:  Negative for confusion.    Objective:     Vital Signs (Most Recent):  Temp: 98.6 °F (37 °C) (03/30/23 0700)  Pulse: 103 (03/30/23 0700)  Resp: 16 (03/30/23 0932)  BP: 119/67 (03/30/23 0700)  SpO2: 97 % (03/30/23 0700)   Vital Signs (24h Range):  Temp:  [98.1 °F (36.7 °C)-98.6 °F (37 °C)] 98.6 °F (37 °C)  Pulse:  [103-110] 103  Resp:  [16-20] 16  SpO2:  [96 %-98 %] 97 %  BP: (109-125)/(59-75) 119/67     Weight: 121.1 kg (267 lb)  Body mass index is 41.82 kg/m².    Intake/Output Summary (Last 24 hours) at 3/30/2023 1114  Last data filed at 3/30/2023 0704  Gross per 24 hour   Intake 1637.21 ml   Output 2600 ml   Net -962.79 ml        Physical Exam  Vitals and nursing note reviewed.   Constitutional:       General: She is not in acute distress.     Appearance: She is obese. She is not ill-appearing.   HENT:      Head: Normocephalic and atraumatic.      Nose: Nose normal.      Mouth/Throat:      Mouth: Mucous membranes are moist.   Cardiovascular:      Rate and Rhythm: Regular rhythm. Tachycardia present.   Pulmonary:      Effort: Pulmonary effort is normal.      Breath sounds: Normal breath sounds.      Comments: Room air  Abdominal:      General: Bowel sounds are normal.      Palpations: Abdomen is soft.   Musculoskeletal:      Right lower leg: No edema.      Left lower leg: No edema.      Comments: R wrist swelling. R and L knee swellling   Skin:     General: Skin is warm and dry.       Findings: No rash.   Neurological:      Mental Status: She is alert and oriented to person, place, and time.       Significant Labs: All pertinent labs within the past 24 hours have been reviewed.    Significant Imaging: I have reviewed all pertinent imaging results/findings within the past 24 hours.

## 2023-03-30 NOTE — PLAN OF CARE
Chart reviewed.  Ortho planning on b/l knee arthroscopy and Rt wrist arthrotomy. Declined Lt shoulder washout.  Will f/u surgical cultures  Continue ceftriaxone, vanc  If no MRSA growth will likely stop vanc  Anticipate atleast 4 wks IV abx.     Yuridia Campbell MD  Infectious Disease

## 2023-03-30 NOTE — PT/OT/SLP PROGRESS
Occupational Therapy   Treatment    Name: Hedy Conway  MRN: [id number]  Admitting Diagnosis:  Polyarthritis  Day of Surgery    Recommendations:     Discharge Recommendations: rehabilitation facility  Discharge Equipment Recommendations:   (TBD at next level.)  Barriers to discharge:   (Pt significantly limited by pain and weaknessm requiring x 2 person assist for standing and is unable to ambualte at this time; pt will benefit from multidisciplinary approach in an IPR setting for returning to PLOF as she was (I) and working PTA.)    Assessment:     Hedy Conway is a 47 y.o. female with a medical diagnosis of Polyarthritis.   Performance deficits affecting function are weakness, impaired endurance, impaired self care skills, impaired functional mobility, gait instability, impaired balance, decreased upper extremity function, decreased coordination, decreased safety awareness, decreased lower extremity function, pain, decreased ROM, impaired coordination, impaired fine motor, edema.     Pt pleasant and willing to participate in tx session this date; pt w/ progress as she was able to pivot BLEs in standing w/ RW, requiring max A x 2 persons. Pt also noted w/ good progress w/ sit>stand trials, requiring mod A x 2 as compared to previous session. Pt w/ pain but tolerated fairly with rest breaks as needed. Pt is making gradual progress towards goals and will continue to benefit from skilled acute OT services to maximize functional capacity for safe performance w/ ADLs and functional mobility.     PTA, pt was (I) w/ all ADLs and functional mobility; pt will benefit from an aggressive multidisciplinary approach in an IPR setting for returning to PLOF. Despite pain and functional limitations, pt is motivated and determined.     Rehab Prognosis:  Good; patient would benefit from acute skilled OT services to address these deficits and reach maximum level of function.       Plan:     Patient to be seen 5 x/week to  address the above listed problems via self-care/home management, therapeutic activities, therapeutic exercises  Plan of Care Expires: 04/07/23  Plan of Care Reviewed with: patient    Subjective     Chief Complaint: Pain; unable to eat 2* being NPO for precedure   Patient/Family Comments/goals: Agreeable to participate  Pain/Comfort:  Pain Rating 1:  (Pt did not rate.)  Location 1:  (B knees; R wrist; L shoulder)    Objective:     Communicated with: Nurse prior to session.  Patient found HOB elevated with telemetry, bed alarm, PICC line, peripheral IV upon OT entry to room.    General Precautions: Standard, fall    Orthopedic Precautions:N/A  Braces: N/A  Respiratory Status: Room air     Occupational Performance:     Bed Mobility:    Patient completed Scooting hips to HOB with moderate assistance; mod A x 2 for scooting to HOB w/ bed in Trendelenburg   Patient completed Supine to Sit with moderate-maximal assistance and 2 persons  Patient completed Sit to Supine with maximal assistance and 2 persons     Functional Mobility/Transfers:  Patient completed Sit <> Stand Transfer x 3 trials from elevated bed with moderate assistance and of 2 persons  with  rolling walker   Functional Mobility: Pt required max verbal cueing for pivoting BLEs w/ max A x 2 persons and use of RW; pt required cueing for correcting flexed posture and weight-shifting.     Activities of Daily Living:  Grooming: set-up assist for performing oral care at bed level w/ HOB elevated     Upper Body Dressing: minimum assistance for donning gown over back     [facility] 6 Click ADL: 15    Treatment & Education:  -Pt performed ADLs and functional mobility as noted above.   -Pt performed the following BUE AROM for 1 set x 15 reps w/ use of #2 lb BUE wrist weights to increase UB strength and endurance for safe performance w/ ADLs and functional mobility:    -elbow flexion/ext    -forward punches    -shoulder flexion    -shoulder abd/add  -Questions and concerns  addressed within scope.        Patient left HOB elevated with all lines intact, call button in reach, bed alarm on, and BLE pressure relief boots, pillow wedged to R side.     GOALS:   Multidisciplinary Problems       Occupational Therapy Goals          Problem: Occupational Therapy    Goal Priority Disciplines Outcome Interventions   Occupational Therapy Goal     OT, PT/OT Ongoing, Progressing    Description: Goals to be met by 04/07/23:    Patient will increase functional independence with ADLs by performing:    Grooming while seated with Modified [place].  Toileting from bedside commode with Moderate Assistance for hygiene and clothing management.   Sit to stand w/ Minimum Assistance.   Step transfer with Moderate Assistance  Toilet transfer to bedside commode with Moderate Assistance.  Upper extremity exercise program x15 reps per handout, with assistance as needed.                           Time Tracking:     OT Date of Treatment: 03/30/23  OT Start Time: 0953  OT Stop Time: 1024  OT Total Time (min): 31 min    Billable Minutes:Therapeutic Activity 20  Therapeutic Exercise 11  Total Time 31    OT/CHANDLER: OT     Number of CHANDLER visits since last OT visit: 1    3/30/2023

## 2023-03-30 NOTE — PLAN OF CARE
Problem: Occupational Therapy  Goal: Occupational Therapy Goal  Description: Goals to be met by 04/07/23:    Patient will increase functional independence with ADLs by performing:    Grooming while seated with Modified [place].  Toileting from bedside commode with Moderate Assistance for hygiene and clothing management.   Sit to stand w/ Minimum Assistance.   Step transfer with Moderate Assistance  Toilet transfer to bedside commode with Moderate Assistance.  Upper extremity exercise program x15 reps per handout, with assistance as needed.      Outcome: Ongoing, Progressing

## 2023-03-30 NOTE — NURSING
Nurses Note -- 4 Eyes      3/30/2023   4:53 AM      Skin assessed during: Q Shift Change      [x] No Pressure Injuries Present    []Prevention Measures Documented      [] Yes- Altered Skin Integrity Present or Discovered   [] LDA Added if Not in Epic (Describe Wound)   [] New Altered Skin Integrity was Present on Admit and Documented in LDA   [] Wound Image Taken    Wound Care Consulted? No    Attending Nurse:  Cayden Villegas RN     Second RN/Staff Member: Eugenia RIVERA

## 2023-03-30 NOTE — PROGRESS NOTES
Pharmacokinetic Assessment Follow Up: IV Vancomycin    Vancomycin serum concentration assessment(s):    The trough level was drawn correctly and can be used to guide therapy at this time. The measurement is below the desired definitive target range of 15 to 20 mcg/mL.    Vancomycin Regimen Plan:    Change regimen to Vancomycin 1500 mg IV every 8 hours with next serum trough concentration measured at 03:30 prior to third dose on 3/31/23    Drug levels (last 3 results):  Recent Labs   Lab Result Units 03/28/23  0231 03/30/23  0412   Vancomycin-Trough ug/mL 13.4 11.9       Pharmacy will continue to follow and monitor vancomycin.    Please contact pharmacy at extension [phone] for questions regarding this assessment.    Thank you for the consult,   Peggy Hinojosa       Patient brief summary:  Hedy Conway is a 47 y.o. female initiated on antimicrobial therapy with IV Vancomycin for treatment of bone/joint infection    The patient's current regimen is Vancomycin 1500 mg IV q8h    Drug Allergies:   Review of patient's allergies indicates:   Allergen Reactions    Tetanus vaccines and toxoid Rash       Actual Body Weight:   121.1 kg    Renal Function:   Estimated Creatinine Clearance: 185.5 mL/min (based on SCr of 0.5 mg/dL).,     Dialysis Method (if applicable):  N/A    CBC (last 72 hours):  Recent Labs   Lab Result Units 03/30/23  0412   WBC K/uL 5.17   Hemoglobin g/dL 8.7*   Hematocrit % 29.4*   Platelets K/uL 176   Gran % % 45.9   Lymph % % 39.5   Mono % % 12.0   Eosinophil % % 0.6   Basophil % % 0.6   Differential Method  Automated       Metabolic Panel (last 72 hours):  Recent Labs   Lab Result Units 03/30/23  0412   Sodium mmol/L 138   Potassium mmol/L 4.1   Chloride mmol/L 101   CO2 mmol/L 28   Glucose mg/dL 149*   BUN mg/dL 12   Creatinine mg/dL 0.5       Vancomycin Administrations:  vancomycin given in the last 96 hours                     vancomycin (VANCOCIN) 2,000 mg in dextrose 5 % (D5W) 500 mL IVPB (mg)  2,000 mg New Bag 03/30/23 0300     2,000 mg New Bag 03/29/23 1414     2,000 mg New Bag  0229     2,000 mg New Bag 03/28/23 1444     2,000 mg New Bag  0340     2,000 mg New Bag 03/27/23 1437     2,000 mg New Bag  0243    vancomycin (VANCOCIN) 2,000 mg in dextrose 5 % (D5W) 500 mL IVPB (mg) 2,000 mg New Bag 03/26/23 1533                    Microbiologic Results:  Microbiology Results (last 7 days)       Procedure Component Value Units Date/Time    AFB Culture & Smear [[id number]] Collected: 03/27/23 1551    Order Status: Completed Specimen: Shoulder, Left Updated: 03/29/23 2127     AFB Culture & Smear Culture in progress     AFB CULTURE STAIN No acid fast bacilli seen.    Aerobic culture [[id number]] Collected: 03/27/23 1551    Order Status: Completed Specimen: Shoulder, Left Updated: 03/29/23 0826     Aerobic Bacterial Culture No growth    Culture, Anaerobe [[id number]] Collected: 03/27/23 1551    Order Status: Completed Specimen: Shoulder, Left Updated: 03/29/23 0654     Anaerobic Culture Culture in progress    Gram stain [[id number]] Collected: 03/27/23 1551    Order Status: Completed Specimen: Shoulder, Left Updated: 03/28/23 0802     Gram Stain Result Rare WBC's      No organisms seen    Fungus culture [[id number]] Collected: 03/27/23 1551    Order Status: Sent Specimen: Shoulder, Left Updated: 03/27/23 1632    Aerobic culture [[id number]] Collected: 03/27/23 1619    Order Status: Canceled Specimen: Arm from Wrist, Right     Culture, Anaerobe [[id number]] Collected: 03/27/23 1618    Order Status: Canceled Specimen: Other from Wrist, Right     AFB Culture & Smear [[id number]] Collected: 03/27/23 1618    Order Status: Canceled Specimen: Other from Wrist, Right     Fungus culture [[id number]] Collected: 03/27/23 1618    Order Status: Canceled Specimen: Other from Wrist, Right     Gram stain [[id number]] Collected: 03/27/23 1618    Order Status: Canceled Specimen: Wound from Wrist, Right     Culture, Body Fluid (Aerobic) w/ GS  [[id number]] Collected: 03/23/23 1244    Order Status: Completed Specimen: Body Fluid from Knee Updated: 03/27/23 0733     AEROBIC CULTURE - FLUID No growth     Gram Stain Result Few WBC's      No organisms seen    Blood culture [[id number]] Collected: 03/22/23 0610    Order Status: Completed Specimen: Blood from Peripheral, Right Arm Updated: 03/26/23 0903     Blood Culture, Routine No Growth after 4 days.

## 2023-03-30 NOTE — PROGRESS NOTES
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  Of note she voiced migratory pain in her left-right knee-both shoulders that started 2 weeks ago, associated with generalized weakness, cleansing inability to move right lower extremity and only have movement without gravity on the left lower extremity without associated trauma, she voiced being on 3 different antibiotics at the outside hospital for both pneumonia and urinary tract infection, and this could explain for been afebrile although she voiced subjective fever at the onset of her symptoms she denied cough, shortness of breath, orthopnea, paroxysmal nocturnal dyspnea or pedal swelling.  She denied prior diarrhea although she voiced upper respiratory tract symptoms,treatment for pneumonia prior to onset of these symptoms.  She denied any urinary symptoms at this time of dysuria, frequency, urgency, straining at micturition or hematuria.  No recent travel, no sick contacts, no tick bite.  She had been sent here for possible knee aspiration.      Overview/Hospital Course:  Ms Hedy Conway who was transferred to [facility] from [facility]. There she was admitted for multiple joint pains. MRI R knee showed meniscal tear and large effusion with synovitis. There was concern for septic arthritis, aspiration was attempted, but no fluid retrieved. She also had Strep pneumoniae bacteremia (3/13) and  Pseudomonas pneumonia (3/15) and is currently being treated with cefepime. She is also receiving vancomycin with concerns for septic joint. She did require 1U RBC transfusion and has had vaginal bleeding. Pelvic US 3/20 showed thickened endometrium, and she will need outpatient GYN follow up. Her iron panel indicates mixed iron deficiency and anemia of chronic disease. She saw Neurology and Orthopaedics. Orthopaedics doubts multiple septic joints simultaneously and suspects Rheumatologic condition. S/p R knee arthrocentesis on 3/23 with culture no growth. Sed rate, CRP remain elevated. Uric acid normal, RF/CCP/SILVIA/hepatitis panel normal. MRI left shoulder with large complex joint effusion with synovitis, high grade partial thickness tear of supraspinatus and infraspinatus, near complete tear of intraarticular bicpet tendon with tenosynovitis. MRI right wrist with distal ulna/radius/carpal osteitis, complex effusions, complex fluid collections, tenosynovitis. S/p arthrocentesis of both L shoulder and R wrist by IR on 3/28- both have high neutrophil count, suggesting septic arthritis. No growth on cultures, but she has been on antibiotics for >1 week. MRI hip shows abnormal L SI joint concerning for septic arthritis. MRI lumbar spine shows R L2-3 facet arthropathy with enhancement. Ortho planning washout on 3/30/23. Neurosurgery also consulted for lumbar spine findings; no surgery needed.       Interval History: Joint pains still present. Understands plan for surgery this evening.     Review of Systems   Constitutional:  Negative for chills and fever.   Respiratory:  Negative for shortness of breath.    Cardiovascular:  Negative for chest pain.   Gastrointestinal:  Negative for abdominal pain.   Genitourinary:  Negative for difficulty urinating.   Musculoskeletal:  Positive for arthralgias. Negative for myalgias.   Skin:  Negative for rash.   Neurological:  Positive for weakness. Negative for numbness.    Psychiatric/Behavioral:  Negative for confusion.    Objective:     Vital Signs (Most Recent):  Temp: 98.6 °F (37 °C) (03/30/23 0700)  Pulse: 103 (03/30/23 0700)  Resp: 16 (03/30/23 0932)  BP: 119/67 (03/30/23 0700)  SpO2: 97 % (03/30/23 0700)   Vital Signs (24h Range):  Temp:  [98.1 °F (36.7 °C)-98.6 °F (37 °C)] 98.6 °F (37 °C)  Pulse:  [103-110] 103  Resp:  [16-20] 16  SpO2:  [96 %-98 %] 97 %  BP: (109-125)/(59-75) 119/67     Weight: 121.1 kg (267 lb)  Body mass index is 41.82 kg/m².    Intake/Output Summary (Last 24 hours) at 3/30/2023 1114  Last data filed at 3/30/2023 0704  Gross per 24 hour   Intake 1637.21 ml   Output 2600 ml   Net -962.79 ml        Physical Exam  Vitals and nursing note reviewed.   Constitutional:       General: She is not in acute distress.     Appearance: She is obese. She is not ill-appearing.   HENT:      Head: Normocephalic and atraumatic.      Nose: Nose normal.      Mouth/Throat:      Mouth: Mucous membranes are moist.   Cardiovascular:      Rate and Rhythm: Regular rhythm. Tachycardia present.   Pulmonary:      Effort: Pulmonary effort is normal.      Breath sounds: Normal breath sounds.      Comments: Room air  Abdominal:      General: Bowel sounds are normal.      Palpations: Abdomen is soft.   Musculoskeletal:      Right lower leg: No edema.      Left lower leg: No edema.      Comments: R wrist swelling. R and L knee swellling   Skin:     General: Skin is warm and dry.      Findings: No rash.   Neurological:      Mental Status: She is alert and oriented to person, place, and time.       Significant Labs: All pertinent labs within the past 24 hours have been reviewed.    Significant Imaging: I have reviewed all pertinent imaging results/findings within the past 24 hours.      Assessment/Plan:      * Polyarthritis  Present since admission to [facility]. MRI noted R knee effusion. ESR, CRP elevated. Doubt gout. RF, CCP, SILVIA, acute hep panel normal. Parvovirus negative. RF normal. TSH  elevated but free T4 normal, so less likely hypothyroidism. Overall this is concerning for septic arthritis affecting multiple joints as a result of bacteremia.     - MRI L shoulder: complex effusion with synovitis  - MRI R wrist: osteitis, complex effusions and fluid collections  - MRI L hip: abnormal SI joint concerning for septic arthritis   - MRI lumbar spine: R sided L2-3 facet joint arthropathy concerning for septic arthritis    - Orthopaedics consulted   - s/p R wrist and L shoulder arthrocenteses on 3/27/23   - R wrist only 2cc, cell count not performed but PMN predominant. Cultures unable to be sent   - L shoulder WBC 47K with PMN predominance, cultures NGTD but has been on antibiotics for >1 week  - Ortho consulted- plan washout bilateral knees and R wrist on 3/30/23    - Neurosurgery consulted for SI joint and L2-3 findings on MRI-- surgery not needed    - ID following, remains on CTX and vanc   - PT, OT consulted as well - likely will need rehab vs SNF when ready       Facet arthropathy, lumbar  Discussed with Neurosurgery. Surgery not needed.       Subclinical hypothyroidism  Lab Results   Component Value Date    TSH 14.200 (H) 03/22/2023     FT4 within normal limits  Repeat as outpatient when acute illness resolved      Anemia of chronic disease  Anemia of chronic disease present (ferritin elevated) but did also have vaginal bleeding. TSAT 14- some degree iron deficiency    Weakness of both lower extremities  Presented with bilateral lower extremity weakness with no significant sensory deficit. She denied preceding diarrhea/GI symptoms, although she had upper respiratory tract symptoms  - Neurology consulted  - seems as though this is secondary to arthritis as being worked up above   - PT, OT consulted       Postmenopausal bleeding  Noted at OSH. TVUS with thickened endometrial stripe  - needs outpatient GYN follow up       Type 2 diabetes mellitus without complication, without long-term current use of  insulin  Patient's FSGs are controlled on current medication regimen.  Last A1c reviewed-   Lab Results   Component Value Date    HGBA1C 6.4 (H) 03/22/2023     Most recent fingerstick glucose reviewed-   Recent Labs   Lab 03/29/23  1138 03/29/23  1633 03/29/23  1958 03/30/23  0720   POCTGLUCOSE 135* 149* 167* 216*     Current correctional scale  Medium  Maintain anti-hyperglycemic dose as follows-   Antihyperglycemics (From admission, onward)    Start     Stop Route Frequency Ordered    03/22/23 0900  insulin detemir U-100 pen 5 Units         -- SubQ Daily 03/22/23 0541    03/22/23 0638  insulin aspart U-100 pen 0-5 Units         -- SubQ Before meals & nightly PRN 03/22/23 0541        Hold Oral hypoglycemics while patient is in the hospital.    Streptococcal bacteremia  Blood cultures 3/13 with Strep bacteremia. Currently being treated with ceftriaxone. This may have seeded her joints causing septic arthritis. Arthrocentesis R wrist and L shoulder completed on 3/27 with elevated neutrophils. Cultures no growth to date but has been on antibiotics for over a week. ID consulted     Stem cells transplant status  Not currently on immunosuppresants  Follow up with oncology      Large cell (diffuse) non-Hodgkin's lymphoma  In remission, follow up with oncology  No new enlarged lymph nodes noted         VTE Risk Mitigation (From admission, onward)         Ordered     Reason for No Pharmacological VTE Prophylaxis  Once        Question:  Reasons:  Answer:  Active Bleeding    03/21/23 2230     IP VTE HIGH RISK PATIENT  Once         03/21/23 2230     Place sequential compression device  Until discontinued         03/21/23 2230                Discharge Planning   JESSE: 4/3/2023     Code Status: Full Code   Is the patient medically ready for discharge?:     Reason for patient still in hospital (select all that apply): Patient trending condition  Discharge Plan A: Rehab   Discharge Delays: None known at this time              Karla  Marco Antonio Springer MD  Department of Hospital Medicine   [facility] [place] [facility]

## 2023-03-31 LAB
ANION GAP SERPL CALC-SCNC: 10 MMOL/L (ref 8–16)
BACTERIA SPEC AEROBE CULT: NO GROWTH
BACTERIA SPEC ANAEROBE CULT: NORMAL
BASOPHILS # BLD AUTO: 0.02 K/UL (ref 0–0.2)
BASOPHILS NFR BLD: 0.4 % (ref 0–1.9)
BUN SERPL-MCNC: 14 MG/DL (ref 6–20)
CALCIUM SERPL-MCNC: 9.1 MG/DL (ref 8.7–10.5)
CHLORIDE SERPL-SCNC: 100 MMOL/L (ref 95–110)
CO2 SERPL-SCNC: 27 MMOL/L (ref 23–29)
CREAT SERPL-MCNC: 0.6 MG/DL (ref 0.5–1.4)
DIFFERENTIAL METHOD: ABNORMAL
EOSINOPHIL # BLD AUTO: 0 K/UL (ref 0–0.5)
EOSINOPHIL NFR BLD: 0.4 % (ref 0–8)
ERYTHROCYTE [DISTWIDTH] IN BLOOD BY AUTOMATED COUNT: 18.4 % (ref 11.5–14.5)
EST. GFR  (NO RACE VARIABLE): >60 ML/MIN/1.73 M^2
GLUCOSE SERPL-MCNC: 155 MG/DL (ref 70–110)
GRAM STN SPEC: NORMAL
HCT VFR BLD AUTO: 26.7 % (ref 37–48.5)
HGB BLD-MCNC: 7.8 G/DL (ref 12–16)
IMM GRANULOCYTES # BLD AUTO: 0.09 K/UL (ref 0–0.04)
IMM GRANULOCYTES NFR BLD AUTO: 1.7 % (ref 0–0.5)
INTERPRETATION UR IFE-IMP: NORMAL
LYMPHOCYTES # BLD AUTO: 1.9 K/UL (ref 1–4.8)
LYMPHOCYTES NFR BLD: 34.8 % (ref 18–48)
MCH RBC QN AUTO: 24.5 PG (ref 27–31)
MCHC RBC AUTO-ENTMCNC: 29.2 G/DL (ref 32–36)
MCV RBC AUTO: 84 FL (ref 82–98)
MONOCYTES # BLD AUTO: 0.6 K/UL (ref 0.3–1)
MONOCYTES NFR BLD: 11.1 % (ref 4–15)
NEUTROPHILS # BLD AUTO: 2.7 K/UL (ref 1.8–7.7)
NEUTROPHILS NFR BLD: 51.6 % (ref 38–73)
NRBC BLD-RTO: 0 /100 WBC
PLATELET # BLD AUTO: ABNORMAL K/UL (ref 150–450)
PLATELET BLD QL SMEAR: ABNORMAL
PMV BLD AUTO: ABNORMAL FL (ref 9.2–12.9)
POCT GLUCOSE: 135 MG/DL (ref 70–110)
POCT GLUCOSE: 139 MG/DL (ref 70–110)
POCT GLUCOSE: 151 MG/DL (ref 70–110)
POCT GLUCOSE: 154 MG/DL (ref 70–110)
POCT GLUCOSE: 178 MG/DL (ref 70–110)
POTASSIUM SERPL-SCNC: 4.2 MMOL/L (ref 3.5–5.1)
RBC # BLD AUTO: 3.18 M/UL (ref 4–5.4)
SODIUM SERPL-SCNC: 137 MMOL/L (ref 136–145)
VANCOMYCIN SERPL-MCNC: 18 UG/ML
VANCOMYCIN TROUGH SERPL-MCNC: 22.2 UG/ML (ref 10–22)
WBC # BLD AUTO: 5.31 K/UL (ref 3.9–12.7)

## 2023-03-31 PROCEDURE — 25000003 PHARM REV CODE 250: Performed by: ORTHOPAEDIC SURGERY

## 2023-03-31 PROCEDURE — 80202 ASSAY OF VANCOMYCIN: CPT | Performed by: HOSPITALIST

## 2023-03-31 PROCEDURE — 97110 THERAPEUTIC EXERCISES: CPT | Mod: CQ

## 2023-03-31 PROCEDURE — A4216 STERILE WATER/SALINE, 10 ML: HCPCS | Performed by: ORTHOPAEDIC SURGERY

## 2023-03-31 PROCEDURE — 63600175 PHARM REV CODE 636 W HCPCS: Performed by: ORTHOPAEDIC SURGERY

## 2023-03-31 PROCEDURE — 11000001 HC ACUTE MED/SURG PRIVATE ROOM

## 2023-03-31 PROCEDURE — 25000003 PHARM REV CODE 250: Performed by: STUDENT IN AN ORGANIZED HEALTH CARE EDUCATION/TRAINING PROGRAM

## 2023-03-31 PROCEDURE — 97110 THERAPEUTIC EXERCISES: CPT

## 2023-03-31 PROCEDURE — 97530 THERAPEUTIC ACTIVITIES: CPT | Mod: CQ

## 2023-03-31 PROCEDURE — 36415 COLL VENOUS BLD VENIPUNCTURE: CPT | Performed by: STUDENT IN AN ORGANIZED HEALTH CARE EDUCATION/TRAINING PROGRAM

## 2023-03-31 PROCEDURE — 85025 COMPLETE CBC W/AUTO DIFF WBC: CPT | Performed by: HOSPITALIST

## 2023-03-31 PROCEDURE — A4216 STERILE WATER/SALINE, 10 ML: HCPCS | Performed by: HOSPITALIST

## 2023-03-31 PROCEDURE — 80048 BASIC METABOLIC PNL TOTAL CA: CPT | Performed by: HOSPITALIST

## 2023-03-31 PROCEDURE — 63600175 PHARM REV CODE 636 W HCPCS: Mod: TB,JG | Performed by: ORTHOPAEDIC SURGERY

## 2023-03-31 PROCEDURE — 25000003 PHARM REV CODE 250: Performed by: HOSPITALIST

## 2023-03-31 PROCEDURE — 80202 ASSAY OF VANCOMYCIN: CPT | Mod: 91 | Performed by: STUDENT IN AN ORGANIZED HEALTH CARE EDUCATION/TRAINING PROGRAM

## 2023-03-31 PROCEDURE — 99232 PR SUBSEQUENT HOSPITAL CARE,LEVL II: ICD-10-PCS | Mod: ,,, | Performed by: INTERNAL MEDICINE

## 2023-03-31 PROCEDURE — 87075 CULTR BACTERIA EXCEPT BLOOD: CPT | Performed by: ORTHOPAEDIC SURGERY

## 2023-03-31 PROCEDURE — 63600175 PHARM REV CODE 636 W HCPCS: Performed by: HOSPITALIST

## 2023-03-31 PROCEDURE — 63600175 PHARM REV CODE 636 W HCPCS: Performed by: STUDENT IN AN ORGANIZED HEALTH CARE EDUCATION/TRAINING PROGRAM

## 2023-03-31 PROCEDURE — 97530 THERAPEUTIC ACTIVITIES: CPT

## 2023-03-31 PROCEDURE — 99232 SBSQ HOSP IP/OBS MODERATE 35: CPT | Mod: ,,, | Performed by: INTERNAL MEDICINE

## 2023-03-31 RX ORDER — MUPIROCIN 20 MG/G
OINTMENT TOPICAL 2 TIMES DAILY
Status: DISPENSED | OUTPATIENT
Start: 2023-03-31 | End: 2023-04-05

## 2023-03-31 RX ORDER — MORPHINE SULFATE 4 MG/ML
2 INJECTION, SOLUTION INTRAMUSCULAR; INTRAVENOUS
Status: DISCONTINUED | OUTPATIENT
Start: 2023-03-31 | End: 2023-04-03

## 2023-03-31 RX ORDER — OXYCODONE HYDROCHLORIDE 5 MG/1
5 TABLET ORAL EVERY 4 HOURS PRN
Status: DISCONTINUED | OUTPATIENT
Start: 2023-03-31 | End: 2023-04-07 | Stop reason: HOSPADM

## 2023-03-31 RX ORDER — ACETAMINOPHEN 325 MG/1
650 TABLET ORAL EVERY 6 HOURS PRN
Status: DISCONTINUED | OUTPATIENT
Start: 2023-03-31 | End: 2023-04-07 | Stop reason: HOSPADM

## 2023-03-31 RX ADMIN — CYCLOBENZAPRINE HYDROCHLORIDE 5 MG: 5 TABLET, FILM COATED ORAL at 10:03

## 2023-03-31 RX ADMIN — MELATONIN TAB 3 MG 6 MG: 3 TAB at 08:03

## 2023-03-31 RX ADMIN — Medication 10 ML: at 11:03

## 2023-03-31 RX ADMIN — MORPHINE SULFATE 2 MG: 4 INJECTION INTRAVENOUS at 04:03

## 2023-03-31 RX ADMIN — MUPIROCIN: 20 OINTMENT TOPICAL at 09:03

## 2023-03-31 RX ADMIN — VANCOMYCIN HYDROCHLORIDE 2000 MG: 1 INJECTION, POWDER, LYOPHILIZED, FOR SOLUTION INTRAVENOUS at 05:03

## 2023-03-31 RX ADMIN — VANCOMYCIN HYDROCHLORIDE 1500 MG: 1.5 INJECTION, POWDER, LYOPHILIZED, FOR SOLUTION INTRAVENOUS at 05:03

## 2023-03-31 RX ADMIN — MORPHINE SULFATE 2 MG: 4 INJECTION INTRAVENOUS at 11:03

## 2023-03-31 RX ADMIN — INSULIN DETEMIR 5 UNITS: 100 INJECTION, SOLUTION SUBCUTANEOUS at 09:03

## 2023-03-31 RX ADMIN — Medication 10 ML: at 06:03

## 2023-03-31 RX ADMIN — CEFTRIAXONE 2 G: 2 INJECTION, SOLUTION INTRAVENOUS at 08:03

## 2023-03-31 RX ADMIN — MORPHINE SULFATE 2 MG: 4 INJECTION INTRAVENOUS at 08:03

## 2023-03-31 RX ADMIN — HYDROCODONE BITARTRATE AND ACETAMINOPHEN 1 TABLET: 5; 325 TABLET ORAL at 05:03

## 2023-03-31 RX ADMIN — Medication 10 ML: at 04:03

## 2023-03-31 RX ADMIN — OXYCODONE HYDROCHLORIDE 5 MG: 5 TABLET ORAL at 10:03

## 2023-03-31 RX ADMIN — MUPIROCIN: 20 OINTMENT TOPICAL at 08:03

## 2023-03-31 NOTE — PROGRESS NOTES
[facility] - [facility]  Infectious Disease  Progress Note    Patient Name: Hedy Conway  MRN: [id number]  Admission Date: 3/21/2023  Length of Stay: 10 days  Attending Physician: Alan Murrieta MD  Primary Care Provider: John Lantigua PA-C    Isolation Status: No active isolations  Assessment/Plan:      ID  Streptococcal bacteremia  48y/o F pt with hx of T2DM, HTN, large cell diffuse non-Hodgkin lymphoma s/p chemotherapy and stem cell transplant 2011 admitted to OSH on 3/14 for possible sepsis (PNA vs UTI) after presenting for eval of 2 wk hx of migratory polyarthralgias, fevers and weakness, found to have strep pneumoniae bacteremia, pneumonia due to pseudomonas and was ultimately transferred to [facility] 3/22 for arthrocentesis due to concern for septic arthritis.     Painful joints and MRI findings concerning for septic joints in the setting of strep pneumoniae bacteremia. S/p IR arthrocentesis Lt shoulder (WBC >47k/ 95% segs) and Rt wrist (89% segs) on 3/27 with findings concerning for septic arthritis. Synovial fluid cx ngtd. Now s/p b/l knee arthroscopy and Rt wrist arthrotomy 3/30. Declined Lt shoulder washout.    Recommendations:  --f/u surgical cultures  --Continue ceftriaxone 2g q 24h  --stop vancomycin  --Anticipate atleast 4 wks IV abx.           Anticipated Disposition: rehab    Thank you for your consult. I will follow-up with patient. Please contact us if you have any additional questions.    Yuridia Campbell MD  Infectious Disease  [facility]    Subjective:     Principal Problem:Polyarthritis    HPI:     Ms. Conway is a 48y/o F pt with hx of T2DM, HTN, large cell diffuse non-Hodgkin lymphoma s/p chemotherapy and stem cell transplant 2011 admitted to OSH on 3/14 for possible sepsis (PNA vs UTI) after presenting for eval of 2 wk hx of migratory polyarthralgias and weakness, found to have  strep pneumoniae bacteremia, pneumonia due to pseudomonas and was ultimately transferred to  [facility]  3/22 for arthrocentesis.     Pt reports she was treated for pneumonia with abx and steroids approx 1.5 weeks prior to hospital admission. 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 Symptoms progressed and notes she became very weak and confused prompting her to present to OSH. She is not sure if she experienced fevers, sweats or chills. Does not think she had a worsening cough or SOB. She is still complaining of Rt wrist, L shoulder, R knee, L hip pain.    Septic workup remarkable for BCx  (3/13) positive for strep pna, RCx (3/15) positive for pseudomonas, MRI R knee showed meniscal tear and large effusion with synovitis, MRI left shoulder with large complex joint effusion with synovitis, high grade partial thickness tear of supraspinatus and infraspinatus, near complete tear of intraarticular bicpet tendon with tenosynovitis. MRI right wrist with distal ulna/radius/carpal osteitis, complex effusions, complex fluid collections, tenosynovitis. Underwent unsucceful attempt at rt knee arthrocentesis 3/19. Transferred for ortho eval 3/22.  Arthrocentesis 3/23 yielded minimal fluid; not enough to send for cell counts and cx remained neg.     Id consulted for "high inflammatory markers, multiple joints with synovitis. could she have multiple septic joints? strep pneumo bacteremia on admit and pseudomonas pneumonia."    Interval History: No acute events overnight.  Pain well controlled.    Review of Systems   Constitutional:  Positive for fatigue. Negative for chills and fever.   Respiratory:  Negative for cough and shortness of breath.    Gastrointestinal:  Negative for diarrhea.   Genitourinary:  Negative for dysuria.   Musculoskeletal:  Positive for arthralgias, joint swelling and myalgias. Negative for back pain and neck pain.   Skin:  Negative for wound. "   All other systems reviewed and are negative.  Objective:     Vital Signs (Most Recent):  Temp: 98.1 °F (36.7 °C) (03/31/23 1256)  Pulse: 100 (03/31/23 1256)  Resp: 20 (03/31/23 1256)  BP: (!) 121/58 (03/31/23 1256)  SpO2: 100 % (03/31/23 1256) Vital Signs (24h Range):  Temp:  [97.6 °F (36.4 °C)-98.8 °F (37.1 °C)] 98.1 °F (36.7 °C)  Pulse:  [] 100  Resp:  [15-20] 20  SpO2:  [95 %-100 %] 100 %  BP: (110-133)/(53-72) 121/58     Weight: 121.1 kg (267 lb)  Body mass index is 41.82 kg/m².    Estimated Creatinine Clearance: 154.6 mL/min (based on SCr of 0.6 mg/dL).    Physical Exam  Vitals reviewed.   Constitutional:       Appearance: Normal appearance.   Eyes:      Conjunctiva/sclera: Conjunctivae normal.      Pupils: Pupils are equal, round, and reactive to light.   Cardiovascular:      Rate and Rhythm: Normal rate and regular rhythm.      Heart sounds: No murmur heard.  Pulmonary:      Effort: Pulmonary effort is normal. No respiratory distress.      Breath sounds: Normal breath sounds. No wheezing.   Abdominal:      General: Abdomen is flat.      Palpations: Abdomen is soft.   Musculoskeletal:      Right lower leg: No edema.      Left lower leg: No edema.      Comments: Surgical dressing at R wrist and b/l knees   Neurological:      Mental Status: She is alert and oriented to person, place, and time.       Significant Labs: Blood Culture:   Recent Labs   Lab 03/13/23  1849 03/16/23  0849 03/22/23  0610   LABBLOO No growth after 5 days.  Gram stain aer bottle: Gram positive cocci in chains resembling Strep  Results called to and read back by: Nurys Gutiérrez RN  03/14/2023  15:50  STREPTOCOCCUS PNEUMONIAE* No growth after 5 days.  No growth after 5 days. No Growth after 4 days.       Wound Culture:   Recent Labs   Lab 03/27/23  1551   LABAERO No growth     All pertinent labs within the past 24 hours have been reviewed.    Significant Imaging: I have reviewed all pertinent imaging results/findings within the  past 24 hours.     no

## 2023-03-31 NOTE — SUBJECTIVE & OBJECTIVE
Interval History:   NAEON  No new symptoms  S/p washout    Review of Systems   Constitutional:  Positive for activity change.   Musculoskeletal:  Positive for arthralgias.   Neurological:  Positive for weakness.     Objective:     Vital Signs (Most Recent):  Temp: 98.1 °F (36.7 °C) (03/31/23 0830)  Pulse: 96 (03/31/23 0830)  Resp: 20 (03/31/23 0830)  BP: 125/63 (03/31/23 0830)  SpO2: 98 % (03/31/23 0830)   Vital Signs (24h Range):  Temp:  [97.6 °F (36.4 °C)-98.8 °F (37.1 °C)] 98.1 °F (36.7 °C)  Pulse:  [] 96  Resp:  [15-20] 20  SpO2:  [95 %-98 %] 98 %  BP: (110-133)/(53-72) 125/63     Weight: 121.1 kg (267 lb)  Body mass index is 41.82 kg/m².    Intake/Output Summary (Last 24 hours) at 3/31/2023 0957  Last data filed at 3/31/2023 0525  Gross per 24 hour   Intake 500 ml   Output 2000 ml   Net -1500 ml        Physical Exam  Vitals and nursing note reviewed.   Constitutional:       General: She is not in acute distress.     Appearance: She is well-developed. She is obese. She is not ill-appearing or diaphoretic.   HENT:      Head: Normocephalic and atraumatic.      Nose: Nose normal.      Mouth/Throat:      Mouth: Mucous membranes are moist.   Eyes:      General: No scleral icterus.  Neck:      Thyroid: No thyromegaly.   Cardiovascular:      Rate and Rhythm: Regular rhythm. Tachycardia present.      Heart sounds: No murmur heard.  Pulmonary:      Effort: Pulmonary effort is normal.      Breath sounds: Normal breath sounds. No stridor. No wheezing or rales.      Comments: Room air  Abdominal:      General: Bowel sounds are normal. There is no distension.      Palpations: Abdomen is soft. There is no mass.      Tenderness: There is no abdominal tenderness. There is no guarding.   Musculoskeletal:         General: Normal range of motion.      Cervical back: Normal range of motion and neck supple.      Right lower leg: No edema.      Left lower leg: No edema.      Comments: R wrist swelling. R and L knee swellling    Skin:     General: Skin is warm and dry.      Capillary Refill: Capillary refill takes less than 2 seconds.      Findings: Lesion (surgical c/d/i) present. No rash.   Neurological:      Mental Status: She is alert and oriented to person, place, and time.   Psychiatric:         Behavior: Behavior normal.           Recent Results (from the past 24 hour(s))   POCT glucose    Collection Time: 03/30/23 11:58 AM   Result Value Ref Range    POCT Glucose 150 (H) 70 - 110 mg/dL   POCT glucose    Collection Time: 03/30/23  4:33 PM   Result Value Ref Range    POCT Glucose 135 (H) 70 - 110 mg/dL   Gram stain    Collection Time: 03/30/23  8:14 PM    Specimen: Wrist, Right; Wound   Result Value Ref Range    Gram Stain Result Rare WBC's     Gram Stain Result No organisms seen    Gram stain    Collection Time: 03/30/23  8:14 PM    Specimen: Knee, Right; Wound   Result Value Ref Range    Gram Stain Result Few WBC's     Gram Stain Result No organisms seen    Gram stain    Collection Time: 03/30/23  8:14 PM    Specimen: Knee, Left; Wound   Result Value Ref Range    Gram Stain Result Few WBC's     Gram Stain Result No organisms seen    POCT glucose    Collection Time: 03/30/23  8:53 PM   Result Value Ref Range    POCT Glucose 178 (H) 70 - 110 mg/dL   POCT glucose    Collection Time: 03/30/23 10:04 PM   Result Value Ref Range    POCT Glucose 206 (H) 70 - 110 mg/dL   CBC Auto Differential    Collection Time: 03/31/23  5:43 AM   Result Value Ref Range    WBC 5.31 3.90 - 12.70 K/uL    RBC 3.18 (L) 4.00 - 5.40 M/uL    Hemoglobin 7.8 (L) 12.0 - 16.0 g/dL    Hematocrit 26.7 (L) 37.0 - 48.5 %    MCV 84 82 - 98 fL    MCH 24.5 (L) 27.0 - 31.0 pg    MCHC 29.2 (L) 32.0 - 36.0 g/dL    RDW 18.4 (H) 11.5 - 14.5 %    Platelets SEE COMMENT 150 - 450 K/uL    MPV SEE COMMENT 9.2 - 12.9 fL    Immature Granulocytes 1.7 (H) 0.0 - 0.5 %    Gran # (ANC) 2.7 1.8 - 7.7 K/uL    Immature Grans (Abs) 0.09 (H) 0.00 - 0.04 K/uL    Lymph # 1.9 1.0 - 4.8 K/uL     Mono # 0.6 0.3 - 1.0 K/uL    Eos # 0.0 0.0 - 0.5 K/uL    Baso # 0.02 0.00 - 0.20 K/uL    nRBC 0 0 /100 WBC    Gran % 51.6 38.0 - 73.0 %    Lymph % 34.8 18.0 - 48.0 %    Mono % 11.1 4.0 - 15.0 %    Eosinophil % 0.4 0.0 - 8.0 %    Basophil % 0.4 0.0 - 1.9 %    Platelet Estimate Clumped (A)     Differential Method Automated    Basic Metabolic Panel    Collection Time: 03/31/23  5:43 AM   Result Value Ref Range    Sodium 137 136 - 145 mmol/L    Potassium 4.2 3.5 - 5.1 mmol/L    Chloride 100 95 - 110 mmol/L    CO2 27 23 - 29 mmol/L    Glucose 155 (H) 70 - 110 mg/dL    BUN 14 6 - 20 mg/dL    Creatinine 0.6 0.5 - 1.4 mg/dL    Calcium 9.1 8.7 - 10.5 mg/dL    Anion Gap 10 8 - 16 mmol/L    eGFR >60 >60 mL/min/1.73 m^2   VANCOMYCIN, TROUGH    Collection Time: 03/31/23  5:43 AM   Result Value Ref Range    Vancomycin-Trough 22.2 (H) 10.0 - 22.0 ug/mL   POCT glucose    Collection Time: 03/31/23  9:20 AM   Result Value Ref Range    POCT Glucose 139 (H) 70 - 110 mg/dL       Microbiology Results (last 7 days)       Procedure Component Value Units Date/Time    Gram stain [[id number]] Collected: 03/30/23 2014    Order Status: Completed Specimen: Wound from Knee, Left Updated: 03/31/23 0755     Gram Stain Result Few WBC's      No organisms seen    Gram stain [[id number]] Collected: 03/30/23 2014    Order Status: Completed Specimen: Wound from Knee, Right Updated: 03/31/23 0755     Gram Stain Result Few WBC's      No organisms seen    Gram stain [[id number]] Collected: 03/30/23 2014    Order Status: Completed Specimen: Wound from Wrist, Right Updated: 03/31/23 0754     Gram Stain Result Rare WBC's      No organisms seen    Aerobic culture [[id number]] Collected: 03/27/23 1551    Order Status: Completed Specimen: Shoulder, Left Updated: 03/31/23 0722     Aerobic Bacterial Culture No growth    Culture, Anaerobe [[id number]] Collected: 03/27/23 1551    Order Status: Completed Specimen: Shoulder, Left Updated: 03/31/23 0527     Anaerobic Culture  No anaerobes isolated    Aerobic culture [[id number]] Collected: 03/30/23 2014    Order Status: Sent Specimen: Wound from Knee, Left Updated: 03/31/23 0216    Culture, Anaerobe [[id number]] Collected: 03/31/23 0149    Order Status: Sent Specimen: Wound from Knee, Left Updated: 03/31/23 0215    Culture, Anaerobe [[id number]] Collected: 03/30/23 2014    Order Status: Sent Specimen: Wound from Wrist, Right Updated: 03/31/23 0213    Aerobic culture [[id number]] Collected: 03/30/23 2014    Order Status: Sent Specimen: Wound from Wrist, Right Updated: 03/31/23 0213    Aerobic culture [[id number]] Collected: 03/30/23 2014    Order Status: Sent Specimen: Wound from Knee, Right Updated: 03/31/23 0212    Culture, Anaerobe [[id number]] Collected: 03/30/23 2014    Order Status: Sent Specimen: Wound from Knee, Right Updated: 03/31/23 0211    Culture, Anaerobe [[id number]] Collected: 03/30/23 2014    Order Status: Canceled Specimen: Wound from Wrist, Right     AFB Culture & Smear [[id number]] Collected: 03/27/23 1551    Order Status: Completed Specimen: Shoulder, Left Updated: 03/29/23 2127     AFB Culture & Smear Culture in progress     AFB CULTURE STAIN No acid fast bacilli seen.    Gram stain [[id number]] Collected: 03/27/23 1551    Order Status: Completed Specimen: Shoulder, Left Updated: 03/28/23 0802     Gram Stain Result Rare WBC's      No organisms seen    Fungus culture [[id number]] Collected: 03/27/23 1551    Order Status: Sent Specimen: Shoulder, Left Updated: 03/27/23 1632    Aerobic culture [[id number]] Collected: 03/27/23 1619    Order Status: Canceled Specimen: Arm from Wrist, Right     Culture, Anaerobe [[id number]] Collected: 03/27/23 1618    Order Status: Canceled Specimen: Other from Wrist, Right     AFB Culture & Smear [[id number]] Collected: 03/27/23 1618    Order Status: Canceled Specimen: Other from Wrist, Right     Fungus culture [[id number]] Collected: 03/27/23 1618    Order Status: Canceled Specimen: Other from Wrist,  Right     Gram stain [[id number]] Collected: 03/27/23 1618    Order Status: Canceled Specimen: Wound from Wrist, Right     Culture, Body Fluid (Aerobic) w/ GS [[id number]] Collected: 03/23/23 1244    Order Status: Completed Specimen: Body Fluid from Knee Updated: 03/27/23 0733     AEROBIC CULTURE - FLUID No growth     Gram Stain Result Few WBC's      No organisms seen    Blood culture [[id number]] Collected: 03/22/23 0610    Order Status: Completed Specimen: Blood from Peripheral, Right Arm Updated: 03/26/23 0903     Blood Culture, Routine No Growth after 4 days.

## 2023-03-31 NOTE — ANESTHESIA PROCEDURE NOTES
Intubation    Date/Time: 3/30/2023 7:20 PM  Performed by: Ayaan Burgos CRNA  Authorized by: Dinorah Casarez MD     Intubation:     Induction:  Intravenous    Intubated:  Postinduction    Mask Ventilation:  Easy mask    Attempts:  1    Attempted By:  CRNA    Method of Intubation:  Video laryngoscopy    Blade:  Elliott 3    Laryngeal View Grade: Grade I - full view of cords      Difficult Airway Encountered?: No      Complications:  None    Airway Device:  Oral endotracheal tube    Airway Device Size:  7.0    Style/Cuff Inflation:  Cuffed (inflated to minimal occlusive pressure)    Tube secured:  22    Secured at:  The lips    Placement Verified By:  Capnometry    Complicating Factors:  Obesity and small mouth    Findings Post-Intubation:  BS equal bilateral and atraumatic/condition of teeth unchanged

## 2023-03-31 NOTE — SUBJECTIVE & OBJECTIVE
Interval History: No acute events overnight.  Pain well controlled.    Review of Systems   Constitutional:  Positive for fatigue. Negative for chills and fever.   Respiratory:  Negative for cough and shortness of breath.    Gastrointestinal:  Negative for diarrhea.   Genitourinary:  Negative for dysuria.   Musculoskeletal:  Positive for arthralgias, joint swelling and myalgias. Negative for back pain and neck pain.   Skin:  Negative for wound.   All other systems reviewed and are negative.  Objective:     Vital Signs (Most Recent):  Temp: 98.1 °F (36.7 °C) (03/31/23 1256)  Pulse: 100 (03/31/23 1256)  Resp: 20 (03/31/23 1256)  BP: (!) 121/58 (03/31/23 1256)  SpO2: 100 % (03/31/23 1256) Vital Signs (24h Range):  Temp:  [97.6 °F (36.4 °C)-98.8 °F (37.1 °C)] 98.1 °F (36.7 °C)  Pulse:  [] 100  Resp:  [15-20] 20  SpO2:  [95 %-100 %] 100 %  BP: (110-133)/(53-72) 121/58     Weight: 121.1 kg (267 lb)  Body mass index is 41.82 kg/m².    Estimated Creatinine Clearance: 154.6 mL/min (based on SCr of 0.6 mg/dL).    Physical Exam  Vitals reviewed.   Constitutional:       Appearance: Normal appearance.   Eyes:      Conjunctiva/sclera: Conjunctivae normal.      Pupils: Pupils are equal, round, and reactive to light.   Cardiovascular:      Rate and Rhythm: Normal rate and regular rhythm.      Heart sounds: No murmur heard.  Pulmonary:      Effort: Pulmonary effort is normal. No respiratory distress.      Breath sounds: Normal breath sounds. No wheezing.   Abdominal:      General: Abdomen is flat.      Palpations: Abdomen is soft.   Musculoskeletal:      Right lower leg: No edema.      Left lower leg: No edema.      Comments: Surgical dressing at R wrist and b/l knees   Neurological:      Mental Status: She is alert and oriented to person, place, and time.       Significant Labs: Blood Culture:   Recent Labs   Lab 03/13/23  1849 03/16/23  0849 03/22/23  0610   LABBLOO No growth after 5 days.  Gram stain aer bottle: Gram  positive cocci in chains resembling Strep  Results called to and read back by: Nurys Gutiérrez RN  03/14/2023  15:50  STREPTOCOCCUS PNEUMONIAE* No growth after 5 days.  No growth after 5 days. No Growth after 4 days.       Wound Culture:   Recent Labs   Lab 03/27/23  1551   LABAERO No growth     All pertinent labs within the past 24 hours have been reviewed.    Significant Imaging: I have reviewed all pertinent imaging results/findings within the past 24 hours.

## 2023-03-31 NOTE — PROGRESS NOTES
[facility]  Progress Note    Patient Name: Hedy Conway  MRN: [id number]  Patient Class: IP- Inpatient   Admission Date: 3/21/2023  Length of Stay: 10 days  Attending Physician: Alan Murrieta MD  Primary Care Provider: John Lantigua PA-C        Subjective:     Principal Problem:Polyarthritis        HPI:  47-year-old  female with medical history significant for type 2 diabetes mellitus, hypertension, large cell diffuse non-Hodgkin lymphoma status post chemotherapy and stem cell transplant 2011 was transferred from outside hospital with suspicion of septic arthritis.  Of note she voiced migratory pain in her left-right knee-both shoulders that started 2 weeks ago, associated with generalized weakness, cleansing inability to move right lower extremity and only have movement without gravity on the left lower extremity without associated trauma, she voiced being on 3 different antibiotics at the outside hospital for both pneumonia and urinary tract infection, and this could explain for been afebrile although she voiced subjective fever at the onset of her symptoms she denied cough, shortness of breath, orthopnea, paroxysmal nocturnal dyspnea or pedal swelling.  She denied prior diarrhea although she voiced upper respiratory tract symptoms,treatment for pneumonia prior to onset of these symptoms.  She denied any urinary symptoms at this time of dysuria, frequency, urgency, straining at micturition or hematuria.  No recent travel, no sick contacts, no tick bite.  She had been sent here for possible knee aspiration.      Overview/Hospital Course:  Ms Hedy Conway who was transferred to [facility] from [facility]. There she was admitted for multiple joint pains. MRI R knee showed meniscal tear and large effusion with synovitis. There was concern for septic arthritis, aspiration was attempted, but no fluid retrieved. She also had Strep pneumoniae bacteremia (3/13) and  Pseudomonas pneumonia (3/15) and is currently being treated with cefepime. She is also receiving vancomycin with concerns for septic joint. She did require 1U RBC transfusion and has had vaginal bleeding. Pelvic US 3/20 showed thickened endometrium, and she will need outpatient GYN follow up. Her iron panel indicates mixed iron deficiency and anemia of chronic disease. She saw Neurology and Orthopaedics. Orthopaedics doubts multiple septic joints simultaneously and suspects Rheumatologic condition. S/p R knee arthrocentesis on 3/23 with culture no growth. Sed rate, CRP remain elevated. Uric acid normal, RF/CCP/SILVIA/hepatitis panel normal. MRI left shoulder with large complex joint effusion with synovitis, high grade partial thickness tear of supraspinatus and infraspinatus, near complete tear of intraarticular bicpet tendon with tenosynovitis. MRI right wrist with distal ulna/radius/carpal osteitis, complex effusions, complex fluid collections, tenosynovitis. S/p arthrocentesis of both L shoulder and R wrist by IR on 3/28- both have high neutrophil count, suggesting septic arthritis. No growth on cultures, but she has been on antibiotics for >1 week. MRI hip shows abnormal L SI joint concerning for septic arthritis. MRI lumbar spine shows R L2-3 facet arthropathy with enhancement. Ortho planning washout on 3/30/23. Neurosurgery also consulted for lumbar spine findings; no surgery needed.       Interval History:   NAEON  No new symptoms  S/p washout    Review of Systems   Constitutional:  Positive for activity change.   Musculoskeletal:  Positive for arthralgias.   Neurological:  Positive for weakness.     Objective:     Vital Signs (Most Recent):  Temp: 98.1 °F (36.7 °C) (03/31/23 0830)  Pulse: 96 (03/31/23 0830)  Resp: 20 (03/31/23 0830)  BP: 125/63 (03/31/23 0830)  SpO2: 98 % (03/31/23 0830)   Vital Signs (24h Range):  Temp:  [97.6 °F (36.4 °C)-98.8 °F (37.1 °C)] 98.1 °F (36.7 °C)  Pulse:  [] 96  Resp:   [15-20] 20  SpO2:  [95 %-98 %] 98 %  BP: (110-133)/(53-72) 125/63     Weight: 121.1 kg (267 lb)  Body mass index is 41.82 kg/m².    Intake/Output Summary (Last 24 hours) at 3/31/2023 0957  Last data filed at 3/31/2023 0525  Gross per 24 hour   Intake 500 ml   Output 2000 ml   Net -1500 ml        Physical Exam  Vitals and nursing note reviewed.   Constitutional:       General: She is not in acute distress.     Appearance: She is well-developed. She is obese. She is not ill-appearing or diaphoretic.   HENT:      Head: Normocephalic and atraumatic.      Nose: Nose normal.      Mouth/Throat:      Mouth: Mucous membranes are moist.   Eyes:      General: No scleral icterus.  Neck:      Thyroid: No thyromegaly.   Cardiovascular:      Rate and Rhythm: Regular rhythm. Tachycardia present.      Heart sounds: No murmur heard.  Pulmonary:      Effort: Pulmonary effort is normal.      Breath sounds: Normal breath sounds. No stridor. No wheezing or rales.      Comments: Room air  Abdominal:      General: Bowel sounds are normal. There is no distension.      Palpations: Abdomen is soft. There is no mass.      Tenderness: There is no abdominal tenderness. There is no guarding.   Musculoskeletal:         General: Normal range of motion.      Cervical back: Normal range of motion and neck supple.      Right lower leg: No edema.      Left lower leg: No edema.      Comments: R wrist swelling. R and L knee swellling   Skin:     General: Skin is warm and dry.      Capillary Refill: Capillary refill takes less than 2 seconds.      Findings: Lesion (surgical c/d/i) present. No rash.   Neurological:      Mental Status: She is alert and oriented to person, place, and time.   Psychiatric:         Behavior: Behavior normal.           Recent Results (from the past 24 hour(s))   POCT glucose    Collection Time: 03/30/23 11:58 AM   Result Value Ref Range    POCT Glucose 150 (H) 70 - 110 mg/dL   POCT glucose    Collection Time: 03/30/23  4:33 PM    Result Value Ref Range    POCT Glucose 135 (H) 70 - 110 mg/dL   Gram stain    Collection Time: 03/30/23  8:14 PM    Specimen: Wrist, Right; Wound   Result Value Ref Range    Gram Stain Result Rare WBC's     Gram Stain Result No organisms seen    Gram stain    Collection Time: 03/30/23  8:14 PM    Specimen: Knee, Right; Wound   Result Value Ref Range    Gram Stain Result Few WBC's     Gram Stain Result No organisms seen    Gram stain    Collection Time: 03/30/23  8:14 PM    Specimen: Knee, Left; Wound   Result Value Ref Range    Gram Stain Result Few WBC's     Gram Stain Result No organisms seen    POCT glucose    Collection Time: 03/30/23  8:53 PM   Result Value Ref Range    POCT Glucose 178 (H) 70 - 110 mg/dL   POCT glucose    Collection Time: 03/30/23 10:04 PM   Result Value Ref Range    POCT Glucose 206 (H) 70 - 110 mg/dL   CBC Auto Differential    Collection Time: 03/31/23  5:43 AM   Result Value Ref Range    WBC 5.31 3.90 - 12.70 K/uL    RBC 3.18 (L) 4.00 - 5.40 M/uL    Hemoglobin 7.8 (L) 12.0 - 16.0 g/dL    Hematocrit 26.7 (L) 37.0 - 48.5 %    MCV 84 82 - 98 fL    MCH 24.5 (L) 27.0 - 31.0 pg    MCHC 29.2 (L) 32.0 - 36.0 g/dL    RDW 18.4 (H) 11.5 - 14.5 %    Platelets SEE COMMENT 150 - 450 K/uL    MPV SEE COMMENT 9.2 - 12.9 fL    Immature Granulocytes 1.7 (H) 0.0 - 0.5 %    Gran # (ANC) 2.7 1.8 - 7.7 K/uL    Immature Grans (Abs) 0.09 (H) 0.00 - 0.04 K/uL    Lymph # 1.9 1.0 - 4.8 K/uL    Mono # 0.6 0.3 - 1.0 K/uL    Eos # 0.0 0.0 - 0.5 K/uL    Baso # 0.02 0.00 - 0.20 K/uL    nRBC 0 0 /100 WBC    Gran % 51.6 38.0 - 73.0 %    Lymph % 34.8 18.0 - 48.0 %    Mono % 11.1 4.0 - 15.0 %    Eosinophil % 0.4 0.0 - 8.0 %    Basophil % 0.4 0.0 - 1.9 %    Platelet Estimate Clumped (A)     Differential Method Automated    Basic Metabolic Panel    Collection Time: 03/31/23  5:43 AM   Result Value Ref Range    Sodium 137 136 - 145 mmol/L    Potassium 4.2 3.5 - 5.1 mmol/L    Chloride 100 95 - 110 mmol/L    CO2 27 23 - 29 mmol/L     Glucose 155 (H) 70 - 110 mg/dL    BUN 14 6 - 20 mg/dL    Creatinine 0.6 0.5 - 1.4 mg/dL    Calcium 9.1 8.7 - 10.5 mg/dL    Anion Gap 10 8 - 16 mmol/L    eGFR >60 >60 mL/min/1.73 m^2   VANCOMYCIN, TROUGH    Collection Time: 03/31/23  5:43 AM   Result Value Ref Range    Vancomycin-Trough 22.2 (H) 10.0 - 22.0 ug/mL   POCT glucose    Collection Time: 03/31/23  9:20 AM   Result Value Ref Range    POCT Glucose 139 (H) 70 - 110 mg/dL       Microbiology Results (last 7 days)       Procedure Component Value Units Date/Time    Gram stain [[id number]] Collected: 03/30/23 2014    Order Status: Completed Specimen: Wound from Knee, Left Updated: 03/31/23 0755     Gram Stain Result Few WBC's      No organisms seen    Gram stain [[id number]] Collected: 03/30/23 2014    Order Status: Completed Specimen: Wound from Knee, Right Updated: 03/31/23 0755     Gram Stain Result Few WBC's      No organisms seen    Gram stain [[id number]] Collected: 03/30/23 2014    Order Status: Completed Specimen: Wound from Wrist, Right Updated: 03/31/23 0754     Gram Stain Result Rare WBC's      No organisms seen    Aerobic culture [[id number]] Collected: 03/27/23 1551    Order Status: Completed Specimen: Shoulder, Left Updated: 03/31/23 0722     Aerobic Bacterial Culture No growth    Culture, Anaerobe [[id number]] Collected: 03/27/23 1551    Order Status: Completed Specimen: Shoulder, Left Updated: 03/31/23 0527     Anaerobic Culture No anaerobes isolated    Aerobic culture [[id number]] Collected: 03/30/23 2014    Order Status: Sent Specimen: Wound from Knee, Left Updated: 03/31/23 0216    Culture, Anaerobe [[id number]] Collected: 03/31/23 0149    Order Status: Sent Specimen: Wound from Knee, Left Updated: 03/31/23 0215    Culture, Anaerobe [[id number]] Collected: 03/30/23 2014    Order Status: Sent Specimen: Wound from Wrist, Right Updated: 03/31/23 0213    Aerobic culture [[id number]] Collected: 03/30/23 2014    Order Status: Sent Specimen: Wound from Wrist,  Right Updated: 03/31/23 0213    Aerobic culture [[id number]] Collected: 03/30/23 2014    Order Status: Sent Specimen: Wound from Knee, Right Updated: 03/31/23 0212    Culture, Anaerobe [[id number]] Collected: 03/30/23 2014    Order Status: Sent Specimen: Wound from Knee, Right Updated: 03/31/23 0211    Culture, Anaerobe [[id number]] Collected: 03/30/23 2014    Order Status: Canceled Specimen: Wound from Wrist, Right     AFB Culture & Smear [[id number]] Collected: 03/27/23 1551    Order Status: Completed Specimen: Shoulder, Left Updated: 03/29/23 2127     AFB Culture & Smear Culture in progress     AFB CULTURE STAIN No acid fast bacilli seen.    Gram stain [[id number]] Collected: 03/27/23 1551    Order Status: Completed Specimen: Shoulder, Left Updated: 03/28/23 0802     Gram Stain Result Rare WBC's      No organisms seen    Fungus culture [[id number]] Collected: 03/27/23 1551    Order Status: Sent Specimen: Shoulder, Left Updated: 03/27/23 1632    Aerobic culture [[id number]] Collected: 03/27/23 1619    Order Status: Canceled Specimen: Arm from Wrist, Right     Culture, Anaerobe [[id number]] Collected: 03/27/23 1618    Order Status: Canceled Specimen: Other from Wrist, Right     AFB Culture & Smear [[id number]] Collected: 03/27/23 1618    Order Status: Canceled Specimen: Other from Wrist, Right     Fungus culture [[id number]] Collected: 03/27/23 1618    Order Status: Canceled Specimen: Other from Wrist, Right     Gram stain [[id number]] Collected: 03/27/23 1618    Order Status: Canceled Specimen: Wound from Wrist, Right     Culture, Body Fluid (Aerobic) w/ GS [[id number]] Collected: 03/23/23 1244    Order Status: Completed Specimen: Body Fluid from Knee Updated: 03/27/23 0733     AEROBIC CULTURE - FLUID No growth     Gram Stain Result Few WBC's      No organisms seen    Blood culture [[id number]] Collected: 03/22/23 0610    Order Status: Completed Specimen: Blood from Peripheral, Right Arm Updated: 03/26/23 0903     Blood  Culture, Routine No Growth after 4 days.                       Assessment/Plan:      * Polyarthritis  Present since admission to [facility]. MRI noted R knee effusion. ESR, CRP elevated. Doubt gout. RF, CCP, SILVIA, acute hep panel normal. Parvovirus negative. RF normal. TSH elevated but free T4 normal, so less likely hypothyroidism. Overall this is concerning for septic arthritis affecting multiple joints as a result of bacteremia.     - MRI L shoulder: complex effusion with synovitis  - MRI R wrist: osteitis, complex effusions and fluid collections  - MRI L hip: abnormal SI joint concerning for septic arthritis   - MRI lumbar spine: R sided L2-3 facet joint arthropathy concerning for septic arthritis    - Orthopaedics consulted   - s/p R wrist and L shoulder arthrocenteses on 3/27/23   - R wrist only 2cc, cell count not performed but PMN predominant. Cultures unable to be sent   - L shoulder WBC 47K with PMN predominance, cultures NGTD but has been on antibiotics for >1 week  - Ortho consulted- plan washout bilateral knees and R wrist on 3/30/23    - Neurosurgery consulted for SI joint and L2-3 findings on MRI-- surgery not needed    - ID following, remains on CTX and vanc   - PT, OT consulted as well - likely will need rehab vs SNF when ready       Facet arthropathy, lumbar  Discussed with Neurosurgery. Surgery not needed.       Subclinical hypothyroidism  Lab Results   Component Value Date    TSH 14.200 (H) 03/22/2023     FT4 within normal limits  Repeat as outpatient when acute illness resolved      Anemia of chronic disease  Anemia of chronic disease present (ferritin elevated) but did also have vaginal bleeding. TSAT 14- some degree iron deficiency    Weakness of both lower extremities  Presented with bilateral lower extremity weakness with no significant sensory deficit. She denied preceding diarrhea/GI symptoms, although she had upper respiratory tract symptoms  - Neurology consulted  - seems as though this is  secondary to arthritis as being worked up above   - PT, OT consulted       Postmenopausal bleeding  Noted at OSH. TVUS with thickened endometrial stripe  - needs outpatient GYN follow up       Type 2 diabetes mellitus without complication, without long-term current use of insulin  Patient's FSGs are controlled on current medication regimen.  Last A1c reviewed-   Lab Results   Component Value Date    HGBA1C 6.4 (H) 03/22/2023     Most recent fingerstick glucose reviewed-   Recent Labs   Lab 03/29/23  1138 03/29/23  1633 03/29/23  1958 03/30/23  0720   POCTGLUCOSE 135* 149* 167* 216*     Current correctional scale  Medium  Maintain anti-hyperglycemic dose as follows-   Antihyperglycemics (From admission, onward)    Start     Stop Route Frequency Ordered    03/22/23 0900  insulin detemir U-100 pen 5 Units         -- SubQ Daily 03/22/23 0541    03/22/23 0638  insulin aspart U-100 pen 0-5 Units         -- SubQ Before meals & nightly PRN 03/22/23 0541        Hold Oral hypoglycemics while patient is in the hospital.    Streptococcal bacteremia  Blood cultures 3/13 with Strep bacteremia. Currently being treated with ceftriaxone. This may have seeded her joints causing septic arthritis. Arthrocentesis R wrist and L shoulder completed on 3/27 with elevated neutrophils. Cultures no growth to date but has been on antibiotics for over a week. ID consulted     Stem cells transplant status  Not currently on immunosuppresants  Follow up with oncology      History of Large cell (diffuse) non-Hodgkin's lymphoma  In remission, follow up with oncology  No new enlarged lymph nodes noted         VTE Risk Mitigation (From admission, onward)         Ordered     Reason for No Pharmacological VTE Prophylaxis  Once        Question:  Reasons:  Answer:  Active Bleeding    03/21/23 2230     IP VTE HIGH RISK PATIENT  Once         03/21/23 2230     Place sequential compression device  Until discontinued         03/21/23 2230                 Discharge Planning   JESSE: 4/3/2023     Code Status: Full Code   Is the patient medically ready for discharge?:     Reason for patient still in hospital (select all that apply): Patient trending condition and Treatment  Discharge Plan A: Rehab   Discharge Delays: None known at this time              Alan Murrieta MD  Department of Hospital Medicine   [facility]

## 2023-03-31 NOTE — TRANSFER OF CARE
"Anesthesia Transfer of Care Note    Patient: Heyd Conway    Procedure(s) Performed: Procedure(s) (LRB):  ARTHROSCOPY, KNEE (Bilateral)  OPEN  ARTHROTOMY WRIST (Right)    Patient location: PACU    Anesthesia Type: general    Transport from OR: Transported from OR on room air with adequate spontaneous ventilation    Post pain: adequate analgesia    Post assessment: no apparent anesthetic complications and tolerated procedure well    Post vital signs: stable    Level of consciousness: awake and lethargic    Nausea/Vomiting: no nausea/vomiting    Complications: none    Transfer of care protocol was followed      Last vitals:   Visit Vitals  /63 (BP Location: Left arm, Patient Position: Lying)   Pulse 110   Temp 36.4 °C (97.6 °F) (Skin)   Resp 20   Ht 5' 7" (1.702 m)   Wt 121.1 kg (267 lb)   LMP 06/22/2011 (Approximate)   SpO2 98%   Breastfeeding No   BMI 41.82 kg/m²     "

## 2023-03-31 NOTE — PLAN OF CARE
Plan A for inpatient Rehab. Placed call to [facility], left message for Enzo. ([phone]) Plan B home health, pt has limited snf benefit. Spoke with patient at bedside regarding discharge planning. Pt agreeable to [facility].     2:05pm Received return call from Enzo with [facility], stated pending nursing final review. TN to continue to follow for dc needs.    03/31/23 1304   Discharge Reassessment   Assessment Type Discharge Planning Reassessment   Did the patient's condition or plan change since previous assessment? No   Discharge Plan discussed with: Patient   Communicated JESSE with patient/caregiver Date not available/Unable to determine   Discharge Plan A Rehab   Discharge Plan B Home Health   DME Needed Upon Discharge  none   Discharge Barriers Identified None   Why the patient remains in the hospital Requires continued medical care   Post-Acute Status   Post-Acute Authorization Placement   Post-Acute Placement Status Pending medical clearance/testing   Coverage [employer]   Discharge Delays None known at this time

## 2023-03-31 NOTE — ASSESSMENT & PLAN NOTE
46y/o F pt with hx of T2DM, HTN, large cell diffuse non-Hodgkin lymphoma s/p chemotherapy and stem cell transplant 2011 admitted to OSH on 3/14 for possible sepsis (PNA vs UTI) after presenting for eval of 2 wk hx of migratory polyarthralgias, fevers and weakness, found to have strep pneumoniae bacteremia, pneumonia due to pseudomonas and was ultimately transferred to [facility] 3/22 for arthrocentesis due to concern for septic arthritis.     Painful joints and MRI findings concerning for septic joints in the setting of strep pneumoniae bacteremia. S/p IR arthrocentesis Lt shoulder (WBC >47k/ 95% segs) and Rt wrist (89% segs) on 3/27 with findings concerning for septic arthritis. Synovial fluid cx ngtd. Now s/p b/l knee arthroscopy and Rt wrist arthrotomy 3/30. Declined Lt shoulder washout.    Recommendations:  --f/u surgical cultures  --Continue ceftriaxone 2g q 24h  --stop vancomycin  --Anticipate atleast 4 wks IV abx.

## 2023-03-31 NOTE — PLAN OF CARE
Problem: Diabetes Comorbidity  Goal: Blood Glucose Level Within Targeted Range  Outcome: Ongoing, Progressing     Problem: Adjustment to Illness (Sepsis/Septic Shock)  Goal: Optimal Coping  Outcome: Ongoing, Progressing     Problem: Bleeding (Sepsis/Septic Shock)  Goal: Absence of Bleeding  Outcome: Ongoing, Progressing     Problem: Glycemic Control Impaired (Sepsis/Septic Shock)  Goal: Blood Glucose Level Within Desired Range  Outcome: Ongoing, Progressing     Problem: Infection Progression (Sepsis/Septic Shock)  Goal: Absence of Infection Signs and Symptoms  Outcome: Ongoing, Progressing     Problem: Nutrition Impaired (Sepsis/Septic Shock)  Goal: Optimal Nutrition Intake  Outcome: Ongoing, Progressing     Problem: Fluid Imbalance (Pneumonia)  Goal: Fluid Balance  Outcome: Ongoing, Progressing     Problem: Infection (Pneumonia)  Goal: Resolution of Infection Signs and Symptoms  Outcome: Ongoing, Progressing     Problem: Respiratory Compromise (Pneumonia)  Goal: Effective Oxygenation and Ventilation  Outcome: Ongoing, Progressing     Problem: Adult Inpatient Plan of Care  Goal: Plan of Care Review  Outcome: Ongoing, Progressing  Goal: Patient-Specific Goal (Individualized)  Outcome: Ongoing, Progressing  Goal: Absence of Hospital-Acquired Illness or Injury  Outcome: Ongoing, Progressing  Goal: Optimal Comfort and Wellbeing  Outcome: Ongoing, Progressing  Goal: Readiness for Transition of Care  Outcome: Ongoing, Progressing     Problem: Bariatric Environmental Safety  Goal: Safety Maintained with Care  Outcome: Ongoing, Progressing     Problem: Impaired Wound Healing  Goal: Optimal Wound Healing  Outcome: Ongoing, Progressing     Problem: Infection  Goal: Absence of Infection Signs and Symptoms  Outcome: Ongoing, Progressing     Problem: Skin Injury Risk Increased  Goal: Skin Health and Integrity  Outcome: Ongoing, Progressing

## 2023-03-31 NOTE — PROGRESS NOTES
Pharmacokinetic Assessment Follow Up: IV Vancomycin    Vancomycin serum concentration assessment(s):    The random level was drawn correctly and can be used to guide therapy at this time. The measurement is within the desired definitive target range of 15 to 20 mcg/mL.    Vancomycin Regimen Plan:    Change regimen to Vancomycin 2000 mg IV every 12 hours with next serum trough concentration measured at 0500 prior to 4th dose on 4/2    Drug levels (last 3 results):  Recent Labs   Lab Result Units 03/30/23 0412 03/31/23  0543 03/31/23  1104   Vancomycin, Random ug/mL  --   --  18.0   Vancomycin-Trough ug/mL 11.9 22.2*  --        Pharmacy will continue to follow and monitor vancomycin.    Please contact pharmacy at extension [phone] for questions regarding this assessment.    Thank you for the consult,   William Freitas       Patient brief summary:  Hedy Conway is a 47 y.o. female initiated on antimicrobial therapy with IV Vancomycin for treatment of bone/joint infection    The patient's current regimen is 2000 mg q12h    Drug Allergies:   Review of patient's allergies indicates:   Allergen Reactions    Tetanus vaccines and toxoid Rash       Actual Body Weight:   121 kg    Renal Function:   Estimated Creatinine Clearance: 154.6 mL/min (based on SCr of 0.6 mg/dL).,     Dialysis Method (if applicable):  N/A    CBC (last 72 hours):  Recent Labs   Lab Result Units 03/30/23 0412 03/31/23  0543   WBC K/uL 5.17 5.31   Hemoglobin g/dL 8.7* 7.8*   Hematocrit % 29.4* 26.7*   Platelets K/uL 176 SEE COMMENT   Gran % % 45.9 51.6   Lymph % % 39.5 34.8   Mono % % 12.0 11.1   Eosinophil % % 0.6 0.4   Basophil % % 0.6 0.4   Differential Method  Automated Automated       Metabolic Panel (last 72 hours):  Recent Labs   Lab Result Units 03/30/23 0412 03/31/23  0543   Sodium mmol/L 138 137   Potassium mmol/L 4.1 4.2   Chloride mmol/L 101 100   CO2 mmol/L 28 27   Glucose mg/dL 149* 155*   BUN mg/dL 12 14   Creatinine mg/dL 0.5 0.6        Vancomycin Administrations:  vancomycin given in the last 96 hours                     vancomycin 1,500 mg in dextrose 5 % (D5W) 250 mL IVPB (Vial-Mate) (mg) 1,500 mg New Bag 03/31/23 0534     1,500 mg New Bag 03/30/23 2249     1,500 mg New Bag  1133    vancomycin (VANCOCIN) 2,000 mg in dextrose 5 % (D5W) 500 mL IVPB (mg) 2,000 mg New Bag 03/30/23 0300     2,000 mg New Bag 03/29/23 1414     2,000 mg New Bag  0229     2,000 mg New Bag 03/28/23 1444     2,000 mg New Bag  0340                    Microbiologic Results:  Microbiology Results (last 7 days)       Procedure Component Value Units Date/Time    Gram stain [[id number]] Collected: 03/30/23 2014    Order Status: Completed Specimen: Wound from Knee, Left Updated: 03/31/23 0755     Gram Stain Result Few WBC's      No organisms seen    Gram stain [[id number]] Collected: 03/30/23 2014    Order Status: Completed Specimen: Wound from Knee, Right Updated: 03/31/23 0755     Gram Stain Result Few WBC's      No organisms seen    Gram stain [[id number]] Collected: 03/30/23 2014    Order Status: Completed Specimen: Wound from Wrist, Right Updated: 03/31/23 0754     Gram Stain Result Rare WBC's      No organisms seen    Aerobic culture [[id number]] Collected: 03/27/23 1551    Order Status: Completed Specimen: Shoulder, Left Updated: 03/31/23 0722     Aerobic Bacterial Culture No growth    Culture, Anaerobe [[id number]] Collected: 03/27/23 1551    Order Status: Completed Specimen: Shoulder, Left Updated: 03/31/23 0527     Anaerobic Culture No anaerobes isolated    Aerobic culture [[id number]] Collected: 03/30/23 2014    Order Status: Sent Specimen: Wound from Knee, Left Updated: 03/31/23 0216    Culture, Anaerobe [[id number]] Collected: 03/31/23 0149    Order Status: Sent Specimen: Wound from Knee, Left Updated: 03/31/23 0215    Culture, Anaerobe [[id number]] Collected: 03/30/23 2014    Order Status: Sent Specimen: Wound from Wrist, Right Updated: 03/31/23 0213    Aerobic  culture [[id number]] Collected: 03/30/23 2014    Order Status: Sent Specimen: Wound from Wrist, Right Updated: 03/31/23 0213    Aerobic culture [[id number]] Collected: 03/30/23 2014    Order Status: Sent Specimen: Wound from Knee, Right Updated: 03/31/23 0212    Culture, Anaerobe [[id number]] Collected: 03/30/23 2014    Order Status: Sent Specimen: Wound from Knee, Right Updated: 03/31/23 0211    Culture, Anaerobe [[id number]] Collected: 03/30/23 2014    Order Status: Canceled Specimen: Wound from Wrist, Right     AFB Culture & Smear [[id number]] Collected: 03/27/23 1551    Order Status: Completed Specimen: Shoulder, Left Updated: 03/29/23 2127     AFB Culture & Smear Culture in progress     AFB CULTURE STAIN No acid fast bacilli seen.    Gram stain [[id number]] Collected: 03/27/23 1551    Order Status: Completed Specimen: Shoulder, Left Updated: 03/28/23 0802     Gram Stain Result Rare WBC's      No organisms seen    Fungus culture [[id number]] Collected: 03/27/23 1551    Order Status: Sent Specimen: Shoulder, Left Updated: 03/27/23 1632    Aerobic culture [[id number]] Collected: 03/27/23 1619    Order Status: Canceled Specimen: Arm from Wrist, Right     Culture, Anaerobe [[id number]] Collected: 03/27/23 1618    Order Status: Canceled Specimen: Other from Wrist, Right     AFB Culture & Smear [[id number]] Collected: 03/27/23 1618    Order Status: Canceled Specimen: Other from Wrist, Right     Fungus culture [[id number]] Collected: 03/27/23 1618    Order Status: Canceled Specimen: Other from Wrist, Right     Gram stain [[id number]] Collected: 03/27/23 1618    Order Status: Canceled Specimen: Wound from Wrist, Right     Culture, Body Fluid (Aerobic) w/ GS [[id number]] Collected: 03/23/23 1244    Order Status: Completed Specimen: Body Fluid from Knee Updated: 03/27/23 0733     AEROBIC CULTURE - FLUID No growth     Gram Stain Result Few WBC's      No organisms seen    Blood culture [[id number]] Collected: 03/22/23 0610    Order  Status: Completed Specimen: Blood from Peripheral, Right Arm Updated: 03/26/23 0903     Blood Culture, Routine No Growth after 4 days.

## 2023-03-31 NOTE — PROGRESS NOTES
Pharmacokinetic Assessment Follow Up: IV Vancomycin    Vancomycin serum concentration assessment(s):    The trough level was drawn correctly and can be used to guide therapy at this time. The measurement is above the desired definitive target range of 15 to 20 mcg/mL.    Vancomycin Regimen Plan:    Discontinue the scheduled vancomycin regimen and re-dose when the random level is less than 20 mcg/mL, next level to be drawn at 1330 on 3/31/23.    Drug levels (last 3 results):  Recent Labs   Lab Result Units 03/30/23  0412 03/31/23  0543   Vancomycin-Trough ug/mL 11.9 22.2*       Pharmacy will continue to follow and monitor vancomycin.    Please contact pharmacy at extension [phone] for questions regarding this assessment.    Thank you for the consult,   Fernando Barajas       Patient brief summary:  Hedy Conway is a 47 y.o. female initiated on antimicrobial therapy with IV Vancomycin for treatment of bone/joint infection    The patient's current regimen is random pulse dosing    Drug Allergies:   Review of patient's allergies indicates:   Allergen Reactions    Tetanus vaccines and toxoid Rash       Actual Body Weight:   121.1 kg    Renal Function:   Estimated Creatinine Clearance: 154.6 mL/min (based on SCr of 0.6 mg/dL).,     Dialysis Method (if applicable):  N/A    CBC (last 72 hours):  Recent Labs   Lab Result Units 03/30/23 0412 03/31/23  0543   WBC K/uL 5.17 5.31   Hemoglobin g/dL 8.7* 7.8*   Hematocrit % 29.4* 26.7*   Platelets K/uL 176 SEE COMMENT   Gran % % 45.9 51.6   Lymph % % 39.5 34.8   Mono % % 12.0 11.1   Eosinophil % % 0.6 0.4   Basophil % % 0.6 0.4   Differential Method  Automated Automated       Metabolic Panel (last 72 hours):  Recent Labs   Lab Result Units 03/30/23 0412 03/31/23  0543   Sodium mmol/L 138 137   Potassium mmol/L 4.1 4.2   Chloride mmol/L 101 100   CO2 mmol/L 28 27   Glucose mg/dL 149* 155*   BUN mg/dL 12 14   Creatinine mg/dL 0.5 0.6       Vancomycin  Administrations:  vancomycin given in the last 96 hours                     vancomycin 1,500 mg in dextrose 5 % (D5W) 250 mL IVPB (Vial-Mate) (mg) 1,500 mg New Bag 03/31/23 0534     1,500 mg New Bag 03/30/23 2249     1,500 mg New Bag  1133    vancomycin (VANCOCIN) 2,000 mg in dextrose 5 % (D5W) 500 mL IVPB (mg) 2,000 mg New Bag 03/30/23 0300     2,000 mg New Bag 03/29/23 1414     2,000 mg New Bag  0229     2,000 mg New Bag 03/28/23 1444     2,000 mg New Bag  0340     2,000 mg New Bag 03/27/23 1437                    Microbiologic Results:  Microbiology Results (last 7 days)       Procedure Component Value Units Date/Time    Culture, Anaerobe [[id number]] Collected: 03/27/23 1551    Order Status: Completed Specimen: Shoulder, Left Updated: 03/31/23 0527     Anaerobic Culture No anaerobes isolated    Aerobic culture [[id number]] Collected: 03/30/23 2014    Order Status: Sent Specimen: Wound from Knee, Left Updated: 03/31/23 0216    Gram stain [[id number]] Collected: 03/30/23 2014    Order Status: Sent Specimen: Wound from Knee, Left Updated: 03/31/23 0216    Culture, Anaerobe [[id number]] Collected: 03/31/23 0149    Order Status: Sent Specimen: Wound from Knee, Left Updated: 03/31/23 0215    Gram stain [[id number]] Collected: 03/30/23 2014    Order Status: Sent Specimen: Wound from Wrist, Right Updated: 03/31/23 0214    Culture, Anaerobe [[id number]] Collected: 03/30/23 2014    Order Status: Sent Specimen: Wound from Wrist, Right Updated: 03/31/23 0213    Aerobic culture [[id number]] Collected: 03/30/23 2014    Order Status: Sent Specimen: Wound from Wrist, Right Updated: 03/31/23 0213    Aerobic culture [[id number]] Collected: 03/30/23 2014    Order Status: Sent Specimen: Wound from Knee, Right Updated: 03/31/23 0212    Culture, Anaerobe [[id number]] Collected: 03/30/23 2014    Order Status: Sent Specimen: Wound from Knee, Right Updated: 03/31/23 0211    Gram stain [[id number]] Collected: 03/30/23 2014    Order Status: Sent  Specimen: Wound from Knee, Right Updated: 03/31/23 0211    Culture, Anaerobe [[id number]] Collected: 03/30/23 2014    Order Status: Canceled Specimen: Wound from Wrist, Right     Aerobic culture [[id number]] Collected: 03/27/23 1551    Order Status: Completed Specimen: Shoulder, Left Updated: 03/30/23 0814     Aerobic Bacterial Culture No growth    AFB Culture & Smear [[id number]] Collected: 03/27/23 1551    Order Status: Completed Specimen: Shoulder, Left Updated: 03/29/23 2127     AFB Culture & Smear Culture in progress     AFB CULTURE STAIN No acid fast bacilli seen.    Gram stain [[id number]] Collected: 03/27/23 1551    Order Status: Completed Specimen: Shoulder, Left Updated: 03/28/23 0802     Gram Stain Result Rare WBC's      No organisms seen    Fungus culture [[id number]] Collected: 03/27/23 1551    Order Status: Sent Specimen: Shoulder, Left Updated: 03/27/23 1632    Aerobic culture [[id number]] Collected: 03/27/23 1619    Order Status: Canceled Specimen: Arm from Wrist, Right     Culture, Anaerobe [[id number]] Collected: 03/27/23 1618    Order Status: Canceled Specimen: Other from Wrist, Right     AFB Culture & Smear [[id number]] Collected: 03/27/23 1618    Order Status: Canceled Specimen: Other from Wrist, Right     Fungus culture [[id number]] Collected: 03/27/23 1618    Order Status: Canceled Specimen: Other from Wrist, Right     Gram stain [[id number]] Collected: 03/27/23 1618    Order Status: Canceled Specimen: Wound from Wrist, Right     Culture, Body Fluid (Aerobic) w/ GS [[id number]] Collected: 03/23/23 1244    Order Status: Completed Specimen: Body Fluid from Knee Updated: 03/27/23 0733     AEROBIC CULTURE - FLUID No growth     Gram Stain Result Few WBC's      No organisms seen    Blood culture [[id number]] Collected: 03/22/23 0610    Order Status: Completed Specimen: Blood from Peripheral, Right Arm Updated: 03/26/23 0903     Blood Culture, Routine No Growth after 4 days.

## 2023-03-31 NOTE — PLAN OF CARE
Problem: Physical Therapy  Goal: Physical Therapy Goal  Description: Goals to be met by:  2023    Patient will increase functional independence with mobility by performin. Supine to sit with Modified [place]  2. Sit to stand transfer with Modified [place]  3. Gait >50 feet with Modified [place] using Rolling Walker  4. BLE seated/supine LE therex x15 reps independently         Outcome: Ongoing, Progressing

## 2023-03-31 NOTE — OP NOTE
[facility] [place] - Surgery  General Surgery  Operative Note    SUMMARY     Date of Procedure: 3/30/2023     Procedure: Procedure(s) (LRB):  ARTHROSCOPY, KNEE (Bilateral)  OPEN  ARTHROTOMY WRIST (Right)       Surgeon(s) and Role:     * Rudolph Murrell MD - Primary    Assisting Surgeon: None    Pre-Operative Diagnosis: Polyarthritis [M13.0]    Post-Operative Diagnosis: Post-Op Diagnosis Codes:     * Polyarthritis [M13.0]    Anesthesia: Choice    Description of the Procedure, Significant Surgical Tasks, Conducted by the Assistant(s), if Applicable, Complications, Estimated Blood Loss (EBL), Condition, Disposition:    Patient has been admitted to the hospital at outlying facility and here for greater than 2 weeks.  Initial patient had blood cultures with Streptococcus pneumonia.  Patient has multiple joint pains, most pains have subsided except for the right wrist and both knees.  Infectious Disease recommended arthroscopy and irrigation and debridement.  Options were discussed with patient and she requested open arthrotomy of the right wrist and bilateral knee arthroscopy due to severe pain and inability to ambulate.  After obtaining proper informed consent the patient was taken operating room given general anesthesia per endotracheal tube.  Patient was already on IV antibiotics.  The right wrist was prepped and draped in usual sterile fashion.  No tourniquet was used.  A longitudinal incision was made over the dorsal wrist and the EPL exposed and transposed.  The wrist joint was opened and entered with sharp dissection.  There was very minimal fluid present this was sent for culture.  It did not appear purulent.  The wound was irrigated with normal saline.  Hemostasis was obtained with electrocautery.  The skin was closed with 4-0 nylon suture.  Sterile dressing was applied as well as a splint.      Both knees were then dressed and prepped and draped in usual sterile fashion.  Separate setups were used for all 3  procedures.  The right knee was addressed 1st.  The tourniquet inflated for 15 minutes during the case.  After prepping and draping, the tourniquet inflated.  The knee underwent arthroscopy with anterolateral and anteromedial portals patient did have a great deal of synovitis and grade 3 osteoarthritis throughout the knee joint.  Patient had degenerative tears of the medial and lateral meniscus which were debrided.  Extensive synovectomy was performed of the right knee.  There was only a few cc of fluid in the knee joint this was sent for culture.  After thorough irrigation.  The wound was closed with 4-0 nylon sutures.  Sterile sterile dressing was applied.      New drapes and gowns were then used and the left knee was addressed with the same arthroscopy procedure.  The tourniquet inflated to 3 mm of mercury for 15 minutes tourniquet.  Standard anterolateral and anteromedial incisions were made.  The patient had a great deal of synovitis and an extensive synovectomy was performed throughout the knee joint.  Patient had grade 3 arthritis.  Degenerative changes of the knee but mediolateral meniscus were debrided with a shaver.  The knee joint was irrigated again and closed with 4-0 nylon sutures.  Sterile dressing was applied.  Cultures were obtained from the left knee as well.             Implants: * No implants in log *    Specimens:   Specimen (24h ago, onward)      None                            Attestation: I was present and scrubbed for the entire procedure.

## 2023-03-31 NOTE — PT/OT/SLP PROGRESS
Physical Therapy Treatment    Patient Name:  eHdy Conway   MRN:  [id number]    Recommendations:     Discharge Recommendations: rehabilitation facility  Discharge Equipment Recommendations:  (TBD at next level of care)  Barriers to discharge: None    Assessment:     Hedy Conway is a 47 y.o. female admitted with a medical diagnosis of Polyarthritis.  She presents with the following impairments/functional limitations: weakness, impaired endurance, impaired self care skills, impaired functional mobility, gait instability, decreased upper extremity function, impaired balance, decreased coordination, decreased safety awareness, pain, decreased ROM, decreased lower extremity function, edema .    Rehab Prognosis: Fair; patient would benefit from acute skilled PT services to address these deficits and reach maximum level of function.    Recent Surgery: Procedure(s) (LRB):  ARTHROSCOPY, KNEE (Bilateral)  OPEN  ARTHROTOMY WRIST (Right) 1 Day Post-Op    Plan:     During this hospitalization, patient to be seen 5 x/week to address the identified rehab impairments via gait training, therapeutic activities, therapeutic exercises and progress toward the following goals:    Plan of Care Expires:  04/07/23    Subjective     Chief Complaint: Pain   Patient/Family Comments/goals: pt is agreeable to therapy  Pain/Comfort:  Pain Rating 1: 10/10  Location - Side 1: Bilateral knees, R wrist and   Pain Addressed 1: Pre-medicate for activity, Reposition, Cessation of Activity, Nurse notified  Pain Rating Post-Intervention 1: 10/10      Objective:     Communicated with nurse  prior to session.  Patient found HOB elevated with telemetry, bed alarm, PICC line, PureWick upon PT entry to room.     General Precautions: Standard, fall  Orthopedic Precautions: N/A  Braces: N/A  Respiratory Status: Room air     Functional Mobility:  Bed Mobility:   Rolling L / R : MAX A     Scooting: max assistance to scoot anteriorly,  total assistance, and  of 2 persons to scoot to HOB with bed in trendelenburg position    Supine to Sit: moderate assistance and of 2 persons, HOB elevated, bedside rail   Sit to Supine: maximal/total assistance and of 2 persons  Transfers:     Sit to Stand: x 4 trials from elevated bed  mod assistance and of 2 persons with RW .  Pt with c/o B knees pain with WB, V/T cues to improve upright posture . pt required max encouragement /education and positive reinforcements to complete tasks.   Gait : pt  unable to ambulate at this time  2* to pain (pt received IV pain med 1 hour prior to treatment.)     Balance:  Good in sitting, poor in standing.      AM-PAC 6 CLICK MOBILITY  Turning over in bed (including adjusting bedclothes, sheets and blankets)?: 2  Sitting down on and standing up from a chair with arms (e.g., wheelchair, bedside commode, etc.): 2  Moving from lying on back to sitting on the side of the bed?: 2  Moving to and from a bed to a chair (including a wheelchair)?: 1  Need to walk in hospital room?: 1  Climbing 3-5 steps with a railing?: 1  Basic Mobility Total Score: 9       Treatment & Education:  Lower Extremity Exercises.   Patient educated on the purpose of therapeutic exercise.    Patient verbalized acceptance/understanding of instructions, expectations, and limitations(for safety).  Patient performed: 2 sets of 10 reps (each) of B LE There Ex: AP, GS , LAQ, Hip abd/add, Hip flexion(AAROM BLE)  while sitting up on EOB.       Patient  still requires verbal cues/tactile cues to ensure correct sequence, to maintain proper form, and to allow for self-correction.  Pt educated not to place pillow under B knees and place under ankles instead to encourage knee extension , pt needs reinforcements. Max encouraged pt to perform BLE AP, QS, GS , HS when in bed. Pt verbalized understanding.     Patient left with HOB elevated with pillow to offload R side, BLE/BUE  elevated, heels offloading  all lines intact, call button in reach, bed  alarm on, nurse notified.    GOALS:   Multidisciplinary Problems       Physical Therapy Goals          Problem: Physical Therapy    Goal Priority Disciplines Outcome Goal Variances Interventions   Physical Therapy Goal     PT, PT/OT Ongoing, Progressing     Description: Goals to be met by:  2023    Patient will increase functional independence with mobility by performin. Supine to sit with Modified [place]  2. Sit to stand transfer with Modified [place]  3. Gait >50 feet with Modified [place] using Rolling Walker  4. BLE seated/supine LE therex x15 reps independently                              Time Tracking:     PT Received On: 23  PT Start Time:      PT Stop Time: 175  PT Total Time (min): 38 min     Billable Minutes: Therapeutic Activity 15, Therapeutic Exercise 23 , and Total Time 38 MIN with OT     Treatment Type: Treatment  PT/PTA: PTA     Number of PTA visits since last PT visit: 3     2023

## 2023-03-31 NOTE — PROGRESS NOTES
Postop day 1 open arthrotomy right wrist and bilateral knee arthroscopy for suspected possible septic joints.  Patient reports all joints feel much better today.  Pain is well controlled.    Very minimal fluid found in right wrist joint and both knees which was sent for culture.  Both knees showed a great deal of synovitis and extensive synovectomy was performed.    Afebrile vital signs stable  Labs stable     Dressing is all clean and intact.    Good range of motion knees, improved from preop.      Physical therapy and occupational therapy for ambulation.  Dressing change both knees and wrist in a.m..  May require wrist splint.  Continue antibiotics per Infectious Disease.  Follow cultures.

## 2023-04-01 LAB
ANION GAP SERPL CALC-SCNC: 13 MMOL/L (ref 8–16)
BASOPHILS # BLD AUTO: 0.02 K/UL (ref 0–0.2)
BASOPHILS NFR BLD: 0.5 % (ref 0–1.9)
BUN SERPL-MCNC: 9 MG/DL (ref 6–20)
CALCIUM SERPL-MCNC: 9 MG/DL (ref 8.7–10.5)
CHLORIDE SERPL-SCNC: 101 MMOL/L (ref 95–110)
CO2 SERPL-SCNC: 27 MMOL/L (ref 23–29)
CREAT SERPL-MCNC: 0.5 MG/DL (ref 0.5–1.4)
DIFFERENTIAL METHOD: ABNORMAL
EOSINOPHIL # BLD AUTO: 0 K/UL (ref 0–0.5)
EOSINOPHIL NFR BLD: 0.9 % (ref 0–8)
ERYTHROCYTE [DISTWIDTH] IN BLOOD BY AUTOMATED COUNT: 18.2 % (ref 11.5–14.5)
EST. GFR  (NO RACE VARIABLE): >60 ML/MIN/1.73 M^2
GLUCOSE SERPL-MCNC: 149 MG/DL (ref 70–110)
HCT VFR BLD AUTO: 25 % (ref 37–48.5)
HGB BLD-MCNC: 7.4 G/DL (ref 12–16)
IMM GRANULOCYTES # BLD AUTO: 0.08 K/UL (ref 0–0.04)
IMM GRANULOCYTES NFR BLD AUTO: 1.8 % (ref 0–0.5)
LYMPHOCYTES # BLD AUTO: 1.8 K/UL (ref 1–4.8)
LYMPHOCYTES NFR BLD: 40.5 % (ref 18–48)
MCH RBC QN AUTO: 24.7 PG (ref 27–31)
MCHC RBC AUTO-ENTMCNC: 29.6 G/DL (ref 32–36)
MCV RBC AUTO: 84 FL (ref 82–98)
MONOCYTES # BLD AUTO: 0.6 K/UL (ref 0.3–1)
MONOCYTES NFR BLD: 12.6 % (ref 4–15)
NEUTROPHILS # BLD AUTO: 1.9 K/UL (ref 1.8–7.7)
NEUTROPHILS NFR BLD: 43.7 % (ref 38–73)
NRBC BLD-RTO: 0 /100 WBC
PLATELET # BLD AUTO: 157 K/UL (ref 150–450)
PMV BLD AUTO: 10.7 FL (ref 9.2–12.9)
POCT GLUCOSE: 135 MG/DL (ref 70–110)
POCT GLUCOSE: 141 MG/DL (ref 70–110)
POCT GLUCOSE: 186 MG/DL (ref 70–110)
POCT GLUCOSE: 199 MG/DL (ref 70–110)
POTASSIUM SERPL-SCNC: 3.8 MMOL/L (ref 3.5–5.1)
RBC # BLD AUTO: 2.99 M/UL (ref 4–5.4)
SODIUM SERPL-SCNC: 141 MMOL/L (ref 136–145)
WBC # BLD AUTO: 4.35 K/UL (ref 3.9–12.7)

## 2023-04-01 PROCEDURE — 36415 COLL VENOUS BLD VENIPUNCTURE: CPT | Performed by: ORTHOPAEDIC SURGERY

## 2023-04-01 PROCEDURE — 63600175 PHARM REV CODE 636 W HCPCS: Performed by: STUDENT IN AN ORGANIZED HEALTH CARE EDUCATION/TRAINING PROGRAM

## 2023-04-01 PROCEDURE — 25000003 PHARM REV CODE 250: Performed by: STUDENT IN AN ORGANIZED HEALTH CARE EDUCATION/TRAINING PROGRAM

## 2023-04-01 PROCEDURE — 85025 COMPLETE CBC W/AUTO DIFF WBC: CPT | Performed by: ORTHOPAEDIC SURGERY

## 2023-04-01 PROCEDURE — 80048 BASIC METABOLIC PNL TOTAL CA: CPT | Performed by: ORTHOPAEDIC SURGERY

## 2023-04-01 PROCEDURE — 11000001 HC ACUTE MED/SURG PRIVATE ROOM

## 2023-04-01 PROCEDURE — 25000003 PHARM REV CODE 250: Performed by: ORTHOPAEDIC SURGERY

## 2023-04-01 PROCEDURE — A4216 STERILE WATER/SALINE, 10 ML: HCPCS | Performed by: ORTHOPAEDIC SURGERY

## 2023-04-01 PROCEDURE — 63600175 PHARM REV CODE 636 W HCPCS: Performed by: ORTHOPAEDIC SURGERY

## 2023-04-01 PROCEDURE — 63600175 PHARM REV CODE 636 W HCPCS: Mod: TB,JG | Performed by: ORTHOPAEDIC SURGERY

## 2023-04-01 RX ADMIN — MUPIROCIN: 20 OINTMENT TOPICAL at 10:04

## 2023-04-01 RX ADMIN — INSULIN DETEMIR 5 UNITS: 100 INJECTION, SOLUTION SUBCUTANEOUS at 08:04

## 2023-04-01 RX ADMIN — MORPHINE SULFATE 2 MG: 4 INJECTION INTRAVENOUS at 10:04

## 2023-04-01 RX ADMIN — MORPHINE SULFATE 2 MG: 4 INJECTION INTRAVENOUS at 03:04

## 2023-04-01 RX ADMIN — OXYCODONE HYDROCHLORIDE 5 MG: 5 TABLET ORAL at 05:04

## 2023-04-01 RX ADMIN — CYCLOBENZAPRINE HYDROCHLORIDE 5 MG: 5 TABLET, FILM COATED ORAL at 10:04

## 2023-04-01 RX ADMIN — CEFTRIAXONE 2 G: 2 INJECTION, SOLUTION INTRAVENOUS at 08:04

## 2023-04-01 RX ADMIN — MUPIROCIN: 20 OINTMENT TOPICAL at 08:04

## 2023-04-01 RX ADMIN — OXYCODONE HYDROCHLORIDE 5 MG: 5 TABLET ORAL at 11:04

## 2023-04-01 RX ADMIN — Medication 10 ML: at 06:04

## 2023-04-01 RX ADMIN — VANCOMYCIN HYDROCHLORIDE 2000 MG: 1 INJECTION, POWDER, LYOPHILIZED, FOR SOLUTION INTRAVENOUS at 05:04

## 2023-04-01 RX ADMIN — Medication 10 ML: at 11:04

## 2023-04-01 RX ADMIN — Medication 10 ML: at 04:04

## 2023-04-01 NOTE — PT/OT/SLP PROGRESS
Occupational Therapy   Treatment    Name: Hedy Conway  MRN: [id number]  Admitting Diagnosis:  Polyarthritis  1 Day Post-Op    Recommendations:     Discharge Recommendations: nursing facility, skilled  Discharge Equipment Recommendations:   (TBD at IPR.)  Barriers to discharge:   (Pt significantly limited by pain and weaknessm requiring x 2 person assist for standing and is unable to ambualte at this time; pt will benefit from multidisciplinary approach in an IPR setting for returning to PLOF as she was (I) and working PTA.)    Assessment:     Hedy Conway is a 47 y.o. female with a medical diagnosis of Polyarthritis.  Performance deficits affecting function are weakness, impaired endurance, impaired self care skills, impaired functional mobility, gait instability, impaired balance, decreased upper extremity function, decreased lower extremity function, decreased safety awareness, decreased ROM, impaired coordination, edema.     Pt pleasant and willing to participate in tx session this date.  Pt noted w/ good progress w/ sit>stand trials (x4), requiring mod A x 2 as compared to previous session. Pt w/ pain but tolerated fairly with rest breaks as needed. Pt is making gradual progress towards goals and will continue to benefit from skilled acute OT services to maximize functional capacity for safe performance w/ ADLs and functional mobility.      PTA, pt was (I) w/ all ADLs and functional mobility; pt will benefit from an aggressive multidisciplinary approach in an IPR setting for returning to PLOF. Despite pain and functional limitations, pt is     Rehab Prognosis:  Good; patient would benefit from acute skilled OT services to address these deficits and reach maximum level of function.       Plan:     Patient to be seen 5 x/week to address the above listed problems via self-care/home management, therapeutic activities, therapeutic exercises  Plan of Care Expires: 04/07/23  Plan of Care Reviewed with:  patient    Subjective     Chief Complaint: Pain in B knees w/ standing   Patient/Family Comments/goals: Agreeable to participate; reports feeling better since sx   Pain/Comfort:  Pain Rating 1: 10/10  Location 1:  (B knees and R wrist)  Pain Addressed 1: Reposition, Distraction, Cessation of Activity    Objective:     Communicated with: Nurse prior to session.  Patient found HOB elevated with telemetry, bed alarm, PICC line, PureWick upon OT entry to room.    General Precautions: Standard, fall    Orthopedic Precautions:N/A  Braces: N/A  Respiratory Status: Room air     Occupational Performance:     Bed Mobility:    Patient completed Rolling/Turning to Left with  maximal assistance  Patient completed Rolling/Turning to Right with maximal assistance  Patient completed Scooting/Bridging with maximal assistance and total assistance x 2 trials   Patient completed Supine to Sit with moderate assistance and 2 persons  Patient completed Sit to Supine with total assistance and 2 persons     Functional Mobility/Transfers:  Patient completed Sit <> Stand Transfer with moderate assistance and of 2 persons  with  rolling walker     Activities of Daily Living:  Grooming: set-up assist for performing oral care at bed level w/ HOB elevated     Upper Body Dressing: minimum assistance for donning gown over back     [facility] 6 Click ADL: 15    Treatment & Education:  -Pt performed ADLs and functional mobility as noted above.   -Pt performed the following BUE AROM for 1 set x 15 reps w/ use of #2 lb BUE wrist weights to increase UB strength and endurance for safe performance w/ ADLs and functional mobility:               -elbow flexion/ext               -forward punches               -shoulder flexion               -shoulder abd/add  -OT performed retrograde massage to pt's RUE for ~8 min promote circulation and reduce edema; pt educated on importance of self performance as well.   -Questions and concerns addressed within scope.      Patient left HOB elevated with all lines intact and call button in reach    GOALS:   Multidisciplinary Problems       Occupational Therapy Goals          Problem: Occupational Therapy    Goal Priority Disciplines Outcome Interventions   Occupational Therapy Goal     OT, PT/OT Ongoing, Progressing    Description: Goals to be met by 04/07/23:    Patient will increase functional independence with ADLs by performing:    Grooming while seated with Modified [place].  Toileting from bedside commode with Moderate Assistance for hygiene and clothing management.   Sit to stand w/ Minimum Assistance.   Step transfer with Moderate Assistance  Toilet transfer to bedside commode with Moderate Assistance.  Upper extremity exercise program x15 reps per handout, with assistance as needed.                           Time Tracking:     OT Date of Treatment: 03/31/23  OT Start Time: 1716  OT Stop Time: 1754  OT Total Time (min): 38 min    Billable Minutes:Therapeutic Activity 23  Therapeutic Exercise 15  Total Time 38 co-tx w/ PT    OT/CHANDLER: OT     Number of CHANDLER visits since last OT visit: 1    3/31/2023

## 2023-04-01 NOTE — PLAN OF CARE
Problem: Diabetes Comorbidity  Goal: Blood Glucose Level Within Targeted Range  Outcome: Ongoing, Progressing     Problem: Adjustment to Illness (Sepsis/Septic Shock)  Goal: Optimal Coping  Outcome: Ongoing, Progressing     Problem: Bleeding (Sepsis/Septic Shock)  Goal: Absence of Bleeding  Outcome: Ongoing, Progressing     Problem: Glycemic Control Impaired (Sepsis/Septic Shock)  Goal: Blood Glucose Level Within Desired Range  Outcome: Ongoing, Progressing     Problem: Infection Progression (Sepsis/Septic Shock)  Goal: Absence of Infection Signs and Symptoms  Outcome: Ongoing, Progressing     Problem: Nutrition Impaired (Sepsis/Septic Shock)  Goal: Optimal Nutrition Intake  Outcome: Ongoing, Progressing     Problem: Fluid Imbalance (Pneumonia)  Goal: Fluid Balance  Outcome: Ongoing, Progressing     Problem: Infection (Pneumonia)  Goal: Resolution of Infection Signs and Symptoms  Outcome: Ongoing, Progressing     Problem: Respiratory Compromise (Pneumonia)  Goal: Effective Oxygenation and Ventilation  Outcome: Ongoing, Progressing     Problem: Adult Inpatient Plan of Care  Goal: Plan of Care Review  Outcome: Ongoing, Progressing  Goal: Patient-Specific Goal (Individualized)  Outcome: Ongoing, Progressing  Goal: Absence of Hospital-Acquired Illness or Injury  Outcome: Ongoing, Progressing     Problem: Bariatric Environmental Safety  Goal: Safety Maintained with Care  Outcome: Ongoing, Progressing     Problem: Impaired Wound Healing  Goal: Optimal Wound Healing  Outcome: Ongoing, Progressing     Problem: Infection  Goal: Absence of Infection Signs and Symptoms  Outcome: Ongoing, Progressing     Problem: Skin Injury Risk Increased  Goal: Skin Health and Integrity  Outcome: Ongoing, Progressing     Problem: Pain Acute  Goal: Acceptable Pain Control and Functional Ability  Outcome: Ongoing, Progressing     Problem: Fall Injury Risk  Goal: Absence of Fall and Fall-Related Injury  Outcome: Ongoing, Progressing

## 2023-04-01 NOTE — NURSING
[facility]  Nurses Note -- 4 Eyes      3/31/2023       Skin assessed on: Q Shift      [x] No Pressure Injuries Present    []Prevention Measures Documented  Pt has incision to bilat knee and rt wrist  [] Yes LDA  for Pressure Injury Previously documented     [] Yes New Pressure Injury Discovered   [] LDA for New Pressure Injury Added      Attending RN:  Eli Riojas LPN     Second RN:  MIGUEL Frances

## 2023-04-01 NOTE — SUBJECTIVE & OBJECTIVE
Interval History:   NAEON  No new symptoms  S/p washout    Review of Systems   Constitutional:  Positive for activity change.   Musculoskeletal:  Positive for arthralgias.   Neurological:  Positive for weakness.     Objective:     Vital Signs (Most Recent):  Temp: 98.1 °F (36.7 °C) (04/01/23 0653)  Pulse: 96 (04/01/23 0653)  Resp: 18 (04/01/23 0653)  BP: 129/70 (04/01/23 0653)  SpO2: 96 % (04/01/23 0653)   Vital Signs (24h Range):  Temp:  [98.1 °F (36.7 °C)-98.6 °F (37 °C)] 98.1 °F (36.7 °C)  Pulse:  [] 96  Resp:  [16-20] 18  SpO2:  [96 %-100 %] 96 %  BP: (109-129)/(58-70) 129/70     Weight: 121.1 kg (267 lb)  Body mass index is 41.82 kg/m².    Intake/Output Summary (Last 24 hours) at 4/1/2023 0923  Last data filed at 4/1/2023 0835  Gross per 24 hour   Intake 480 ml   Output 2752 ml   Net -2272 ml        Physical Exam  Vitals and nursing note reviewed.   Constitutional:       General: She is not in acute distress.     Appearance: She is well-developed. She is obese. She is not ill-appearing or diaphoretic.   HENT:      Head: Normocephalic and atraumatic.      Nose: Nose normal.      Mouth/Throat:      Mouth: Mucous membranes are moist.   Eyes:      General: No scleral icterus.  Neck:      Thyroid: No thyromegaly.   Cardiovascular:      Rate and Rhythm: Regular rhythm. Tachycardia present.      Heart sounds: No murmur heard.  Pulmonary:      Effort: Pulmonary effort is normal.      Breath sounds: Normal breath sounds. No stridor. No wheezing or rales.      Comments: Room air  Abdominal:      General: Bowel sounds are normal. There is no distension.      Palpations: Abdomen is soft. There is no mass.      Tenderness: There is no abdominal tenderness. There is no guarding.   Musculoskeletal:         General: Normal range of motion.      Cervical back: Normal range of motion and neck supple.      Right lower leg: No edema.      Left lower leg: No edema.      Comments: R wrist swelling. R and L knee swellling    Skin:     General: Skin is warm and dry.      Capillary Refill: Capillary refill takes less than 2 seconds.      Findings: Lesion (surgical c/d/i) present. No rash.   Neurological:      Mental Status: She is alert and oriented to person, place, and time.   Psychiatric:         Behavior: Behavior normal.           Recent Results (from the past 24 hour(s))   Vancomycin, Random    Collection Time: 03/31/23 11:04 AM   Result Value Ref Range    Vancomycin, Random 18.0 Not established ug/mL   POCT glucose    Collection Time: 03/31/23 12:58 PM   Result Value Ref Range    POCT Glucose 154 (H) 70 - 110 mg/dL   POCT glucose    Collection Time: 03/31/23  5:09 PM   Result Value Ref Range    POCT Glucose 135 (H) 70 - 110 mg/dL   POCT glucose    Collection Time: 03/31/23  8:32 PM   Result Value Ref Range    POCT Glucose 151 (H) 70 - 110 mg/dL   CBC Auto Differential    Collection Time: 04/01/23  3:51 AM   Result Value Ref Range    WBC 4.35 3.90 - 12.70 K/uL    RBC 2.99 (L) 4.00 - 5.40 M/uL    Hemoglobin 7.4 (L) 12.0 - 16.0 g/dL    Hematocrit 25.0 (L) 37.0 - 48.5 %    MCV 84 82 - 98 fL    MCH 24.7 (L) 27.0 - 31.0 pg    MCHC 29.6 (L) 32.0 - 36.0 g/dL    RDW 18.2 (H) 11.5 - 14.5 %    Platelets 157 150 - 450 K/uL    MPV 10.7 9.2 - 12.9 fL    Immature Granulocytes 1.8 (H) 0.0 - 0.5 %    Gran # (ANC) 1.9 1.8 - 7.7 K/uL    Immature Grans (Abs) 0.08 (H) 0.00 - 0.04 K/uL    Lymph # 1.8 1.0 - 4.8 K/uL    Mono # 0.6 0.3 - 1.0 K/uL    Eos # 0.0 0.0 - 0.5 K/uL    Baso # 0.02 0.00 - 0.20 K/uL    nRBC 0 0 /100 WBC    Gran % 43.7 38.0 - 73.0 %    Lymph % 40.5 18.0 - 48.0 %    Mono % 12.6 4.0 - 15.0 %    Eosinophil % 0.9 0.0 - 8.0 %    Basophil % 0.5 0.0 - 1.9 %    Differential Method Automated    Basic Metabolic Panel    Collection Time: 04/01/23  3:51 AM   Result Value Ref Range    Sodium 141 136 - 145 mmol/L    Potassium 3.8 3.5 - 5.1 mmol/L    Chloride 101 95 - 110 mmol/L    CO2 27 23 - 29 mmol/L    Glucose 149 (H) 70 - 110 mg/dL    BUN 9 6  - 20 mg/dL    Creatinine 0.5 0.5 - 1.4 mg/dL    Calcium 9.0 8.7 - 10.5 mg/dL    Anion Gap 13 8 - 16 mmol/L    eGFR >60 >60 mL/min/1.73 m^2   POCT glucose    Collection Time: 04/01/23  6:52 AM   Result Value Ref Range    POCT Glucose 186 (H) 70 - 110 mg/dL       Microbiology Results (last 7 days)       Procedure Component Value Units Date/Time    Gram stain [[id number]] Collected: 03/30/23 2014    Order Status: Completed Specimen: Wound from Knee, Left Updated: 03/31/23 0755     Gram Stain Result Few WBC's      No organisms seen    Gram stain [[id number]] Collected: 03/30/23 2014    Order Status: Completed Specimen: Wound from Knee, Right Updated: 03/31/23 0755     Gram Stain Result Few WBC's      No organisms seen    Gram stain [[id number]] Collected: 03/30/23 2014    Order Status: Completed Specimen: Wound from Wrist, Right Updated: 03/31/23 0754     Gram Stain Result Rare WBC's      No organisms seen    Aerobic culture [[id number]] Collected: 03/27/23 1551    Order Status: Completed Specimen: Shoulder, Left Updated: 03/31/23 0722     Aerobic Bacterial Culture No growth    Culture, Anaerobe [[id number]] Collected: 03/27/23 1551    Order Status: Completed Specimen: Shoulder, Left Updated: 03/31/23 0527     Anaerobic Culture No anaerobes isolated    Aerobic culture [[id number]] Collected: 03/30/23 2014    Order Status: Sent Specimen: Wound from Knee, Left Updated: 03/31/23 0216    Culture, Anaerobe [[id number]] Collected: 03/31/23 0149    Order Status: Sent Specimen: Wound from Knee, Left Updated: 03/31/23 0215    Culture, Anaerobe [[id number]] Collected: 03/30/23 2014    Order Status: Sent Specimen: Wound from Wrist, Right Updated: 03/31/23 0213    Aerobic culture [[id number]] Collected: 03/30/23 2014    Order Status: Sent Specimen: Wound from Wrist, Right Updated: 03/31/23 0213    Aerobic culture [[id number]] Collected: 03/30/23 2014    Order Status: Sent Specimen: Wound from Knee, Right Updated: 03/31/23 0212    Culture,  Anaerobe [[id number]] Collected: 03/30/23 2014    Order Status: Sent Specimen: Wound from Knee, Right Updated: 03/31/23 0211    Culture, Anaerobe [[id number]] Collected: 03/30/23 2014    Order Status: Canceled Specimen: Wound from Wrist, Right     AFB Culture & Smear [[id number]] Collected: 03/27/23 1551    Order Status: Completed Specimen: Shoulder, Left Updated: 03/29/23 2127     AFB Culture & Smear Culture in progress     AFB CULTURE STAIN No acid fast bacilli seen.    Gram stain [[id number]] Collected: 03/27/23 1551    Order Status: Completed Specimen: Shoulder, Left Updated: 03/28/23 0802     Gram Stain Result Rare WBC's      No organisms seen    Fungus culture [[id number]] Collected: 03/27/23 1551    Order Status: Sent Specimen: Shoulder, Left Updated: 03/27/23 1632    Aerobic culture [[id number]] Collected: 03/27/23 1619    Order Status: Canceled Specimen: Arm from Wrist, Right     Culture, Anaerobe [[id number]] Collected: 03/27/23 1618    Order Status: Canceled Specimen: Other from Wrist, Right     AFB Culture & Smear [[id number]] Collected: 03/27/23 1618    Order Status: Canceled Specimen: Other from Wrist, Right     Fungus culture [[id number]] Collected: 03/27/23 1618    Order Status: Canceled Specimen: Other from Wrist, Right     Gram stain [[id number]] Collected: 03/27/23 1618    Order Status: Canceled Specimen: Wound from Wrist, Right     Culture, Body Fluid (Aerobic) w/ GS [[id number]] Collected: 03/23/23 1244    Order Status: Completed Specimen: Body Fluid from Knee Updated: 03/27/23 0733     AEROBIC CULTURE - FLUID No growth     Gram Stain Result Few WBC's      No organisms seen    Blood culture [[id number]] Collected: 03/22/23 0610    Order Status: Completed Specimen: Blood from Peripheral, Right Arm Updated: 03/26/23 0903     Blood Culture, Routine No Growth after 4 days.

## 2023-04-01 NOTE — PLAN OF CARE
Problem: Diabetes Comorbidity  Goal: Blood Glucose Level Within Targeted Range  Outcome: Ongoing, Progressing  Intervention: Monitor and Manage Glycemia  Flowsheets (Taken 3/31/2023 2033)  Glycemic Management:   blood glucose monitored   supplemental insulin given     Problem: Adjustment to Illness (Sepsis/Septic Shock)  Goal: Optimal Coping  Outcome: Ongoing, Progressing  Intervention: Optimize Psychosocial Adjustment to Illness  Flowsheets (Taken 3/31/2023 2033)  Supportive Measures:   active listening utilized   verbalization of feelings encouraged   self-responsibility promoted   relaxation techniques promoted   self-care encouraged   self-reflection promoted   positive reinforcement provided   problem-solving facilitated  Family/Support System Care: self-care encouraged     Problem: Bleeding (Sepsis/Septic Shock)  Goal: Absence of Bleeding  Outcome: Ongoing, Progressing     Problem: Glycemic Control Impaired (Sepsis/Septic Shock)  Goal: Blood Glucose Level Within Desired Range  Outcome: Ongoing, Progressing     Problem: Infection Progression (Sepsis/Septic Shock)  Goal: Absence of Infection Signs and Symptoms  Outcome: Ongoing, Progressing  Intervention: Initiate Sepsis Management  Flowsheets (Taken 3/31/2023 2033)  Infection Prevention: hand hygiene promoted  Infection Management: aseptic technique maintained  Intervention: Promote Recovery  Flowsheets (Taken 3/31/2023 2033)  Sleep/Rest Enhancement: relaxation techniques promoted  Activity Management:   Rolling - L1   Arm raise - L1   Ankle pumps - L1     Problem: Nutrition Impaired (Sepsis/Septic Shock)  Goal: Optimal Nutrition Intake  Outcome: Ongoing, Progressing     Problem: Fluid Imbalance (Pneumonia)  Goal: Fluid Balance  Outcome: Ongoing, Progressing     Problem: Infection (Pneumonia)  Goal: Resolution of Infection Signs and Symptoms  Outcome: Ongoing, Progressing     Problem: Adult Inpatient Plan of Care  Goal: Plan of Care Review  Outcome: Ongoing,  Progressing  Flowsheets (Taken 3/31/2023 2033)  Plan of Care Reviewed With:   patient   spouse  Goal: Patient-Specific Goal (Individualized)  Outcome: Ongoing, Progressing  Goal: Absence of Hospital-Acquired Illness or Injury  Outcome: Ongoing, Progressing  Intervention: Identify and Manage Fall Risk  Flowsheets (Taken 3/31/2023 2033)  Safety Promotion/Fall Prevention:   Fall Risk reviewed with patient/family   assistive device/personal item within reach   medications reviewed   nonskid shoes/socks when out of bed   side rails raised x 2   room near unit station   instructed to call staff for mobility   high risk medications identified  Intervention: Prevent Skin Injury  Flowsheets (Taken 3/31/2023 2033)  Body Position:   position changed independently   weight shifting  Skin Protection:   adhesive use limited   tubing/devices free from skin contact  Intervention: Prevent and Manage VTE (Venous Thromboembolism) Risk  Flowsheets (Taken 3/31/2023 2033)  Activity Management:   Rolling - L1   Arm raise - L1   Ankle pumps - L1  VTE Prevention/Management:   bleeding precations maintained   bleeding risk assessed   ambulation promoted  Range of Motion: active ROM (range of motion) encouraged  Intervention: Prevent Infection  Flowsheets (Taken 3/31/2023 2033)  Infection Prevention: hand hygiene promoted  Goal: Optimal Comfort and Wellbeing  Outcome: Ongoing, Progressing  Goal: Readiness for Transition of Care  Outcome: Ongoing, Progressing     Problem: Bariatric Environmental Safety  Goal: Safety Maintained with Care  Outcome: Ongoing, Progressing     Problem: Impaired Wound Healing  Goal: Optimal Wound Healing  Outcome: Ongoing, Progressing     Problem: Infection  Goal: Absence of Infection Signs and Symptoms  Outcome: Ongoing, Progressing     Problem: Skin Injury Risk Increased  Goal: Skin Health and Integrity  Outcome: Ongoing, Progressing     Problem: Pain Acute  Goal: Acceptable Pain Control and Functional  Ability  Outcome: Ongoing, Progressing     Problem: Fall Injury Risk  Goal: Absence of Fall and Fall-Related Injury  Outcome: Ongoing, Progressing   Pt alert able to make needs known,jennie meds well,IV abx remains in progress,no s/s adverse reaction noted,reposition  q 2hrs,pain controlled by prn pain medication,POC explained,remains free from falls and pressure injuries,safety maintained,continue monitoring.

## 2023-04-01 NOTE — PLAN OF CARE
Infectious Disease Consults    Chart reviewed.  Surgical cultures remain NGTD.    #strep pneumonia bacteremia  #Septic arthritis L shoulder, R wrist, b/l knees  Suspect due to strep pna bacteremia. Bacteremia cleared prior to transfer and did not grow on synovial cx, likely bc pt had been on abx since 3/13. S/p washout R wrist, b/l knees 3/30.   -Plan to treat with ceftriaxone x 4 wks from washout.   -vanc stopped  -Declined Lt shoulder washout due to symptomatic improvement. If pain / ROM worsens would ask ortho to re-evaluate.    ID will sign off for now.   Will arrange f/u in ID clinic.    Outpatient Antibiotic Therapy Plan:    Please send referral to [facility].    1) Infection: strep pna bacteremia and assoc septic arthritis L shoulder, R wrist, B/L knees    2) Discharge Antibiotics:    Intravenous antibiotics:  Ceftriaxone 2g IV q 24 hours       3) Therapy Duration:  4 wks    Estimated end date of IV antibiotics: 4/27/23    4) Outpatient Weekly Labs:    Order the following labs to be drawn on Mondays:   CBC  CMP   CRP      5) Fax Lab Results to Infectious Diseases Provider: Yuridia Campbell      [facility] Fax Number: [phone]    6) Outpatient Infectious Diseases Follow-up    Follow-up appointment will be arranged by the ID clinic and will be found in the patient's appointments tab.    Prior to discharge, please ensure the patient's follow-up has been scheduled.    If there is still no follow-up scheduled prior to discharge, please send an EPIC message to Eli Rice in Infectious Diseases.

## 2023-04-01 NOTE — PROGRESS NOTES
Ortho Daily Progress Note    Hedy Conway is a 47 y.o. female admitted on 3/21/2023      Chief Complaint/Reason for admission: No chief complaint on file.       Hospital Day: 11  Post Op Day: 2 Days Post-Op     The patient was seen and examined this morning at the bedside. Patient reports no acute issues overnight.  Patient reports that pain is adequately controlled.    _______________    Vitals:    04/01/23 0552 04/01/23 0653 04/01/23 1055 04/01/23 1138   BP:  129/70 (!) 122/59    Pulse:  96 110    Resp: 18 18 18 20   Temp:  98.1 °F (36.7 °C) 97.8 °F (36.6 °C)    TempSrc:  Oral Oral    SpO2:  96% 97%    Weight:       Height:           Vital Signs (Most Recent)  Temp: 97.8 °F (36.6 °C) (04/01/23 1055)  Pulse: 110 (04/01/23 1055)  Resp: 20 (04/01/23 1138)  BP: (!) 122/59 (04/01/23 1055)  SpO2: 97 % (04/01/23 1055)    Vital Signs Range (Last 24H):  Temp:  [97.8 °F (36.6 °C)-98.6 °F (37 °C)]   Pulse:  []   Resp:  [16-20]   BP: (109-129)/(58-70)   SpO2:  [96 %-100 %]       Physical:    AAOx3  Incision/ dressing clean/dry/intact  All changed today  NVI Distally  Palpable distal pulses  CR<3sec      Recent Labs     03/30/23  0412 03/31/23  0543 04/01/23  0351   K 4.1 4.2 3.8   CALCIUM 8.9 9.1 9.0   WBC 5.17 5.31 4.35   HGB 8.7* 7.8* 7.4*   HCT 29.4* 26.7* 25.0*    SEE COMMENT 157       I/O last 3 completed shifts:  In: 860 [P.O.:360; IV Piggyback:500]  Out: 3552 [Urine:3552]          Assessment:  A/P status post I and D bilateral lower extremities and right hand.             Plan:    Dressing change today.  Everything looks appropriate.  Continue current care.    Abraham Penn MD  [facility]

## 2023-04-01 NOTE — PLAN OF CARE
Problem: Occupational Therapy  Goal: Occupational Therapy Goal  Description: Goals to be met by 04/07/23:    Patient will increase functional independence with ADLs by performing:    Grooming while seated with Modified [place].  Toileting from bedside commode with Moderate Assistance for hygiene and clothing management.   Sit to stand w/ Minimum Assistance.   Step transfer with Moderate Assistance  Toilet transfer to bedside commode with Moderate Assistance.  Upper extremity exercise program x15 reps per handout, with assistance as needed.      Outcome: Ongoing, Progressing

## 2023-04-01 NOTE — PLAN OF CARE
Problem: Diabetes Comorbidity  Goal: Blood Glucose Level Within Targeted Range  Outcome: Ongoing, Progressing     Problem: Adjustment to Illness (Sepsis/Septic Shock)  Goal: Optimal Coping  Outcome: Ongoing, Progressing     Problem: Bleeding (Sepsis/Septic Shock)  Goal: Absence of Bleeding  Outcome: Ongoing, Progressing     Problem: Glycemic Control Impaired (Sepsis/Septic Shock)  Goal: Blood Glucose Level Within Desired Range  Outcome: Ongoing, Progressing     Problem: Infection Progression (Sepsis/Septic Shock)  Goal: Absence of Infection Signs and Symptoms  Outcome: Ongoing, Progressing     Problem: Nutrition Impaired (Sepsis/Septic Shock)  Goal: Optimal Nutrition Intake  Outcome: Ongoing, Progressing     Problem: Fluid Imbalance (Pneumonia)  Goal: Fluid Balance  Outcome: Ongoing, Progressing     Problem: Infection (Pneumonia)  Goal: Resolution of Infection Signs and Symptoms  Outcome: Ongoing, Progressing     Problem: Respiratory Compromise (Pneumonia)  Goal: Effective Oxygenation and Ventilation  Outcome: Ongoing, Progressing     Problem: Adult Inpatient Plan of Care  Goal: Plan of Care Review  Outcome: Ongoing, Progressing  Goal: Patient-Specific Goal (Individualized)  Outcome: Ongoing, Progressing  Goal: Absence of Hospital-Acquired Illness or Injury  Outcome: Ongoing, Progressing  Goal: Optimal Comfort and Wellbeing  Outcome: Ongoing, Progressing  Goal: Readiness for Transition of Care  Outcome: Ongoing, Progressing     Problem: Bariatric Environmental Safety  Goal: Safety Maintained with Care  Outcome: Ongoing, Progressing     Problem: Impaired Wound Healing  Goal: Optimal Wound Healing  Outcome: Ongoing, Progressing     Problem: Infection  Goal: Absence of Infection Signs and Symptoms  Outcome: Ongoing, Progressing     Problem: Skin Injury Risk Increased  Goal: Skin Health and Integrity  Outcome: Ongoing, Progressing     Problem: Pain Acute  Goal: Acceptable Pain Control and Functional Ability  Outcome:  Ongoing, Progressing     Problem: Fall Injury Risk  Goal: Absence of Fall and Fall-Related Injury  Outcome: Ongoing, Progressing

## 2023-04-01 NOTE — PROGRESS NOTES
During AM assessment pt refuses to be turned at this time to assess skin- she states she was just cleaned and turned on previous shift and she is comfortable at this time.

## 2023-04-02 LAB
ANION GAP SERPL CALC-SCNC: 11 MMOL/L (ref 8–16)
BASOPHILS # BLD AUTO: 0.03 K/UL (ref 0–0.2)
BASOPHILS NFR BLD: 0.6 % (ref 0–1.9)
BUN SERPL-MCNC: 8 MG/DL (ref 6–20)
CALCIUM SERPL-MCNC: 9 MG/DL (ref 8.7–10.5)
CHLORIDE SERPL-SCNC: 101 MMOL/L (ref 95–110)
CO2 SERPL-SCNC: 27 MMOL/L (ref 23–29)
CREAT SERPL-MCNC: 0.5 MG/DL (ref 0.5–1.4)
DIFFERENTIAL METHOD: ABNORMAL
EOSINOPHIL # BLD AUTO: 0 K/UL (ref 0–0.5)
EOSINOPHIL NFR BLD: 0.6 % (ref 0–8)
ERYTHROCYTE [DISTWIDTH] IN BLOOD BY AUTOMATED COUNT: 18.2 % (ref 11.5–14.5)
EST. GFR  (NO RACE VARIABLE): >60 ML/MIN/1.73 M^2
GLUCOSE SERPL-MCNC: 138 MG/DL (ref 70–110)
HCT VFR BLD AUTO: 26.5 % (ref 37–48.5)
HGB BLD-MCNC: 7.6 G/DL (ref 12–16)
IMM GRANULOCYTES # BLD AUTO: 0.06 K/UL (ref 0–0.04)
IMM GRANULOCYTES NFR BLD AUTO: 1.3 % (ref 0–0.5)
LYMPHOCYTES # BLD AUTO: 1.8 K/UL (ref 1–4.8)
LYMPHOCYTES NFR BLD: 38.6 % (ref 18–48)
MCH RBC QN AUTO: 23.8 PG (ref 27–31)
MCHC RBC AUTO-ENTMCNC: 28.7 G/DL (ref 32–36)
MCV RBC AUTO: 83 FL (ref 82–98)
MONOCYTES # BLD AUTO: 0.6 K/UL (ref 0.3–1)
MONOCYTES NFR BLD: 12.4 % (ref 4–15)
NEUTROPHILS # BLD AUTO: 2.2 K/UL (ref 1.8–7.7)
NEUTROPHILS NFR BLD: 46.5 % (ref 38–73)
NRBC BLD-RTO: 0 /100 WBC
PLATELET # BLD AUTO: 162 K/UL (ref 150–450)
PMV BLD AUTO: 10.5 FL (ref 9.2–12.9)
POCT GLUCOSE: 140 MG/DL (ref 70–110)
POCT GLUCOSE: 149 MG/DL (ref 70–110)
POCT GLUCOSE: 160 MG/DL (ref 70–110)
POCT GLUCOSE: 188 MG/DL (ref 70–110)
POTASSIUM SERPL-SCNC: 3.8 MMOL/L (ref 3.5–5.1)
RBC # BLD AUTO: 3.19 M/UL (ref 4–5.4)
SODIUM SERPL-SCNC: 139 MMOL/L (ref 136–145)
WBC # BLD AUTO: 4.74 K/UL (ref 3.9–12.7)

## 2023-04-02 PROCEDURE — 63600175 PHARM REV CODE 636 W HCPCS: Performed by: ORTHOPAEDIC SURGERY

## 2023-04-02 PROCEDURE — 11000001 HC ACUTE MED/SURG PRIVATE ROOM

## 2023-04-02 PROCEDURE — 25000003 PHARM REV CODE 250: Performed by: ORTHOPAEDIC SURGERY

## 2023-04-02 PROCEDURE — 85025 COMPLETE CBC W/AUTO DIFF WBC: CPT | Performed by: ORTHOPAEDIC SURGERY

## 2023-04-02 PROCEDURE — 25000003 PHARM REV CODE 250: Performed by: HOSPITALIST

## 2023-04-02 PROCEDURE — 80048 BASIC METABOLIC PNL TOTAL CA: CPT | Performed by: ORTHOPAEDIC SURGERY

## 2023-04-02 PROCEDURE — A4216 STERILE WATER/SALINE, 10 ML: HCPCS | Performed by: ORTHOPAEDIC SURGERY

## 2023-04-02 RX ADMIN — CEFTRIAXONE 2 G: 2 INJECTION, SOLUTION INTRAVENOUS at 08:04

## 2023-04-02 RX ADMIN — CYCLOBENZAPRINE HYDROCHLORIDE 5 MG: 5 TABLET, FILM COATED ORAL at 06:04

## 2023-04-02 RX ADMIN — OXYCODONE HYDROCHLORIDE 5 MG: 5 TABLET ORAL at 12:04

## 2023-04-02 RX ADMIN — MUPIROCIN: 20 OINTMENT TOPICAL at 09:04

## 2023-04-02 RX ADMIN — OXYCODONE HYDROCHLORIDE 5 MG: 5 TABLET ORAL at 06:04

## 2023-04-02 RX ADMIN — MELATONIN TAB 3 MG 6 MG: 3 TAB at 09:04

## 2023-04-02 RX ADMIN — CYCLOBENZAPRINE HYDROCHLORIDE 5 MG: 5 TABLET, FILM COATED ORAL at 09:04

## 2023-04-02 RX ADMIN — MORPHINE SULFATE 2 MG: 4 INJECTION INTRAVENOUS at 07:04

## 2023-04-02 RX ADMIN — OXYCODONE HYDROCHLORIDE 5 MG: 5 TABLET ORAL at 09:04

## 2023-04-02 RX ADMIN — MUPIROCIN: 20 OINTMENT TOPICAL at 08:04

## 2023-04-02 RX ADMIN — Medication 10 ML: at 04:04

## 2023-04-02 RX ADMIN — INSULIN DETEMIR 5 UNITS: 100 INJECTION, SOLUTION SUBCUTANEOUS at 08:04

## 2023-04-02 RX ADMIN — Medication 10 ML: at 11:04

## 2023-04-02 NOTE — PROGRESS NOTES
Pt sitting up in bed watching tv. No distress noted. No complaints voiced. Will continue to monitor.

## 2023-04-02 NOTE — SUBJECTIVE & OBJECTIVE
Interval History:   NAEON  No new symptoms  S/p washout    Review of Systems   Constitutional:  Positive for activity change.   Musculoskeletal:  Positive for arthralgias.   Neurological:  Positive for weakness.     Objective:     Vital Signs (Most Recent):  Temp: 97.8 °F (36.6 °C) (04/02/23 0700)  Pulse: 101 (04/02/23 0700)  Resp: 18 (04/02/23 0700)  BP: 108/77 (04/02/23 0700)  SpO2: 96 % (04/02/23 0700)   Vital Signs (24h Range):  Temp:  [97.8 °F (36.6 °C)-98.5 °F (36.9 °C)] 97.8 °F (36.6 °C)  Pulse:  [] 101  Resp:  [18-20] 18  SpO2:  [96 %-98 %] 96 %  BP: (108-138)/(59-83) 108/77     Weight: 121.1 kg (267 lb)  Body mass index is 41.82 kg/m².    Intake/Output Summary (Last 24 hours) at 4/2/2023 0759  Last data filed at 4/2/2023 0441  Gross per 24 hour   Intake 2260 ml   Output 4150 ml   Net -1890 ml        Physical Exam  Vitals and nursing note reviewed.   Constitutional:       General: She is not in acute distress.     Appearance: She is well-developed. She is obese. She is not ill-appearing or diaphoretic.   HENT:      Head: Normocephalic and atraumatic.      Nose: Nose normal.      Mouth/Throat:      Mouth: Mucous membranes are moist.   Eyes:      General: No scleral icterus.  Neck:      Thyroid: No thyromegaly.   Cardiovascular:      Rate and Rhythm: Regular rhythm. Tachycardia present.      Heart sounds: No murmur heard.  Pulmonary:      Effort: Pulmonary effort is normal.      Breath sounds: Normal breath sounds. No stridor. No wheezing or rales.      Comments: Room air  Abdominal:      General: Bowel sounds are normal. There is no distension.      Palpations: Abdomen is soft. There is no mass.      Tenderness: There is no abdominal tenderness. There is no guarding.   Musculoskeletal:         General: Normal range of motion.      Cervical back: Normal range of motion and neck supple.      Right lower leg: No edema.      Left lower leg: No edema.      Comments: R wrist swelling. R and L knee swellling    Skin:     General: Skin is warm and dry.      Capillary Refill: Capillary refill takes less than 2 seconds.      Findings: Lesion (surgical c/d/i) present. No rash.   Neurological:      Mental Status: She is alert and oriented to person, place, and time.   Psychiatric:         Behavior: Behavior normal.           Recent Results (from the past 24 hour(s))   POCT glucose    Collection Time: 04/01/23 10:53 AM   Result Value Ref Range    POCT Glucose 135 (H) 70 - 110 mg/dL   POCT glucose    Collection Time: 04/01/23  4:14 PM   Result Value Ref Range    POCT Glucose 141 (H) 70 - 110 mg/dL   POCT glucose    Collection Time: 04/01/23  7:42 PM   Result Value Ref Range    POCT Glucose 199 (H) 70 - 110 mg/dL   CBC Auto Differential    Collection Time: 04/02/23  6:46 AM   Result Value Ref Range    WBC 4.74 3.90 - 12.70 K/uL    RBC 3.19 (L) 4.00 - 5.40 M/uL    Hemoglobin 7.6 (L) 12.0 - 16.0 g/dL    Hematocrit 26.5 (L) 37.0 - 48.5 %    MCV 83 82 - 98 fL    MCH 23.8 (L) 27.0 - 31.0 pg    MCHC 28.7 (L) 32.0 - 36.0 g/dL    RDW 18.2 (H) 11.5 - 14.5 %    Platelets 162 150 - 450 K/uL    MPV 10.5 9.2 - 12.9 fL    Immature Granulocytes 1.3 (H) 0.0 - 0.5 %    Gran # (ANC) 2.2 1.8 - 7.7 K/uL    Immature Grans (Abs) 0.06 (H) 0.00 - 0.04 K/uL    Lymph # 1.8 1.0 - 4.8 K/uL    Mono # 0.6 0.3 - 1.0 K/uL    Eos # 0.0 0.0 - 0.5 K/uL    Baso # 0.03 0.00 - 0.20 K/uL    nRBC 0 0 /100 WBC    Gran % 46.5 38.0 - 73.0 %    Lymph % 38.6 18.0 - 48.0 %    Mono % 12.4 4.0 - 15.0 %    Eosinophil % 0.6 0.0 - 8.0 %    Basophil % 0.6 0.0 - 1.9 %    Differential Method Automated    POCT glucose    Collection Time: 04/02/23  6:59 AM   Result Value Ref Range    POCT Glucose 160 (H) 70 - 110 mg/dL       Microbiology Results (last 7 days)       Procedure Component Value Units Date/Time    Aerobic culture [[id number]] Collected: 03/30/23 2014    Order Status: Completed Specimen: Wound from Knee, Left Updated: 04/01/23 0959     Aerobic Bacterial Culture No growth     Aerobic culture [[id number]] Collected: 03/30/23 2014    Order Status: Completed Specimen: Wound from Knee, Right Updated: 04/01/23 0958     Aerobic Bacterial Culture No growth    Aerobic culture [[id number]] Collected: 03/30/23 2014    Order Status: Completed Specimen: Wound from Wrist, Right Updated: 04/01/23 0958     Aerobic Bacterial Culture No growth    Gram stain [[id number]] Collected: 03/30/23 2014    Order Status: Completed Specimen: Wound from Knee, Left Updated: 03/31/23 0755     Gram Stain Result Few WBC's      No organisms seen    Gram stain [[id number]] Collected: 03/30/23 2014    Order Status: Completed Specimen: Wound from Knee, Right Updated: 03/31/23 0755     Gram Stain Result Few WBC's      No organisms seen    Gram stain [[id number]] Collected: 03/30/23 2014    Order Status: Completed Specimen: Wound from Wrist, Right Updated: 03/31/23 0754     Gram Stain Result Rare WBC's      No organisms seen    Aerobic culture [[id number]] Collected: 03/27/23 1551    Order Status: Completed Specimen: Shoulder, Left Updated: 03/31/23 0722     Aerobic Bacterial Culture No growth    Culture, Anaerobe [[id number]] Collected: 03/27/23 1551    Order Status: Completed Specimen: Shoulder, Left Updated: 03/31/23 0527     Anaerobic Culture No anaerobes isolated    Culture, Anaerobe [[id number]] Collected: 03/31/23 0149    Order Status: Sent Specimen: Wound from Knee, Left Updated: 03/31/23 0215    Culture, Anaerobe [[id number]] Collected: 03/30/23 2014    Order Status: Sent Specimen: Wound from Wrist, Right Updated: 03/31/23 0213    Culture, Anaerobe [[id number]] Collected: 03/30/23 2014    Order Status: Sent Specimen: Wound from Knee, Right Updated: 03/31/23 0211    Culture, Anaerobe [[id number]] Collected: 03/30/23 2014    Order Status: Canceled Specimen: Wound from Wrist, Right     AFB Culture & Smear [[id number]] Collected: 03/27/23 1551    Order Status: Completed Specimen: Shoulder, Left Updated: 03/29/23 2127     AFB Culture  & Smear Culture in progress     AFB CULTURE STAIN No acid fast bacilli seen.    Gram stain [[id number]] Collected: 03/27/23 1551    Order Status: Completed Specimen: Shoulder, Left Updated: 03/28/23 0802     Gram Stain Result Rare WBC's      No organisms seen    Fungus culture [[id number]] Collected: 03/27/23 1551    Order Status: Sent Specimen: Shoulder, Left Updated: 03/27/23 1632    Aerobic culture [[id number]] Collected: 03/27/23 1619    Order Status: Canceled Specimen: Arm from Wrist, Right     Culture, Anaerobe [[id number]] Collected: 03/27/23 1618    Order Status: Canceled Specimen: Other from Wrist, Right     AFB Culture & Smear [[id number]] Collected: 03/27/23 1618    Order Status: Canceled Specimen: Other from Wrist, Right     Fungus culture [[id number]] Collected: 03/27/23 1618    Order Status: Canceled Specimen: Other from Wrist, Right     Gram stain [[id number]] Collected: 03/27/23 1618    Order Status: Canceled Specimen: Wound from Wrist, Right     Culture, Body Fluid (Aerobic) w/ GS [[id number]] Collected: 03/23/23 1244    Order Status: Completed Specimen: Body Fluid from Knee Updated: 03/27/23 0733     AEROBIC CULTURE - FLUID No growth     Gram Stain Result Few WBC's      No organisms seen    Blood culture [[id number]] Collected: 03/22/23 0610    Order Status: Completed Specimen: Blood from Peripheral, Right Arm Updated: 03/26/23 0903     Blood Culture, Routine No Growth after 4 days.

## 2023-04-02 NOTE — PROGRESS NOTES
Pt lying in bed watching TV, states she wants to be flat on her back. Educated on the importance of turning to prevent skin breakdown. Pt agrees to stay turned at this time.

## 2023-04-02 NOTE — PROGRESS NOTES
Ortho Daily Progress Note    Hedy Conway is a 47 y.o. female admitted on 3/21/2023      Chief Complaint/Reason for admission: No chief complaint on file.       Hospital Day: 12  Post Op Day: 3 Days Post-Op     The patient was seen and examined this morning at the bedside.   Patient appears to be lacking much motivation to move around.    I discussed with her to perform her own occupational therapy to the right hand with range of motion as well as to try to attempt to range of motion of the knees as well.  _______________    Vitals:    04/02/23 0024 04/02/23 0615 04/02/23 0637 04/02/23 0700   BP:  125/72  108/77   Pulse:  99  101   Resp: 18 18 18 18   Temp:  98.2 °F (36.8 °C)  97.8 °F (36.6 °C)   TempSrc:  Oral  Oral   SpO2:  96%  96%   Weight:       Height:           Vital Signs (Most Recent)  Temp: 97.8 °F (36.6 °C) (04/02/23 0700)  Pulse: 101 (04/02/23 0700)  Resp: 18 (04/02/23 0700)  BP: 108/77 (04/02/23 0700)  SpO2: 96 % (04/02/23 0700)    Vital Signs Range (Last 24H):  Temp:  [97.8 °F (36.6 °C)-98.5 °F (36.9 °C)]   Pulse:  []   Resp:  [18-20]   BP: (108-138)/(59-83)   SpO2:  [96 %-98 %]       Physical:    AAOx3  Incision/ dressing clean/dry/intact  NVI Distally  Palpable distal pulses  CR<3sec      Recent Labs     03/31/23  0543 04/01/23  0351 04/02/23  0646   K 4.2 3.8 3.8   CALCIUM 9.1 9.0 9.0   WBC 5.31 4.35 4.74   HGB 7.8* 7.4* 7.6*   HCT 26.7* 25.0* 26.5*   PLT SEE COMMENT 157 162       I/O last 3 completed shifts:  In: 2510 [P.O.:2160; IV Piggyback:350]  Out: 6302 [Urine:6302]          Assessment:  A/P status post I&D bilateral knees, right wrist           Plan:    PT/OT:  Weight-bearing as tolerated  Pain Control  DVT Prophylaxis:     Discharge Planning    Lacking much motivation to move.      Abraham Penn MD  [facility]

## 2023-04-02 NOTE — PROGRESS NOTES
[facility]  Progress Note    Patient Name: Hedy Conway  MRN: [id number]  Patient Class: IP- Inpatient   Admission Date: 3/21/2023  Length of Stay: 12 days  Attending Physician: Alan Murrieta MD  Primary Care Provider: John Lantigua PA-C        Subjective:     Principal Problem:Polyarthritis        HPI:  47-year-old  female with medical history significant for type 2 diabetes mellitus, hypertension, large cell diffuse non-Hodgkin lymphoma status post chemotherapy and stem cell transplant 2011 was transferred from outside hospital with suspicion of septic arthritis.  Of note she voiced migratory pain in her left-right knee-both shoulders that started 2 weeks ago, associated with generalized weakness, cleansing inability to move right lower extremity and only have movement without gravity on the left lower extremity without associated trauma, she voiced being on 3 different antibiotics at the outside hospital for both pneumonia and urinary tract infection, and this could explain for been afebrile although she voiced subjective fever at the onset of her symptoms she denied cough, shortness of breath, orthopnea, paroxysmal nocturnal dyspnea or pedal swelling.  She denied prior diarrhea although she voiced upper respiratory tract symptoms,treatment for pneumonia prior to onset of these symptoms.  She denied any urinary symptoms at this time of dysuria, frequency, urgency, straining at micturition or hematuria.  No recent travel, no sick contacts, no tick bite.  She had been sent here for possible knee aspiration.      Overview/Hospital Course:  Ms Hedy Conway who was transferred to [facility] from [facility]. There she was admitted for multiple joint pains. MRI R knee showed meniscal tear and large effusion with synovitis. There was concern for septic arthritis, aspiration was attempted, but no fluid retrieved. She also had Strep pneumoniae bacteremia (3/13) and  Pseudomonas pneumonia (3/15) and is currently being treated with cefepime. She is also receiving vancomycin with concerns for septic joint. She did require 1U RBC transfusion and has had vaginal bleeding. Pelvic US 3/20 showed thickened endometrium, and she will need outpatient GYN follow up. Her iron panel indicates mixed iron deficiency and anemia of chronic disease. She saw Neurology and Orthopaedics. Orthopaedics doubts multiple septic joints simultaneously and suspects Rheumatologic condition. S/p R knee arthrocentesis on 3/23 with culture no growth. Sed rate, CRP remain elevated. Uric acid normal, RF/CCP/SILVIA/hepatitis panel normal. MRI left shoulder with large complex joint effusion with synovitis, high grade partial thickness tear of supraspinatus and infraspinatus, near complete tear of intraarticular bicpet tendon with tenosynovitis. MRI right wrist with distal ulna/radius/carpal osteitis, complex effusions, complex fluid collections, tenosynovitis. S/p arthrocentesis of both L shoulder and R wrist by IR on 3/28- both have high neutrophil count, suggesting septic arthritis. No growth on cultures, but she has been on antibiotics for >1 week. MRI hip shows abnormal L SI joint concerning for septic arthritis. MRI lumbar spine shows R L2-3 facet arthropathy with enhancement. Ortho planning washout on 3/30/23. Neurosurgery also consulted for lumbar spine findings; no surgery needed.     Continue ceftriaxone 2g q 24h, anticipate atleast 4 wks IV abx.  Medically Ready, awaiting REHAB placement.      Interval History:   NAEON  No new symptoms  S/p washout    Review of Systems   Constitutional:  Positive for activity change.   Musculoskeletal:  Positive for arthralgias.   Neurological:  Positive for weakness.     Objective:     Vital Signs (Most Recent):  Temp: 97.8 °F (36.6 °C) (04/02/23 0700)  Pulse: 101 (04/02/23 0700)  Resp: 18 (04/02/23 0700)  BP: 108/77 (04/02/23 0700)  SpO2: 96 % (04/02/23 0700)   Vital Signs  (24h Range):  Temp:  [97.8 °F (36.6 °C)-98.5 °F (36.9 °C)] 97.8 °F (36.6 °C)  Pulse:  [] 101  Resp:  [18-20] 18  SpO2:  [96 %-98 %] 96 %  BP: (108-138)/(59-83) 108/77     Weight: 121.1 kg (267 lb)  Body mass index is 41.82 kg/m².    Intake/Output Summary (Last 24 hours) at 4/2/2023 0759  Last data filed at 4/2/2023 0441  Gross per 24 hour   Intake 2260 ml   Output 4150 ml   Net -1890 ml        Physical Exam  Vitals and nursing note reviewed.   Constitutional:       General: She is not in acute distress.     Appearance: She is well-developed. She is obese. She is not ill-appearing or diaphoretic.   HENT:      Head: Normocephalic and atraumatic.      Nose: Nose normal.      Mouth/Throat:      Mouth: Mucous membranes are moist.   Eyes:      General: No scleral icterus.  Neck:      Thyroid: No thyromegaly.   Cardiovascular:      Rate and Rhythm: Regular rhythm. Tachycardia present.      Heart sounds: No murmur heard.  Pulmonary:      Effort: Pulmonary effort is normal.      Breath sounds: Normal breath sounds. No stridor. No wheezing or rales.      Comments: Room air  Abdominal:      General: Bowel sounds are normal. There is no distension.      Palpations: Abdomen is soft. There is no mass.      Tenderness: There is no abdominal tenderness. There is no guarding.   Musculoskeletal:         General: Normal range of motion.      Cervical back: Normal range of motion and neck supple.      Right lower leg: No edema.      Left lower leg: No edema.      Comments: R wrist swelling. R and L knee swellling   Skin:     General: Skin is warm and dry.      Capillary Refill: Capillary refill takes less than 2 seconds.      Findings: Lesion (surgical c/d/i) present. No rash.   Neurological:      Mental Status: She is alert and oriented to person, place, and time.   Psychiatric:         Behavior: Behavior normal.           Recent Results (from the past 24 hour(s))   POCT glucose    Collection Time: 04/01/23 10:53 AM   Result  Value Ref Range    POCT Glucose 135 (H) 70 - 110 mg/dL   POCT glucose    Collection Time: 04/01/23  4:14 PM   Result Value Ref Range    POCT Glucose 141 (H) 70 - 110 mg/dL   POCT glucose    Collection Time: 04/01/23  7:42 PM   Result Value Ref Range    POCT Glucose 199 (H) 70 - 110 mg/dL   CBC Auto Differential    Collection Time: 04/02/23  6:46 AM   Result Value Ref Range    WBC 4.74 3.90 - 12.70 K/uL    RBC 3.19 (L) 4.00 - 5.40 M/uL    Hemoglobin 7.6 (L) 12.0 - 16.0 g/dL    Hematocrit 26.5 (L) 37.0 - 48.5 %    MCV 83 82 - 98 fL    MCH 23.8 (L) 27.0 - 31.0 pg    MCHC 28.7 (L) 32.0 - 36.0 g/dL    RDW 18.2 (H) 11.5 - 14.5 %    Platelets 162 150 - 450 K/uL    MPV 10.5 9.2 - 12.9 fL    Immature Granulocytes 1.3 (H) 0.0 - 0.5 %    Gran # (ANC) 2.2 1.8 - 7.7 K/uL    Immature Grans (Abs) 0.06 (H) 0.00 - 0.04 K/uL    Lymph # 1.8 1.0 - 4.8 K/uL    Mono # 0.6 0.3 - 1.0 K/uL    Eos # 0.0 0.0 - 0.5 K/uL    Baso # 0.03 0.00 - 0.20 K/uL    nRBC 0 0 /100 WBC    Gran % 46.5 38.0 - 73.0 %    Lymph % 38.6 18.0 - 48.0 %    Mono % 12.4 4.0 - 15.0 %    Eosinophil % 0.6 0.0 - 8.0 %    Basophil % 0.6 0.0 - 1.9 %    Differential Method Automated    POCT glucose    Collection Time: 04/02/23  6:59 AM   Result Value Ref Range    POCT Glucose 160 (H) 70 - 110 mg/dL       Microbiology Results (last 7 days)       Procedure Component Value Units Date/Time    Aerobic culture [[id number]] Collected: 03/30/23 2014    Order Status: Completed Specimen: Wound from Knee, Left Updated: 04/01/23 0959     Aerobic Bacterial Culture No growth    Aerobic culture [[id number]] Collected: 03/30/23 2014    Order Status: Completed Specimen: Wound from Knee, Right Updated: 04/01/23 0958     Aerobic Bacterial Culture No growth    Aerobic culture [[id number]] Collected: 03/30/23 2014    Order Status: Completed Specimen: Wound from Wrist, Right Updated: 04/01/23 0958     Aerobic Bacterial Culture No growth    Gram stain [[id number]] Collected: 03/30/23 2014    Order  Status: Completed Specimen: Wound from Knee, Left Updated: 03/31/23 0755     Gram Stain Result Few WBC's      No organisms seen    Gram stain [[id number]] Collected: 03/30/23 2014    Order Status: Completed Specimen: Wound from Knee, Right Updated: 03/31/23 0755     Gram Stain Result Few WBC's      No organisms seen    Gram stain [[id number]] Collected: 03/30/23 2014    Order Status: Completed Specimen: Wound from Wrist, Right Updated: 03/31/23 0754     Gram Stain Result Rare WBC's      No organisms seen    Aerobic culture [[id number]] Collected: 03/27/23 1551    Order Status: Completed Specimen: Shoulder, Left Updated: 03/31/23 0722     Aerobic Bacterial Culture No growth    Culture, Anaerobe [[id number]] Collected: 03/27/23 1551    Order Status: Completed Specimen: Shoulder, Left Updated: 03/31/23 0527     Anaerobic Culture No anaerobes isolated    Culture, Anaerobe [[id number]] Collected: 03/31/23 0149    Order Status: Sent Specimen: Wound from Knee, Left Updated: 03/31/23 0215    Culture, Anaerobe [[id number]] Collected: 03/30/23 2014    Order Status: Sent Specimen: Wound from Wrist, Right Updated: 03/31/23 0213    Culture, Anaerobe [[id number]] Collected: 03/30/23 2014    Order Status: Sent Specimen: Wound from Knee, Right Updated: 03/31/23 0211    Culture, Anaerobe [[id number]] Collected: 03/30/23 2014    Order Status: Canceled Specimen: Wound from Wrist, Right     AFB Culture & Smear [[id number]] Collected: 03/27/23 1551    Order Status: Completed Specimen: Shoulder, Left Updated: 03/29/23 2127     AFB Culture & Smear Culture in progress     AFB CULTURE STAIN No acid fast bacilli seen.    Gram stain [[id number]] Collected: 03/27/23 1551    Order Status: Completed Specimen: Shoulder, Left Updated: 03/28/23 0802     Gram Stain Result Rare WBC's      No organisms seen    Fungus culture [[id number]] Collected: 03/27/23 1551    Order Status: Sent Specimen: Shoulder, Left Updated: 03/27/23 1632    Aerobic culture [[id number]]  Collected: 03/27/23 1619    Order Status: Canceled Specimen: Arm from Wrist, Right     Culture, Anaerobe [[id number]] Collected: 03/27/23 1618    Order Status: Canceled Specimen: Other from Wrist, Right     AFB Culture & Smear [[id number]] Collected: 03/27/23 1618    Order Status: Canceled Specimen: Other from Wrist, Right     Fungus culture [[id number]] Collected: 03/27/23 1618    Order Status: Canceled Specimen: Other from Wrist, Right     Gram stain [[id number]] Collected: 03/27/23 1618    Order Status: Canceled Specimen: Wound from Wrist, Right     Culture, Body Fluid (Aerobic) w/ GS [[id number]] Collected: 03/23/23 1244    Order Status: Completed Specimen: Body Fluid from Knee Updated: 03/27/23 0733     AEROBIC CULTURE - FLUID No growth     Gram Stain Result Few WBC's      No organisms seen    Blood culture [[id number]] Collected: 03/22/23 0610    Order Status: Completed Specimen: Blood from Peripheral, Right Arm Updated: 03/26/23 0903     Blood Culture, Routine No Growth after 4 days.                         Assessment/Plan:      * Polyarthritis  Present since admission to [facility]. MRI noted R knee effusion. ESR, CRP elevated. Doubt gout. RF, CCP, SILVIA, acute hep panel normal. Parvovirus negative. RF normal. TSH elevated but free T4 normal, so less likely hypothyroidism. Overall this is concerning for septic arthritis affecting multiple joints as a result of bacteremia.     - MRI L shoulder: complex effusion with synovitis  - MRI R wrist: osteitis, complex effusions and fluid collections  - MRI L hip: abnormal SI joint concerning for septic arthritis   - MRI lumbar spine: R sided L2-3 facet joint arthropathy concerning for septic arthritis    - Orthopaedics consulted   - s/p R wrist and L shoulder arthrocenteses on 3/27/23   - R wrist only 2cc, cell count not performed but PMN predominant. Cultures unable to be sent   - L shoulder WBC 47K with PMN predominance, cultures NGTD but has been on antibiotics for >1 week  -  Ortho consulted- plan washout bilateral knees and R wrist on 3/30/23    - Neurosurgery consulted for SI joint and L2-3 findings on MRI-- surgery not needed    - ID following, remains on CTX and vanc   - PT, OT consulted as well - likely will need rehab vs SNF when ready       Facet arthropathy, lumbar  Discussed with Neurosurgery. Surgery not needed.       Subclinical hypothyroidism  Lab Results   Component Value Date    TSH 14.200 (H) 03/22/2023     FT4 within normal limits  Repeat as outpatient when acute illness resolved      Anemia of chronic disease  Anemia of chronic disease present (ferritin elevated) but did also have vaginal bleeding. TSAT 14- some degree iron deficiency    Weakness of both lower extremities  Presented with bilateral lower extremity weakness with no significant sensory deficit. She denied preceding diarrhea/GI symptoms, although she had upper respiratory tract symptoms  - Neurology consulted  - seems as though this is secondary to arthritis as being worked up above   - PT, OT consulted       Postmenopausal bleeding  Noted at OSH. TVUS with thickened endometrial stripe  - needs outpatient GYN follow up       Type 2 diabetes mellitus without complication, without long-term current use of insulin  Patient's FSGs are controlled on current medication regimen.  Last A1c reviewed-   Lab Results   Component Value Date    HGBA1C 6.4 (H) 03/22/2023     Most recent fingerstick glucose reviewed-   Recent Labs   Lab 03/29/23  1138 03/29/23  1633 03/29/23  1958 03/30/23  0720   POCTGLUCOSE 135* 149* 167* 216*     Current correctional scale  Medium  Maintain anti-hyperglycemic dose as follows-   Antihyperglycemics (From admission, onward)      Start     Stop Route Frequency Ordered    03/22/23 0900  insulin detemir U-100 pen 5 Units         -- SubQ Daily 03/22/23 0541    03/22/23 0638  insulin aspart U-100 pen 0-5 Units         -- SubQ Before meals & nightly PRN 03/22/23 0541          Hold Oral  hypoglycemics while patient is in the hospital.    Streptococcal bacteremia  Blood cultures 3/13 with Strep bacteremia. Currently being treated with ceftriaxone. This may have seeded her joints causing septic arthritis. Arthrocentesis R wrist and L shoulder completed on 3/27 with elevated neutrophils. Cultures no growth to date but has been on antibiotics for over a week. ID consulted     Stem cells transplant status  Not currently on immunosuppresants  Follow up with oncology      History of Large cell (diffuse) non-Hodgkin's lymphoma  In remission, follow up with oncology  No new enlarged lymph nodes noted         VTE Risk Mitigation (From admission, onward)           Ordered     Reason for No Pharmacological VTE Prophylaxis  Once        Question:  Reasons:  Answer:  Active Bleeding    03/21/23 2230     IP VTE HIGH RISK PATIENT  Once         03/21/23 2230     Place sequential compression device  Until discontinued         03/21/23 2230                    Discharge Planning   JESSE: 4/3/2023     Code Status: Full Code   Is the patient medically ready for discharge?:     Reason for patient still in hospital (select all that apply): Patient trending condition and Treatment  Discharge Plan A: Rehab   Discharge Delays: None known at this time              Alan Murrieta MD  Department of Hospital Medicine   [facility]

## 2023-04-02 NOTE — CONSULTS
Thank you for your consult to [facility]. We have reviewed the patient chart. This patient does meet criteria for [facility] service at this time. Will assume care on 04/03/23 at 7AM.

## 2023-04-03 LAB
PATHOLOGIST INTERPRETATION UIFE: NORMAL
POCT GLUCOSE: 131 MG/DL (ref 70–110)
POCT GLUCOSE: 138 MG/DL (ref 70–110)
POCT GLUCOSE: 154 MG/DL (ref 70–110)
POCT GLUCOSE: 188 MG/DL (ref 70–110)
SARS-COV-2 RDRP RESP QL NAA+PROBE: POSITIVE

## 2023-04-03 PROCEDURE — 63600175 PHARM REV CODE 636 W HCPCS: Performed by: ORTHOPAEDIC SURGERY

## 2023-04-03 PROCEDURE — 97530 THERAPEUTIC ACTIVITIES: CPT

## 2023-04-03 PROCEDURE — 97530 THERAPEUTIC ACTIVITIES: CPT | Mod: CQ

## 2023-04-03 PROCEDURE — 25000003 PHARM REV CODE 250: Performed by: STUDENT IN AN ORGANIZED HEALTH CARE EDUCATION/TRAINING PROGRAM

## 2023-04-03 PROCEDURE — 25000003 PHARM REV CODE 250: Performed by: HOSPITALIST

## 2023-04-03 PROCEDURE — U0002 COVID-19 LAB TEST NON-CDC: HCPCS | Performed by: HOSPITALIST

## 2023-04-03 PROCEDURE — A4216 STERILE WATER/SALINE, 10 ML: HCPCS | Performed by: ORTHOPAEDIC SURGERY

## 2023-04-03 PROCEDURE — 25000003 PHARM REV CODE 250: Performed by: ORTHOPAEDIC SURGERY

## 2023-04-03 PROCEDURE — 97110 THERAPEUTIC EXERCISES: CPT

## 2023-04-03 PROCEDURE — 11000001 HC ACUTE MED/SURG PRIVATE ROOM

## 2023-04-03 PROCEDURE — 27000207 HC ISOLATION

## 2023-04-03 PROCEDURE — 97110 THERAPEUTIC EXERCISES: CPT | Mod: CQ

## 2023-04-03 RX ADMIN — Medication 10 ML: at 05:04

## 2023-04-03 RX ADMIN — CYCLOBENZAPRINE HYDROCHLORIDE 5 MG: 5 TABLET, FILM COATED ORAL at 07:04

## 2023-04-03 RX ADMIN — OXYCODONE HYDROCHLORIDE 5 MG: 5 TABLET ORAL at 01:04

## 2023-04-03 RX ADMIN — CYCLOBENZAPRINE HYDROCHLORIDE 5 MG: 5 TABLET, FILM COATED ORAL at 09:04

## 2023-04-03 RX ADMIN — Medication 10 ML: at 07:04

## 2023-04-03 RX ADMIN — CEFTRIAXONE 2 G: 2 INJECTION, SOLUTION INTRAVENOUS at 09:04

## 2023-04-03 RX ADMIN — INSULIN DETEMIR 5 UNITS: 100 INJECTION, SOLUTION SUBCUTANEOUS at 09:04

## 2023-04-03 RX ADMIN — MELATONIN TAB 3 MG 6 MG: 3 TAB at 09:04

## 2023-04-03 RX ADMIN — MUPIROCIN: 20 OINTMENT TOPICAL at 09:04

## 2023-04-03 RX ADMIN — Medication 10 ML: at 11:04

## 2023-04-03 RX ADMIN — OXYCODONE HYDROCHLORIDE 5 MG: 5 TABLET ORAL at 12:04

## 2023-04-03 RX ADMIN — Medication 10 ML: at 01:04

## 2023-04-03 RX ADMIN — OXYCODONE HYDROCHLORIDE 5 MG: 5 TABLET ORAL at 05:04

## 2023-04-03 RX ADMIN — OXYCODONE HYDROCHLORIDE 5 MG: 5 TABLET ORAL at 09:04

## 2023-04-03 RX ADMIN — OXYCODONE HYDROCHLORIDE 5 MG: 5 TABLET ORAL at 07:04

## 2023-04-03 NOTE — PLAN OF CARE
Problem: Occupational Therapy  Goal: Occupational Therapy Goal  Description: Goals to be met by 04/07/23:    Patient will increase functional independence with ADLs by performing:    Grooming while seated with Modified [place].  Toileting from bedside commode with Moderate Assistance for hygiene and clothing management.   Sit to stand w/ Minimum Assistance.   Step transfer with Moderate Assistance  Toilet transfer to bedside commode with Moderate Assistance.  Upper extremity exercise program x15 reps per handout, with assistance as needed.      Outcome: Ongoing, Progressing

## 2023-04-03 NOTE — ASSESSMENT & PLAN NOTE
Patient's FSGs are controlled on current medication regimen.  Last A1c reviewed-   Lab Results   Component Value Date    HGBA1C 6.4 (H) 03/22/2023     Most recent fingerstick glucose reviewed-   Recent Labs   Lab 04/02/23  1107 04/02/23  1620 04/02/23 2009 04/03/23  0702   POCTGLUCOSE 140* 149* 188* 154*     Current correctional scale  Medium  Maintain anti-hyperglycemic dose as follows-   Antihyperglycemics (From admission, onward)    Start     Stop Route Frequency Ordered    03/22/23 0900  insulin detemir U-100 pen 5 Units         -- SubQ Daily 03/22/23 0541    03/22/23 0638  insulin aspart U-100 pen 0-5 Units         -- SubQ Before meals & nightly PRN 03/22/23 0541        Hold Oral hypoglycemics while patient is in the hospital.

## 2023-04-03 NOTE — PLAN OF CARE
Problem: Diabetes Comorbidity  Goal: Blood Glucose Level Within Targeted Range  Outcome: Ongoing, Progressing     Problem: Infection Progression (Sepsis/Septic Shock)  Goal: Absence of Infection Signs and Symptoms  Outcome: Ongoing, Progressing     Problem: Nutrition Impaired (Sepsis/Septic Shock)  Goal: Optimal Nutrition Intake  Outcome: Ongoing, Progressing

## 2023-04-03 NOTE — PROGRESS NOTES
Pt refuses to be turned at this time for sacral skin assessment. Pt states she was just cleaned and turned. Heels floated off bed with pillows.

## 2023-04-03 NOTE — PROGRESS NOTES
[facility]  Nurses Note -- 4 Eyes      4/3/2023       Skin assessed on: Q Shift      [x] No Pressure Injuries Present    [x]Prevention Measures Documented    [] Yes LDA  for Pressure Injury Previously documented     [] Yes New Pressure Injury Discovered   [] LDA for New Pressure Injury Added      Attending RN:  Antoinette Al, RN     Second RN:  Ching NEWTON

## 2023-04-03 NOTE — PROGRESS NOTES
[facility] Medicine  Telemedicine Progress Note    Patient Name: Hedy Conway  MRN: [id number]  Patient Class: IP- Inpatient   Admission Date: 3/21/2023  Length of Stay: 13 days  Attending Physician: Valencia Cao MD  Primary Care Provider: John Lantigua PA-C          Subjective:     Principal Problem:Polyarthritis        HPI:  47-year-old  female with medical history significant for type 2 diabetes mellitus, hypertension, large cell diffuse non-Hodgkin lymphoma status post chemotherapy and stem cell transplant 2011 was transferred from outside hospital with suspicion of septic arthritis.  Of note she voiced migratory pain in her left-right knee-both shoulders that started 2 weeks ago, associated with generalized weakness, cleansing inability to move right lower extremity and only have movement without gravity on the left lower extremity without associated trauma, she voiced being on 3 different antibiotics at the outside hospital for both pneumonia and urinary tract infection, and this could explain for been afebrile although she voiced subjective fever at the onset of her symptoms she denied cough, shortness of breath, orthopnea, paroxysmal nocturnal dyspnea or pedal swelling.  She denied prior diarrhea although she voiced upper respiratory tract symptoms,treatment for pneumonia prior to onset of these symptoms.  She denied any urinary symptoms at this time of dysuria, frequency, urgency, straining at micturition or hematuria.  No recent travel, no sick contacts, no tick bite.  She had been sent here for possible knee aspiration.      Overview/Hospital Course:  Ms Hedy Conway who was transferred to [facility] from [facility]. There she was admitted for multiple joint pains. MRI R knee showed meniscal tear and large effusion with synovitis. There was concern for septic arthritis, aspiration was attempted, but no fluid retrieved. She also had Strep pneumoniae  bacteremia (3/13) and Pseudomonas pneumonia (3/15) and is currently being treated with cefepime. She is also receiving vancomycin with concerns for septic joint. She did require 1U RBC transfusion and has had vaginal bleeding. Pelvic US 3/20 showed thickened endometrium, and she will need outpatient GYN follow up. Her iron panel indicates mixed iron deficiency and anemia of chronic disease. She saw Neurology and Orthopaedics. Orthopaedics doubts multiple septic joints simultaneously and suspects Rheumatologic condition. S/p R knee arthrocentesis on 3/23 with culture no growth. Sed rate, CRP remain elevated. Uric acid normal, RF/CCP/SILVIA/hepatitis panel normal. MRI left shoulder with large complex joint effusion with synovitis, high grade partial thickness tear of supraspinatus and infraspinatus, near complete tear of intraarticular bicpet tendon with tenosynovitis. MRI right wrist with distal ulna/radius/carpal osteitis, complex effusions, complex fluid collections, tenosynovitis. S/p arthrocentesis of both L shoulder and R wrist by IR on 3/28- both have high neutrophil count, suggesting septic arthritis. No growth on cultures, but she has been on antibiotics for >1 week. MRI hip shows abnormal L SI joint concerning for septic arthritis. MRI lumbar spine shows R L2-3 facet arthropathy with enhancement. Ortho planning washout on 3/30/23. Neurosurgery also consulted for lumbar spine findings; no surgery needed.     Continue ceftriaxone 2g q 24h, anticipate atleast 4 wks IV abx.  Medically Ready, awaiting REHAB placement.      Interval History: No acute events. Doing well. Awaiting SNF placement. Will need 4 weeks of IV abx.     Review of Systems   Constitutional:  Negative for chills, fatigue and fever.   HENT: Negative.     Eyes: Negative.    Respiratory:  Negative for cough and shortness of breath.    Cardiovascular:  Negative for chest pain, palpitations and leg swelling.   Gastrointestinal:  Negative for abdominal  pain and vomiting.   Genitourinary: Negative.    Skin: Negative.    Neurological:  Negative for seizures and speech difficulty.   Psychiatric/Behavioral:  Negative for agitation and confusion. The patient is not nervous/anxious.    Objective:     Vital Signs (Most Recent):  Temp: 99.3 °F (37.4 °C) (04/03/23 0703)  Pulse: 103 (04/03/23 0703)  Resp: 18 (Simultaneous filing. User may not have seen previous data.) (04/03/23 0703)  BP: 139/67 (04/03/23 0703)  SpO2: 97 % (04/03/23 0703) Vital Signs (24h Range):  Temp:  [98.1 °F (36.7 °C)-99.3 °F (37.4 °C)] 99.3 °F (37.4 °C)  Pulse:  [103-111] 103  Resp:  [16-20] 18  SpO2:  [95 %-100 %] 97 %  BP: (116-139)/(63-72) 139/67     Weight: 121.1 kg (267 lb)  Body mass index is 41.82 kg/m².    Intake/Output Summary (Last 24 hours) at 4/3/2023 0842  Last data filed at 4/3/2023 0834  Gross per 24 hour   Intake 1560 ml   Output 2550 ml   Net -990 ml      Physical Exam  Vitals and nursing note reviewed.   Constitutional:       General: She is awake. She is not in acute distress.     Appearance: Normal appearance. She is well-developed and well-groomed. She is not ill-appearing, toxic-appearing or diaphoretic.   HENT:      Head: Normocephalic and atraumatic.   Eyes:      General: No scleral icterus.  Cardiovascular:      Rate and Rhythm: Normal rate.   Pulmonary:      Effort: No tachypnea or respiratory distress.   Musculoskeletal:      Right lower leg: No edema.      Left lower leg: No edema.   Skin:     Coloration: Skin is not jaundiced.   Neurological:      General: No focal deficit present.      Mental Status: She is alert and oriented to person, place, and time. Mental status is at baseline.   Psychiatric:         Attention and Perception: Attention normal.         Mood and Affect: Mood and affect normal.         Speech: Speech normal.         Behavior: Behavior normal. Behavior is cooperative.         Thought Content: Thought content normal.         Cognition and Memory:  Cognition and memory normal. Cognition is not impaired. Memory is not impaired.         Judgment: Judgment normal.       Significant Labs: All pertinent labs within the past 24 hours have been reviewed.    Significant Imaging: I have reviewed all pertinent imaging results/findings within the past 24 hours.      Assessment/Plan:      * Polyarthritis  Present since admission to [facility]. MRI noted R knee effusion. ESR, CRP elevated. Doubt gout. RF, CCP, SILVIA, acute hep panel normal. Parvovirus negative. RF normal. TSH elevated but free T4 normal, so less likely hypothyroidism. Overall this is concerning for septic arthritis affecting multiple joints as a result of bacteremia.     - MRI L shoulder: complex effusion with synovitis  - MRI R wrist: osteitis, complex effusions and fluid collections  - MRI L hip: abnormal SI joint concerning for septic arthritis   - MRI lumbar spine: R sided L2-3 facet joint arthropathy concerning for septic arthritis    - Orthopaedics consulted   - s/p R wrist and L shoulder arthrocenteses on 3/27/23   - R wrist only 2cc, cell count not performed but PMN predominant. Cultures unable to be sent   - L shoulder WBC 47K with PMN predominance, cultures NGTD but has been on antibiotics for >1 week  - Ortho consulted- plan washout bilateral knees and R wrist on 3/30/23    - Neurosurgery consulted for SI joint and L2-3 findings on MRI-- surgery not needed    - ID following, remains on CTX 2g q 24h  - Anticipate atleast 4 wks IV abx.   - PT, OT consulted as well - likely will need rehab vs SNF when ready       Streptococcal bacteremia  Blood cultures 3/13 with Strep bacteremia. Currently being treated with ceftriaxone. This may have seeded her joints causing septic arthritis. Arthrocentesis R wrist and L shoulder completed on 3/27 with elevated neutrophils. Cultures no growth to date but has been on antibiotics for over a week. ID consulted     Facet arthropathy, lumbar  Discussed with Neurosurgery.  Surgery not needed.       Subclinical hypothyroidism  Lab Results   Component Value Date    TSH 14.200 (H) 03/22/2023     FT4 within normal limits  Repeat as outpatient when acute illness resolved      Anemia of chronic disease  Anemia of chronic disease present (ferritin elevated) but did also have vaginal bleeding. TSAT 14- some degree iron deficiency    Weakness of both lower extremities  Presented with bilateral lower extremity weakness with no significant sensory deficit. She denied preceding diarrhea/GI symptoms, although she had upper respiratory tract symptoms  - Neurology consulted  - seems as though this is secondary to arthritis as being worked up above   - PT, OT consulted       Postmenopausal bleeding  Noted at OSH. TVUS with thickened endometrial stripe  - needs outpatient GYN follow up       Type 2 diabetes mellitus without complication, without long-term current use of insulin  Patient's FSGs are controlled on current medication regimen.  Last A1c reviewed-   Lab Results   Component Value Date    HGBA1C 6.4 (H) 03/22/2023     Most recent fingerstick glucose reviewed-   Recent Labs   Lab 04/02/23  1107 04/02/23  1620 04/02/23 2009 04/03/23  0702   POCTGLUCOSE 140* 149* 188* 154*     Current correctional scale  Medium  Maintain anti-hyperglycemic dose as follows-   Antihyperglycemics (From admission, onward)    Start     Stop Route Frequency Ordered    03/22/23 0900  insulin detemir U-100 pen 5 Units         -- SubQ Daily 03/22/23 0541    03/22/23 0638  insulin aspart U-100 pen 0-5 Units         -- SubQ Before meals & nightly PRN 03/22/23 0541        Hold Oral hypoglycemics while patient is in the hospital.    Stem cells transplant status  Not currently on immunosuppresants  Follow up with oncology      History of Large cell (diffuse) non-Hodgkin's lymphoma  In remission, follow up with oncology  No new enlarged lymph nodes noted         VTE Risk Mitigation (From admission, onward)         Ordered      Reason for No Pharmacological VTE Prophylaxis  Once        Question:  Reasons:  Answer:  Active Bleeding    03/21/23 2230     IP VTE HIGH RISK PATIENT  Once         03/21/23 2230     Place sequential compression device  Until discontinued         03/21/23 2230                      I have completed this tele-visit without the assistance of a telepresenter.    The attending portion of this evaluation, treatment, and documentation was performed per Valencia Keller MD via Telemedicine AudioVisual using the secure [employer] software platform with 2 way audio/video. The provider was located off-site and the patient is located in the hospital. The aforementioned video software was utilized to document the relevant history and physical exam    Valencia Keller MD  Department of Hospital Medicine   [facility]

## 2023-04-03 NOTE — ASSESSMENT & PLAN NOTE
Present since admission to [facility]. MRI noted R knee effusion. ESR, CRP elevated. Doubt gout. RF, CCP, SILVIA, acute hep panel normal. Parvovirus negative. RF normal. TSH elevated but free T4 normal, so less likely hypothyroidism. Overall this is concerning for septic arthritis affecting multiple joints as a result of bacteremia.     - MRI L shoulder: complex effusion with synovitis  - MRI R wrist: osteitis, complex effusions and fluid collections  - MRI L hip: abnormal SI joint concerning for septic arthritis   - MRI lumbar spine: R sided L2-3 facet joint arthropathy concerning for septic arthritis    - Orthopaedics consulted   - s/p R wrist and L shoulder arthrocenteses on 3/27/23   - R wrist only 2cc, cell count not performed but PMN predominant. Cultures unable to be sent   - L shoulder WBC 47K with PMN predominance, cultures NGTD but has been on antibiotics for >1 week  - Ortho consulted- plan washout bilateral knees and R wrist on 3/30/23    - Neurosurgery consulted for SI joint and L2-3 findings on MRI-- surgery not needed    - ID following, remains on CTX 2g q 24h  - Anticipate atleast 4 wks IV abx.   - PT, OT consulted as well - likely will need rehab vs SNF when ready

## 2023-04-03 NOTE — PT/OT/SLP PROGRESS
Physical Therapy Treatment    Patient Name:  Hedy Conway   MRN:  [id number]    Recommendations:     Discharge Recommendations: rehabilitation facility  Discharge Equipment Recommendations:  (TBD at next level of care)  Barriers to discharge: None    Assessment:     Hedy Conway is a 47 y.o. female admitted with a medical diagnosis of Polyarthritis.  She presents with the following impairments/functional limitations: weakness, impaired endurance, impaired self care skills, impaired functional mobility, gait instability, decreased lower extremity function, decreased upper extremity function, decreased ROM, pain, decreased safety awareness, impaired balance, edema, impaired fine motor, decreased coordination, impaired skin, impaired muscle length, impaired joint extensibility .    Rehab Prognosis: Fair+; patient would benefit from acute skilled PT services to address these deficits and reach maximum level of function.    Recent Surgery: Procedure(s) (LRB):  ARTHROSCOPY, KNEE (Bilateral)  OPEN  ARTHROTOMY WRIST (Right) 4 Days Post-Op    Plan:     During this hospitalization, patient to be seen 5 x/week to address the identified rehab impairments via gait training, therapeutic activities, therapeutic exercises and progress toward the following goals:    Plan of Care Expires:  04/07/23    Subjective     Chief Complaint: pain   Patient/Family Comments/goals: pt is agreeable to therapy with encouragement.   Pain/Comfort:  Location - Side 1: Bilateral  Pain Addressed 1: Pre-medicate for activity, Reposition, Cessation of Activity      Objective:     Communicated with nurse  prior to session.  Patient found HOB elevated with pillow to offload L side  bed alarm, PICC line, PureWick upon PT entry to room.     General Precautions: Standard, fall  Orthopedic Precautions: N/A  Braces: N/A  Respiratory Status: Room air     Functional Mobility:  Bed Mobility:   Rolling Left : mod A   Scooting: min/mod assistance to scoot  anteriorly  Supine to Sit: moderate assistance , HOB elevated, bedside rail   Transfers:     Sit to Stand: x 3 trials from elevated bed  max assistance and of 2 persons with RW .  Pt with c/o B knees pain with WB, despite max V/T cues pt unable to achieve upright posture , pt required max encouragement /education and positive reinforcements to complete tasks.   Gait : pt  unable to ambulate at this time  2* to pain  Balance:  Good in sitting, poor in standing.      AM-PAC 6 CLICK MOBILITY  Turning over in bed (including adjusting bedclothes, sheets and blankets)?: 2  Sitting down on and standing up from a chair with arms (e.g., wheelchair, bedside commode, etc.): 2  Moving from lying on back to sitting on the side of the bed?: 2  Moving to and from a bed to a chair (including a wheelchair)?: 1  Need to walk in hospital room?: 1  Climbing 3-5 steps with a railing?: 1  Basic Mobility Total Score: 9       Treatment & Education:  Lower Extremity Exercises.   Patient educated on the purpose of therapeutic exercise.    Patient verbalized acceptance/understanding of instructions, expectations, and limitations(for safety).  Patient performed: 2 sets of 10 reps (each) of B LE There Ex: AP, GS , LAQ, Hip abd/add, Hip flexion(AAROM BLE)  while sitting up on EOB.       Patient  still requires verbal cues/tactile cues to ensure correct sequence, to maintain proper form, and to allow for self-correction.    Patient left sitting edge of bed with all lines intact, call button in reach, and OT present..    GOALS:   Multidisciplinary Problems       Physical Therapy Goals          Problem: Physical Therapy    Goal Priority Disciplines Outcome Goal Variances Interventions   Physical Therapy Goal     PT, PT/OT Ongoing, Progressing     Description: Goals to be met by:  2023    Patient will increase functional independence with mobility by performin. Supine to sit with Modified [place]  2. Sit to stand transfer with  Modified [place]  3. Gait >50 feet with Modified [place] using Rolling Walker  4. BLE seated/supine LE therex x15 reps independently                              Time Tracking:     PT Received On: 04/03/23  PT Start Time: 1105     PT Stop Time: 1134  PT Total Time (min): 29 min     Billable Minutes: Therapeutic Activity 14, Therapeutic Exercise 15, and Total Time 29 MIN with OT     Treatment Type: Treatment  PT/PTA: PTA     Number of PTA visits since last PT visit: 4     04/03/2023

## 2023-04-03 NOTE — PROGRESS NOTES
Pt sitting up in bed eating dinner, tolerating well. No distress noted. Will continue to monitor.

## 2023-04-03 NOTE — SUBJECTIVE & OBJECTIVE
Interval History: No acute events. Doing well. Awaiting SNF placement. Will need 4 weeks of IV abx.     Review of Systems   Constitutional:  Negative for chills, fatigue and fever.   HENT: Negative.     Eyes: Negative.    Respiratory:  Negative for cough and shortness of breath.    Cardiovascular:  Negative for chest pain, palpitations and leg swelling.   Gastrointestinal:  Negative for abdominal pain and vomiting.   Genitourinary: Negative.    Skin: Negative.    Neurological:  Negative for seizures and speech difficulty.   Psychiatric/Behavioral:  Negative for agitation and confusion. The patient is not nervous/anxious.    Objective:     Vital Signs (Most Recent):  Temp: 99.3 °F (37.4 °C) (04/03/23 0703)  Pulse: 103 (04/03/23 0703)  Resp: 18 (Simultaneous filing. User may not have seen previous data.) (04/03/23 0703)  BP: 139/67 (04/03/23 0703)  SpO2: 97 % (04/03/23 0703) Vital Signs (24h Range):  Temp:  [98.1 °F (36.7 °C)-99.3 °F (37.4 °C)] 99.3 °F (37.4 °C)  Pulse:  [103-111] 103  Resp:  [16-20] 18  SpO2:  [95 %-100 %] 97 %  BP: (116-139)/(63-72) 139/67     Weight: 121.1 kg (267 lb)  Body mass index is 41.82 kg/m².    Intake/Output Summary (Last 24 hours) at 4/3/2023 0842  Last data filed at 4/3/2023 0834  Gross per 24 hour   Intake 1560 ml   Output 2550 ml   Net -990 ml      Physical Exam  Vitals and nursing note reviewed.   Constitutional:       General: She is awake. She is not in acute distress.     Appearance: Normal appearance. She is well-developed and well-groomed. She is not ill-appearing, toxic-appearing or diaphoretic.   HENT:      Head: Normocephalic and atraumatic.   Eyes:      General: No scleral icterus.  Cardiovascular:      Rate and Rhythm: Normal rate.   Pulmonary:      Effort: No tachypnea or respiratory distress.   Musculoskeletal:      Right lower leg: No edema.      Left lower leg: No edema.   Skin:     Coloration: Skin is not jaundiced.   Neurological:      General: No focal deficit  present.      Mental Status: She is alert and oriented to person, place, and time. Mental status is at baseline.   Psychiatric:         Attention and Perception: Attention normal.         Mood and Affect: Mood and affect normal.         Speech: Speech normal.         Behavior: Behavior normal. Behavior is cooperative.         Thought Content: Thought content normal.         Cognition and Memory: Cognition and memory normal. Cognition is not impaired. Memory is not impaired.         Judgment: Judgment normal.       Significant Labs: All pertinent labs within the past 24 hours have been reviewed.    Significant Imaging: I have reviewed all pertinent imaging results/findings within the past 24 hours.

## 2023-04-03 NOTE — PLAN OF CARE
Plan for discharge to [facility]. Spoke with Enzo, with [facility] ([phone])stated will confirm ins auth has been received and willing to admit pt on tomorrow.     3:45pm Patient covid+. Informed Enzo with [facility]. Per Enzo, patient will require 10 days isolation prior to admit to [facility]. Ins auth pending, NP and physician notified.     Additional referral sent to [facility]. Per Karie with [facility], will require 5 days, maybe less if patient fever free, will review and confirm.     Per Angela with [facility], will review, if medically accepted would be admitted on day 6 following covid +. [place] to follow up.   04/03/23 0853   Discharge Reassessment   Assessment Type Discharge Planning Reassessment   Did the patient's condition or plan change since previous assessment? No   Communicated JESSE with patient/caregiver Yes   Discharge Plan A Rehab   Discharge Plan B Home with family   DME Needed Upon Discharge  none   Discharge Barriers Identified None   Why the patient remains in the hospital Requires continued medical care   Post-Acute Status   Post-Acute Authorization Placement   Post-Acute Placement Status Pending payor review/awaiting authorization (if required)   Coverage [employer]   Discharge Delays None known at this time

## 2023-04-04 PROBLEM — U07.1 COVID-19 VIRUS DETECTED: Status: ACTIVE | Noted: 2023-04-04

## 2023-04-04 LAB
BACTERIA SPEC AEROBE CULT: NO GROWTH
BACTERIA SPEC ANAEROBE CULT: NORMAL
POCT GLUCOSE: 127 MG/DL (ref 70–110)
POCT GLUCOSE: 128 MG/DL (ref 70–110)
POCT GLUCOSE: 156 MG/DL (ref 70–110)
POCT GLUCOSE: 159 MG/DL (ref 70–110)

## 2023-04-04 PROCEDURE — 63600175 PHARM REV CODE 636 W HCPCS: Performed by: ORTHOPAEDIC SURGERY

## 2023-04-04 PROCEDURE — 97110 THERAPEUTIC EXERCISES: CPT | Mod: CQ

## 2023-04-04 PROCEDURE — A4216 STERILE WATER/SALINE, 10 ML: HCPCS | Performed by: ORTHOPAEDIC SURGERY

## 2023-04-04 PROCEDURE — 97530 THERAPEUTIC ACTIVITIES: CPT | Mod: CQ

## 2023-04-04 PROCEDURE — 25000003 PHARM REV CODE 250: Performed by: STUDENT IN AN ORGANIZED HEALTH CARE EDUCATION/TRAINING PROGRAM

## 2023-04-04 PROCEDURE — 25000003 PHARM REV CODE 250: Performed by: HOSPITALIST

## 2023-04-04 PROCEDURE — 97530 THERAPEUTIC ACTIVITIES: CPT

## 2023-04-04 PROCEDURE — 11000001 HC ACUTE MED/SURG PRIVATE ROOM

## 2023-04-04 PROCEDURE — 25000003 PHARM REV CODE 250: Performed by: ORTHOPAEDIC SURGERY

## 2023-04-04 PROCEDURE — 97110 THERAPEUTIC EXERCISES: CPT

## 2023-04-04 PROCEDURE — 27000207 HC ISOLATION

## 2023-04-04 RX ADMIN — Medication 10 ML: at 01:04

## 2023-04-04 RX ADMIN — POLYETHYLENE GLYCOL 3350 17 G: 17 POWDER, FOR SOLUTION ORAL at 08:04

## 2023-04-04 RX ADMIN — MELATONIN TAB 3 MG 6 MG: 3 TAB at 09:04

## 2023-04-04 RX ADMIN — Medication 10 ML: at 06:04

## 2023-04-04 RX ADMIN — OXYCODONE HYDROCHLORIDE 5 MG: 5 TABLET ORAL at 08:04

## 2023-04-04 RX ADMIN — OXYCODONE HYDROCHLORIDE 5 MG: 5 TABLET ORAL at 03:04

## 2023-04-04 RX ADMIN — MUPIROCIN: 20 OINTMENT TOPICAL at 08:04

## 2023-04-04 RX ADMIN — Medication 10 ML: at 12:04

## 2023-04-04 RX ADMIN — Medication 10 ML: at 05:04

## 2023-04-04 RX ADMIN — CYCLOBENZAPRINE HYDROCHLORIDE 5 MG: 5 TABLET, FILM COATED ORAL at 05:04

## 2023-04-04 RX ADMIN — MUPIROCIN: 20 OINTMENT TOPICAL at 09:04

## 2023-04-04 RX ADMIN — OXYCODONE HYDROCHLORIDE 5 MG: 5 TABLET ORAL at 09:04

## 2023-04-04 RX ADMIN — OXYCODONE HYDROCHLORIDE 5 MG: 5 TABLET ORAL at 01:04

## 2023-04-04 RX ADMIN — OXYCODONE HYDROCHLORIDE 5 MG: 5 TABLET ORAL at 05:04

## 2023-04-04 RX ADMIN — INSULIN DETEMIR 5 UNITS: 100 INJECTION, SOLUTION SUBCUTANEOUS at 09:04

## 2023-04-04 RX ADMIN — CEFTRIAXONE 2 G: 2 INJECTION, SOLUTION INTRAVENOUS at 09:04

## 2023-04-04 NOTE — PROGRESS NOTES
[facility] Medicine  Telemedicine Progress Note    Patient Name: Hedy Conway  MRN: [id number]  Patient Class: IP- Inpatient   Admission Date: 3/21/2023  Length of Stay: 14 days  Attending Physician: Valencia Cao MD  Primary Care Provider: John Lantigua PA-C          Subjective:     Principal Problem:Polyarthritis        HPI:  47-year-old  female with medical history significant for type 2 diabetes mellitus, hypertension, large cell diffuse non-Hodgkin lymphoma status post chemotherapy and stem cell transplant 2011 was transferred from outside hospital with suspicion of septic arthritis.  Of note she voiced migratory pain in her left-right knee-both shoulders that started 2 weeks ago, associated with generalized weakness, cleansing inability to move right lower extremity and only have movement without gravity on the left lower extremity without associated trauma, she voiced being on 3 different antibiotics at the outside hospital for both pneumonia and urinary tract infection, and this could explain for been afebrile although she voiced subjective fever at the onset of her symptoms she denied cough, shortness of breath, orthopnea, paroxysmal nocturnal dyspnea or pedal swelling.  She denied prior diarrhea although she voiced upper respiratory tract symptoms,treatment for pneumonia prior to onset of these symptoms.  She denied any urinary symptoms at this time of dysuria, frequency, urgency, straining at micturition or hematuria.  No recent travel, no sick contacts, no tick bite.  She had been sent here for possible knee aspiration.      Overview/Hospital Course:  Ms Hedy Conway who was transferred to [facility] from [facility]. There she was admitted for multiple joint pains. MRI R knee showed meniscal tear and large effusion with synovitis. There was concern for septic arthritis, aspiration was attempted, but no fluid retrieved. She also had Strep pneumoniae  bacteremia (3/13) and Pseudomonas pneumonia (3/15) and is currently being treated with cefepime. She is also receiving vancomycin with concerns for septic joint. She did require 1U RBC transfusion and has had vaginal bleeding. Pelvic US 3/20 showed thickened endometrium, and she will need outpatient GYN follow up. Her iron panel indicates mixed iron deficiency and anemia of chronic disease. She saw Neurology and Orthopaedics. Orthopaedics doubts multiple septic joints simultaneously and suspects Rheumatologic condition. S/p R knee arthrocentesis on 3/23 with culture no growth. Sed rate, CRP remain elevated. Uric acid normal, RF/CCP/SILVIA/hepatitis panel normal. MRI left shoulder with large complex joint effusion with synovitis, high grade partial thickness tear of supraspinatus and infraspinatus, near complete tear of intraarticular bicpet tendon with tenosynovitis. MRI right wrist with distal ulna/radius/carpal osteitis, complex effusions, complex fluid collections, tenosynovitis. S/p arthrocentesis of both L shoulder and R wrist by IR on 3/28- both have high neutrophil count, suggesting septic arthritis. No growth on cultures, but she has been on antibiotics for >1 week. MRI hip shows abnormal L SI joint concerning for septic arthritis. MRI lumbar spine shows R L2-3 facet arthropathy with enhancement. Ortho planning washout on 3/30/23. Neurosurgery also consulted for lumbar spine findings; no surgery needed.     Continue ceftriaxone 2g q 24h, anticipate atleast 4 wks IV abx.  Medically Ready, awaiting REHAB placement.      Interval History: No acute events. Doing well. Awaiting SNF placement. Will need 4 weeks of IV abx.     Review of Systems   Constitutional:  Negative for chills, fatigue and fever.   HENT: Negative.     Eyes: Negative.    Respiratory:  Negative for cough and shortness of breath.    Cardiovascular:  Negative for chest pain, palpitations and leg swelling.   Gastrointestinal:  Negative for abdominal  pain and vomiting.   Genitourinary: Negative.    Skin: Negative.    Neurological:  Negative for seizures and speech difficulty.   Psychiatric/Behavioral:  Negative for agitation and confusion. The patient is not nervous/anxious.    Objective:     Vital Signs (Most Recent):  Temp: 98.4 °F (36.9 °C) (04/04/23 0738)  Pulse: 99 (04/04/23 0738)  Resp: 18 (04/04/23 0841)  BP: 119/76 (04/04/23 0738)  SpO2: 96 % (04/04/23 0738) Vital Signs (24h Range):  Temp:  [97.8 °F (36.6 °C)-99 °F (37.2 °C)] 98.4 °F (36.9 °C)  Pulse:  [] 99  Resp:  [16-20] 18  SpO2:  [95 %-99 %] 96 %  BP: (102-139)/(57-76) 119/76     Weight: 121.1 kg (267 lb)  Body mass index is 41.82 kg/m².    Intake/Output Summary (Last 24 hours) at 4/4/2023 0847  Last data filed at 4/4/2023 0624  Gross per 24 hour   Intake 1560 ml   Output 3100 ml   Net -1540 ml        Physical Exam  Vitals and nursing note reviewed.   Constitutional:       General: She is awake. She is not in acute distress.     Appearance: Normal appearance. She is well-developed and well-groomed. She is not ill-appearing, toxic-appearing or diaphoretic.   HENT:      Head: Normocephalic and atraumatic.   Eyes:      General: No scleral icterus.  Cardiovascular:      Rate and Rhythm: Normal rate.   Pulmonary:      Effort: No tachypnea or respiratory distress.   Musculoskeletal:      Right lower leg: No edema.      Left lower leg: No edema.   Skin:     Coloration: Skin is not jaundiced.   Neurological:      General: No focal deficit present.      Mental Status: She is alert and oriented to person, place, and time. Mental status is at baseline.   Psychiatric:         Attention and Perception: Attention normal.         Mood and Affect: Mood and affect normal.         Speech: Speech normal.         Behavior: Behavior normal. Behavior is cooperative.         Thought Content: Thought content normal.         Cognition and Memory: Cognition and memory normal. Cognition is not impaired. Memory is not  impaired.         Judgment: Judgment normal.       Significant Labs: All pertinent labs within the past 24 hours have been reviewed.    Significant Imaging: I have reviewed all pertinent imaging results/findings within the past 24 hours.      Assessment/Plan:      * Polyarthritis  Present since admission to [facility]. MRI noted R knee effusion. ESR, CRP elevated. Doubt gout. RF, CCP, SILVIA, acute hep panel normal. Parvovirus negative. RF normal. TSH elevated but free T4 normal, so less likely hypothyroidism. Overall this is concerning for septic arthritis affecting multiple joints as a result of bacteremia.     - MRI L shoulder: complex effusion with synovitis  - MRI R wrist: osteitis, complex effusions and fluid collections  - MRI L hip: abnormal SI joint concerning for septic arthritis   - MRI lumbar spine: R sided L2-3 facet joint arthropathy concerning for septic arthritis    - Orthopaedics consulted   - s/p R wrist and L shoulder arthrocenteses on 3/27/23   - R wrist only 2cc, cell count not performed but PMN predominant. Cultures unable to be sent   - L shoulder WBC 47K with PMN predominance, cultures NGTD but has been on antibiotics for >1 week  - Ortho consulted- plan washout bilateral knees and R wrist on 3/30/23    - Neurosurgery consulted for SI joint and L2-3 findings on MRI-- surgery not needed    - ID following, remains on CTX 2g q 24h  - Anticipate atleast 4 wks IV abx.   - Estimated end date of IV antibiotics: 4/27/23  - PT, OT consulted as well - likely will need rehab vs SNF when ready       Streptococcal bacteremia  Blood cultures 3/13 with Strep bacteremia. Currently being treated with ceftriaxone. This may have seeded her joints causing septic arthritis. Arthrocentesis R wrist and L shoulder completed on 3/27 with elevated neutrophils. Cultures no growth to date but has been on antibiotics for over a week. ID consulted     COVID-19 virus detected  Patient is asymptomatic  Cont isolation       Facet  arthropathy, lumbar  Discussed with Neurosurgery. Surgery not needed.       Subclinical hypothyroidism  Lab Results   Component Value Date    TSH 14.200 (H) 03/22/2023     FT4 within normal limits  Repeat as outpatient when acute illness resolved      Anemia of chronic disease  Anemia of chronic disease present (ferritin elevated) but did also have vaginal bleeding. TSAT 14- some degree iron deficiency    Weakness of both lower extremities  Presented with bilateral lower extremity weakness with no significant sensory deficit. She denied preceding diarrhea/GI symptoms, although she had upper respiratory tract symptoms  - Neurology consulted  - seems as though this is secondary to arthritis as being worked up above   - PT, OT consulted       Postmenopausal bleeding  Noted at OSH. TVUS with thickened endometrial stripe  - needs outpatient GYN follow up       Type 2 diabetes mellitus without complication, without long-term current use of insulin  Patient's FSGs are controlled on current medication regimen.  Last A1c reviewed-   Lab Results   Component Value Date    HGBA1C 6.4 (H) 03/22/2023     Most recent fingerstick glucose reviewed-   Recent Labs   Lab 04/03/23  1103 04/03/23  1555 04/03/23 2006 04/04/23  0739   POCTGLUCOSE 131* 138* 188* 128*     Current correctional scale  Medium  Maintain anti-hyperglycemic dose as follows-   Antihyperglycemics (From admission, onward)    Start     Stop Route Frequency Ordered    03/22/23 0900  insulin detemir U-100 pen 5 Units         -- SubQ Daily 03/22/23 0541    03/22/23 0638  insulin aspart U-100 pen 0-5 Units         -- SubQ Before meals & nightly PRN 03/22/23 0541        Hold Oral hypoglycemics while patient is in the hospital.    Stem cells transplant status  Not currently on immunosuppresants  Follow up with oncology      History of Large cell (diffuse) non-Hodgkin's lymphoma  In remission, follow up with oncology  No new enlarged lymph nodes noted         VTE Risk  Mitigation (From admission, onward)         Ordered     Reason for No Pharmacological VTE Prophylaxis  Once        Question:  Reasons:  Answer:  Active Bleeding    03/21/23 2230     IP VTE HIGH RISK PATIENT  Once         03/21/23 2230     Place sequential compression device  Until discontinued         03/21/23 2230                      I have completed this tele-visit without the assistance of a telepresenter.    The attending portion of this evaluation, treatment, and documentation was performed per Valencia Keller MD via Telemedicine AudioVisual using the secure [employer] software platform with 2 way audio/video. The provider was located off-site and the patient is located in the hospital. The aforementioned video software was utilized to document the relevant history and physical exam    Valencia Keller MD  Department of Hospital Medicine   [facility]

## 2023-04-04 NOTE — PLAN OF CARE
Plan A for discharge to [facility]. Patient covid screen positive on 4/3/23, will requires 10 days isolation prior to discharge.     Plan B discharge for [facility], pending review for medically acceptance. Pt would require 5 days isolation, with admit on day 6 if facility can medically meet pt needs. Pt will require ins auth prior to discharge.       Placed call to patient to update on discharge planning, left detailed voice message.     4:00pm Spoke with patient regarding discharge planning. Informed of isolation requirement for [facility]. Pt agreeable to [facility] since willing to accept on day 6 vs day 10 of isolation.     Per Angela with [facility] willing to submit for insurance auth and goal to admit on day 6 following isolation. [place] to continue to follow for dc needs.

## 2023-04-04 NOTE — ASSESSMENT & PLAN NOTE
Present since admission to [facility]. MRI noted R knee effusion. ESR, CRP elevated. Doubt gout. RF, CCP, SILVIA, acute hep panel normal. Parvovirus negative. RF normal. TSH elevated but free T4 normal, so less likely hypothyroidism. Overall this is concerning for septic arthritis affecting multiple joints as a result of bacteremia.     - MRI L shoulder: complex effusion with synovitis  - MRI R wrist: osteitis, complex effusions and fluid collections  - MRI L hip: abnormal SI joint concerning for septic arthritis   - MRI lumbar spine: R sided L2-3 facet joint arthropathy concerning for septic arthritis    - Orthopaedics consulted   - s/p R wrist and L shoulder arthrocenteses on 3/27/23   - R wrist only 2cc, cell count not performed but PMN predominant. Cultures unable to be sent   - L shoulder WBC 47K with PMN predominance, cultures NGTD but has been on antibiotics for >1 week  - Ortho consulted- plan washout bilateral knees and R wrist on 3/30/23    - Neurosurgery consulted for SI joint and L2-3 findings on MRI-- surgery not needed    - ID following, remains on CTX 2g q 24h  - Anticipate atleast 4 wks IV abx.   - Estimated end date of IV antibiotics: 4/27/23  - PT, OT consulted as well - likely will need rehab vs SNF when ready

## 2023-04-04 NOTE — ASSESSMENT & PLAN NOTE
Patient's FSGs are controlled on current medication regimen.  Last A1c reviewed-   Lab Results   Component Value Date    HGBA1C 6.4 (H) 03/22/2023     Most recent fingerstick glucose reviewed-   Recent Labs   Lab 04/03/23  1103 04/03/23  1555 04/03/23 2006 04/04/23  0739   POCTGLUCOSE 131* 138* 188* 128*     Current correctional scale  Medium  Maintain anti-hyperglycemic dose as follows-   Antihyperglycemics (From admission, onward)    Start     Stop Route Frequency Ordered    03/22/23 0900  insulin detemir U-100 pen 5 Units         -- SubQ Daily 03/22/23 0541    03/22/23 0638  insulin aspart U-100 pen 0-5 Units         -- SubQ Before meals & nightly PRN 03/22/23 0541        Hold Oral hypoglycemics while patient is in the hospital.

## 2023-04-04 NOTE — NURSING
[facility]  Nurses Note -- 4 Eyes      4/4/2023       Skin assessed on: Q Shift      [x] No Pressure Injuries Present    []Prevention Measures Documented    [] Yes LDA  for Pressure Injury Previously documented     [] Yes New Pressure Injury Discovered   [] LDA for New Pressure Injury Added      Attending RN:  Ariadne Burrell RN     Second RN:  Kiya MIDDLETON

## 2023-04-04 NOTE — PLAN OF CARE
Problem: Occupational Therapy  Goal: Occupational Therapy Goal  Description: Goals to be met by 04/07/23:    Patient will increase functional independence with ADLs by performing:    Grooming while seated with Modified [place].  Toileting from bedside commode with Moderate Assistance for hygiene and clothing management.   Sit to stand w/ Minimum Assistance.   Step transfer with Moderate Assistance  Toilet transfer to bedside commode with Moderate Assistance.  Upper extremity exercise program x15 reps per handout, with assistance as needed.      Outcome: Ongoing, Progressing

## 2023-04-04 NOTE — SUBJECTIVE & OBJECTIVE
Interval History: No acute events. Doing well. Awaiting SNF placement. Will need 4 weeks of IV abx.     Review of Systems   Constitutional:  Negative for chills, fatigue and fever.   HENT: Negative.     Eyes: Negative.    Respiratory:  Negative for cough and shortness of breath.    Cardiovascular:  Negative for chest pain, palpitations and leg swelling.   Gastrointestinal:  Negative for abdominal pain and vomiting.   Genitourinary: Negative.    Skin: Negative.    Neurological:  Negative for seizures and speech difficulty.   Psychiatric/Behavioral:  Negative for agitation and confusion. The patient is not nervous/anxious.    Objective:     Vital Signs (Most Recent):  Temp: 98.4 °F (36.9 °C) (04/04/23 0738)  Pulse: 99 (04/04/23 0738)  Resp: 18 (04/04/23 0841)  BP: 119/76 (04/04/23 0738)  SpO2: 96 % (04/04/23 0738) Vital Signs (24h Range):  Temp:  [97.8 °F (36.6 °C)-99 °F (37.2 °C)] 98.4 °F (36.9 °C)  Pulse:  [] 99  Resp:  [16-20] 18  SpO2:  [95 %-99 %] 96 %  BP: (102-139)/(57-76) 119/76     Weight: 121.1 kg (267 lb)  Body mass index is 41.82 kg/m².    Intake/Output Summary (Last 24 hours) at 4/4/2023 0847  Last data filed at 4/4/2023 0624  Gross per 24 hour   Intake 1560 ml   Output 3100 ml   Net -1540 ml        Physical Exam  Vitals and nursing note reviewed.   Constitutional:       General: She is awake. She is not in acute distress.     Appearance: Normal appearance. She is well-developed and well-groomed. She is not ill-appearing, toxic-appearing or diaphoretic.   HENT:      Head: Normocephalic and atraumatic.   Eyes:      General: No scleral icterus.  Cardiovascular:      Rate and Rhythm: Normal rate.   Pulmonary:      Effort: No tachypnea or respiratory distress.   Musculoskeletal:      Right lower leg: No edema.      Left lower leg: No edema.   Skin:     Coloration: Skin is not jaundiced.   Neurological:      General: No focal deficit present.      Mental Status: She is alert and oriented to person,  place, and time. Mental status is at baseline.   Psychiatric:         Attention and Perception: Attention normal.         Mood and Affect: Mood and affect normal.         Speech: Speech normal.         Behavior: Behavior normal. Behavior is cooperative.         Thought Content: Thought content normal.         Cognition and Memory: Cognition and memory normal. Cognition is not impaired. Memory is not impaired.         Judgment: Judgment normal.       Significant Labs: All pertinent labs within the past 24 hours have been reviewed.    Significant Imaging: I have reviewed all pertinent imaging results/findings within the past 24 hours.

## 2023-04-04 NOTE — PLAN OF CARE
Problem: Diabetes Comorbidity  Goal: Blood Glucose Level Within Targeted Range  Outcome: Ongoing, Progressing     Problem: Adjustment to Illness (Sepsis/Septic Shock)  Goal: Optimal Coping  Outcome: Ongoing, Progressing     Problem: Bleeding (Sepsis/Septic Shock)  Goal: Absence of Bleeding  Outcome: Ongoing, Progressing       Patient remained free from falls/injury throughout shift.   No acute changes in status.  Bed locked in lowest position.  Side rails up x2.  Call bell within reach.  Purposeful rounding maintained throughout shift.

## 2023-04-04 NOTE — PT/OT/SLP PROGRESS
Occupational Therapy   Treatment    Name: Hedy Conway  MRN: [id number]  Admitting Diagnosis:  Polyarthritis  5 Days Post-Op    Recommendations:     Discharge Recommendations: rehabilitation facility  Discharge Equipment Recommendations:   (TBD at I[facility].)  Barriers to discharge:   (Pt significantly limited by pain and weaknessm requiring x 2 person assist for standing and is unable to ambualte at this time; pt will benefit from multidisciplinary approach in an I[facility] setting for returning to PLOF as she was (I) and working PTA.)    Assessment:     Hedy Conway is a 47 y.o. female with a medical diagnosis of Polyarthritis.  Performance deficits affecting function are weakness, impaired endurance, impaired functional mobility, impaired self care skills, impaired balance, decreased upper extremity function, decreased lower extremity function, decreased coordination, decreased safety awareness, decreased ROM, pain, edema.     Pt pleasant and willing to participate in tx session this date.  Pt was able to perform sit>stand trials (x4), requiring max A x 2 as compared to previous session. Pt w/ pain but tolerated fairly with rest breaks as needed. Pt is making gradual progress towards goals and will continue to benefit from skilled acute OT services to maximize functional capacity for safe performance w/ ADLs and functional mobility.      PTA, pt was (I) w/ all ADLs and functional mobility; pt will benefit from an aggressive multidisciplinary approach in an I[facility] setting for returning to PLOF. Despite pain and functional limitations, pt is motivated and     Rehab Prognosis:  Good; patient would benefit from acute skilled OT services to address these deficits and reach maximum level of function.       Plan:     Patient to be seen 5 x/week to address the above listed problems via self-care/home management, therapeutic activities, therapeutic exercises  Plan of Care Expires: 04/07/23  Plan of Care Reviewed with:  "patient    Subjective     Chief Complaint: Pain   Patient/Family Comments/goals: "I do want to walk it just hurts."   Pain/Comfort:   Pain in B knees w/ standing     Objective:     Communicated with: Nurse prior to session.  Patient found HOB elevated with bed alarm, PICC line, PureWick upon OT entry to room.    General Precautions: Standard, fall    Orthopedic Precautions:RLE weight bearing as tolerated, LLE weight bearing as tolerated (per ortho)  Braces: N/A  Respiratory Status: Room air     Occupational Performance:     Bed Mobility:    Patient completed Rolling/Turning to Left with  moderate-maximum assistance  Patient completed Scooting to EOB with moderate assistance  Patient completed Supine to Sit with moderate assistance      Functional Mobility/Transfers:  Patient completed Sit <> Stand Transfer x 4 trials from EOB with maximal assistance and of 2 persons  with  rolling walker   Functional Mobility: Unable to take steps at this time.    Activities of Daily Living:  Upper Body Dressing: minimum assistance for donning gown over back   Lower Body Dressing: dependence for donning socks for BLEs     AMPA 6 Click ADL:  15    Treatment & Education:  -Pt performed ADLs and functional mobility as noted above.   -Pt performed the following BUE AROM for 1 set x 15 reps:    -shoulder flexion   -elbow flex/ext     -forward punches    -shoulder horizontal reaches    -forward circles    -reverse circles   -Questions and concerns addressed within scope.     Patient left HOB elevated with all lines intact and call button in reach    GOALS:   Multidisciplinary Problems       Occupational Therapy Goals          Problem: Occupational Therapy    Goal Priority Disciplines Outcome Interventions   Occupational Therapy Goal     OT, PT/OT Ongoing, Progressing    Description: Goals to be met by 04/07/23:    Patient will increase functional independence with ADLs by performing:    Grooming while seated with Modified " [place].  Toileting from bedside commode with Moderate Assistance for hygiene and clothing management.   Sit to stand w/ Minimum Assistance.   Step transfer with Moderate Assistance  Toilet transfer to bedside commode with Moderate Assistance.  Upper extremity exercise program x15 reps per handout, with assistance as needed.                           Time Tracking:     OT Date of Treatment: 04/04/23  OT Start Time: 1520  OT Stop Time: 1543  OT Total Time (min): 23 min    Billable Minutes:Therapeutic Activity 13  Therapeutic Exercise 10  Total Time 23 (co-tx w/ PT)     OT/CHANDLER: OT     Number of CHANDLER visits since last OT visit: 1 4/4/2023

## 2023-04-04 NOTE — PROGRESS NOTES
The patient is in bed.  She reports she is doing okay.    Temp:  [97.8 °F (36.6 °C)-99.3 °F (37.4 °C)] 97.8 °F (36.6 °C)  Pulse:  [103-113] 113  Resp:  [16-20] 20  SpO2:  [95 %-99 %] 96 %  BP: (118-139)/(60-71) 128/71    Physical examination:    Right wrist incision is clean and dry.  There is mild swelling present.  She is moving her fingers.  Both knees revealed clean dressings without discharge.      No results for input(s): WBC, RBC, HGB, HCT, PLT, MCV, MCH, MCHC in the last 24 hours.      Current Facility-Administered Medications:     acetaminophen tablet 650 mg, 650 mg, Oral, Q6H PRN, Emilio Lakhani MD    aluminum-magnesium hydroxide-simethicone 200-200-20 mg/5 mL suspension 30 mL, 30 mL, Oral, QID PRN, Kenny Robertson Jr., PHILLY    cefTRIAXone 2 gram/50 mL IVPB 2 g, 2 g, Intravenous, Q24H, Rudolph Murrell MD, Stopped at 04/03/23 0953    cyclobenzaprine tablet 5 mg, 5 mg, Oral, TID PRN, Rudolph Murrell MD, 5 mg at 04/03/23 0703    dextrose 10% bolus 125 mL 125 mL, 12.5 g, Intravenous, PRN, Kenny Robertson Jr., NP    dextrose 10% bolus 125 mL 125 mL, 12.5 g, Intravenous, PRN, Rudolph Murrell MD    dextrose 10% bolus 250 mL 250 mL, 25 g, Intravenous, PRN, Kenny Robertson Jr., NP    dextrose 10% bolus 250 mL 250 mL, 25 g, Intravenous, PRN, Rudolph Murrell MD    dextrose 40 % gel 15,000 mg, 15 g, Oral, PRN, Kenny Robertson Jr., NP    dextrose 40 % gel 30,000 mg, 30 g, Oral, PRN, Kenny Robertson Jr., NP    glucagon (human recombinant) injection 1 mg, 1 mg, Intramuscular, PRN, Kenny Robertson Jr., NP    glucagon (human recombinant) injection 1 mg, 1 mg, Intramuscular, PRN, Rudolph Murrell MD    insulin aspart U-100 pen 0-5 Units, 0-5 Units, Subcutaneous, QID (AC + HS) PRN, Rudolph Murrell MD, 1 Units at 03/30/23 2245    insulin detemir U-100 pen 5 Units, 5 Units, Subcutaneous, Daily, Rudolph Murrell MD, 5 Units at 04/03/23 0918    melatonin tablet 6 mg, 6 mg, Oral, Nightly PRN, Kenny ARTHUR  Marcelo Rodriguez, NP, 6 mg at 04/02/23 2137    mupirocin 2 % ointment, , Nasal, BID, Alan Murrieta MD, Given at 04/02/23 2137    naloxone 0.4 mg/mL injection 0.02 mg, 0.02 mg, Intravenous, PRN, Kenny Robertson Jr., NP    ondansetron injection 4 mg, 4 mg, Intravenous, Q8H PRN, Kenny Robertson Jr., PHILLY    oxyCODONE immediate release tablet 5 mg, 5 mg, Oral, Q4H PRN, Rudolph Murrell MD, 5 mg at 04/03/23 1700    polyethylene glycol packet 17 g, 17 g, Oral, Daily, Kenny Robertson Jr., NP, 17 g at 03/26/23 1345    potassium bicarbonate disintegrating tablet 35 mEq, 35 mEq, Oral, PRN, Rudolph Murrell MD    prochlorperazine injection Soln 5 mg, 5 mg, Intravenous, Q6H PRN, Kenny Robertson Jr., NP    simethicone chewable tablet 80 mg, 1 tablet, Oral, QID PRN, Kenny Robertson Jr., NP    Flushing PICC Protocol, , , Until Discontinued **AND** sodium chloride 0.9% flush 10 mL, 10 mL, Intravenous, Q6H, 10 mL at 04/03/23 1701 **AND** sodium chloride 0.9% flush 10 mL, 10 mL, Intravenous, PRN, Rudolph Murrell MD, 10 mL at 03/31/23 1148    Assessment and Plan:    41-year-old female status post I&D of both knees and right wrist for possible septic arthritis.  Cultures are negative so far.    Antibiotics currently by IV.     Rachel Thompson MD  [facility]  [phone]  This note has been transcribed with voice recognition software, but not reviewed and may contain unrecognized errors.

## 2023-04-04 NOTE — PT/OT/SLP PROGRESS
Physical Therapy Treatment    Patient Name:  Hedy Conway   MRN:  [id number]    Recommendations:     Discharge Recommendations: rehabilitation facility  Discharge Equipment Recommendations:  (TBD at next level of care)  Barriers to discharge: None    Assessment:     Hedy Conway is a 47 y.o. female admitted with a medical diagnosis of Polyarthritis.  She presents with the following impairments/functional limitations: weakness, impaired endurance, impaired functional mobility, impaired self care skills, gait instability, decreased lower extremity function, decreased upper extremity function, pain, impaired balance, decreased safety awareness, decreased ROM, edema, impaired cardiopulmonary response to activity, orthopedic precautions .    Rehab Prognosis: Fair; patient would benefit from acute skilled PT services to address these deficits and reach maximum level of function.    Recent Surgery: Procedure(s) (LRB):  ARTHROSCOPY, KNEE (Bilateral)  OPEN  ARTHROTOMY WRIST (Right) 5 Days Post-Op    Plan:     During this hospitalization, patient to be seen 5 x/week to address the identified rehab impairments via gait training, therapeutic activities, therapeutic exercises and progress toward the following goals:    Plan of Care Expires:  04/07/23    Subjective     Chief Complaint: pain with movements   Patient/Family Comments/goals: pt is agreeable to therapy   Pain/Comfort:  Location - Side 1: Bilateral  Location 1: knee  Pain Addressed 1: Pre-medicate for activity, Cessation of Activity, Reposition, Distraction, Nurse notified      Objective:     Communicated with nurse  prior to session.  Patient found HOB elevated with telemetry, PICC line, bed alarm, PureWick upon PT entry to room.     General Precautions: Standard, fall  Orthopedic Precautions: RLE weight bearing as tolerated, LLE weight bearing as tolerated  Braces: N/A  Respiratory Status: Room air     Functional Mobility: pt with slow movements, required  extra time and frequent rest breaks 2* to pain .  Bed Mobility:     Scooting: Max assistance to scoot anteriorly    Supine to Sit: mod assistance , HOB elevated, bedside rail   Transfers:     Sit to Stand: x 4 trials from elevated bed  maximal assistance and of 2 persons with rolling walker. Pt only able to clear buttock off the bed . Pt with c/o B knees pain with WB, despite max V/T cues , pt unable to achieve  upright posture .   Balance:  Good in sitting, poor in standing.      AM-PAC 6 CLICK MOBILITY  Turning over in bed (including adjusting bedclothes, sheets and blankets)?: 2  Sitting down on and standing up from a chair with arms (e.g., wheelchair, bedside commode, etc.): 2  Moving from lying on back to sitting on the side of the bed?: 2  Moving to and from a bed to a chair (including a wheelchair)?: 1  Need to walk in hospital room?: 1  Climbing 3-5 steps with a railing?: 1  Basic Mobility Total Score: 9       Treatment & Education:  Lower Extremity Exercises.   Patient educated on the purpose of therapeutic exercise.    Patient verbalized acceptance/understanding of instructions, expectations, and limitations(for safety).  Patient performed: 2 sets of 10 reps (each) of B LE There Ex: AP, LAQ (AAROM) , Hip abd/add, Hip flexion while sitting up on EOB.       Patient  still requires verbal cues/tactile cues to ensure correct sequence, to maintain proper form, and to allow for self-correction.  Pt educated not to place pillow under B knees and place under ankles instead to encourage knee extension , pt needs reinforcements. Max encouraged pt to perform BLE AP, QS, GS , HS when in bed. Pt verbalized understanding.   Patient left sitting edge of bed with all lines intact, call button in reach, and PCT notified..    GOALS:   Multidisciplinary Problems       Physical Therapy Goals          Problem: Physical Therapy    Goal Priority Disciplines Outcome Goal Variances Interventions   Physical Therapy Goal     PT,  PT/OT Ongoing, Progressing     Description: Goals to be met by:  2023    Patient will increase functional independence with mobility by performin. Supine to sit with Modified [place]  2. Sit to stand transfer with Modified [place]  3. Gait >50 feet with Modified [place] using Rolling Walker  4. BLE seated/supine LE therex x15 reps independently                              Time Tracking:     PT Received On: 23  PT Start Time: 1520     PT Stop Time: 1544  PT Total Time (min): 24 min     Billable Minutes: Therapeutic Activity 12 and Therapeutic Exercise 12 with OT     Treatment Type: Treatment  PT/PTA: PTA     Number of PTA visits since last PT visit: 5     2023

## 2023-04-05 LAB
POCT GLUCOSE: 120 MG/DL (ref 70–110)
POCT GLUCOSE: 141 MG/DL (ref 70–110)
POCT GLUCOSE: 164 MG/DL (ref 70–110)
POCT GLUCOSE: 169 MG/DL (ref 70–110)

## 2023-04-05 PROCEDURE — A4216 STERILE WATER/SALINE, 10 ML: HCPCS | Performed by: ORTHOPAEDIC SURGERY

## 2023-04-05 PROCEDURE — 11000001 HC ACUTE MED/SURG PRIVATE ROOM

## 2023-04-05 PROCEDURE — 97530 THERAPEUTIC ACTIVITIES: CPT

## 2023-04-05 PROCEDURE — 25000003 PHARM REV CODE 250: Performed by: ORTHOPAEDIC SURGERY

## 2023-04-05 PROCEDURE — 63600175 PHARM REV CODE 636 W HCPCS: Performed by: ORTHOPAEDIC SURGERY

## 2023-04-05 PROCEDURE — 27000207 HC ISOLATION

## 2023-04-05 PROCEDURE — 25000003 PHARM REV CODE 250: Performed by: HOSPITALIST

## 2023-04-05 RX ADMIN — INSULIN DETEMIR 5 UNITS: 100 INJECTION, SOLUTION SUBCUTANEOUS at 08:04

## 2023-04-05 RX ADMIN — OXYCODONE HYDROCHLORIDE 5 MG: 5 TABLET ORAL at 09:04

## 2023-04-05 RX ADMIN — OXYCODONE HYDROCHLORIDE 5 MG: 5 TABLET ORAL at 08:04

## 2023-04-05 RX ADMIN — Medication 10 ML: at 12:04

## 2023-04-05 RX ADMIN — Medication 10 ML: at 06:04

## 2023-04-05 RX ADMIN — MELATONIN TAB 3 MG 6 MG: 3 TAB at 09:04

## 2023-04-05 RX ADMIN — OXYCODONE HYDROCHLORIDE 5 MG: 5 TABLET ORAL at 02:04

## 2023-04-05 RX ADMIN — CEFTRIAXONE 2 G: 2 INJECTION, SOLUTION INTRAVENOUS at 08:04

## 2023-04-05 RX ADMIN — CYCLOBENZAPRINE HYDROCHLORIDE 5 MG: 5 TABLET, FILM COATED ORAL at 04:04

## 2023-04-05 RX ADMIN — Medication 10 ML: at 01:04

## 2023-04-05 RX ADMIN — CYCLOBENZAPRINE HYDROCHLORIDE 5 MG: 5 TABLET, FILM COATED ORAL at 02:04

## 2023-04-05 RX ADMIN — OXYCODONE HYDROCHLORIDE 5 MG: 5 TABLET ORAL at 04:04

## 2023-04-05 NOTE — PLAN OF CARE
Problem: Physical Therapy  Goal: Physical Therapy Goal  Description: Goals to be met by:  2023    Patient will increase functional independence with mobility by performin. Supine to sit with Modified [place]  2. Sit to stand transfer with Modified [place]  3. Gait >50 feet with Modified [place] using Rolling Walker  4. BLE seated/supine LE therex x15 reps independently         Outcome: Ongoing, Progressing   Pt sat EOB and attempted sit<>stand x2 trials.

## 2023-04-05 NOTE — NURSING
[facility] [place]  Nurses Note -- 4 Eyes      4/4/2023       Skin assessed on: Q Shift      [x] No Pressure Injuries Present    []Prevention Measures Documented    [] Yes LDA  for Pressure Injury Previously documented     [] Yes New Pressure Injury Discovered   [] LDA for New Pressure Injury Added      Attending RN:  Eli Riojas LPN     Second RN:  MIGUEL Ron

## 2023-04-05 NOTE — PT/OT/SLP PROGRESS
Occupational Therapy   Treatment    Name: Hedy Conway  MRN: [id number]  Admitting Diagnosis:  Polyarthritis  6 Days Post-Op    Recommendations:     Discharge Recommendations: rehabilitation facility  Discharge Equipment Recommendations:   (TBD per IPR.)  Barriers to discharge:   (Pt significantly limited by pain and weaknessm requiring x 2 person assist for standing and is unable to ambualte at this time; pt will benefit from multidisciplinary approach in an IPR setting for returning to PLOF as she was (I) and working PTA.)    Assessment:     Hedy Conway is a 47 y.o. female with a medical diagnosis of Polyarthritis.  Performance deficits affecting function are weakness, impaired endurance, impaired self care skills, impaired functional mobility, impaired balance, decreased lower extremity function, decreased upper extremity function, decreased coordination, decreased ROM, impaired coordination, decreased safety awareness, pain, edema, impaired skin, orthopedic precautions.     Pt pleasant and willing to participate in tx session this date.  Pt was able to perform sit>stand trials (x3), requiring max A x 2 persons from elevated bed. Pt w/ pain but tolerated fairly with rest breaks as needed. Pt is making gradual progress towards goals and will continue to benefit from skilled acute OT services to maximize functional capacity for safe performance w/ ADLs and functional mobility.      PTA, pt was (I) w/ all ADLs and functional mobility; pt will benefit from an aggressive multidisciplinary approach in an IPR setting for returning to PLOF. Despite pain and functional limitations, pt is motivated and     Rehab Prognosis:  Good; patient would benefit from acute skilled OT services to address these deficits and reach maximum level of function.       Plan:     Patient to be seen 5 x/week to address the above listed problems via self-care/home management, therapeutic activities, therapeutic exercises  Plan of Care  "Expires: 04/07/23  Plan of Care Reviewed with: patient    Subjective     Chief Complaint: "It hurts to stand straight up."   Patient/Family Comments/goals: Agreeable to tx session.   Pain/Comfort:  Pain Rating 1: 7/10  Location - Side 1: Bilateral  Location 1: knee  Pain Addressed 1: Reposition, Distraction, Nurse notified, Cessation of Activity  Pain Rating Post-Intervention 1:  (6.5)    Objective:     Communicated with: Nurse prior to session.  Patient found HOB elevated with telemetry, PICC line, bed alarm, PureWick upon OT entry to room.    General Precautions: Standard, fall    Orthopedic Precautions:RLE weight bearing as tolerated, LLE weight bearing as tolerated  Braces: N/A  Respiratory Status: Room air     Occupational Performance:     Bed Mobility:    Patient completed Rolling/Turning to Left with  moderate assistance and 1 persons w/ use of bed rail; pt uses momentum as needed to assist  Patient completed Rolling/Turning to Right with moderate assistance and 1 persons  Patient completed Scooting/Bridging with use of bed rail; pt uses momentum as needed to assist   Patient completed Supine to Sit with moderate assistance and 1 persons  Patient completed Sit to Supine with maximal assistance and 2 persons     Functional Mobility/Transfers:  Patient completed Sit <> Stand Transfer x 3 trials with maximal assistance and of 2 persons  with  rolling walker; pt was able to bear weight through R elbow and  w/ LUE on RW; pt required max cueing for shifting hips forward and extending knees but was limited 2* severe pain   Functional Mobility: Unable to take steps at this time.     Activities of Daily Living:  Upper Body Dressing: minimum assistance for donning gown over back       [facility] 6 Click ADL: 15    Treatment & Education:  -Pt performed ADLs and functional mobility as noted above.   -Pt educated on safe body mechanics for performing standing trials safely.   -Reinforced importance of performing BUE AROM " throughout the day; pt verbalized understanding.   -Questions and concerns addressed within scope.    Patient left HOB elevated with all lines intact, call button in reach, bed alarm on, and BUEs elevated; pillow supporting RLE per pt's request; L pressure relief boot in place.     GOALS:   Multidisciplinary Problems       Occupational Therapy Goals          Problem: Occupational Therapy    Goal Priority Disciplines Outcome Interventions   Occupational Therapy Goal     OT, PT/OT Ongoing, Progressing    Description: Goals to be met by 04/07/23:    Patient will increase functional independence with ADLs by performing:    Grooming while seated with Modified [place].  Toileting from bedside commode with Moderate Assistance for hygiene and clothing management.   Sit to stand w/ Minimum Assistance.   Step transfer with Moderate Assistance  Toilet transfer to bedside commode with Moderate Assistance.  Upper extremity exercise program x15 reps per handout, with assistance as needed.                           Time Tracking:     OT Date of Treatment: 04/05/23  OT Start Time: 1127  OT Stop Time: 1157  OT Total Time (min): 30 min    Billable Minutes:Therapeutic Activity 30  Total Time 30 (co-tx w/ PT)     OT/CHANDLER: OT     Number of CHANDLER visits since last OT visit: 1 4/5/2023

## 2023-04-05 NOTE — SUBJECTIVE & OBJECTIVE
Interval History: No acute events. Doing well. Awaiting IPR placement after day 6 of isolation. Will need 4 weeks of IV abx.     Review of Systems   Constitutional:  Negative for chills, fatigue and fever.   HENT: Negative.     Eyes: Negative.    Respiratory:  Negative for cough and shortness of breath.    Cardiovascular:  Negative for chest pain, palpitations and leg swelling.   Gastrointestinal:  Negative for abdominal pain and vomiting.   Genitourinary: Negative.    Skin: Negative.    Neurological:  Negative for seizures and speech difficulty.   Psychiatric/Behavioral:  Negative for agitation and confusion. The patient is not nervous/anxious.    Objective:     Vital Signs (Most Recent):  Temp: 98 °F (36.7 °C) (04/05/23 0720)  Pulse: 98 (04/05/23 0720)  Resp: 18 (04/05/23 0850)  BP: 120/68 (04/05/23 0720)  SpO2: 100 % (04/05/23 0720) Vital Signs (24h Range):  Temp:  [97.5 °F (36.4 °C)-99.5 °F (37.5 °C)] 98 °F (36.7 °C)  Pulse:  [] 98  Resp:  [16-19] 18  SpO2:  [96 %-100 %] 100 %  BP: (110-129)/(59-77) 120/68     Weight: 121.1 kg (267 lb)  Body mass index is 41.82 kg/m².    Intake/Output Summary (Last 24 hours) at 4/5/2023 0940  Last data filed at 4/5/2023 0100  Gross per 24 hour   Intake 480 ml   Output 1300 ml   Net -820 ml        Physical Exam  Vitals and nursing note reviewed.   Constitutional:       General: She is awake. She is not in acute distress.     Appearance: Normal appearance. She is well-developed and well-groomed. She is not ill-appearing, toxic-appearing or diaphoretic.   HENT:      Head: Normocephalic and atraumatic.   Eyes:      General: No scleral icterus.  Cardiovascular:      Rate and Rhythm: Normal rate.   Pulmonary:      Effort: No tachypnea or respiratory distress.   Musculoskeletal:      Right lower leg: No edema.      Left lower leg: No edema.   Skin:     Coloration: Skin is not jaundiced.   Neurological:      General: No focal deficit present.      Mental Status: She is alert and  oriented to person, place, and time. Mental status is at baseline.   Psychiatric:         Attention and Perception: Attention normal.         Mood and Affect: Mood and affect normal.         Speech: Speech normal.         Behavior: Behavior normal. Behavior is cooperative.         Thought Content: Thought content normal.         Cognition and Memory: Cognition and memory normal. Cognition is not impaired. Memory is not impaired.         Judgment: Judgment normal.       Significant Labs: All pertinent labs within the past 24 hours have been reviewed.    Significant Imaging: I have reviewed all pertinent imaging results/findings within the past 24 hours.

## 2023-04-05 NOTE — PLAN OF CARE
Problem: Diabetes Comorbidity  Goal: Blood Glucose Level Within Targeted Range  Outcome: Ongoing, Progressing  Intervention: Monitor and Manage Glycemia  Flowsheets (Taken 4/4/2023 1922)  Glycemic Management: blood glucose monitored     Problem: Adjustment to Illness (Sepsis/Septic Shock)  Goal: Optimal Coping  Outcome: Ongoing, Progressing     Problem: Bleeding (Sepsis/Septic Shock)  Goal: Absence of Bleeding  Outcome: Ongoing, Progressing     Problem: Glycemic Control Impaired (Sepsis/Septic Shock)  Goal: Blood Glucose Level Within Desired Range  Outcome: Ongoing, Progressing  Intervention: Optimize Glycemic Control  Flowsheets (Taken 4/4/2023 1922)  Glycemic Management: blood glucose monitored     Problem: Infection Progression (Sepsis/Septic Shock)  Goal: Absence of Infection Signs and Symptoms  Outcome: Ongoing, Progressing  Intervention: Initiate Sepsis Management  Flowsheets (Taken 4/4/2023 1922)  Infection Prevention: hand hygiene promoted  Infection Management: aseptic technique maintained     Problem: Nutrition Impaired (Sepsis/Septic Shock)  Goal: Optimal Nutrition Intake  Outcome: Ongoing, Progressing     Problem: Fluid Imbalance (Pneumonia)  Goal: Fluid Balance  Outcome: Ongoing, Progressing     Problem: Infection (Pneumonia)  Goal: Resolution of Infection Signs and Symptoms  Outcome: Ongoing, Progressing     Problem: Respiratory Compromise (Pneumonia)  Goal: Effective Oxygenation and Ventilation  Outcome: Ongoing, Progressing     Problem: Adult Inpatient Plan of Care  Goal: Plan of Care Review  Outcome: Ongoing, Progressing  Flowsheets (Taken 4/4/2023 1922)  Plan of Care Reviewed With: patient  Goal: Patient-Specific Goal (Individualized)  Outcome: Ongoing, Progressing  Goal: Absence of Hospital-Acquired Illness or Injury  Outcome: Ongoing, Progressing  Intervention: Identify and Manage Fall Risk  Flowsheets (Taken 4/4/2023 1922)  Safety Promotion/Fall Prevention:   assistive device/personal item  within reach   instructed to call staff for mobility   side rails raised x 2   Fall Risk reviewed with patient/family   high risk medications identified   nonskid shoes/socks when out of bed   room near unit station   medications reviewed  Intervention: Prevent Skin Injury  Flowsheets (Taken 4/4/2023 1922)  Body Position:   turned   weight shifting  Skin Protection:   adhesive use limited   tubing/devices free from skin contact  Intervention: Prevent and Manage VTE (Venous Thromboembolism) Risk  Flowsheets (Taken 4/4/2023 1922)  Activity Management:   Ankle pumps - L1   Arm raise - L1   Rolling - L1  VTE Prevention/Management:   ambulation promoted   bleeding precations maintained   bleeding risk assessed  Range of Motion: active ROM (range of motion) encouraged  Intervention: Prevent Infection  Flowsheets (Taken 4/4/2023 1922)  Infection Prevention: hand hygiene promoted  Goal: Optimal Comfort and Wellbeing  Outcome: Ongoing, Progressing  Goal: Readiness for Transition of Care  Outcome: Ongoing, Progressing     Problem: Bariatric Environmental Safety  Goal: Safety Maintained with Care  Outcome: Ongoing, Progressing     Problem: Impaired Wound Healing  Goal: Optimal Wound Healing  Outcome: Ongoing, Progressing  Intervention: Promote Wound Healing  Flowsheets (Taken 4/4/2023 1922)  Sleep/Rest Enhancement: relaxation techniques promoted  Activity Management:   Ankle pumps - L1   Arm raise - L1   Rolling - L1  Pain Management Interventions: relaxation techniques promoted     Problem: Infection  Goal: Absence of Infection Signs and Symptoms  Outcome: Ongoing, Progressing  Intervention: Prevent or Manage Infection  Flowsheets (Taken 4/4/2023 1922)  Infection Management: aseptic technique maintained     Problem: Skin Injury Risk Increased  Goal: Skin Health and Integrity  Outcome: Ongoing, Progressing  Intervention: Optimize Skin Protection  Flowsheets (Taken 4/4/2023 1922)  Pressure Reduction Techniques:   frequent  weight shift encouraged   pressure points protected   heels elevated off bed   weight shift assistance provided  Skin Protection:   adhesive use limited   tubing/devices free from skin contact  Head of Bed (HOB) Positioning: HOB at 30-45 degrees     Problem: Pain Acute  Goal: Acceptable Pain Control and Functional Ability  Outcome: Ongoing, Progressing  Intervention: Develop Pain Management Plan  Flowsheets (Taken 4/4/2023 1922)  Pain Management Interventions: relaxation techniques promoted  Intervention: Prevent or Manage Pain  Flowsheets (Taken 4/4/2023 1922)  Sleep/Rest Enhancement: relaxation techniques promoted  Medication Review/Management:   medications reviewed   high-risk medications identified     Problem: Fall Injury Risk  Goal: Absence of Fall and Fall-Related Injury  Outcome: Ongoing, Progressing  Intervention: Identify and Manage Contributors  Flowsheets (Taken 4/4/2023 1922)  Self-Care Promotion:   BADL personal routines maintained   meal set-up provided   BADL personal objects within reach   independence encouraged   safe use of adaptive equipment encouraged  Medication Review/Management:   medications reviewed   high-risk medications identified  Intervention: Promote Injury-Free Environment  Flowsheets (Taken 4/4/2023 1922)  Safety Promotion/Fall Prevention:   assistive device/personal item within reach   instructed to call staff for mobility   side rails raised x 2   Fall Risk reviewed with patient/family   high risk medications identified   nonskid shoes/socks when out of bed   room near unit station   medications reviewed   Pt alert able to make needs known,jennie meds well,IV abx remains in progress,no s/s adverse reaction noted,reposition q 2hrs,pain controlled by prn pain medication,POC explained,remains free from falls and pressure injuries,safety maintained,continue monitoring.

## 2023-04-05 NOTE — PLAN OF CARE
Recommendations    Recommend DM diet 1500 kcal ; monitor glu levels  Monitor PO intake   3.  Monitor weight changes; check weekly weights.   4.  RD to follow up  Goals: 1. Pt to meet % EEN/EPN by RD follow up  Nutrition Goal Status: goal met  Communication of RD Recs:  (POC)    Assessment and Plan       Nutrition Problem  Obesity     Related to (etiology):   Poor food choices     Signs and Symptoms (as evidenced by):   BMI of 41.82 - morbid obesity  Type 2 diabetes mellitus     Interventions/Recommendations (treatment strategy):  Carbohydrate controlled diet  Collaboration with medical providers     Nutrition Diagnosis Status:   Revised; continues

## 2023-04-05 NOTE — PROGRESS NOTES
[facility] Medicine  Telemedicine Progress Note    Patient Name: Hedy Conway  MRN: [id number]  Patient Class: IP- Inpatient   Admission Date: 3/21/2023  Length of Stay: 15 days  Attending Physician: Valencia Cao MD  Primary Care Provider: John Lantigua PA-C          Subjective:     Principal Problem:Polyarthritis        HPI:  47-year-old  female with medical history significant for type 2 diabetes mellitus, hypertension, large cell diffuse non-Hodgkin lymphoma status post chemotherapy and stem cell transplant 2011 was transferred from outside hospital with suspicion of septic arthritis.  Of note she voiced migratory pain in her left-right knee-both shoulders that started 2 weeks ago, associated with generalized weakness, cleansing inability to move right lower extremity and only have movement without gravity on the left lower extremity without associated trauma, she voiced being on 3 different antibiotics at the outside hospital for both pneumonia and urinary tract infection, and this could explain for been afebrile although she voiced subjective fever at the onset of her symptoms she denied cough, shortness of breath, orthopnea, paroxysmal nocturnal dyspnea or pedal swelling.  She denied prior diarrhea although she voiced upper respiratory tract symptoms,treatment for pneumonia prior to onset of these symptoms.  She denied any urinary symptoms at this time of dysuria, frequency, urgency, straining at micturition or hematuria.  No recent travel, no sick contacts, no tick bite.  She had been sent here for possible knee aspiration.      Overview/Hospital Course:  Ms Hedy Conway who was transferred to [facility] from [facility]. There she was admitted for multiple joint pains. MRI R knee showed meniscal tear and large effusion with synovitis. There was concern for septic arthritis, aspiration was attempted, but no fluid retrieved. She also had Strep pneumoniae  bacteremia (3/13) and Pseudomonas pneumonia (3/15) and is currently being treated with cefepime. She is also receiving vancomycin with concerns for septic joint. She did require 1U RBC transfusion and has had vaginal bleeding. Pelvic US 3/20 showed thickened endometrium, and she will need outpatient GYN follow up. Her iron panel indicates mixed iron deficiency and anemia of chronic disease. She saw Neurology and Orthopaedics. Orthopaedics doubts multiple septic joints simultaneously and suspects Rheumatologic condition. S/p R knee arthrocentesis on 3/23 with culture no growth. Sed rate, CRP remain elevated. Uric acid normal, RF/CCP/SILVIA/hepatitis panel normal. MRI left shoulder with large complex joint effusion with synovitis, high grade partial thickness tear of supraspinatus and infraspinatus, near complete tear of intraarticular bicpet tendon with tenosynovitis. MRI right wrist with distal ulna/radius/carpal osteitis, complex effusions, complex fluid collections, tenosynovitis. S/p arthrocentesis of both L shoulder and R wrist by IR on 3/28- both have high neutrophil count, suggesting septic arthritis. No growth on cultures, but she has been on antibiotics for >1 week. MRI hip shows abnormal L SI joint concerning for septic arthritis. MRI lumbar spine shows R L2-3 facet arthropathy with enhancement. Ortho planning washout on 3/30/23. Neurosurgery also consulted for lumbar spine findings; no surgery needed.     Continue ceftriaxone 2g q 24h, anticipate atleast 4 wks IV abx.  Medically Ready, awaiting REHAB placement.      Interval History: No acute events. Doing well. Awaiting IPR placement after day 6 of isolation. Will need 4 weeks of IV abx.     Review of Systems   Constitutional:  Negative for chills, fatigue and fever.   HENT: Negative.     Eyes: Negative.    Respiratory:  Negative for cough and shortness of breath.    Cardiovascular:  Negative for chest pain, palpitations and leg swelling.   Gastrointestinal:   Negative for abdominal pain and vomiting.   Genitourinary: Negative.    Skin: Negative.    Neurological:  Negative for seizures and speech difficulty.   Psychiatric/Behavioral:  Negative for agitation and confusion. The patient is not nervous/anxious.    Objective:     Vital Signs (Most Recent):  Temp: 98 °F (36.7 °C) (04/05/23 0720)  Pulse: 98 (04/05/23 0720)  Resp: 18 (04/05/23 0850)  BP: 120/68 (04/05/23 0720)  SpO2: 100 % (04/05/23 0720) Vital Signs (24h Range):  Temp:  [97.5 °F (36.4 °C)-99.5 °F (37.5 °C)] 98 °F (36.7 °C)  Pulse:  [] 98  Resp:  [16-19] 18  SpO2:  [96 %-100 %] 100 %  BP: (110-129)/(59-77) 120/68     Weight: 121.1 kg (267 lb)  Body mass index is 41.82 kg/m².    Intake/Output Summary (Last 24 hours) at 4/5/2023 0940  Last data filed at 4/5/2023 0100  Gross per 24 hour   Intake 480 ml   Output 1300 ml   Net -820 ml        Physical Exam  Vitals and nursing note reviewed.   Constitutional:       General: She is awake. She is not in acute distress.     Appearance: Normal appearance. She is well-developed and well-groomed. She is not ill-appearing, toxic-appearing or diaphoretic.   HENT:      Head: Normocephalic and atraumatic.   Eyes:      General: No scleral icterus.  Cardiovascular:      Rate and Rhythm: Normal rate.   Pulmonary:      Effort: No tachypnea or respiratory distress.   Musculoskeletal:      Right lower leg: No edema.      Left lower leg: No edema.   Skin:     Coloration: Skin is not jaundiced.   Neurological:      General: No focal deficit present.      Mental Status: She is alert and oriented to person, place, and time. Mental status is at baseline.   Psychiatric:         Attention and Perception: Attention normal.         Mood and Affect: Mood and affect normal.         Speech: Speech normal.         Behavior: Behavior normal. Behavior is cooperative.         Thought Content: Thought content normal.         Cognition and Memory: Cognition and memory normal. Cognition is not  impaired. Memory is not impaired.         Judgment: Judgment normal.       Significant Labs: All pertinent labs within the past 24 hours have been reviewed.    Significant Imaging: I have reviewed all pertinent imaging results/findings within the past 24 hours.      Assessment/Plan:      * Polyarthritis  Present since admission to [facility]. MRI noted R knee effusion. ESR, CRP elevated. Doubt gout. RF, CCP, SILVIA, acute hep panel normal. Parvovirus negative. RF normal. TSH elevated but free T4 normal, so less likely hypothyroidism. Overall this is concerning for septic arthritis affecting multiple joints as a result of bacteremia.     - MRI L shoulder: complex effusion with synovitis  - MRI R wrist: osteitis, complex effusions and fluid collections  - MRI L hip: abnormal SI joint concerning for septic arthritis   - MRI lumbar spine: R sided L2-3 facet joint arthropathy concerning for septic arthritis    - Orthopaedics consulted   - s/p R wrist and L shoulder arthrocenteses on 3/27/23   - R wrist only 2cc, cell count not performed but PMN predominant. Cultures unable to be sent   - L shoulder WBC 47K with PMN predominance, cultures NGTD but has been on antibiotics for >1 week  - Ortho consulted- plan washout bilateral knees and R wrist on 3/30/23    - Neurosurgery consulted for SI joint and L2-3 findings on MRI-- surgery not needed    - ID following, remains on CTX 2g q 24h  - Anticipate atleast 4 wks IV abx.   - Estimated end date of IV antibiotics: 4/27/23  - PT, OT consulted as well - likely will need rehab vs SNF when ready       Streptococcal bacteremia  Blood cultures 3/13 with Strep bacteremia. Currently being treated with ceftriaxone. This may have seeded her joints causing septic arthritis. Arthrocentesis R wrist and L shoulder completed on 3/27 with elevated neutrophils. Cultures no growth to date but has been on antibiotics for over a week. ID consulted     COVID-19 virus detected  Patient is  asymptomatic  Cont isolation       Facet arthropathy, lumbar  Discussed with Neurosurgery. Surgery not needed.       Subclinical hypothyroidism  Lab Results   Component Value Date    TSH 14.200 (H) 03/22/2023     FT4 within normal limits  Repeat as outpatient when acute illness resolved      Anemia of chronic disease  Anemia of chronic disease present (ferritin elevated) but did also have vaginal bleeding. TSAT 14- some degree iron deficiency    Weakness of both lower extremities  Presented with bilateral lower extremity weakness with no significant sensory deficit. She denied preceding diarrhea/GI symptoms, although she had upper respiratory tract symptoms  - Neurology consulted  - seems as though this is secondary to arthritis as being worked up above   - PT, OT consulted       Postmenopausal bleeding  Noted at OSH. TVUS with thickened endometrial stripe  - needs outpatient GYN follow up       Type 2 diabetes mellitus without complication, without long-term current use of insulin  Patient's FSGs are controlled on current medication regimen.  Last A1c reviewed-   Lab Results   Component Value Date    HGBA1C 6.4 (H) 03/22/2023     Most recent fingerstick glucose reviewed-   Recent Labs   Lab 04/04/23  1136 04/04/23  1712 04/04/23  2056 04/05/23  0722   POCTGLUCOSE 127* 159* 156* 141*     Current correctional scale  Medium  Maintain anti-hyperglycemic dose as follows-   Antihyperglycemics (From admission, onward)    Start     Stop Route Frequency Ordered    03/22/23 0900  insulin detemir U-100 pen 5 Units         -- SubQ Daily 03/22/23 0541    03/22/23 0638  insulin aspart U-100 pen 0-5 Units         -- SubQ Before meals & nightly PRN 03/22/23 0541        Hold Oral hypoglycemics while patient is in the hospital.    Stem cells transplant status  Not currently on immunosuppresants  Follow up with oncology      History of Large cell (diffuse) non-Hodgkin's lymphoma  In remission, follow up with oncology  No new enlarged  lymph nodes noted         VTE Risk Mitigation (From admission, onward)         Ordered     Reason for No Pharmacological VTE Prophylaxis  Once        Question:  Reasons:  Answer:  Active Bleeding    03/21/23 2230     IP VTE HIGH RISK PATIENT  Once         03/21/23 2230     Place sequential compression device  Until discontinued         03/21/23 2230                      I have completed this tele-visit with the assistance of a telepresenter.    The attending portion of this evaluation, treatment, and documentation was performed per Valencia Keller MD via Telemedicine AudioVisual using the secure [employer] software platform with 2 way audio/video. The provider was located off-site and the patient is located in the hospital. The aforementioned video software was utilized to document the relevant history and physical exam    Valencia Keller MD  Department of Hospital Medicine   [facility]

## 2023-04-05 NOTE — PROGRESS NOTES
Attempted to see Pt in her prior room 430, nurse reported to me she was transferred to SNF, did not notify me of room transfer to 428, therefore not seen.    Pt appears ready for SNF transfer for continued therapy.  Ab per ID recs    AFVSS  All cultures NG    Follow up in office 2 weeks  Pt will be seen tomorrow if still in hospital

## 2023-04-05 NOTE — PT/OT/SLP PROGRESS
Physical Therapy Treatment    Patient Name:  Hedy Conway   MRN:  [id number]    Recommendations:     Discharge Recommendations: rehabilitation facility  Discharge Equipment Recommendations:  (TBD at next level of care)    Assessment:     Hedy Conway is a 47 y.o. female admitted with a medical diagnosis of Polyarthritis.  She presents with the following impairments/functional limitations: weakness, impaired functional mobility, gait instability, decreased lower extremity function, decreased upper extremity function, pain, decreased ROM .    Rehab Prognosis: Fair; patient would benefit from acute skilled PT services to address these deficits and reach maximum level of function.    Recent Surgery: Procedure(s) (LRB):  ARTHROSCOPY, KNEE (Bilateral)  OPEN  ARTHROTOMY WRIST (Right) 6 Days Post-Op    Plan:     During this hospitalization, patient to be seen 5 x/week to address the identified rehab impairments via gait training, therapeutic activities, therapeutic exercises and progress toward the following goals:    Plan of Care Expires:  04/12/23    Subjective     Chief Complaint: (B) knee pain  Patient/Family Comments/goals: Pt agreeable to therapy treatments.  Pain/Comfort:  Pain Rating 1: 7/10  Location - Side 1: Bilateral  Location 1: knee  Pain Addressed 1: Reposition      Objective:     Communicated with nurseEli prior to session.  Patient found supine with pressure relief boots, PureWick upon PT entry to room.     General Precautions: Standard, fall  Orthopedic Precautions: N/A  Braces: N/A  Respiratory Status: Room air     Functional Mobility:  Bed Mobility:     Supine to Sit: moderate assistance  Sit to Supine: maximal assistance  Transfers:     Sit to Stand:  maximal assistance and of 2 persons with rolling walker      AM-PAC 6 CLICK MOBILITY  Turning over in bed (including adjusting bedclothes, sheets and blankets)?: 2  Sitting down on and standing up from a chair with arms (e.g., wheelchair,  bedside commode, etc.): 1  Moving from lying on back to sitting on the side of the bed?: 2  Moving to and from a bed to a chair (including a wheelchair)?: 1  Need to walk in hospital room?: 1  Climbing 3-5 steps with a railing?: 1  Basic Mobility Total Score: 8       Treatment & Education:  Performed 1x10 reps LAQ, PROM on RLE; AROM LLE, attempted standing with RW x2 trials however pt resistant maintaining flexed posture.  Pt appears to be able to do more, reports limited by pain.    Patient left supine with all lines intact and call button in reach..    GOALS:   Multidisciplinary Problems       Physical Therapy Goals          Problem: Physical Therapy    Goal Priority Disciplines Outcome Goal Variances Interventions   Physical Therapy Goal     PT, PT/OT Ongoing, Progressing     Description: Goals to be met by:  2023    Patient will increase functional independence with mobility by performin. Supine to sit with Modified [place]  2. Sit to stand transfer with Modified [place]  3. Gait >50 feet with Modified [place] using Rolling Walker  4. BLE seated/supine LE therex x15 reps independently                              Time Tracking:     PT Received On: 23  PT Start Time: 1127     PT Stop Time: 1155  PT Total Time (min): 28 min     Billable Minutes: Therapeutic Activity 28 (Co-treat with EFRA Morrell)    Treatment Type: Treatment  PT/PTA: PT     Number of PTA visits since last PT visit: 2023

## 2023-04-05 NOTE — PROGRESS NOTES
"  [facility] - Med Surg  Adult Nutrition  Consult Note    SUMMARY     Recommendations    Recommend DM diet 1500 kcal ; monitor glu levels  Monitor PO intake   3.  Monitor weight changes; check weekly weights.   4.  RD to follow up  Goals: 1. Pt to meet % EEN/EPN by RD follow up  Nutrition Goal Status: goal met  Communication of RD Recs:  (POC)    Assessment and Plan       Nutrition Problem  Obesity     Related to (etiology):   Poor food choices     Signs and Symptoms (as evidenced by):   BMI of 41.82 - morbid obesity  Type 2 diabetes mellitus     Interventions/Recommendations (treatment strategy):  Carbohydrate controlled diet  Collaboration with medical providers     Nutrition Diagnosis Status:   Revised; continues    Reason for Assessment    Reason For Assessment: RD follow-up  Diagnosis: other (see comments) (polyarthritis)  Relevant Medical History: Acute carpal tunnel syndrome of left wrist, DM, chemotherapy  Interdisciplinary Rounds: did not attend  General Information Comments: RD follow up. Pt on diabetic diet. PO intake % of meals since previous RD consult. Glucose levels remaining elevated. Consider DM 1500 kcal diet. Continue to monitor PO intake and weight changes. NFPE shows no signs of malnutrition at this time. Pt on isolation precautions.  Nutrition Discharge Planning: Diabetic Diet    Nutrition Risk Screen    Nutrition Risk Screen: no indicators present    Nutrition/Diet History    Spiritual, Cultural Beliefs, Protestant Practices, Values that Affect Care: no  Food Allergies: NKFA    Anthropometrics    Temp: 98 °F (36.7 °C)  Height Method: Stated  Height: 5' 7" (170.2 cm)  Height (inches): 67 in  Weight Method: Bed Scale  Weight: 121.1 kg (267 lb)  Weight (lb): 267 lb  Ideal Body Weight (IBW), Female: 135 lb  % Ideal Body Weight, Female (lb): 198.41 %  BMI (Calculated): 41.8  BMI Grade: greater than 40 - morbid obesity       Lab/Procedures/Meds    Pertinent Labs Reviewed: " reviewed  Pertinent Labs Comments: glu 138  Pertinent Medications Reviewed: reviewed  Current Outpatient Medications   Medication Instructions    albuterol (PROVENTIL/VENTOLIN HFA) 90 mcg/actuation inhaler Ventolin HFA 90 mcg/actuation aerosol inhaler   INHALE 2 PUFFS BY MOUTH 3 TIMES DAILY AS NEEDED.    albuterol-ipratropium (DUO-NEB) 2.5 mg-0.5 mg/3 mL nebulizer solution ipratropium 0.5 mg-albuterol 3 mg (2.5 mg base)/3 mL nebulization soln   USE 1 VIAL IN NEBULIZER EVERY 6 TO 8 HOURS AS NEEDED    bisoprolol (ZEBETA) 5 mg, Oral, Daily    blood sugar diagnostic Strp USE TO CHECK BLOOD SUGAR TWICE A DAY    cyclobenzaprine (FLEXERIL) 10 mg, Oral, 3 times daily PRN, Take for 5 days    fluticasone (FLONASE) 50 mcg/actuation nasal spray 1 spray, Each Nostril, Daily    HYDROcodone-acetaminophen (NORCO) 5-325 mg per tablet 1 tablet, Oral, Every 6 hours PRN    melatonin 10 mg, Oral, Nightly PRN    metFORMIN (GLUCOPHAGE) 500 mg, Oral, Daily    montelukast (SINGULAIR) 10 mg, Oral, Daily    naproxen (NAPROSYN) 500 mg, Oral, 2 times daily PRN, Take for 5 days    ondansetron (ZOFRAN) 4 mg, Oral, Every 8 hours PRN    pantoprazole (PROTONIX) 40 mg, Oral, Daily    PREMARIN 0.5 g, Vaginal, Daily, Place 0.5 vaginally for 4 weeks, then transition to twice weekly use.    zinc sulfate (ZINCATE) 220 mg, Oral, 3 times daily        Estimated/Assessed Needs    Weight Used For Calorie Calculations: 61.2 kg (135 lb)  Energy Calorie Requirements (kcal): 1401 kcal  Energy Need Method: [place]-St Jeor (1.1 x IBW)  Protein Requirements: 61 g  Weight Used For Protein Calculations: 61.2 kg (135 lb)        RDA Method (mL): 1401       Evaluation of Received Nutrient/Fluid Intake    I/O: -25 L  Energy Calories Required: exceeds needs  Protein Required: exceeds needs  Fluid Required: exceeds needs  Comments: LBM 4/2/23  % Intake of Estimated Energy Needs: 75 - 100 %  % Meal Intake: 75 - 100 %    Nutrition Risk    Level of Risk/Frequency of Follow-up:  low - moderate       Monitor and Evaluation    Food and Nutrient Intake: energy intake, food and beverage intake  Food and Nutrient Adminstration: diet order  Knowledge/Beliefs/Attitudes: food and nutrition knowledge/skill, beliefs and attitudes  Physical Activity and Function: nutrition-related ADLs and IADLs, factors affecting access to physical activity  Anthropometric Measurements: weight, weight change, body mass index  Biochemical Data, Medical Tests and Procedures: electrolyte and renal panel, gastrointestinal profile, glucose/endocrine profile, inflammatory profile, lipid profile  Nutrition-Focused Physical Findings: overall appearance       Nutrition Follow-Up    RD Follow-up?: Yes    Radha Joseph, Registration Eligible, Provisional LDN

## 2023-04-05 NOTE — NURSING
[facility] [place]  Nurses Note -- 4 Eyes      4/5/2023       Skin assessed on: Q Shift      [x] No Pressure Injuries Present    []Prevention Measures Documented    [] Yes LDA  for Pressure Injury Previously documented     [] Yes New Pressure Injury Discovered   [] LDA for New Pressure Injury Added      Attending RN:  Ariadne Burrell RN     Second RN:  Lissett MIDDLETON

## 2023-04-05 NOTE — ASSESSMENT & PLAN NOTE
Patient's FSGs are controlled on current medication regimen.  Last A1c reviewed-   Lab Results   Component Value Date    HGBA1C 6.4 (H) 03/22/2023     Most recent fingerstick glucose reviewed-   Recent Labs   Lab 04/04/23  1136 04/04/23  1712 04/04/23 2056 04/05/23  0722   POCTGLUCOSE 127* 159* 156* 141*     Current correctional scale  Medium  Maintain anti-hyperglycemic dose as follows-   Antihyperglycemics (From admission, onward)    Start     Stop Route Frequency Ordered    03/22/23 0900  insulin detemir U-100 pen 5 Units         -- SubQ Daily 03/22/23 0541    03/22/23 0638  insulin aspart U-100 pen 0-5 Units         -- SubQ Before meals & nightly PRN 03/22/23 0541        Hold Oral hypoglycemics while patient is in the hospital.

## 2023-04-05 NOTE — PLAN OF CARE
Problem: Occupational Therapy  Goal: Occupational Therapy Goal  Description: Goals to be met by 04/07/23:    Patient will increase functional independence with ADLs by performing:    Grooming while seated with Modified [place].  Toileting from bedside commode with Moderate Assistance for hygiene and clothing management.   Sit to stand w/ Minimum Assistance.   Step transfer with Moderate Assistance  Toilet transfer to bedside commode with Moderate Assistance.  Upper extremity exercise program x15 reps per handout, with assistance as needed.      Outcome: Ongoing, Progressing

## 2023-04-05 NOTE — ASSESSMENT & PLAN NOTE
Hypertension is well controlled.  Plan: Continue Cardizem  mg daily.   Present since admission to [facility]. MRI noted R knee effusion. ESR, CRP elevated. Doubt gout. RF, CCP, SILVIA, acute hep panel normal. Parvovirus negative. RF normal. TSH elevated but free T4 normal, so less likely hypothyroidism. Overall this is concerning for septic arthritis affecting multiple joints as a result of bacteremia.     - MRI L shoulder: complex effusion with synovitis  - MRI R wrist: osteitis, complex effusions and fluid collections  - MRI L hip: abnormal SI joint concerning for septic arthritis   - MRI lumbar spine: R sided L2-3 facet joint arthropathy concerning for septic arthritis    - Orthopaedics consulted   - s/p R wrist and L shoulder arthrocenteses on 3/27/23   - R wrist only 2cc, cell count not performed but PMN predominant. Cultures unable to be sent   - L shoulder WBC 47K with PMN predominance, cultures NGTD but has been on antibiotics for >1 week  - Ortho consulted- plan washout bilateral knees and R wrist on 3/30/23    - Neurosurgery consulted for SI joint and L2-3 findings on MRI-- surgery not needed    - ID following, remains on CTX 2g q 24h  - Anticipate atleast 4 wks IV abx.   - Estimated end date of IV antibiotics: 4/27/23  - PT, OT consulted as well - likely will need rehab vs SNF when ready

## 2023-04-06 PROBLEM — Z75.8 DISCHARGE PLANNING ISSUES: Status: ACTIVE | Noted: 2023-04-06

## 2023-04-06 LAB
POCT GLUCOSE: 127 MG/DL (ref 70–110)
POCT GLUCOSE: 145 MG/DL (ref 70–110)
POCT GLUCOSE: 158 MG/DL (ref 70–110)
POCT GLUCOSE: 167 MG/DL (ref 70–110)

## 2023-04-06 PROCEDURE — 97110 THERAPEUTIC EXERCISES: CPT | Mod: CQ

## 2023-04-06 PROCEDURE — 25000003 PHARM REV CODE 250: Performed by: HOSPITALIST

## 2023-04-06 PROCEDURE — 27000207 HC ISOLATION

## 2023-04-06 PROCEDURE — 11000001 HC ACUTE MED/SURG PRIVATE ROOM

## 2023-04-06 PROCEDURE — 97530 THERAPEUTIC ACTIVITIES: CPT | Mod: CQ

## 2023-04-06 PROCEDURE — 25000003 PHARM REV CODE 250: Performed by: ORTHOPAEDIC SURGERY

## 2023-04-06 PROCEDURE — 63600175 PHARM REV CODE 636 W HCPCS: Performed by: ORTHOPAEDIC SURGERY

## 2023-04-06 PROCEDURE — 97530 THERAPEUTIC ACTIVITIES: CPT

## 2023-04-06 PROCEDURE — A4216 STERILE WATER/SALINE, 10 ML: HCPCS | Performed by: ORTHOPAEDIC SURGERY

## 2023-04-06 RX ORDER — ACETAMINOPHEN 325 MG/1
650 TABLET ORAL EVERY 6 HOURS PRN
Refills: 0
Start: 2023-04-06

## 2023-04-06 RX ORDER — CEFTRIAXONE 2 G/50ML
2 INJECTION, SOLUTION INTRAVENOUS DAILY
Qty: 1050 ML | Refills: 0
Start: 2023-04-06 | End: 2023-04-27

## 2023-04-06 RX ORDER — POLYETHYLENE GLYCOL 3350 17 G/17G
17 POWDER, FOR SOLUTION ORAL DAILY
Refills: 0
Start: 2023-04-06 | End: 2023-06-20

## 2023-04-06 RX ORDER — OXYCODONE HYDROCHLORIDE 5 MG/1
5 TABLET ORAL EVERY 4 HOURS PRN
Refills: 0 | Status: ON HOLD
Start: 2023-04-06 | End: 2023-06-08 | Stop reason: HOSPADM

## 2023-04-06 RX ADMIN — OXYCODONE HYDROCHLORIDE 5 MG: 5 TABLET ORAL at 08:04

## 2023-04-06 RX ADMIN — CYCLOBENZAPRINE HYDROCHLORIDE 5 MG: 5 TABLET, FILM COATED ORAL at 03:04

## 2023-04-06 RX ADMIN — OXYCODONE HYDROCHLORIDE 5 MG: 5 TABLET ORAL at 02:04

## 2023-04-06 RX ADMIN — CEFTRIAXONE 2 G: 2 INJECTION, SOLUTION INTRAVENOUS at 10:04

## 2023-04-06 RX ADMIN — Medication 10 ML: at 06:04

## 2023-04-06 RX ADMIN — Medication 10 ML: at 05:04

## 2023-04-06 RX ADMIN — INSULIN DETEMIR 5 UNITS: 100 INJECTION, SOLUTION SUBCUTANEOUS at 09:04

## 2023-04-06 RX ADMIN — CYCLOBENZAPRINE HYDROCHLORIDE 5 MG: 5 TABLET, FILM COATED ORAL at 08:04

## 2023-04-06 RX ADMIN — OXYCODONE HYDROCHLORIDE 5 MG: 5 TABLET ORAL at 03:04

## 2023-04-06 RX ADMIN — MELATONIN TAB 3 MG 6 MG: 3 TAB at 08:04

## 2023-04-06 RX ADMIN — POLYETHYLENE GLYCOL 3350 17 G: 17 POWDER, FOR SOLUTION ORAL at 10:04

## 2023-04-06 RX ADMIN — OXYCODONE HYDROCHLORIDE 5 MG: 5 TABLET ORAL at 10:04

## 2023-04-06 RX ADMIN — Medication 10 ML: at 12:04

## 2023-04-06 NOTE — PLAN OF CARE
Problem: Occupational Therapy  Goal: Occupational Therapy Goal  Description: Goals to be met by 04/07/23:    Patient will increase functional independence with ADLs by performing:    Grooming while seated with Modified [place].  Toileting from bedside commode with Moderate Assistance for hygiene and clothing management.   Sit to stand w/ Minimum Assistance.   Step transfer with Moderate Assistance  Toilet transfer to bedside commode with Moderate Assistance.  Upper extremity exercise program x15 reps per handout, with assistance as needed.      Outcome: Ongoing, Progressing

## 2023-04-06 NOTE — ANESTHESIA POSTPROCEDURE EVALUATION
Anesthesia Post Evaluation    Patient: Hedy Conway    Procedure(s) Performed: Procedure(s) (LRB):  ARTHROSCOPY, KNEE (Bilateral)  OPEN  ARTHROTOMY WRIST (Right)    Final Anesthesia Type: general      Patient location during evaluation: PACU  Patient participation: Yes- Able to Participate  Level of consciousness: awake and alert, oriented and awake  Post-procedure vital signs: reviewed and stable  Airway patency: patent    PONV status at discharge: No PONV  Anesthetic complications: no      Cardiovascular status: blood pressure returned to baseline  Respiratory status: unassisted, spontaneous ventilation and room air  Hydration status: euvolemic  Follow-up not needed.          Vitals Value Taken Time   /60 04/06/23 0720   Temp 36.5 °C (97.7 °F) 04/06/23 0720   Pulse 95 04/06/23 0720   Resp 18 04/06/23 0720   SpO2 96 % 04/06/23 0720         Event Time   Out of Recovery 03/30/2023 21:43:13         Pain/Stephanie Score: Pain Rating Prior to Med Admin: 6 (4/6/2023  3:15 AM)  Pain Rating Post Med Admin: 4 (4/6/2023  4:15 AM)

## 2023-04-06 NOTE — PROGRESS NOTES
[facility] Medicine  Telemedicine Progress Note    Patient Name: Hedy Conway  MRN: [id number]  Patient Class: IP- Inpatient   Admission Date: 3/21/2023  Length of Stay: 16 days  Attending Physician: Millicent Seay MD  Primary Care Provider: John Lantigua PA-C          Subjective:     Principal Problem:Polyarthritis        HPI:  47-year-old  female with medical history significant for type 2 diabetes mellitus, hypertension, large cell diffuse non-Hodgkin lymphoma status post chemotherapy and stem cell transplant 2011 was transferred from outside hospital with suspicion of septic arthritis.  Of note she voiced migratory pain in her left-right knee-both shoulders that started 2 weeks ago, associated with generalized weakness, cleansing inability to move right lower extremity and only have movement without gravity on the left lower extremity without associated trauma, she voiced being on 3 different antibiotics at the outside hospital for both pneumonia and urinary tract infection, and this could explain for been afebrile although she voiced subjective fever at the onset of her symptoms she denied cough, shortness of breath, orthopnea, paroxysmal nocturnal dyspnea or pedal swelling.  She denied prior diarrhea although she voiced upper respiratory tract symptoms,treatment for pneumonia prior to onset of these symptoms.  She denied any urinary symptoms at this time of dysuria, frequency, urgency, straining at micturition or hematuria.  No recent travel, no sick contacts, no tick bite.  She had been sent here for possible knee aspiration.      Overview/Hospital Course:  Ms Hedy Conway who was transferred to [facility] from [facility]. There she was admitted for multiple joint pains. MRI R knee showed meniscal tear and large effusion with synovitis. There was concern for septic arthritis, aspiration was attempted, but no fluid retrieved. She also had Strep pneumoniae  bacteremia (3/13) and Pseudomonas pneumonia (3/15) and is currently being treated with cefepime. She is also receiving vancomycin with concerns for septic joint. She did require 1U RBC transfusion and has had vaginal bleeding. Pelvic US 3/20 showed thickened endometrium, and she will need outpatient GYN follow up. Her iron panel indicates mixed iron deficiency and anemia of chronic disease. She saw Neurology and Orthopaedics. Orthopaedics doubts multiple septic joints simultaneously and suspects Rheumatologic condition. S/p R knee arthrocentesis on 3/23 with culture no growth. Sed rate, CRP remain elevated. Uric acid normal, RF/CCP/SILVIA/hepatitis panel normal. MRI left shoulder with large complex joint effusion with synovitis, high grade partial thickness tear of supraspinatus and infraspinatus, near complete tear of intraarticular bicpet tendon with tenosynovitis. MRI right wrist with distal ulna/radius/carpal osteitis, complex effusions, complex fluid collections, tenosynovitis. S/p arthrocentesis of both L shoulder and R wrist by IR on 3/28- both have high neutrophil count, suggesting septic arthritis. No growth on cultures, but she has been on antibiotics for >1 week. MRI hip shows abnormal L SI joint concerning for septic arthritis. MRI lumbar spine shows R L2-3 facet arthropathy with enhancement. Ortho planning washout on 3/30/23. Neurosurgery also consulted for lumbar spine findings; no surgery needed.     Continue ceftriaxone 2g q 24h, anticipate atleast 4 wks IV abx.  Medically Ready, awaiting REHAB placement.      Interval History: Pt noted to be awake, alert, conversant and calm. HDS and afebrile. No acute events overnight. No new complaints this morning. Feels close to baseline. Pending placement to [facility].    Review of Systems   Constitutional:  Positive for activity change and fatigue. Negative for fever.   Respiratory:  Negative for shortness of breath.    Cardiovascular:  Negative for chest pain.    Gastrointestinal:  Negative for abdominal pain.   Musculoskeletal:  Positive for arthralgias.   Neurological:  Negative for dizziness and headaches.   All other systems reviewed and are negative.  Objective:     Vital Signs (Most Recent):  Temp: 97.7 °F (36.5 °C) (04/06/23 1132)  Pulse: 104 (04/06/23 1132)  Resp: 19 (04/06/23 1132)  BP: 119/64 (04/06/23 1132)  SpO2: 96 % (04/06/23 1132) Vital Signs (24h Range):  Temp:  [97.7 °F (36.5 °C)-99.3 °F (37.4 °C)] 97.7 °F (36.5 °C)  Pulse:  [] 104  Resp:  [16-20] 19  SpO2:  [95 %-99 %] 96 %  BP: (111-131)/(60-73) 119/64     Weight: 121.1 kg (267 lb)  Body mass index is 41.82 kg/m².    Intake/Output Summary (Last 24 hours) at 4/6/2023 1230  Last data filed at 4/6/2023 0826  Gross per 24 hour   Intake 1560 ml   Output 850 ml   Net 710 ml      Physical Exam  Vitals and nursing note reviewed.   Constitutional:       Appearance: Normal appearance.   HENT:      Head: Normocephalic and atraumatic.   Eyes:      Extraocular Movements: Extraocular movements intact.      Pupils: Pupils are equal, round, and reactive to light.   Cardiovascular:      Rate and Rhythm: Normal rate and regular rhythm.   Pulmonary:      Effort: Pulmonary effort is normal. No respiratory distress.   Abdominal:      General: There is no distension.   Musculoskeletal:         General: Normal range of motion.      Cervical back: Normal range of motion.   Neurological:      General: No focal deficit present.      Mental Status: She is alert and oriented to person, place, and time.   Psychiatric:         Mood and Affect: Mood normal.         Behavior: Behavior normal.       Significant Labs: All pertinent labs within the past 24 hours have been reviewed.  CBC: No results for input(s): WBC, HGB, HCT, PLT in the last 48 hours.  CMP: No results for input(s): NA, K, CL, CO2, GLU, BUN, CREATININE, CALCIUM, PROT, ALBUMIN, BILITOT, ALKPHOS, AST, ALT, ANIONGAP, EGFRNONAA in the last 48 hours.    Invalid input(s):  COREEN    Significant Imaging: I have reviewed all pertinent imaging results/findings within the past 24 hours.      Assessment/Plan:      * Polyarthritis  Present since admission to [facility]. MRI noted R knee effusion. ESR, CRP elevated. Doubt gout. RF, CCP, SILVIA, acute hep panel normal. Parvovirus negative. RF normal. TSH elevated but free T4 normal, so less likely hypothyroidism. Overall this is concerning for septic arthritis affecting multiple joints as a result of bacteremia.     - MRI L shoulder: complex effusion with synovitis  - MRI R wrist: osteitis, complex effusions and fluid collections  - MRI L hip: abnormal SI joint concerning for septic arthritis   - MRI lumbar spine: R sided L2-3 facet joint arthropathy concerning for septic arthritis    - Orthopaedics consulted   - s/p R wrist and L shoulder arthrocenteses on 3/27/23   - R wrist only 2cc, cell count not performed but PMN predominant. Cultures unable to be sent   - L shoulder WBC 47K with PMN predominance, cultures NGTD but has been on antibiotics for >1 week  - Ortho consulted- plan washout bilateral knees and R wrist on 3/30/23    - Neurosurgery consulted for SI joint and L2-3 findings on MRI-- surgery not needed    - ID following, remains on CTX 2g q 24h  - Anticipate atleast 4 wks IV abx.   - Estimated end date of IV antibiotics: 4/27/23  - PT, OT consulted as well - likely will need rehab vs SNF when ready       Discharge planning issues  -Patient noted to be medically stable for discharge at this time  -Has been able to work with OT/PT who recommend placement for further rehabilitation  -Patient pending placement, continue in-hospital care as stated above in the meantime  -More than 20 minutes of my time has been spent discussing and planning just her safe disposition with patient/family/CM/SW/Nursing staff (not including other time spent in clinical discussion and management)  -CM/SW following, appreciate input   -Will place DC orders once  placement has been secured        COVID-19 virus detected  -Patient is asymptomatic  -Cont isolation   -stable on RA      Facet arthropathy, lumbar  Discussed with Neurosurgery. Surgery not needed.       Subclinical hypothyroidism  Lab Results   Component Value Date    TSH 14.200 (H) 03/22/2023     FT4 within normal limits  Repeat as outpatient when acute illness resolved      Anemia of chronic disease  Anemia of chronic disease present (ferritin elevated) but did also have vaginal bleeding. TSAT 14- some degree iron deficiency    Weakness of both lower extremities  Presented with bilateral lower extremity weakness with no significant sensory deficit. She denied preceding diarrhea/GI symptoms, although she had upper respiratory tract symptoms  - Neurology consulted  - seems as though this is secondary to arthritis as being worked up above   - PT, OT consulted       Postmenopausal bleeding  Noted at OSH. TVUS with thickened endometrial stripe  - needs outpatient GYN follow up       Type 2 diabetes mellitus without complication, without long-term current use of insulin  Patient's FSGs are controlled on current medication regimen.  Last A1c reviewed-   Lab Results   Component Value Date    HGBA1C 6.4 (H) 03/22/2023     Most recent fingerstick glucose reviewed-   Recent Labs   Lab 04/04/23  1136 04/04/23  1712 04/04/23  2056 04/05/23  0722   POCTGLUCOSE 127* 159* 156* 141*     Current correctional scale  Medium  Maintain anti-hyperglycemic dose as follows-   Antihyperglycemics (From admission, onward)    Start     Stop Route Frequency Ordered    03/22/23 0900  insulin detemir U-100 pen 5 Units         -- SubQ Daily 03/22/23 0541    03/22/23 0638  insulin aspart U-100 pen 0-5 Units         -- SubQ Before meals & nightly PRN 03/22/23 0541        Hold Oral hypoglycemics while patient is in the hospital.    Streptococcal bacteremia  Blood cultures 3/13 with Strep bacteremia. Currently being treated with ceftriaxone. This  may have seeded her joints causing septic arthritis. Arthrocentesis R wrist and L shoulder completed on 3/27 with elevated neutrophils. Cultures no growth to date but has been on antibiotics for over a week. ID consulted     Stem cells transplant status  Not currently on immunosuppresants  Follow up with oncology      History of Large cell (diffuse) non-Hodgkin's lymphoma  In remission, follow up with oncology  No new enlarged lymph nodes noted         VTE Risk Mitigation (From admission, onward)         Ordered     Reason for No Pharmacological VTE Prophylaxis  Once        Question:  Reasons:  Answer:  Active Bleeding    03/21/23 2230     IP VTE HIGH RISK PATIENT  Once         03/21/23 2230     Place sequential compression device  Until discontinued         03/21/23 2230                      I have completed this tele-visit without the assistance of a telepresenter.    The attending portion of this evaluation, treatment, and documentation was performed per Millicent Seay MD via Telemedicine AudioVisual using the secure [employer] software platform with 2 way audio/video. The provider was located off-site and the patient is located in the hospital. The aforementioned video software was utilized to document the relevant history and physical exam    Millicent Seay MD  Department of Hospital Medicine   [facility]

## 2023-04-06 NOTE — PLAN OF CARE
[facility]     Department of Hospital Medicine     [address] [place] [address]     [phone] after hours  [phone] fax       REHAB ORDERS    04/06/2023    Admit to REHAB                                                 Diagnoses:  Active Hospital Problems    Diagnosis  POA    *Polyarthritis [M13.0]  Yes    COVID-19 virus detected [U07.1]  Yes    Facet arthropathy, lumbar [M47.816]  Yes    Subclinical hypothyroidism [E03.8]  Yes    Weakness of both lower extremities [R29.898]  Yes    Anemia of chronic disease [D63.8]  Yes    Postmenopausal bleeding [N95.0]  Yes    Type 2 diabetes mellitus without complication, without long-term current use of insulin [E11.9]  Yes    Streptococcal bacteremia [R78.81, B95.5]  Yes    History of Large cell (diffuse) non-Hodgkin's lymphoma [C83.30]  Yes    Stem cells transplant status [Z94.84]  Not Applicable      Resolved Hospital Problems    Diagnosis Date Resolved POA    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 Yes    Pneumonia of both lungs due to infectious organism [J18.9] 03/28/2023 Yes       Allergies:  Review of patient's allergies indicates:   Allergen Reactions    Tetanus vaccines and toxoid Rash       Vitals:  Every shift (Skilled Nursing patients)    Diet: diabetic, cardiac   Supplement:  1 can every three times a day with meals                         Type:     Glucerna       Acitivities: Per PT     LABS:  Per facility protocol    Nursing Precautions:      - Fall precautions per nursing home protocol   - Decubitus precautions:        -  for positioning   - Pressure reducing foam mattress   - Turn patient every two hours. Use wedge pillows to anchor patient   - Aspiration precautions        CONSULTS:      Physical Therapy to evaluate and treat     Occupational Therapy to evaluate and treat     Nutrition to evaluate and recommend diet       MISCELLANEOUS CARE:     Routine Skin for Bedridden Patients:  Apply  moisture barrier cream to all    skin folds and wet areas in perineal area daily and after baths and                           all bowel movements.                  DIABETES CARE:      Check blood sugar:       Fingerstick blood sugar AC and HS   Fingerstick blood sugar every 6 hours if unable to eat      Report CBG < 60 or > 400 to physician.                                          Insulin Sliding Scale          Glucose  Novolog Insulin Subcutaneous        0 - 60   Orange juice or glucose tablet, hold insulin      No insulin   201-250  2 units   251-300  4 units   301-350  6 units   351-400  8 units   >400   10 units then call physician      Medications: Discontinue all previous medication orders, if any. See new list below.    Current Discharge Medication List        START taking these medications    Details   acetaminophen (TYLENOL) 325 MG tablet Take 2 tablets (650 mg total) by mouth every 6 (six) hours as needed.  Refills: 0      cefTRIAXone (ROCEPHIN) 2 gram/50 mL IVPB Inject 50 mLs (2 g total) into the vein once daily. for 21 days  Qty: 1050 mL, Refills: 0   End date: 4/27/2023   insulin detemir U-100, Levemir, 100 unit/mL (3 mL) SubQ InPn pen Inject 5 Units into the skin once daily.  Refills: 0      oxyCODONE (ROXICODONE) 5 MG immediate release tablet Take 1 tablet (5 mg total) by mouth every 4 (four) hours as needed for Pain.  Refills: 0    Comments: Quantity prescribed more than 7 day supply? No      polyethylene glycol (GLYCOLAX) 17 gram PwPk Take 17 g by mouth once daily.  Refills: 0           CONTINUE these medications which have NOT CHANGED    Details   albuterol (PROVENTIL/VENTOLIN HFA) 90 mcg/actuation inhaler Ventolin HFA 90 mcg/actuation aerosol inhaler   INHALE 2 PUFFS BY MOUTH 3 TIMES DAILY AS NEEDED.      albuterol-ipratropium (DUO-NEB) 2.5 mg-0.5 mg/3 mL nebulizer solution ipratropium 0.5 mg-albuterol 3 mg (2.5 mg base)/3 mL nebulization soln   USE 1 VIAL IN NEBULIZER EVERY 6 TO 8 HOURS  AS NEEDED      bisoprolol (ZEBETA) 5 MG tablet Take 5 mg by mouth once daily.      blood sugar diagnostic Strp USE TO CHECK BLOOD SUGAR TWICE A DAY      conjugated estrogens (PREMARIN) vaginal cream Place 0.5 g vaginally once daily. Place 0.5 vaginally for 4 weeks, then transition to twice weekly use.  Qty: 30 g, Refills: 1    Associated Diagnoses: Vaginal atrophy      cyclobenzaprine (FLEXERIL) 10 MG tablet Take 10 mg by mouth 3 (three) times daily as needed. Take for 5 days      fluticasone (FLONASE) 50 mcg/actuation nasal spray 1 spray by Each Nare route once daily.      melatonin 10 mg Cap Take 10 mg by mouth nightly as needed.      metFORMIN (GLUCOPHAGE) 500 MG tablet Take 500 mg by mouth once daily.  Refills: 3      montelukast (SINGULAIR) 10 mg tablet Take 10 mg by mouth once daily.      naproxen (NAPROSYN) 500 MG tablet Take 500 mg by mouth 2 (two) times daily as needed. Take for 5 days      ondansetron (ZOFRAN) 4 MG tablet Take 4 mg by mouth every 8 (eight) hours as needed.      pantoprazole (PROTONIX) 40 MG tablet Take 1 tablet (40 mg total) by mouth once daily.  Qty: 30 tablet, Refills: 0    Associated Diagnoses: Chronic cough      zinc sulfate (ZINCATE) 220 (50) mg capsule Take 220 mg by mouth 3 (three) times daily.           STOP taking these medications       HYDROcodone-acetaminophen (NORCO) 5-325 mg per tablet Comments:   Reason for Stopping:                       _________________________________  Millicent Seay MD  04/06/2023

## 2023-04-06 NOTE — PT/OT/SLP PROGRESS
Occupational Therapy   Treatment    Name: Hedy Conway  MRN: [id number]  Admitting Diagnosis:  Polyarthritis  7 Days Post-Op    Recommendations:     Discharge Recommendations: rehabilitation facility  Discharge Equipment Recommendations:   (TBD per IPR.)  Barriers to discharge:   (Pt significantly limited by pain and weaknessm requiring x 2 person assist for standing and is unable to ambualte at this time; pt will benefit from multidisciplinary approach in an IPR setting for returning to PLOF as she was (I) and working PTA.)    Assessment:     Hedy Conway is a 47 y.o. female with a medical diagnosis of Polyarthritis.   Performance deficits affecting function are weakness, impaired endurance, impaired self care skills, impaired functional mobility, decreased upper extremity function, decreased lower extremity function, decreased coordination, decreased safety awareness, decreased ROM, pain, edema, impaired skin.     Pt pleasant and willing to participate in tx session this date.  Pt was able to perform sit>stand trials (x2), requiring max A x 2 persons from elevated bed. Pt w/ pain but tolerated fairly with rest breaks as needed. Tx session also focused on proper body mechanics for laterally scooting in the event pt will have to rely on scoot/pivot tranfers for safety; pt required max A x 2 persons for success. Pt is making gradual progress towards goals and will continue to benefit from skilled acute OT services to maximize functional capacity for safe performance w/ ADLs and functional mobility.      PTA, pt was (I) w/ all ADLs and functional mobility; pt will benefit from an aggressive multidisciplinary approach in an IPR setting for returning to PLOF. Despite pain and functional limitations, pt is motivated and     Rehab Prognosis:  Good; patient would benefit from acute skilled OT services to address these deficits and reach maximum level of function.       Plan:     Patient to be seen 5 x/week to  address the above listed problems via self-care/home management, therapeutic activities, therapeutic exercises  Plan of Care Expires: 04/07/23  Plan of Care Reviewed with: patient    Subjective     Chief Complaint: Pain   Patient/Family Comments/goals: None stated   Pain/Comfort:  Location - Side 1: Bilateral  Location 1: knee  Pain Addressed 1: Reposition, Distraction, Cessation of Activity    Objective:     Communicated with: Nurse prior to session.  Patient found HOB elevated with telemetry, PICC line, bed alarm, PureWick upon OT entry to room.    General Precautions: Standard, fall    Orthopedic Precautions:RLE weight bearing as tolerated, LLE weight bearing as tolerated  Braces: N/A  Respiratory Status: Room air     Occupational Performance:     Bed Mobility:    Patient completed Scooting/Bridging with maximal assistance and 2 persons for anteriot and lateral scooting at EOB  Patient completed Supine to Sit with minimum assistance and increased time; pt require cueing for safe technique     Functional Mobility/Transfers:  Patient completed Sit <> Stand Transfer x 2 trials from elevated bed with maximal assistance and of 2 persons  with  rolling walker; pt was able to bear weight through R elbow and  w/ LUE on RW; pt required max cueing for shifting hips forward and extending knees but was limited 2* severe pain   Functional Mobility: Unable to take steps at this time.     Activities of Daily Living:  Upper Body Dressing: minimum assistance for donning gown over back     [facility] 6 Click ADL: 15    Treatment & Education:  -Pt performed ADLs and functional mobility as noted above.   -Pt educated on safe body mechanics for performing standing trials safely.   -Reinforced importance of performing BUE AROM throughout the day; pt verbalized understanding.   -Questions and concerns addressed within scope.    Patient left sitting edge of bed w/ RUE elevated with all lines intact and call button in reach    GOALS:    Multidisciplinary Problems       Occupational Therapy Goals          Problem: Occupational Therapy    Goal Priority Disciplines Outcome Interventions   Occupational Therapy Goal     OT, PT/OT Ongoing, Progressing    Description: Goals to be met by 04/07/23:    Patient will increase functional independence with ADLs by performing:    Grooming while seated with Modified [place].  Toileting from bedside commode with Moderate Assistance for hygiene and clothing management.   Sit to stand w/ Minimum Assistance.   Step transfer with Moderate Assistance  Toilet transfer to bedside commode with Moderate Assistance.  Upper extremity exercise program x15 reps per handout, with assistance as needed.                           Time Tracking:     OT Date of Treatment: 04/06/23  OT Start Time: 1353  OT Stop Time: 1419  OT Total Time (min): 26 min    Billable Minutes:Therapeutic Activity 26  Total Time 26 (co-tx w/ PT)     OT/CHANDLER: OT     Number of CHANDLER visits since last OT visit: 1 4/6/2023

## 2023-04-06 NOTE — PT/OT/SLP PROGRESS
Physical Therapy Treatment    Patient Name:  Hedy Conway   MRN:  [id number]    Recommendations:     Discharge Recommendations: rehabilitation facility  Discharge Equipment Recommendations:  (TBD at next level of care)  Barriers to discharge: None    Assessment:     Hedy Conway is a 47 y.o. female admitted with a medical diagnosis of Polyarthritis.  She presents with the following impairments/functional limitations: weakness, impaired endurance, impaired balance, impaired self care skills, impaired functional mobility, gait instability, decreased lower extremity function, decreased upper extremity function, decreased coordination, decreased safety awareness, pain, orthopedic precautions, decreased ROM, edema .    Rehab Prognosis: Fair; patient would benefit from acute skilled PT services to address these deficits and reach maximum level of function.    Recent Surgery: Procedure(s) (LRB):  ARTHROSCOPY, KNEE (Bilateral)  OPEN  ARTHROTOMY WRIST (Right) 7 Days Post-Op    Plan:     During this hospitalization, patient to be seen 5 x/week to address the identified rehab impairments via gait training, therapeutic activities, therapeutic exercises and progress toward the following goals:    Plan of Care Expires:  04/12/23    Subjective     Chief Complaint: pain   Patient/Family Comments/goals: pt is agreeable to therapy   Pain/Comfort:  Location - Side 1: Bilateral  Location 1: knee  Pain Addressed 1: Pre-medicate for activity, Reposition      Objective:     Communicated with nurse prior to session.  Patient found HOB elevated with bed alarm, PICC line, PureWick upon PT entry to room.     General Precautions: Standard, fall, airborne , droplets, contact   Orthopedic Precautions: RLE weight bearing as tolerated, LLE weight bearing as tolerated  Braces: N/A  Respiratory Status: Room air     Functional Mobility:  Bed Mobility:   Scooting: mod assistance to scoot anteriorly, MAX A x 2 to scoot laterally   Supine to Sit:  min assistance , HOB elevated, bedside rail   Transfers:     Sit to Stand: x 2 trials from elevated bed  max assistance and of 2 persons with RW .  Pt with c/o B knees pain with WB, despite max V/T cues pt unable to achieve upright posture , pt required max encouragement /education and positive reinforcements to complete tasks.   Gait : pt  unable to ambulate at this time  2* to pain  Balance:  Good in sitting, poor in standing.         AM-PAC 6 CLICK MOBILITY  Turning over in bed (including adjusting bedclothes, sheets and blankets)?: 2  Sitting down on and standing up from a chair with arms (e.g., wheelchair, bedside commode, etc.): 1  Moving from lying on back to sitting on the side of the bed?: 2  Moving to and from a bed to a chair (including a wheelchair)?: 1  Need to walk in hospital room?: 1  Climbing 3-5 steps with a railing?: 1  Basic Mobility Total Score: 8       Treatment & Education:  Patient educated on the purpose of therapeutic exercise.    Patient verbalized acceptance/understanding of instructions, expectations, and limitations(for safety).  Performed BLE x 10 reps : QS and BLE x 20 reps : hip IR/ER .   Patient performed: 2 sets of 10 reps (each) of B LE There Ex: AP, LAQ (AAROM)  while sitting up on EOB.       Patient  still requires verbal cues/tactile cues to ensure correct sequence, to maintain proper form, and to allow for self-correction.  Pt re-educated not to place pillow under B knees and place under ankles instead to encourage knee extension , pt needs reinforcements. Max encouraged pt to perform BLE AP, QS, GS , HS when in bed. Pt verbalized understanding.     Patient left sitting edge of bed with all lines intact, call button in reach, and nurse notified..    GOALS:   Multidisciplinary Problems       Physical Therapy Goals          Problem: Physical Therapy    Goal Priority Disciplines Outcome Goal Variances Interventions   Physical Therapy Goal     PT, PT/OT Ongoing, Progressing      Description: Goals to be met by:  2023    Patient will increase functional independence with mobility by performin. Supine to sit with Modified [place]  2. Sit to stand transfer with Modified [place]  3. Gait >50 feet with Modified [place] using Rolling Walker  4. BLE seated/supine LE therex x15 reps independently                              Time Tracking:     PT Received On: 23  PT Start Time: 1353     PT Stop Time: 1419  PT Total Time (min): 26 min     Billable Minutes: Therapeutic Activity 13, Therapeutic Exercise 13, and Total Time 26 min with OT     Treatment Type: Treatment  PT/PTA: PTA     Number of PTA visits since last PT visit: 1     2023

## 2023-04-06 NOTE — PLAN OF CARE
[facility]  Discharge Reassessment    Plan for discharge to [facility]. Per Angela ins auth received, bed available  and goal for discharge tomorrow. [place] to continue to follow for dc needs.    Primary Care Provider: John Lantigua PA-C    Expected Discharge Date: 4/8/2023    Reassessment (most recent)       Discharge Reassessment - 04/06/23 1324          Discharge Reassessment    Assessment Type Discharge Planning Reassessment     Did the patient's condition or plan change since previous assessment? No     Discharge Plan discussed with: Patient     Communicated JESSE with patient/caregiver Yes

## 2023-04-06 NOTE — PLAN OF CARE
Problem: Diabetes Comorbidity  Goal: Blood Glucose Level Within Targeted Range  Outcome: Ongoing, Progressing     Problem: Adjustment to Illness (Sepsis/Septic Shock)  Goal: Optimal Coping  Outcome: Ongoing, Progressing     Problem: Bleeding (Sepsis/Septic Shock)  Goal: Absence of Bleeding  Outcome: Ongoing, Progressing     Problem: Glycemic Control Impaired (Sepsis/Septic Shock)  Goal: Blood Glucose Level Within Desired Range  Outcome: Ongoing, Progressing     Patient remained free from falls/injury throughout shift.   No acute changes in status.  Bed locked in lowest position.  Side rails up x2.  Call bell within reach.  Purposeful rounding maintained throughout shift.

## 2023-04-06 NOTE — ASSESSMENT & PLAN NOTE
-Patient noted to be medically stable for discharge at this time  -Has been able to work with OT/PT who recommend placement for further rehabilitation  -Patient pending placement, continue in-hospital care as stated above in the meantime  -More than 20 minutes of my time has been spent discussing and planning just her safe disposition with patient/family/CM/SW/Nursing staff (not including other time spent in clinical discussion and management)  -CM/SW following, appreciate input   -Will place DC orders once placement has been secured

## 2023-04-06 NOTE — NURSING
[facility]  Nurses Note -- 4 Eyes      4/6/2023       Skin assessed on: Q Shift      [x] No Pressure Injuries Present    []Prevention Measures Documented    [] Yes LDA  for Pressure Injury Previously documented     [] Yes New Pressure Injury Discovered   [] LDA for New Pressure Injury Added      Attending RN:  Ariadne Burrell RN     Second RN:  Elina MIDDLETON

## 2023-04-06 NOTE — SUBJECTIVE & OBJECTIVE
Interval History: Pt noted to be awake, alert, conversant and calm. HDS and afebrile. No acute events overnight. No new complaints this morning. Feels close to baseline. Pending placement to [facility].    Review of Systems   Constitutional:  Positive for activity change and fatigue. Negative for fever.   Respiratory:  Negative for shortness of breath.    Cardiovascular:  Negative for chest pain.   Gastrointestinal:  Negative for abdominal pain.   Musculoskeletal:  Positive for arthralgias.   Neurological:  Negative for dizziness and headaches.   All other systems reviewed and are negative.  Objective:     Vital Signs (Most Recent):  Temp: 97.7 °F (36.5 °C) (04/06/23 1132)  Pulse: 104 (04/06/23 1132)  Resp: 19 (04/06/23 1132)  BP: 119/64 (04/06/23 1132)  SpO2: 96 % (04/06/23 1132) Vital Signs (24h Range):  Temp:  [97.7 °F (36.5 °C)-99.3 °F (37.4 °C)] 97.7 °F (36.5 °C)  Pulse:  [] 104  Resp:  [16-20] 19  SpO2:  [95 %-99 %] 96 %  BP: (111-131)/(60-73) 119/64     Weight: 121.1 kg (267 lb)  Body mass index is 41.82 kg/m².    Intake/Output Summary (Last 24 hours) at 4/6/2023 1230  Last data filed at 4/6/2023 0826  Gross per 24 hour   Intake 1560 ml   Output 850 ml   Net 710 ml      Physical Exam  Vitals and nursing note reviewed.   Constitutional:       Appearance: Normal appearance.   HENT:      Head: Normocephalic and atraumatic.   Eyes:      Extraocular Movements: Extraocular movements intact.      Pupils: Pupils are equal, round, and reactive to light.   Cardiovascular:      Rate and Rhythm: Normal rate and regular rhythm.   Pulmonary:      Effort: Pulmonary effort is normal. No respiratory distress.   Abdominal:      General: There is no distension.   Musculoskeletal:         General: Normal range of motion.      Cervical back: Normal range of motion.   Neurological:      General: No focal deficit present.      Mental Status: She is alert and oriented to person, place, and time.   Psychiatric:         Mood and  Affect: Mood normal.         Behavior: Behavior normal.       Significant Labs: All pertinent labs within the past 24 hours have been reviewed.  CBC: No results for input(s): WBC, HGB, HCT, PLT in the last 48 hours.  CMP: No results for input(s): NA, K, CL, CO2, GLU, BUN, CREATININE, CALCIUM, PROT, ALBUMIN, BILITOT, ALKPHOS, AST, ALT, ANIONGAP, EGFRNONAA in the last 48 hours.    Invalid input(s): ESTGFAFRICA    Significant Imaging: I have reviewed all pertinent imaging results/findings within the past 24 hours.

## 2023-04-07 VITALS
DIASTOLIC BLOOD PRESSURE: 59 MMHG | WEIGHT: 267 LBS | SYSTOLIC BLOOD PRESSURE: 152 MMHG | HEIGHT: 67 IN | TEMPERATURE: 98 F | RESPIRATION RATE: 17 BRPM | HEART RATE: 99 BPM | OXYGEN SATURATION: 97 % | BODY MASS INDEX: 41.91 KG/M2

## 2023-04-07 DIAGNOSIS — U07.1 COVID-19 VIRUS DETECTED: ICD-10-CM

## 2023-04-07 LAB
POCT GLUCOSE: 128 MG/DL (ref 70–110)
POCT GLUCOSE: 166 MG/DL (ref 70–110)

## 2023-04-07 PROCEDURE — 99222 1ST HOSP IP/OBS MODERATE 55: CPT | Mod: ,,, | Performed by: FAMILY MEDICINE

## 2023-04-07 PROCEDURE — 25000003 PHARM REV CODE 250: Performed by: ORTHOPAEDIC SURGERY

## 2023-04-07 PROCEDURE — 63600175 PHARM REV CODE 636 W HCPCS: Mod: JZ,JG | Performed by: HOSPITALIST

## 2023-04-07 PROCEDURE — 99222 PR INITIAL HOSPITAL CARE,LEVL II: ICD-10-PCS | Mod: ,,, | Performed by: FAMILY MEDICINE

## 2023-04-07 PROCEDURE — A4216 STERILE WATER/SALINE, 10 ML: HCPCS | Performed by: ORTHOPAEDIC SURGERY

## 2023-04-07 PROCEDURE — 63600175 PHARM REV CODE 636 W HCPCS: Performed by: ORTHOPAEDIC SURGERY

## 2023-04-07 RX ADMIN — Medication 10 ML: at 11:04

## 2023-04-07 RX ADMIN — INSULIN DETEMIR 5 UNITS: 100 INJECTION, SOLUTION SUBCUTANEOUS at 09:04

## 2023-04-07 RX ADMIN — ALTEPLASE 2 MG: 2.2 INJECTION, POWDER, LYOPHILIZED, FOR SOLUTION INTRAVENOUS at 11:04

## 2023-04-07 RX ADMIN — OXYCODONE HYDROCHLORIDE 5 MG: 5 TABLET ORAL at 02:04

## 2023-04-07 RX ADMIN — CYCLOBENZAPRINE HYDROCHLORIDE 5 MG: 5 TABLET, FILM COATED ORAL at 09:04

## 2023-04-07 RX ADMIN — OXYCODONE HYDROCHLORIDE 5 MG: 5 TABLET ORAL at 09:04

## 2023-04-07 RX ADMIN — OXYCODONE HYDROCHLORIDE 5 MG: 5 TABLET ORAL at 03:04

## 2023-04-07 RX ADMIN — Medication 10 ML: at 12:04

## 2023-04-07 NOTE — PROGRESS NOTES
Patient will be able to transfer to [facility]. ADT 30 Transport to be set for noon . Patient's nurse can call report at or around 11am to [phone]. Patient will be delivered to facility and will report to 4th floor nurses station upon arrival.

## 2023-04-07 NOTE — PLAN OF CARE
[facility] - Med Surg  Discharge Final Note    Primary Care Provider: John Lantigua PA-C    Expected Discharge Date: 4/8/2023    Final Discharge Note (most recent)       Final Note - 04/07/23 0724          Final Note    Assessment Type Final Discharge Note     Anticipated Discharge Disposition Rehab Facility   [facility]    What phone number can be called within the next 1-3 days to see how you are doing after discharge? --   [phone]    Hospital Resources/Appts/Education Provided Appointments scheduled by Navigator/Coordinator;Provided education on problems/symptoms using teachback;Provided patient/caregiver with written discharge plan information;Appointments scheduled and added to AVS        Post-Acute Status    Post-Acute Authorization Placement   [facility]    Post-Acute Placement Status Set-up Complete/Auth obtained     Coverage BCBS     Patient choice form signed by patient/caregiver --   Patient accepted at [facility] by Angela and set up by Marlin of [facility] CM team    Discharge Delays None known at this time                     Important Message from Medicare             Contact Info       [facility]   Specialty: Rehab Facility, Rehabilitation    [address] [place] [id number]   Phone: [phone]       Next Steps: Follow up    Instructions: Rehab: In-Patient

## 2023-04-07 NOTE — NURSING
Second attempt to call report to Mary at [facility]. Mary unable to take report at this time. RN provided call back information. Awaiting callback at this time.

## 2023-04-07 NOTE — PLAN OF CARE
04/07/23 0723   Post-Acute Status   Post-Acute Authorization Placement  ([facility])   Post-Acute Placement Status Set-up Complete/Auth obtained   Coverage [facility]   Hospital Resources/Appts/Education Provided Appointments scheduled by Navigator/Coordinator;Provided education on problems/symptoms using teachback;Appointments scheduled and added to AVS;Provided patient/caregiver with written discharge plan information   Patient choice form signed by patient/caregiver   (Patient accepted by Angela at [facility])   Discharge Delays None known at this time   Discharge Plan   Discharge Plan A Rehab  ([facility])   Discharge Plan B Rehab  ([facility])

## 2023-04-07 NOTE — PROGRESS NOTES
ADT-30 Order placed for Non-Emergent Transport: Stretcher  Request  time: 3:00PM  If transportation does not arrive at anticipated ETA, nurse should follow protocol for transportation:  How can I get in touch directly with dispatch if needed?  Non-Emergent Transport Dispatch/Stretcher: [phone]

## 2023-04-07 NOTE — NURSING
[facility] [place]  Nurses Note -- 4 Eyes      4/6/2023       Skin assessed on: Q Shift      [x] No Pressure Injuries Present    []Prevention Measures Documented    [] Yes LDA  for Pressure Injury Previously documented     [] Yes New Pressure Injury Discovered   [] LDA for New Pressure Injury Added      Attending RN:  Marc Flores RN     Second RN:  ilya christian

## 2023-04-07 NOTE — NURSING
Patient transported to [facility]

## 2023-04-07 NOTE — NURSING
[facility]  Nurses Note -- 4 Eyes      4/7/2023       Skin assessed on: Q Shift      [] No Pressure Injuries Present    []Prevention Measures Documented    [x] Yes LDA  for Pressure Injury Previously documented     [] Yes New Pressure Injury Discovered   [] LDA for New Pressure Injury Added      Attending RN:  Jayla Nash RN     Second RN:  MIGUEL Smith

## 2023-04-08 NOTE — DISCHARGE SUMMARY
[facility]  Discharge Summary      Patient Name: Hedy Conway  MRN: [id number]  Patient Class: IP- Inpatient  Admission Date: 3/21/2023  Hospital Length of Stay: 17 days  Discharge Date and Time: 4/7/2023  3:19 PM  Attending Physician: No att. providers found   Discharging Provider: Millicent Seay MD  Primary Care Provider: John Lantigua PA-C      HPI:   47-year-old  female with medical history significant for type 2 diabetes mellitus, hypertension, large cell diffuse non-Hodgkin lymphoma status post chemotherapy and stem cell transplant 2011 was transferred from outside hospital with suspicion of septic arthritis.  Of note she voiced migratory pain in her left-right knee-both shoulders that started 2 weeks ago, associated with generalized weakness, cleansing inability to move right lower extremity and only have movement without gravity on the left lower extremity without associated trauma, she voiced being on 3 different antibiotics at the outside hospital for both pneumonia and urinary tract infection, and this could explain for been afebrile although she voiced subjective fever at the onset of her symptoms she denied cough, shortness of breath, orthopnea, paroxysmal nocturnal dyspnea or pedal swelling.  She denied prior diarrhea although she voiced upper respiratory tract symptoms,treatment for pneumonia prior to onset of these symptoms.  She denied any urinary symptoms at this time of dysuria, frequency, urgency, straining at micturition or hematuria.  No recent travel, no sick contacts, no tick bite.  She had been sent here for possible knee aspiration.      Procedure(s) (LRB):  ARTHROSCOPY, KNEE (Bilateral)  OPEN  ARTHROTOMY WRIST (Right)      Hospital Course:   Ms Hedy Conway who was transferred to [facility] from [facility]. There she was admitted for multiple joint pains. MRI R knee showed meniscal tear and large effusion with synovitis. There was  concern for septic arthritis, aspiration was attempted, but no fluid retrieved. She also had Strep pneumoniae bacteremia (3/13) and Pseudomonas pneumonia (3/15) and is currently being treated with cefepime. She is also receiving vancomycin with concerns for septic joint. She did require 1U RBC transfusion and has had vaginal bleeding. Pelvic US 3/20 showed thickened endometrium, and she will need outpatient GYN follow up. Her iron panel indicates mixed iron deficiency and anemia of chronic disease. She saw Neurology and Orthopaedics. Orthopaedics doubts multiple septic joints simultaneously and suspects Rheumatologic condition. S/p R knee arthrocentesis on 3/23 with culture no growth. Sed rate, CRP remain elevated. Uric acid normal, RF/CCP/SILVIA/hepatitis panel normal. MRI left shoulder with large complex joint effusion with synovitis, high grade partial thickness tear of supraspinatus and infraspinatus, near complete tear of intraarticular bicpet tendon with tenosynovitis. MRI right wrist with distal ulna/radius/carpal osteitis, complex effusions, complex fluid collections, tenosynovitis. S/p arthrocentesis of both L shoulder and R wrist by IR on 3/28- both have high neutrophil count, suggesting septic arthritis. No growth on cultures, but she has been on antibiotics for >1 week. MRI hip shows abnormal L SI joint concerning for septic arthritis. MRI lumbar spine shows R L2-3 facet arthropathy with enhancement. Ortho planning washout on 3/30/23. Neurosurgery also consulted for lumbar spine findings; no surgery needed. Continue ceftriaxone 2g q 24h, anticipate atleast 4 wks IV abx. Medically Ready, awaiting REHAB placement. Pt discharged to rehab in stbale condition on 4/7.        Goals of Care Treatment Preferences:  Code Status: Full Code      Consults:   Consults (From admission, onward)        Status Ordering Provider     Inpatient virtual consult to Hospital Medicine  Once        Provider:  Valencia Cao MD     Completed MUNIRA NEWMAN     Inpatient consult to Neurosurgery  Once        Provider:  Paulina Bell PA-C    Completed ROSE DELUCA     Inpatient consult to Infectious Diseases  Once        Provider:  Yaneth Montana MD    Completed ROSE DELUCA     Inpatient consult to Interventional Radiology  Once        Provider:  Neil Cornelius MD    Completed ROSE DELUCA     Inpatient consult to Orthopedic Surgery  Once        Provider:  Maryann Luna NP    Completed SHIRA ERIC     Inpatient consult to Neurology  Once        Provider:  Mandeep Dietz MD    Completed SHIRA ERIC     Inpatient consult to Orthopedic Surgery  Once        Provider:  Abraham Penn MD    Completed JHON VALENCIA JR          No new Assessment & Plan notes have been filed under this hospital service since the last note was generated.  Service: Hospital Medicine    Final Active Diagnoses:    Diagnosis Date Noted POA    PRINCIPAL PROBLEM:  Polyarthritis [M13.0] 03/14/2023 Yes    Discharge planning issues [Z02.9] 04/06/2023 Not Applicable    COVID-19 virus detected [U07.1] 04/04/2023 Yes    Facet arthropathy, lumbar [M47.816] 03/29/2023 Yes    Subclinical hypothyroidism [E03.8] 03/23/2023 Yes    Weakness of both lower extremities [R29.898] 03/22/2023 Yes    Anemia of chronic disease [D63.8] 03/22/2023 Yes    Postmenopausal bleeding [N95.0] 03/19/2023 Yes    Type 2 diabetes mellitus without complication, without long-term current use of insulin [E11.9] 03/14/2023 Yes    Streptococcal bacteremia [R78.81, B95.5] 06/12/2021 Yes    History of Large cell (diffuse) non-Hodgkin's lymphoma [C83.30] 06/28/2012 Yes    Stem cells transplant status [Z94.84] 06/28/2012 Not Applicable      Problems Resolved During this Admission:    Diagnosis Date Noted Date Resolved POA    Plasma cell disorder [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    Why: Rehab: Inpatient  Contact information:  [address]  [place] [address]  [phone]                     Patient Instructions:      Ambulatory referral/consult to Orthopedics   Standing Status: Future   Referral Priority: Routine Referral Type: Consultation   Requested Specialty: Orthopedic Surgery   Number of Visits Requested: 1     Diet Cardiac     Diet diabetic     Notify your health care provider if you experience any of the following:  temperature >100.4     Notify your health care provider if you experience any of the following:  persistent nausea and vomiting or diarrhea     Notify your health care provider if you experience any of the following:  severe uncontrolled pain     Notify your health care provider if you experience any of the following:  redness, tenderness, or signs of infection (pain, swelling, redness, odor or green/yellow discharge around incision site)     Notify your health care provider if you experience any of the following:  difficulty breathing or increased cough     Notify your health care provider if you experience any of the following:  severe persistent headache     Notify your health care provider if you experience any of the following:  worsening rash     Notify your health care provider if you experience any of the following:  persistent dizziness, light-headedness, or visual disturbances     Notify your health care provider if you experience any of the following:  increased confusion or weakness     Send follow up & questions to   Order Comments: Patient's primary care physician: John Lantigua PA-C     Activity as tolerated       Significant Diagnostic Studies: Labs: CMP No results for input(s): NA, K, CL, CO2, GLU, BUN, CREATININE, CALCIUM, PROT, ALBUMIN, BILITOT, ALKPHOS, AST, ALT, ANIONGAP,  ESTGFRAFRICA, EGFRNONAA in the last 48 hours. and CBC No results for input(s): WBC, HGB, HCT, PLT in the last 48 hours.    Pending Diagnostic Studies:     None         Medications:  Reconciled Home Medications:      Medication List      START taking these medications    acetaminophen 325 MG tablet  Commonly known as: TYLENOL  Take 2 tablets (650 mg total) by mouth every 6 (six) hours as needed.     cefTRIAXone 2 gram/50 mL IVPB  Commonly known as: ROCEPHIN  Inject 50 mLs (2 g total) into the vein once daily. for 21 days     insulin detemir U-100 (Levemir) 100 unit/mL (3 mL) Inpn pen  Inject 5 Units into the skin once daily.     oxyCODONE 5 MG immediate release tablet  Commonly known as: ROXICODONE  Take 1 tablet (5 mg total) by mouth every 4 (four) hours as needed for Pain.     polyethylene glycol 17 gram Pwpk  Commonly known as: GLYCOLAX  Take 17 g by mouth once daily.        CONTINUE taking these medications    albuterol 90 mcg/actuation inhaler  Commonly known as: PROVENTIL/VENTOLIN HFA  Ventolin HFA 90 mcg/actuation aerosol inhaler   INHALE 2 PUFFS BY MOUTH 3 TIMES DAILY AS NEEDED.     albuterol-ipratropium 2.5 mg-0.5 mg/3 mL nebulizer solution  Commonly known as: DUO-NEB  ipratropium 0.5 mg-albuterol 3 mg (2.5 mg base)/3 mL nebulization soln   USE 1 VIAL IN NEBULIZER EVERY 6 TO 8 HOURS AS NEEDED     bisoprolol 5 MG tablet  Commonly known as: ZEBETA  Take 5 mg by mouth once daily.     blood sugar diagnostic Strp  USE TO CHECK BLOOD SUGAR TWICE A DAY     cyclobenzaprine 10 MG tablet  Commonly known as: FLEXERIL  Take 10 mg by mouth 3 (three) times daily as needed. Take for 5 days     fluticasone propionate 50 mcg/actuation nasal spray  Commonly known as: FLONASE  1 spray by Each Nare route once daily.     melatonin 10 mg Cap  Take 10 mg by mouth nightly as needed.     metFORMIN 500 MG tablet  Commonly known as: GLUCOPHAGE  Take 500 mg by mouth once daily.     montelukast 10 mg tablet  Commonly known as:  SINGULAIR  Take 10 mg by mouth once daily.     naproxen 500 MG tablet  Commonly known as: NAPROSYN  Take 500 mg by mouth 2 (two) times daily as needed. Take for 5 days     ondansetron 4 MG tablet  Commonly known as: ZOFRAN  Take 4 mg by mouth every 8 (eight) hours as needed.     pantoprazole 40 MG tablet  Commonly known as: PROTONIX  Take 1 tablet (40 mg total) by mouth once daily.     PREMARIN vaginal cream  Generic drug: conjugated estrogens  Place 0.5 g vaginally once daily. Place 0.5 vaginally for 4 weeks, then transition to twice weekly use.     zinc sulfate 50 mg zinc (220 mg) capsule  Commonly known as: ZINCATE  Take 220 mg by mouth 3 (three) times daily.        STOP taking these medications    HYDROcodone-acetaminophen 5-325 mg per tablet  Commonly known as: NORCO            Indwelling Lines/Drains at time of discharge:   Lines/Drains/Airways     Peripherally Inserted Central Catheter Line  Duration           PICC Double Lumen 03/28/23 1940 right basilic 10 days                Time spent on the discharge of patient: 39 minutes         The attending portion of this evaluation, treatment, and documentation was performed per Millicent Seay MD via Telemedicine AudioVisual using the secure [employer] software platform with 2 way audio/video. The provider was located off-site and the patient is located in the hospital. The aforementioned video software was utilized to document the relevant history and physical exam    Millicent Seay MD  Department of Hospital Medicine  [facility]

## 2023-04-10 ENCOUNTER — HOSPITAL ENCOUNTER (OUTPATIENT)
Dept: RADIOLOGY | Facility: HOSPITAL | Age: 48
Discharge: HOME | End: 2023-04-10
Attending: NURSE PRACTITIONER
Payer: COMMERCIAL

## 2023-04-10 PROCEDURE — 71045 XR CHEST 1 VIEW: ICD-10-PCS | Mod: 26,,, | Performed by: RADIOLOGY

## 2023-04-10 PROCEDURE — 71045 X-RAY EXAM CHEST 1 VIEW: CPT | Mod: 26,,, | Performed by: RADIOLOGY

## 2023-04-24 LAB — FUNGUS SPEC CULT: NORMAL

## 2023-05-10 NOTE — PROGRESS NOTES
Patient has been informed to come in fasting. [facility] Medicine  Progress Note    Patient Name: Hedy Conway  MRN: [id number]  Patient Class: IP- Inpatient   Admission Date: 3/21/2023  Length of Stay: 11 days  Attending Physician: Alan Murrieta MD  Primary Care Provider: John Lantigua PA-C        Subjective:     Principal Problem:Polyarthritis        HPI:  47-year-old  female with medical history significant for type 2 diabetes mellitus, hypertension, large cell diffuse non-Hodgkin lymphoma status post chemotherapy and stem cell transplant 2011 was transferred from outside hospital with suspicion of septic arthritis.  Of note she voiced migratory pain in her left-right knee-both shoulders that started 2 weeks ago, associated with generalized weakness, cleansing inability to move right lower extremity and only have movement without gravity on the left lower extremity without associated trauma, she voiced being on 3 different antibiotics at the outside hospital for both pneumonia and urinary tract infection, and this could explain for been afebrile although she voiced subjective fever at the onset of her symptoms she denied cough, shortness of breath, orthopnea, paroxysmal nocturnal dyspnea or pedal swelling.  She denied prior diarrhea although she voiced upper respiratory tract symptoms,treatment for pneumonia prior to onset of these symptoms.  She denied any urinary symptoms at this time of dysuria, frequency, urgency, straining at micturition or hematuria.  No recent travel, no sick contacts, no tick bite.  She had been sent here for possible knee aspiration.      Overview/Hospital Course:  Ms Hedy Conway who was transferred to [facility] from [facility]. There she was admitted for multiple joint pains. MRI R knee showed meniscal tear and large effusion with synovitis. There was concern for septic arthritis, aspiration was attempted, but no fluid retrieved. She also had Strep pneumoniae bacteremia (3/13) and  Pseudomonas pneumonia (3/15) and is currently being treated with cefepime. She is also receiving vancomycin with concerns for septic joint. She did require 1U RBC transfusion and has had vaginal bleeding. Pelvic US 3/20 showed thickened endometrium, and she will need outpatient GYN follow up. Her iron panel indicates mixed iron deficiency and anemia of chronic disease. She saw Neurology and Orthopaedics. Orthopaedics doubts multiple septic joints simultaneously and suspects Rheumatologic condition. S/p R knee arthrocentesis on 3/23 with culture no growth. Sed rate, CRP remain elevated. Uric acid normal, RF/CCP/SILVIA/hepatitis panel normal. MRI left shoulder with large complex joint effusion with synovitis, high grade partial thickness tear of supraspinatus and infraspinatus, near complete tear of intraarticular bicpet tendon with tenosynovitis. MRI right wrist with distal ulna/radius/carpal osteitis, complex effusions, complex fluid collections, tenosynovitis. S/p arthrocentesis of both L shoulder and R wrist by IR on 3/28- both have high neutrophil count, suggesting septic arthritis. No growth on cultures, but she has been on antibiotics for >1 week. MRI hip shows abnormal L SI joint concerning for septic arthritis. MRI lumbar spine shows R L2-3 facet arthropathy with enhancement. Ortho planning washout on 3/30/23. Neurosurgery also consulted for lumbar spine findings; no surgery needed.     Continue ceftriaxone 2g q 24h, anticipate atleast 4 wks IV abx.  Medically Ready, awaiting REHAB placement.      Interval History:   NAEON  No new symptoms  S/p washout    Review of Systems   Constitutional:  Positive for activity change.   Musculoskeletal:  Positive for arthralgias.   Neurological:  Positive for weakness.     Objective:     Vital Signs (Most Recent):  Temp: 98.1 °F (36.7 °C) (04/01/23 0653)  Pulse: 96 (04/01/23 0653)  Resp: 18 (04/01/23 0653)  BP: 129/70 (04/01/23 0653)  SpO2: 96 % (04/01/23 0653)   Vital Signs  (24h Range):  Temp:  [98.1 °F (36.7 °C)-98.6 °F (37 °C)] 98.1 °F (36.7 °C)  Pulse:  [] 96  Resp:  [16-20] 18  SpO2:  [96 %-100 %] 96 %  BP: (109-129)/(58-70) 129/70     Weight: 121.1 kg (267 lb)  Body mass index is 41.82 kg/m².    Intake/Output Summary (Last 24 hours) at 4/1/2023 0923  Last data filed at 4/1/2023 0835  Gross per 24 hour   Intake 480 ml   Output 2752 ml   Net -2272 ml        Physical Exam  Vitals and nursing note reviewed.   Constitutional:       General: She is not in acute distress.     Appearance: She is well-developed. She is obese. She is not ill-appearing or diaphoretic.   HENT:      Head: Normocephalic and atraumatic.      Nose: Nose normal.      Mouth/Throat:      Mouth: Mucous membranes are moist.   Eyes:      General: No scleral icterus.  Neck:      Thyroid: No thyromegaly.   Cardiovascular:      Rate and Rhythm: Regular rhythm. Tachycardia present.      Heart sounds: No murmur heard.  Pulmonary:      Effort: Pulmonary effort is normal.      Breath sounds: Normal breath sounds. No stridor. No wheezing or rales.      Comments: Room air  Abdominal:      General: Bowel sounds are normal. There is no distension.      Palpations: Abdomen is soft. There is no mass.      Tenderness: There is no abdominal tenderness. There is no guarding.   Musculoskeletal:         General: Normal range of motion.      Cervical back: Normal range of motion and neck supple.      Right lower leg: No edema.      Left lower leg: No edema.      Comments: R wrist swelling. R and L knee swellling   Skin:     General: Skin is warm and dry.      Capillary Refill: Capillary refill takes less than 2 seconds.      Findings: Lesion (surgical c/d/i) present. No rash.   Neurological:      Mental Status: She is alert and oriented to person, place, and time.   Psychiatric:         Behavior: Behavior normal.           Recent Results (from the past 24 hour(s))   Vancomycin, Random    Collection Time: 03/31/23 11:04 AM   Result  Value Ref Range    Vancomycin, Random 18.0 Not established ug/mL   POCT glucose    Collection Time: 03/31/23 12:58 PM   Result Value Ref Range    POCT Glucose 154 (H) 70 - 110 mg/dL   POCT glucose    Collection Time: 03/31/23  5:09 PM   Result Value Ref Range    POCT Glucose 135 (H) 70 - 110 mg/dL   POCT glucose    Collection Time: 03/31/23  8:32 PM   Result Value Ref Range    POCT Glucose 151 (H) 70 - 110 mg/dL   CBC Auto Differential    Collection Time: 04/01/23  3:51 AM   Result Value Ref Range    WBC 4.35 3.90 - 12.70 K/uL    RBC 2.99 (L) 4.00 - 5.40 M/uL    Hemoglobin 7.4 (L) 12.0 - 16.0 g/dL    Hematocrit 25.0 (L) 37.0 - 48.5 %    MCV 84 82 - 98 fL    MCH 24.7 (L) 27.0 - 31.0 pg    MCHC 29.6 (L) 32.0 - 36.0 g/dL    RDW 18.2 (H) 11.5 - 14.5 %    Platelets 157 150 - 450 K/uL    MPV 10.7 9.2 - 12.9 fL    Immature Granulocytes 1.8 (H) 0.0 - 0.5 %    Gran # (ANC) 1.9 1.8 - 7.7 K/uL    Immature Grans (Abs) 0.08 (H) 0.00 - 0.04 K/uL    Lymph # 1.8 1.0 - 4.8 K/uL    Mono # 0.6 0.3 - 1.0 K/uL    Eos # 0.0 0.0 - 0.5 K/uL    Baso # 0.02 0.00 - 0.20 K/uL    nRBC 0 0 /100 WBC    Gran % 43.7 38.0 - 73.0 %    Lymph % 40.5 18.0 - 48.0 %    Mono % 12.6 4.0 - 15.0 %    Eosinophil % 0.9 0.0 - 8.0 %    Basophil % 0.5 0.0 - 1.9 %    Differential Method Automated    Basic Metabolic Panel    Collection Time: 04/01/23  3:51 AM   Result Value Ref Range    Sodium 141 136 - 145 mmol/L    Potassium 3.8 3.5 - 5.1 mmol/L    Chloride 101 95 - 110 mmol/L    CO2 27 23 - 29 mmol/L    Glucose 149 (H) 70 - 110 mg/dL    BUN 9 6 - 20 mg/dL    Creatinine 0.5 0.5 - 1.4 mg/dL    Calcium 9.0 8.7 - 10.5 mg/dL    Anion Gap 13 8 - 16 mmol/L    eGFR >60 >60 mL/min/1.73 m^2   POCT glucose    Collection Time: 04/01/23  6:52 AM   Result Value Ref Range    POCT Glucose 186 (H) 70 - 110 mg/dL       Microbiology Results (last 7 days)       Procedure Component Value Units Date/Time    Gram stain [[id number]] Collected: 03/30/23 2014    Order Status: Completed  Specimen: Wound from Knee, Left Updated: 03/31/23 0755     Gram Stain Result Few WBC's      No organisms seen    Gram stain [[id number]] Collected: 03/30/23 2014    Order Status: Completed Specimen: Wound from Knee, Right Updated: 03/31/23 0755     Gram Stain Result Few WBC's      No organisms seen    Gram stain [[id number]] Collected: 03/30/23 2014    Order Status: Completed Specimen: Wound from Wrist, Right Updated: 03/31/23 0754     Gram Stain Result Rare WBC's      No organisms seen    Aerobic culture [[id number]] Collected: 03/27/23 1551    Order Status: Completed Specimen: Shoulder, Left Updated: 03/31/23 0722     Aerobic Bacterial Culture No growth    Culture, Anaerobe [[id number]] Collected: 03/27/23 1551    Order Status: Completed Specimen: Shoulder, Left Updated: 03/31/23 0527     Anaerobic Culture No anaerobes isolated    Aerobic culture [[id number]] Collected: 03/30/23 2014    Order Status: Sent Specimen: Wound from Knee, Left Updated: 03/31/23 0216    Culture, Anaerobe [[id number]] Collected: 03/31/23 0149    Order Status: Sent Specimen: Wound from Knee, Left Updated: 03/31/23 0215    Culture, Anaerobe [[id number]] Collected: 03/30/23 2014    Order Status: Sent Specimen: Wound from Wrist, Right Updated: 03/31/23 0213    Aerobic culture [[id number]] Collected: 03/30/23 2014    Order Status: Sent Specimen: Wound from Wrist, Right Updated: 03/31/23 0213    Aerobic culture [[id number]] Collected: 03/30/23 2014    Order Status: Sent Specimen: Wound from Knee, Right Updated: 03/31/23 0212    Culture, Anaerobe [[id number]] Collected: 03/30/23 2014    Order Status: Sent Specimen: Wound from Knee, Right Updated: 03/31/23 0211    Culture, Anaerobe [[id number]] Collected: 03/30/23 2014    Order Status: Canceled Specimen: Wound from Wrist, Right     AFB Culture & Smear [[id number]] Collected: 03/27/23 1551    Order Status: Completed Specimen: Shoulder, Left Updated: 03/29/23 2127     AFB Culture & Smear Culture in progress      AFB CULTURE STAIN No acid fast bacilli seen.    Gram stain [[id number]] Collected: 03/27/23 1551    Order Status: Completed Specimen: Shoulder, Left Updated: 03/28/23 0802     Gram Stain Result Rare WBC's      No organisms seen    Fungus culture [[id number]] Collected: 03/27/23 1551    Order Status: Sent Specimen: Shoulder, Left Updated: 03/27/23 1632    Aerobic culture [[id number]] Collected: 03/27/23 1619    Order Status: Canceled Specimen: Arm from Wrist, Right     Culture, Anaerobe [[id number]] Collected: 03/27/23 1618    Order Status: Canceled Specimen: Other from Wrist, Right     AFB Culture & Smear [[id number]] Collected: 03/27/23 1618    Order Status: Canceled Specimen: Other from Wrist, Right     Fungus culture [[id number]] Collected: 03/27/23 1618    Order Status: Canceled Specimen: Other from Wrist, Right     Gram stain [[id number]] Collected: 03/27/23 1618    Order Status: Canceled Specimen: Wound from Wrist, Right     Culture, Body Fluid (Aerobic) w/ GS [[id number]] Collected: 03/23/23 1244    Order Status: Completed Specimen: Body Fluid from Knee Updated: 03/27/23 0733     AEROBIC CULTURE - FLUID No growth     Gram Stain Result Few WBC's      No organisms seen    Blood culture [[id number]] Collected: 03/22/23 0610    Order Status: Completed Specimen: Blood from Peripheral, Right Arm Updated: 03/26/23 0903     Blood Culture, Routine No Growth after 4 days.                       Assessment/Plan:      * Polyarthritis  Present since admission to [facility]. MRI noted R knee effusion. ESR, CRP elevated. Doubt gout. RF, CCP, SILVIA, acute hep panel normal. Parvovirus negative. RF normal. TSH elevated but free T4 normal, so less likely hypothyroidism. Overall this is concerning for septic arthritis affecting multiple joints as a result of bacteremia.     - MRI L shoulder: complex effusion with synovitis  - MRI R wrist: osteitis, complex effusions and fluid collections  - MRI L hip: abnormal SI joint concerning for septic  arthritis   - MRI lumbar spine: R sided L2-3 facet joint arthropathy concerning for septic arthritis    - Orthopaedics consulted   - s/p R wrist and L shoulder arthrocenteses on 3/27/23   - R wrist only 2cc, cell count not performed but PMN predominant. Cultures unable to be sent   - L shoulder WBC 47K with PMN predominance, cultures NGTD but has been on antibiotics for >1 week  - Ortho consulted- plan washout bilateral knees and R wrist on 3/30/23    - Neurosurgery consulted for SI joint and L2-3 findings on MRI-- surgery not needed    - ID following, remains on CTX and vanc   - PT, OT consulted as well - likely will need rehab vs SNF when ready       Facet arthropathy, lumbar  Discussed with Neurosurgery. Surgery not needed.       Subclinical hypothyroidism  Lab Results   Component Value Date    TSH 14.200 (H) 03/22/2023     FT4 within normal limits  Repeat as outpatient when acute illness resolved      Anemia of chronic disease  Anemia of chronic disease present (ferritin elevated) but did also have vaginal bleeding. TSAT 14- some degree iron deficiency    Weakness of both lower extremities  Presented with bilateral lower extremity weakness with no significant sensory deficit. She denied preceding diarrhea/GI symptoms, although she had upper respiratory tract symptoms  - Neurology consulted  - seems as though this is secondary to arthritis as being worked up above   - PT, OT consulted       Postmenopausal bleeding  Noted at OSH. TVUS with thickened endometrial stripe  - needs outpatient GYN follow up       Type 2 diabetes mellitus without complication, without long-term current use of insulin  Patient's FSGs are controlled on current medication regimen.  Last A1c reviewed-   Lab Results   Component Value Date    HGBA1C 6.4 (H) 03/22/2023     Most recent fingerstick glucose reviewed-   Recent Labs   Lab 03/29/23  1138 03/29/23  1633 03/29/23  1958 03/30/23  0720   POCTGLUCOSE 135* 149* 167* 216*     Current  correctional scale  Medium  Maintain anti-hyperglycemic dose as follows-   Antihyperglycemics (From admission, onward)      Start     Stop Route Frequency Ordered    03/22/23 0900  insulin detemir U-100 pen 5 Units         -- SubQ Daily 03/22/23 0541    03/22/23 0638  insulin aspart U-100 pen 0-5 Units         -- SubQ Before meals & nightly PRN 03/22/23 0541          Hold Oral hypoglycemics while patient is in the hospital.    Streptococcal bacteremia  Blood cultures 3/13 with Strep bacteremia. Currently being treated with ceftriaxone. This may have seeded her joints causing septic arthritis. Arthrocentesis R wrist and L shoulder completed on 3/27 with elevated neutrophils. Cultures no growth to date but has been on antibiotics for over a week. ID consulted     Stem cells transplant status  Not currently on immunosuppresants  Follow up with oncology      History of Large cell (diffuse) non-Hodgkin's lymphoma  In remission, follow up with oncology  No new enlarged lymph nodes noted         VTE Risk Mitigation (From admission, onward)           Ordered     Reason for No Pharmacological VTE Prophylaxis  Once        Question:  Reasons:  Answer:  Active Bleeding    03/21/23 2230     IP VTE HIGH RISK PATIENT  Once         03/21/23 2230     Place sequential compression device  Until discontinued         03/21/23 2230                    Discharge Planning   JESSE: 4/3/2023     Code Status: Full Code   Is the patient medically ready for discharge?:     Reason for patient still in hospital (select all that apply): Patient trending condition and Treatment  Discharge Plan A: Rehab   Discharge Delays: None known at this time              Alan Murrieta MD  Department of Hospital Medicine   [facility]

## 2023-05-16 LAB
ACID FAST MOD KINY STN SPEC: NORMAL
MYCOBACTERIUM SPEC QL CULT: NORMAL

## 2023-06-04 ENCOUNTER — HOSPITAL ENCOUNTER (INPATIENT)
Facility: HOSPITAL | Age: 48
LOS: 3 days | Discharge: HOME HEALTH | DRG: 871 | End: 2023-06-08
Attending: SURGERY | Admitting: STUDENT IN AN ORGANIZED HEALTH CARE EDUCATION/TRAINING PROGRAM
Payer: COMMERCIAL

## 2023-06-04 DIAGNOSIS — R53.83 FATIGUE: ICD-10-CM

## 2023-06-04 DIAGNOSIS — N17.9 ACUTE KIDNEY INJURY: ICD-10-CM

## 2023-06-04 DIAGNOSIS — C83.30 LARGE CELL (DIFFUSE) NON-HODGKIN'S LYMPHOMA: ICD-10-CM

## 2023-06-04 DIAGNOSIS — I95.9 HYPOTENSION, UNSPECIFIED HYPOTENSION TYPE: ICD-10-CM

## 2023-06-04 DIAGNOSIS — I10 ESSENTIAL HYPERTENSION: ICD-10-CM

## 2023-06-04 DIAGNOSIS — R29.898 WEAKNESS OF BOTH LOWER EXTREMITIES: ICD-10-CM

## 2023-06-04 DIAGNOSIS — E11.628 TYPE 2 DIABETES MELLITUS WITH OTHER SKIN COMPLICATION, WITHOUT LONG-TERM CURRENT USE OF INSULIN: ICD-10-CM

## 2023-06-04 DIAGNOSIS — L02.31 ABSCESS, GLUTEAL, RIGHT: ICD-10-CM

## 2023-06-04 DIAGNOSIS — L02.91 ABSCESS: Primary | ICD-10-CM

## 2023-06-04 DIAGNOSIS — R53.83 FATIGUE, UNSPECIFIED TYPE: ICD-10-CM

## 2023-06-04 DIAGNOSIS — A41.9 SEPSIS: ICD-10-CM

## 2023-06-04 DIAGNOSIS — A41.02 SEPSIS DUE TO METHICILLIN RESISTANT STAPHYLOCOCCUS AUREUS (MRSA) WITHOUT ACUTE ORGAN DYSFUNCTION: ICD-10-CM

## 2023-06-04 LAB
ALBUMIN SERPL BCP-MCNC: 3.3 G/DL (ref 3.5–5.2)
ALP SERPL-CCNC: 92 U/L (ref 55–135)
ALT SERPL W/O P-5'-P-CCNC: 12 U/L (ref 10–44)
AMORPH CRY URNS QL MICRO: ABNORMAL
ANION GAP SERPL CALC-SCNC: 13 MMOL/L (ref 8–16)
AST SERPL-CCNC: 5 U/L (ref 10–40)
BACTERIA #/AREA URNS HPF: ABNORMAL /HPF
BASOPHILS # BLD AUTO: 0.02 K/UL (ref 0–0.2)
BASOPHILS NFR BLD: 0.3 % (ref 0–1.9)
BILIRUB SERPL-MCNC: 0.7 MG/DL (ref 0.1–1)
BILIRUB UR QL STRIP: ABNORMAL
BNP SERPL-MCNC: 45 PG/ML (ref 0–99)
BUN SERPL-MCNC: 32 MG/DL (ref 6–20)
CALCIUM SERPL-MCNC: 9.2 MG/DL (ref 8.7–10.5)
CAOX CRY URNS QL MICRO: ABNORMAL
CHLORIDE SERPL-SCNC: 101 MMOL/L (ref 95–110)
CK SERPL-CCNC: 8 U/L (ref 20–180)
CLARITY UR: ABNORMAL
CO2 SERPL-SCNC: 22 MMOL/L (ref 23–29)
COLOR UR: YELLOW
CREAT SERPL-MCNC: 1.3 MG/DL (ref 0.5–1.4)
DIFFERENTIAL METHOD: ABNORMAL
EOSINOPHIL # BLD AUTO: 0 K/UL (ref 0–0.5)
EOSINOPHIL NFR BLD: 0.1 % (ref 0–8)
ERYTHROCYTE [DISTWIDTH] IN BLOOD BY AUTOMATED COUNT: 16.4 % (ref 11.5–14.5)
EST. GFR  (NO RACE VARIABLE): 50 ML/MIN/1.73 M^2
GLUCOSE SERPL-MCNC: 138 MG/DL (ref 70–110)
GLUCOSE UR QL STRIP: NEGATIVE
HCT VFR BLD AUTO: 33 % (ref 37–48.5)
HGB BLD-MCNC: 9.9 G/DL (ref 12–16)
HGB UR QL STRIP: NEGATIVE
HYALINE CASTS #/AREA URNS LPF: 20 /LPF
IMM GRANULOCYTES # BLD AUTO: 0.07 K/UL (ref 0–0.04)
IMM GRANULOCYTES NFR BLD AUTO: 1 % (ref 0–0.5)
KETONES UR QL STRIP: ABNORMAL
LACTATE SERPL-SCNC: 1.4 MMOL/L (ref 0.5–2.2)
LEUKOCYTE ESTERASE UR QL STRIP: NEGATIVE
LYMPHOCYTES # BLD AUTO: 2.9 K/UL (ref 1–4.8)
LYMPHOCYTES NFR BLD: 40.6 % (ref 18–48)
MCH RBC QN AUTO: 23.7 PG (ref 27–31)
MCHC RBC AUTO-ENTMCNC: 30 G/DL (ref 32–36)
MCV RBC AUTO: 79 FL (ref 82–98)
MICROSCOPIC COMMENT: ABNORMAL
MONOCYTES # BLD AUTO: 1.1 K/UL (ref 0.3–1)
MONOCYTES NFR BLD: 15.7 % (ref 4–15)
NEUTROPHILS # BLD AUTO: 3 K/UL (ref 1.8–7.7)
NEUTROPHILS NFR BLD: 42.3 % (ref 38–73)
NITRITE UR QL STRIP: NEGATIVE
NRBC BLD-RTO: 0 /100 WBC
OTHER ELEMENTS URNS MICRO: ABNORMAL
PH UR STRIP: 6 [PH] (ref 5–8)
PLATELET # BLD AUTO: 74 K/UL (ref 150–450)
PMV BLD AUTO: 11.3 FL (ref 9.2–12.9)
POTASSIUM SERPL-SCNC: 4.1 MMOL/L (ref 3.5–5.1)
PROT SERPL-MCNC: 5.5 G/DL (ref 6–8.4)
PROT UR QL STRIP: ABNORMAL
RBC # BLD AUTO: 4.17 M/UL (ref 4–5.4)
RBC #/AREA URNS HPF: 1 /HPF (ref 0–4)
SODIUM SERPL-SCNC: 136 MMOL/L (ref 136–145)
SP GR UR STRIP: 1.02 (ref 1–1.03)
SQUAMOUS #/AREA URNS HPF: 1 /HPF
TROPONIN I SERPL DL<=0.01 NG/ML-MCNC: <0.006 NG/ML (ref 0–0.03)
URN SPEC COLLECT METH UR: ABNORMAL
UROBILINOGEN UR STRIP-ACNC: NEGATIVE EU/DL
WBC # BLD AUTO: 7.14 K/UL (ref 3.9–12.7)
WBC #/AREA URNS HPF: 23 /HPF (ref 0–5)

## 2023-06-04 PROCEDURE — 99285 EMERGENCY DEPT VISIT HI MDM: CPT | Mod: 25

## 2023-06-04 PROCEDURE — 96367 TX/PROPH/DG ADDL SEQ IV INF: CPT

## 2023-06-04 PROCEDURE — 63600175 PHARM REV CODE 636 W HCPCS: Performed by: SURGERY

## 2023-06-04 PROCEDURE — 25000003 PHARM REV CODE 250: Performed by: SURGERY

## 2023-06-04 PROCEDURE — G0378 HOSPITAL OBSERVATION PER HR: HCPCS

## 2023-06-04 PROCEDURE — 93010 EKG 12-LEAD: ICD-10-PCS | Mod: ,,, | Performed by: INTERNAL MEDICINE

## 2023-06-04 PROCEDURE — 85025 COMPLETE CBC W/AUTO DIFF WBC: CPT | Performed by: SURGERY

## 2023-06-04 PROCEDURE — 96361 HYDRATE IV INFUSION ADD-ON: CPT

## 2023-06-04 PROCEDURE — 84484 ASSAY OF TROPONIN QUANT: CPT | Performed by: SURGERY

## 2023-06-04 PROCEDURE — 87070 CULTURE OTHR SPECIMN AEROBIC: CPT | Performed by: SURGERY

## 2023-06-04 PROCEDURE — 93005 ELECTROCARDIOGRAM TRACING: CPT

## 2023-06-04 PROCEDURE — 99900035 HC TECH TIME PER 15 MIN (STAT)

## 2023-06-04 PROCEDURE — 96366 THER/PROPH/DIAG IV INF ADDON: CPT

## 2023-06-04 PROCEDURE — 80053 COMPREHEN METABOLIC PANEL: CPT | Performed by: SURGERY

## 2023-06-04 PROCEDURE — 10060 I&D ABSCESS SIMPLE/SINGLE: CPT

## 2023-06-04 PROCEDURE — 87077 CULTURE AEROBIC IDENTIFY: CPT | Performed by: SURGERY

## 2023-06-04 PROCEDURE — 36415 COLL VENOUS BLD VENIPUNCTURE: CPT | Performed by: SURGERY

## 2023-06-04 PROCEDURE — 87186 SC STD MICRODIL/AGAR DIL: CPT | Performed by: SURGERY

## 2023-06-04 PROCEDURE — 87040 BLOOD CULTURE FOR BACTERIA: CPT | Mod: 59 | Performed by: SURGERY

## 2023-06-04 PROCEDURE — 83880 ASSAY OF NATRIURETIC PEPTIDE: CPT | Performed by: SURGERY

## 2023-06-04 PROCEDURE — 25000003 PHARM REV CODE 250: Performed by: STUDENT IN AN ORGANIZED HEALTH CARE EDUCATION/TRAINING PROGRAM

## 2023-06-04 PROCEDURE — 83605 ASSAY OF LACTIC ACID: CPT | Performed by: SURGERY

## 2023-06-04 PROCEDURE — 82550 ASSAY OF CK (CPK): CPT | Performed by: SURGERY

## 2023-06-04 PROCEDURE — 87086 URINE CULTURE/COLONY COUNT: CPT | Performed by: SURGERY

## 2023-06-04 PROCEDURE — 81000 URINALYSIS NONAUTO W/SCOPE: CPT | Performed by: SURGERY

## 2023-06-04 PROCEDURE — 93010 ELECTROCARDIOGRAM REPORT: CPT | Mod: ,,, | Performed by: INTERNAL MEDICINE

## 2023-06-04 PROCEDURE — 96365 THER/PROPH/DIAG IV INF INIT: CPT

## 2023-06-04 RX ORDER — SODIUM CHLORIDE 9 MG/ML
INJECTION, SOLUTION INTRAVENOUS CONTINUOUS
Status: DISCONTINUED | OUTPATIENT
Start: 2023-06-04 | End: 2023-06-06

## 2023-06-04 RX ORDER — HYDROCODONE BITARTRATE AND ACETAMINOPHEN 5; 325 MG/1; MG/1
1 TABLET ORAL
Status: COMPLETED | OUTPATIENT
Start: 2023-06-04 | End: 2023-06-04

## 2023-06-04 RX ORDER — MUPIROCIN 20 MG/G
OINTMENT TOPICAL
Status: COMPLETED | OUTPATIENT
Start: 2023-06-04 | End: 2023-06-04

## 2023-06-04 RX ORDER — ONDANSETRON 2 MG/ML
4 INJECTION INTRAMUSCULAR; INTRAVENOUS EVERY 8 HOURS PRN
Status: DISCONTINUED | OUTPATIENT
Start: 2023-06-04 | End: 2023-06-08 | Stop reason: HOSPADM

## 2023-06-04 RX ORDER — VANCOMYCIN HYDROCHLORIDE 1 G/20ML
INJECTION, POWDER, LYOPHILIZED, FOR SOLUTION INTRAVENOUS
Status: DISCONTINUED
Start: 2023-06-04 | End: 2023-06-05

## 2023-06-04 RX ORDER — NOREPINEPHRINE BITARTRATE/D5W 4MG/250ML
0-3 PLASTIC BAG, INJECTION (ML) INTRAVENOUS CONTINUOUS
Status: DISCONTINUED | OUTPATIENT
Start: 2023-06-04 | End: 2023-06-07

## 2023-06-04 RX ORDER — TALC
6 POWDER (GRAM) TOPICAL NIGHTLY PRN
Status: DISCONTINUED | OUTPATIENT
Start: 2023-06-04 | End: 2023-06-08 | Stop reason: HOSPADM

## 2023-06-04 RX ORDER — SODIUM CHLORIDE 0.9 % (FLUSH) 0.9 %
10 SYRINGE (ML) INJECTION
Status: DISCONTINUED | OUTPATIENT
Start: 2023-06-04 | End: 2023-06-08 | Stop reason: HOSPADM

## 2023-06-04 RX ORDER — LIDOCAINE HYDROCHLORIDE 10 MG/ML
10 INJECTION, SOLUTION EPIDURAL; INFILTRATION; INTRACAUDAL; PERINEURAL
Status: COMPLETED | OUTPATIENT
Start: 2023-06-04 | End: 2023-06-04

## 2023-06-04 RX ORDER — ACETAMINOPHEN 325 MG/1
650 TABLET ORAL EVERY 8 HOURS PRN
Status: DISCONTINUED | OUTPATIENT
Start: 2023-06-04 | End: 2023-06-08 | Stop reason: HOSPADM

## 2023-06-04 RX ORDER — HYDROCODONE BITARTRATE AND ACETAMINOPHEN 5; 325 MG/1; MG/1
1 TABLET ORAL EVERY 4 HOURS PRN
Status: DISCONTINUED | OUTPATIENT
Start: 2023-06-04 | End: 2023-06-08 | Stop reason: HOSPADM

## 2023-06-04 RX ADMIN — SODIUM CHLORIDE 2000 ML: 9 INJECTION, SOLUTION INTRAVENOUS at 05:06

## 2023-06-04 RX ADMIN — NOREPINEPHRINE BITARTRATE 0.05 MCG/KG/MIN: 4 INJECTION, SOLUTION INTRAVENOUS at 09:06

## 2023-06-04 RX ADMIN — VANCOMYCIN HYDROCHLORIDE 2500 MG: 1 INJECTION, POWDER, LYOPHILIZED, FOR SOLUTION INTRAVENOUS at 06:06

## 2023-06-04 RX ADMIN — MUPIROCIN: 20 OINTMENT TOPICAL at 03:06

## 2023-06-04 RX ADMIN — HYDROCODONE BITARTRATE AND ACETAMINOPHEN 1 TABLET: 5; 325 TABLET ORAL at 05:06

## 2023-06-04 RX ADMIN — SODIUM CHLORIDE 2000 ML: 9 INJECTION, SOLUTION INTRAVENOUS at 03:06

## 2023-06-04 RX ADMIN — PIPERACILLIN AND TAZOBACTAM 4.5 G: 4; .5 INJECTION, POWDER, LYOPHILIZED, FOR SOLUTION INTRAVENOUS; PARENTERAL at 05:06

## 2023-06-04 RX ADMIN — SODIUM CHLORIDE: 9 INJECTION, SOLUTION INTRAVENOUS at 11:06

## 2023-06-04 RX ADMIN — LIDOCAINE HYDROCHLORIDE 100 MG: 10 INJECTION, SOLUTION EPIDURAL; INFILTRATION; INTRACAUDAL; PERINEURAL at 03:06

## 2023-06-04 NOTE — ED PROVIDER NOTES
Encounter Date: 6/4/2023       History     Chief Complaint   Patient presents with    Abscess     Patient to ER CC of a abscess on her lower back for a few days      Hedy Conway is a 48 y.o. female that presents with a right buttock abscess   Pain in the right buttock area, she is a paraplegic on ER interview this evening  Patient states there has been bothering her, 4 centimeter abscess on buttock  No obvious cellulitic spread, right upper gluteal cleft & location this evening  However this patient's systolic blood pressure is 86 on initial arrival this p.m.  Patient states that she was septic in March 2023, no source identified then  Patient had extensive stay at [facility], afebrile today in ED    Review of patient's allergies indicates:   Allergen Reactions    Tetanus vaccines and toxoid Rash     Past Medical History:   Diagnosis Date    Acute carpal tunnel syndrome of left wrist     Biliary colic     Diabetes mellitus     Encounter for blood transfusion     History of chemotherapy     Incisional hernia     Large cell (diffuse) non-Hodgkin's lymphoma     Stem cells transplant status      Past Surgical History:   Procedure Laterality Date    ARTHROSCOPY OF KNEE Bilateral 3/30/2023    Procedure: ARTHROSCOPY, KNEE;  Surgeon: Rudolph Murrell MD;  Location: [facility];  Service: Orthopedics;  Laterality: Bilateral;    BREAST BIOPSY Left     lymph node biopsy, benign    BREAST SURGERY      CHOLECYSTECTOMY      COLD KNIFE CONIZATION OF CERVIX N/A 3/8/2021    Procedure: CONE BIOPSY, CERVIX, USING COLD KNIFE;  Surgeon: Evelyn Wheeler MD;  Location: [facility];  Service: OB/GYN;  Laterality: N/A;    COLONOSCOPY N/A 12/21/2022    Procedure: COLONOSCOPY;  Surgeon: Annamarie Jane MD;  Location: [facility];  Service: Endoscopy;  Laterality: N/A;    HAND ARTHROTOMY Right 3/30/2023    Procedure: OPEN  ARTHROTOMY WRIST;  Surgeon: Rudolph Murrell MD;  Location: [facility];  Service: Orthopedics;   Laterality: Right;    HERNIA REPAIR      incisional    LUNG BIOPSY      lymphnode biopsy      MEDIPORT REMOVAL Left 1/30/2020    Procedure: REMOVAL, CATHETER, CENTRAL VENOUS, TUNNELED, WITH PORT;  Surgeon: Luis Bogran-Reyes, MD;  Location: [facility] OR;  Service: General;  Laterality: Left;    PORTACATH PLACEMENT Left     REVISION OF SCAR  6/22/2021    Procedure: REVISION, SCAR;  Surgeon: Otis Grimes MD;  [facility];  Service: General;;    TUBAL LIGATION      UMBILICAL HERNIA REPAIR       Family History   Problem Relation Age of Onset    Diabetes Mother     Hypertension Mother     Kidney disease Mother     Heart block Mother     Diabetes Father     Hypertension Father     Lung cancer Father     Heart failure Father     Breast cancer Maternal Aunt     Breast cancer Maternal Aunt         leukemia    Colon cancer Paternal Grandmother     Cancer Paternal Grandmother     Anesthesia problems Neg Hx      Social History     Tobacco Use    Smoking status: Never    Smokeless tobacco: Never   Substance Use Topics    Alcohol use: Not Currently    Drug use: No     Review of Systems   Constitutional: Negative.    HENT: Negative.     Eyes: Negative.    Respiratory: Negative.     Cardiovascular: Negative.    Gastrointestinal: Negative.    Genitourinary: Negative.    Musculoskeletal: Negative.    Skin:  Positive for wound.   Neurological: Negative.    Psychiatric/Behavioral: Negative.       Physical Exam     Initial Vitals [06/04/23 1521]   BP Pulse Resp Temp SpO2   (!) 93/52 102 18 98.3 °F (36.8 °C) 97 %      MAP       --         Physical Exam    Nursing note and vitals reviewed.  Constitutional: She appears well-developed.   HENT:   Head: Normocephalic and atraumatic.   Right Ear: External ear normal.   Left Ear: External ear normal.   Nose: Nose normal.   Mouth/Throat: Oropharynx is clear and moist.   Eyes: Conjunctivae and EOM are normal. Pupils are equal, round, and reactive to light.   Neck: Neck supple. No  JVD present.   Normal range of motion.  Cardiovascular:  Normal rate and regular rhythm.           Pulmonary/Chest: No respiratory distress. She has no wheezes. She has no rhonchi. She has no rales. She exhibits no tenderness.   Abdominal: Abdomen is soft. Bowel sounds are normal. She exhibits no distension. There is no abdominal tenderness. There is no rebound.   Musculoskeletal:         General: Normal range of motion.      Cervical back: Normal range of motion and neck supple.     Neurological: She is alert and oriented to person, place, and time. She has normal strength and normal reflexes.   Skin:   (+) 3 centimeter abscess of the right upper gluteal cleft       ED Course   Procedures  Labs Reviewed   CBC W/ AUTO DIFFERENTIAL - Abnormal; Notable for the following components:       Result Value    Hemoglobin 9.9 (*)     Hematocrit 33.0 (*)     MCV 79 (*)     MCH 23.7 (*)     MCHC 30.0 (*)     RDW 16.4 (*)     Platelets 74 (*)     Immature Granulocytes 1.0 (*)     Immature Grans (Abs) 0.07 (*)     Mono # 1.1 (*)     Mono % 15.7 (*)     All other components within normal limits   COMPREHENSIVE METABOLIC PANEL - Abnormal; Notable for the following components:    CO2 22 (*)     Glucose 138 (*)     BUN 32 (*)     Total Protein 5.5 (*)     Albumin 3.3 (*)     AST 5 (*)     eGFR 50 (*)     All other components within normal limits   URINALYSIS, REFLEX TO URINE CULTURE - Abnormal; Notable for the following components:    Appearance, UA Hazy (*)     Protein, UA 2+ (*)     Ketones, UA Trace (*)     Bilirubin (UA) 1+ (*)     All other components within normal limits    Narrative:     Specimen Source->Urine   CK - Abnormal; Notable for the following components:    CPK 8 (*)     All other components within normal limits   URINALYSIS MICROSCOPIC - Abnormal; Notable for the following components:    WBC, UA 23 (*)     Bacteria Many (*)     Hyaline Casts, UA 20 (*)     Amorphous, UA Many (*)     Other (U/A) Rare (*)     All  other components within normal limits    Narrative:     Specimen Source->Urine   CULTURE, BLOOD   CULTURE, BLOOD   CULTURE, AEROBIC  (SPECIFY SOURCE)   CULTURE, URINE   LACTIC ACID, PLASMA   TROPONIN I   B-TYPE NATRIURETIC PEPTIDE     EKG Readings: (Independently Interpreted)   No STEMI  Normal sinus rhythm  No ectopy  Normal conduction  Normal ST segments  Normal T-wave  Normal axis  Heart rate in the 100s     Imaging Results              X-Ray Chest 1 View (Final result)  Result time 06/04/23 17:41:49      Final result by Jona Fajardo MD (06/04/23 17:41:49)                   Impression:      See above comments.  No detrimental change.      Electronically signed by: Jona Fajardo  Date:    06/04/2023  Time:    17:41               Narrative:    EXAMINATION:  XR CHEST 1 VIEW    CLINICAL HISTORY:  Fatigue;    TECHNIQUE:  Single frontal view of the chest was performed.    COMPARISON:  04/10/2023, 03/28/2023    FINDINGS:  Mild bibasilar alveolar airspace disease.  This could represent mild patchy infiltrate or atelectasis.  No detrimental change.    The cardiac silhouette is normal in size. The hilar and mediastinal contours are unremarkable.    No acute fractures.  Remote rib fractures on the left.  No significant change.    The PICC catheter is been removed in the interval.                                       Medications   sodium chloride 0.9% bolus 2,000 mL 2,000 mL (has no administration in time range)   vancomycin - pharmacy to dose (has no administration in time range)   piperacillin-tazobactam (ZOSYN) 4.5 g in dextrose 5 % in water (D5W) 5 % 100 mL IVPB (MB+) (4.5 g Intravenous New Bag 6/4/23 0796)   vancomycin (VANCOCIN) 2,500 mg in dextrose 5 % (D5W) 500 mL IVPB (has no administration in time range)   HYDROcodone-acetaminophen 5-325 mg per tablet 1 tablet (has no administration in time range)   mupirocin 2 % ointment ( Topical (Top) Given 6/4/23 1728)   LIDOcaine (PF) 10 mg/ml (1%) injection 100 mg (100  mg Infiltration Given 23 1538)   sodium chloride 0.9% bolus 2,000 mL 2,000 mL (2,000 mLs Intravenous New Bag 23 1546)     I & D - Incision and Drainage  -- Performed by: Jonathan Cole M.D.  -- Date/Time: 5:51 PM 2023    -- Type: abscess  -- Location: Right upper gluteal cleft  -- Anesthesia: local infiltration  -- Local anesthetic: lidocaine 1%   -- Anesthetic total: 10 ml  -- Scalpel size: 11  -- Incision type: single straight  -- Complexity: simple  -- Drainage: pus  -- Drainage amount: scant  -- Wound treatment: incision  -- Packing material:  in gauze  -- Wound culture taken    Medical Decision Makin y.o. female that presents with a right buttock abscess today  Patient had an incision & drainage in the emergency room today  Patient however has a systolic blood pressure of 86, HX of HTN  IV fluids being administered, lab work ordered reviewed in the ER   Lab work looked within normal limits, normal chest x-ray & EKG    Patient however was recently diagnosed with sepsis in 2023  Patient's buttock wound was appropriately cultured, BP improving  However given her recent sepsis, appropriate concerned this evening  Will admit to the ICU with IV fluids & IV antibiotics going forward  Consultation to General surgery, monitor cultures on ED admission    Critical Care ED Physician Time (minutes):  -- Performed by: Jonathan Cole M.D.  -- Date/Time: 5:54 PM 2023   -- Direct Patient Care (Face Time): 5  -- Additional History from Records or Taking Additional History: 5  -- Ordering, Reviewing, and Interpreting Diagnostic Studies: 5  -- Total Time in Documentation: 11  -- Consultation with Other Physicians: 5  -- Consultation with Family Related to Condition: 0  -- Total Critical Care Time: 31  -- Critical care was necessary to treat hypotension/abscess/possible sepsis  -- Critical care was time spent personally by me on the following activities:   -- examination of patient, ordering and  performing treatments   -- review of radiographic studies, re-evaluation of pt's condition  -- review of labs and evaluation of response to treatment                           Clinical Impression:   Final diagnoses:  [R53.83] Fatigue  [L02.91] Abscess (Primary)  [I95.9] Hypotension, unspecified hypotension type        ED Disposition Condition    Observation Stable                Jonathan Cole MD  06/04/23 8035

## 2023-06-05 PROBLEM — D64.89 OTHER SPECIFIED ANEMIAS: Status: ACTIVE | Noted: 2023-06-05

## 2023-06-05 PROBLEM — L02.31 ABSCESS, GLUTEAL, RIGHT: Status: ACTIVE | Noted: 2023-06-05

## 2023-06-05 PROBLEM — E11.9 TYPE 2 DIABETES MELLITUS: Status: ACTIVE | Noted: 2023-06-05

## 2023-06-05 PROBLEM — I10 ESSENTIAL HYPERTENSION: Status: ACTIVE | Noted: 2023-06-05

## 2023-06-05 PROBLEM — N17.9 ACUTE KIDNEY INJURY: Status: ACTIVE | Noted: 2023-06-05

## 2023-06-05 PROBLEM — E11.628 TYPE 2 DIABETES MELLITUS WITH SKIN COMPLICATION: Status: ACTIVE | Noted: 2023-06-05

## 2023-06-05 PROBLEM — A41.9 SEPSIS: Status: ACTIVE | Noted: 2023-06-05

## 2023-06-05 LAB
ALBUMIN SERPL BCP-MCNC: 2.7 G/DL (ref 3.5–5.2)
ALP SERPL-CCNC: 73 U/L (ref 55–135)
ALT SERPL W/O P-5'-P-CCNC: 11 U/L (ref 10–44)
ANION GAP SERPL CALC-SCNC: 10 MMOL/L (ref 8–16)
AST SERPL-CCNC: <5 U/L (ref 10–40)
BACTERIA UR CULT: NO GROWTH
BASOPHILS # BLD AUTO: 0.03 K/UL (ref 0–0.2)
BASOPHILS NFR BLD: 0.5 % (ref 0–1.9)
BILIRUB SERPL-MCNC: 0.6 MG/DL (ref 0.1–1)
BUN SERPL-MCNC: 18 MG/DL (ref 6–20)
CALCIUM SERPL-MCNC: 8.6 MG/DL (ref 8.7–10.5)
CHLORIDE SERPL-SCNC: 110 MMOL/L (ref 95–110)
CO2 SERPL-SCNC: 19 MMOL/L (ref 23–29)
CREAT SERPL-MCNC: 0.7 MG/DL (ref 0.5–1.4)
DIFFERENTIAL METHOD: ABNORMAL
EOSINOPHIL # BLD AUTO: 0 K/UL (ref 0–0.5)
EOSINOPHIL NFR BLD: 0.6 % (ref 0–8)
ERYTHROCYTE [DISTWIDTH] IN BLOOD BY AUTOMATED COUNT: 16.3 % (ref 11.5–14.5)
EST. GFR  (NO RACE VARIABLE): >60 ML/MIN/1.73 M^2
GLUCOSE SERPL-MCNC: 146 MG/DL (ref 70–110)
HCT VFR BLD AUTO: 27.9 % (ref 37–48.5)
HGB BLD-MCNC: 8.5 G/DL (ref 12–16)
IMM GRANULOCYTES # BLD AUTO: 0.19 K/UL (ref 0–0.04)
IMM GRANULOCYTES NFR BLD AUTO: 2.9 % (ref 0–0.5)
LYMPHOCYTES # BLD AUTO: 2.6 K/UL (ref 1–4.8)
LYMPHOCYTES NFR BLD: 40.7 % (ref 18–48)
MCH RBC QN AUTO: 23.9 PG (ref 27–31)
MCHC RBC AUTO-ENTMCNC: 30.5 G/DL (ref 32–36)
MCV RBC AUTO: 79 FL (ref 82–98)
MONOCYTES # BLD AUTO: 1 K/UL (ref 0.3–1)
MONOCYTES NFR BLD: 14.9 % (ref 4–15)
NEUTROPHILS # BLD AUTO: 2.6 K/UL (ref 1.8–7.7)
NEUTROPHILS NFR BLD: 40.4 % (ref 38–73)
NRBC BLD-RTO: 0 /100 WBC
PLATELET # BLD AUTO: 72 K/UL (ref 150–450)
PMV BLD AUTO: 11.2 FL (ref 9.2–12.9)
POCT GLUCOSE: 121 MG/DL (ref 70–110)
POCT GLUCOSE: 132 MG/DL (ref 70–110)
POCT GLUCOSE: 134 MG/DL (ref 70–110)
POTASSIUM SERPL-SCNC: 3.4 MMOL/L (ref 3.5–5.1)
PROT SERPL-MCNC: 4.7 G/DL (ref 6–8.4)
RBC # BLD AUTO: 3.55 M/UL (ref 4–5.4)
SODIUM SERPL-SCNC: 139 MMOL/L (ref 136–145)
WBC # BLD AUTO: 6.46 K/UL (ref 3.9–12.7)

## 2023-06-05 PROCEDURE — 25000003 PHARM REV CODE 250: Performed by: SURGERY

## 2023-06-05 PROCEDURE — 99291 CRITICAL CARE FIRST HOUR: CPT | Mod: ,,, | Performed by: PHYSICIAN ASSISTANT

## 2023-06-05 PROCEDURE — 96368 THER/DIAG CONCURRENT INF: CPT

## 2023-06-05 PROCEDURE — 85025 COMPLETE CBC W/AUTO DIFF WBC: CPT | Performed by: SURGERY

## 2023-06-05 PROCEDURE — 11000001 HC ACUTE MED/SURG PRIVATE ROOM

## 2023-06-05 PROCEDURE — 25000003 PHARM REV CODE 250: Performed by: STUDENT IN AN ORGANIZED HEALTH CARE EDUCATION/TRAINING PROGRAM

## 2023-06-05 PROCEDURE — 80053 COMPREHEN METABOLIC PANEL: CPT | Performed by: SURGERY

## 2023-06-05 PROCEDURE — 99291 PR CRITICAL CARE, E/M 30-74 MINUTES: ICD-10-PCS | Mod: ,,, | Performed by: PHYSICIAN ASSISTANT

## 2023-06-05 PROCEDURE — 96366 THER/PROPH/DIAG IV INF ADDON: CPT

## 2023-06-05 PROCEDURE — 36415 COLL VENOUS BLD VENIPUNCTURE: CPT | Performed by: SURGERY

## 2023-06-05 PROCEDURE — 25000003 PHARM REV CODE 250: Performed by: PHYSICIAN ASSISTANT

## 2023-06-05 PROCEDURE — 63600175 PHARM REV CODE 636 W HCPCS: Performed by: STUDENT IN AN ORGANIZED HEALTH CARE EDUCATION/TRAINING PROGRAM

## 2023-06-05 RX ORDER — IBUPROFEN 200 MG
200 TABLET ORAL ONCE
Status: DISCONTINUED | OUTPATIENT
Start: 2023-06-05 | End: 2023-06-08 | Stop reason: HOSPADM

## 2023-06-05 RX ORDER — INSULIN ASPART 100 [IU]/ML
0-5 INJECTION, SOLUTION INTRAVENOUS; SUBCUTANEOUS
Status: DISCONTINUED | OUTPATIENT
Start: 2023-06-05 | End: 2023-06-08 | Stop reason: HOSPADM

## 2023-06-05 RX ORDER — IBUPROFEN 200 MG
24 TABLET ORAL
Status: DISCONTINUED | OUTPATIENT
Start: 2023-06-05 | End: 2023-06-08 | Stop reason: HOSPADM

## 2023-06-05 RX ORDER — GLUCAGON 1 MG
1 KIT INJECTION
Status: DISCONTINUED | OUTPATIENT
Start: 2023-06-05 | End: 2023-06-08 | Stop reason: HOSPADM

## 2023-06-05 RX ORDER — IBUPROFEN 200 MG
16 TABLET ORAL
Status: DISCONTINUED | OUTPATIENT
Start: 2023-06-05 | End: 2023-06-08 | Stop reason: HOSPADM

## 2023-06-05 RX ORDER — MONTELUKAST SODIUM 10 MG/1
10 TABLET ORAL DAILY
Status: DISCONTINUED | OUTPATIENT
Start: 2023-06-05 | End: 2023-06-08 | Stop reason: HOSPADM

## 2023-06-05 RX ORDER — KETOROLAC TROMETHAMINE 10 MG/1
10 TABLET, FILM COATED ORAL ONCE
Status: DISCONTINUED | OUTPATIENT
Start: 2023-06-05 | End: 2023-06-05

## 2023-06-05 RX ADMIN — PIPERACILLIN AND TAZOBACTAM 4.5 G: 4; .5 INJECTION, POWDER, LYOPHILIZED, FOR SOLUTION INTRAVENOUS; PARENTERAL at 02:06

## 2023-06-05 RX ADMIN — VANCOMYCIN HYDROCHLORIDE 1250 MG: 1.25 INJECTION, POWDER, LYOPHILIZED, FOR SOLUTION INTRAVENOUS at 09:06

## 2023-06-05 RX ADMIN — PIPERACILLIN AND TAZOBACTAM 4.5 G: 4; .5 INJECTION, POWDER, LYOPHILIZED, FOR SOLUTION INTRAVENOUS; PARENTERAL at 09:06

## 2023-06-05 RX ADMIN — Medication 6 MG: at 09:06

## 2023-06-05 RX ADMIN — ACETAMINOPHEN 650 MG: 325 TABLET ORAL at 10:06

## 2023-06-05 RX ADMIN — PIPERACILLIN AND TAZOBACTAM 4.5 G: 4; .5 INJECTION, POWDER, LYOPHILIZED, FOR SOLUTION INTRAVENOUS; PARENTERAL at 05:06

## 2023-06-05 RX ADMIN — POTASSIUM BICARBONATE 20 MEQ: 391 TABLET, EFFERVESCENT ORAL at 08:06

## 2023-06-05 RX ADMIN — SODIUM CHLORIDE: 9 INJECTION, SOLUTION INTRAVENOUS at 08:06

## 2023-06-05 RX ADMIN — MONTELUKAST 10 MG: 10 TABLET, FILM COATED ORAL at 08:06

## 2023-06-05 RX ADMIN — Medication 6 MG: at 12:06

## 2023-06-05 RX ADMIN — SODIUM CHLORIDE: 9 INJECTION, SOLUTION INTRAVENOUS at 11:06

## 2023-06-05 RX ADMIN — SODIUM CHLORIDE: 9 INJECTION, SOLUTION INTRAVENOUS at 03:06

## 2023-06-05 NOTE — NURSING TRANSFER
Nursing Transfer Note      6/5/2023     Reason patient is being transferred: ***    Transfer {TRANSFER TO/FROM:07479}: ***    Transfer via {TRANSFER VIA:38398}    Transfer with {TRANSFER WITH:61641}    Transported by ***    Telemetry: {TELEMETRY:82744}    Medicines sent: ***    Any special needs or follow-up needed: ***    Chart send with patient: {YES (DEF)/NO:81393}    Notified: {NOTIFIED:51782}    Patient reassessed at: *** (date, time)  1  Upon arrival to floor: {IP NSG TRANSFER ARRIVAL OHS:65403}

## 2023-06-05 NOTE — NURSING
2215 Received report from [facility] on a 48 year old female with diagnosis of Fatigue, Abcess, and Hypotension. She is alert and oriented times 4. Calm and cooperative. Oriented to ICU and discussed plan of care with patient. Continuous cardiac monitoring in progress Hr 95. Levophed infusing at 0.05 mcg/kg/min. Sat 97% on room air. External catheter in progress to continuous wall suction. Tea colored urine in canister. SCD's in progress to BLE's. Will continue to monitor. Side rails times 2 up, call button in reach, and bed low and locked.    2309 began NS continuous infusion to run at 125 ml/hr.    0600 Patient slept throughout the night. Levophed infusing at 0.02 mcg/kg/min. B/p 109/54, MAP 77.

## 2023-06-05 NOTE — ASSESSMENT & PLAN NOTE
Patient's FSGs are controlled on current medication regimen.  Last A1c reviewed-   Lab Results   Component Value Date    HGBA1C 6.4 (H) 03/22/2023     Most recent fingerstick glucose reviewed- No results for input(s): POCTGLUCOSE in the last 24 hours.  Current correctional scale  Low  Maintain anti-hyperglycemic dose as follows-   Antihyperglycemics (From admission, onward)    Start     Stop Route Frequency Ordered    06/05/23 0911  insulin aspart U-100 pen 0-5 Units         -- SubQ Before meals & nightly PRN 06/05/23 0811        Hold Oral hypoglycemics while patient is in the hospital.

## 2023-06-05 NOTE — H&P
[facility] Medicine  History & Physical    Patient Name: Hedy Conway  MRN: [id number]  Patient Class: OP- Observation  Admission Date: 6/4/2023  Attending Physician: Perry Bray MD   Primary Care Provider: John Lantigua PA-C         Patient information was obtained from patient and ER records.     Subjective:     Principal Problem:Sepsis    Chief Complaint:   Chief Complaint   Patient presents with    Abscess     Patient to ER CC of a abscess on her lower back for a few days         HPI: Patient is a 48 year old female with medical history of obesity, nonhogdkin lymphoma, recently treated for septic arthritis, HTN, DM type 2 who presented to the ED with right buttock abscess.  Patient states symptoms started on Thursday and on Saturday she felt fatigue. She has had fever and chills but denies CP, SOB, nausea and vomiting.   BP low in the ED and was bolus a total of 4 liters.      Admitted for sepsis secondary to right buttock abscess.        Past Medical History:   Diagnosis Date    Acute carpal tunnel syndrome of left wrist     Biliary colic     Diabetes mellitus     Encounter for blood transfusion     History of chemotherapy     Incisional hernia     Large cell (diffuse) non-Hodgkin's lymphoma     Stem cells transplant status        Past Surgical History:   Procedure Laterality Date    ARTHROSCOPY OF KNEE Bilateral 03/30/2023    Procedure: ARTHROSCOPY, KNEE;  Surgeon: Rudolph Murrell MD;  Location: [facility];  Service: Orthopedics;  Laterality: Bilateral;    BREAST BIOPSY Left     lymph node biopsy, benign    BREAST SURGERY      CHOLECYSTECTOMY      COLD KNIFE CONIZATION OF CERVIX N/A 03/08/2021    Procedure: CONE BIOPSY, CERVIX, USING COLD KNIFE;  Surgeon: Evelyn Wheeler MD;  Location: [facility] OR;  Service: OB/GYN;  Laterality: N/A;    COLONOSCOPY N/A 12/21/2022    Procedure: COLONOSCOPY;  Surgeon: Annamarie Jane MD;  Location: [facility];  Service:  Endoscopy;  Laterality: N/A;    HAND ARTHROTOMY Right 03/30/2023    Procedure: OPEN  ARTHROTOMY WRIST;  Surgeon: Rudolph Murrell MD;  Location: [facility] OR;  Service: Orthopedics;  Laterality: Right;    HERNIA REPAIR      incisional    LUNG BIOPSY      lymphnode biopsy      MEDIPORT REMOVAL Left 01/30/2020    Procedure: REMOVAL, CATHETER, CENTRAL VENOUS, TUNNELED, WITH PORT;  Surgeon: Luis Bogran-Reyes, MD;  Location: [facility] OR;  Service: General;  Laterality: Left;    PORTACATH PLACEMENT Left     REVISION OF SCAR  06/22/2021    Procedure: REVISION, SCAR;  Surgeon: Otis Grimes MD;  Location: [facility] OR [address] [place] [address];  Service: General;;    SKIN BIOPSY      TUBAL LIGATION      UMBILICAL HERNIA REPAIR         Review of patient's allergies indicates:   Allergen Reactions    Tetanus vaccines and toxoid Rash       No current facility-administered medications on file prior to encounter.     Current Outpatient Medications on File Prior to Encounter   Medication Sig    acetaminophen (TYLENOL) 325 MG tablet Take 2 tablets (650 mg total) by mouth every 6 (six) hours as needed.    bisoprolol (ZEBETA) 5 MG tablet Take 5 mg by mouth once daily.    cyclobenzaprine (FLEXERIL) 10 MG tablet Take 10 mg by mouth 3 (three) times daily as needed. Take for 5 days    fluticasone (FLONASE) 50 mcg/actuation nasal spray 1 spray by Each Nare route once daily.    melatonin 10 mg Cap Take 10 mg by mouth nightly as needed.    montelukast (SINGULAIR) 10 mg tablet Take 10 mg by mouth once daily.    ondansetron (ZOFRAN) 4 MG tablet Take 4 mg by mouth every 8 (eight) hours as needed.    albuterol (PROVENTIL/VENTOLIN HFA) 90 mcg/actuation inhaler Ventolin HFA 90 mcg/actuation aerosol inhaler   INHALE 2 PUFFS BY MOUTH 3 TIMES DAILY AS NEEDED.    albuterol-ipratropium (DUO-NEB) 2.5 mg-0.5 mg/3 mL nebulizer solution ipratropium 0.5 mg-albuterol 3 mg (2.5 mg base)/3 mL nebulization soln   USE 1 VIAL IN NEBULIZER EVERY 6 TO 8 HOURS AS  NEEDED    blood sugar diagnostic Strp USE TO CHECK BLOOD SUGAR TWICE A DAY    conjugated estrogens (PREMARIN) vaginal cream Place 0.5 g vaginally once daily. Place 0.5 vaginally for 4 weeks, then transition to twice weekly use.    insulin detemir U-100, Levemir, 100 unit/mL (3 mL) SubQ InPn pen Inject 5 Units into the skin once daily.    metFORMIN (GLUCOPHAGE) 500 MG tablet Take 500 mg by mouth once daily.    naproxen (NAPROSYN) 500 MG tablet Take 500 mg by mouth 2 (two) times daily as needed. Take for 5 days    oxyCODONE (ROXICODONE) 5 MG immediate release tablet Take 1 tablet (5 mg total) by mouth every 4 (four) hours as needed for Pain.    pantoprazole (PROTONIX) 40 MG tablet Take 1 tablet (40 mg total) by mouth once daily.    polyethylene glycol (GLYCOLAX) 17 gram PwPk Take 17 g by mouth once daily.    zinc sulfate (ZINCATE) 220 (50) mg capsule Take 220 mg by mouth 3 (three) times daily.     Family History       Problem Relation (Age of Onset)    Breast cancer Maternal Aunt, Maternal Aunt    Cancer Paternal Grandmother    Colon cancer Paternal Grandmother    Diabetes Mother, Father    Heart block Mother    Heart failure Father    Hypertension Mother, Father    Kidney disease Mother    Lung cancer Father          Tobacco Use    Smoking status: Never    Smokeless tobacco: Never   Substance and Sexual Activity    Alcohol use: Not Currently    Drug use: No    Sexual activity: Yes     Partners: Male     Birth control/protection: See Surgical Hx     Comment: with one partner for 24 years     Review of Systems   Constitutional:  Positive for chills, fatigue and fever.   Respiratory:  Negative for cough and shortness of breath.    Cardiovascular:  Negative for chest pain.   Gastrointestinal:  Negative for constipation, diarrhea, nausea and vomiting.   Genitourinary:  Negative for difficulty urinating and dysuria.   Musculoskeletal:  Positive for arthralgias and gait problem.   Skin:  Positive for wound.    Neurological:  Negative for speech difficulty and weakness (generalized/ right knee).   Psychiatric/Behavioral:  Negative for agitation and confusion.    Objective:     Vital Signs (Most Recent):  Temp: 98 °F (36.7 °C) (06/05/23 0730)  Pulse: 86 (06/05/23 0615)  Resp: (!) 31 (06/05/23 0615)  BP: (!) 117/58 (06/05/23 0600)  SpO2: 98 % (06/05/23 0615) Vital Signs (24h Range):  Temp:  [98 °F (36.7 °C)-99.7 °F (37.6 °C)] 98 °F (36.7 °C)  Pulse:  [] 86  Resp:  [16-43] 31  SpO2:  [93 %-99 %] 98 %  BP: ()/(39-83) 117/58     Weight: 112.2 kg (247 lb 5.7 oz)  Body mass index is 38.74 kg/m².     Physical Exam  Constitutional:       Appearance: Normal appearance.   Cardiovascular:      Rate and Rhythm: Normal rate and regular rhythm.   Pulmonary:      Effort: Pulmonary effort is normal.      Breath sounds: Normal breath sounds.   Abdominal:      General: There is no distension.      Tenderness: There is no guarding.   Skin:     General: Skin is warm and dry.      Comments: Right gluteal abscess with packaging and mediplex    Neurological:      Mental Status: She is alert and oriented to person, place, and time. Mental status is at baseline.   Psychiatric:         Mood and Affect: Mood normal.         Behavior: Behavior normal.              Significant Labs: A1C:   Recent Labs   Lab 03/15/23  0551 03/22/23  0610   HGBA1C 6.4* 6.4*     ABGs: No results for input(s): PH, PCO2, HCO3, POCSATURATED, BE, TOTALHB, COHB, METHB, O2HB, POCFIO2, PO2 in the last 48 hours.  Bilirubin:   Recent Labs   Lab 06/04/23  1610 06/05/23  0504   BILITOT 0.7 0.6     Blood Culture:   Recent Labs   Lab 06/04/23  1610   LABBLOO No Growth to date  No Growth to date     BMP:   Recent Labs   Lab 06/05/23  0504   *      K 3.4*      CO2 19*   BUN 18   CREATININE 0.7   CALCIUM 8.6*     CBC:   Recent Labs   Lab 06/04/23  1610 06/05/23  0504   WBC 7.14 6.46   HGB 9.9* 8.5*   HCT 33.0* 27.9*   PLT 74* 72*     CMP:   Recent Labs    Lab 06/04/23  1610 06/05/23  0504    139   K 4.1 3.4*    110   CO2 22* 19*   * 146*   BUN 32* 18   CREATININE 1.3 0.7   CALCIUM 9.2 8.6*   PROT 5.5* 4.7*   ALBUMIN 3.3* 2.7*   BILITOT 0.7 0.6   ALKPHOS 92 73   AST 5* <5*   ALT 12 11   ANIONGAP 13 10     Cardiac Markers:   Recent Labs   Lab 06/04/23  1610   BNP 45     Coagulation: No results for input(s): PT, INR, APTT in the last 48 hours.  Lactic Acid:   Recent Labs   Lab 06/04/23  1610   LACTATE 1.4     Lipase: No results for input(s): LIPASE in the last 48 hours.  Lipid Panel: No results for input(s): CHOL, HDL, LDLCALC, TRIG, CHOLHDL in the last 48 hours.  Magnesium: No results for input(s): MG in the last 48 hours.  POCT Glucose: No results for input(s): POCTGLUCOSE in the last 48 hours.  Prealbumin: No results for input(s): PREALBUMIN in the last 48 hours.  Respiratory Culture: No results for input(s): GSRESP, RESPIRATORYC in the last 48 hours.  Troponin:   Recent Labs   Lab 06/04/23  1610   TROPONINI <0.006     TSH:   Recent Labs   Lab 03/22/23  0610   TSH 14.200*     Urine Culture: No results for input(s): LABURIN in the last 48 hours.  Urine Studies:   Recent Labs   Lab 06/04/23  1617   COLORU Yellow   APPEARANCEUA Hazy*   PHUR 6.0   SPECGRAV 1.025   PROTEINUA 2+*   GLUCUA Negative   KETONESU Trace*   BILIRUBINUA 1+*   OCCULTUA Negative   NITRITE Negative   UROBILINOGEN Negative   LEUKOCYTESUR Negative   RBCUA 1   WBCUA 23*   BACTERIA Many*   SQUAMEPITHEL 1   HYALINECASTS 20*       Significant Imaging: I have reviewed all pertinent imaging results/findings within the past 24 hours.    Assessment/Plan:     * Sepsis  This patient does have evidence of infective focus  My overall impression is septic shock due to SBP < 90.  Source: Skin and Soft Tissue (location right glute)  Antibiotics given-   Antibiotics (72h ago, onward)    Start     Stop Route Frequency Ordered    06/05/23 0900  vancomycin 1,250 mg in dextrose 5 % (D5W) 250 mL IVPB  (Vial-Mate)         -- IV Every 12 hours (non-standard times) 06/04/23 2310    06/05/23 0130  piperacillin-tazobactam (ZOSYN) 4.5 g in dextrose 5 % in water (D5W) 5 % 100 mL IVPB (MB+)         -- IV Every 8 hours (non-standard times) 06/04/23 1749    06/04/23 1819  vancomycin - pharmacy to dose  (vancomycin IVPB)        See Hyperspace for full Linked Orders Report.    -- IV pharmacy to manage frequency 06/04/23 1720    06/04/23 1810  vancomycin (VANCOCIN) 1,000 mg injection        Note to Pharmacy: Created by cabinet override    06/05 0614 06/04/23 1810        Latest lactate reviewed-  Recent Labs   Lab 06/04/23  1610   LACTATE 1.4     Organ dysfunction indicated by Acute kidney injury    Fluid challenge Ideal Body Weight- The patient's ideal body weight is Ideal body weight: 61.6 kg (135 lb 12.9 oz) which will be used to calculate fluid bolus of 30 ml/kg for treatment of septic shock.      Post- resuscitation assessment Yes Perfusion exam was performed within 6 hours of septic shock presentation after bolus shows Adequate tissue perfusion assessed by non-invasive monitoring       Will Start Pressors- Levophed for MAP of 65  Source control achieved by: IV vanc and IV zosyn  No lactic acidosis present      Other specified anemias  Hg 8.5/ HCT 27.9  Trend with daily cbc     Type 2 diabetes mellitus with skin complication  Patient's FSGs are controlled on current medication regimen.  Last A1c reviewed-   Lab Results   Component Value Date    HGBA1C 6.4 (H) 03/22/2023     Most recent fingerstick glucose reviewed- No results for input(s): POCTGLUCOSE in the last 24 hours.  Current correctional scale  Low  Maintain anti-hyperglycemic dose as follows-   Antihyperglycemics (From admission, onward)    Start     Stop Route Frequency Ordered    06/05/23 0911  insulin aspart U-100 pen 0-5 Units         -- SubQ Before meals & nightly PRN 06/05/23 0811        Hold Oral hypoglycemics while patient is in the  [facility].    Essential hypertension  H/o of hypertension  Holding antihypertensives in the setting of sepsis       Abscess, gluteal, right  I&D in the ED  Wound cultures pending  IV abx with vanc and zosyn  Pain management       Acute kidney injury  Patient with acute kidney injury likely due to pre-renal azotemia EZRA is currently improving. Labs reviewed- Renal function/electrolytes with Estimated Creatinine Clearance: 125.5 mL/min (based on SCr of 0.7 mg/dL). according to latest data. Monitor urine output and serial BMP and adjust therapy as needed. Avoid nephrotoxins and renally dose meds for GFR listed above.     Resolving  Good urine output       History of Large cell (diffuse) non-Hodgkin's lymphoma  Follow up outpatient oncology       VTE Risk Mitigation (From admission, onward)         Ordered     IP VTE HIGH RISK PATIENT  Once         06/04/23 2301     Place sequential compression device  Until discontinued         06/04/23 2301              Critical care time spent on the evaluation and treatment of severe organ dysfunction, review of pertinent labs and imaging studies, discussions with consulting providers and discussions with patient/family: 35 minutes.       On 06/05/2023, patient should be placed in hospital observation services under my care in collaboration with .      Saira Solitario PA-C  Department of Hospital Medicine  [place] - Intensive Care

## 2023-06-05 NOTE — EICU
Intervention Initiated From:  COR / EICU    Sohail intervened regarding:  Rounding (Video assessment)    Nurse Notified:  No    Doctor Notified:  Yes    Comments: New admission from ED. Levo infusing as per MAR. VSS. Awake and alert. RN at bedside. NAD. Dr. Goodrich notified of admission.

## 2023-06-05 NOTE — EICU
Complains:pain in her legs and requesting for flexeril.    Remains on pressor support.    Plan:  Attempt Advil 200 mg po once.

## 2023-06-05 NOTE — ASSESSMENT & PLAN NOTE
Patient with acute kidney injury likely due to pre-renal azotemia EZRA is currently improving. Labs reviewed- Renal function/electrolytes with Estimated Creatinine Clearance: 125.5 mL/min (based on SCr of 0.7 mg/dL). according to latest data. Monitor urine output and serial BMP and adjust therapy as needed. Avoid nephrotoxins and renally dose meds for GFR listed above.     Resolving  Good urine output

## 2023-06-05 NOTE — HPI
Patient is a 48 year old female with medical history of obesity, nonhogdkin lymphoma, recently treated for septic arthritis, HTN, DM type 2 who presented to the ED with right buttock abscess.  Patient states symptoms started on Thursday and on Saturday she felt fatigue. She has had fever and chills but denies CP, SOB, nausea and vomiting.   BP low in the ED and was bolus a total of 4 liters.      Admitted for sepsis secondary to right buttock abscess.

## 2023-06-05 NOTE — PROGRESS NOTES
Pharmacokinetic Initial Assessment: IV Vancomycin    Assessment/Plan:    Initiate intravenous vancomycin with loading dose of 2500 mg once followed by a maintenance dose of vancomycin 1250mg IV every 12 hours  Desired empiric serum trough concentration is 10 to 15 mcg/mL  Draw vancomycin trough level 60 min prior to fourth dose on 6/6 at approximately 0800  Pharmacy will continue to follow and monitor vancomycin.      Please contact pharmacy at extension [phone] with any questions regarding this assessment.     Thank you for the consult,   Kayla Martínezy       Patient brief summary:  Hedy Conway is a 48 y.o. female initiated on antimicrobial therapy with IV Vancomycin for treatment of suspected skin & soft tissue infection    Drug Allergies:   Review of patient's allergies indicates:   Allergen Reactions    Tetanus vaccines and toxoid Rash       Actual Body Weight:   121.1 kg    Renal Function:   Estimated Creatinine Clearance: 70.6 mL/min (based on SCr of 1.3 mg/dL).,     Dialysis Method (if applicable):  N/A    CBC (last 72 hours):  Recent Labs   Lab Result Units 06/04/23  1610   WBC K/uL 7.14   Hemoglobin g/dL 9.9*   Hematocrit % 33.0*   Platelets K/uL 74*   Gran % % 42.3   Lymph % % 40.6   Mono % % 15.7*   Eosinophil % % 0.1   Basophil % % 0.3   Differential Method  Automated       Metabolic Panel (last 72 hours):  Recent Labs   Lab Result Units 06/04/23  1610 06/04/23  1617   Sodium mmol/L 136  --    Potassium mmol/L 4.1  --    Chloride mmol/L 101  --    CO2 mmol/L 22*  --    Glucose mg/dL 138*  --    Glucose, UA   --  Negative   BUN mg/dL 32*  --    Creatinine mg/dL 1.3  --    Albumin g/dL 3.3*  --    Total Bilirubin mg/dL 0.7  --    Alkaline Phosphatase U/L 92  --    AST U/L 5*  --    ALT U/L 12  --        Drug levels (last 3 results):  No results for input(s): VANCOMYCINRA, VANCORANDOM, VANCOMYCINPE, VANCOPEAK, VANCOMYCINTR, VANCOTROUGH in the last 72 hours.    Microbiologic Results:  Microbiology  Results (last 7 days)       Procedure Component Value Units Date/Time    Blood culture [[id number]] Collected: 06/04/23 1610    Order Status: Sent Specimen: Blood from Antecubital, Left Updated: 06/04/23 1956    Blood culture [[id number]] Collected: 06/04/23 1610    Order Status: Sent Specimen: Blood from Antecubital, Right Updated: 06/04/23 1956    Aerobic culture [[id number]] Collected: 06/04/23 1641    Order Status: Sent Specimen: Abscess Updated: 06/04/23 1949    Urine culture [[id number]] Collected: 06/04/23 1617    Order Status: No result Specimen: Urine Updated: 06/04/23 1641

## 2023-06-05 NOTE — PROVIDER PROGRESS NOTES - EMERGENCY DEPT.
Encounter Date: 6/4/2023    ED Physician Progress Notes        Central Line    Date/Time: 6/4/2023 9:01 PM  Performed by: Say Mayer MD  Authorized by: Perry Bray MD     Location procedure was performed:  [facility] EMERGENCY DEPARTMENT  Consent Done ?:  Emergent Situation  Indications:  Med administration  Anesthesia:  Local infiltration  Local anesthetic:  Lidocaine 1% without epinephrine  Preparation:  Skin prepped with ChloraPrep  Skin prep agent dried: Skin prep agent completely dried prior to procedure    Sterile barriers: All five maximal sterile barriers used - gloves, gown, cap, mask and large sterile sheet    Hand hygiene: Hand hygiene performed immediately prior to central venous catheter insertion    Location:  Right femoral  Site selection rationale:  Patient preference  Catheter type:  Triple lumen  Catheter size:  7.5 Fr  Manometry: No    Number of attempts:  1  Securement:  Chlorhexidine patch, sterile dressing applied, line sutured and blood return through all ports  Adverse Events:  None

## 2023-06-05 NOTE — EICU
48 year old female admitted with gluteal abscess and septic shock on norepinephrine infusion and received 4L fluid bolus in ED.  Past H/O paraplegia,non Hodgkin's lymphoma,s/p stem cell transplant COPD,DM,      Camera assessment revealed patient to be alert and awake and communicative  /57,SPO2 95%,TN 88,RR 30    Data  UA ketones trace   HB 9.9,MCV 79,MCHC 30,MCHC 23.7(HB 7.8  one month ago),immature neutrophils 1%  Platelet 74  EKG 95 Per minute      Assessment and plan:  1.Shock-sepsis most likely-fluids,pressor support,antibiotics with vancomycin and zosyn as needed and deescalate with results.  2.gluteal abscess-antibiotics and surgical consult for debridement,  3.DM-maintain strict glycemic control.  4.COPD-bronchodilators as needed.  5.Non hodgkin's lymphoma-s/p stem cell transplant-hematology/oncology recommendations  6.Paraplegia-supportive care.    DVT prophylaxis-SCD.

## 2023-06-06 PROBLEM — L02.91 ABSCESS: Status: ACTIVE | Noted: 2023-06-06

## 2023-06-06 LAB
ALBUMIN SERPL BCP-MCNC: 2.5 G/DL (ref 3.5–5.2)
ALP SERPL-CCNC: 76 U/L (ref 55–135)
ALT SERPL W/O P-5'-P-CCNC: 9 U/L (ref 10–44)
ANION GAP SERPL CALC-SCNC: 11 MMOL/L (ref 8–16)
AST SERPL-CCNC: 5 U/L (ref 10–40)
BASOPHILS # BLD AUTO: 0.01 K/UL (ref 0–0.2)
BASOPHILS NFR BLD: 0.3 % (ref 0–1.9)
BILIRUB SERPL-MCNC: 0.4 MG/DL (ref 0.1–1)
BUN SERPL-MCNC: 13 MG/DL (ref 6–20)
CALCIUM SERPL-MCNC: 8.5 MG/DL (ref 8.7–10.5)
CHLORIDE SERPL-SCNC: 109 MMOL/L (ref 95–110)
CO2 SERPL-SCNC: 18 MMOL/L (ref 23–29)
CREAT SERPL-MCNC: 0.7 MG/DL (ref 0.5–1.4)
DIFFERENTIAL METHOD: ABNORMAL
EOSINOPHIL # BLD AUTO: 0.1 K/UL (ref 0–0.5)
EOSINOPHIL NFR BLD: 2 % (ref 0–8)
ERYTHROCYTE [DISTWIDTH] IN BLOOD BY AUTOMATED COUNT: 15.9 % (ref 11.5–14.5)
EST. GFR  (NO RACE VARIABLE): >60 ML/MIN/1.73 M^2
GLUCOSE SERPL-MCNC: 107 MG/DL (ref 70–110)
HCT VFR BLD AUTO: 25.3 % (ref 37–48.5)
HGB BLD-MCNC: 7.6 G/DL (ref 12–16)
IMM GRANULOCYTES # BLD AUTO: 0.01 K/UL (ref 0–0.04)
IMM GRANULOCYTES NFR BLD AUTO: 0.3 % (ref 0–0.5)
LYMPHOCYTES # BLD AUTO: 1.6 K/UL (ref 1–4.8)
LYMPHOCYTES NFR BLD: 52 % (ref 18–48)
MAGNESIUM SERPL-MCNC: 1.8 MG/DL (ref 1.6–2.6)
MCH RBC QN AUTO: 24.1 PG (ref 27–31)
MCHC RBC AUTO-ENTMCNC: 30 G/DL (ref 32–36)
MCV RBC AUTO: 80 FL (ref 82–98)
MONOCYTES # BLD AUTO: 0.4 K/UL (ref 0.3–1)
MONOCYTES NFR BLD: 12.8 % (ref 4–15)
NEUTROPHILS # BLD AUTO: 1 K/UL (ref 1.8–7.7)
NEUTROPHILS NFR BLD: 32.6 % (ref 38–73)
NRBC BLD-RTO: 0 /100 WBC
PLATELET # BLD AUTO: 61 K/UL (ref 150–450)
PMV BLD AUTO: 11.4 FL (ref 9.2–12.9)
POCT GLUCOSE: 103 MG/DL (ref 70–110)
POCT GLUCOSE: 89 MG/DL (ref 70–110)
POTASSIUM SERPL-SCNC: 3.3 MMOL/L (ref 3.5–5.1)
PROT SERPL-MCNC: 4.5 G/DL (ref 6–8.4)
RBC # BLD AUTO: 3.16 M/UL (ref 4–5.4)
SODIUM SERPL-SCNC: 138 MMOL/L (ref 136–145)
VANCOMYCIN TROUGH SERPL-MCNC: 16.7 UG/ML (ref 10–22)
WBC # BLD AUTO: 3.04 K/UL (ref 3.9–12.7)

## 2023-06-06 PROCEDURE — 63600175 PHARM REV CODE 636 W HCPCS: Performed by: STUDENT IN AN ORGANIZED HEALTH CARE EDUCATION/TRAINING PROGRAM

## 2023-06-06 PROCEDURE — 80202 ASSAY OF VANCOMYCIN: CPT | Performed by: STUDENT IN AN ORGANIZED HEALTH CARE EDUCATION/TRAINING PROGRAM

## 2023-06-06 PROCEDURE — 80053 COMPREHEN METABOLIC PANEL: CPT | Performed by: PHYSICIAN ASSISTANT

## 2023-06-06 PROCEDURE — 99222 PR INITIAL HOSPITAL CARE,LEVL II: ICD-10-PCS | Mod: ,,, | Performed by: SURGERY

## 2023-06-06 PROCEDURE — 25000003 PHARM REV CODE 250: Performed by: INTERNAL MEDICINE

## 2023-06-06 PROCEDURE — 11000001 HC ACUTE MED/SURG PRIVATE ROOM

## 2023-06-06 PROCEDURE — 63600175 PHARM REV CODE 636 W HCPCS: Performed by: PHYSICIAN ASSISTANT

## 2023-06-06 PROCEDURE — 25000003 PHARM REV CODE 250: Performed by: PHYSICIAN ASSISTANT

## 2023-06-06 PROCEDURE — 83735 ASSAY OF MAGNESIUM: CPT | Performed by: PHYSICIAN ASSISTANT

## 2023-06-06 PROCEDURE — 25000003 PHARM REV CODE 250: Performed by: SURGERY

## 2023-06-06 PROCEDURE — 25000003 PHARM REV CODE 250: Performed by: STUDENT IN AN ORGANIZED HEALTH CARE EDUCATION/TRAINING PROGRAM

## 2023-06-06 PROCEDURE — 99291 PR CRITICAL CARE, E/M 30-74 MINUTES: ICD-10-PCS | Mod: ,,, | Performed by: PHYSICIAN ASSISTANT

## 2023-06-06 PROCEDURE — 85025 COMPLETE CBC W/AUTO DIFF WBC: CPT | Performed by: PHYSICIAN ASSISTANT

## 2023-06-06 PROCEDURE — 99222 1ST HOSP IP/OBS MODERATE 55: CPT | Mod: ,,, | Performed by: SURGERY

## 2023-06-06 PROCEDURE — 63600175 PHARM REV CODE 636 W HCPCS: Performed by: INTERNAL MEDICINE

## 2023-06-06 PROCEDURE — 36415 COLL VENOUS BLD VENIPUNCTURE: CPT | Performed by: PHYSICIAN ASSISTANT

## 2023-06-06 PROCEDURE — 99291 CRITICAL CARE FIRST HOUR: CPT | Mod: ,,, | Performed by: PHYSICIAN ASSISTANT

## 2023-06-06 RX ORDER — POTASSIUM CHLORIDE 7.45 MG/ML
10 INJECTION INTRAVENOUS ONCE
Status: COMPLETED | OUTPATIENT
Start: 2023-06-06 | End: 2023-06-06

## 2023-06-06 RX ORDER — HYDROCODONE BITARTRATE AND ACETAMINOPHEN 500; 5 MG/1; MG/1
TABLET ORAL
Status: DISCONTINUED | OUTPATIENT
Start: 2023-06-06 | End: 2023-06-08 | Stop reason: HOSPADM

## 2023-06-06 RX ORDER — SODIUM CHLORIDE 9 MG/ML
INJECTION, SOLUTION INTRAVENOUS CONTINUOUS
Status: ACTIVE | OUTPATIENT
Start: 2023-06-06 | End: 2023-06-06

## 2023-06-06 RX ORDER — MAGNESIUM SULFATE HEPTAHYDRATE 40 MG/ML
2 INJECTION, SOLUTION INTRAVENOUS ONCE
Status: COMPLETED | OUTPATIENT
Start: 2023-06-06 | End: 2023-06-06

## 2023-06-06 RX ADMIN — HYDROCODONE BITARTRATE AND ACETAMINOPHEN 1 TABLET: 5; 325 TABLET ORAL at 04:06

## 2023-06-06 RX ADMIN — POTASSIUM CHLORIDE 10 MEQ: 10 INJECTION, SOLUTION INTRAVENOUS at 11:06

## 2023-06-06 RX ADMIN — MONTELUKAST 10 MG: 10 TABLET, FILM COATED ORAL at 10:06

## 2023-06-06 RX ADMIN — PIPERACILLIN AND TAZOBACTAM 4.5 G: 4; .5 INJECTION, POWDER, LYOPHILIZED, FOR SOLUTION INTRAVENOUS; PARENTERAL at 11:06

## 2023-06-06 RX ADMIN — ACETAMINOPHEN 650 MG: 325 TABLET ORAL at 10:06

## 2023-06-06 RX ADMIN — SODIUM CHLORIDE: 9 INJECTION, SOLUTION INTRAVENOUS at 10:06

## 2023-06-06 RX ADMIN — VANCOMYCIN HYDROCHLORIDE 1000 MG: 1 INJECTION, POWDER, LYOPHILIZED, FOR SOLUTION INTRAVENOUS at 12:06

## 2023-06-06 RX ADMIN — PIPERACILLIN AND TAZOBACTAM 4.5 G: 4; .5 INJECTION, POWDER, LYOPHILIZED, FOR SOLUTION INTRAVENOUS; PARENTERAL at 01:06

## 2023-06-06 RX ADMIN — MAGNESIUM SULFATE HEPTAHYDRATE 2 G: 40 INJECTION, SOLUTION INTRAVENOUS at 10:06

## 2023-06-06 RX ADMIN — Medication 6 MG: at 09:06

## 2023-06-06 RX ADMIN — POTASSIUM CHLORIDE 10 MEQ: 10 INJECTION, SOLUTION INTRAVENOUS at 10:06

## 2023-06-06 RX ADMIN — VANCOMYCIN HYDROCHLORIDE 1000 MG: 1 INJECTION, POWDER, LYOPHILIZED, FOR SOLUTION INTRAVENOUS at 10:06

## 2023-06-06 RX ADMIN — PIPERACILLIN AND TAZOBACTAM 4.5 G: 4; .5 INJECTION, POWDER, LYOPHILIZED, FOR SOLUTION INTRAVENOUS; PARENTERAL at 05:06

## 2023-06-06 NOTE — ASSESSMENT & PLAN NOTE
Hg 7.6/ HCT 25.3  Trending down   Likely dilutional component (decreasing IV fluids and will repeat CBC tomorrow)

## 2023-06-06 NOTE — SUBJECTIVE & OBJECTIVE
Past Medical History:   Diagnosis Date    Acute carpal tunnel syndrome of left wrist     Biliary colic     Diabetes mellitus     Encounter for blood transfusion     History of chemotherapy     Incisional hernia     Large cell (diffuse) non-Hodgkin's lymphoma     Stem cells transplant status        Past Surgical History:   Procedure Laterality Date    ARTHROSCOPY OF KNEE Bilateral 03/30/2023    Procedure: ARTHROSCOPY, KNEE;  Surgeon: Rudolph Murrell MD;  Location: [facility];  Service: Orthopedics;  Laterality: Bilateral;    BREAST BIOPSY Left     lymph node biopsy, benign    BREAST SURGERY      CHOLECYSTECTOMY      COLD KNIFE CONIZATION OF CERVIX N/A 03/08/2021    Procedure: CONE BIOPSY, CERVIX, USING COLD KNIFE;  Surgeon: Evelyn Wheeler MD;  Location: [facility] OR;  Service: OB/GYN;  Laterality: N/A;    COLONOSCOPY N/A 12/21/2022    Procedure: COLONOSCOPY;  Surgeon: Annamarie Jane MD;  Location: [facility];  Service: Endoscopy;  Laterality: N/A;    HAND ARTHROTOMY Right 03/30/2023    Procedure: OPEN  ARTHROTOMY WRIST;  Surgeon: Rudolph Murrell MD;  Location: [facility];  Service: Orthopedics;  Laterality: Right;    HERNIA REPAIR      incisional    LUNG BIOPSY      lymphnode biopsy      MEDIPORT REMOVAL Left 01/30/2020    Procedure: REMOVAL, CATHETER, CENTRAL VENOUS, TUNNELED, WITH PORT;  Surgeon: Luis Bogran-Reyes, MD;  Location: [facility];  Service: General;  Laterality: Left;    PORTACATH PLACEMENT Left     REVISION OF SCAR  06/22/2021    Procedure: REVISION, SCAR;  Surgeon: Otis Grimes MD;  Location: [address];  Service: General;;    SKIN BIOPSY      TUBAL LIGATION      UMBILICAL HERNIA REPAIR         Review of patient's allergies indicates:   Allergen Reactions    Tetanus vaccines and toxoid Rash       No current facility-administered medications on file prior to encounter.     Current Outpatient Medications on File Prior to Encounter   Medication Sig    acetaminophen (TYLENOL) 325 MG tablet Take  2 tablets (650 mg total) by mouth every 6 (six) hours as needed.    bisoprolol (ZEBETA) 5 MG tablet Take 5 mg by mouth once daily.    cyclobenzaprine (FLEXERIL) 10 MG tablet Take 10 mg by mouth 3 (three) times daily as needed. Take for 5 days    fluticasone (FLONASE) 50 mcg/actuation nasal spray 1 spray by Each Nare route once daily.    melatonin 10 mg Cap Take 10 mg by mouth nightly as needed.    montelukast (SINGULAIR) 10 mg tablet Take 10 mg by mouth once daily.    ondansetron (ZOFRAN) 4 MG tablet Take 4 mg by mouth every 8 (eight) hours as needed.    albuterol (PROVENTIL/VENTOLIN HFA) 90 mcg/actuation inhaler Ventolin HFA 90 mcg/actuation aerosol inhaler   INHALE 2 PUFFS BY MOUTH 3 TIMES DAILY AS NEEDED.    albuterol-ipratropium (DUO-NEB) 2.5 mg-0.5 mg/3 mL nebulizer solution ipratropium 0.5 mg-albuterol 3 mg (2.5 mg base)/3 mL nebulization soln   USE 1 VIAL IN NEBULIZER EVERY 6 TO 8 HOURS AS NEEDED    blood sugar diagnostic Strp USE TO CHECK BLOOD SUGAR TWICE A DAY    conjugated estrogens (PREMARIN) vaginal cream Place 0.5 g vaginally once daily. Place 0.5 vaginally for 4 weeks, then transition to twice weekly use.    insulin detemir U-100, Levemir, 100 unit/mL (3 mL) SubQ InPn pen Inject 5 Units into the skin once daily.    metFORMIN (GLUCOPHAGE) 500 MG tablet Take 500 mg by mouth once daily.    naproxen (NAPROSYN) 500 MG tablet Take 500 mg by mouth 2 (two) times daily as needed. Take for 5 days    oxyCODONE (ROXICODONE) 5 MG immediate release tablet Take 1 tablet (5 mg total) by mouth every 4 (four) hours as needed for Pain.    pantoprazole (PROTONIX) 40 MG tablet Take 1 tablet (40 mg total) by mouth once daily.    polyethylene glycol (GLYCOLAX) 17 gram PwPk Take 17 g by mouth once daily.    zinc sulfate (ZINCATE) 220 (50) mg capsule Take 220 mg by mouth 3 (three) times daily.     Family History       Problem Relation (Age of Onset)    Breast cancer Maternal Aunt, Maternal Aunt    Cancer Paternal Grandmother     Colon cancer Paternal Grandmother    Diabetes Mother, Father    Heart block Mother    Heart failure Father    Hypertension Mother, Father    Kidney disease Mother    Lung cancer Father          Tobacco Use    Smoking status: Never    Smokeless tobacco: Never   Substance and Sexual Activity    Alcohol use: Not Currently    Drug use: No    Sexual activity: Yes     Partners: Male     Birth control/protection: See Surgical Hx     Comment: with one partner for 24 years     Review of Systems   Constitutional:  Positive for chills, fatigue and fever.   Respiratory:  Negative for cough and shortness of breath.    Cardiovascular:  Negative for chest pain.   Gastrointestinal:  Negative for constipation, diarrhea, nausea and vomiting.   Genitourinary:  Negative for difficulty urinating and dysuria.   Musculoskeletal:  Positive for arthralgias and gait problem.   Skin:  Positive for wound.   Neurological:  Negative for speech difficulty and weakness (generalized/ right knee).   Psychiatric/Behavioral:  Negative for agitation and confusion.    Objective:     Vital Signs (Most Recent):  Temp: 97.6 °F (36.4 °C) (06/06/23 0705)  Pulse: 82 (06/06/23 0705)  Resp: (!) 25 (06/06/23 0705)  BP: (!) 106/56 (06/06/23 0705)  SpO2: 100 % (06/06/23 0705) Vital Signs (24h Range):  Temp:  [97.5 °F (36.4 °C)-97.9 °F (36.6 °C)] 97.6 °F (36.4 °C)  Pulse:  [] 82  Resp:  [16-43] 25  SpO2:  [94 %-100 %] 100 %  BP: ()/(51-64) 106/56     Weight: 112.2 kg (247 lb 5.7 oz)  Body mass index is 38.74 kg/m².     Physical Exam  Constitutional:       Appearance: Normal appearance.   Cardiovascular:      Rate and Rhythm: Normal rate and regular rhythm.   Pulmonary:      Effort: Pulmonary effort is normal.      Breath sounds: Normal breath sounds.   Abdominal:      General: There is no distension.      Tenderness: There is no guarding.   Skin:     General: Skin is warm and dry.      Comments: Right gluteal abscess with packaging and mediplex     Neurological:      Mental Status: She is alert and oriented to person, place, and time. Mental status is at baseline.   Psychiatric:         Mood and Affect: Mood normal.         Behavior: Behavior normal.              Significant Labs: A1C:   Recent Labs   Lab 03/15/23  0551 03/22/23  0610   HGBA1C 6.4* 6.4*       ABGs: No results for input(s): PH, PCO2, HCO3, POCSATURATED, BE, TOTALHB, COHB, METHB, O2HB, POCFIO2, PO2 in the last 48 hours.  Bilirubin:   Recent Labs   Lab 06/04/23  1610 06/05/23  0504 06/06/23  0333   BILITOT 0.7 0.6 0.4       Blood Culture:   Recent Labs   Lab 06/04/23  1610   LABBLOO No Growth to date  No Growth to date  No Growth to date  No Growth to date       BMP:   Recent Labs   Lab 06/06/23  0333         K 3.3*      CO2 18*   BUN 13   CREATININE 0.7   CALCIUM 8.5*   MG 1.8       CBC:   Recent Labs   Lab 06/04/23  1610 06/05/23  0504 06/06/23  0333   WBC 7.14 6.46 3.04*   HGB 9.9* 8.5* 7.6*   HCT 33.0* 27.9* 25.3*   PLT 74* 72* 61*       CMP:   Recent Labs   Lab 06/04/23  1610 06/05/23  0504 06/06/23  0333    139 138   K 4.1 3.4* 3.3*    110 109   CO2 22* 19* 18*   * 146* 107   BUN 32* 18 13   CREATININE 1.3 0.7 0.7   CALCIUM 9.2 8.6* 8.5*   PROT 5.5* 4.7* 4.5*   ALBUMIN 3.3* 2.7* 2.5*   BILITOT 0.7 0.6 0.4   ALKPHOS 92 73 76   AST 5* <5* 5*   ALT 12 11 9*   ANIONGAP 13 10 11       Cardiac Markers:   Recent Labs   Lab 06/04/23  1610   BNP 45       Coagulation: No results for input(s): PT, INR, APTT in the last 48 hours.  Lactic Acid:   Recent Labs   Lab 06/04/23  1610   LACTATE 1.4       Lipase: No results for input(s): LIPASE in the last 48 hours.  Lipid Panel: No results for input(s): CHOL, HDL, LDLCALC, TRIG, CHOLHDL in the last 48 hours.  Magnesium:   Recent Labs   Lab 06/06/23  0333   MG 1.8     POCT Glucose:   Recent Labs   Lab 06/05/23  1143 06/05/23  1648 06/05/23  1954   POCTGLUCOSE 132* 134* 121*     Prealbumin: No results for input(s):  PREALBUMIN in the last 48 hours.  Respiratory Culture: No results for input(s): GSRESP, RESPIRATORYC in the last 48 hours.  Troponin:   Recent Labs   Lab 06/04/23  1610   TROPONINI <0.006       TSH:   Recent Labs   Lab 03/22/23  0610   TSH 14.200*       Urine Culture:   Recent Labs   Lab 06/04/23  1617   LABURIN No growth     Urine Studies:   Recent Labs   Lab 06/04/23  1617   COLORU Yellow   APPEARANCEUA Hazy*   PHUR 6.0   SPECGRAV 1.025   PROTEINUA 2+*   GLUCUA Negative   KETONESU Trace*   BILIRUBINUA 1+*   OCCULTUA Negative   NITRITE Negative   UROBILINOGEN Negative   LEUKOCYTESUR Negative   RBCUA 1   WBCUA 23*   BACTERIA Many*   SQUAMEPITHEL 1   HYALINECASTS 20*         Significant Imaging: I have reviewed all pertinent imaging results/findings within the past 24 hours.

## 2023-06-06 NOTE — PROGRESS NOTES
"[facility] - Intensive Care  Adult Nutrition  Progress Note    SUMMARY       Recommendations   1) Change diet to DM 2000 kcal, low sodium diet   2) Add Boost glucose control daily of PO intakes < 75% of meals at f/u   3) weigh weekly, replete lytes as needed    Goals: 1) PO Intakes > 75% of most meals at f/u  Nutrition Goal Status: new  Communication of RD Recs:  (POC, sticky note)    Assessment and Plan    No nutrition diagnosis at this time     Malnutrition Assessment     Skin (Micronutrient):  (John = 19, buttocks abscess)  Teeth (Micronutrient):  (missing some)   Micronutrient Evaluation Summary: no deficiencies   Fluid Accumulation (Malnutrition): mild (2+)                         Reason for Assessment    Reason For Assessment: identified at risk by screening criteria  Diagnosis:  (sepsis)  Relevant Medical History: obesity, nonhogdkin lymphoma, recently treated for septic arthritis, HTN, DM type 2  Interdisciplinary Rounds: did not attend (remote)    General Information Comments: 49 y/o female admitted with buttocks abscess, sepsis, EZRA. In ICU on room air.Eating well this admission. NFPE not done, noted to appear well nourished, BMI > 30 kg/m2. No significant wt loss per chart review.    Nutrition Discharge Planning: To be determined- DM 1600 kcal, low sodium diet    Nutrition Risk Screen    Nutrition Risk Screen: no indicators present    Nutrition/Diet History    Spiritual, Cultural Beliefs, Latter-day Practices, Values that Affect Care: no  Food Allergies: NKFA  Factors Affecting Nutritional Intake: None identified at this time    Anthropometrics    Temp: 97.1 °F (36.2 °C)  Height Method: Stated  Height: 5' 7"  Height (inches): 67 in  Weight Method: Bed Scale  Weight: 112.2 kg (247 lb 5.7 oz)  Weight (lb): 247.36 lb  Ideal Body Weight (IBW), Female: 135 lb  % Ideal Body Weight, Female (lb): 183.23 %  BMI (Calculated): 38.7  BMI Grade: 35 - 39.9 - obesity - grade II       Lab/Procedures/Meds    Pertinent Labs " Reviewed: reviewed  BMP  Lab Results   Component Value Date     06/06/2023    K 3.3 (L) 06/06/2023     06/06/2023    CO2 18 (L) 06/06/2023    BUN 13 06/06/2023    CREATININE 0.7 06/06/2023    CALCIUM 8.5 (L) 06/06/2023    ANIONGAP 11 06/06/2023    EGFRNORACEVR >60 06/06/2023     POCT Glucose   Date Value Ref Range Status   06/05/2023 121 (H) 70 - 110 mg/dL Final   06/05/2023 134 (H) 70 - 110 mg/dL Final   06/05/2023 132 (H) 70 - 110 mg/dL Final     Lab Results   Component Value Date    HGBA1C 6.4 (H) 03/22/2023     Lab Results   Component Value Date    CALCIUM 8.5 (L) 06/06/2023    PHOS 3.3 12/30/2022     Lab Results   Component Value Date    ALBUMIN 2.5 (L) 06/06/2023       Pertinent Medications Reviewed: reviewed    Scheduled Meds:   ibuprofen  200 mg Oral Once    montelukast  10 mg Oral Daily    piperacillin-tazobactam (Zosyn) IV (PEDS and ADULTS) (extended infusion is not appropriate)  4.5 g Intravenous Q8H    vancomycin (VANCOCIN) IVPB  1,000 mg Intravenous Q12H     Continuous Infusions:   sodium chloride 0.9% 75 mL/hr at 06/06/23 1522    NORepinephrine bitartrate-D5W Stopped (06/05/23 1240)     PRN Meds:.sodium chloride, acetaminophen, dextrose 10%, dextrose 10%, glucagon (human recombinant), glucose, glucose, HYDROcodone-acetaminophen, insulin aspart U-100, melatonin, ondansetron, sodium chloride 0.9%, Pharmacy to dose Vancomycin consult **AND** vancomycin - pharmacy to dose    Estimated/Assessed Needs    Weight Used For Calorie Calculations: 112.2 kg (247 lb 5.7 oz)  Energy Calorie Requirements (kcal): MSJ ( no AF obesity) = 1780 kcal  Energy Need Method: Mariela Hernandez  Protein Requirements: 1.5-2 g protein/kg IBW ( ICU obesity vs. EZRA) =  g  Weight Used For Protein Calculations: 61.3 kg (135 lb 2.3 oz) (IBW)  Fluid Requirements (mL): urine output + 500 ml  Estimated Fluid Requirement Method: other (see comments)  CHO Requirement: 200-244 g      Nutrition Prescription  Ordered    Current Diet Order: low sodium diet    Evaluation of Received Nutrient/Fluid Intake    Energy Calories Required: meeting needs  Protein Required: meeting needs  Fluid Required: meeting needs  Tolerance: tolerating     Intake/Output Summary (Last 24 hours) at 6/6/2023 1618  Last data filed at 6/6/2023 1522  Gross per 24 hour   Intake 2676.11 ml   Output 2800 ml   Net -123.89 ml       % Intake of Estimated Energy Needs: %  % Meal Intake: %    Nutrition Risk    Level of Risk/Frequency of Follow-up:  (1 x weekly)     Monitor and Evaluation    Food and Nutrient Intake: energy intake, food and beverage intake  Food and Nutrient Adminstration: diet order  Anthropometric Measurements: weight  Biochemical Data, Medical Tests and Procedures: electrolyte and renal panel, glucose/endocrine profile  Nutrition-Focused Physical Findings: overall appearance, skin     Nutrition Follow-Up    RD Follow-up?: Yes

## 2023-06-06 NOTE — PLAN OF CARE
[facility] - Intensive Care  Initial Discharge Assessment       Primary Care Provider: John Lantigua PA-C    Admission Diagnosis: Abscess [L02.91]  Fatigue [R53.83]  Hypotension, unspecified hypotension type [I95.9]    Admission Date: 6/4/2023  Expected Discharge Date:     Transition of Care Barriers: None    Payor: BLUE CROSS BLUE SHIELD / Plan: [place] PPO / Product Type: PPO /     Extended Emergency Contact Information  Primary Emergency Contact: Raheem Conway  Address: [address] [place] [address] [id number] [place] [address]  Home Phone: [phone]  Mobile Phone: [phone]  Relation: Spouse    Discharge Plan A: Home Health  Discharge Plan B: Home with family      [facility] - [place], [place] - [address]  [address]  [place] [address]  Phone: [phone] Fax: [phone]    [facility]  [address] [facility] [address]  Phone: [phone] Fax: [phone]      Initial Assessment (most recent)       Adult Discharge Assessment - 06/06/23 1153          Discharge Assessment    Assessment Type Discharge Planning Assessment     Confirmed/corrected address, phone number and insurance Yes     Confirmed Demographics Correct on Facesheet     Source of Information patient     Communicated JESSE with patient/caregiver Date not available/Unable to determine     Reason For Admission abcess     People in Home spouse     Do you expect to return to your current living situation? Yes     Do you have help at home or someone to help you manage your care at home? Yes     Who are your caregiver(s) and their phone number(s)? Patient reports her  in the evening and that she has three friends that come and help her during the day.     Prior to hospitilization cognitive status: Alert/Oriented     Current cognitive status: Alert/Oriented     Walking or Climbing Stairs stair climbing difficulty, dependent;ambulation difficulty, requires  equipment     Mobility Management patient reports she has a ramp and a wheelchair. Patient also reports she has a transfer board.     Do you have any problems with: Needs other help     Home Accessibility wheelchair accessible     Home Layout Able to live on 1st floor     Equipment Currently Used at Home wheelchair;hospital bed     Readmission within 30 days? No     Patient currently being followed by outpatient case management? No     Do you currently have service(s) that help you manage your care at home? Yes     Name and Contact number of agency [facility]     Is the pt/caregiver preference to resume services with current agency Yes     Do you take prescription medications? Yes     Do you have prescription coverage? Yes     Coverage BCBS     Do you have any problems affording any of your prescribed medications? No     Is the patient taking medications as prescribed? yes     Who is going to help you get home at discharge?      How do you get to doctors appointments? family or friend will provide     Are you on dialysis? No     Do you take coumadin? No     Discharge Plan A Home Health     Discharge Plan B Home with family     DME Needed Upon Discharge  other (see comments)   Patient is unsure but may be interested in a bedside commode.    Discharge Plan discussed with: Patient     Transition of Care Barriers None                      Assessment completed with the patient. No current case management needs identified. Case management to remain available.

## 2023-06-06 NOTE — ASSESSMENT & PLAN NOTE
This patient does have evidence of infective focus  My overall impression is septic shock due to SBP < 90.  Source: Skin and Soft Tissue (location right glute)  Antibiotics given-   Antibiotics (72h ago, onward)    Start     Stop Route Frequency Ordered    06/05/23 0900  vancomycin 1,250 mg in dextrose 5 % (D5W) 250 mL IVPB (Vial-Mate)         -- IV Every 12 hours (non-standard times) 06/04/23 2310    06/05/23 0130  piperacillin-tazobactam (ZOSYN) 4.5 g in dextrose 5 % in water (D5W) 5 % 100 mL IVPB (MB+)         -- IV Every 8 hours (non-standard times) 06/04/23 1749    06/04/23 1819  vancomycin - pharmacy to dose  (vancomycin IVPB)        See Hyperspace for full Linked Orders Report.    -- IV pharmacy to manage frequency 06/04/23 1720    06/04/23 1810  vancomycin (VANCOCIN) 1,000 mg injection        Note to Pharmacy: Created by cabinet override    06/05 0614 06/04/23 1810        Latest lactate reviewed-  Recent Labs   Lab 06/04/23  1610   LACTATE 1.4     Organ dysfunction indicated by Acute kidney injury    Fluid challenge Ideal Body Weight- The patient's ideal body weight is Ideal body weight: 61.6 kg (135 lb 12.9 oz) which will be used to calculate fluid bolus of 30 ml/kg for treatment of septic shock.      Post- resuscitation assessment Yes Perfusion exam was performed within 6 hours of septic shock presentation after bolus shows Adequate tissue perfusion assessed by non-invasive monitoring       Will Start Pressors- Levophed for MAP of 65  Source control achieved by: IV vanc and IV zosyn  No lactic acidosis present

## 2023-06-06 NOTE — ASSESSMENT & PLAN NOTE
Patient's FSGs are controlled on current medication regimen.  Last A1c reviewed-   Lab Results   Component Value Date    HGBA1C 6.4 (H) 03/22/2023     Most recent fingerstick glucose reviewed-   Recent Labs   Lab 06/05/23  1143 06/05/23  1648 06/05/23  1954   POCTGLUCOSE 132* 134* 121*     Current correctional scale  Low  Maintain anti-hyperglycemic dose as follows-   Antihyperglycemics (From admission, onward)    Start     Stop Route Frequency Ordered    06/05/23 0911  insulin aspart U-100 pen 0-5 Units         -- SubQ Before meals & nightly PRN 06/05/23 0811        Hold Oral hypoglycemics while patient is in the hospital.

## 2023-06-06 NOTE — PLAN OF CARE
Recommendations   1) Change diet to DM 2000 kcal, low sodium diet   2) Add Boost glucose control daily of PO intakes < 75% of meals at f/u   3) weigh weekly, replete lytes as needed    Goals: 1) PO Intakes > 75% of most meals at f/u  Nutrition Goal Status: new  Communication of RD Recs:  (POC, sticky note)

## 2023-06-06 NOTE — ASSESSMENT & PLAN NOTE
I&D in the ED  Wound cultures pending  IV abx with vanc and zosyn  Pain management   General surgery consulted

## 2023-06-06 NOTE — PROGRESS NOTES
[facility] Medicine  Progress Note    Patient Name: Hedy Conway  MRN: [id number]  Patient Class: IP- Inpatient   Admission Date: 6/4/2023  Length of Stay: 1 days  Attending Physician: Uche Blum MD   Primary Care Provider: John Lantigua PA-C        Subjective:     Principal Problem:Sepsis        HPI:  Patient is a 48 year old female with medical history of obesity, nonhogdkin lymphoma, recently treated for septic arthritis, HTN, DM type 2 who presented to the ED with right buttock abscess.  Patient states symptoms started on Thursday and on Saturday she felt fatigue. She has had fever and chills but denies CP, SOB, nausea and vomiting.   BP low in the ED and was bolus a total of 4 liters.      Admitted for sepsis secondary to right buttock abscess.        Overview/Hospital Course:  Pt HD stable off pressor support.  Currently on room air.  Electrolyte abnormalities.  Replacing Mg and K today.  Hg down trending.  Will monitor h/h.  Down trending but asymptomatic and likely dilutional.  Continue IV vanc and IV zosyn for right gluteal abscess.        Past Medical History:   Diagnosis Date    Acute carpal tunnel syndrome of left wrist     Biliary colic     Diabetes mellitus     Encounter for blood transfusion     History of chemotherapy     Incisional hernia     Large cell (diffuse) non-Hodgkin's lymphoma     Stem cells transplant status        Past Surgical History:   Procedure Laterality Date    ARTHROSCOPY OF KNEE Bilateral 03/30/2023    Procedure: ARTHROSCOPY, KNEE;  Surgeon: Rudolph Murrell MD;  Location: [facility] OR;  Service: Orthopedics;  Laterality: Bilateral;    BREAST BIOPSY Left     lymph node biopsy, benign    BREAST SURGERY      CHOLECYSTECTOMY      COLD KNIFE CONIZATION OF CERVIX N/A 03/08/2021    Procedure: CONE BIOPSY, CERVIX, USING COLD KNIFE;  Surgeon: Evelyn Wheeler MD;  Location: [facility] OR;  Service: OB/GYN;  Laterality: N/A;    COLONOSCOPY N/A 12/21/2022     Procedure: COLONOSCOPY;  Surgeon: Annamarie Jane MD;  Location: [facility];  Service: Endoscopy;  Laterality: N/A;    HAND ARTHROTOMY Right 03/30/2023    Procedure: OPEN  ARTHROTOMY WRIST;  Surgeon: Rudolph Murrell MD;  Location: [facility] OR;  Service: Orthopedics;  Laterality: Right;    HERNIA REPAIR      incisional    LUNG BIOPSY      lymphnode biopsy      MEDIPORT REMOVAL Left 01/30/2020    Procedure: REMOVAL, CATHETER, CENTRAL VENOUS, TUNNELED, WITH PORT;  Surgeon: Luis Bogran-Reyes, MD;  Location: [facility] OR;  Service: General;  Laterality: Left;    PORTACATH PLACEMENT Left     REVISION OF SCAR  06/22/2021    Procedure: REVISION, SCAR;  Surgeon: Otis Grimes MD;  Location: [address];  Service: General;;    SKIN BIOPSY      TUBAL LIGATION      UMBILICAL HERNIA REPAIR         Review of patient's allergies indicates:   Allergen Reactions    Tetanus vaccines and toxoid Rash       No current facility-administered medications on file prior to encounter.     Current Outpatient Medications on File Prior to Encounter   Medication Sig    acetaminophen (TYLENOL) 325 MG tablet Take 2 tablets (650 mg total) by mouth every 6 (six) hours as needed.    bisoprolol (ZEBETA) 5 MG tablet Take 5 mg by mouth once daily.    cyclobenzaprine (FLEXERIL) 10 MG tablet Take 10 mg by mouth 3 (three) times daily as needed. Take for 5 days    fluticasone (FLONASE) 50 mcg/actuation nasal spray 1 spray by Each Nare route once daily.    melatonin 10 mg Cap Take 10 mg by mouth nightly as needed.    montelukast (SINGULAIR) 10 mg tablet Take 10 mg by mouth once daily.    ondansetron (ZOFRAN) 4 MG tablet Take 4 mg by mouth every 8 (eight) hours as needed.    albuterol (PROVENTIL/VENTOLIN HFA) 90 mcg/actuation inhaler Ventolin HFA 90 mcg/actuation aerosol inhaler   INHALE 2 PUFFS BY MOUTH 3 TIMES DAILY AS NEEDED.    albuterol-ipratropium (DUO-NEB) 2.5 mg-0.5 mg/3 mL nebulizer solution ipratropium 0.5 mg-albuterol 3 mg (2.5 mg base)/3 mL  nebulization soln   USE 1 VIAL IN NEBULIZER EVERY 6 TO 8 HOURS AS NEEDED    blood sugar diagnostic Strp USE TO CHECK BLOOD SUGAR TWICE A DAY    conjugated estrogens (PREMARIN) vaginal cream Place 0.5 g vaginally once daily. Place 0.5 vaginally for 4 weeks, then transition to twice weekly use.    insulin detemir U-100, Levemir, 100 unit/mL (3 mL) SubQ InPn pen Inject 5 Units into the skin once daily.    metFORMIN (GLUCOPHAGE) 500 MG tablet Take 500 mg by mouth once daily.    naproxen (NAPROSYN) 500 MG tablet Take 500 mg by mouth 2 (two) times daily as needed. Take for 5 days    oxyCODONE (ROXICODONE) 5 MG immediate release tablet Take 1 tablet (5 mg total) by mouth every 4 (four) hours as needed for Pain.    pantoprazole (PROTONIX) 40 MG tablet Take 1 tablet (40 mg total) by mouth once daily.    polyethylene glycol (GLYCOLAX) 17 gram PwPk Take 17 g by mouth once daily.    zinc sulfate (ZINCATE) 220 (50) mg capsule Take 220 mg by mouth 3 (three) times daily.     Family History       Problem Relation (Age of Onset)    Breast cancer Maternal Aunt, Maternal Aunt    Cancer Paternal Grandmother    Colon cancer Paternal Grandmother    Diabetes Mother, Father    Heart block Mother    Heart failure Father    Hypertension Mother, Father    Kidney disease Mother    Lung cancer Father          Tobacco Use    Smoking status: Never    Smokeless tobacco: Never   Substance and Sexual Activity    Alcohol use: Not Currently    Drug use: No    Sexual activity: Yes     Partners: Male     Birth control/protection: See Surgical Hx     Comment: with one partner for 24 years     Review of Systems   Constitutional:  Positive for chills, fatigue and fever.   Respiratory:  Negative for cough and shortness of breath.    Cardiovascular:  Negative for chest pain.   Gastrointestinal:  Negative for constipation, diarrhea, nausea and vomiting.   Genitourinary:  Negative for difficulty urinating and dysuria.   Musculoskeletal:  Positive for  arthralgias and gait problem.   Skin:  Positive for wound.   Neurological:  Negative for speech difficulty and weakness (generalized/ right knee).   Psychiatric/Behavioral:  Negative for agitation and confusion.    Objective:     Vital Signs (Most Recent):  Temp: 97.6 °F (36.4 °C) (06/06/23 0705)  Pulse: 82 (06/06/23 0705)  Resp: (!) 25 (06/06/23 0705)  BP: (!) 106/56 (06/06/23 0705)  SpO2: 100 % (06/06/23 0705) Vital Signs (24h Range):  Temp:  [97.5 °F (36.4 °C)-97.9 °F (36.6 °C)] 97.6 °F (36.4 °C)  Pulse:  [] 82  Resp:  [16-43] 25  SpO2:  [94 %-100 %] 100 %  BP: ()/(51-64) 106/56     Weight: 112.2 kg (247 lb 5.7 oz)  Body mass index is 38.74 kg/m².     Physical Exam  Constitutional:       Appearance: Normal appearance.   Cardiovascular:      Rate and Rhythm: Normal rate and regular rhythm.   Pulmonary:      Effort: Pulmonary effort is normal.      Breath sounds: Normal breath sounds.   Abdominal:      General: There is no distension.      Tenderness: There is no guarding.   Skin:     General: Skin is warm and dry.      Comments: Right gluteal abscess with packaging and mediplex    Neurological:      Mental Status: She is alert and oriented to person, place, and time. Mental status is at baseline.   Psychiatric:         Mood and Affect: Mood normal.         Behavior: Behavior normal.              Significant Labs: A1C:   Recent Labs   Lab 03/15/23  0551 03/22/23  0610   HGBA1C 6.4* 6.4*       ABGs: No results for input(s): PH, PCO2, HCO3, POCSATURATED, BE, TOTALHB, COHB, METHB, O2HB, POCFIO2, PO2 in the last 48 hours.  Bilirubin:   Recent Labs   Lab 06/04/23  1610 06/05/23  0504 06/06/23  0333   BILITOT 0.7 0.6 0.4       Blood Culture:   Recent Labs   Lab 06/04/23  1610   LABBLOO No Growth to date  No Growth to date  No Growth to date  No Growth to date       BMP:   Recent Labs   Lab 06/06/23  0333         K 3.3*      CO2 18*   BUN 13   CREATININE 0.7   CALCIUM 8.5*   MG 1.8        CBC:   Recent Labs   Lab 06/04/23  1610 06/05/23  0504 06/06/23  0333   WBC 7.14 6.46 3.04*   HGB 9.9* 8.5* 7.6*   HCT 33.0* 27.9* 25.3*   PLT 74* 72* 61*       CMP:   Recent Labs   Lab 06/04/23  1610 06/05/23  0504 06/06/23  0333    139 138   K 4.1 3.4* 3.3*    110 109   CO2 22* 19* 18*   * 146* 107   BUN 32* 18 13   CREATININE 1.3 0.7 0.7   CALCIUM 9.2 8.6* 8.5*   PROT 5.5* 4.7* 4.5*   ALBUMIN 3.3* 2.7* 2.5*   BILITOT 0.7 0.6 0.4   ALKPHOS 92 73 76   AST 5* <5* 5*   ALT 12 11 9*   ANIONGAP 13 10 11       Cardiac Markers:   Recent Labs   Lab 06/04/23  1610   BNP 45       Coagulation: No results for input(s): PT, INR, APTT in the last 48 hours.  Lactic Acid:   Recent Labs   Lab 06/04/23  1610   LACTATE 1.4       Lipase: No results for input(s): LIPASE in the last 48 hours.  Lipid Panel: No results for input(s): CHOL, HDL, LDLCALC, TRIG, CHOLHDL in the last 48 hours.  Magnesium:   Recent Labs   Lab 06/06/23  0333   MG 1.8     POCT Glucose:   Recent Labs   Lab 06/05/23  1143 06/05/23  1648 06/05/23  1954   POCTGLUCOSE 132* 134* 121*     Prealbumin: No results for input(s): PREALBUMIN in the last 48 hours.  Respiratory Culture: No results for input(s): GSRESP, RESPIRATORYC in the last 48 hours.  Troponin:   Recent Labs   Lab 06/04/23  1610   TROPONINI <0.006       TSH:   Recent Labs   Lab 03/22/23  0610   TSH 14.200*       Urine Culture:   Recent Labs   Lab 06/04/23  1617   LABURIN No growth     Urine Studies:   Recent Labs   Lab 06/04/23  1617   COLORU Yellow   APPEARANCEUA Hazy*   PHUR 6.0   SPECGRAV 1.025   PROTEINUA 2+*   GLUCUA Negative   KETONESU Trace*   BILIRUBINUA 1+*   OCCULTUA Negative   NITRITE Negative   UROBILINOGEN Negative   LEUKOCYTESUR Negative   RBCUA 1   WBCUA 23*   BACTERIA Many*   SQUAMEPITHEL 1   HYALINECASTS 20*         Significant Imaging: I have reviewed all pertinent imaging results/findings within the past 24 hours.    Assessment/Plan:      * Sepsis  This patient  does have evidence of infective focus  My overall impression is septic shock due to SBP < 90.  Source: Skin and Soft Tissue (location right glute)  Antibiotics given-   Antibiotics (72h ago, onward)      Start     Stop Route Frequency Ordered    06/05/23 0900  vancomycin 1,250 mg in dextrose 5 % (D5W) 250 mL IVPB (Vial-Mate)         -- IV Every 12 hours (non-standard times) 06/04/23 2310    06/05/23 0130  piperacillin-tazobactam (ZOSYN) 4.5 g in dextrose 5 % in water (D5W) 5 % 100 mL IVPB (MB+)         -- IV Every 8 hours (non-standard times) 06/04/23 1749    06/04/23 1819  vancomycin - pharmacy to dose  (vancomycin IVPB)        See Hyperspace for full Linked Orders Report.    -- IV pharmacy to manage frequency 06/04/23 1720    06/04/23 1810  vancomycin (VANCOCIN) 1,000 mg injection        Note to Pharmacy: Created by cabinet override    06/05 0614 06/04/23 1810          Latest lactate reviewed-  Recent Labs   Lab 06/04/23  1610   LACTATE 1.4     Organ dysfunction indicated by Acute kidney injury    Fluid challenge Ideal Body Weight- The patient's ideal body weight is Ideal body weight: 61.6 kg (135 lb 12.9 oz) which will be used to calculate fluid bolus of 30 ml/kg for treatment of septic shock.      Post- resuscitation assessment Yes Perfusion exam was performed within 6 hours of septic shock presentation after bolus shows Adequate tissue perfusion assessed by non-invasive monitoring       Will Start Pressors- Levophed for MAP of 65  Source control achieved by: IV vanc and IV zosyn  No lactic acidosis present      Other specified anemias  Hg 7.6/ HCT 25.3  Trending down   Likely dilutional component (decreasing IV fluids and will repeat CBC tomorrow)      Type 2 diabetes mellitus with skin complication  Patient's FSGs are controlled on current medication regimen.  Last A1c reviewed-   Lab Results   Component Value Date    HGBA1C 6.4 (H) 03/22/2023     Most recent fingerstick glucose reviewed-   Recent Labs   Lab  06/05/23  1143 06/05/23  1648 06/05/23  1954   POCTGLUCOSE 132* 134* 121*     Current correctional scale  Low  Maintain anti-hyperglycemic dose as follows-   Antihyperglycemics (From admission, onward)      Start     Stop Route Frequency Ordered    06/05/23 0911  insulin aspart U-100 pen 0-5 Units         -- SubQ Before meals & nightly PRN 06/05/23 0811          Hold Oral hypoglycemics while patient is in the hospital.    Essential hypertension  H/o of hypertension  Holding antihypertensives in the setting of sepsis       Abscess, gluteal, right  I&D in the ED  Wound cultures pending  IV abx with vanc and zosyn  Pain management   General surgery consulted       Acute kidney injury  Patient with acute kidney injury likely due to pre-renal azotemia EZRA is currently improving. Labs reviewed- Renal function/electrolytes with Estimated Creatinine Clearance: 125.5 mL/min (based on SCr of 0.7 mg/dL). according to latest data. Monitor urine output and serial BMP and adjust therapy as needed. Avoid nephrotoxins and renally dose meds for GFR listed above.     Resolving  Good urine output       History of Large cell (diffuse) non-Hodgkin's lymphoma  Follow up outpatient oncology         VTE Risk Mitigation (From admission, onward)           Ordered     IP VTE HIGH RISK PATIENT  Once         06/04/23 2301     Place sequential compression device  Until discontinued         06/04/23 2301                    Discharge Planning   JESSE:      Code Status: Full Code   Is the patient medically ready for discharge?:     Reason for patient still in hospital (select all that apply): Patient trending condition               Critical care time spent on the evaluation and treatment of severe organ dysfunction, review of pertinent labs and imaging studies, discussions with consulting providers and discussions with patient/family: 35 minutes.      Saira Solitario PA-C  Department of Hospital Medicine   [place] - Intensive Care

## 2023-06-06 NOTE — NURSING
1906 Patient lying in bed resting. Alert and oriented times 4. Calm and cooperative. Discussed plan of care with patient and she verbalized understanding. Continuous cardiac monitoring in progress  ST. Sat on room air 98%. NS infusing at 125 ml/hr. External female catheter intact and patent. No distress assessed at this time. Will continue to monitor. Side rails times 2 up, call button in reach, and bed low and locked.      0600 Patient slept throughout the night without complications.

## 2023-06-06 NOTE — CONSULTS
"CM consulted for "Patient would like an orthopedic consult for bilateral knees. Does not want to go  to [place]". CM can not place referrals for patient. This would need to be a discussion between patient and MD.     CM to remain available.   "

## 2023-06-06 NOTE — EICU
Intervention Initiated From:  COR / EICU    Sohail intervened regarding:  Rounding (Video assessment)    Nurse Notified:  No    Doctor Notified:  No    Comments: Rounding . Alert. On IV fluid @ 125/hour. B/P 102/56. , resp 35, sat 97 on room air.No c/o voiced

## 2023-06-06 NOTE — HOSPITAL COURSE
Pt HD stable off pressor support.  Currently on room air.  Electrolyte abnormalities.  Replacing Mg and K today.  Hg down trending.  Will monitor h/h.  Down trending but asymptomatic and likely dilutional.  Continue IV vanc and IV zosyn for right gluteal abscess.      6/7 HD stable on room air.  IV fluids d/c yesterday.  Hypokalemic and hypomagnesemia today.  IV replacement ordered.  H/H stabilizing.  General surgery repacked gluteal wound yesterday.  Gram negative rods growing.  Waiting final wound cultures to deescalate abx.  Transfer to med surg today.      6/8 HD stable on room air.  Waiting final wound cultures. Proteus mirabilis and staph growing.  No MRSA so far. Continue IV rocephin.  General removed packing today.  Will discharge home health with wound care.      Final cultures grew MRSA staph, proteus mirbailis and providencia stuartii.  Levofloxacin and bactrim prescribed for 8 days.  H/H to monitor kidney function and for wound care.  She is planning to make an apt with neurology for continued weakness.

## 2023-06-06 NOTE — CONSULTS
[facility] - Intensive Care  General Surgery  Consult Note    Inpatient consult to General Surgery  Consult performed by: Arvin Vila Jr., MD  Consult ordered by: Jonathan Cole MD      Subjective:     Chief Complaint/Reason for Admission:     History of Present Illness:  48-year-old female who had incision and drainage of a right buttock abscess performed in the ER by the ER physician.  Packing was placed.  She is in the ICU.  I was asked to see her for the abscess.  I removed her dressing and packing.  There is no foul odor.  There is no purulence.  No redness.  She states she feels better.  I will just have the nurse packet 1 more time and remove it on Thursday.  Dr. Carl will be here for wound care and he can take a look at it then.    No current facility-administered medications on file prior to encounter.     Current Outpatient Medications on File Prior to Encounter   Medication Sig    acetaminophen (TYLENOL) 325 MG tablet Take 2 tablets (650 mg total) by mouth every 6 (six) hours as needed.    bisoprolol (ZEBETA) 5 MG tablet Take 5 mg by mouth once daily.    cyclobenzaprine (FLEXERIL) 10 MG tablet Take 10 mg by mouth 3 (three) times daily as needed. Take for 5 days    fluticasone (FLONASE) 50 mcg/actuation nasal spray 1 spray by Each Nare route once daily.    melatonin 10 mg Cap Take 10 mg by mouth nightly as needed.    montelukast (SINGULAIR) 10 mg tablet Take 10 mg by mouth once daily.    ondansetron (ZOFRAN) 4 MG tablet Take 4 mg by mouth every 8 (eight) hours as needed.    albuterol (PROVENTIL/VENTOLIN HFA) 90 mcg/actuation inhaler Ventolin HFA 90 mcg/actuation aerosol inhaler   INHALE 2 PUFFS BY MOUTH 3 TIMES DAILY AS NEEDED.    albuterol-ipratropium (DUO-NEB) 2.5 mg-0.5 mg/3 mL nebulizer solution ipratropium 0.5 mg-albuterol 3 mg (2.5 mg base)/3 mL nebulization soln   USE 1 VIAL IN NEBULIZER EVERY 6 TO 8 HOURS AS NEEDED    blood sugar diagnostic Strp USE TO CHECK BLOOD SUGAR TWICE A DAY     conjugated estrogens (PREMARIN) vaginal cream Place 0.5 g vaginally once daily. Place 0.5 vaginally for 4 weeks, then transition to twice weekly use.    insulin detemir U-100, Levemir, 100 unit/mL (3 mL) SubQ InPn pen Inject 5 Units into the skin once daily.    metFORMIN (GLUCOPHAGE) 500 MG tablet Take 500 mg by mouth once daily.    naproxen (NAPROSYN) 500 MG tablet Take 500 mg by mouth 2 (two) times daily as needed. Take for 5 days    oxyCODONE (ROXICODONE) 5 MG immediate release tablet Take 1 tablet (5 mg total) by mouth every 4 (four) hours as needed for Pain.    pantoprazole (PROTONIX) 40 MG tablet Take 1 tablet (40 mg total) by mouth once daily.    polyethylene glycol (GLYCOLAX) 17 gram PwPk Take 17 g by mouth once daily.    zinc sulfate (ZINCATE) 220 (50) mg capsule Take 220 mg by mouth 3 (three) times daily.       Review of patient's allergies indicates:   Allergen Reactions    Tetanus vaccines and toxoid Rash       Past Medical History:   Diagnosis Date    Acute carpal tunnel syndrome of left wrist     Biliary colic     Diabetes mellitus     Encounter for blood transfusion     History of chemotherapy     Incisional hernia     Large cell (diffuse) non-Hodgkin's lymphoma     Stem cells transplant status      Past Surgical History:   Procedure Laterality Date    ARTHROSCOPY OF KNEE Bilateral 03/30/2023    Procedure: ARTHROSCOPY, KNEE;  Surgeon: Rudolph Murrell MD;  Location: [facility];  Service: Orthopedics;  Laterality: Bilateral;    BREAST BIOPSY Left     lymph node biopsy, benign    BREAST SURGERY      CHOLECYSTECTOMY      COLD KNIFE CONIZATION OF CERVIX N/A 03/08/2021    Procedure: CONE BIOPSY, CERVIX, USING COLD KNIFE;  Surgeon: Evelyn Wheeler MD;  Location: [facility];  Service: OB/GYN;  Laterality: N/A;    COLONOSCOPY N/A 12/21/2022    Procedure: COLONOSCOPY;  Surgeon: Annamarie Jane MD;  Location: [facility];  Service: Endoscopy;  Laterality: N/A;    HAND ARTHROTOMY Right 03/30/2023    Procedure:  OPEN  ARTHROTOMY WRIST;  Surgeon: Rudolph Murrell MD;  Location: [facility] OR;  Service: Orthopedics;  Laterality: Right;    HERNIA REPAIR      incisional    LUNG BIOPSY      lymphnode biopsy      MEDIPORT REMOVAL Left 01/30/2020    Procedure: REMOVAL, CATHETER, CENTRAL VENOUS, TUNNELED, WITH PORT;  Surgeon: Luis Bogran-Reyes, MD;  Location: [facility] OR;  Service: General;  Laterality: Left;    PORTACATH PLACEMENT Left     REVISION OF SCAR  06/22/2021    Procedure: REVISION, SCAR;  Surgeon: Otis Grimes MD;  Location: [facility];  Service: General;;    SKIN BIOPSY      TUBAL LIGATION      UMBILICAL HERNIA REPAIR       Family History       Problem Relation (Age of Onset)    Breast cancer Maternal Aunt, Maternal Aunt    Cancer Paternal Grandmother    Colon cancer Paternal Grandmother    Diabetes Mother, Father    Heart block Mother    Heart failure Father    Hypertension Mother, Father    Kidney disease Mother    Lung cancer Father          Tobacco Use    Smoking status: Never    Smokeless tobacco: Never   Substance and Sexual Activity    Alcohol use: Not Currently    Drug use: No    Sexual activity: Yes     Partners: Male     Birth control/protection: See Surgical Hx     Comment: with one partner for 24 years     Review of Systems   All other systems reviewed and are negative.  Objective:     Vital Signs (Most Recent):  Temp: 97.6 °F (36.4 °C) (06/06/23 0705)  Pulse: 82 (06/06/23 0705)  Resp: (!) 25 (06/06/23 0705)  BP: (!) 106/56 (06/06/23 0705)  SpO2: 100 % (06/06/23 0705) Vital Signs (24h Range):  Temp:  [97.6 °F (36.4 °C)-97.9 °F (36.6 °C)] 97.6 °F (36.4 °C)  Pulse:  [] 82  Resp:  [16-43] 25  SpO2:  [94 %-100 %] 100 %  BP: ()/(51-64) 106/56     Weight: 112.2 kg (247 lb 5.7 oz)  Body mass index is 38.74 kg/m².      Intake/Output Summary (Last 24 hours) at 6/6/2023 1209  Last data filed at 6/6/2023 0626  Gross per 24 hour   Intake 3344.77 ml   Output 1200 ml   Net 2144.77 ml       Physical  Exam  Constitutional:       Appearance: She is well-developed.   HENT:      Head: Normocephalic.   Eyes:      Pupils: Pupils are equal, round, and reactive to light.   Pulmonary:      Effort: Pulmonary effort is normal.   Abdominal:      Palpations: Abdomen is soft.   Musculoskeletal:         General: Normal range of motion.      Cervical back: Normal range of motion.   Skin:     General: Skin is warm and dry.      Comments: Patient with right buttock abscess.  The packing was removed.  The opening is only about a 1 0.5 x 1.5 cm opening.  I can not probe it it is too small.  There is no foul odor.  There is no purulent drainage.  There is no redness.  She states she feels better.   Neurological:      Mental Status: She is alert and oriented to person, place, and time.       Significant Labs:  CBC:   Recent Labs   Lab 06/06/23 0333   WBC 3.04*   RBC 3.16*   HGB 7.6*   HCT 25.3*   PLT 61*   MCV 80*   MCH 24.1*   MCHC 30.0*     CMP:   Recent Labs   Lab 06/06/23 0333      CALCIUM 8.5*   ALBUMIN 2.5*   PROT 4.5*      K 3.3*   CO2 18*      BUN 13   CREATININE 0.7   ALKPHOS 76   ALT 9*   AST 5*   BILITOT 0.4       Significant Diagnostics:  None    Assessment/Plan:     Active Diagnoses:    Diagnosis Date Noted POA    PRINCIPAL PROBLEM:  Sepsis [A41.9] 06/05/2023 Yes    Acute kidney injury [N17.9] 06/05/2023 Yes    Abscess, gluteal, right [L02.31] 06/05/2023 Yes    Essential hypertension [I10] 06/05/2023 Yes    Type 2 diabetes mellitus with skin complication [E11.628] 06/05/2023 Yes    Other specified anemias [D64.89] 06/05/2023 Yes    History of Large cell (diffuse) non-Hodgkin's lymphoma [C83.30] 06/28/2012 Yes      Problems Resolved During this Admission:     The nurse will lightly repack it again today just to keep it open and let it drain a little more.  I do not think she needs any further surgery or incision and drainage.  If she is still here, Dr. Carl will be here Thursday for wound care and  he can take a look at it.    Thank you for your consult.     Arvin Vila Jr, MD  General Surgery  [place] - Intensive Care

## 2023-06-06 NOTE — PROGRESS NOTES
Pharmacokinetic Assessment Follow Up: IV Vancomycin    Vancomycin serum concentration assessment(s):    The trough level was drawn correctly and can be used to guide therapy at this time. The measurement is above the desired definitive target range of 10 to 15 mcg/mL.    Vancomycin Regimen Plan:    Change regimen to Vancomycin 1000 mg IV every 12 hours with next serum trough concentration measured at 2100 prior to 4th dose on 6/7/23    Drug levels (last 3 results):  Recent Labs   Lab Result Units 06/06/23  0833   Vancomycin-Trough ug/mL 16.7       Pharmacy will continue to follow and monitor vancomycin.    Please contact pharmacy at extension [phone] for questions regarding this assessment.    Thank you for the consult,   Shi dEge       Patient brief summary:  Hedy Conway is a 48 y.o. female initiated on antimicrobial therapy with IV Vancomycin for treatment of skin & soft tissue infection    The patient's current regimen is 1000mg IV q 12 h    Drug Allergies:   Review of patient's allergies indicates:   Allergen Reactions    Tetanus vaccines and toxoid Rash       Actual Body Weight:   112.2kg    Renal Function:   Estimated Creatinine Clearance: 125.5 mL/min (based on SCr of 0.7 mg/dL).,     Dialysis Method (if applicable):  N/A    CBC (last 72 hours):  Recent Labs   Lab Result Units 06/04/23  1610 06/05/23  0504 06/06/23  0333   WBC K/uL 7.14 6.46 3.04*   Hemoglobin g/dL 9.9* 8.5* 7.6*   Hematocrit % 33.0* 27.9* 25.3*   Platelets K/uL 74* 72* 61*   Gran % % 42.3 40.4 32.6*   Lymph % % 40.6 40.7 52.0*   Mono % % 15.7* 14.9 12.8   Eosinophil % % 0.1 0.6 2.0   Basophil % % 0.3 0.5 0.3   Differential Method  Automated Automated Automated       Metabolic Panel (last 72 hours):  Recent Labs   Lab Result Units 06/04/23  1610 06/04/23  1617 06/05/23  0504 06/06/23  0333   Sodium mmol/L 136  --  139 138   Potassium mmol/L 4.1  --  3.4* 3.3*   Chloride mmol/L 101  --  110 109   CO2 mmol/L 22*  --  19* 18*    Glucose mg/dL 138*  --  146* 107   Glucose, UA   --  Negative  --   --    BUN mg/dL 32*  --  18 13   Creatinine mg/dL 1.3  --  0.7 0.7   Albumin g/dL 3.3*  --  2.7* 2.5*   Total Bilirubin mg/dL 0.7  --  0.6 0.4   Alkaline Phosphatase U/L 92  --  73 76   AST U/L 5*  --  <5* 5*   ALT U/L 12  --  11 9*   Magnesium mg/dL  --   --   --  1.8       Vancomycin Administrations:  vancomycin given in the last 96 hours                     vancomycin 1,250 mg in dextrose 5 % (D5W) 250 mL IVPB (Vial-Mate) (mg) 1,250 mg New Bag 06/05/23 2113     1,250 mg New Bag  0942    vancomycin (VANCOCIN) 2,500 mg in dextrose 5 % (D5W) 500 mL IVPB (mg) 2,500 mg New Bag 06/04/23 1830                    Microbiologic Results:  Microbiology Results (last 7 days)       Procedure Component Value Units Date/Time    Aerobic culture [[id number]]  (Abnormal) Collected: 06/04/23 1641    Order Status: Completed Specimen: Abscess Updated: 06/06/23 0830     Aerobic Bacterial Culture GRAM NEGATIVE ALEX  Few  Identification and susceptibility pending      Blood culture [[id number]] Collected: 06/04/23 1610    Order Status: Completed Specimen: Blood from Antecubital, Left Updated: 06/05/23 2212     Blood Culture, Routine No Growth to date      No Growth to date    Blood culture [[id number]] Collected: 06/04/23 1610    Order Status: Completed Specimen: Blood from Antecubital, Right Updated: 06/05/23 2212     Blood Culture, Routine No Growth to date      No Growth to date    Urine culture [[id number]] Collected: 06/04/23 1617    Order Status: Completed Specimen: Urine Updated: 06/05/23 2210     Urine Culture, Routine No growth    Narrative:      Specimen Source->Urine

## 2023-06-07 LAB
ALBUMIN SERPL BCP-MCNC: 2.6 G/DL (ref 3.5–5.2)
ALP SERPL-CCNC: 72 U/L (ref 55–135)
ALT SERPL W/O P-5'-P-CCNC: 9 U/L (ref 10–44)
ANION GAP SERPL CALC-SCNC: 11 MMOL/L (ref 8–16)
AST SERPL-CCNC: <5 U/L (ref 10–40)
BASOPHILS # BLD AUTO: 0.01 K/UL (ref 0–0.2)
BASOPHILS NFR BLD: 0.4 % (ref 0–1.9)
BILIRUB SERPL-MCNC: 0.3 MG/DL (ref 0.1–1)
BUN SERPL-MCNC: 8 MG/DL (ref 6–20)
CALCIUM SERPL-MCNC: 9.3 MG/DL (ref 8.7–10.5)
CHLORIDE SERPL-SCNC: 108 MMOL/L (ref 95–110)
CO2 SERPL-SCNC: 22 MMOL/L (ref 23–29)
CREAT SERPL-MCNC: 0.6 MG/DL (ref 0.5–1.4)
DIFFERENTIAL METHOD: ABNORMAL
EOSINOPHIL # BLD AUTO: 0.1 K/UL (ref 0–0.5)
EOSINOPHIL NFR BLD: 3 % (ref 0–8)
ERYTHROCYTE [DISTWIDTH] IN BLOOD BY AUTOMATED COUNT: 15.9 % (ref 11.5–14.5)
EST. GFR  (NO RACE VARIABLE): >60 ML/MIN/1.73 M^2
GLUCOSE SERPL-MCNC: 116 MG/DL (ref 70–110)
HCT VFR BLD AUTO: 27.3 % (ref 37–48.5)
HGB BLD-MCNC: 8.2 G/DL (ref 12–16)
IMM GRANULOCYTES # BLD AUTO: 0.02 K/UL (ref 0–0.04)
IMM GRANULOCYTES NFR BLD AUTO: 0.9 % (ref 0–0.5)
LYMPHOCYTES # BLD AUTO: 1.2 K/UL (ref 1–4.8)
LYMPHOCYTES NFR BLD: 52.8 % (ref 18–48)
MAGNESIUM SERPL-MCNC: 1.9 MG/DL (ref 1.6–2.6)
MCH RBC QN AUTO: 24.2 PG (ref 27–31)
MCHC RBC AUTO-ENTMCNC: 30 G/DL (ref 32–36)
MCV RBC AUTO: 81 FL (ref 82–98)
MONOCYTES # BLD AUTO: 0.3 K/UL (ref 0.3–1)
MONOCYTES NFR BLD: 12.1 % (ref 4–15)
NEUTROPHILS # BLD AUTO: 0.7 K/UL (ref 1.8–7.7)
NEUTROPHILS NFR BLD: 30.8 % (ref 38–73)
NRBC BLD-RTO: 0 /100 WBC
PLATELET # BLD AUTO: 74 K/UL (ref 150–450)
PMV BLD AUTO: 10.8 FL (ref 9.2–12.9)
POCT GLUCOSE: 104 MG/DL (ref 70–110)
POCT GLUCOSE: 112 MG/DL (ref 70–110)
POCT GLUCOSE: 113 MG/DL (ref 70–110)
POTASSIUM SERPL-SCNC: 3.3 MMOL/L (ref 3.5–5.1)
PROT SERPL-MCNC: 4.8 G/DL (ref 6–8.4)
RBC # BLD AUTO: 3.39 M/UL (ref 4–5.4)
SODIUM SERPL-SCNC: 141 MMOL/L (ref 136–145)
WBC # BLD AUTO: 2.31 K/UL (ref 3.9–12.7)

## 2023-06-07 PROCEDURE — 94761 N-INVAS EAR/PLS OXIMETRY MLT: CPT

## 2023-06-07 PROCEDURE — 25000003 PHARM REV CODE 250: Performed by: SURGERY

## 2023-06-07 PROCEDURE — 11000001 HC ACUTE MED/SURG PRIVATE ROOM

## 2023-06-07 PROCEDURE — 85025 COMPLETE CBC W/AUTO DIFF WBC: CPT | Performed by: PHYSICIAN ASSISTANT

## 2023-06-07 PROCEDURE — 83735 ASSAY OF MAGNESIUM: CPT | Performed by: PHYSICIAN ASSISTANT

## 2023-06-07 PROCEDURE — 99233 SBSQ HOSP IP/OBS HIGH 50: CPT | Mod: GT,,, | Performed by: PHYSICIAN ASSISTANT

## 2023-06-07 PROCEDURE — 63600175 PHARM REV CODE 636 W HCPCS: Performed by: STUDENT IN AN ORGANIZED HEALTH CARE EDUCATION/TRAINING PROGRAM

## 2023-06-07 PROCEDURE — 80053 COMPREHEN METABOLIC PANEL: CPT | Performed by: PHYSICIAN ASSISTANT

## 2023-06-07 PROCEDURE — 63600175 PHARM REV CODE 636 W HCPCS: Performed by: PHYSICIAN ASSISTANT

## 2023-06-07 PROCEDURE — 25000003 PHARM REV CODE 250: Performed by: STUDENT IN AN ORGANIZED HEALTH CARE EDUCATION/TRAINING PROGRAM

## 2023-06-07 PROCEDURE — 25000003 PHARM REV CODE 250: Performed by: PHYSICIAN ASSISTANT

## 2023-06-07 PROCEDURE — 25000003 PHARM REV CODE 250: Performed by: INTERNAL MEDICINE

## 2023-06-07 PROCEDURE — 63600175 PHARM REV CODE 636 W HCPCS: Performed by: INTERNAL MEDICINE

## 2023-06-07 PROCEDURE — 99233 PR SUBSEQUENT HOSPITAL CARE,LEVL III: ICD-10-PCS | Mod: GT,,, | Performed by: PHYSICIAN ASSISTANT

## 2023-06-07 RX ORDER — MAGNESIUM SULFATE HEPTAHYDRATE 40 MG/ML
2 INJECTION, SOLUTION INTRAVENOUS ONCE
Status: COMPLETED | OUTPATIENT
Start: 2023-06-07 | End: 2023-06-07

## 2023-06-07 RX ORDER — DOCUSATE SODIUM 100 MG/1
100 CAPSULE, LIQUID FILLED ORAL DAILY
Status: DISCONTINUED | OUTPATIENT
Start: 2023-06-07 | End: 2023-06-08 | Stop reason: HOSPADM

## 2023-06-07 RX ORDER — CELECOXIB 100 MG/1
100 CAPSULE ORAL 2 TIMES DAILY
Status: DISCONTINUED | OUTPATIENT
Start: 2023-06-07 | End: 2023-06-07

## 2023-06-07 RX ORDER — CELECOXIB 100 MG/1
100 CAPSULE ORAL 2 TIMES DAILY
Status: ON HOLD | COMMUNITY
End: 2023-06-08 | Stop reason: HOSPADM

## 2023-06-07 RX ORDER — POLYETHYLENE GLYCOL 3350 17 G/17G
17 POWDER, FOR SOLUTION ORAL DAILY
Status: DISCONTINUED | OUTPATIENT
Start: 2023-06-07 | End: 2023-06-08 | Stop reason: HOSPADM

## 2023-06-07 RX ORDER — POTASSIUM CHLORIDE 7.45 MG/ML
10 INJECTION INTRAVENOUS ONCE
Status: COMPLETED | OUTPATIENT
Start: 2023-06-07 | End: 2023-06-07

## 2023-06-07 RX ADMIN — PIPERACILLIN AND TAZOBACTAM 4.5 G: 4; .5 INJECTION, POWDER, LYOPHILIZED, FOR SOLUTION INTRAVENOUS; PARENTERAL at 02:06

## 2023-06-07 RX ADMIN — POTASSIUM CHLORIDE 10 MEQ: 10 INJECTION, SOLUTION INTRAVENOUS at 08:06

## 2023-06-07 RX ADMIN — POTASSIUM CHLORIDE 10 MEQ: 10 INJECTION, SOLUTION INTRAVENOUS at 10:06

## 2023-06-07 RX ADMIN — MAGNESIUM SULFATE HEPTAHYDRATE 2 G: 40 INJECTION, SOLUTION INTRAVENOUS at 08:06

## 2023-06-07 RX ADMIN — POLYETHYLENE GLYCOL 3350 17 G: 17 POWDER, FOR SOLUTION ORAL at 08:06

## 2023-06-07 RX ADMIN — ACETAMINOPHEN 650 MG: 325 TABLET ORAL at 10:06

## 2023-06-07 RX ADMIN — PIPERACILLIN AND TAZOBACTAM 4.5 G: 4; .5 INJECTION, POWDER, LYOPHILIZED, FOR SOLUTION INTRAVENOUS; PARENTERAL at 08:06

## 2023-06-07 RX ADMIN — DOCUSATE SODIUM 100 MG: 100 CAPSULE, LIQUID FILLED ORAL at 08:06

## 2023-06-07 RX ADMIN — MONTELUKAST 10 MG: 10 TABLET, FILM COATED ORAL at 08:06

## 2023-06-07 RX ADMIN — VANCOMYCIN HYDROCHLORIDE 1000 MG: 1 INJECTION, POWDER, LYOPHILIZED, FOR SOLUTION INTRAVENOUS at 10:06

## 2023-06-07 RX ADMIN — Medication 6 MG: at 10:06

## 2023-06-07 RX ADMIN — ACETAMINOPHEN 650 MG: 325 TABLET ORAL at 09:06

## 2023-06-07 NOTE — SUBJECTIVE & OBJECTIVE
Past Medical History:   Diagnosis Date    Acute carpal tunnel syndrome of left wrist     Biliary colic     Diabetes mellitus     Encounter for blood transfusion     History of chemotherapy     Incisional hernia     Large cell (diffuse) non-Hodgkin's lymphoma     Stem cells transplant status        Past Surgical History:   Procedure Laterality Date    ARTHROSCOPY OF KNEE Bilateral 03/30/2023    Procedure: ARTHROSCOPY, KNEE;  Surgeon: Rudolph Murrell MD;  Location: [facility];  Service: Orthopedics;  Laterality: Bilateral;    BREAST BIOPSY Left     lymph node biopsy, benign    BREAST SURGERY      CHOLECYSTECTOMY      COLD KNIFE CONIZATION OF CERVIX N/A 03/08/2021    Procedure: CONE BIOPSY, CERVIX, USING COLD KNIFE;  Surgeon: Evelyn Wheeler MD;  Location: [facility] OR;  Service: OB/GYN;  Laterality: N/A;    COLONOSCOPY N/A 12/21/2022    Procedure: COLONOSCOPY;  Surgeon: Annamarie Jane MD;  Location: [facility];  Service: Endoscopy;  Laterality: N/A;    HAND ARTHROTOMY Right 03/30/2023    Procedure: OPEN  ARTHROTOMY WRIST;  Surgeon: Rudolph Murrell MD;  Location: [facility];  Service: Orthopedics;  Laterality: Right;    HERNIA REPAIR      incisional    LUNG BIOPSY      lymphnode biopsy      MEDIPORT REMOVAL Left 01/30/2020    Procedure: REMOVAL, CATHETER, CENTRAL VENOUS, TUNNELED, WITH PORT;  Surgeon: Luis Bogran-Reyes, MD;  Location: [facility];  Service: General;  Laterality: Left;    PORTACATH PLACEMENT Left     REVISION OF SCAR  06/22/2021    Procedure: REVISION, SCAR;  Surgeon: Otis Grimes MD;  Location: [address];  Service: General;;    SKIN BIOPSY      TUBAL LIGATION      UMBILICAL HERNIA REPAIR         Review of patient's allergies indicates:   Allergen Reactions    Tetanus vaccines and toxoid Rash       No current facility-administered medications on file prior to encounter.     Current Outpatient Medications on File Prior to Encounter   Medication Sig    acetaminophen (TYLENOL) 325 MG tablet Take  2 tablets (650 mg total) by mouth every 6 (six) hours as needed.    bisoprolol (ZEBETA) 5 MG tablet Take 5 mg by mouth once daily.    cyclobenzaprine (FLEXERIL) 10 MG tablet Take 10 mg by mouth 3 (three) times daily as needed. Take for 5 days    fluticasone (FLONASE) 50 mcg/actuation nasal spray 1 spray by Each Nare route once daily.    melatonin 10 mg Cap Take 10 mg by mouth nightly as needed.    montelukast (SINGULAIR) 10 mg tablet Take 10 mg by mouth once daily.    ondansetron (ZOFRAN) 4 MG tablet Take 4 mg by mouth every 8 (eight) hours as needed.    albuterol (PROVENTIL/VENTOLIN HFA) 90 mcg/actuation inhaler Ventolin HFA 90 mcg/actuation aerosol inhaler   INHALE 2 PUFFS BY MOUTH 3 TIMES DAILY AS NEEDED.    albuterol-ipratropium (DUO-NEB) 2.5 mg-0.5 mg/3 mL nebulizer solution ipratropium 0.5 mg-albuterol 3 mg (2.5 mg base)/3 mL nebulization soln   USE 1 VIAL IN NEBULIZER EVERY 6 TO 8 HOURS AS NEEDED    blood sugar diagnostic Strp USE TO CHECK BLOOD SUGAR TWICE A DAY    conjugated estrogens (PREMARIN) vaginal cream Place 0.5 g vaginally once daily. Place 0.5 vaginally for 4 weeks, then transition to twice weekly use.    insulin detemir U-100, Levemir, 100 unit/mL (3 mL) SubQ InPn pen Inject 5 Units into the skin once daily.    metFORMIN (GLUCOPHAGE) 500 MG tablet Take 500 mg by mouth once daily.    naproxen (NAPROSYN) 500 MG tablet Take 500 mg by mouth 2 (two) times daily as needed. Take for 5 days    oxyCODONE (ROXICODONE) 5 MG immediate release tablet Take 1 tablet (5 mg total) by mouth every 4 (four) hours as needed for Pain.    pantoprazole (PROTONIX) 40 MG tablet Take 1 tablet (40 mg total) by mouth once daily.    polyethylene glycol (GLYCOLAX) 17 gram PwPk Take 17 g by mouth once daily.    zinc sulfate (ZINCATE) 220 (50) mg capsule Take 220 mg by mouth 3 (three) times daily.     Family History       Problem Relation (Age of Onset)    Breast cancer Maternal Aunt, Maternal Aunt    Cancer Paternal Grandmother     Colon cancer Paternal Grandmother    Diabetes Mother, Father    Heart block Mother    Heart failure Father    Hypertension Mother, Father    Kidney disease Mother    Lung cancer Father          Tobacco Use    Smoking status: Never    Smokeless tobacco: Never   Substance and Sexual Activity    Alcohol use: Not Currently    Drug use: No    Sexual activity: Yes     Partners: Male     Birth control/protection: See Surgical Hx     Comment: with one partner for 24 years     Review of Systems   Constitutional:  Negative for chills, fatigue and fever.   Respiratory:  Negative for cough and shortness of breath.    Cardiovascular:  Negative for chest pain.   Gastrointestinal:  Positive for constipation. Negative for diarrhea, nausea and vomiting.   Genitourinary:  Negative for difficulty urinating and dysuria.   Musculoskeletal:  Positive for arthralgias and gait problem.   Skin:  Positive for wound.   Neurological:  Negative for speech difficulty and weakness (generalized/ right knee).   Psychiatric/Behavioral:  Negative for agitation and confusion.    Objective:     Vital Signs (Most Recent):  Temp: 97.9 °F (36.6 °C) (06/07/23 0717)  Pulse: 83 (06/07/23 0615)  Resp: (!) 25 (06/07/23 0615)  BP: (!) 148/74 (06/07/23 0600)  SpO2: 97 % (06/07/23 0615) Vital Signs (24h Range):  Temp:  [97.1 °F (36.2 °C)-98.1 °F (36.7 °C)] 97.9 °F (36.6 °C)  Pulse:  [73-92] 83  Resp:  [8-36] 25  SpO2:  [95 %-100 %] 97 %  BP: (120-155)/(59-79) 148/74     Weight: 112.2 kg (247 lb 5.7 oz)  Body mass index is 38.74 kg/m².     Physical Exam  Constitutional:       Appearance: Normal appearance.   Cardiovascular:      Rate and Rhythm: Normal rate and regular rhythm.   Pulmonary:      Effort: Pulmonary effort is normal.      Breath sounds: Normal breath sounds.   Abdominal:      General: There is no distension.      Tenderness: There is no guarding.   Skin:     General: Skin is warm and dry.      Comments: Right gluteal abscess with packaging and  mediplex    Neurological:      Mental Status: She is alert and oriented to person, place, and time. Mental status is at baseline.   Psychiatric:         Mood and Affect: Mood normal.         Behavior: Behavior normal.              Significant Labs: A1C:   Recent Labs   Lab 03/15/23  0551 03/22/23  0610   HGBA1C 6.4* 6.4*       ABGs: No results for input(s): PH, PCO2, HCO3, POCSATURATED, BE, TOTALHB, COHB, METHB, O2HB, POCFIO2, PO2 in the last 48 hours.  Bilirubin:   Recent Labs   Lab 06/04/23  1610 06/05/23  0504 06/06/23  0333 06/07/23  0342   BILITOT 0.7 0.6 0.4 0.3       Blood Culture:   No results for input(s): LABBLOO in the last 48 hours.    BMP:   Recent Labs   Lab 06/07/23  0342   *      K 3.3*      CO2 22*   BUN 8   CREATININE 0.6   CALCIUM 9.3   MG 1.9       CBC:   Recent Labs   Lab 06/06/23  0333 06/07/23  0342   WBC 3.04* 2.31*   HGB 7.6* 8.2*   HCT 25.3* 27.3*   PLT 61* 74*       CMP:   Recent Labs   Lab 06/06/23  0333 06/07/23  0342    141   K 3.3* 3.3*    108   CO2 18* 22*    116*   BUN 13 8   CREATININE 0.7 0.6   CALCIUM 8.5* 9.3   PROT 4.5* 4.8*   ALBUMIN 2.5* 2.6*   BILITOT 0.4 0.3   ALKPHOS 76 72   AST 5* <5*   ALT 9* 9*   ANIONGAP 11 11       Cardiac Markers:   No results for input(s): CKMB, MYOGLOBIN, BNP, TROPISTAT in the last 48 hours.    Coagulation: No results for input(s): PT, INR, APTT in the last 48 hours.  Lactic Acid:   No results for input(s): LACTATE in the last 48 hours.    Lipase: No results for input(s): LIPASE in the last 48 hours.  Lipid Panel: No results for input(s): CHOL, HDL, LDLCALC, TRIG, CHOLHDL in the last 48 hours.  Magnesium:   Recent Labs   Lab 06/06/23  0333 06/07/23  0342   MG 1.8 1.9       POCT Glucose:   Recent Labs   Lab 06/06/23  1623 06/06/23  1955 06/07/23  0744   POCTGLUCOSE 89 103 112*       Prealbumin: No results for input(s): PREALBUMIN in the last 48 hours.  Respiratory Culture: No results for input(s): GSRESP,  RESPIRATORYC in the last 48 hours.  Troponin:   No results for input(s): TROPONINI, TROPONINIHS in the last 48 hours.    TSH:   Recent Labs   Lab 03/22/23  0610   TSH 14.200*       Urine Culture: No results for input(s): LABURIN in the last 48 hours.    Urine Studies:   No results for input(s): COLORU, APPEARANCEUA, PHUR, SPECGRAV, PROTEINUA, GLUCUA, KETONESU, BILIRUBINUA, OCCULTUA, NITRITE, UROBILINOGEN, LEUKOCYTESUR, RBCUA, WBCUA, BACTERIA, SQUAMEPITHEL, HYALINECASTS in the last 48 hours.    Invalid input(s): JOSSELIN      Significant Imaging: I have reviewed all pertinent imaging results/findings within the past 24 hours.

## 2023-06-07 NOTE — ASSESSMENT & PLAN NOTE
I&D in the ED  Wound cultures gram negative rods   IV abx with vanc and zosyn  Pain management   General surgery consulted and repacked 6/6

## 2023-06-07 NOTE — PROGRESS NOTES
Therapy with Vancomycin complete and/or consult discontinued by provider.  Pharmacy will sign off, please re-consult as needed.    Thanks!   Michelle Cedillo, PharmD  06/07/2023 2:38 PM

## 2023-06-07 NOTE — PLAN OF CARE
Problem: Adult Inpatient Plan of Care  Goal: Plan of Care Review  Outcome: Ongoing, Progressing  Goal: Patient-Specific Goal (Individualized)  Outcome: Ongoing, Progressing  Goal: Absence of Hospital-Acquired Illness or Injury  Outcome: Ongoing, Progressing  Goal: Optimal Comfort and Wellbeing  Outcome: Ongoing, Progressing  Goal: Readiness for Transition of Care  Outcome: Ongoing, Progressing     Problem: Bariatric Environmental Safety  Goal: Safety Maintained with Care  Outcome: Ongoing, Progressing     Problem: Diabetes Comorbidity  Goal: Blood Glucose Level Within Targeted Range  Outcome: Ongoing, Progressing     Problem: Infection  Goal: Absence of Infection Signs and Symptoms  Outcome: Ongoing, Progressing     Problem: Skin Injury Risk Increased  Goal: Skin Health and Integrity  Outcome: Ongoing, Progressing     Problem: Adjustment to Illness (Sepsis/Septic Shock)  Goal: Optimal Coping  Outcome: Ongoing, Progressing     Problem: Bleeding (Sepsis/Septic Shock)  Goal: Absence of Bleeding  Outcome: Ongoing, Progressing     Problem: Glycemic Control Impaired (Sepsis/Septic Shock)  Goal: Blood Glucose Level Within Desired Range  Outcome: Ongoing, Progressing     Problem: Infection Progression (Sepsis/Septic Shock)  Goal: Absence of Infection Signs and Symptoms  Outcome: Ongoing, Progressing     Problem: Nutrition Impaired (Sepsis/Septic Shock)  Goal: Optimal Nutrition Intake  Outcome: Ongoing, Progressing     Problem: Fluid and Electrolyte Imbalance (Acute Kidney Injury/Impairment)  Goal: Fluid and Electrolyte Balance  Outcome: Ongoing, Progressing     Problem: Oral Intake Inadequate (Acute Kidney Injury/Impairment)  Goal: Optimal Nutrition Intake  Outcome: Ongoing, Progressing     Problem: Renal Function Impairment (Acute Kidney Injury/Impairment)  Goal: Effective Renal Function  Outcome: Ongoing, Progressing     Problem: Impaired Wound Healing  Goal: Optimal Wound Healing  Outcome: Ongoing, Progressing

## 2023-06-07 NOTE — ASSESSMENT & PLAN NOTE
Hg 7.6/ HCT 25.3  Trending down   Likely dilutional component (decreasing IV fluids and will repeat CBC tomorrow)    6/7 Improved to 8.2/ 27.3 today

## 2023-06-07 NOTE — PROGRESS NOTES
[facility] Medicine  Progress Note    Patient Name: Hedy Conway  MRN: [id number]  Patient Class: IP- Inpatient   Admission Date: 6/4/2023  Length of Stay: 2 days  Attending Physician: Uche Blum MD  Primary Care Provider: John Lantigua PA-C        Subjective:     Principal Problem:Sepsis        HPI:  Patient is a 48 year old female with medical history of obesity, nonhogdkin lymphoma, recently treated for septic arthritis, HTN, DM type 2 who presented to the ED with right buttock abscess.  Patient states symptoms started on Thursday and on Saturday she felt fatigue. She has had fever and chills but denies CP, SOB, nausea and vomiting.   BP low in the ED and was bolus a total of 4 liters.      Admitted for sepsis secondary to right buttock abscess.        Overview/Hospital Course:  Pt HD stable off pressor support.  Currently on room air.  Electrolyte abnormalities.  Replacing Mg and K today.  Hg down trending.  Will monitor h/h.  Down trending but asymptomatic and likely dilutional.  Continue IV vanc and IV zosyn for right gluteal abscess.      6/7 HD stable on room air.  IV fluids d/c yesterday.  Hypokalemic and hypomagnesemia today.  IV replacement ordered.  H/H stabilizing.  General surgery repacked gluteal wound yesterday.  Gram negative rods growing.  Waiting final wound cultures to deescalate abx.  Transfer to med surg today.        Past Medical History:   Diagnosis Date    Acute carpal tunnel syndrome of left wrist     Biliary colic     Diabetes mellitus     Encounter for blood transfusion     History of chemotherapy     Incisional hernia     Large cell (diffuse) non-Hodgkin's lymphoma     Stem cells transplant status        Past Surgical History:   Procedure Laterality Date    ARTHROSCOPY OF KNEE Bilateral 03/30/2023    Procedure: ARTHROSCOPY, KNEE;  Surgeon: Rudolph Murrell MD;  Location: [facility] OR;  Service: Orthopedics;  Laterality: Bilateral;    BREAST  BIOPSY Left     lymph node biopsy, benign    BREAST SURGERY      CHOLECYSTECTOMY      COLD KNIFE CONIZATION OF CERVIX N/A 03/08/2021    Procedure: CONE BIOPSY, CERVIX, USING COLD KNIFE;  Surgeon: Evelyn Wheeler MD;  Location: [facility] OR;  Service: OB/GYN;  Laterality: N/A;    COLONOSCOPY N/A 12/21/2022    Procedure: COLONOSCOPY;  Surgeon: Annamarie Jane MD;  Location: [facility];  Service: Endoscopy;  Laterality: N/A;    HAND ARTHROTOMY Right 03/30/2023    Procedure: OPEN  ARTHROTOMY WRIST;  Surgeon: Rudolph Murrell MD;  Location: [facility] OR;  Service: Orthopedics;  Laterality: Right;    HERNIA REPAIR      incisional    LUNG BIOPSY      lymphnode biopsy      MEDIPORT REMOVAL Left 01/30/2020    Procedure: REMOVAL, CATHETER, CENTRAL VENOUS, TUNNELED, WITH PORT;  Surgeon: Luis Bogran-Reyes, MD;  Location: [facility] OR;  Service: General;  Laterality: Left;    PORTACATH PLACEMENT Left     REVISION OF SCAR  06/22/2021    Procedure: REVISION, SCAR;  Surgeon: Otis Grimes MD;  Location: [address];  Service: General;;    SKIN BIOPSY      TUBAL LIGATION      UMBILICAL HERNIA REPAIR         Review of patient's allergies indicates:   Allergen Reactions    Tetanus vaccines and toxoid Rash       No current facility-administered medications on file prior to encounter.     Current Outpatient Medications on File Prior to Encounter   Medication Sig    acetaminophen (TYLENOL) 325 MG tablet Take 2 tablets (650 mg total) by mouth every 6 (six) hours as needed.    bisoprolol (ZEBETA) 5 MG tablet Take 5 mg by mouth once daily.    cyclobenzaprine (FLEXERIL) 10 MG tablet Take 10 mg by mouth 3 (three) times daily as needed. Take for 5 days    fluticasone (FLONASE) 50 mcg/actuation nasal spray 1 spray by Each Nare route once daily.    melatonin 10 mg Cap Take 10 mg by mouth nightly as needed.    montelukast (SINGULAIR) 10 mg tablet Take 10 mg by mouth once daily.    ondansetron (ZOFRAN) 4 MG tablet Take 4 mg  by mouth every 8 (eight) hours as needed.    albuterol (PROVENTIL/VENTOLIN HFA) 90 mcg/actuation inhaler Ventolin HFA 90 mcg/actuation aerosol inhaler   INHALE 2 PUFFS BY MOUTH 3 TIMES DAILY AS NEEDED.    albuterol-ipratropium (DUO-NEB) 2.5 mg-0.5 mg/3 mL nebulizer solution ipratropium 0.5 mg-albuterol 3 mg (2.5 mg base)/3 mL nebulization soln   USE 1 VIAL IN NEBULIZER EVERY 6 TO 8 HOURS AS NEEDED    blood sugar diagnostic Strp USE TO CHECK BLOOD SUGAR TWICE A DAY    conjugated estrogens (PREMARIN) vaginal cream Place 0.5 g vaginally once daily. Place 0.5 vaginally for 4 weeks, then transition to twice weekly use.    insulin detemir U-100, Levemir, 100 unit/mL (3 mL) SubQ InPn pen Inject 5 Units into the skin once daily.    metFORMIN (GLUCOPHAGE) 500 MG tablet Take 500 mg by mouth once daily.    naproxen (NAPROSYN) 500 MG tablet Take 500 mg by mouth 2 (two) times daily as needed. Take for 5 days    oxyCODONE (ROXICODONE) 5 MG immediate release tablet Take 1 tablet (5 mg total) by mouth every 4 (four) hours as needed for Pain.    pantoprazole (PROTONIX) 40 MG tablet Take 1 tablet (40 mg total) by mouth once daily.    polyethylene glycol (GLYCOLAX) 17 gram PwPk Take 17 g by mouth once daily.    zinc sulfate (ZINCATE) 220 (50) mg capsule Take 220 mg by mouth 3 (three) times daily.     Family History       Problem Relation (Age of Onset)    Breast cancer Maternal Aunt, Maternal Aunt    Cancer Paternal Grandmother    Colon cancer Paternal Grandmother    Diabetes Mother, Father    Heart block Mother    Heart failure Father    Hypertension Mother, Father    Kidney disease Mother    Lung cancer Father          Tobacco Use    Smoking status: Never    Smokeless tobacco: Never   Substance and Sexual Activity    Alcohol use: Not Currently    Drug use: No    Sexual activity: Yes     Partners: Male     Birth control/protection: See Surgical Hx     Comment: with one partner for 24 years     Review of Systems    Constitutional:  Negative for chills, fatigue and fever.   Respiratory:  Negative for cough and shortness of breath.    Cardiovascular:  Negative for chest pain.   Gastrointestinal:  Positive for constipation. Negative for diarrhea, nausea and vomiting.   Genitourinary:  Negative for difficulty urinating and dysuria.   Musculoskeletal:  Positive for arthralgias and gait problem.   Skin:  Positive for wound.   Neurological:  Negative for speech difficulty and weakness (generalized/ right knee).   Psychiatric/Behavioral:  Negative for agitation and confusion.    Objective:     Vital Signs (Most Recent):  Temp: 97.9 °F (36.6 °C) (06/07/23 0717)  Pulse: 83 (06/07/23 0615)  Resp: (!) 25 (06/07/23 0615)  BP: (!) 148/74 (06/07/23 0600)  SpO2: 97 % (06/07/23 0615) Vital Signs (24h Range):  Temp:  [97.1 °F (36.2 °C)-98.1 °F (36.7 °C)] 97.9 °F (36.6 °C)  Pulse:  [73-92] 83  Resp:  [8-36] 25  SpO2:  [95 %-100 %] 97 %  BP: (120-155)/(59-79) 148/74     Weight: 112.2 kg (247 lb 5.7 oz)  Body mass index is 38.74 kg/m².     Physical Exam  Constitutional:       Appearance: Normal appearance.   Cardiovascular:      Rate and Rhythm: Normal rate and regular rhythm.   Pulmonary:      Effort: Pulmonary effort is normal.      Breath sounds: Normal breath sounds.   Abdominal:      General: There is no distension.      Tenderness: There is no guarding.   Skin:     General: Skin is warm and dry.      Comments: Right gluteal abscess with packaging and mediplex    Neurological:      Mental Status: She is alert and oriented to person, place, and time. Mental status is at baseline.   Psychiatric:         Mood and Affect: Mood normal.         Behavior: Behavior normal.              Significant Labs: A1C:   Recent Labs   Lab 03/15/23  0551 03/22/23  0610   HGBA1C 6.4* 6.4*       ABGs: No results for input(s): PH, PCO2, HCO3, POCSATURATED, BE, TOTALHB, COHB, METHB, O2HB, POCFIO2, PO2 in the last 48 hours.  Bilirubin:   Recent Labs   Lab  06/04/23  1610 06/05/23  0504 06/06/23  0333 06/07/23  0342   BILITOT 0.7 0.6 0.4 0.3       Blood Culture:   No results for input(s): LABBLOO in the last 48 hours.    BMP:   Recent Labs   Lab 06/07/23  0342   *      K 3.3*      CO2 22*   BUN 8   CREATININE 0.6   CALCIUM 9.3   MG 1.9       CBC:   Recent Labs   Lab 06/06/23  0333 06/07/23 0342   WBC 3.04* 2.31*   HGB 7.6* 8.2*   HCT 25.3* 27.3*   PLT 61* 74*       CMP:   Recent Labs   Lab 06/06/23 0333 06/07/23 0342    141   K 3.3* 3.3*    108   CO2 18* 22*    116*   BUN 13 8   CREATININE 0.7 0.6   CALCIUM 8.5* 9.3   PROT 4.5* 4.8*   ALBUMIN 2.5* 2.6*   BILITOT 0.4 0.3   ALKPHOS 76 72   AST 5* <5*   ALT 9* 9*   ANIONGAP 11 11       Cardiac Markers:   No results for input(s): CKMB, MYOGLOBIN, BNP, TROPISTAT in the last 48 hours.    Coagulation: No results for input(s): PT, INR, APTT in the last 48 hours.  Lactic Acid:   No results for input(s): LACTATE in the last 48 hours.    Lipase: No results for input(s): LIPASE in the last 48 hours.  Lipid Panel: No results for input(s): CHOL, HDL, LDLCALC, TRIG, CHOLHDL in the last 48 hours.  Magnesium:   Recent Labs   Lab 06/06/23  0333 06/07/23  0342   MG 1.8 1.9       POCT Glucose:   Recent Labs   Lab 06/06/23  1623 06/06/23  1955 06/07/23  0744   POCTGLUCOSE 89 103 112*       Prealbumin: No results for input(s): PREALBUMIN in the last 48 hours.  Respiratory Culture: No results for input(s): GSRESP, RESPIRATORYC in the last 48 hours.  Troponin:   No results for input(s): TROPONINI, TROPONINIHS in the last 48 hours.    TSH:   Recent Labs   Lab 03/22/23  0610   TSH 14.200*       Urine Culture: No results for input(s): LABURIN in the last 48 hours.    Urine Studies:   No results for input(s): COLORU, APPEARANCEUA, PHUR, SPECGRAV, PROTEINUA, GLUCUA, KETONESU, BILIRUBINUA, OCCULTUA, NITRITE, UROBILINOGEN, LEUKOCYTESUR, RBCUA, WBCUA, BACTERIA, SQUAMEPITHEL, HYALINECASTS in the last 48  hours.    Invalid input(s): JOSSELIN      Significant Imaging: I have reviewed all pertinent imaging results/findings within the past 24 hours.      Assessment/Plan:      * Sepsis  This patient does have evidence of infective focus  My overall impression is septic shock due to SBP < 90.  Source: Skin and Soft Tissue (location right glute)  Antibiotics given-   Antibiotics (72h ago, onward)    Start     Stop Route Frequency Ordered    06/06/23 1000  vancomycin (VANCOCIN) 1,000 mg in dextrose 5 % (D5W) 250 mL IVPB (Vial-Mate)         -- IV Every 12 hours (non-standard times) 06/06/23 0926    06/05/23 0130  piperacillin-tazobactam (ZOSYN) 4.5 g in dextrose 5 % in water (D5W) 5 % 100 mL IVPB (MB+)         -- IV Every 8 hours (non-standard times) 06/04/23 1749    06/04/23 1819  vancomycin - pharmacy to dose  (vancomycin IVPB)        See Hyperspace for full Linked Orders Report.    -- IV pharmacy to manage frequency 06/04/23 1720        Latest lactate reviewed-  Recent Labs   Lab 06/04/23  1610   LACTATE 1.4     Organ dysfunction indicated by Acute kidney injury    Fluid challenge Ideal Body Weight- The patient's ideal body weight is Ideal body weight: 61.6 kg (135 lb 12.9 oz) which will be used to calculate fluid bolus of 30 ml/kg for treatment of septic shock.      Post- resuscitation assessment Yes Perfusion exam was performed within 6 hours of septic shock presentation after bolus shows Adequate tissue perfusion assessed by non-invasive monitoring       Will Start Pressors- Levophed for MAP of 65  Source control achieved by: IV vanc and IV zosyn  No lactic acidosis present      Other specified anemias  Hg 7.6/ HCT 25.3  Trending down   Likely dilutional component (decreasing IV fluids and will repeat CBC tomorrow)    6/7 Improved to 8.2/ 27.3 today       Type 2 diabetes mellitus with skin complication  Patient's FSGs are controlled on current medication regimen.  Last A1c reviewed-   Lab Results   Component Value  Date    HGBA1C 6.4 (H) 03/22/2023     Most recent fingerstick glucose reviewed-   Recent Labs   Lab 06/06/23  1623 06/06/23  1955 06/07/23  0744   POCTGLUCOSE 89 103 112*     Current correctional scale  Low  Maintain anti-hyperglycemic dose as follows-   Antihyperglycemics (From admission, onward)    Start     Stop Route Frequency Ordered    06/05/23 0911  insulin aspart U-100 pen 0-5 Units         -- SubQ Before meals & nightly PRN 06/05/23 0811        Hold Oral hypoglycemics while patient is in the hospital.    Essential hypertension  H/o of hypertension  Holding antihypertensives in the setting of sepsis       Abscess, gluteal, right  I&D in the ED  Wound cultures gram negative rods   IV abx with vanc and zosyn  Pain management   General surgery consulted and repacked 6/6      Acute kidney injury  Patient with acute kidney injury likely due to pre-renal azotemia EZRA is currently improving. Labs reviewed- Renal function/electrolytes with Estimated Creatinine Clearance: 146.5 mL/min (based on SCr of 0.6 mg/dL). according to latest data. Monitor urine output and serial BMP and adjust therapy as needed. Avoid nephrotoxins and renally dose meds for GFR listed above.     Resolved  Good urine output       History of Large cell (diffuse) non-Hodgkin's lymphoma  Follow up outpatient oncology         VTE Risk Mitigation (From admission, onward)         Ordered     IP VTE HIGH RISK PATIENT  Once         06/04/23 2301     Place sequential compression device  Until discontinued         06/04/23 2301                Discharge Planning   JESSE:      Code Status: Full Code   Is the patient medically ready for discharge?:     Reason for patient still in hospital (select all that apply): Patient trending condition  Discharge Plan A: Home Health            Critical care time spent on the evaluation and treatment of severe organ dysfunction, review of pertinent labs and imaging studies, discussions with consulting providers and  discussions with patient/family: 35 minutes.      Saira Solitario PA-C  Department of Hospital Medicine   [facility] - Intensive Care

## 2023-06-07 NOTE — ASSESSMENT & PLAN NOTE
Patient's FSGs are controlled on current medication regimen.  Last A1c reviewed-   Lab Results   Component Value Date    HGBA1C 6.4 (H) 03/22/2023     Most recent fingerstick glucose reviewed-   Recent Labs   Lab 06/06/23  1623 06/06/23 1955 06/07/23  0744   POCTGLUCOSE 89 103 112*     Current correctional scale  Low  Maintain anti-hyperglycemic dose as follows-   Antihyperglycemics (From admission, onward)    Start     Stop Route Frequency Ordered    06/05/23 0911  insulin aspart U-100 pen 0-5 Units         -- SubQ Before meals & nightly PRN 06/05/23 0811        Hold Oral hypoglycemics while patient is in the hospital.

## 2023-06-07 NOTE — ASSESSMENT & PLAN NOTE
This patient does have evidence of infective focus  My overall impression is septic shock due to SBP < 90.  Source: Skin and Soft Tissue (location right glute)  Antibiotics given-   Antibiotics (72h ago, onward)    Start     Stop Route Frequency Ordered    06/06/23 1000  vancomycin (VANCOCIN) 1,000 mg in dextrose 5 % (D5W) 250 mL IVPB (Vial-Mate)         -- IV Every 12 hours (non-standard times) 06/06/23 0926    06/05/23 0130  piperacillin-tazobactam (ZOSYN) 4.5 g in dextrose 5 % in water (D5W) 5 % 100 mL IVPB (MB+)         -- IV Every 8 hours (non-standard times) 06/04/23 1749    06/04/23 1819  vancomycin - pharmacy to dose  (vancomycin IVPB)        See Hyperspace for full Linked Orders Report.    -- IV pharmacy to manage frequency 06/04/23 1720        Latest lactate reviewed-  Recent Labs   Lab 06/04/23  1610   LACTATE 1.4     Organ dysfunction indicated by Acute kidney injury    Fluid challenge Ideal Body Weight- The patient's ideal body weight is Ideal body weight: 61.6 kg (135 lb 12.9 oz) which will be used to calculate fluid bolus of 30 ml/kg for treatment of septic shock.      Post- resuscitation assessment Yes Perfusion exam was performed within 6 hours of septic shock presentation after bolus shows Adequate tissue perfusion assessed by non-invasive monitoring       Will Start Pressors- Levophed for MAP of 65  Source control achieved by: IV vanc and IV zosyn  No lactic acidosis present

## 2023-06-07 NOTE — NURSING
1906 lying in bed resting. Alert and oriented times 4. Calm and cooperative. Voices concerns about her IV fluids. Will  call MD as requested by patient. Discussed plan of care with patient and she verbalized understanding. Continuous cardiac monitoring in progress HR 82 NSR and sat on room air 100%. External female catheter in progress clear yellow urine noted in wall canister to suction.  Wound to right buttocks with border dressing and it is clean, dry, and intact. Positioning off wound every 2 hours in progress. Will continue to monitor. Side rails times 2 up, call button in reach, and bed low and locked.    0600 Patient slept throughout the night without complications. Stable.

## 2023-06-07 NOTE — ASSESSMENT & PLAN NOTE
Patient with acute kidney injury likely due to pre-renal azotemia EZRA is currently improving. Labs reviewed- Renal function/electrolytes with Estimated Creatinine Clearance: 146.5 mL/min (based on SCr of 0.6 mg/dL). according to latest data. Monitor urine output and serial BMP and adjust therapy as needed. Avoid nephrotoxins and renally dose meds for GFR listed above.     Resolved  Good urine output

## 2023-06-08 VITALS
RESPIRATION RATE: 20 BRPM | OXYGEN SATURATION: 98 % | HEIGHT: 67 IN | TEMPERATURE: 98 F | WEIGHT: 243.63 LBS | HEART RATE: 84 BPM | DIASTOLIC BLOOD PRESSURE: 66 MMHG | SYSTOLIC BLOOD PRESSURE: 129 MMHG | BODY MASS INDEX: 38.24 KG/M2

## 2023-06-08 PROBLEM — I95.9 HYPOTENSION: Status: ACTIVE | Noted: 2023-06-08

## 2023-06-08 PROBLEM — R53.83 FATIGUE: Status: ACTIVE | Noted: 2023-06-08

## 2023-06-08 LAB
ANION GAP SERPL CALC-SCNC: 9 MMOL/L (ref 8–16)
BACTERIA SPEC AEROBE CULT: ABNORMAL
BUN SERPL-MCNC: 6 MG/DL (ref 6–20)
CALCIUM SERPL-MCNC: 9 MG/DL (ref 8.7–10.5)
CHLORIDE SERPL-SCNC: 106 MMOL/L (ref 95–110)
CO2 SERPL-SCNC: 25 MMOL/L (ref 23–29)
CREAT SERPL-MCNC: 0.7 MG/DL (ref 0.5–1.4)
EST. GFR  (NO RACE VARIABLE): >60 ML/MIN/1.73 M^2
GLUCOSE SERPL-MCNC: 118 MG/DL (ref 70–110)
MAGNESIUM SERPL-MCNC: 2 MG/DL (ref 1.6–2.6)
POCT GLUCOSE: 108 MG/DL (ref 70–110)
POCT GLUCOSE: 99 MG/DL (ref 70–110)
POTASSIUM SERPL-SCNC: 3.3 MMOL/L (ref 3.5–5.1)
SODIUM SERPL-SCNC: 140 MMOL/L (ref 136–145)

## 2023-06-08 PROCEDURE — 99232 SBSQ HOSP IP/OBS MODERATE 35: CPT | Mod: ,,, | Performed by: SURGERY

## 2023-06-08 PROCEDURE — 80048 BASIC METABOLIC PNL TOTAL CA: CPT | Performed by: PHYSICIAN ASSISTANT

## 2023-06-08 PROCEDURE — 25000003 PHARM REV CODE 250: Performed by: PHYSICIAN ASSISTANT

## 2023-06-08 PROCEDURE — 63600175 PHARM REV CODE 636 W HCPCS: Performed by: PHYSICIAN ASSISTANT

## 2023-06-08 PROCEDURE — 99232 PR SUBSEQUENT HOSPITAL CARE,LEVL II: ICD-10-PCS | Mod: ,,, | Performed by: SURGERY

## 2023-06-08 PROCEDURE — 99239 HOSP IP/OBS DSCHRG MGMT >30: CPT | Mod: ,,, | Performed by: PHYSICIAN ASSISTANT

## 2023-06-08 PROCEDURE — 99239 PR HOSPITAL DISCHARGE DAY,>30 MIN: ICD-10-PCS | Mod: ,,, | Performed by: PHYSICIAN ASSISTANT

## 2023-06-08 PROCEDURE — 94760 N-INVAS EAR/PLS OXIMETRY 1: CPT

## 2023-06-08 PROCEDURE — 83735 ASSAY OF MAGNESIUM: CPT | Performed by: PHYSICIAN ASSISTANT

## 2023-06-08 RX ORDER — METFORMIN HYDROCHLORIDE 500 MG/1
500 TABLET ORAL DAILY
Refills: 3
Start: 2023-06-08 | End: 2023-06-20

## 2023-06-08 RX ORDER — POTASSIUM CHLORIDE 7.45 MG/ML
10 INJECTION INTRAVENOUS ONCE
Status: COMPLETED | OUTPATIENT
Start: 2023-06-08 | End: 2023-06-08

## 2023-06-08 RX ORDER — SODIUM CHLORIDE 0.9 % (FLUSH) 0.9 %
50 SYRINGE (ML) INJECTION CONTINUOUS PRN
Status: DISCONTINUED | OUTPATIENT
Start: 2023-06-08 | End: 2023-06-08 | Stop reason: HOSPADM

## 2023-06-08 RX ORDER — SULFAMETHOXAZOLE AND TRIMETHOPRIM 800; 160 MG/1; MG/1
1 TABLET ORAL DAILY
Qty: 8 TABLET | Refills: 0 | Status: SHIPPED | OUTPATIENT
Start: 2023-06-08 | End: 2023-06-16

## 2023-06-08 RX ORDER — LACTOBACILLUS RHAMNOSUS GG 10B CELL
1 CAPSULE ORAL DAILY
Qty: 30 CAPSULE | Refills: 0 | Status: SHIPPED | OUTPATIENT
Start: 2023-06-08 | End: 2023-07-08

## 2023-06-08 RX ORDER — LEVOFLOXACIN 750 MG/1
750 TABLET ORAL DAILY
Qty: 8 TABLET | Refills: 0 | Status: SHIPPED | OUTPATIENT
Start: 2023-06-08 | End: 2023-06-16

## 2023-06-08 RX ORDER — POTASSIUM CHLORIDE 750 MG/1
10 TABLET, EXTENDED RELEASE ORAL DAILY
Qty: 7 TABLET | Refills: 0 | Status: SHIPPED | OUTPATIENT
Start: 2023-06-08 | End: 2023-06-20

## 2023-06-08 RX ADMIN — POTASSIUM CHLORIDE 10 MEQ: 7.46 INJECTION, SOLUTION INTRAVENOUS at 11:06

## 2023-06-08 RX ADMIN — MONTELUKAST 10 MG: 10 TABLET, FILM COATED ORAL at 08:06

## 2023-06-08 RX ADMIN — DOCUSATE SODIUM 100 MG: 100 CAPSULE, LIQUID FILLED ORAL at 08:06

## 2023-06-08 RX ADMIN — POTASSIUM CHLORIDE 10 MEQ: 7.46 INJECTION, SOLUTION INTRAVENOUS at 12:06

## 2023-06-08 NOTE — ASSESSMENT & PLAN NOTE
This patient does have evidence of infective focus  My overall impression is septic shock due to SBP < 90.  Source: Skin and Soft Tissue (location right glute)  Antibiotics given-   Antibiotics (72h ago, onward)    None        Latest lactate reviewed-  No results for input(s): LACTATE in the last 72 hours.  Organ dysfunction indicated by Acute kidney injury    Fluid challenge Ideal Body Weight- The patient's ideal body weight is Ideal body weight: 61.6 kg (135 lb 12.9 oz) which will be used to calculate fluid bolus of 30 ml/kg for treatment of septic shock.      Post- resuscitation assessment Yes Perfusion exam was performed within 6 hours of septic shock presentation after bolus shows Adequate tissue perfusion assessed by non-invasive monitoring       Will Start Pressors- Levophed for MAP of 65  Source control achieved by: IV vanc and IV zosyn.  Now on IV rocephin   No lactic acidosis present

## 2023-06-08 NOTE — ASSESSMENT & PLAN NOTE
I&D in the ED  Wound cultures proteus and staph   IV abx with vanc and zosyn  Pain management   General surgery consulted and repacked 6/6  Packing removed 6/8      Wound cultures now with MRSA, providencia stuartii proteus.  Will discharge with levofloxacin and bactrim for 8 days.  Follow up labs with JOSELUIS

## 2023-06-08 NOTE — PLAN OF CARE
Problem: Adult Inpatient Plan of Care  Goal: Optimal Comfort and Wellbeing  Outcome: Ongoing, Progressing  Goal: Readiness for Transition of Care  Outcome: Ongoing, Progressing     Problem: Bariatric Environmental Safety  Goal: Safety Maintained with Care  Outcome: Ongoing, Progressing     Problem: Diabetes Comorbidity  Goal: Blood Glucose Level Within Targeted Range  Outcome: Ongoing, Progressing     Problem: Infection  Goal: Absence of Infection Signs and Symptoms  Outcome: Ongoing, Progressing

## 2023-06-08 NOTE — ASSESSMENT & PLAN NOTE
H/o of hypertension  Holding antihypertensives in the setting of sepsis     Resume bisoprolol at discharge

## 2023-06-08 NOTE — ASSESSMENT & PLAN NOTE
I&D in the ED  Wound cultures proteus and staph   IV abx with vanc and zosyn  Pain management   General surgery consulted and repacked 6/6  Packing removed 6/8      Wound cultures now with staph and proteus. Waiting final cultures.  Deescalated abx yesterday to IV rocephin but no MRSA detected so far.

## 2023-06-08 NOTE — ASSESSMENT & PLAN NOTE
Patient's FSGs are controlled on current medication regimen.  Last A1c reviewed-   Lab Results   Component Value Date    HGBA1C 6.4 (H) 03/22/2023     Most recent fingerstick glucose reviewed-   Recent Labs   Lab 06/07/23  1634 06/08/23  1120   POCTGLUCOSE 104 99     Current correctional scale  Low  Maintain anti-hyperglycemic dose as follows-   Antihyperglycemics (From admission, onward)    Start     Stop Route Frequency Ordered    06/05/23 0911  insulin aspart U-100 pen 0-5 Units         -- SubQ Before meals & nightly PRN 06/05/23 0811        Hold Oral hypoglycemics while patient is in the hospital.

## 2023-06-08 NOTE — DISCHARGE SUMMARY
[facility] ([facility])  [facility] Medicine  Discharge Summary      Patient Name: Hedy Conway  MRN: [id number]  [facility]: [id number]  Patient Class: IP- Inpatient  Admission Date: 6/4/2023  Hospital Length of Stay: 3 days  Discharge Date and Time:  06/08/2023 3:13 PM  Attending Physician: Anaid Yung MD   Discharging Provider: Saira Solitario PA-C  Primary Care Provider: John Lantigua PA-C    Primary Care Team: Networked reference to record PCT     HPI:   Patient is a 48 year old female with medical history of obesity, nonhogdkin lymphoma, recently treated for septic arthritis, HTN, DM type 2 who presented to the ED with right buttock abscess.  Patient states symptoms started on Thursday and on Saturday she felt fatigue. She has had fever and chills but denies CP, SOB, nausea and vomiting.   BP low in the ED and was bolus a total of 4 liters.      Admitted for sepsis secondary to right buttock abscess.        * No surgery found *      Hospital Course:   Pt HD stable off pressor support.  Currently on room air.  Electrolyte abnormalities.  Replacing Mg and K today.  Hg down trending.  Will monitor h/h.  Down trending but asymptomatic and likely dilutional.  Continue IV vanc and IV zosyn for right gluteal abscess.      6/7 HD stable on room air.  IV fluids d/c yesterday.  Hypokalemic and hypomagnesemia today.  IV replacement ordered.  H/H stabilizing.  General surgery repacked gluteal wound yesterday.  Gram negative rods growing.  Waiting final wound cultures to deescalate abx.  Transfer to med surg today.      6/8 HD stable on room air.  Waiting final wound cultures. Proteus mirabilis and staph growing.  No MRSA so far. Continue IV rocephin.  General removed packing today.  Will discharge home health with wound care.      Final cultures grew MRSA staph, proteus mirbailis and providencia stuartii.  Levofloxacin and bactrim prescribed for 8 days.  H/H to monitor kidney function and for wound care.  She  is planning to make an apt with neurology for continued weakness.        Goals of Care Treatment Preferences:  Code Status: Full Code      Consults:   Consults (From admission, onward)          Status Ordering Provider     IP consult to case management  Once        Provider:  (Not yet assigned)    MIO Downey     Inpatient consult to General Surgery  Once        Provider:  Arvin Vila Jr., MD    Completed LINDA COPELAND            No new Assessment & Plan notes have been filed under this hospital service since the last note was generated.  Service: Hospital Medicine    Final Active Diagnoses:    Diagnosis Date Noted POA    PRINCIPAL PROBLEM:  Sepsis [A41.9] 06/05/2023 Yes    Fatigue [R53.83] 06/08/2023 Unknown    Hypotension [I95.9] 06/08/2023 Unknown    Abscess [L02.91] 06/06/2023 Yes    Acute kidney injury [N17.9] 06/05/2023 Yes    Abscess, gluteal, right [L02.31] 06/05/2023 Yes    Essential hypertension [I10] 06/05/2023 Yes    Type 2 diabetes mellitus with skin complication [E11.628] 06/05/2023 Yes    Other specified anemias [D64.89] 06/05/2023 Yes    History of Large cell (diffuse) non-Hodgkin's lymphoma [C83.30] 06/28/2012 Yes      Problems Resolved During this Admission:       Discharged Condition: stable    Disposition: Home or Self Care    Follow Up:   Follow-up Information       John Lantigua PA-C Follow up in 1 week(s).    Specialty: Family Medicine  Contact information:  [address]  [place] [place]  [place] [address]  [phone]                           Patient Instructions:      Ambulatory referral/consult to Home Health   Standing Status: Future   Referral Priority: Routine Referral Type: Home Health   Referral Reason: Specialty Services Required   Requested Specialty: Home Health Services   Number of Visits Requested: 1       Significant Diagnostic Studies:   Recent Labs   Lab 03/15/23  0551 03/22/23  0610   HGBA1C 6.4* 6.4*         ABGs: No results for input(s): PH,  PCO2, HCO3, POCSATURATED, BE, TOTALHB, COHB, METHB, O2HB, POCFIO2, PO2 in the last 48 hours.  Bilirubin:          Recent Labs   Lab 06/04/23  1610 06/05/23  0504 06/06/23  0333 06/07/23  0342   BILITOT 0.7 0.6 0.4 0.3         Blood Culture:   No results for input(s): LABBLOO in the last 48 hours.     BMP:       Recent Labs   Lab 06/08/23  0626   *      K 3.3*      CO2 25   BUN 6   CREATININE 0.7   CALCIUM 9.0   MG 2.0         CBC:       Recent Labs   Lab 06/07/23  0342   WBC 2.31*   HGB 8.2*   HCT 27.3*   PLT 74*         CMP:        Recent Labs   Lab 06/07/23  0342 06/08/23  0626    140   K 3.3* 3.3*    106   CO2 22* 25   * 118*   BUN 8 6   CREATININE 0.6 0.7   CALCIUM 9.3 9.0   PROT 4.8*  --    ALBUMIN 2.6*  --    BILITOT 0.3  --    ALKPHOS 72  --    AST <5*  --    ALT 9*  --    ANIONGAP 11 9         Cardiac Markers:   No results for input(s): CKMB, MYOGLOBIN, BNP, TROPISTAT in the last 48 hours.     Coagulation: No results for input(s): PT, INR, APTT in the last 48 hours.  Lactic Acid:   No results for input(s): LACTATE in the last 48 hours.     Lipase: No results for input(s): LIPASE in the last 48 hours.  Lipid Panel: No results for input(s): CHOL, HDL, LDLCALC, TRIG, CHOLHDL in the last 48 hours.  Magnesium:        Recent Labs   Lab 06/07/23  0342 06/08/23  0626   MG 1.9 2.0         POCT Glucose:         Recent Labs   Lab 06/07/23  0744 06/07/23  1148 06/07/23  1634   POCTGLUCOSE 112* 113* 104         Prealbumin: No results for input(s): PREALBUMIN in the last 48 hours.  Respiratory Culture: No results for input(s): GSRESP, RESPIRATORYC in the last 48 hours.  Troponin:   No results for input(s): TROPONINI, TROPONINIHS in the last 48 hours.     TSH:       Recent Labs   Lab 03/22/23  0610   TSH 14.200*         Urine Culture: No results for input(s): LABURIN in the last 48 hours.     Urine Studies:   No results for input(s): COLORU, APPEARANCEUA, PHUR, SPECGRAV, PROTEINUA,  GLUCUA, KETONESU, BILIRUBINUA, OCCULTUA, NITRITE, UROBILINOGEN, LEUKOCYTESUR, RBCUA, WBCUA, BACTERIA, SQUAMEPITHEL, HYALINECASTS in the last 48 hours.     Invalid input(s): JOSSELIN        Significant Imaging: I have reviewed all pertinent imaging results/findings within the past 24 hours.         Pending Diagnostic Studies:       None           Medications:  Reconciled Home Medications:      Medication List        START taking these medications      CULTURELLE 10 billion cell capsule  Generic drug: Lactobacillus rhamnosus GG  Take 1 capsule by mouth once daily.     levoFLOXacin 750 MG tablet  Commonly known as: LEVAQUIN  Take 1 tablet (750 mg total) by mouth once daily. for 8 days     potassium chloride 10 MEQ Tbsr  Commonly known as: KLOR-CON  Take 1 tablet (10 mEq total) by mouth once daily.     sulfamethoxazole-trimethoprim 800-160mg 800-160 mg Tab  Commonly known as: BACTRIM DS  Take 1 tablet by mouth once daily. for 8 days            CONTINUE taking these medications      acetaminophen 325 MG tablet  Commonly known as: TYLENOL  Take 2 tablets (650 mg total) by mouth every 6 (six) hours as needed.     albuterol 90 mcg/actuation inhaler  Commonly known as: PROVENTIL/VENTOLIN HFA  Ventolin HFA 90 mcg/actuation aerosol inhaler   INHALE 2 PUFFS BY MOUTH 3 TIMES DAILY AS NEEDED.     bisoprolol 5 MG tablet  Commonly known as: ZEBETA  Take 5 mg by mouth once daily.     fluticasone propionate 50 mcg/actuation nasal spray  Commonly known as: FLONASE  1 spray by Each Nare route once daily.     melatonin 10 mg Cap  Take 10 mg by mouth nightly as needed.     metFORMIN 500 MG tablet  Commonly known as: GLUCOPHAGE  Take 1 tablet (500 mg total) by mouth once daily.     montelukast 10 mg tablet  Commonly known as: SINGULAIR  Take 10 mg by mouth once daily.     ondansetron 4 MG tablet  Commonly known as: ZOFRAN  Take 4 mg by mouth every 8 (eight) hours as needed.     polyethylene glycol 17 gram Pwpk  Commonly known as:  GLYCOLAX  Take 17 g by mouth once daily.     PREMARIN vaginal cream  Generic drug: conjugated estrogens  Place 0.5 g vaginally once daily. Place 0.5 vaginally for 4 weeks, then transition to twice weekly use.     zinc sulfate 50 mg zinc (220 mg) capsule  Commonly known as: ZINCATE  Take 220 mg by mouth 3 (three) times daily.            STOP taking these medications      albuterol-ipratropium 2.5 mg-0.5 mg/3 mL nebulizer solution  Commonly known as: DUO-NEB     blood sugar diagnostic Strp     celecoxib 100 MG capsule  Commonly known as: CeleBREX     cyclobenzaprine 10 MG tablet  Commonly known as: FLEXERIL     insulin detemir U-100 (Levemir) 100 unit/mL (3 mL) Inpn pen     naproxen 500 MG tablet  Commonly known as: NAPROSYN     oxyCODONE 5 MG immediate release tablet  Commonly known as: ROXICODONE     pantoprazole 40 MG tablet  Commonly known as: PROTONIX              Indwelling Lines/Drains at time of discharge:   Lines/Drains/Airways       Central Venous Catheter Line  Duration             Percutaneous Central Line Insertion/Assessment - Triple Lumen  06/04/23 2102 Femoral Right 3 days              Drain  Duration             Female External Urinary Catheter 06/07/23 0509 1 day                    Time spent on the discharge of patient: 25 minutes         Saira Solitario PA-C  Department of Hospital Medicine  [place] - [facility] ([facility])

## 2023-06-08 NOTE — ASSESSMENT & PLAN NOTE
Hg 7.6/ HCT 25.3  Trending down   Likely dilutional component (decreasing IV fluids and will repeat CBC tomorrow)    6/7 Improving

## 2023-06-08 NOTE — ASSESSMENT & PLAN NOTE
This patient does have evidence of infective focus  My overall impression is septic shock due to SBP < 90.  Source: Skin and Soft Tissue (location right glute)  Antibiotics given-   Antibiotics (72h ago, onward)    Start     Stop Route Frequency Ordered    06/08/23 1500  cefTRIAXone (ROCEPHIN) 1 g in dextrose 5 % in water (D5W) 5 % 100 mL IVPB (MB+)         -- IV Every 24 hours (non-standard times) 06/07/23 1435        Latest lactate reviewed-  No results for input(s): LACTATE in the last 72 hours.  Organ dysfunction indicated by Acute kidney injury    Fluid challenge Ideal Body Weight- The patient's ideal body weight is Ideal body weight: 61.6 kg (135 lb 12.9 oz) which will be used to calculate fluid bolus of 30 ml/kg for treatment of septic shock.      Post- resuscitation assessment Yes Perfusion exam was performed within 6 hours of septic shock presentation after bolus shows Adequate tissue perfusion assessed by non-invasive monitoring       Will Start Pressors- Levophed for MAP of 65  Source control achieved by: IV vanc and IV zosyn.  Now on IV rocephin   No lactic acidosis present

## 2023-06-08 NOTE — ASSESSMENT & PLAN NOTE
Patient with acute kidney injury likely due to pre-renal azotemia EZRA is currently improving. Labs reviewed- Renal function/electrolytes with Estimated Creatinine Clearance: 124.6 mL/min (based on SCr of 0.7 mg/dL). according to latest data. Monitor urine output and serial BMP and adjust therapy as needed. Avoid nephrotoxins and renally dose meds for GFR listed above.     Resolved  Good urine output

## 2023-06-08 NOTE — ASSESSMENT & PLAN NOTE
Patient's FSGs are controlled on current medication regimen.  Last A1c reviewed-   Lab Results   Component Value Date    HGBA1C 6.4 (H) 03/22/2023     Most recent fingerstick glucose reviewed-   Recent Labs   Lab 06/07/23  1148 06/07/23  1634   POCTGLUCOSE 113* 104     Current correctional scale  Low  Maintain anti-hyperglycemic dose as follows-   Antihyperglycemics (From admission, onward)    Start     Stop Route Frequency Ordered    06/05/23 0911  insulin aspart U-100 pen 0-5 Units         -- SubQ Before meals & nightly PRN 06/05/23 0811        Hold Oral hypoglycemics while patient is in the hospital.

## 2023-06-08 NOTE — SUBJECTIVE & OBJECTIVE
Past Medical History:   Diagnosis Date    Acute carpal tunnel syndrome of left wrist     Biliary colic     Diabetes mellitus     Encounter for blood transfusion     History of chemotherapy     Incisional hernia     Large cell (diffuse) non-Hodgkin's lymphoma     Stem cells transplant status        Past Surgical History:   Procedure Laterality Date    ARTHROSCOPY OF KNEE Bilateral 03/30/2023    Procedure: ARTHROSCOPY, KNEE;  Surgeon: Rudolph Murrell MD;  Location: [facility];  Service: Orthopedics;  Laterality: Bilateral;    BREAST BIOPSY Left     lymph node biopsy, benign    BREAST SURGERY      CHOLECYSTECTOMY      COLD KNIFE CONIZATION OF CERVIX N/A 03/08/2021    Procedure: CONE BIOPSY, CERVIX, USING COLD KNIFE;  Surgeon: Evelyn Wheeler MD;  Location: [facility];  Service: OB/GYN;  Laterality: N/A;    COLONOSCOPY N/A 12/21/2022    Procedure: COLONOSCOPY;  Surgeon: Annamarie Jane MD;  [facility]: [facility];  Service: Endoscopy;  Laterality: N/A;    HAND ARTHROTOMY Right 03/30/2023    Procedure: OPEN  ARTHROTOMY WRIST;  Surgeon: Rudolph Murrell MD;  Location: [facility];  Service: Orthopedics;  Laterality: Right;    HERNIA REPAIR      incisional    LUNG BIOPSY      lymphnode biopsy      MEDIPORT REMOVAL Left 01/30/2020    Procedure: REMOVAL, CATHETER, CENTRAL VENOUS, TUNNELED, WITH PORT;  Surgeon: Luis Bogran-Reyes, MD;  Location: [facility];  Service: General;  Laterality: Left;    PORTACATH PLACEMENT Left     REVISION OF SCAR  06/22/2021    Procedure: REVISION, SCAR;  Surgeon: Otis Grimes MD;  Location: [address];  Service: General;;    SKIN BIOPSY      TUBAL LIGATION      UMBILICAL HERNIA REPAIR         Review of patient's allergies indicates:   Allergen Reactions    Tetanus vaccines and toxoid Rash       No current facility-administered medications on file prior to encounter.     Current Outpatient Medications on File Prior to Encounter   Medication Sig    acetaminophen (TYLENOL) 325 MG tablet Take  2 tablets (650 mg total) by mouth every 6 (six) hours as needed.    bisoprolol (ZEBETA) 5 MG tablet Take 5 mg by mouth once daily.    cyclobenzaprine (FLEXERIL) 10 MG tablet Take 10 mg by mouth 3 (three) times daily as needed. Take for 5 days    fluticasone (FLONASE) 50 mcg/actuation nasal spray 1 spray by Each Nare route once daily.    melatonin 10 mg Cap Take 10 mg by mouth nightly as needed.    montelukast (SINGULAIR) 10 mg tablet Take 10 mg by mouth once daily.    ondansetron (ZOFRAN) 4 MG tablet Take 4 mg by mouth every 8 (eight) hours as needed.    albuterol (PROVENTIL/VENTOLIN HFA) 90 mcg/actuation inhaler Ventolin HFA 90 mcg/actuation aerosol inhaler   INHALE 2 PUFFS BY MOUTH 3 TIMES DAILY AS NEEDED.    albuterol-ipratropium (DUO-NEB) 2.5 mg-0.5 mg/3 mL nebulizer solution ipratropium 0.5 mg-albuterol 3 mg (2.5 mg base)/3 mL nebulization soln   USE 1 VIAL IN NEBULIZER EVERY 6 TO 8 HOURS AS NEEDED    blood sugar diagnostic Strp USE TO CHECK BLOOD SUGAR TWICE A DAY    celecoxib (CELEBREX) 100 MG capsule Take 100 mg by mouth 2 (two) times daily.    conjugated estrogens (PREMARIN) vaginal cream Place 0.5 g vaginally once daily. Place 0.5 vaginally for 4 weeks, then transition to twice weekly use.    insulin detemir U-100, Levemir, 100 unit/mL (3 mL) SubQ InPn pen Inject 5 Units into the skin once daily.    metFORMIN (GLUCOPHAGE) 500 MG tablet Take 500 mg by mouth once daily.    naproxen (NAPROSYN) 500 MG tablet Take 500 mg by mouth 2 (two) times daily as needed. Take for 5 days    oxyCODONE (ROXICODONE) 5 MG immediate release tablet Take 1 tablet (5 mg total) by mouth every 4 (four) hours as needed for Pain.    pantoprazole (PROTONIX) 40 MG tablet Take 1 tablet (40 mg total) by mouth once daily.    polyethylene glycol (GLYCOLAX) 17 gram PwPk Take 17 g by mouth once daily.    zinc sulfate (ZINCATE) 220 (50) mg capsule Take 220 mg by mouth 3 (three) times daily.     Family History       Problem Relation (Age of  Onset)    Breast cancer Maternal Aunt, Maternal Aunt    Cancer Paternal Grandmother    Colon cancer Paternal Grandmother    Diabetes Mother, Father    Heart block Mother    Heart failure Father    Hypertension Mother, Father    Kidney disease Mother    Lung cancer Father          Tobacco Use    Smoking status: Never    Smokeless tobacco: Never   Substance and Sexual Activity    Alcohol use: Not Currently    Drug use: No    Sexual activity: Yes     Partners: Male     Birth control/protection: See Surgical Hx     Comment: with one partner for 24 years     Review of Systems   Constitutional:  Negative for chills, fatigue and fever.   Respiratory:  Negative for cough and shortness of breath.    Cardiovascular:  Negative for chest pain.   Gastrointestinal:  Positive for constipation. Negative for diarrhea, nausea and vomiting.   Genitourinary:  Negative for difficulty urinating and dysuria.   Musculoskeletal:  Positive for arthralgias and gait problem.   Skin:  Positive for wound.   Neurological:  Negative for speech difficulty and weakness (generalized/ right knee).   Psychiatric/Behavioral:  Negative for agitation and confusion.    Objective:     Vital Signs (Most Recent):  Temp: 97.9 °F (36.6 °C) (06/08/23 0754)  Pulse: 89 (06/08/23 0800)  Resp: 16 (06/08/23 0754)  BP: (!) 142/87 (06/08/23 0754)  SpO2: 99 % (06/08/23 0754) Vital Signs (24h Range):  Temp:  [97.9 °F (36.6 °C)-98.6 °F (37 °C)] 97.9 °F (36.6 °C)  Pulse:  [80-93] 89  Resp:  [10-34] 16  SpO2:  [96 %-100 %] 99 %  BP: (133-160)/(66-90) 142/87     Weight: 110.5 kg (243 lb 9.7 oz)  Body mass index is 38.15 kg/m².     Physical Exam  Constitutional:       Appearance: Normal appearance.   Cardiovascular:      Rate and Rhythm: Normal rate and regular rhythm.   Pulmonary:      Effort: Pulmonary effort is normal.      Breath sounds: Normal breath sounds.   Abdominal:      General: There is no distension.      Tenderness: There is no guarding.   Skin:     General:  Skin is warm and dry.      Comments: Right gluteal abscess with packaging removed.    Neurological:      Mental Status: She is alert and oriented to person, place, and time. Mental status is at baseline.   Psychiatric:         Mood and Affect: Mood normal.         Behavior: Behavior normal.              Significant Labs: A1C:   Recent Labs   Lab 03/15/23  0551 03/22/23  0610   HGBA1C 6.4* 6.4*       ABGs: No results for input(s): PH, PCO2, HCO3, POCSATURATED, BE, TOTALHB, COHB, METHB, O2HB, POCFIO2, PO2 in the last 48 hours.  Bilirubin:   Recent Labs   Lab 06/04/23  1610 06/05/23  0504 06/06/23  0333 06/07/23  0342   BILITOT 0.7 0.6 0.4 0.3       Blood Culture:   No results for input(s): LABBLOO in the last 48 hours.    BMP:   Recent Labs   Lab 06/08/23  0626   *      K 3.3*      CO2 25   BUN 6   CREATININE 0.7   CALCIUM 9.0   MG 2.0       CBC:   Recent Labs   Lab 06/07/23  0342   WBC 2.31*   HGB 8.2*   HCT 27.3*   PLT 74*       CMP:   Recent Labs   Lab 06/07/23  0342 06/08/23  0626    140   K 3.3* 3.3*    106   CO2 22* 25   * 118*   BUN 8 6   CREATININE 0.6 0.7   CALCIUM 9.3 9.0   PROT 4.8*  --    ALBUMIN 2.6*  --    BILITOT 0.3  --    ALKPHOS 72  --    AST <5*  --    ALT 9*  --    ANIONGAP 11 9       Cardiac Markers:   No results for input(s): CKMB, MYOGLOBIN, BNP, TROPISTAT in the last 48 hours.    Coagulation: No results for input(s): PT, INR, APTT in the last 48 hours.  Lactic Acid:   No results for input(s): LACTATE in the last 48 hours.    Lipase: No results for input(s): LIPASE in the last 48 hours.  Lipid Panel: No results for input(s): CHOL, HDL, LDLCALC, TRIG, CHOLHDL in the last 48 hours.  Magnesium:   Recent Labs   Lab 06/07/23  0342 06/08/23  0626   MG 1.9 2.0       POCT Glucose:   Recent Labs   Lab 06/07/23  0744 06/07/23  1148 06/07/23  1634   POCTGLUCOSE 112* 113* 104       Prealbumin: No results for input(s): PREALBUMIN in the last 48 hours.  Respiratory  Culture: No results for input(s): GSRESP, RESPIRATORYC in the last 48 hours.  Troponin:   No results for input(s): TROPONINI, TROPONINIHS in the last 48 hours.    TSH:   Recent Labs   Lab 03/22/23  0610   TSH 14.200*       Urine Culture: No results for input(s): LABURIN in the last 48 hours.    Urine Studies:   No results for input(s): COLORU, APPEARANCEUA, PHUR, SPECGRAV, PROTEINUA, GLUCUA, KETONESU, BILIRUBINUA, OCCULTUA, NITRITE, UROBILINOGEN, LEUKOCYTESUR, RBCUA, WBCUA, BACTERIA, SQUAMEPITHEL, HYALINECASTS in the last 48 hours.    Invalid input(s): JOSSELIN      Significant Imaging: I have reviewed all pertinent imaging results/findings within the past 24 hours.

## 2023-06-08 NOTE — PLAN OF CARE
Pt vss. Removed femoral line. Held pressure for 15 minutes. Applied pressure bandage and layed pt flat for 30 minutes. No signs of bleeding noted. Discussed discharge instructions. Pt verbalized understanding. RX sent to preferred pharmacy. Follow up ryan ryan scheduled.       Problem: Adult Inpatient Plan of Care  Goal: Plan of Care Review  Outcome: Met  Goal: Patient-Specific Goal (Individualized)  Outcome: Met  Goal: Absence of Hospital-Acquired Illness or Injury  Outcome: Met  Goal: Optimal Comfort and Wellbeing  Outcome: Met  Goal: Readiness for Transition of Care  Outcome: Met     Problem: Bariatric Environmental Safety  Goal: Safety Maintained with Care  Outcome: Met     Problem: Diabetes Comorbidity  Goal: Blood Glucose Level Within Targeted Range  Outcome: Met     Problem: Infection  Goal: Absence of Infection Signs and Symptoms  Outcome: Met     Problem: Skin Injury Risk Increased  Goal: Skin Health and Integrity  Outcome: Met     Problem: Adjustment to Illness (Sepsis/Septic Shock)  Goal: Optimal Coping  Outcome: Met     Problem: Bleeding (Sepsis/Septic Shock)  Goal: Absence of Bleeding  Outcome: Met     Problem: Glycemic Control Impaired (Sepsis/Septic Shock)  Goal: Blood Glucose Level Within Desired Range  Outcome: Met     Problem: Infection Progression (Sepsis/Septic Shock)  Goal: Absence of Infection Signs and Symptoms  Outcome: Met     Problem: Nutrition Impaired (Sepsis/Septic Shock)  Goal: Optimal Nutrition Intake  Outcome: Met     Problem: Fluid and Electrolyte Imbalance (Acute Kidney Injury/Impairment)  Goal: Fluid and Electrolyte Balance  Outcome: Met     Problem: Oral Intake Inadequate (Acute Kidney Injury/Impairment)  Goal: Optimal Nutrition Intake  Outcome: Met     Problem: Renal Function Impairment (Acute Kidney Injury/Impairment)  Goal: Effective Renal Function  Outcome: Met     Problem: Impaired Wound Healing  Goal: Optimal Wound Healing  Outcome: Met

## 2023-06-08 NOTE — PROGRESS NOTES
[facility] ([facility])  [facility] Medicine  Progress Note    Patient Name: Hedy Conway  MRN: [id number]  Patient Class: IP- Inpatient   Admission Date: 6/4/2023  Length of Stay: 3 days  Attending Physician: Anaid Yung MD  Primary Care Provider: John Lantigua PA-C        Subjective:     Principal Problem:Sepsis        HPI:  Patient is a 48 year old female with medical history of obesity, nonhogdkin lymphoma, recently treated for septic arthritis, HTN, DM type 2 who presented to the ED with right buttock abscess.  Patient states symptoms started on Thursday and on Saturday she felt fatigue. She has had fever and chills but denies CP, SOB, nausea and vomiting.   BP low in the ED and was bolus a total of 4 liters.      Admitted for sepsis secondary to right buttock abscess.        Overview/Hospital Course:  Pt HD stable off pressor support.  Currently on room air.  Electrolyte abnormalities.  Replacing Mg and K today.  Hg down trending.  Will monitor h/h.  Down trending but asymptomatic and likely dilutional.  Continue IV vanc and IV zosyn for right gluteal abscess.      6/7 HD stable on room air.  IV fluids d/c yesterday.  Hypokalemic and hypomagnesemia today.  IV replacement ordered.  H/H stabilizing.  General surgery repacked gluteal wound yesterday.  Gram negative rods growing.  Waiting final wound cultures to deescalate abx.  Transfer to med surg today.      6/8 HD stable on room air.  Waiting final wound cultures. Proteus mirabilis and staph growing.  No MRSA so far. Continue IV rocephin.  General removed packing today.  Will discharge home health with wound care.        Past Medical History:   Diagnosis Date    Acute carpal tunnel syndrome of left wrist     Biliary colic     Diabetes mellitus     Encounter for blood transfusion     History of chemotherapy     Incisional hernia     Large cell (diffuse) non-Hodgkin's lymphoma     Stem cells transplant status        Past Surgical History:    Procedure Laterality Date    ARTHROSCOPY OF KNEE Bilateral 03/30/2023    Procedure: ARTHROSCOPY, KNEE;  Surgeon: Rudolph Murrell MD;  Location: [facility] OR;  Service: Orthopedics;  Laterality: Bilateral;    BREAST BIOPSY Left     lymph node biopsy, benign    BREAST SURGERY      CHOLECYSTECTOMY      COLD KNIFE CONIZATION OF CERVIX N/A 03/08/2021    Procedure: CONE BIOPSY, CERVIX, USING COLD KNIFE;  Surgeon: Evelyn Wheeler MD;  Location: [facility] OR;  Service: OB/GYN;  Laterality: N/A;    COLONOSCOPY N/A 12/21/2022    Procedure: COLONOSCOPY;  Surgeon: Annamarie Jane MD;  Location: [facility];  Service: Endoscopy;  Laterality: N/A;    HAND ARTHROTOMY Right 03/30/2023    Procedure: OPEN  ARTHROTOMY WRIST;  Surgeon: Rudolph Murrell MD;  Location: [facility] OR;  Service: Orthopedics;  Laterality: Right;    HERNIA REPAIR      incisional    LUNG BIOPSY      lymphnode biopsy      MEDIPORT REMOVAL Left 01/30/2020    Procedure: REMOVAL, CATHETER, CENTRAL VENOUS, TUNNELED, WITH PORT;  Surgeon: Luis Bogran-Reyes, MD;  Location: [facility] OR;  Service: General;  Laterality: Left;    PORTACATH PLACEMENT Left     REVISION OF SCAR  06/22/2021    Procedure: REVISION, SCAR;  Surgeon: Otis Grimes MD;  Location: [address];  Service: General;;    SKIN BIOPSY      TUBAL LIGATION      UMBILICAL HERNIA REPAIR         Review of patient's allergies indicates:   Allergen Reactions    Tetanus vaccines and toxoid Rash       No current facility-administered medications on file prior to encounter.     Current Outpatient Medications on File Prior to Encounter   Medication Sig    acetaminophen (TYLENOL) 325 MG tablet Take 2 tablets (650 mg total) by mouth every 6 (six) hours as needed.    bisoprolol (ZEBETA) 5 MG tablet Take 5 mg by mouth once daily.    cyclobenzaprine (FLEXERIL) 10 MG tablet Take 10 mg by mouth 3 (three) times daily as needed. Take for 5 days    fluticasone (FLONASE) 50 mcg/actuation nasal spray 1  spray by Each Nare route once daily.    melatonin 10 mg Cap Take 10 mg by mouth nightly as needed.    montelukast (SINGULAIR) 10 mg tablet Take 10 mg by mouth once daily.    ondansetron (ZOFRAN) 4 MG tablet Take 4 mg by mouth every 8 (eight) hours as needed.    albuterol (PROVENTIL/VENTOLIN HFA) 90 mcg/actuation inhaler Ventolin HFA 90 mcg/actuation aerosol inhaler   INHALE 2 PUFFS BY MOUTH 3 TIMES DAILY AS NEEDED.    albuterol-ipratropium (DUO-NEB) 2.5 mg-0.5 mg/3 mL nebulizer solution ipratropium 0.5 mg-albuterol 3 mg (2.5 mg base)/3 mL nebulization soln   USE 1 VIAL IN NEBULIZER EVERY 6 TO 8 HOURS AS NEEDED    blood sugar diagnostic Strp USE TO CHECK BLOOD SUGAR TWICE A DAY    celecoxib (CELEBREX) 100 MG capsule Take 100 mg by mouth 2 (two) times daily.    conjugated estrogens (PREMARIN) vaginal cream Place 0.5 g vaginally once daily. Place 0.5 vaginally for 4 weeks, then transition to twice weekly use.    insulin detemir U-100, Levemir, 100 unit/mL (3 mL) SubQ InPn pen Inject 5 Units into the skin once daily.    metFORMIN (GLUCOPHAGE) 500 MG tablet Take 500 mg by mouth once daily.    naproxen (NAPROSYN) 500 MG tablet Take 500 mg by mouth 2 (two) times daily as needed. Take for 5 days    oxyCODONE (ROXICODONE) 5 MG immediate release tablet Take 1 tablet (5 mg total) by mouth every 4 (four) hours as needed for Pain.    pantoprazole (PROTONIX) 40 MG tablet Take 1 tablet (40 mg total) by mouth once daily.    polyethylene glycol (GLYCOLAX) 17 gram PwPk Take 17 g by mouth once daily.    zinc sulfate (ZINCATE) 220 (50) mg capsule Take 220 mg by mouth 3 (three) times daily.     Family History       Problem Relation (Age of Onset)    Breast cancer Maternal Aunt, Maternal Aunt    Cancer Paternal Grandmother    Colon cancer Paternal Grandmother    Diabetes Mother, Father    Heart block Mother    Heart failure Father    Hypertension Mother, Father    Kidney disease Mother    Lung cancer Father           Tobacco Use    Smoking status: Never    Smokeless tobacco: Never   Substance and Sexual Activity    Alcohol use: Not Currently    Drug use: No    Sexual activity: Yes     Partners: Male     Birth control/protection: See Surgical Hx     Comment: with one partner for 24 years     Review of Systems   Constitutional:  Negative for chills, fatigue and fever.   Respiratory:  Negative for cough and shortness of breath.    Cardiovascular:  Negative for chest pain.   Gastrointestinal:  Positive for constipation. Negative for diarrhea, nausea and vomiting.   Genitourinary:  Negative for difficulty urinating and dysuria.   Musculoskeletal:  Positive for arthralgias and gait problem.   Skin:  Positive for wound.   Neurological:  Negative for speech difficulty and weakness (generalized/ right knee).   Psychiatric/Behavioral:  Negative for agitation and confusion.    Objective:     Vital Signs (Most Recent):  Temp: 97.9 °F (36.6 °C) (06/08/23 0754)  Pulse: 89 (06/08/23 0800)  Resp: 16 (06/08/23 0754)  BP: (!) 142/87 (06/08/23 0754)  SpO2: 99 % (06/08/23 0754) Vital Signs (24h Range):  Temp:  [97.9 °F (36.6 °C)-98.6 °F (37 °C)] 97.9 °F (36.6 °C)  Pulse:  [80-93] 89  Resp:  [10-34] 16  SpO2:  [96 %-100 %] 99 %  BP: (133-160)/(66-90) 142/87     Weight: 110.5 kg (243 lb 9.7 oz)  Body mass index is 38.15 kg/m².     Physical Exam  Constitutional:       Appearance: Normal appearance.   Cardiovascular:      Rate and Rhythm: Normal rate and regular rhythm.   Pulmonary:      Effort: Pulmonary effort is normal.      Breath sounds: Normal breath sounds.   Abdominal:      General: There is no distension.      Tenderness: There is no guarding.   Skin:     General: Skin is warm and dry.      Comments: Right gluteal abscess with packaging removed.    Neurological:      Mental Status: She is alert and oriented to person, place, and time. Mental status is at baseline.   Psychiatric:         Mood and Affect: Mood normal.         Behavior:  Behavior normal.              Significant Labs: A1C:   Recent Labs   Lab 03/15/23  0551 03/22/23  0610   HGBA1C 6.4* 6.4*       ABGs: No results for input(s): PH, PCO2, HCO3, POCSATURATED, BE, TOTALHB, COHB, METHB, O2HB, POCFIO2, PO2 in the last 48 hours.  Bilirubin:   Recent Labs   Lab 06/04/23  1610 06/05/23  0504 06/06/23  0333 06/07/23  0342   BILITOT 0.7 0.6 0.4 0.3       Blood Culture:   No results for input(s): LABBLOO in the last 48 hours.    BMP:   Recent Labs   Lab 06/08/23  0626   *      K 3.3*      CO2 25   BUN 6   CREATININE 0.7   CALCIUM 9.0   MG 2.0       CBC:   Recent Labs   Lab 06/07/23  0342   WBC 2.31*   HGB 8.2*   HCT 27.3*   PLT 74*       CMP:   Recent Labs   Lab 06/07/23  0342 06/08/23  0626    140   K 3.3* 3.3*    106   CO2 22* 25   * 118*   BUN 8 6   CREATININE 0.6 0.7   CALCIUM 9.3 9.0   PROT 4.8*  --    ALBUMIN 2.6*  --    BILITOT 0.3  --    ALKPHOS 72  --    AST <5*  --    ALT 9*  --    ANIONGAP 11 9       Cardiac Markers:   No results for input(s): CKMB, MYOGLOBIN, BNP, TROPISTAT in the last 48 hours.    Coagulation: No results for input(s): PT, INR, APTT in the last 48 hours.  Lactic Acid:   No results for input(s): LACTATE in the last 48 hours.    Lipase: No results for input(s): LIPASE in the last 48 hours.  Lipid Panel: No results for input(s): CHOL, HDL, LDLCALC, TRIG, CHOLHDL in the last 48 hours.  Magnesium:   Recent Labs   Lab 06/07/23  0342 06/08/23  0626   MG 1.9 2.0       POCT Glucose:   Recent Labs   Lab 06/07/23  0744 06/07/23  1148 06/07/23  1634   POCTGLUCOSE 112* 113* 104       Prealbumin: No results for input(s): PREALBUMIN in the last 48 hours.  Respiratory Culture: No results for input(s): GSRESP, RESPIRATORYC in the last 48 hours.  Troponin:   No results for input(s): TROPONINI, TROPONINIHS in the last 48 hours.    TSH:   Recent Labs   Lab 03/22/23  0610   TSH 14.200*       Urine Culture: No results for input(s): LABURIN in the  last 48 hours.    Urine Studies:   No results for input(s): COLORU, APPEARANCEUA, PHUR, SPECGRAV, PROTEINUA, GLUCUA, KETONESU, BILIRUBINUA, OCCULTUA, NITRITE, UROBILINOGEN, LEUKOCYTESUR, RBCUA, WBCUA, BACTERIA, SQUAMEPITHEL, HYALINECASTS in the last 48 hours.    Invalid input(s): WRIGHTSUR      Significant Imaging: I have reviewed all pertinent imaging results/findings within the past 24 hours.      Assessment/Plan:      * Sepsis  This patient does have evidence of infective focus  My overall impression is septic shock due to SBP < 90.  Source: Skin and Soft Tissue (location right glute)  Antibiotics given-   Antibiotics (72h ago, onward)    Start     Stop Route Frequency Ordered    06/08/23 1500  cefTRIAXone (ROCEPHIN) 1 g in dextrose 5 % in water (D5W) 5 % 100 mL IVPB (MB+)         -- IV Every 24 hours (non-standard times) 06/07/23 1435        Latest lactate reviewed-  No results for input(s): LACTATE in the last 72 hours.  Organ dysfunction indicated by Acute kidney injury    Fluid challenge Ideal Body Weight- The patient's ideal body weight is Ideal body weight: 61.6 kg (135 lb 12.9 oz) which will be used to calculate fluid bolus of 30 ml/kg for treatment of septic shock.      Post- resuscitation assessment Yes Perfusion exam was performed within 6 hours of septic shock presentation after bolus shows Adequate tissue perfusion assessed by non-invasive monitoring       Will Start Pressors- Levophed for MAP of 65  Source control achieved by: IV vanc and IV zosyn.  Now on IV rocephin   No lactic acidosis present      Other specified anemias  Hg 7.6/ HCT 25.3  Trending down   Likely dilutional component (decreasing IV fluids and will repeat CBC tomorrow)    6/7 Improving       Type 2 diabetes mellitus with skin complication  Patient's FSGs are controlled on current medication regimen.  Last A1c reviewed-   Lab Results   Component Value Date    HGBA1C 6.4 (H) 03/22/2023     Most recent fingerstick glucose reviewed-    Recent Labs   Lab 06/07/23  1148 06/07/23  1634   POCTGLUCOSE 113* 104     Current correctional scale  Low  Maintain anti-hyperglycemic dose as follows-   Antihyperglycemics (From admission, onward)    Start     Stop Route Frequency Ordered    06/05/23 0911  insulin aspart U-100 pen 0-5 Units         -- SubQ Before meals & nightly PRN 06/05/23 0811        Hold Oral hypoglycemics while patient is in the hospital.    Essential hypertension  H/o of hypertension  Holding antihypertensives in the setting of sepsis       Abscess, gluteal, right  I&D in the ED  Wound cultures proteus and staph   IV abx with vanc and zosyn  Pain management   General surgery consulted and repacked 6/6  Packing removed 6/8      Wound cultures now with staph and proteus. Waiting final cultures.  Deescalated abx yesterday to IV rocephin but no MRSA detected so far.                Acute kidney injury  Patient with acute kidney injury likely due to pre-renal azotemia EZRA is currently improving. Labs reviewed- Renal function/electrolytes with Estimated Creatinine Clearance: 124.6 mL/min (based on SCr of 0.7 mg/dL). according to latest data. Monitor urine output and serial BMP and adjust therapy as needed. Avoid nephrotoxins and renally dose meds for GFR listed above.     Resolved  Good urine output       History of Large cell (diffuse) non-Hodgkin's lymphoma  Follow up outpatient oncology         VTE Risk Mitigation (From admission, onward)         Ordered     IP VTE HIGH RISK PATIENT  Once         06/04/23 2301     Place sequential compression device  Until discontinued         06/04/23 2301                Discharge Planning   JESSE:      Code Status: Full Code   Is the patient medically ready for discharge?:     Reason for patient still in hospital (select all that apply): Patient trending condition  Discharge Plan A: Home Health                  Saira Solitario PA-C  Department of Hospital Medicine   [place] [facility] Surg (3rd Fl)

## 2023-06-08 NOTE — PHYSICIAN QUERY
"PT Name: Hedy Conway  MR #: [id number]    DOCUMENTATION CLARIFICATION     CDS/: Rafaela Ozuna RN          Contact Information: [email or web address]    This form is a permanent document in the medical record.    Query Date: June 8, 2023  By submitting this query, we are merely seeking further clarification of documentation. Please utilize your independent clinical judgment when addressing the question(s) below.    The Medical Record contains the following:   Indicator Supporting Clinical Findings Location in Medical Record    Documentation of "Debridement"       x Documentation of "I&D" I & D - Incision and Drainage  -- Performed by: Jonathan Cole M.D.  -- Date/Time: 5:51 PM 6/4/2023    -- Type: abscess  -- Location: Right upper gluteal cleft  -- Anesthesia: local infiltration  -- Local anesthetic: lidocaine 1%   -- Anesthetic total: 10 ml  -- Scalpel size: 11  -- Incision type: single straight  -- Complexity: simple  -- Drainage: pus  -- Drainage amount: scant  -- Wound treatment: incision  -- Packing material: 1/4 in gauze  -- Wound culture taken 6/4/2023 ED Provider Note    Other       Excisional debridement is the surgical removal or cutting away of such tissue, necrosis, or slough and is classified to the root operation "Excision." Use of a sharp instrument does not always indicate that an excisional debridement was performed. Minor removal of loose fragments with scissors or using a sharp instrument to scrape away tissue is not an excisional debridement. Excisional debridement involves the use of a scalpel to remove devitalized tissue.  Nonexcisional debridement is the nonoperative brushing, irrigating, scrubbing, or washing of devitalized tissue, necrosis, slough, or foreign material. Most nonexcisional debridement procedures are classified to the root operation "Extraction" (pulling or stripping out or off all or a portion of a body part by the use of force).     Provider, please provide further " clarification on the procedure performed on Right upper gluteal cleft:       [X] Incision and Drainage to depth of skin only   [   ] Incision and Drainage to depth of subcutaneous and fascia   [   ] Incision and Drainage to other depth (please specify): _____________     [   ] Other Procedure (please specify): _____________     Reference:    ICD-10-CM/PCS Coding Clinic Third Quarter ICD-10, Effective with discharges: October 7, 2015 [facility] [employer] § Excisional and nonexcisional debridement (2015).    Form No. 55924

## 2023-06-08 NOTE — PROGRESS NOTES
Patient examined packing removed.  Wound probed.  No evidence of any necrotic tissue or purulence noted.  We will stop packing and just covering clean daily

## 2023-06-09 LAB
BACTERIA BLD CULT: NORMAL
BACTERIA BLD CULT: NORMAL

## 2023-06-09 NOTE — PLAN OF CARE
[facility] [facility] - Med Surg (3rd Fl)  Discharge Final Note    Primary Care Provider: John Lantigua PA-C    Expected Discharge Date: 6/8/2023    Final Discharge Note (most recent)       Final Note - 06/09/23 0817          Final Note    Assessment Type Final Discharge Note     Anticipated Discharge Disposition [place] [facility] [place]     Hospital Resources/Appts/Education Provided Appointments scheduled and added to AVS        Post-Acute Status    Post-Acute Authorization Home Health     Home Health Status Set-up Complete/Auth obtained     Discharge Delays None known at this time                     Important Message from Medicare             Contact Info       John Lantigua PA-C   Specialty: Family Medicine   Relationship: PCP - General    [address]  [facility]  [place] [address]   Phone: [phone]       Next Steps: Go on 6/13/2023    Instructions: Hospital Follow-up at 8am          Patient discharging home with family and returning to the care of [facility].

## 2023-06-12 NOTE — SUBJECTIVE & OBJECTIVE
Past Medical History:   Diagnosis Date    Acute carpal tunnel syndrome of left wrist     Biliary colic     Diabetes mellitus     Encounter for blood transfusion     History of chemotherapy     Incisional hernia     Large cell (diffuse) non-Hodgkin's lymphoma     Stem cells transplant status        Past Surgical History:   Procedure Laterality Date    ARTHROSCOPY OF KNEE Bilateral 03/30/2023    Procedure: ARTHROSCOPY, KNEE;  Surgeon: Rudolph Murrell MD;  Location: [facility];  Service: Orthopedics;  Laterality: Bilateral;    BREAST BIOPSY Left     lymph node biopsy, benign    BREAST SURGERY      CHOLECYSTECTOMY      COLD KNIFE CONIZATION OF CERVIX N/A 03/08/2021    Procedure: CONE BIOPSY, CERVIX, USING COLD KNIFE;  Surgeon: Evelyn Wheeler MD;  Location: [facility] OR;  Service: OB/GYN;  Laterality: N/A;    COLONOSCOPY N/A 12/21/2022    Procedure: COLONOSCOPY;  Surgeon: Annamarie Jane MD;  Location: [facility];  Service: Endoscopy;  Laterality: N/A;    HAND ARTHROTOMY Right 03/30/2023    Procedure: OPEN  ARTHROTOMY WRIST;  Surgeon: Rudolph Murrell MD;  Location: [facility];  Service: Orthopedics;  Laterality: Right;    HERNIA REPAIR      incisional    LUNG BIOPSY      lymphnode biopsy      MEDIPORT REMOVAL Left 01/30/2020    Procedure: REMOVAL, CATHETER, CENTRAL VENOUS, TUNNELED, WITH PORT;  Surgeon: Luis Bogran-Reyes, MD;  Location: [facility];  Service: General;  Laterality: Left;    PORTACATH PLACEMENT Left     REVISION OF SCAR  06/22/2021    Procedure: REVISION, SCAR;  Surgeon: Otis Grimes MD;  Location: [address];  Service: General;;    SKIN BIOPSY      TUBAL LIGATION      UMBILICAL HERNIA REPAIR         Review of patient's allergies indicates:   Allergen Reactions    Tetanus vaccines and toxoid Rash       No current facility-administered medications on file prior to encounter.     Current Outpatient Medications on File Prior to Encounter   Medication Sig    acetaminophen (TYLENOL) 325 MG tablet Take  2 tablets (650 mg total) by mouth every 6 (six) hours as needed.    bisoprolol (ZEBETA) 5 MG tablet Take 5 mg by mouth once daily.    cyclobenzaprine (FLEXERIL) 10 MG tablet Take 10 mg by mouth 3 (three) times daily as needed. Take for 5 days    fluticasone (FLONASE) 50 mcg/actuation nasal spray 1 spray by Each Nare route once daily.    melatonin 10 mg Cap Take 10 mg by mouth nightly as needed.    montelukast (SINGULAIR) 10 mg tablet Take 10 mg by mouth once daily.    ondansetron (ZOFRAN) 4 MG tablet Take 4 mg by mouth every 8 (eight) hours as needed.    albuterol (PROVENTIL/VENTOLIN HFA) 90 mcg/actuation inhaler Ventolin HFA 90 mcg/actuation aerosol inhaler   INHALE 2 PUFFS BY MOUTH 3 TIMES DAILY AS NEEDED.    albuterol-ipratropium (DUO-NEB) 2.5 mg-0.5 mg/3 mL nebulizer solution ipratropium 0.5 mg-albuterol 3 mg (2.5 mg base)/3 mL nebulization soln   USE 1 VIAL IN NEBULIZER EVERY 6 TO 8 HOURS AS NEEDED    blood sugar diagnostic Strp USE TO CHECK BLOOD SUGAR TWICE A DAY    conjugated estrogens (PREMARIN) vaginal cream Place 0.5 g vaginally once daily. Place 0.5 vaginally for 4 weeks, then transition to twice weekly use.    insulin detemir U-100, Levemir, 100 unit/mL (3 mL) SubQ InPn pen Inject 5 Units into the skin once daily.    metFORMIN (GLUCOPHAGE) 500 MG tablet Take 500 mg by mouth once daily.    naproxen (NAPROSYN) 500 MG tablet Take 500 mg by mouth 2 (two) times daily as needed. Take for 5 days    oxyCODONE (ROXICODONE) 5 MG immediate release tablet Take 1 tablet (5 mg total) by mouth every 4 (four) hours as needed for Pain.    pantoprazole (PROTONIX) 40 MG tablet Take 1 tablet (40 mg total) by mouth once daily.    polyethylene glycol (GLYCOLAX) 17 gram PwPk Take 17 g by mouth once daily.    zinc sulfate (ZINCATE) 220 (50) mg capsule Take 220 mg by mouth 3 (three) times daily.     Family History       Problem Relation (Age of Onset)    Breast cancer Maternal Aunt, Maternal Aunt    Cancer Paternal Grandmother     Colon cancer Paternal Grandmother    Diabetes Mother, Father    Heart block Mother    Heart failure Father    Hypertension Mother, Father    Kidney disease Mother    Lung cancer Father          Tobacco Use    Smoking status: Never    Smokeless tobacco: Never   Substance and Sexual Activity    Alcohol use: Not Currently    Drug use: No    Sexual activity: Yes     Partners: Male     Birth control/protection: See Surgical Hx     Comment: with one partner for 24 years     Review of Systems   Constitutional:  Positive for chills, fatigue and fever.   Respiratory:  Negative for cough and shortness of breath.    Cardiovascular:  Negative for chest pain.   Gastrointestinal:  Negative for constipation, diarrhea, nausea and vomiting.   Genitourinary:  Negative for difficulty urinating and dysuria.   Musculoskeletal:  Positive for arthralgias and gait problem.   Skin:  Positive for wound.   Neurological:  Negative for speech difficulty and weakness (generalized/ right knee).   Psychiatric/Behavioral:  Negative for agitation and confusion.    Objective:     Vital Signs (Most Recent):  Temp: 98 °F (36.7 °C) (06/05/23 0730)  Pulse: 86 (06/05/23 0615)  Resp: (!) 31 (06/05/23 0615)  BP: (!) 117/58 (06/05/23 0600)  SpO2: 98 % (06/05/23 0615) Vital Signs (24h Range):  Temp:  [98 °F (36.7 °C)-99.7 °F (37.6 °C)] 98 °F (36.7 °C)  Pulse:  [] 86  Resp:  [16-43] 31  SpO2:  [93 %-99 %] 98 %  BP: ()/(39-83) 117/58     Weight: 112.2 kg (247 lb 5.7 oz)  Body mass index is 38.74 kg/m².     Physical Exam  Constitutional:       Appearance: Normal appearance.   Cardiovascular:      Rate and Rhythm: Normal rate and regular rhythm.   Pulmonary:      Effort: Pulmonary effort is normal.      Breath sounds: Normal breath sounds.   Abdominal:      General: There is no distension.      Tenderness: There is no guarding.   Skin:     General: Skin is warm and dry.      Comments: Right gluteal abscess with packaging and mediplex     Neurological:      Mental Status: She is alert and oriented to person, place, and time. Mental status is at baseline.   Psychiatric:         Mood and Affect: Mood normal.         Behavior: Behavior normal.              Significant Labs: A1C:   Recent Labs   Lab 03/15/23  0551 03/22/23  0610   HGBA1C 6.4* 6.4*     ABGs: No results for input(s): PH, PCO2, HCO3, POCSATURATED, BE, TOTALHB, COHB, METHB, O2HB, POCFIO2, PO2 in the last 48 hours.  Bilirubin:   Recent Labs   Lab 06/04/23  1610 06/05/23  0504   BILITOT 0.7 0.6     Blood Culture:   Recent Labs   Lab 06/04/23  1610   LABBLOO No Growth to date  No Growth to date     BMP:   Recent Labs   Lab 06/05/23  0504   *      K 3.4*      CO2 19*   BUN 18   CREATININE 0.7   CALCIUM 8.6*     CBC:   Recent Labs   Lab 06/04/23  1610 06/05/23  0504   WBC 7.14 6.46   HGB 9.9* 8.5*   HCT 33.0* 27.9*   PLT 74* 72*     CMP:   Recent Labs   Lab 06/04/23  1610 06/05/23  0504    139   K 4.1 3.4*    110   CO2 22* 19*   * 146*   BUN 32* 18   CREATININE 1.3 0.7   CALCIUM 9.2 8.6*   PROT 5.5* 4.7*   ALBUMIN 3.3* 2.7*   BILITOT 0.7 0.6   ALKPHOS 92 73   AST 5* <5*   ALT 12 11   ANIONGAP 13 10     Cardiac Markers:   Recent Labs   Lab 06/04/23  1610   BNP 45     Coagulation: No results for input(s): PT, INR, APTT in the last 48 hours.  Lactic Acid:   Recent Labs   Lab 06/04/23  1610   LACTATE 1.4     Lipase: No results for input(s): LIPASE in the last 48 hours.  Lipid Panel: No results for input(s): CHOL, HDL, LDLCALC, TRIG, CHOLHDL in the last 48 hours.  Magnesium: No results for input(s): MG in the last 48 hours.  POCT Glucose: No results for input(s): POCTGLUCOSE in the last 48 hours.  Prealbumin: No results for input(s): PREALBUMIN in the last 48 hours.  Respiratory Culture: No results for input(s): GSRESP, RESPIRATORYC in the last 48 hours.  Troponin:   Recent Labs   Lab 06/04/23  1610   TROPONINI <0.006     TSH:   Recent Labs   Lab 03/22/23  0610  686   TSH 14.200*     Urine Culture: No results for input(s): LABURIN in the last 48 hours.  Urine Studies:   Recent Labs   Lab 06/04/23  1617   COLORU Yellow   APPEARANCEUA Hazy*   PHUR 6.0   SPECGRAV 1.025   PROTEINUA 2+*   GLUCUA Negative   KETONESU Trace*   BILIRUBINUA 1+*   OCCULTUA Negative   NITRITE Negative   UROBILINOGEN Negative   LEUKOCYTESUR Negative   RBCUA 1   WBCUA 23*   BACTERIA Many*   SQUAMEPITHEL 1   HYALINECASTS 20*       Significant Imaging: I have reviewed all pertinent imaging results/findings within the past 24 hours.   [id number]

## 2023-06-20 ENCOUNTER — OFFICE VISIT (OUTPATIENT)
Dept: NEUROLOGY | Facility: CLINIC | Age: 48
End: 2023-06-20
Payer: COMMERCIAL

## 2023-06-20 VITALS
HEART RATE: 86 BPM | RESPIRATION RATE: 16 BRPM | BODY MASS INDEX: 38.15 KG/M2 | DIASTOLIC BLOOD PRESSURE: 68 MMHG | HEIGHT: 67 IN | SYSTOLIC BLOOD PRESSURE: 98 MMHG

## 2023-06-20 DIAGNOSIS — M25.561 RIGHT KNEE PAIN, UNSPECIFIED CHRONICITY: Primary | ICD-10-CM

## 2023-06-20 DIAGNOSIS — Z98.890 POST-OPERATIVE STATE: ICD-10-CM

## 2023-06-20 DIAGNOSIS — M24.561 FLEXION CONTRACTURE OF RIGHT KNEE: ICD-10-CM

## 2023-06-20 PROCEDURE — 1111F PR DISCHARGE MEDS RECONCILED W/ CURRENT OUTPATIENT MED LIST: ICD-10-PCS | Mod: CPTII,S$GLB,, | Performed by: PHYSICIAN ASSISTANT

## 2023-06-20 PROCEDURE — 99214 OFFICE O/P EST MOD 30 MIN: CPT | Mod: S$GLB,,, | Performed by: PHYSICIAN ASSISTANT

## 2023-06-20 PROCEDURE — 99214 PR OFFICE/OUTPT VISIT, EST, LEVL IV, 30-39 MIN: ICD-10-PCS | Mod: S$GLB,,, | Performed by: PHYSICIAN ASSISTANT

## 2023-06-20 PROCEDURE — 3044F PR MOST RECENT HEMOGLOBIN A1C LEVEL <7.0%: ICD-10-PCS | Mod: CPTII,S$GLB,, | Performed by: PHYSICIAN ASSISTANT

## 2023-06-20 PROCEDURE — 1159F MED LIST DOCD IN RCRD: CPT | Mod: CPTII,S$GLB,, | Performed by: PHYSICIAN ASSISTANT

## 2023-06-20 PROCEDURE — 99999 PR PBB SHADOW E&M-EST. PATIENT-LVL IV: CPT | Mod: PBBFAC,,, | Performed by: PHYSICIAN ASSISTANT

## 2023-06-20 PROCEDURE — 3074F PR MOST RECENT SYSTOLIC BLOOD PRESSURE < 130 MM HG: ICD-10-PCS | Mod: CPTII,S$GLB,, | Performed by: PHYSICIAN ASSISTANT

## 2023-06-20 PROCEDURE — 1111F DSCHRG MED/CURRENT MED MERGE: CPT | Mod: CPTII,S$GLB,, | Performed by: PHYSICIAN ASSISTANT

## 2023-06-20 PROCEDURE — 1159F PR MEDICATION LIST DOCUMENTED IN MEDICAL RECORD: ICD-10-PCS | Mod: CPTII,S$GLB,, | Performed by: PHYSICIAN ASSISTANT

## 2023-06-20 PROCEDURE — 3078F PR MOST RECENT DIASTOLIC BLOOD PRESSURE < 80 MM HG: ICD-10-PCS | Mod: CPTII,S$GLB,, | Performed by: PHYSICIAN ASSISTANT

## 2023-06-20 PROCEDURE — 3008F BODY MASS INDEX DOCD: CPT | Mod: CPTII,S$GLB,, | Performed by: PHYSICIAN ASSISTANT

## 2023-06-20 PROCEDURE — 3044F HG A1C LEVEL LT 7.0%: CPT | Mod: CPTII,S$GLB,, | Performed by: PHYSICIAN ASSISTANT

## 2023-06-20 PROCEDURE — 3078F DIAST BP <80 MM HG: CPT | Mod: CPTII,S$GLB,, | Performed by: PHYSICIAN ASSISTANT

## 2023-06-20 PROCEDURE — 3008F PR BODY MASS INDEX (BMI) DOCUMENTED: ICD-10-PCS | Mod: CPTII,S$GLB,, | Performed by: PHYSICIAN ASSISTANT

## 2023-06-20 PROCEDURE — 3074F SYST BP LT 130 MM HG: CPT | Mod: CPTII,S$GLB,, | Performed by: PHYSICIAN ASSISTANT

## 2023-06-20 PROCEDURE — 99999 PR PBB SHADOW E&M-EST. PATIENT-LVL IV: ICD-10-PCS | Mod: PBBFAC,,, | Performed by: PHYSICIAN ASSISTANT

## 2023-06-20 NOTE — PROGRESS NOTES
Subjective:       Patient ID: Hedy Conway is a 48 y.o. female.    Chief Complaint: Neurologic Problem (Pain and unable to walk using the right leg)    HPI  Hedy Conway is a 48 y.o. female is here for initial visit. In march of this year, she presented to the ED with multiple joint pain and swelling, especially knees. She had recently been treated for pneumonia with sepsis. Worried for septic joints, she was admitted and transferred to [facility]. While there, she had arthroscopy of both knees and surgery on right wrist. She was treated with more antibiotics. No growth on cultures of fluid and tissue from joints.     She was sent for inpatient rehab then to nursing home for rehab. She was discharged in May. She has not been able to walk since admission. She has been home in wheelchair. She continues with right greater than left knee pain. She is unable to straighten the right with extreme pain with attempted weight bearing. She has near full extension of left.    She was on Celebrex twice a day with pain control, but this was discontinued due to anemia, suspected blood loss. Pain has not been controlled.    She has not had follow up with ortho since hospitalization.     Recent hospitalization for sepsis secondary to wound to buttocks. Was advised to see neuro for follow up. No neurological issues.    Hx of non Hodgkins Lymphoma, anemia.    Past Medical History:   Diagnosis Date    Acute carpal tunnel syndrome of left wrist     Biliary colic     Diabetes mellitus     Encounter for blood transfusion     History of chemotherapy     Incisional hernia     Large cell (diffuse) non-Hodgkin's lymphoma     Stem cells transplant status        Past Surgical History:   Procedure Laterality Date    ARTHROSCOPY OF KNEE Bilateral 03/30/2023    Procedure: ARTHROSCOPY, KNEE;  Surgeon: Rudolph Murrell MD;  Location: [facility];  Service: Orthopedics;  Laterality: Bilateral;    BREAST BIOPSY Left     lymph node  biopsy, benign    BREAST SURGERY      CHOLECYSTECTOMY      COLD KNIFE CONIZATION OF CERVIX N/A 03/08/2021    Procedure: CONE BIOPSY, CERVIX, USING COLD KNIFE;  Surgeon: Evelyn Wheeler MD;  Location: [facility];  Service: OB/GYN;  Laterality: N/A;    COLONOSCOPY N/A 12/21/2022    Procedure: COLONOSCOPY;  Surgeon: Annamarie Jane MD;  Location: [facility];  Service: Endoscopy;  Laterality: N/A;    HAND ARTHROTOMY Right 03/30/2023    Procedure: OPEN  ARTHROTOMY WRIST;  Surgeon: Rudolph Murrell MD;  Location: [facility];  Service: Orthopedics;  Laterality: Right;    HERNIA REPAIR      incisional    LUNG BIOPSY      lymphnode biopsy      MEDIPORT REMOVAL Left 01/30/2020    Procedure: REMOVAL, CATHETER, CENTRAL VENOUS, TUNNELED, WITH PORT;  Surgeon: Luis Bogran-Reyes, MD;  Location: [facility];  Service: General;  Laterality: Left;    PORTACATH PLACEMENT Left     REVISION OF SCAR  06/22/2021    Procedure: REVISION, SCAR;  Surgeon: Otis Grimes MD;  Location: [address];  Service: General;;    SKIN BIOPSY      TUBAL LIGATION      UMBILICAL HERNIA REPAIR         Family History   Problem Relation Age of Onset    Diabetes Mother     Hypertension Mother     Kidney disease Mother     Heart block Mother     Diabetes Father     Hypertension Father     Lung cancer Father     Heart failure Father     Breast cancer Maternal Aunt     Breast cancer Maternal Aunt         leukemia    Colon cancer Paternal Grandmother     Cancer Paternal Grandmother     Anesthesia problems Neg Hx        Social History     Socioeconomic History    Marital status:     Years of education: 12th   Tobacco Use    Smoking status: Never    Smokeless tobacco: Never   Substance and Sexual Activity    Alcohol use: Not Currently    Drug use: No    Sexual activity: Yes     Partners: Male     Birth control/protection: See Surgical Hx     Comment: with one partner for 24 years       Current Outpatient Medications   Medication Sig Dispense Refill     acetaminophen (TYLENOL) 325 MG tablet Take 2 tablets (650 mg total) by mouth every 6 (six) hours as needed.  0    albuterol (PROVENTIL/VENTOLIN HFA) 90 mcg/actuation inhaler Ventolin HFA 90 mcg/actuation aerosol inhaler   INHALE 2 PUFFS BY MOUTH 3 TIMES DAILY AS NEEDED.      bisoprolol (ZEBETA) 5 MG tablet Take 5 mg by mouth once daily.      fluticasone (FLONASE) 50 mcg/actuation nasal spray 1 spray by Each Nare route once daily.      Lactobacillus rhamnosus GG (CULTURELLE) 10 billion cell capsule Take 1 capsule by mouth once daily. 30 capsule 0    melatonin 10 mg Cap Take 10 mg by mouth nightly as needed.      montelukast (SINGULAIR) 10 mg tablet Take 10 mg by mouth once daily.      ondansetron (ZOFRAN) 4 MG tablet Take 4 mg by mouth every 8 (eight) hours as needed.       No current facility-administered medications for this visit.       Review of patient's allergies indicates:   Allergen Reactions    Tetanus vaccines and toxoid Rash          Review of Systems   Constitutional:  Negative for appetite change and fever.   HENT:  Negative for sore throat.    Eyes:  Negative for visual disturbance.   Respiratory:  Negative for cough and shortness of breath.    Cardiovascular:  Negative for chest pain.   Gastrointestinal:  Negative for nausea and vomiting.   Endocrine: Negative for cold intolerance and heat intolerance.   Genitourinary:  Negative for difficulty urinating.   Musculoskeletal:  Positive for joint swelling (with pain Knees particularly). Negative for arthralgias, back pain and neck pain.   Skin:  Positive for wound (buttock). Negative for rash.   Allergic/Immunologic: Negative for food allergies.   Neurological:  Negative for dizziness, tremors, seizures, speech difficulty, weakness, numbness and headaches.   Hematological:  Does not bruise/bleed easily.   Psychiatric/Behavioral:  Negative for agitation, decreased concentration and sleep disturbance.      Objective:      Neurologic Exam     Mental Status    Oriented to person, place, and time.     Cranial Nerves     CN III, IV, VI   Pupils are equal, round, and reactive to light.  Physical Exam  Vitals and nursing note reviewed.   Constitutional:       Appearance: Normal appearance. She is obese.   HENT:      Head: Normocephalic and atraumatic.      Nose: Nose normal.      Mouth/Throat:      Mouth: Mucous membranes are moist.      Pharynx: Oropharynx is clear.   Eyes:      Extraocular Movements: Extraocular movements intact.      Conjunctiva/sclera: Conjunctivae normal.      Pupils: Pupils are equal, round, and reactive to light.   Cardiovascular:      Rate and Rhythm: Normal rate and regular rhythm.      Pulses: Normal pulses.   Pulmonary:      Effort: Pulmonary effort is normal. No respiratory distress.   Abdominal:      General: There is no distension.      Palpations: Abdomen is soft.      Tenderness: There is no abdominal tenderness.   Genitourinary:     Comments: Deferred    Musculoskeletal:         General: Swelling, tenderness (right knee, diffuse tenderness) and deformity present.      Cervical back: Normal range of motion and neck supple. No tenderness.      Comments: Flexion contracture right knee  Swelling to the knee  Left knee minimal tenderness swelling   Skin:     General: Skin is warm and dry.   Neurological:      General: No focal deficit present.      Mental Status: She is alert and oriented to person, place, and time. Mental status is at baseline.   Psychiatric:         Mood and Affect: Mood normal.         Behavior: Behavior normal.         Thought Content: Thought content normal.         Judgment: Judgment normal.         MRI right wrist  Impression:     1. Findings concerning for an infectious versus inflammatory arthritis of the wrist as follows:  *Patchy osteitis throughout the distal ulna, radius, carpal bones and 4th metacarpal base with questionable early erosions.  *Complex effusions of the distal radioulnar, radiocarpal, ulnocarpal,  midcarpal and possibly the common carpometacarpal compartment.  *Extensive high-grade partial to full-thickness chondral loss throughout the wrist.  *Complex fluid collection at the volar wrist along the undersurface of the flexor tendons, presumably representing complex tenosynovitis.  *Trace 4th and 5th extensor compartment tenosynovitis.  Recommend correlation with laboratory values and further evaluation with arthrocentesis as clinically warranted.     This report was flagged in Epic as abnormal.        Electronically signed by: Kip Katz MD  Date:                                            03/25/2023  Time:                                           15:04    MRI right knee  FINDINGS:  Sequences are degraded by patient motion artifact.     Medial compartment: Radial tear involving the body segment of medial meniscus (10:20, 5:11, and 04:16).  There is peripheral extrusion of medial meniscus with reactive medial joint line edema.  There are regions of partial-thickness cartilage loss over the femoral condyle and tibial plateau.     Lateral compartment: No meniscal tear. There are regions of partial-thickness cartilage loss over the femoral condyle.     Patellofemoral compartment: There are regions of partial-thickness cartilage loss over the patellar ridge and medial patellar facet.  Trochlear cartilage is difficult to assess.     Tendons: Quadriceps, patellar, and popliteus tendons are intact.     Ligaments: Cruciate and collateral ligaments are grossly intact.     Bone: No fracture or osteonecrosis.  There is patchy intermediate signal in the distal femoral and proximal tibial epiphyses, with more confluent signal abnormality in the femoral, tibial, and fibular diaphyses.  No erosions.     Joint: Large joint effusion with synovitis.  Baker's cyst.  There is subcutaneous and intramuscular edema about the knee.  No bursal collection.     Impression:     Radial tear of medial meniscus, with peripheral  extrusion and reactive joint line edema.     Tricompartmental cartilage loss, difficult to evaluate given motion artifact.     Bone marrow signal abnormalities as described.  Red marrow reconversion is favored given history of anemia, COPD, and obesity.  No erosions.     Large joint effusion with synovitis.  Septic arthritis cannot be excluded.  Consider arthrocentesis.     Extensive soft tissue edema about the knee, which could be inflammatory or infectious.  No organized fluid collections.     This report was flagged in Epic as abnormal.        Electronically signed by: Narinder Wilkins  Date:                                            03/20/2023  Time:                                           14:31    MRI hip  Impression:     Abnormal left SI joint concerning for septic arthritis until proven otherwise.  Inflammatory arthropathy causing asymmetric sacroiliitis could also be considered but this is felt less likely.     Bilateral asymmetric muscle edema suggestive of infectious or inflammatory myositis.     Nonspecific bilateral hip joint effusions which could be reactive in nature.     This report was flagged in Epic as abnormal.        Electronically signed by: Koffi Workman Jr  Date:                                            03/28/2023  Time:                                           11:09           Exam Ended: 03/28/23 10:38 Last Resulted: 03/28/23 11:09            MRI shoulder  Impression:     1. Large complex joint effusion with prominent synovitis, possibly infectious or inflammatory in nature.  Recommend correlation with laboratory values and further evaluation with arthrocentesis if clinically warranted.  2. High-grade partial-thickness tear of the supraspinatus and infraspinatus critical zones.  No definite full-thickness tear.  3. Near complete tear of the intra-articular biceps tendon with associated complex tenosynovitis.  4. Degenerative fraying of the posterior-superior labrum.  5. Geographic area  of subchondral marrow edema at the posterolateral humeral head with questionable cortical indistinctness, possibly inflammatory or infectious osteitis.  6. Mild glenohumeral chondral thinning.  This report was flagged in Epic as abnormal.        Electronically signed by: Kip Katz MD  Date:                                            03/25/2023  Time:                                           15:17           Exam Ended: 03/25/23 14:03 Last Resulted: 03/25/23 15:17                          Laboratory studies reviewed. Elevated inflammatory markers improved over course of treatment, but remained elevated.  Negative rheumatoid factor.  Assessment:       1. Right knee pain, unspecified chronicity    2. Flexion contracture of right knee    3. Post-operative state        Plan:   Right knee pain, unspecified chronicity  -     Ambulatory referral/consult to Orthopedics; Future; Expected date: 06/27/2023  I will send her to Dr. Ramires for follow up. She needs sooner appointment  She will need extensive PT.    Flexion contracture of right knee  -     Ambulatory referral/consult to Orthopedics; Future; Expected date: 06/27/2023  As above.    Post-operative state  -     Ambulatory referral/consult to Orthopedics; Future; Expected date: 06/27/2023  As above.      Discussed risks and benefits of potential treatment options at length as well as potential side effects of medication changes. Medications were not changed. Please refer to medication reconciliation report for details. Medication compliance discussed. Instructed to call clinic if concerned or to ED if emergency.    FABIANA Davis

## 2023-07-31 ENCOUNTER — HOSPITAL ENCOUNTER (EMERGENCY)
Facility: HOSPITAL | Age: 48
Discharge: HOME | End: 2023-07-31
Attending: STUDENT IN AN ORGANIZED HEALTH CARE EDUCATION/TRAINING PROGRAM
Payer: COMMERCIAL

## 2023-07-31 VITALS
DIASTOLIC BLOOD PRESSURE: 59 MMHG | SYSTOLIC BLOOD PRESSURE: 112 MMHG | TEMPERATURE: 98 F | HEART RATE: 100 BPM | OXYGEN SATURATION: 96 % | RESPIRATION RATE: 18 BRPM

## 2023-07-31 DIAGNOSIS — L03.317 CELLULITIS OF BUTTOCK: Primary | ICD-10-CM

## 2023-07-31 LAB
ALBUMIN SERPL BCP-MCNC: 3.2 G/DL (ref 3.5–5.2)
ALP SERPL-CCNC: 94 U/L (ref 55–135)
ALT SERPL W/O P-5'-P-CCNC: 7 U/L (ref 10–44)
ANION GAP SERPL CALC-SCNC: 14 MMOL/L (ref 8–16)
AST SERPL-CCNC: 6 U/L (ref 10–40)
BASOPHILS # BLD AUTO: 0.05 K/UL (ref 0–0.2)
BASOPHILS NFR BLD: 0.5 % (ref 0–1.9)
BILIRUB SERPL-MCNC: 0.5 MG/DL (ref 0.1–1)
BUN SERPL-MCNC: 23 MG/DL (ref 6–20)
CALCIUM SERPL-MCNC: 10.3 MG/DL (ref 8.7–10.5)
CHLORIDE SERPL-SCNC: 103 MMOL/L (ref 95–110)
CO2 SERPL-SCNC: 21 MMOL/L (ref 23–29)
CREAT SERPL-MCNC: 0.9 MG/DL (ref 0.5–1.4)
DIFFERENTIAL METHOD: ABNORMAL
EOSINOPHIL # BLD AUTO: 0.1 K/UL (ref 0–0.5)
EOSINOPHIL NFR BLD: 0.5 % (ref 0–8)
ERYTHROCYTE [DISTWIDTH] IN BLOOD BY AUTOMATED COUNT: 18.4 % (ref 11.5–14.5)
EST. GFR  (NO RACE VARIABLE): >60 ML/MIN/1.73 M^2
GLUCOSE SERPL-MCNC: 110 MG/DL (ref 70–110)
HCT VFR BLD AUTO: 32.5 % (ref 37–48.5)
HGB BLD-MCNC: 10.1 G/DL (ref 12–16)
IMM GRANULOCYTES # BLD AUTO: 0.1 K/UL (ref 0–0.04)
IMM GRANULOCYTES NFR BLD AUTO: 1 % (ref 0–0.5)
LACTATE SERPL-SCNC: 0.9 MMOL/L (ref 0.5–2.2)
LYMPHOCYTES # BLD AUTO: 3.1 K/UL (ref 1–4.8)
LYMPHOCYTES NFR BLD: 31.2 % (ref 18–48)
MCH RBC QN AUTO: 24.7 PG (ref 27–31)
MCHC RBC AUTO-ENTMCNC: 31.1 G/DL (ref 32–36)
MCV RBC AUTO: 80 FL (ref 82–98)
MONOCYTES # BLD AUTO: 1 K/UL (ref 0.3–1)
MONOCYTES NFR BLD: 10.3 % (ref 4–15)
NEUTROPHILS # BLD AUTO: 5.7 K/UL (ref 1.8–7.7)
NEUTROPHILS NFR BLD: 56.5 % (ref 38–73)
NRBC BLD-RTO: 0 /100 WBC
PLATELET # BLD AUTO: 126 K/UL (ref 150–450)
PMV BLD AUTO: 10.7 FL (ref 9.2–12.9)
POTASSIUM SERPL-SCNC: 4.4 MMOL/L (ref 3.5–5.1)
PROT SERPL-MCNC: 6 G/DL (ref 6–8.4)
RBC # BLD AUTO: 4.09 M/UL (ref 4–5.4)
SODIUM SERPL-SCNC: 138 MMOL/L (ref 136–145)
WBC # BLD AUTO: 10 K/UL (ref 3.9–12.7)

## 2023-07-31 PROCEDURE — 25500020 PHARM REV CODE 255: Performed by: STUDENT IN AN ORGANIZED HEALTH CARE EDUCATION/TRAINING PROGRAM

## 2023-07-31 PROCEDURE — 87040 BLOOD CULTURE FOR BACTERIA: CPT | Performed by: NURSE PRACTITIONER

## 2023-07-31 PROCEDURE — 85025 COMPLETE CBC W/AUTO DIFF WBC: CPT | Performed by: NURSE PRACTITIONER

## 2023-07-31 PROCEDURE — 96374 THER/PROPH/DIAG INJ IV PUSH: CPT

## 2023-07-31 PROCEDURE — 96375 TX/PRO/DX INJ NEW DRUG ADDON: CPT

## 2023-07-31 PROCEDURE — 80053 COMPREHEN METABOLIC PANEL: CPT | Performed by: NURSE PRACTITIONER

## 2023-07-31 PROCEDURE — 99285 EMERGENCY DEPT VISIT HI MDM: CPT | Mod: 25

## 2023-07-31 PROCEDURE — 83605 ASSAY OF LACTIC ACID: CPT | Performed by: NURSE PRACTITIONER

## 2023-07-31 PROCEDURE — 25000003 PHARM REV CODE 250: Performed by: NURSE PRACTITIONER

## 2023-07-31 PROCEDURE — 63600175 PHARM REV CODE 636 W HCPCS: Performed by: NURSE PRACTITIONER

## 2023-07-31 RX ORDER — CLINDAMYCIN PHOSPHATE 600 MG/50ML
600 INJECTION, SOLUTION INTRAVENOUS
Status: COMPLETED | OUTPATIENT
Start: 2023-07-31 | End: 2023-07-31

## 2023-07-31 RX ORDER — MORPHINE SULFATE 2 MG/ML
2 INJECTION, SOLUTION INTRAMUSCULAR; INTRAVENOUS
Status: COMPLETED | OUTPATIENT
Start: 2023-07-31 | End: 2023-07-31

## 2023-07-31 RX ORDER — SULFAMETHOXAZOLE AND TRIMETHOPRIM 800; 160 MG/1; MG/1
1 TABLET ORAL 2 TIMES DAILY
COMMUNITY
Start: 2023-07-25 | End: 2023-12-15

## 2023-07-31 RX ORDER — ONDANSETRON 2 MG/ML
4 INJECTION INTRAMUSCULAR; INTRAVENOUS
Status: COMPLETED | OUTPATIENT
Start: 2023-07-31 | End: 2023-07-31

## 2023-07-31 RX ORDER — CLINDAMYCIN HYDROCHLORIDE 300 MG/1
300 CAPSULE ORAL EVERY 6 HOURS
Qty: 28 CAPSULE | Refills: 0 | Status: SHIPPED | OUTPATIENT
Start: 2023-07-31 | End: 2023-08-07

## 2023-07-31 RX ORDER — SODIUM CHLORIDE 9 MG/ML
1000 INJECTION, SOLUTION INTRAVENOUS
Status: COMPLETED | OUTPATIENT
Start: 2023-07-31 | End: 2023-07-31

## 2023-07-31 RX ORDER — TRAMADOL HYDROCHLORIDE 50 MG/1
50 TABLET ORAL EVERY 6 HOURS PRN
Qty: 12 TABLET | Refills: 0 | OUTPATIENT
Start: 2023-07-31 | End: 2023-12-15

## 2023-07-31 RX ADMIN — MORPHINE SULFATE 2 MG: 2 INJECTION, SOLUTION INTRAMUSCULAR; INTRAVENOUS at 03:07

## 2023-07-31 RX ADMIN — ONDANSETRON HYDROCHLORIDE 4 MG: 2 SOLUTION INTRAMUSCULAR; INTRAVENOUS at 03:07

## 2023-07-31 RX ADMIN — CLINDAMYCIN IN 5 PERCENT DEXTROSE 600 MG: 12 INJECTION, SOLUTION INTRAVENOUS at 03:07

## 2023-07-31 RX ADMIN — SODIUM CHLORIDE 1000 ML: 9 INJECTION, SOLUTION INTRAVENOUS at 02:07

## 2023-07-31 RX ADMIN — IOHEXOL 100 ML: 350 INJECTION, SOLUTION INTRAVENOUS at 02:07

## 2023-07-31 NOTE — ED PROVIDER NOTES
"Encounter Date: 7/31/2023       History     Chief Complaint   Patient presents with    Abscess     Pt had abscess on right buttock opened in clinic last Tuesday. Pt followed up with PCP, Dr. Lantigua,  today due to "not healing". MD sent pt to ED "to have a surgeon look at the wound and possibly have surgery" as per pt.      Hedy Conway is a 48 y.o. female with PMH of obesity, nonhogdkin lymphoma, septic arthritis, HTN, DM type 2 who presents to the ED for evaluation of gluteal abscess.   R buttock abscess with I&D by PCP, John Lantigua, in clinic last Tues. She was placed on Bactrim DS but did not have any improvement. She had a follow-up appt today and was told to come to the ED for surgical evaluation.  Pain is moderate, throbbing, currently 7/10 in severity. Movement and sitting exacerbates pain.   She reports fever for the first three days after I&D that has now resolved.   + hx MRSA and DM. Reports that blood sugars are typically well-controlled. Last A1C 6.4 in 03/23'    The history is provided by the patient.     Review of patient's allergies indicates:   Allergen Reactions    Tetanus vaccines and toxoid Rash     Past Medical History:   Diagnosis Date    Acute carpal tunnel syndrome of left wrist     Biliary colic     Diabetes mellitus     Encounter for blood transfusion     History of chemotherapy     Incisional hernia     Large cell (diffuse) non-Hodgkin's lymphoma     Stem cells transplant status      Past Surgical History:   Procedure Laterality Date    ARTHROSCOPY OF KNEE Bilateral 03/30/2023    Procedure: ARTHROSCOPY, KNEE;  Surgeon: Rudolph Murrell MD;  Location: [facility] OR;  Service: Orthopedics;  Laterality: Bilateral;    BREAST BIOPSY Left     lymph node biopsy, benign    BREAST SURGERY      CHOLECYSTECTOMY      COLD KNIFE CONIZATION OF CERVIX N/A 03/08/2021    Procedure: CONE BIOPSY, CERVIX, USING COLD KNIFE;  Surgeon: Evelyn Wheeler MD;  Location: [facility] OR;  Service: OB/GYN;  " Laterality: N/A;    COLONOSCOPY N/A 12/21/2022    Procedure: COLONOSCOPY;  Surgeon: Annamarie Jane MD;  Location: [facility] ENDO;  Service: Endoscopy;  Laterality: N/A;    HAND ARTHROTOMY Right 03/30/2023    Procedure: OPEN  ARTHROTOMY WRIST;  Surgeon: Rudolph Murrell MD;  Location: [facility] OR;  Service: Orthopedics;  Laterality: Right;    HERNIA REPAIR      incisional    LUNG BIOPSY      lymphnode biopsy      MEDIPORT REMOVAL Left 01/30/2020    Procedure: REMOVAL, CATHETER, CENTRAL VENOUS, TUNNELED, WITH PORT;  Surgeon: Luis Bogran-Reyes, MD;  Location: [facility] OR;  Service: General;  Laterality: Left;    PORTACATH PLACEMENT Left     REVISION OF SCAR  06/22/2021    Procedure: REVISION, SCAR;  Surgeon: Otis Grimes MD;  Location: [address];  Service: General;;    SKIN BIOPSY      TUBAL LIGATION      UMBILICAL HERNIA REPAIR       Family History   Problem Relation Age of Onset    Diabetes Mother     Hypertension Mother     Kidney disease Mother     Heart block Mother     Diabetes Father     Hypertension Father     Lung cancer Father     Heart failure Father     Breast cancer Maternal Aunt     Breast cancer Maternal Aunt         leukemia    Colon cancer Paternal Grandmother     Cancer Paternal Grandmother     Anesthesia problems Neg Hx      Social History     Tobacco Use    Smoking status: Never    Smokeless tobacco: Never   Substance Use Topics    Alcohol use: Not Currently    Drug use: No     Review of Systems   Constitutional:  Negative for fever.   HENT:  Negative for sore throat.    Respiratory:  Negative for chest tightness and shortness of breath.    Cardiovascular:  Negative for chest pain.   Gastrointestinal:  Negative for abdominal pain and nausea.   Genitourinary:  Negative for dysuria, frequency and urgency.   Musculoskeletal:  Negative for back pain.   Skin:  Positive for wound. Negative for rash.   Neurological:  Negative for dizziness, weakness, light-headedness and numbness.   Hematological:   Does not bruise/bleed easily.       Physical Exam     Initial Vitals [07/31/23 1159]   BP Pulse Resp Temp SpO2   (!) 112/59 100 18 98.2 °F (36.8 °C) 96 %      MAP       --         Physical Exam    Nursing note and vitals reviewed.  Constitutional: She appears well-developed and well-nourished.   HENT:   Head: Normocephalic and atraumatic.   Right Ear: Tympanic membrane, external ear and ear canal normal. Tympanic membrane is not erythematous. No middle ear effusion.   Left Ear: Tympanic membrane, external ear and ear canal normal. Tympanic membrane is not erythematous.  No middle ear effusion.   Nose: Nose normal.   Mouth/Throat: Uvula is midline, oropharynx is clear and moist and mucous membranes are normal. Mucous membranes are not pale and not dry.   Eyes: Conjunctivae and EOM are normal. Pupils are equal, round, and reactive to light.   Neck: Neck supple.   Normal range of motion.  Cardiovascular:  Normal rate, regular rhythm, normal heart sounds and intact distal pulses.           Pulmonary/Chest: Effort normal and breath sounds normal. She has no decreased breath sounds. She has no wheezes. She has no rhonchi. She has no rales.   Abdominal: Abdomen is soft. Bowel sounds are normal. There is no abdominal tenderness.   Musculoskeletal:         General: Normal range of motion.      Cervical back: Normal range of motion and neck supple.     Neurological: She is alert and oriented to person, place, and time. She has normal strength. She displays normal reflexes. No cranial nerve deficit or sensory deficit.   Skin: Skin is warm and dry. Capillary refill takes less than 2 seconds. Abscess (Redness and induration R gluteus. No fluctuance.) noted. No rash noted.   Psychiatric: She has a normal mood and affect. Her behavior is normal. Judgment and thought content normal.           ED Course   Procedures  Labs Reviewed   CBC W/ AUTO DIFFERENTIAL - Abnormal; Notable for the following components:       Result Value     Hemoglobin 10.1 (*)     Hematocrit 32.5 (*)     MCV 80 (*)     MCH 24.7 (*)     MCHC 31.1 (*)     RDW 18.4 (*)     Platelets 126 (*)     Immature Granulocytes 1.0 (*)     Immature Grans (Abs) 0.10 (*)     All other components within normal limits   COMPREHENSIVE METABOLIC PANEL - Abnormal; Notable for the following components:    CO2 21 (*)     BUN 23 (*)     Albumin 3.2 (*)     AST 6 (*)     ALT 7 (*)     All other components within normal limits   CULTURE, BLOOD   CULTURE, BLOOD   LACTIC ACID, PLASMA          Imaging Results              CT Pelvis With Contrast (Final result)  Result time 07/31/23 14:37:18      Final result by Ellen Ambrosio MD (07/31/23 14:37:18)                   Impression:      Significant soft tissue stranding within the subcutaneous tissues of the right buttocks with a non organized collection of fluid.  Findings likely related to phlegmon formation/early abscess.      Electronically signed by: Ellen Ambrosio MD  Date:    07/31/2023  Time:    14:37               Narrative:    EXAMINATION:  CT PELVIS WITH  CONTRAST    CLINICAL HISTORY:  Anal/rectal abscess;R gluteal abscess;    TECHNIQUE:  Axial CT images of the pelvis were obtained with intravenous contrast material.  Coronal and sagittal reformats from the axial acquisition.  Oral contrast was not administered.    COMPARISON:  03/13/2023    FINDINGS:  Constipation.  No free air or obstruction identified within the pelvis.  Urinary bladder has a normal appearance.  Uterus and adnexa appear normal.    Significant amount of soft tissue induration within the subcutaneous tissues of the right buttocks.  No localized well defined fluid collection is seen to suggest abscess formation at this time.                                       Medications   0.9%  NaCl infusion (1,000 mLs Intravenous New Bag 7/31/23 1410)   iohexoL (OMNIPAQUE 350) injection 100 mL (100 mLs Intravenous Given 7/31/23 1419)   morphine injection 2 mg (2 mg Intravenous Given  7/31/23 1507)   ondansetron injection 4 mg (4 mg Intravenous Given 7/31/23 1507)   clindamycin in D5W 600 mg/50 mL IVPB 600 mg (600 mg Intravenous New Bag 7/31/23 1511)     Medical Decision Making:   Differential Diagnosis:   Cellulitis, abscess, sepsis   Clinical Tests:   Lab Tests: Ordered and Reviewed  Radiological Study: Ordered and Reviewed  ED Management:  Evaluation of a 48-year-old female with a right gluteal abscess for the past several days.  Patient was evaluated by her PCP last Tuesday with an I and D that she reports had good drainage.  She was placed on Bactrim, but notes that she is not having any improvement in symptoms with.  She was again evaluated by her doctor and was told to come to the ED for a surgical evaluation.  She presents with a large area of induration, no fluctuance.  No active drainage.  She is afebrile and normotensive.  Lab workup with no leukocytosis and a negative lactic.  Blood cultures pending.  CT of the pelvis shows soft tissue induration within the subcutaneous tissues of the right buttocks with no fluid collection to suggest abscess at this time.  Reviewed with patient. Spoke to General Surgeon, Dr. Vila. No further intervention at this time.   She was given IV fluids in addition to morphine for pain control.  Reviewed previous wound cultures from hospitalization in June 2023 that showed resistance to Bactrim.  Patient given IV clindamycin in the ED and will switch to p.o. clinda with close follow-up with General surgery. Patient/caregiver voices understanding and feels comfortable with discharge plan.      The patient acknowledges that close follow up with medical provider is required. Instructed to follow up with PCP within 2 days. Patient was given specific return precautions. The patient agrees to comply with all instruction and directions given in the ER.                            Clinical Impression:   Final diagnoses:  [L03.317] Cellulitis of buttock (Primary)         ED Disposition Condition    Discharge Stable          ED Prescriptions       Medication Sig Dispense Start Date End Date Auth. Provider    clindamycin (CLEOCIN) 300 MG capsule Take 1 capsule (300 mg total) by mouth every 6 (six) hours. for 7 days 28 capsule 7/31/2023 8/7/2023 Marizol rFeitas NP    traMADoL (ULTRAM) 50 mg tablet Take 1 tablet (50 mg total) by mouth every 6 (six) hours as needed for Pain. 12 tablet 7/31/2023 -- Marizol Freitas NP          Follow-up Information       Follow up With Specialties Details Why Contact Info    Arvin Vila Jr., MD General Surgery Schedule an appointment as soon as possible for a visit in 1 day  [address]   [address]  [facility] [address]  [phone]               Marizol Freitas NP  07/31/23 1924

## 2023-08-05 LAB
BACTERIA BLD CULT: NORMAL
BACTERIA BLD CULT: NORMAL

## 2023-08-21 ENCOUNTER — OFFICE VISIT (OUTPATIENT)
Dept: WOUND CARE | Facility: HOSPITAL | Age: 48
End: 2023-08-21
Attending: SURGERY
Payer: COMMERCIAL

## 2023-08-21 VITALS
DIASTOLIC BLOOD PRESSURE: 64 MMHG | RESPIRATION RATE: 18 BRPM | TEMPERATURE: 98 F | HEART RATE: 88 BPM | SYSTOLIC BLOOD PRESSURE: 115 MMHG

## 2023-08-21 DIAGNOSIS — L98.499 DIABETES MELLITUS WITH SKIN ULCER: ICD-10-CM

## 2023-08-21 DIAGNOSIS — E11.622 DIABETES MELLITUS WITH SKIN ULCER: ICD-10-CM

## 2023-08-21 DIAGNOSIS — T81.89XD NON-HEALING SURGICAL WOUND, SUBSEQUENT ENCOUNTER: ICD-10-CM

## 2023-08-21 DIAGNOSIS — L98.492 CHRONIC ULCER SKIN, WITH FAT LAYER EXPOSED: Primary | ICD-10-CM

## 2023-08-21 PROBLEM — T81.89XA NON-HEALING SURGICAL WOUND: Status: ACTIVE | Noted: 2023-08-21

## 2023-08-21 PROCEDURE — 99213 OFFICE O/P EST LOW 20 MIN: CPT | Mod: 25

## 2023-08-21 PROCEDURE — 11042 DBRDMT SUBQ TIS 1ST 20SQCM/<: CPT

## 2023-08-21 PROCEDURE — 99499 NO LOS: ICD-10-PCS | Mod: ,,, | Performed by: SURGERY

## 2023-08-21 PROCEDURE — 99499 UNLISTED E&M SERVICE: CPT | Mod: ,,, | Performed by: SURGERY

## 2023-08-21 NOTE — PROGRESS NOTES
[facility]  Wound Care  History and Physical    Problem List Items Addressed This Visit       Non-healing surgical wound    Overview     48-year-old female with history of recurrent abscess to the right buttocks.  Patient had area drained by primary care physician and was treated with antibiotics.  Infection never resolved and she was seen by myself about 2 weeks ago.  Patient was brought to the operating room and underwent further I and D of the right buttock abscess.  Wound was cultured and revealed MRSA.  Patient has been doing Vashe dressing changes and completed a course of oral antibiotics.  Patient says pain is much improved.  She denies fever or chills.         Current Assessment & Plan     Will continue to irrigate wound with Vashe.  Will change to silver alginate dressing.  No need for further oral antibiotics.              History:    Past Medical History:   Diagnosis Date    Acute carpal tunnel syndrome of left wrist     Biliary colic     Diabetes mellitus     Encounter for blood transfusion     History of chemotherapy     Incisional hernia     Large cell (diffuse) non-Hodgkin's lymphoma     Stem cells transplant status        Past Surgical History:   Procedure Laterality Date    ARTHROSCOPY OF KNEE Bilateral 03/30/2023    Procedure: ARTHROSCOPY, KNEE;  Surgeon: Rudolph Murrell MD;  Location: [facility];  Service: Orthopedics;  Laterality: Bilateral;    BREAST BIOPSY Left     lymph node biopsy, benign    BREAST SURGERY      CHOLECYSTECTOMY      COLD KNIFE CONIZATION OF CERVIX N/A 03/08/2021    Procedure: CONE BIOPSY, CERVIX, USING COLD KNIFE;  Surgeon: Evelyn Wheeler MD;  Location: [facility];  Service: OB/GYN;  Laterality: N/A;    COLONOSCOPY N/A 12/21/2022    Procedure: COLONOSCOPY;  Surgeon: Annamarie Jane MD;  Location: [facility];  Service: Endoscopy;  Laterality: N/A;    HAND ARTHROTOMY Right 03/30/2023    Procedure: OPEN  ARTHROTOMY WRIST;  Surgeon: Rudolph Murrell MD;   "Location: [facility] OR;  Service: Orthopedics;  Laterality: Right;    HERNIA REPAIR      incisional    LUNG BIOPSY      lymphnode biopsy      MEDIPORT REMOVAL Left 01/30/2020    Procedure: REMOVAL, CATHETER, CENTRAL VENOUS, TUNNELED, WITH PORT;  Surgeon: Luis Bogran-Reyes, MD;  Location: [facility] OR;  Service: General;  Laterality: Left;    PORTACATH PLACEMENT Left     REVISION OF SCAR  06/22/2021    Procedure: REVISION, SCAR;  Surgeon: Otis Grimes MD;  Location: [facility] OR [address] [place] [address];  Service: General;;    SKIN BIOPSY      TUBAL LIGATION      UMBILICAL HERNIA REPAIR         Family History   Problem Relation Age of Onset    Diabetes Mother     Hypertension Mother     Kidney disease Mother     Heart block Mother     Diabetes Father     Hypertension Father     Lung cancer Father     Heart failure Father     Breast cancer Maternal Aunt     Breast cancer Maternal Aunt         leukemia    Colon cancer Paternal Grandmother     Cancer Paternal Grandmother     Anesthesia problems Neg Hx         reports that she has never smoked. She has never used smokeless tobacco. She reports that she does not currently use alcohol. She reports that she does not use drugs.    has a current medication list which includes the following prescription(s): acetaminophen, albuterol, bisoprolol, celecoxib, fluticasone propionate, melatonin, montelukast, ondansetron, sulfamethoxazole-trimethoprim 800-160mg, tramadol, and tramadol.    Allergies:  Tetanus vaccines and toxoid    Review of Systems:  ROS      There were no vitals filed for this visit.      BMI:  There is no height or weight on file to calculate BMI.    Physical Exam:  Physical Exam  Patient is obese.  Patient in no distress.    Right buttocks has open wound.  Wound mostly granulating.  Minimal slough present.  There is a fair amount of serous type drainage.  Periwound area without infection.  There is some excoriation.  A1C:  No results for input(s): "HGBA1C" in the last 2160 " "hours.  BMP:  Recent Labs   Lab Result Units 06/06/23  0333 06/07/23  0342 06/08/23  0626 07/11/23  1232 07/31/23  1246   Glucose mg/dL 107 116* 118*   < > 110   Sodium mmol/L 138 141 140   < > 138   Potassium mmol/L 3.3* 3.3* 3.3*   < > 4.4   Chloride mmol/L 109 108 106   < > 103   CO2 mmol/L 18* 22* 25   < > 21*   BUN mg/dL 13 8 6   < > 23*   Creatinine mg/dL 0.7 0.6 0.7   < > 0.9   Calcium mg/dL 8.5* 9.3 9.0   < > 10.3   Magnesium mg/dL 1.8 1.9 2.0  --   --     < > = values in this interval not displayed.      CBC:  Recent Labs   Lab Result Units 06/07/23  0342 07/11/23  1232 07/31/23  1246   WBC K/uL 2.31* 6.63 10.00   RBC M/uL 3.39* 4.55 4.09   Hemoglobin g/dL 8.2* 11.1* 10.1*   Hematocrit % 27.3* 35.6* 32.5*   Platelets K/uL 74* 97* 126*   MCV fL 81* 78* 80*   MCH pg 24.2* 24.4* 24.7*   MCHC g/dL 30.0* 31.2* 31.1*     CMP:  Recent Labs   Lab Result Units 06/07/23  0342 06/08/23  0626 07/11/23  1232 07/31/23  1246   Glucose mg/dL 116*   < > 110 110   Calcium mg/dL 9.3   < > 10.2 10.3   Albumin g/dL 2.6*  --  3.8 3.2*   Total Protein g/dL 4.8*  --  5.9* 6.0   Sodium mmol/L 141   < > 137 138   Potassium mmol/L 3.3*   < > 4.1 4.4   CO2 mmol/L 22*   < > 23 21*   Chloride mmol/L 108   < > 101 103   BUN mg/dL 8   < > 14 23*   Alkaline Phosphatase U/L 72  --  98 94   ALT U/L 9*  --  14 7*   AST U/L <5*  --  9* 6*   Total Bilirubin mg/dL 0.3  --  0.9 0.5    < > = values in this interval not displayed.     PREALBUMIN:  No results for input(s): "PREALBUMIN" in the last 2160 hours.  WOUND CULTURES:  Recent Labs   Lab Result Units 06/04/23  1641   Aerobic Bacterial Culture  PROTEUS MIRABILIS  Few  *  PROVIDENCIA STUARTII  Rare  *  METHICILLIN RESISTANT STAPHYLOCOCCUS AUREUS  Few  *           Plan:  See Wound Docs note for plan and follow up.        Bunny NEVAREZ Marino [facility]     "

## 2023-08-21 NOTE — ASSESSMENT & PLAN NOTE
Will continue to irrigate wound with Vashe.  Will change to silver alginate dressing.  No need for further oral antibiotics.

## 2023-08-28 ENCOUNTER — OFFICE VISIT (OUTPATIENT)
Dept: WOUND CARE | Facility: HOSPITAL | Age: 48
End: 2023-08-28
Attending: SURGERY
Payer: COMMERCIAL

## 2023-08-28 VITALS
SYSTOLIC BLOOD PRESSURE: 115 MMHG | RESPIRATION RATE: 18 BRPM | TEMPERATURE: 98 F | HEART RATE: 79 BPM | DIASTOLIC BLOOD PRESSURE: 63 MMHG

## 2023-08-28 DIAGNOSIS — L98.492 CHRONIC ULCER SKIN, WITH FAT LAYER EXPOSED: Primary | ICD-10-CM

## 2023-08-28 DIAGNOSIS — T81.89XA NON-HEALING SURGICAL WOUND, INITIAL ENCOUNTER: ICD-10-CM

## 2023-08-28 PROCEDURE — 11042 DBRDMT SUBQ TIS 1ST 20SQCM/<: CPT

## 2023-08-28 PROCEDURE — 99499 NO LOS: ICD-10-PCS | Mod: ,,, | Performed by: SURGERY

## 2023-08-28 PROCEDURE — 99499 UNLISTED E&M SERVICE: CPT | Mod: ,,, | Performed by: SURGERY

## 2023-08-28 NOTE — PROGRESS NOTES
[facility]  Wound Care  Progress Note    Problem List Items Addressed This Visit          Orthopedic    Non-healing surgical wound    Overview     48-year-old female with history of recurrent abscess to the right buttocks.  Patient had area drained by primary care physician and was treated with antibiotics.  Infection never resolved and she was seen by myself about 2 weeks ago.  Patient was brought to the operating room and underwent further I and D of the right buttock abscess.  Wound was cultured and revealed MRSA.  Patient has been doing Vashe dressing changes and completed a course of oral antibiotics.  Patient says pain is much improved.  She denies fever or chills.         Current Assessment & Plan     Wound is much improved.    Continue debridement to remove nonviable slough and maintain active phase wound healing.  Continue packing.  Clean with Dakin's.            See wound doc progress notes. Documents will be scanned.        Alan Wilson  [facility]

## 2023-08-28 NOTE — ASSESSMENT & PLAN NOTE
Wound is much improved.    Continue debridement to remove nonviable slough and maintain active phase wound healing.  Continue packing.  Clean with Dakin's.

## 2023-09-04 PROBLEM — N17.9 ACUTE KIDNEY INJURY: Status: RESOLVED | Noted: 2023-06-05 | Resolved: 2023-09-04

## 2023-09-04 PROBLEM — A41.9 SEPSIS: Status: RESOLVED | Noted: 2023-06-05 | Resolved: 2023-09-04

## 2023-09-11 ENCOUNTER — OFFICE VISIT (OUTPATIENT)
Dept: WOUND CARE | Facility: HOSPITAL | Age: 48
End: 2023-09-11
Attending: SURGERY
Payer: COMMERCIAL

## 2023-09-11 VITALS
RESPIRATION RATE: 18 BRPM | SYSTOLIC BLOOD PRESSURE: 121 MMHG | TEMPERATURE: 98 F | HEART RATE: 75 BPM | DIASTOLIC BLOOD PRESSURE: 75 MMHG

## 2023-09-11 DIAGNOSIS — E11.622 DIABETES MELLITUS WITH SKIN ULCER: ICD-10-CM

## 2023-09-11 DIAGNOSIS — L98.492 CHRONIC ULCER SKIN, WITH FAT LAYER EXPOSED: Primary | ICD-10-CM

## 2023-09-11 DIAGNOSIS — L98.499 DIABETES MELLITUS WITH SKIN ULCER: ICD-10-CM

## 2023-09-11 DIAGNOSIS — T81.89XA NON-HEALING SURGICAL WOUND, INITIAL ENCOUNTER: ICD-10-CM

## 2023-09-11 PROCEDURE — 11042 DBRDMT SUBQ TIS 1ST 20SQCM/<: CPT

## 2023-09-11 PROCEDURE — 99499 UNLISTED E&M SERVICE: CPT | Mod: ,,, | Performed by: SURGERY

## 2023-09-11 PROCEDURE — 99499 NO LOS: ICD-10-PCS | Mod: ,,, | Performed by: SURGERY

## 2023-09-11 NOTE — ASSESSMENT & PLAN NOTE
Wound is improved.    Continued debridement to maintain active phase of wound healing.    Continue packing.

## 2023-09-11 NOTE — PROGRESS NOTES
[facility]  Wound Care  Progress Note    Problem List Items Addressed This Visit          Orthopedic    Non-healing surgical wound    Overview     48-year-old female with history of recurrent abscess to the right buttocks.  Patient had area drained by primary care physician and was treated with antibiotics.  Infection never resolved and she was seen by myself about 2 weeks ago.  Patient was brought to the operating room and underwent further I and D of the right buttock abscess.  Wound was cultured and revealed MRSA.  Patient has been doing Vashe dressing changes and completed a course of oral antibiotics.  Patient says pain is much improved.  She denies fever or chills.         Current Assessment & Plan     Wound is improved.    Continued debridement to maintain active phase of wound healing.    Continue packing.          Other Visit Diagnoses       Chronic ulcer skin, with fat layer exposed    -  Primary    Diabetes mellitus with skin ulcer                See wound doc progress notes. Documents will be scanned.        Alan Wilosn  [facility]

## 2023-09-11 NOTE — PROGRESS NOTES
How Severe Are Your Spot(S)?: mild See wound care assessment documentation in chart review. Scan under media tab.       What Type Of Note Output Would You Prefer (Optional)?: Standard Output What Is The Reason For Today's Visit?: Full Body Skin Examination What Is The Reason For Today's Visit? (Being Monitored For X): concerning skin lesions on a periodic basis

## 2023-09-18 ENCOUNTER — OFFICE VISIT (OUTPATIENT)
Dept: WOUND CARE | Facility: HOSPITAL | Age: 48
End: 2023-09-18
Attending: SURGERY
Payer: COMMERCIAL

## 2023-09-18 VITALS
HEART RATE: 76 BPM | TEMPERATURE: 98 F | SYSTOLIC BLOOD PRESSURE: 118 MMHG | RESPIRATION RATE: 17 BRPM | DIASTOLIC BLOOD PRESSURE: 71 MMHG

## 2023-09-18 DIAGNOSIS — T81.89XA NON-HEALING SURGICAL WOUND, INITIAL ENCOUNTER: ICD-10-CM

## 2023-09-18 PROCEDURE — 11042 DBRDMT SUBQ TIS 1ST 20SQCM/<: CPT

## 2023-09-18 PROCEDURE — 99499 NO LOS: ICD-10-PCS | Mod: ,,, | Performed by: SURGERY

## 2023-09-18 PROCEDURE — 99499 UNLISTED E&M SERVICE: CPT | Mod: ,,, | Performed by: SURGERY

## 2023-09-18 NOTE — PROGRESS NOTES
[facility]  Wound Care  Progress Note    Problem List Items Addressed This Visit          Orthopedic    Non-healing surgical wound    Overview     48-year-old female with history of recurrent abscess to the right buttocks.  Patient had area drained by primary care physician and was treated with antibiotics.  Infection never resolved and she was seen by myself about 2 weeks ago.  Patient was brought to the operating room and underwent further I and D of the right buttock abscess.  Wound was cultured and revealed MRSA.  Patient has been doing Vashe dressing changes and completed a course of oral antibiotics.  Patient says pain is much improved.  She denies fever or chills.         Current Assessment & Plan     Wound appears to be improving.    Continue debridement to maintain active phase wound healing.    Continue packing with silver alginate loosely.  Change dressing Monday Wednesday Friday.            See wound doc progress notes. Documents will be scanned.        Alan Wilson  [facility]

## 2023-09-18 NOTE — ASSESSMENT & PLAN NOTE
Wound appears to be improving.    Continue debridement to maintain active phase wound healing.    Continue packing with silver alginate loosely.  Change dressing Monday Wednesday Friday.

## 2023-09-25 ENCOUNTER — OFFICE VISIT (OUTPATIENT)
Dept: WOUND CARE | Facility: HOSPITAL | Age: 48
End: 2023-09-25
Attending: SURGERY
Payer: COMMERCIAL

## 2023-09-25 VITALS
TEMPERATURE: 98 F | HEART RATE: 77 BPM | DIASTOLIC BLOOD PRESSURE: 64 MMHG | SYSTOLIC BLOOD PRESSURE: 121 MMHG | RESPIRATION RATE: 18 BRPM

## 2023-09-25 DIAGNOSIS — T81.89XD NON-HEALING SURGICAL WOUND, SUBSEQUENT ENCOUNTER: ICD-10-CM

## 2023-09-25 PROCEDURE — 11042 DBRDMT SUBQ TIS 1ST 20SQCM/<: CPT

## 2023-09-25 PROCEDURE — 99499 UNLISTED E&M SERVICE: CPT | Mod: ,,, | Performed by: SURGERY

## 2023-09-25 PROCEDURE — 99499 NO LOS: ICD-10-PCS | Mod: ,,, | Performed by: SURGERY

## 2023-09-25 NOTE — ASSESSMENT & PLAN NOTE
Wound continues to improve.  Wound granulating with no concerning drainage.  No sign of soft tissue infection.  Patient remains on antibiotics.  Continue debridement is needed.  Continue silver dressing

## 2023-09-25 NOTE — PROGRESS NOTES
[facility]  Wound Care  Progress Note    Problem List Items Addressed This Visit       Non-healing surgical wound    Overview     48-year-old female with history of recurrent abscess to the right buttocks.  Patient had area drained by primary care physician and was treated with antibiotics.  Infection never resolved and she was seen by myself about 2 weeks ago.  Patient was brought to the operating room and underwent further I and D of the right buttock abscess.  Wound was cultured and revealed MRSA.  Patient has been doing Vashe dressing changes and completed a course of oral antibiotics.  Patient says pain is much improved.  She denies fever or chills.         Current Assessment & Plan     Wound continues to improve.  Wound granulating with no concerning drainage.  No sign of soft tissue infection.  Patient remains on antibiotics.  Continue debridement is needed.  Continue silver dressing            See wound doc progress notes. Documents will be scanned.        Bunny Hernandez  [facility]

## 2023-10-02 ENCOUNTER — OFFICE VISIT (OUTPATIENT)
Dept: WOUND CARE | Facility: HOSPITAL | Age: 48
End: 2023-10-02
Attending: SURGERY
Payer: COMMERCIAL

## 2023-10-02 VITALS
HEART RATE: 76 BPM | SYSTOLIC BLOOD PRESSURE: 130 MMHG | TEMPERATURE: 98 F | DIASTOLIC BLOOD PRESSURE: 70 MMHG | RESPIRATION RATE: 18 BRPM

## 2023-10-02 DIAGNOSIS — L98.499 DIABETES MELLITUS WITH SKIN ULCER: ICD-10-CM

## 2023-10-02 DIAGNOSIS — E11.622 DIABETES MELLITUS WITH SKIN ULCER: ICD-10-CM

## 2023-10-02 DIAGNOSIS — T81.89XD NON-HEALING SURGICAL WOUND, SUBSEQUENT ENCOUNTER: ICD-10-CM

## 2023-10-02 DIAGNOSIS — L98.492 CHRONIC ULCER SKIN, WITH FAT LAYER EXPOSED: Primary | ICD-10-CM

## 2023-10-02 PROCEDURE — 11042 DBRDMT SUBQ TIS 1ST 20SQCM/<: CPT

## 2023-10-02 PROCEDURE — 99499 NO LOS: ICD-10-PCS | Mod: ,,, | Performed by: SURGERY

## 2023-10-02 PROCEDURE — 99499 UNLISTED E&M SERVICE: CPT | Mod: ,,, | Performed by: SURGERY

## 2023-10-02 NOTE — ASSESSMENT & PLAN NOTE
Wound remains clean and granulating.  Minimal slough present.  No sign of infection.  Continue debridement as needed.  Continue silver dressing patient remains on oral doxycycline.

## 2023-10-02 NOTE — PROGRESS NOTES
[facility]  Wound Care  Progress Note    Problem List Items Addressed This Visit       Non-healing surgical wound    Overview     48-year-old female with history of recurrent abscess to the right buttocks.  Patient had area drained by primary care physician and was treated with antibiotics.  Infection never resolved and she was seen by myself about 2 weeks ago.  Patient was brought to the operating room and underwent further I and D of the right buttock abscess.  Wound was cultured and revealed MRSA.  Patient has been doing Vashe dressing changes and completed a course of oral antibiotics.  Patient says pain is much improved.  She denies fever or chills.         Current Assessment & Plan     Wound remains clean and granulating.  Minimal slough present.  No sign of infection.  Continue debridement as needed.  Continue silver dressing patient remains on oral doxycycline.          Other Visit Diagnoses       Chronic ulcer skin, with fat layer exposed    -  Primary    Diabetes mellitus with skin ulcer                See wound doc progress notes. Documents will be scanned.        Bunny Hernandez  [facility]

## 2023-10-09 ENCOUNTER — OFFICE VISIT (OUTPATIENT)
Dept: WOUND CARE | Facility: HOSPITAL | Age: 48
End: 2023-10-09
Attending: SURGERY
Payer: COMMERCIAL

## 2023-10-09 VITALS
SYSTOLIC BLOOD PRESSURE: 132 MMHG | RESPIRATION RATE: 18 BRPM | TEMPERATURE: 97 F | HEART RATE: 67 BPM | DIASTOLIC BLOOD PRESSURE: 73 MMHG

## 2023-10-09 DIAGNOSIS — T81.89XA NON-HEALING SURGICAL WOUND, INITIAL ENCOUNTER: ICD-10-CM

## 2023-10-09 PROCEDURE — 11042 DBRDMT SUBQ TIS 1ST 20SQCM/<: CPT

## 2023-10-09 PROCEDURE — 99499 NO LOS: ICD-10-PCS | Mod: ,,, | Performed by: SURGERY

## 2023-10-09 PROCEDURE — 99499 UNLISTED E&M SERVICE: CPT | Mod: ,,, | Performed by: SURGERY

## 2023-10-09 NOTE — PROGRESS NOTES
[facility]  Wound Care  Progress Note    Problem List Items Addressed This Visit          Orthopedic    Non-healing surgical wound    Overview     48-year-old female with history of recurrent abscess to the right buttocks.  Patient had area drained by primary care physician and was treated with antibiotics.  Infection never resolved and she was seen by myself about 2 weeks ago.  Patient was brought to the operating room and underwent further I and D of the right buttock abscess.  Wound was cultured and revealed MRSA.  Patient has been doing Vashe dressing changes and completed a course of oral antibiotics.  Patient says pain is much improved.  She denies fever or chills.         Current Assessment & Plan     Wound with increased odor.  There appears to be a large amount of drainage.    Wound bed is relatively clean with limited slough.    Continue debridement to maintain active phase of wound healing and remove nonviable slough.    Begin irrigation with Dakin's.    Daily dressing change until odor has resolved.            See wound doc progress notes. Documents will be scanned.        Alan Wilson  [facility]

## 2023-10-09 NOTE — ASSESSMENT & PLAN NOTE
Wound with increased odor.  There appears to be a large amount of drainage.    Wound bed is relatively clean with limited slough.    Continue debridement to maintain active phase of wound healing and remove nonviable slough.    Begin irrigation with Dakin's.    Daily dressing change until odor has resolved.

## 2023-10-16 ENCOUNTER — OFFICE VISIT (OUTPATIENT)
Dept: WOUND CARE | Facility: HOSPITAL | Age: 48
End: 2023-10-16
Attending: SURGERY
Payer: COMMERCIAL

## 2023-10-16 VITALS
TEMPERATURE: 99 F | HEART RATE: 69 BPM | SYSTOLIC BLOOD PRESSURE: 127 MMHG | DIASTOLIC BLOOD PRESSURE: 69 MMHG | RESPIRATION RATE: 18 BRPM

## 2023-10-16 DIAGNOSIS — T81.89XD NON-HEALING SURGICAL WOUND, SUBSEQUENT ENCOUNTER: ICD-10-CM

## 2023-10-16 PROCEDURE — 99499 UNLISTED E&M SERVICE: CPT | Mod: ,,, | Performed by: SURGERY

## 2023-10-16 PROCEDURE — 11042 DBRDMT SUBQ TIS 1ST 20SQCM/<: CPT

## 2023-10-16 PROCEDURE — 99499 NO LOS: ICD-10-PCS | Mod: ,,, | Performed by: SURGERY

## 2023-10-16 NOTE — ASSESSMENT & PLAN NOTE
Wound is clean and granulating.  There is no odor or significant drainage.  No sign of infection.  Continue debridement as needed.  Continue to wash with Dakin's or Vashe.  Continue silver dressing.

## 2023-10-16 NOTE — PROGRESS NOTES
[facility]  Wound Care  Progress Note    Problem List Items Addressed This Visit       Non-healing surgical wound    Overview     48-year-old female with history of recurrent abscess to the right buttocks.  Patient had area drained by primary care physician and was treated with antibiotics.  Infection never resolved and she was seen by myself about 2 weeks ago.  Patient was brought to the operating room and underwent further I and D of the right buttock abscess.  Wound was cultured and revealed MRSA.  Patient has been doing Vashe dressing changes and completed a course of oral antibiotics.  Patient says pain is much improved.  She denies fever or chills.         Current Assessment & Plan     Wound is clean and granulating.  There is no odor or significant drainage.  No sign of infection.  Continue debridement as needed.  Continue to wash with Dakin's or Vashe.  Continue silver dressing.            See wound doc progress notes. Documents will be scanned.        Bunny Hernandez  [facility]

## 2023-10-23 ENCOUNTER — OFFICE VISIT (OUTPATIENT)
Dept: WOUND CARE | Facility: HOSPITAL | Age: 48
End: 2023-10-23
Attending: SURGERY
Payer: COMMERCIAL

## 2023-10-23 VITALS
RESPIRATION RATE: 18 BRPM | HEART RATE: 72 BPM | TEMPERATURE: 98 F | DIASTOLIC BLOOD PRESSURE: 67 MMHG | SYSTOLIC BLOOD PRESSURE: 124 MMHG

## 2023-10-23 DIAGNOSIS — T81.89XA NON-HEALING SURGICAL WOUND, INITIAL ENCOUNTER: ICD-10-CM

## 2023-10-23 PROCEDURE — 99499 NO LOS: ICD-10-PCS | Mod: ,,, | Performed by: SURGERY

## 2023-10-23 PROCEDURE — 99499 UNLISTED E&M SERVICE: CPT | Mod: ,,, | Performed by: SURGERY

## 2023-10-23 PROCEDURE — 11042 DBRDMT SUBQ TIS 1ST 20SQCM/<: CPT

## 2023-10-23 NOTE — ASSESSMENT & PLAN NOTE
Wound is improving.    Continue debridement to remove nonviable slough and maintain active phase wound healing.    Continue silver alginate.

## 2023-10-23 NOTE — PROGRESS NOTES
[facility]  Wound Care  Progress Note    Problem List Items Addressed This Visit          Orthopedic    Non-healing surgical wound    Overview     48-year-old female with history of recurrent abscess to the right buttocks.  Patient had area drained by primary care physician and was treated with antibiotics.  Infection never resolved and she was seen by myself about 2 weeks ago.  Patient was brought to the operating room and underwent further I and D of the right buttock abscess.  Wound was cultured and revealed MRSA.  Patient has been doing Vashe dressing changes and completed a course of oral antibiotics.  Patient says pain is much improved.  She denies fever or chills.         Current Assessment & Plan     Wound is improving.    Continue debridement to remove nonviable slough and maintain active phase wound healing.    Continue silver alginate.            See wound doc progress notes. Documents will be scanned.        Alan Wilson  [facility]

## 2023-10-30 ENCOUNTER — OFFICE VISIT (OUTPATIENT)
Dept: WOUND CARE | Facility: HOSPITAL | Age: 48
End: 2023-10-30
Attending: SURGERY
Payer: COMMERCIAL

## 2023-10-30 VITALS
SYSTOLIC BLOOD PRESSURE: 127 MMHG | RESPIRATION RATE: 17 BRPM | HEART RATE: 70 BPM | DIASTOLIC BLOOD PRESSURE: 65 MMHG | TEMPERATURE: 97 F

## 2023-10-30 DIAGNOSIS — T81.89XD NON-HEALING SURGICAL WOUND, SUBSEQUENT ENCOUNTER: Primary | ICD-10-CM

## 2023-10-30 DIAGNOSIS — E11.622 DIABETES MELLITUS WITH SKIN ULCER: ICD-10-CM

## 2023-10-30 DIAGNOSIS — L98.492 CHRONIC ULCER SKIN, WITH FAT LAYER EXPOSED: ICD-10-CM

## 2023-10-30 DIAGNOSIS — L98.499 DIABETES MELLITUS WITH SKIN ULCER: ICD-10-CM

## 2023-10-30 PROCEDURE — 11042 DBRDMT SUBQ TIS 1ST 20SQCM/<: CPT

## 2023-10-30 PROCEDURE — 99499 NO LOS: ICD-10-PCS | Mod: ,,, | Performed by: SURGERY

## 2023-10-30 PROCEDURE — 99499 UNLISTED E&M SERVICE: CPT | Mod: ,,, | Performed by: SURGERY

## 2023-10-30 NOTE — PROGRESS NOTES
[facility]  Wound Care  Progress Note    Problem List Items Addressed This Visit       Non-healing surgical wound    Overview     48-year-old female with history of recurrent abscess to the right buttocks.  Patient had area drained by primary care physician and was treated with antibiotics.  Infection never resolved and she was seen by myself about 2 weeks ago.  Patient was brought to the operating room and underwent further I and D of the right buttock abscess.  Wound was cultured and revealed MRSA.  Patient has been doing Vashe dressing changes and completed a course of oral antibiotics.  Patient says pain is much improved.  She denies fever or chills.         Current Assessment & Plan     Wound continues to improve.  Wound remains clean and granulating with minimal slough.  Continue sharp debridement is needed.  Continue silver dressing.  Continue offloading            See wound doc progress notes. Documents will be scanned.        Bunny Hernandez  [facility]

## 2023-10-30 NOTE — ASSESSMENT & PLAN NOTE
Wound continues to improve.  Wound remains clean and granulating with minimal slough.  Continue sharp debridement is needed.  Continue silver dressing.  Continue offloading

## 2023-11-06 ENCOUNTER — OFFICE VISIT (OUTPATIENT)
Dept: WOUND CARE | Facility: HOSPITAL | Age: 48
End: 2023-11-06
Attending: SURGERY
Payer: COMMERCIAL

## 2023-11-06 VITALS
RESPIRATION RATE: 17 BRPM | DIASTOLIC BLOOD PRESSURE: 62 MMHG | HEART RATE: 68 BPM | TEMPERATURE: 97 F | SYSTOLIC BLOOD PRESSURE: 124 MMHG

## 2023-11-06 DIAGNOSIS — T81.89XA NON-HEALING SURGICAL WOUND, INITIAL ENCOUNTER: ICD-10-CM

## 2023-11-06 PROCEDURE — 99499 NO LOS: ICD-10-PCS | Mod: ,,, | Performed by: SURGERY

## 2023-11-06 PROCEDURE — 99499 UNLISTED E&M SERVICE: CPT | Mod: ,,, | Performed by: SURGERY

## 2023-11-06 PROCEDURE — 11042 DBRDMT SUBQ TIS 1ST 20SQCM/<: CPT

## 2023-11-13 ENCOUNTER — OFFICE VISIT (OUTPATIENT)
Dept: WOUND CARE | Facility: HOSPITAL | Age: 48
End: 2023-11-13
Attending: SURGERY
Payer: COMMERCIAL

## 2023-11-13 VITALS
SYSTOLIC BLOOD PRESSURE: 102 MMHG | TEMPERATURE: 98 F | RESPIRATION RATE: 17 BRPM | HEART RATE: 65 BPM | DIASTOLIC BLOOD PRESSURE: 60 MMHG

## 2023-11-13 DIAGNOSIS — E11.622 DIABETES MELLITUS WITH SKIN ULCER: ICD-10-CM

## 2023-11-13 DIAGNOSIS — L98.499 DIABETES MELLITUS WITH SKIN ULCER: ICD-10-CM

## 2023-11-13 DIAGNOSIS — T81.89XD NON-HEALING SURGICAL WOUND, SUBSEQUENT ENCOUNTER: Primary | ICD-10-CM

## 2023-11-13 DIAGNOSIS — L98.492 CHRONIC ULCER OF SKIN, WITH FAT LAYER EXPOSED: ICD-10-CM

## 2023-11-13 PROCEDURE — 11042 DBRDMT SUBQ TIS 1ST 20SQCM/<: CPT

## 2023-11-13 PROCEDURE — 99499 NO LOS: ICD-10-PCS | Mod: ,,, | Performed by: SURGERY

## 2023-11-13 PROCEDURE — 99499 UNLISTED E&M SERVICE: CPT | Mod: ,,, | Performed by: SURGERY

## 2023-11-13 NOTE — ASSESSMENT & PLAN NOTE
Wound slowly improving.  Remains clean and granulating.  No concerning drainage.  No surrounding erythema or sign of infection.  Continued sharp debridement is needed.  Continue silver dressing.

## 2023-11-13 NOTE — PROGRESS NOTES
[facility]  Wound Care  Progress Note    Problem List Items Addressed This Visit       Non-healing surgical wound - Primary    Overview     48-year-old female with history of recurrent abscess to the right buttocks.  Patient had area drained by primary care physician and was treated with antibiotics.  Infection never resolved and she was seen by myself about 2 weeks ago.  Patient was brought to the operating room and underwent further I and D of the right buttock abscess.  Wound was cultured and revealed MRSA.  Patient has been doing Vashe dressing changes and completed a course of oral antibiotics.  Patient says pain is much improved.  She denies fever or chills.         Current Assessment & Plan     Wound slowly improving.  Remains clean and granulating.  No concerning drainage.  No surrounding erythema or sign of infection.  Continued sharp debridement is needed.  Continue silver dressing.            See wound doc progress notes. Documents will be scanned.        Bunny Hernandez  [facility]

## 2023-11-20 ENCOUNTER — LAB VISIT (OUTPATIENT)
Dept: LAB | Facility: HOSPITAL | Age: 48
End: 2023-11-20
Attending: NURSE PRACTITIONER
Payer: COMMERCIAL

## 2023-11-20 ENCOUNTER — OFFICE VISIT (OUTPATIENT)
Dept: WOUND CARE | Facility: HOSPITAL | Age: 48
End: 2023-11-20
Attending: SURGERY
Payer: COMMERCIAL

## 2023-11-20 VITALS
HEART RATE: 68 BPM | SYSTOLIC BLOOD PRESSURE: 109 MMHG | TEMPERATURE: 99 F | DIASTOLIC BLOOD PRESSURE: 67 MMHG | RESPIRATION RATE: 18 BRPM

## 2023-11-20 DIAGNOSIS — C83.30 LARGE CELL (DIFFUSE) NON-HODGKIN'S LYMPHOMA: ICD-10-CM

## 2023-11-20 DIAGNOSIS — D75.89 BICYTOPENIA: ICD-10-CM

## 2023-11-20 DIAGNOSIS — T81.89XA NON-HEALING SURGICAL WOUND, INITIAL ENCOUNTER: Primary | ICD-10-CM

## 2023-11-20 DIAGNOSIS — D69.6 THROMBOCYTOPENIA DUE TO SEQUESTRATION: ICD-10-CM

## 2023-11-20 LAB
ALBUMIN SERPL BCP-MCNC: 4.1 G/DL (ref 3.5–5.2)
ALP SERPL-CCNC: 90 U/L (ref 55–135)
ALT SERPL W/O P-5'-P-CCNC: 18 U/L (ref 10–44)
ANION GAP SERPL CALC-SCNC: 12 MMOL/L (ref 8–16)
AST SERPL-CCNC: 10 U/L (ref 10–40)
BASOPHILS # BLD AUTO: 0.02 K/UL (ref 0–0.2)
BASOPHILS NFR BLD: 0.3 % (ref 0–1.9)
BILIRUB SERPL-MCNC: 0.8 MG/DL (ref 0.1–1)
BUN SERPL-MCNC: 26 MG/DL (ref 6–20)
CALCIUM SERPL-MCNC: 10.6 MG/DL (ref 8.7–10.5)
CHLORIDE SERPL-SCNC: 105 MMOL/L (ref 95–110)
CO2 SERPL-SCNC: 23 MMOL/L (ref 23–29)
CREAT SERPL-MCNC: 0.8 MG/DL (ref 0.5–1.4)
DIFFERENTIAL METHOD: ABNORMAL
EOSINOPHIL # BLD AUTO: 0.2 K/UL (ref 0–0.5)
EOSINOPHIL NFR BLD: 2.1 % (ref 0–8)
ERYTHROCYTE [DISTWIDTH] IN BLOOD BY AUTOMATED COUNT: 16.3 % (ref 11.5–14.5)
EST. GFR  (NO RACE VARIABLE): >60 ML/MIN/1.73 M^2
FERRITIN SERPL-MCNC: 286 NG/ML (ref 20–300)
GLUCOSE SERPL-MCNC: 111 MG/DL (ref 70–110)
HCT VFR BLD AUTO: 35.8 % (ref 37–48.5)
HGB BLD-MCNC: 11.4 G/DL (ref 12–16)
IMM GRANULOCYTES # BLD AUTO: 0.03 K/UL (ref 0–0.04)
IMM GRANULOCYTES NFR BLD AUTO: 0.4 % (ref 0–0.5)
IRON SERPL-MCNC: 34 UG/DL (ref 30–160)
LYMPHOCYTES # BLD AUTO: 3.2 K/UL (ref 1–4.8)
LYMPHOCYTES NFR BLD: 43.7 % (ref 18–48)
MAGNESIUM SERPL-MCNC: 2.1 MG/DL (ref 1.6–2.6)
MCH RBC QN AUTO: 26.9 PG (ref 27–31)
MCHC RBC AUTO-ENTMCNC: 31.8 G/DL (ref 32–36)
MCV RBC AUTO: 84 FL (ref 82–98)
MONOCYTES # BLD AUTO: 0.7 K/UL (ref 0.3–1)
MONOCYTES NFR BLD: 9 % (ref 4–15)
NEUTROPHILS # BLD AUTO: 3.2 K/UL (ref 1.8–7.7)
NEUTROPHILS NFR BLD: 44.5 % (ref 38–73)
NRBC BLD-RTO: 0 /100 WBC
PHOSPHATE SERPL-MCNC: 4.4 MG/DL (ref 2.7–4.5)
PLATELET # BLD AUTO: 85 K/UL (ref 150–450)
PMV BLD AUTO: 10.3 FL (ref 9.2–12.9)
POTASSIUM SERPL-SCNC: 4.3 MMOL/L (ref 3.5–5.1)
PROT SERPL-MCNC: 6.3 G/DL (ref 6–8.4)
RBC # BLD AUTO: 4.24 M/UL (ref 4–5.4)
SATURATED IRON: 12 % (ref 20–50)
SODIUM SERPL-SCNC: 140 MMOL/L (ref 136–145)
TOTAL IRON BINDING CAPACITY: 286 UG/DL (ref 250–450)
TRANSFERRIN SERPL-MCNC: 193 MG/DL (ref 200–375)
WBC # BLD AUTO: 7.23 K/UL (ref 3.9–12.7)

## 2023-11-20 PROCEDURE — 99499 UNLISTED E&M SERVICE: CPT | Mod: ,,, | Performed by: SURGERY

## 2023-11-20 PROCEDURE — 84466 ASSAY OF TRANSFERRIN: CPT | Performed by: NURSE PRACTITIONER

## 2023-11-20 PROCEDURE — 85025 COMPLETE CBC W/AUTO DIFF WBC: CPT | Performed by: NURSE PRACTITIONER

## 2023-11-20 PROCEDURE — 36415 COLL VENOUS BLD VENIPUNCTURE: CPT | Performed by: NURSE PRACTITIONER

## 2023-11-20 PROCEDURE — 11042 DBRDMT SUBQ TIS 1ST 20SQCM/<: CPT

## 2023-11-20 PROCEDURE — 84100 ASSAY OF PHOSPHORUS: CPT | Performed by: NURSE PRACTITIONER

## 2023-11-20 PROCEDURE — 83540 ASSAY OF IRON: CPT | Performed by: NURSE PRACTITIONER

## 2023-11-20 PROCEDURE — 83735 ASSAY OF MAGNESIUM: CPT | Performed by: NURSE PRACTITIONER

## 2023-11-20 PROCEDURE — 80053 COMPREHEN METABOLIC PANEL: CPT | Performed by: NURSE PRACTITIONER

## 2023-11-20 PROCEDURE — 82728 ASSAY OF FERRITIN: CPT | Performed by: NURSE PRACTITIONER

## 2023-11-20 PROCEDURE — 99499 NO LOS: ICD-10-PCS | Mod: ,,, | Performed by: SURGERY

## 2023-11-20 NOTE — PROGRESS NOTES
[facility]  Wound Care  Progress Note    Problem List Items Addressed This Visit          Orthopedic    Non-healing surgical wound - Primary    Overview     48-year-old female with history of recurrent abscess to the right buttocks.  Patient had area drained by primary care physician and was treated with antibiotics.  Infection never resolved and she was seen by myself about 2 weeks ago.  Patient was brought to the operating room and underwent further I and D of the right buttock abscess.  Wound was cultured and revealed MRSA.  Patient has been doing Vashe dressing changes and completed a course of oral antibiotics.  Patient says pain is much improved.  She denies fever or chills.         Current Assessment & Plan     Wound is slowly improving.    Continue debridement to maintain active phase wound healing.    Continue Aquacel silver            See wound doc progress notes. Documents will be scanned.        Alan Wilson  [facility]

## 2023-11-20 NOTE — ASSESSMENT & PLAN NOTE
Wound is slowly improving.    Continue debridement to maintain active phase wound healing.    Continue Aquacel silver   no concerns

## 2023-12-04 ENCOUNTER — OFFICE VISIT (OUTPATIENT)
Dept: WOUND CARE | Facility: HOSPITAL | Age: 48
End: 2023-12-04
Attending: SURGERY
Payer: COMMERCIAL

## 2023-12-04 VITALS
SYSTOLIC BLOOD PRESSURE: 115 MMHG | HEART RATE: 80 BPM | DIASTOLIC BLOOD PRESSURE: 73 MMHG | TEMPERATURE: 98 F | RESPIRATION RATE: 18 BRPM

## 2023-12-04 DIAGNOSIS — L98.492 CHRONIC ULCER OF SKIN, WITH FAT LAYER EXPOSED: ICD-10-CM

## 2023-12-04 DIAGNOSIS — E11.622 DIABETES MELLITUS WITH SKIN ULCER: ICD-10-CM

## 2023-12-04 DIAGNOSIS — L98.499 DIABETES MELLITUS WITH SKIN ULCER: ICD-10-CM

## 2023-12-04 DIAGNOSIS — T81.89XA NON-HEALING SURGICAL WOUND, INITIAL ENCOUNTER: Primary | ICD-10-CM

## 2023-12-04 PROCEDURE — 99499 NO LOS: ICD-10-PCS | Mod: ,,, | Performed by: SURGERY

## 2023-12-04 PROCEDURE — 11042 DBRDMT SUBQ TIS 1ST 20SQCM/<: CPT

## 2023-12-04 PROCEDURE — 99499 UNLISTED E&M SERVICE: CPT | Mod: ,,, | Performed by: SURGERY

## 2023-12-04 NOTE — ASSESSMENT & PLAN NOTE
Wound appears to be rapidly improving at this time.    Continue debridement to maintain active phase wound healing.

## 2023-12-04 NOTE — PROGRESS NOTES
[facility]  Wound Care  Progress Note    Problem List Items Addressed This Visit          Orthopedic    Non-healing surgical wound - Primary    Overview     48-year-old female with history of recurrent abscess to the right buttocks.  Patient had area drained by primary care physician and was treated with antibiotics.  Infection never resolved and she was seen by myself about 2 weeks ago.  Patient was brought to the operating room and underwent further I and D of the right buttock abscess.  Wound was cultured and revealed MRSA.  Patient has been doing Vashe dressing changes and completed a course of oral antibiotics.  Patient says pain is much improved.  She denies fever or chills.         Current Assessment & Plan     Wound appears to be rapidly improving at this time.    Continue debridement to maintain active phase wound healing.          Other Visit Diagnoses       Chronic ulcer of skin, with fat layer exposed        Diabetes mellitus with skin ulcer                See wound doc progress notes. Documents will be scanned.        Alan Wilson  [facility]

## 2023-12-11 ENCOUNTER — OFFICE VISIT (OUTPATIENT)
Dept: WOUND CARE | Facility: HOSPITAL | Age: 48
End: 2023-12-11
Attending: SURGERY
Payer: COMMERCIAL

## 2023-12-11 VITALS
HEART RATE: 70 BPM | DIASTOLIC BLOOD PRESSURE: 75 MMHG | RESPIRATION RATE: 18 BRPM | TEMPERATURE: 97 F | SYSTOLIC BLOOD PRESSURE: 122 MMHG

## 2023-12-11 DIAGNOSIS — T81.89XD NON-HEALING SURGICAL WOUND, SUBSEQUENT ENCOUNTER: Primary | ICD-10-CM

## 2023-12-11 DIAGNOSIS — L98.492 CHRONIC ULCER OF SKIN, WITH FAT LAYER EXPOSED: ICD-10-CM

## 2023-12-11 PROCEDURE — 99499 NO LOS: ICD-10-PCS | Mod: ,,, | Performed by: SURGERY

## 2023-12-11 PROCEDURE — 11042 DBRDMT SUBQ TIS 1ST 20SQCM/<: CPT

## 2023-12-11 PROCEDURE — 99499 UNLISTED E&M SERVICE: CPT | Mod: ,,, | Performed by: SURGERY

## 2023-12-11 NOTE — ASSESSMENT & PLAN NOTE
Wound continues to improve.  Wound decreased in size.  Wound clean and granulating.  No sign of infection.  Continue sharp debridement is needed.  Continue silver.

## 2023-12-11 NOTE — PROGRESS NOTES
[facility]  Wound Care  Progress Note    Problem List Items Addressed This Visit       Non-healing surgical wound - Primary    Overview     48-year-old female with history of recurrent abscess to the right buttocks.  Patient had area drained by primary care physician and was treated with antibiotics.  Infection never resolved and she was seen by myself about 2 weeks ago.  Patient was brought to the operating room and underwent further I and D of the right buttock abscess.  Wound was cultured and revealed MRSA.  Patient has been doing Vashe dressing changes and completed a course of oral antibiotics.  Patient says pain is much improved.  She denies fever or chills.         Current Assessment & Plan     Wound continues to improve.  Wound decreased in size.  Wound clean and granulating.  No sign of infection.  Continue sharp debridement is needed.  Continue silver.            See wound doc progress notes. Documents will be scanned.        Bunny Hernandez  [facility]

## 2023-12-15 ENCOUNTER — HOSPITAL ENCOUNTER (EMERGENCY)
Facility: HOSPITAL | Age: 48
Discharge: HOME | End: 2023-12-15
Attending: SURGERY
Payer: COMMERCIAL

## 2023-12-15 VITALS
TEMPERATURE: 98 F | OXYGEN SATURATION: 96 % | WEIGHT: 252 LBS | BODY MASS INDEX: 39.47 KG/M2 | DIASTOLIC BLOOD PRESSURE: 66 MMHG | HEART RATE: 90 BPM | SYSTOLIC BLOOD PRESSURE: 118 MMHG | RESPIRATION RATE: 18 BRPM

## 2023-12-15 DIAGNOSIS — K08.89 TOOTHACHE: Primary | ICD-10-CM

## 2023-12-15 PROCEDURE — 99284 EMERGENCY DEPT VISIT MOD MDM: CPT

## 2023-12-15 PROCEDURE — 96372 THER/PROPH/DIAG INJ SC/IM: CPT | Performed by: SURGERY

## 2023-12-15 PROCEDURE — 63600175 PHARM REV CODE 636 W HCPCS: Performed by: SURGERY

## 2023-12-15 RX ORDER — MORPHINE SULFATE 2 MG/ML
2 INJECTION, SOLUTION INTRAMUSCULAR; INTRAVENOUS
Status: COMPLETED | OUTPATIENT
Start: 2023-12-15 | End: 2023-12-15

## 2023-12-15 RX ORDER — AMOXICILLIN 875 MG/1
875 TABLET, FILM COATED ORAL 2 TIMES DAILY
Qty: 14 TABLET | Refills: 0 | Status: SHIPPED | OUTPATIENT
Start: 2023-12-15 | End: 2023-12-22

## 2023-12-15 RX ORDER — CEFTRIAXONE 1 G/1
1 INJECTION, POWDER, FOR SOLUTION INTRAMUSCULAR; INTRAVENOUS
Status: COMPLETED | OUTPATIENT
Start: 2023-12-15 | End: 2023-12-15

## 2023-12-15 RX ORDER — ONDANSETRON 2 MG/ML
4 INJECTION INTRAMUSCULAR; INTRAVENOUS
Status: COMPLETED | OUTPATIENT
Start: 2023-12-15 | End: 2023-12-15

## 2023-12-15 RX ORDER — HYDROCODONE BITARTRATE AND ACETAMINOPHEN 5; 325 MG/1; MG/1
1 TABLET ORAL EVERY 4 HOURS PRN
Qty: 15 TABLET | Refills: 0 | Status: SHIPPED | OUTPATIENT
Start: 2023-12-15 | End: 2023-12-17

## 2023-12-15 RX ADMIN — MORPHINE SULFATE 2 MG: 2 INJECTION, SOLUTION INTRAMUSCULAR; INTRAVENOUS at 10:12

## 2023-12-15 RX ADMIN — CEFTRIAXONE SODIUM 1 G: 1 INJECTION, POWDER, FOR SOLUTION INTRAMUSCULAR; INTRAVENOUS at 10:12

## 2023-12-15 RX ADMIN — ONDANSETRON 4 MG: 2 INJECTION, SOLUTION INTRAMUSCULAR; INTRAVENOUS at 10:12

## 2023-12-15 NOTE — ED PROVIDER NOTES
Encounter Date: 12/15/2023       History     Chief Complaint   Patient presents with    Dental Pain     Patient to ER CC of left dental pain, reports she has a dental appt in January      Hedy Conway is a 48 y.o. female that presents with left facial swelling  Patient has poor dentition with a left upper molar abscess on clinical exam  Patient states that she needs to see an oral maxillary facial surgeon for this  Her insurance was not correct & her appointment with [facility] was canceled  She is now scheduled to see them in January with left dental pain this a.m.  Slight facial swelling with 5/10 dental pain with any chewing meals on eval    The history is provided by the patient and the spouse.     Review of patient's allergies indicates:   Allergen Reactions    Tetanus vaccines and toxoid Rash     Past Medical History:   Diagnosis Date    Acute carpal tunnel syndrome of left wrist     Biliary colic     Diabetes mellitus     Encounter for blood transfusion     History of chemotherapy     Incisional hernia     Large cell (diffuse) non-Hodgkin's lymphoma     Stem cells transplant status      Past Surgical History:   Procedure Laterality Date    ARTHROSCOPY OF KNEE Bilateral 03/30/2023    Procedure: ARTHROSCOPY, KNEE;  Surgeon: Rudolph Murrell MD;  Location: [facility];  Service: Orthopedics;  Laterality: Bilateral;    BREAST BIOPSY Left     lymph node biopsy, benign    BREAST SURGERY      CHOLECYSTECTOMY      COLD KNIFE CONIZATION OF CERVIX N/A 03/08/2021    Procedure: CONE BIOPSY, CERVIX, USING COLD KNIFE;  Surgeon: Evelyn Wheeler MD;  Location: [facility];  Service: OB/GYN;  Laterality: N/A;    COLONOSCOPY N/A 12/21/2022    Procedure: COLONOSCOPY;  Surgeon: Annamarie Jane MD;  Location: [facility];  Service: Endoscopy;  Laterality: N/A;    HAND ARTHROTOMY Right 03/30/2023    Procedure: OPEN  ARTHROTOMY WRIST;  Surgeon: Rudolph Murrell MD;  Location: [facility];  Service: Orthopedics;  Laterality: Right;     HERNIA REPAIR      incisional    LUNG BIOPSY      lymphnode biopsy      MEDIPORT REMOVAL Left 01/30/2020    Procedure: REMOVAL, CATHETER, CENTRAL VENOUS, TUNNELED, WITH PORT;  Surgeon: Luis Bogran-Reyes, MD;  Location: [facility] OR;  Service: General;  Laterality: Left;    PORTACATH PLACEMENT Left     REVISION OF SCAR  06/22/2021    Procedure: REVISION, SCAR;  Surgeon: Otis Grimes MD;  Location: [facility] OR 2ND FLR;  Service: General;;    SKIN BIOPSY      TUBAL LIGATION      UMBILICAL HERNIA REPAIR       Family History   Problem Relation Age of Onset    Diabetes Mother     Hypertension Mother     Kidney disease Mother     Heart block Mother     Diabetes Father     Hypertension Father     Lung cancer Father     Heart failure Father     Breast cancer Maternal Aunt     Breast cancer Maternal Aunt         leukemia    Colon cancer Paternal Grandmother     Cancer Paternal Grandmother     Anesthesia problems Neg Hx      Social History     Tobacco Use    Smoking status: Never    Smokeless tobacco: Never   Substance Use Topics    Alcohol use: Not Currently    Drug use: No     Review of Systems   Constitutional: Negative.    HENT:  Positive for dental problem.    Eyes: Negative.    Respiratory: Negative.     Cardiovascular: Negative.    Gastrointestinal: Negative.    Genitourinary: Negative.    Musculoskeletal: Negative.    Skin: Negative.    Neurological: Negative.    Psychiatric/Behavioral: Negative.         Physical Exam     Initial Vitals [12/15/23 1030]   BP Pulse Resp Temp SpO2   118/66 90 18 98 °F (36.7 °C) 96 %      MAP       --         Physical Exam    Nursing note and vitals reviewed.  Constitutional: Vital signs are normal. She appears well-developed and well-nourished. She is cooperative.   HENT:   Head: Normocephalic and atraumatic.   (+) left upper molar abscess with adjacent facial swelling   Eyes: Conjunctivae, EOM and lids are normal. Pupils are equal, round, and reactive to light.   Neck: Trachea normal and  phonation normal. Neck supple. No JVD present.   Normal range of motion.   Full passive range of motion without pain.     Cardiovascular:  Normal rate, regular rhythm, S1 normal, S2 normal, normal heart sounds, intact distal pulses and normal pulses.           Pulmonary/Chest: Effort normal and breath sounds normal.   Abdominal: Abdomen is soft and flat. Bowel sounds are normal.   Musculoskeletal:         General: Normal range of motion.      Cervical back: Full passive range of motion without pain, normal range of motion and neck supple.     Neurological: She is alert and oriented to person, place, and time. She has normal strength.   Skin: Skin is warm, dry and intact. Capillary refill takes less than 2 seconds.         ED Course   Procedures  Labs Reviewed - No data to display       Imaging Results    None          Medications   morphine injection 2 mg (2 mg Intramuscular Given 12/15/23 1045)   ondansetron injection 4 mg (4 mg Intramuscular Given 12/15/23 1045)   cefTRIAXone injection 1 g (1 g Intramuscular Given 12/15/23 1045)     Medical Decision Making  48-year-old female presents with left facial swelling on ER evaluation  Dental abscess & she needs to see oral maxillary facial surgeon in clinic  Appointment was canceled & she is requesting antibiotics & pain control  Differential includes dental abscess, cellulitis, gingivitis, dental caries    Problems Addressed:  Toothache: complicated acute illness or injury    ED Management & Risks of Complication, Morbidity, & Mortality:  IM antibiotic injected with morphine & Zofran in the ER for pain  Amoxicillin prescribed with Norco pain control on discharge  Patient will go see local dentist in the next 48 hours for evaluation  Return to ER with any concerning symptoms after discharge    Clinical Impression:  Final diagnoses:  [K08.89] Toothache (Primary)          ED Disposition Condition    Discharge Stable          ED Prescriptions       Medication Sig Dispense  Start Date End Date Auth. Provider    amoxicillin (AMOXIL) 875 MG tablet Take 1 tablet (875 mg total) by mouth 2 (two) times daily. for 7 days 14 tablet 12/15/2023 12/22/2023 Jonathan Cole MD    HYDROcodone-acetaminophen (NORCO) 5-325 mg per tablet Take 1 tablet by mouth every 4 (four) hours as needed for Pain. 15 tablet 12/15/2023 12/17/2023 Jonathan Cole MD          Follow-up Information       Follow up With Specialties Details Why Contact Info    John Lantigua PA-C Family Medicine Schedule an appointment as soon as possible for a visit in 2 days  [address]  [place]  [place] [address]  [phone]      Dentistry, [facility] Schedule an appointment as soon as possible for a visit in 2 days  [address] [facility] [place] [address]  [phone]               Jonathan Cole MD  12/15/23 5525

## 2023-12-28 ENCOUNTER — OFFICE VISIT (OUTPATIENT)
Dept: WOUND CARE | Facility: HOSPITAL | Age: 48
End: 2023-12-28
Attending: SURGERY
Payer: COMMERCIAL

## 2023-12-28 VITALS
SYSTOLIC BLOOD PRESSURE: 120 MMHG | HEART RATE: 78 BPM | TEMPERATURE: 98 F | RESPIRATION RATE: 18 BRPM | DIASTOLIC BLOOD PRESSURE: 72 MMHG

## 2023-12-28 DIAGNOSIS — T81.89XD NON-HEALING SURGICAL WOUND, SUBSEQUENT ENCOUNTER: Primary | ICD-10-CM

## 2023-12-28 DIAGNOSIS — L98.492 SKIN ULCER OF PERIRECTAL REGION, WITH FAT LAYER EXPOSED: ICD-10-CM

## 2023-12-28 PROCEDURE — 99499 NO LOS: ICD-10-PCS | Mod: ,,, | Performed by: SURGERY

## 2023-12-28 PROCEDURE — 99213 OFFICE O/P EST LOW 20 MIN: CPT

## 2023-12-28 PROCEDURE — 99499 UNLISTED E&M SERVICE: CPT | Mod: ,,, | Performed by: SURGERY

## 2023-12-28 NOTE — PROGRESS NOTES
[facility]  Wound Care  Progress Note    Problem List Items Addressed This Visit       Non-healing surgical wound - Primary    Overview     48-year-old female with history of recurrent abscess to the right buttocks.  Patient had area drained by primary care physician and was treated with antibiotics.  Infection never resolved and she was seen by myself about 2 weeks ago.  Patient was brought to the operating room and underwent further I and D of the right buttock abscess.  Wound was cultured and revealed MRSA.  Patient has been doing Vashe dressing changes and completed a course of oral antibiotics.  Patient says pain is much improved.  She denies fever or chills.         Current Assessment & Plan     The wound is almost resolved very small opening status post I and D of an abscess.  We will silver nitrate placed in the wound today no debridement performed.  Patient can be discharged follow up if any complications            See wound doc progress notes. Documents will be scanned.        Trace Carl  [facility]

## 2023-12-28 NOTE — ASSESSMENT & PLAN NOTE
The wound is almost resolved very small opening status post I and D of an abscess.  We will silver nitrate placed in the wound today no debridement performed.  Patient can be discharged follow up if any complications

## 2024-05-21 ENCOUNTER — HOSPITAL ENCOUNTER (INPATIENT)
Facility: HOSPITAL | Age: 49
LOS: 1 days | Discharge: HOME | DRG: 871 | End: 2024-05-23
Attending: EMERGENCY MEDICINE | Admitting: INTERNAL MEDICINE

## 2024-05-21 DIAGNOSIS — R50.9 FEVER: ICD-10-CM

## 2024-05-21 DIAGNOSIS — J18.9 SEPSIS DUE TO PNEUMONIA: ICD-10-CM

## 2024-05-21 DIAGNOSIS — J18.9 PNEUMONIA DUE TO INFECTIOUS ORGANISM, UNSPECIFIED LATERALITY, UNSPECIFIED PART OF LUNG: ICD-10-CM

## 2024-05-21 DIAGNOSIS — A41.9 SEPSIS DUE TO PNEUMONIA: ICD-10-CM

## 2024-05-21 DIAGNOSIS — R05.9 COUGH: ICD-10-CM

## 2024-05-21 DIAGNOSIS — J18.9 PNEUMONIA OF BOTH LUNGS DUE TO INFECTIOUS ORGANISM, UNSPECIFIED PART OF LUNG: Primary | ICD-10-CM

## 2024-05-21 LAB
ALBUMIN SERPL BCP-MCNC: 3.4 G/DL (ref 3.5–5.2)
ALP SERPL-CCNC: 160 U/L (ref 55–135)
ALT SERPL W/O P-5'-P-CCNC: 56 U/L (ref 10–44)
ANION GAP SERPL CALC-SCNC: 13 MMOL/L (ref 8–16)
AST SERPL-CCNC: 17 U/L (ref 10–40)
BACTERIA #/AREA URNS HPF: NORMAL /HPF
BASOPHILS # BLD AUTO: 0.05 K/UL (ref 0–0.2)
BASOPHILS NFR BLD: 0.3 % (ref 0–1.9)
BILIRUB SERPL-MCNC: 1.3 MG/DL (ref 0.1–1)
BILIRUB UR QL STRIP: NEGATIVE
BNP SERPL-MCNC: 46 PG/ML (ref 0–99)
BUN SERPL-MCNC: 21 MG/DL (ref 6–20)
CALCIUM SERPL-MCNC: 9.2 MG/DL (ref 8.7–10.5)
CHLORIDE SERPL-SCNC: 104 MMOL/L (ref 95–110)
CLARITY UR: ABNORMAL
CO2 SERPL-SCNC: 19 MMOL/L (ref 23–29)
COLOR UR: YELLOW
CREAT SERPL-MCNC: 1 MG/DL (ref 0.5–1.4)
DIFFERENTIAL METHOD BLD: ABNORMAL
EOSINOPHIL # BLD AUTO: 0 K/UL (ref 0–0.5)
EOSINOPHIL NFR BLD: 0.1 % (ref 0–8)
ERYTHROCYTE [DISTWIDTH] IN BLOOD BY AUTOMATED COUNT: 16.4 % (ref 11.5–14.5)
EST. GFR  (NO RACE VARIABLE): >60 ML/MIN/1.73 M^2
GLUCOSE SERPL-MCNC: 162 MG/DL (ref 70–110)
GLUCOSE UR QL STRIP: NEGATIVE
HCT VFR BLD AUTO: 33.8 % (ref 37–48.5)
HGB BLD-MCNC: 10.9 G/DL (ref 12–16)
HGB UR QL STRIP: NEGATIVE
HYALINE CASTS #/AREA URNS LPF: 1 /LPF
IMM GRANULOCYTES # BLD AUTO: 0.09 K/UL (ref 0–0.04)
IMM GRANULOCYTES NFR BLD AUTO: 0.6 % (ref 0–0.5)
INFLUENZA A, MOLECULAR: NEGATIVE
INFLUENZA B, MOLECULAR: NEGATIVE
KETONES UR QL STRIP: NEGATIVE
LACTATE SERPL-SCNC: 1.2 MMOL/L (ref 0.5–2.2)
LACTATE SERPL-SCNC: 2.3 MMOL/L (ref 0.5–2.2)
LEUKOCYTE ESTERASE UR QL STRIP: ABNORMAL
LYMPHOCYTES # BLD AUTO: 2.7 K/UL (ref 1–4.8)
LYMPHOCYTES NFR BLD: 17.9 % (ref 18–48)
MCH RBC QN AUTO: 26.8 PG (ref 27–31)
MCHC RBC AUTO-ENTMCNC: 32.2 G/DL (ref 32–36)
MCV RBC AUTO: 83 FL (ref 82–98)
MICROSCOPIC COMMENT: NORMAL
MONOCYTES # BLD AUTO: 1.3 K/UL (ref 0.3–1)
MONOCYTES NFR BLD: 8.4 % (ref 4–15)
NEUTROPHILS # BLD AUTO: 10.8 K/UL (ref 1.8–7.7)
NEUTROPHILS NFR BLD: 72.7 % (ref 38–73)
NITRITE UR QL STRIP: NEGATIVE
NRBC BLD-RTO: 0 /100 WBC
OHS QRS DURATION: 88 MS
OHS QTC CALCULATION: 497 MS
PH UR STRIP: 6 [PH] (ref 5–8)
PLATELET # BLD AUTO: 94 K/UL (ref 150–450)
PMV BLD AUTO: 10.6 FL (ref 9.2–12.9)
POCT GLUCOSE: 128 MG/DL (ref 70–110)
POCT GLUCOSE: 157 MG/DL (ref 70–110)
POTASSIUM SERPL-SCNC: 3.9 MMOL/L (ref 3.5–5.1)
PROT SERPL-MCNC: 6 G/DL (ref 6–8.4)
PROT UR QL STRIP: NEGATIVE
RBC # BLD AUTO: 4.06 M/UL (ref 4–5.4)
RBC #/AREA URNS HPF: 0 /HPF (ref 0–4)
SARS-COV-2 RDRP RESP QL NAA+PROBE: NEGATIVE
SODIUM SERPL-SCNC: 136 MMOL/L (ref 136–145)
SP GR UR STRIP: <=1.005 (ref 1–1.03)
SPECIMEN SOURCE: NORMAL
SQUAMOUS #/AREA URNS HPF: 8 /HPF
TROPONIN I SERPL DL<=0.01 NG/ML-MCNC: <0.006 NG/ML (ref 0–0.03)
URN SPEC COLLECT METH UR: ABNORMAL
UROBILINOGEN UR STRIP-ACNC: NEGATIVE EU/DL
WBC # BLD AUTO: 14.88 K/UL (ref 3.9–12.7)
WBC #/AREA URNS HPF: 3 /HPF (ref 0–5)

## 2024-05-21 PROCEDURE — 96366 THER/PROPH/DIAG IV INF ADDON: CPT

## 2024-05-21 PROCEDURE — 93005 ELECTROCARDIOGRAM TRACING: CPT

## 2024-05-21 PROCEDURE — 83605 ASSAY OF LACTIC ACID: CPT | Mod: 91 | Performed by: NURSE PRACTITIONER

## 2024-05-21 PROCEDURE — G0378 HOSPITAL OBSERVATION PER HR: HCPCS

## 2024-05-21 PROCEDURE — 93010 ELECTROCARDIOGRAM REPORT: CPT | Mod: ,,, | Performed by: INTERNAL MEDICINE

## 2024-05-21 PROCEDURE — 87205 SMEAR GRAM STAIN: CPT | Performed by: INTERNAL MEDICINE

## 2024-05-21 PROCEDURE — U0002 COVID-19 LAB TEST NON-CDC: HCPCS | Performed by: NURSE PRACTITIONER

## 2024-05-21 PROCEDURE — 83880 ASSAY OF NATRIURETIC PEPTIDE: CPT | Performed by: NURSE PRACTITIONER

## 2024-05-21 PROCEDURE — 87070 CULTURE OTHR SPECIMN AEROBIC: CPT | Performed by: INTERNAL MEDICINE

## 2024-05-21 PROCEDURE — 99900031 HC PATIENT EDUCATION (STAT)

## 2024-05-21 PROCEDURE — 83036 HEMOGLOBIN GLYCOSYLATED A1C: CPT | Performed by: INTERNAL MEDICINE

## 2024-05-21 PROCEDURE — 63600175 PHARM REV CODE 636 W HCPCS: Performed by: INTERNAL MEDICINE

## 2024-05-21 PROCEDURE — 63600175 PHARM REV CODE 636 W HCPCS: Performed by: NURSE PRACTITIONER

## 2024-05-21 PROCEDURE — 84484 ASSAY OF TROPONIN QUANT: CPT | Performed by: NURSE PRACTITIONER

## 2024-05-21 PROCEDURE — 99900035 HC TECH TIME PER 15 MIN (STAT)

## 2024-05-21 PROCEDURE — 99285 EMERGENCY DEPT VISIT HI MDM: CPT | Mod: 25

## 2024-05-21 PROCEDURE — 96361 HYDRATE IV INFUSION ADD-ON: CPT

## 2024-05-21 PROCEDURE — 94761 N-INVAS EAR/PLS OXIMETRY MLT: CPT

## 2024-05-21 PROCEDURE — 36415 COLL VENOUS BLD VENIPUNCTURE: CPT | Performed by: NURSE PRACTITIONER

## 2024-05-21 PROCEDURE — 36415 COLL VENOUS BLD VENIPUNCTURE: CPT | Performed by: INTERNAL MEDICINE

## 2024-05-21 PROCEDURE — 96365 THER/PROPH/DIAG IV INF INIT: CPT

## 2024-05-21 PROCEDURE — 25000003 PHARM REV CODE 250: Performed by: INTERNAL MEDICINE

## 2024-05-21 PROCEDURE — 99223 1ST HOSP IP/OBS HIGH 75: CPT | Mod: ,,, | Performed by: INTERNAL MEDICINE

## 2024-05-21 PROCEDURE — 25000003 PHARM REV CODE 250: Performed by: NURSE PRACTITIONER

## 2024-05-21 PROCEDURE — 81000 URINALYSIS NONAUTO W/SCOPE: CPT | Performed by: NURSE PRACTITIONER

## 2024-05-21 PROCEDURE — 80053 COMPREHEN METABOLIC PANEL: CPT | Performed by: NURSE PRACTITIONER

## 2024-05-21 PROCEDURE — 85025 COMPLETE CBC W/AUTO DIFF WBC: CPT | Performed by: NURSE PRACTITIONER

## 2024-05-21 PROCEDURE — 87040 BLOOD CULTURE FOR BACTERIA: CPT | Mod: 59 | Performed by: NURSE PRACTITIONER

## 2024-05-21 PROCEDURE — 96367 TX/PROPH/DG ADDL SEQ IV INF: CPT

## 2024-05-21 PROCEDURE — 87502 INFLUENZA DNA AMP PROBE: CPT | Performed by: NURSE PRACTITIONER

## 2024-05-21 RX ORDER — LEVOFLOXACIN 5 MG/ML
750 INJECTION, SOLUTION INTRAVENOUS
Status: DISCONTINUED | OUTPATIENT
Start: 2024-05-22 | End: 2024-05-23 | Stop reason: HOSPADM

## 2024-05-21 RX ORDER — PANTOPRAZOLE SODIUM 40 MG/1
40 TABLET, DELAYED RELEASE ORAL DAILY
COMMUNITY

## 2024-05-21 RX ORDER — ALBUTEROL SULFATE 90 UG/1
1 AEROSOL, METERED RESPIRATORY (INHALATION) EVERY 6 HOURS PRN
Status: DISCONTINUED | OUTPATIENT
Start: 2024-05-21 | End: 2024-05-23 | Stop reason: HOSPADM

## 2024-05-21 RX ORDER — LEVOFLOXACIN 5 MG/ML
750 INJECTION, SOLUTION INTRAVENOUS
Status: DISCONTINUED | OUTPATIENT
Start: 2024-05-22 | End: 2024-05-21

## 2024-05-21 RX ORDER — ONDANSETRON 8 MG/1
8 TABLET, ORALLY DISINTEGRATING ORAL EVERY 8 HOURS PRN
Status: DISCONTINUED | OUTPATIENT
Start: 2024-05-21 | End: 2024-05-23 | Stop reason: HOSPADM

## 2024-05-21 RX ORDER — ACETAMINOPHEN 325 MG/1
650 TABLET ORAL EVERY 6 HOURS PRN
Status: DISCONTINUED | OUTPATIENT
Start: 2024-05-21 | End: 2024-05-23 | Stop reason: HOSPADM

## 2024-05-21 RX ORDER — METOPROLOL TARTRATE 25 MG/1
25 TABLET, FILM COATED ORAL 2 TIMES DAILY
Status: DISCONTINUED | OUTPATIENT
Start: 2024-05-21 | End: 2024-05-23 | Stop reason: HOSPADM

## 2024-05-21 RX ORDER — ACETAMINOPHEN 325 MG/1
650 TABLET ORAL EVERY 8 HOURS PRN
Status: DISCONTINUED | OUTPATIENT
Start: 2024-05-21 | End: 2024-05-23 | Stop reason: HOSPADM

## 2024-05-21 RX ORDER — TALC
6 POWDER (GRAM) TOPICAL NIGHTLY PRN
Status: DISCONTINUED | OUTPATIENT
Start: 2024-05-21 | End: 2024-05-23 | Stop reason: HOSPADM

## 2024-05-21 RX ORDER — MELATONIN 10 MG
10 CAPSULE ORAL NIGHTLY PRN
Status: DISCONTINUED | OUTPATIENT
Start: 2024-05-21 | End: 2024-05-21

## 2024-05-21 RX ORDER — GLUCAGON 1 MG
1 KIT INJECTION
Status: DISCONTINUED | OUTPATIENT
Start: 2024-05-21 | End: 2024-05-23 | Stop reason: HOSPADM

## 2024-05-21 RX ORDER — CELECOXIB 200 MG/1
200 CAPSULE ORAL DAILY
Status: DISCONTINUED | OUTPATIENT
Start: 2024-05-22 | End: 2024-05-22

## 2024-05-21 RX ORDER — IBUPROFEN 200 MG
24 TABLET ORAL
Status: DISCONTINUED | OUTPATIENT
Start: 2024-05-21 | End: 2024-05-23 | Stop reason: HOSPADM

## 2024-05-21 RX ORDER — LEVOFLOXACIN 5 MG/ML
750 INJECTION, SOLUTION INTRAVENOUS
Status: COMPLETED | OUTPATIENT
Start: 2024-05-21 | End: 2024-05-21

## 2024-05-21 RX ORDER — MONTELUKAST SODIUM 10 MG/1
10 TABLET ORAL DAILY
Status: DISCONTINUED | OUTPATIENT
Start: 2024-05-22 | End: 2024-05-23 | Stop reason: HOSPADM

## 2024-05-21 RX ORDER — IBUPROFEN 200 MG
16 TABLET ORAL
Status: DISCONTINUED | OUTPATIENT
Start: 2024-05-21 | End: 2024-05-23 | Stop reason: HOSPADM

## 2024-05-21 RX ORDER — INSULIN ASPART 100 [IU]/ML
0-10 INJECTION, SOLUTION INTRAVENOUS; SUBCUTANEOUS
Status: DISCONTINUED | OUTPATIENT
Start: 2024-05-21 | End: 2024-05-23 | Stop reason: HOSPADM

## 2024-05-21 RX ORDER — SODIUM CHLORIDE 0.9 % (FLUSH) 0.9 %
10 SYRINGE (ML) INJECTION
Status: DISCONTINUED | OUTPATIENT
Start: 2024-05-21 | End: 2024-05-23 | Stop reason: HOSPADM

## 2024-05-21 RX ORDER — BENZONATATE 100 MG/1
100 CAPSULE ORAL 3 TIMES DAILY PRN
Status: DISCONTINUED | OUTPATIENT
Start: 2024-05-21 | End: 2024-05-23 | Stop reason: HOSPADM

## 2024-05-21 RX ORDER — FLUTICASONE PROPIONATE 50 MCG
1 SPRAY, SUSPENSION (ML) NASAL DAILY
Status: DISCONTINUED | OUTPATIENT
Start: 2024-05-22 | End: 2024-05-23 | Stop reason: HOSPADM

## 2024-05-21 RX ORDER — CELECOXIB 200 MG/1
200 CAPSULE ORAL DAILY PRN
Status: ON HOLD | COMMUNITY
End: 2024-05-23 | Stop reason: HOSPADM

## 2024-05-21 RX ORDER — ONDANSETRON HYDROCHLORIDE 4 MG/5ML
4 SOLUTION ORAL EVERY 8 HOURS PRN
Status: DISCONTINUED | OUTPATIENT
Start: 2024-05-21 | End: 2024-05-22 | Stop reason: SDUPTHER

## 2024-05-21 RX ADMIN — VANCOMYCIN HYDROCHLORIDE 1250 MG: 1.25 INJECTION, POWDER, LYOPHILIZED, FOR SOLUTION INTRAVENOUS at 03:05

## 2024-05-21 RX ADMIN — SODIUM CHLORIDE 1000 ML: 9 INJECTION, SOLUTION INTRAVENOUS at 12:05

## 2024-05-21 RX ADMIN — LEVOFLOXACIN 750 MG: 750 INJECTION, SOLUTION INTRAVENOUS at 12:05

## 2024-05-21 RX ADMIN — SODIUM CHLORIDE 1000 ML: 0.9 SOLUTION INTRAVENOUS at 11:05

## 2024-05-21 RX ADMIN — BENZONATATE 100 MG: 100 CAPSULE, LIQUID FILLED ORAL at 03:05

## 2024-05-21 RX ADMIN — Medication 6 MG: at 10:05

## 2024-05-21 RX ADMIN — VANCOMYCIN HYDROCHLORIDE 1250 MG: 1.25 INJECTION, POWDER, LYOPHILIZED, FOR SOLUTION INTRAVENOUS at 05:05

## 2024-05-21 RX ADMIN — ACETAMINOPHEN 650 MG: 325 TABLET ORAL at 04:05

## 2024-05-21 RX ADMIN — METOPROLOL TARTRATE 25 MG: 25 TABLET, FILM COATED ORAL at 08:05

## 2024-05-21 NOTE — ASSESSMENT & PLAN NOTE
"This patient does have evidence of infective focus  My overall impression is sepsis.  Source: Respiratory  Antibiotics given-   Antibiotics (72h ago, onward)      Start     Stop Route Frequency Ordered    05/22/24 1245  levoFLOXacin 750 mg/150 mL IVPB 750 mg         -- IV Every 24 hours (non-standard times) 05/21/24 1548    05/21/24 1715  vancomycin 1,250 mg in dextrose 5 % (D5W) 250 mL IVPB (Vial-Mate)        Placed in "Followed by" Linked Group    -- IV Once 05/21/24 1538    05/21/24 1626  vancomycin - pharmacy to dose  (vancomycin IVPB (PEDS and ADULTS))        Placed in "And" Linked Group    -- IV pharmacy to manage frequency 05/21/24 1526    05/21/24 1545  vancomycin 1,250 mg in dextrose 5 % (D5W) 250 mL IVPB (Vial-Mate)        Placed in "Followed by" Linked Group    -- IV Once 05/21/24 1538          Latest lactate reviewed-  Recent Labs   Lab 05/21/24  1423   LACTATE 1.2     Organ dysfunction indicated by Acute kidney injury and Thrombocytopenia     Fluid challenge Actual Body weight- Patient will receive 30ml/kg actual body weight to calculate fluid bolus for treatment of septic shock.   "

## 2024-05-21 NOTE — PROGRESS NOTES
"Pharmacokinetic Initial Assessment: IV Vancomycin    Assessment/Plan:    Initiate intravenous vancomycin with loading dose of 2500 mg once followed by a maintenance dose of vancomycin 1500mg IV every 12 hours  Desired empiric serum trough concentration is 10 to 20 mcg/mL  Draw vancomycin trough level 60 min prior to fourth dose on 5/23/24 at approximately 0300  Pharmacy will continue to follow and monitor vancomycin.      Please contact pharmacy at extension [phone] with any questions regarding this assessment.     Thank you for the consult,   Shi Edge       Patient brief summary:  Hedy Conway is a 49 y.o. female initiated on antimicrobial therapy with IV Vancomycin for treatment of suspected lower respiratory infection    Drug Allergies:   Review of patient's allergies indicates:   Allergen Reactions    Tetanus vaccines and toxoid Rash       Actual Body Weight:   107.9kg    Renal Function:   Estimated Creatinine Clearance: 85.1 mL/min (based on SCr of 1 mg/dL).,     Dialysis Method (if applicable):  N/A    CBC (last 72 hours):  Recent Labs   Lab Result Units 05/21/24  1101   WBC K/uL 14.88*   Hemoglobin g/dL 10.9*   Hematocrit % 33.8*   Platelets K/uL 94*   Gran % % 72.7   Lymph % % 17.9*   Mono % % 8.4   Eosinophil % % 0.1   Basophil % % 0.3   Differential Method  Automated       Metabolic Panel (last 72 hours):  Recent Labs   Lab Result Units 05/21/24  1101 05/21/24  1241   Sodium mmol/L 136  --    Potassium mmol/L 3.9  --    Chloride mmol/L 104  --    CO2 mmol/L 19*  --    Glucose mg/dL 162*  --    Glucose, UA   --  Negative   BUN mg/dL 21*  --    Creatinine mg/dL 1.0  --    Albumin g/dL 3.4*  --    Total Bilirubin mg/dL 1.3*  --    Alkaline Phosphatase U/L 160*  --    AST U/L 17  --    ALT U/L 56*  --        Drug levels (last 3 results):  No results for input(s): "VANCOMYCINRA", "VANCORANDOM", "VANCOMYCINPE", "VANCOPEAK", "VANCOMYCINTR", "VANCOTROUGH" in the last 72 hours.    Microbiologic " Results:  Microbiology Results (last 7 days)       Procedure Component Value Units Date/Time    Culture, Respiratory with Gram Stain [[id number]]     Order Status: No result Specimen: Respiratory     Blood culture #1 **CANNOT BE ORDERED STAT** [[id number]] Collected: 05/21/24 1102    Order Status: Sent Specimen: Blood from Peripheral, Antecubital, Right Updated: 05/21/24 1415    Blood culture #2 **CANNOT BE ORDERED STAT** [[id number]] Collected: 05/21/24 1109    Order Status: Sent Specimen: Blood from Peripheral, Wrist, Right Updated: 05/21/24 1415    Influenza A & B by Molecular [[id number]] Collected: 05/21/24 1036    Order Status: Completed Specimen: Nasopharyngeal Swab Updated: 05/21/24 1108     Influenza A, Molecular Negative     Influenza B, Molecular Negative     Flu A & B Source NP

## 2024-05-21 NOTE — ASSESSMENT & PLAN NOTE
"Patient has a diagnosis of pneumonia. The cause of the pneumonia is unknown at this time. The pneumonia is worsening due to cough /fever  . The patient has the following signs/symptoms of pneumonia: cough, sputum production, shortness of breath, and chest pain.   Current antimicrobial regimen consists of the antibiotics listed below. Will monitor patient closely and continue current treatment plan unchanged.    Antibiotics (From admission, onward)      Start     Stop Route Frequency Ordered    05/22/24 1245  levoFLOXacin 750 mg/150 mL IVPB 750 mg         -- IV Every 24 hours (non-standard times) 05/21/24 1548    05/21/24 1715  vancomycin 1,250 mg in dextrose 5 % (D5W) 250 mL IVPB (Vial-Mate)        Placed in "Followed by" Linked Group    -- IV Once 05/21/24 1538    05/21/24 1626  vancomycin - pharmacy to dose  (vancomycin IVPB (PEDS and ADULTS))        Placed in "And" Linked Group    -- IV pharmacy to manage frequency 05/21/24 1526    05/21/24 1545  vancomycin 1,250 mg in dextrose 5 % (D5W) 250 mL IVPB (Vial-Mate)        Placed in "Followed by" Linked Group    -- IV Once 05/21/24 1538            Microbiology Results (last 7 days)       Procedure Component Value Units Date/Time    Blood culture #1 **CANNOT BE ORDERED STAT** [[id number]] Collected: 05/21/24 1102    Order Status: Sent Specimen: Blood from Peripheral, Antecubital, Right Updated: 05/21/24 1415    Blood culture #2 **CANNOT BE ORDERED STAT** [[id number]] Collected: 05/21/24 1109    Order Status: Sent Specimen: Blood from Peripheral, Wrist, Right Updated: 05/21/24 1415    Influenza A & B by Molecular [[id number]] Collected: 05/21/24 1036    Order Status: Completed Specimen: Nasopharyngeal Swab Updated: 05/21/24 1108     Influenza A, Molecular Negative     Influenza B, Molecular Negative     Flu A & B Source NP          "

## 2024-05-21 NOTE — NURSING
Pt up from ED via bed.  Pt AAO X 4.  No SOB or acute distress noted. Pt on IV antibiotics. Pt has productive cough, denies difficulty swallowing. Patient resting comfortably in bed. Fall/Safety maintained, bed low, locked, side rails up x 2, call bell within reach.

## 2024-05-21 NOTE — H&P
[facility] Medicine  History & Physical    Patient Name: Hedy Conway  MRN: [id number]  Patient Class: OP- Observation  Admission Date: 5/21/2024  Attending Physician: Uche Blum MD   Primary Care Provider: John Lantigua PA-C         Patient information was obtained from patient, past medical records, and ER records.     Subjective:     Principal Problem:Sepsis due to pneumonia    Chief Complaint:   Chief Complaint   Patient presents with    General Illness        HPI:   Hedy Conway is a 49 y.o. female   with a history of carpal tunnel  biliary colic diabetes non-Hodgkin's lymphoma presents to be evaluated for cough and fever that began yesterday.  Patient reports T-max of 103°.  Also reports productive cough with clear sputum she reports an occasional blood streaks in her sputum denies any elida  hemoptysis.  Reports she has been taking ibuprofen and Tylenol which does improve or fever.  Denies shortness a breath or chest pain.  Does report some left shoulder pain and back pain.  Unsure of any recent contacts.  Denies any urinary complaints.      Work up in ER showed ;high white count : high lactate : bilateral pneumonia .  She is being admitted for IV antibiotics   Oncology history :  Dx: Stage IV DLBCL recurrence 2011 s/p salvage chemo and autoSCT   Onc Hx:  <Hx obtained from Dr. Mora's note dated 11.25.2019>  Patient was diagnosed with stage IV DLBCL recurrence in 2011, then treated with salvage chemotherapy and auto-HSCT.      Since March 2012, she has been told that she likely has recurrent disease based on scans showing enlarged retroperitoneal nodes and splenomegaly, but she has remained clinically well and asymptomatic all that time. Biopsy attempted in past was non-diagnostic.       CAT scan late last year showed several sub-cm pulmonary nodules, some retroperitoneal nodes, and some splenomegaly, little to no change over several months.      She was  referred to pulmonary; bronch done and no evidence of lymphoma was seen. Lymphocytes appeared to be reactive in nature. She was given empiric abx for presumed infection, repeat CAT scan showed improvement in nodularity; no change to other lesions.       Past Medical History:   Diagnosis Date    Acute carpal tunnel syndrome of left wrist     Biliary colic     Diabetes mellitus     Encounter for blood transfusion     History of chemotherapy     Incisional hernia     Large cell (diffuse) non-Hodgkin's lymphoma     Stem cells transplant status        Past Surgical History:   Procedure Laterality Date    ARTHROSCOPY OF KNEE Bilateral 03/30/2023    Procedure: ARTHROSCOPY, KNEE;  Surgeon: Rudolph Murrell MD;  Location: [facility];  Service: Orthopedics;  Laterality: Bilateral;    BREAST BIOPSY Left     lymph node biopsy, benign    BREAST SURGERY      CHOLECYSTECTOMY      COLD KNIFE CONIZATION OF CERVIX N/A 03/08/2021    Procedure: CONE BIOPSY, CERVIX, USING COLD KNIFE;  Surgeon: Evelyn Wheeler MD;  Location: [facility];  Service: OB/GYN;  Laterality: N/A;    COLONOSCOPY N/A 12/21/2022    Procedure: COLONOSCOPY;  Surgeon: Annamarie Jane MD;  Location: [facility];  Service: Endoscopy;  Laterality: N/A;    HAND ARTHROTOMY Right 03/30/2023    Procedure: OPEN  ARTHROTOMY WRIST;  Surgeon: Rudolph Murrell MD;  Location: [facility] OR;  Service: Orthopedics;  Laterality: Right;    HERNIA REPAIR      incisional    LUNG BIOPSY      lymphnode biopsy      MEDIPORT REMOVAL Left 01/30/2020    Procedure: REMOVAL, CATHETER, CENTRAL VENOUS, TUNNELED, WITH PORT;  Surgeon: Luis Bogran-Reyes, MD;  Location: [facility];  Service: General;  Laterality: Left;    PORTACATH PLACEMENT Left     REVISION OF SCAR  06/22/2021    Procedure: REVISION, SCAR;  Surgeon: Otis Grimes MD;  Location: [address];  Service: General;;    SKIN BIOPSY      TUBAL LIGATION      UMBILICAL HERNIA REPAIR         Review of patient's allergies indicates:    Allergen Reactions    Tetanus vaccines and toxoid Rash       No current facility-administered medications on file prior to encounter.     Current Outpatient Medications on File Prior to Encounter   Medication Sig    acetaminophen (TYLENOL) 325 MG tablet Take 2 tablets (650 mg total) by mouth every 6 (six) hours as needed.    albuterol (PROVENTIL/VENTOLIN HFA) 90 mcg/actuation inhaler Ventolin HFA 90 mcg/actuation aerosol inhaler   INHALE 2 PUFFS BY MOUTH 3 TIMES DAILY AS NEEDED.    bisoprolol (ZEBETA) 5 MG tablet Take 5 mg by mouth once daily.    celecoxib (CELEBREX) 100 MG capsule Take 100 mg by mouth once daily.    fluticasone (FLONASE) 50 mcg/actuation nasal spray 1 spray by Each Nare route once daily.    melatonin 10 mg Cap Take 10 mg by mouth nightly as needed.    montelukast (SINGULAIR) 10 mg tablet Take 10 mg by mouth once daily.    ondansetron (ZOFRAN) 4 MG tablet Take 4 mg by mouth every 8 (eight) hours as needed.     Family History       Problem Relation (Age of Onset)    Breast cancer Maternal Aunt, Maternal Aunt    Cancer Paternal Grandmother    Colon cancer Paternal Grandmother    Diabetes Mother, Father    Heart block Mother    Heart failure Father    Hypertension Mother, Father    Kidney disease Mother    Lung cancer Father          Tobacco Use    Smoking status: Never    Smokeless tobacco: Never   Substance and Sexual Activity    Alcohol use: Not Currently    Drug use: No    Sexual activity: Yes     Partners: Male     Birth control/protection: See Surgical Hx     Comment: with one partner for 24 years     Review of Systems   Constitutional:  Positive for fatigue. Negative for appetite change, chills and fever.   HENT:  Negative for congestion, ear discharge, ear pain, rhinorrhea, sinus pressure and sore throat.    Respiratory:  Positive for cough, shortness of breath and wheezing. Negative for choking and chest tightness.         With coughing and increased activity   Cardiovascular:  Negative for  "chest pain and palpitations.   Gastrointestinal:  Negative for constipation, diarrhea, nausea and vomiting.   Musculoskeletal:  Negative for joint swelling and myalgias.   Skin:  Negative for rash and wound.   Neurological:  Negative for dizziness, syncope, weakness, light-headedness and numbness.     Objective:     Vital Signs (Most Recent):  Temp: 97.8 °F (36.6 °C) (05/21/24 1024)  Pulse: 98 (05/21/24 1502)  Resp: 18 (05/21/24 1024)  BP: (!) 109/58 (05/21/24 1502)  SpO2: 95 % (05/21/24 1502) Vital Signs (24h Range):  Temp:  [97.8 °F (36.6 °C)] 97.8 °F (36.6 °C)  Pulse:  [] 98  Resp:  [18] 18  SpO2:  [94 %-97 %] 95 %  BP: ()/(48-67) 109/58     Weight: 107.9 kg (237 lb 14 oz)  Body mass index is 37.26 kg/m².     Physical Exam  Vitals and nursing note reviewed.   Constitutional:       Appearance: She is well-developed.   HENT:      Head: Normocephalic and atraumatic.      Right Ear: External ear normal.      Left Ear: External ear normal.      Nose: Nose normal.   Cardiovascular:      Rate and Rhythm: Normal rate and regular rhythm.      Heart sounds: Normal heart sounds.   Pulmonary:      Effort: Pulmonary effort is normal.   Abdominal:      General: Bowel sounds are normal.      Palpations: Abdomen is soft.   Skin:     General: Skin is warm and dry.   Neurological:      Mental Status: She is alert and oriented to person, place, and time.                Significant Labs: All pertinent labs within the past 24 hours have been reviewed.  ABGs: No results for input(s): "PH", "PCO2", "HCO3", "POCSATURATED", "BE", "TOTALHB", "COHB", "METHB", "O2HB", "POCFIO2", "PO2" in the last 48 hours.  CBC:   Recent Labs   Lab 05/21/24  1101   WBC 14.88*   HGB 10.9*   HCT 33.8*   PLT 94*     CMP:   Recent Labs   Lab 05/21/24  1101      K 3.9      CO2 19*   *   BUN 21*   CREATININE 1.0   CALCIUM 9.2   PROT 6.0   ALBUMIN 3.4*   BILITOT 1.3*   ALKPHOS 160*   AST 17   ALT 56*   ANIONGAP 13     Lactic Acid: " "  Recent Labs   Lab 05/21/24  1100 05/21/24  1423   LACTATE 2.3* 1.2     Troponin:   Recent Labs   Lab 05/21/24  1101   TROPONINI <0.006     Urine Culture: No results for input(s): "LABURIN" in the last 48 hours.  Urine Studies:   Recent Labs   Lab 05/21/24  1241   COLORU Yellow   APPEARANCEUA Hazy*   PHUR 6.0   SPECGRAV <=1.005*   PROTEINUA Negative   GLUCUA Negative   KETONESU Negative   BILIRUBINUA Negative   OCCULTUA Negative   NITRITE Negative   UROBILINOGEN Negative   LEUKOCYTESUR 2+*   RBCUA 0   WBCUA 3   BACTERIA Occasional   SQUAMEPITHEL 8   HYALINECASTS 1       Significant Imaging: I have reviewed all pertinent imaging results/findings within the past 24 hours.  CT: I have reviewed all pertinent results/findings within the past 24 hours and my personal findings are:  see below  CXR: I have reviewed all pertinent results/findings within the past 24 hours and my personal findings are:  see below  Impression:     1. Multifocal pneumonia, greatest in the left lower lobe.  2. Multiple enlarged mediastinal and hilar lymph nodes, consistent with history of non-Hodgkin's lymphoma.  These are new from the prior CT of 12/15/2021, consistent with disease recurrence.  3. Splenomegaly appears stable compared to abdomen pelvis CT 03/13/2023.        CXR   Frontal portable chest demonstrates heterogeneous opacities in the mid and lower lung zones bilaterally.  No pleural fluid.  Cardiomediastinal silhouette is unremarkable.  No osseous abnormality.     Impression:     Bilateral pneumonia.      Recent Labs   Lab 05/21/24  1101   TROPONINI <0.006      EKG  Sinus tachycardia with Premature atrial complexes Otherwise normal ECG When compared with ECG of 04-JUN-2023 16:22, Premature atrial complexes are now Present QT has lengthened Confirmed by Alan Blanco MD ([id number]) on 5/21/2024 12:28:08 P  Assessment/Plan:     * Sepsis due to pneumonia  This patient does have evidence of infective focus  My overall impression is " "sepsis.  Source: Respiratory  Antibiotics given-   Antibiotics (72h ago, onward)      Start     Stop Route Frequency Ordered    05/22/24 1245  levoFLOXacin 750 mg/150 mL IVPB 750 mg         -- IV Every 24 hours (non-standard times) 05/21/24 1548    05/21/24 1715  vancomycin 1,250 mg in dextrose 5 % (D5W) 250 mL IVPB (Vial-Mate)        Placed in "Followed by" Linked Group    -- IV Once 05/21/24 1538    05/21/24 1626  vancomycin - pharmacy to dose  (vancomycin IVPB (PEDS and ADULTS))        Placed in "And" Linked Group    -- IV pharmacy to manage frequency 05/21/24 1526    05/21/24 1545  vancomycin 1,250 mg in dextrose 5 % (D5W) 250 mL IVPB (Vial-Mate)        Placed in "Followed by" Linked Group    -- IV Once 05/21/24 1538          Latest lactate reviewed-  Recent Labs   Lab 05/21/24  1423   LACTATE 1.2     Organ dysfunction indicated by Acute kidney injury and Thrombocytopenia     Fluid challenge Actual Body weight- Patient will receive 30ml/kg actual body weight to calculate fluid bolus for treatment of septic shock.     Pneumonia of both lungs due to infectious organism  Patient has a diagnosis of pneumonia. The cause of the pneumonia is unknown at this time. The pneumonia is worsening due to cough /fever  . The patient has the following signs/symptoms of pneumonia: cough, sputum production, shortness of breath, and chest pain.   Current antimicrobial regimen consists of the antibiotics listed below. Will monitor patient closely and continue current treatment plan unchanged.    Antibiotics (From admission, onward)      Start     Stop Route Frequency Ordered    05/22/24 1245  levoFLOXacin 750 mg/150 mL IVPB 750 mg         -- IV Every 24 hours (non-standard times) 05/21/24 1548    05/21/24 1715  vancomycin 1,250 mg in dextrose 5 % (D5W) 250 mL IVPB (Vial-Mate)        Placed in "Followed by" Linked Group    -- IV Once 05/21/24 1538    05/21/24 1626  vancomycin - pharmacy to dose  (vancomycin IVPB (PEDS and ADULTS))    " "    Placed in "And" Linked Group    -- IV pharmacy to manage frequency 05/21/24 1526    05/21/24 1545  vancomycin 1,250 mg in dextrose 5 % (D5W) 250 mL IVPB (Vial-Mate)        Placed in "Followed by" Linked Group    -- IV Once 05/21/24 1538            Microbiology Results (last 7 days)       Procedure Component Value Units Date/Time    Blood culture #1 **CANNOT BE ORDERED STAT** [[id number]] Collected: 05/21/24 1102    Order Status: Sent Specimen: Blood from Peripheral, Antecubital, Right Updated: 05/21/24 1415    Blood culture #2 **CANNOT BE ORDERED STAT** [[id number]] Collected: 05/21/24 1109    Order Status: Sent Specimen: Blood from Peripheral, Wrist, Right Updated: 05/21/24 1415    Influenza A & B by Molecular [[id number]] Collected: 05/21/24 1036    Order Status: Completed Specimen: Nasopharyngeal Swab Updated: 05/21/24 1108     Influenza A, Molecular Negative     Influenza B, Molecular Negative     Flu A & B Source NP            Essential hypertension  Chronic, controlled. Latest blood pressure and vitals reviewed-     Temp:  [97.8 °F (36.6 °C)]   Pulse:  []   Resp:  [18]   BP: ()/(48-67)   SpO2:  [94 %-97 %] .   Home meds for hypertension were reviewed and noted below.   Hypertension Medications               bisoprolol (ZEBETA) 5 MG tablet Take 5 mg by mouth once daily.            While in the hospital, will manage blood pressure as follows; Continue home antihypertensive regimen    Betablocker added; ziac not available in pharmacy : metoprolol        Type 2 diabetes mellitus without complication, without long-term current use of insulin  Patient's FSGs are controlled on current medication regimen.  Last A1c reviewed-   Lab Results   Component Value Date    HGBA1C 6.4 (H) 03/22/2023     Most recent fingerstick glucose reviewed- No results for input(s): "POCTGLUCOSE" in the last 24 hours.  Current correctional scale  Medium  Maintain anti-hyperglycemic dose as follows-   Antihyperglycemics (From " admission, onward)      Start     Stop Route Frequency Ordered    05/21/24 1648  insulin aspart U-100 pen 0-10 Units         -- SubQ Before meals & nightly PRN 05/21/24 1548          Hold Oral hypoglycemics while patient is in the hospital.    History of Large cell (diffuse) non-Hodgkin's lymphoma    Dx: Stage IV DLBCL recurrence 2011 s/p salvage chemo and autoSCT     Onc Hx:  <Hx obtained from Dr. Mora's note dated 11.25.2019>  Patient was diagnosed with stage IV DLBCL recurrence in 2011, then treated with salvage chemotherapy and auto-HSCT.      Since March 2012, she has been told that she likely has recurrent disease based on scans showing enlarged retroperitoneal nodes and splenomegaly, but she has remained clinically well and asymptomatic all that time. Biopsy attempted in past was non-diagnostic.       CAT scan late last year showed several sub-cm pulmonary nodules, some retroperitoneal nodes, and some splenomegaly, little to no change over several months.      She was referred to pulmonary; bronch done and no evidence of lymphoma was seen. Lymphocytes appeared to be reactive in nature. She was given empiric abx for presumed infection, repeat CAT scan showed improvement in nodularity; no change to other lesions.        11.25.2019 HO eval  01.30.2020 Port removal  05.20.2020 HO eval - telehealth  11.17.2020 HO eval  06.21.2021 - 06.26.2021 INpatient stay for PNA, Bacteremia & Obstructed Incarcerated Hernia Repair  06.30.2021 HO eval, surveillance   12.15.2021 CT CAP  -Interval resolution of the left-sided pleural effusions since the prior CT scan of the chest from [facility]bigail 10, 2021.  -Decrease in the airspace consolidation in the lungs bilaterally with persistent noncalcified nodules in the lungs bilaterally.  -Splenomegaly.  -Persistent mildly prominent lymph nodes in the retroperitoneum and enlarged lymph nodes in the right paratracheal and subcarinal regions.  -Postoperative changes in the midline of the  anterior abdominal and pelvic wall.  -Possible hemangioma in the right lobe of the liver. This could be further assessed with an MRI examination of the abdomen with and without intravenous contrast to rule out alternative etiologies.  12.29.2021 HO eval, surveillance   Screening MMG: Left Normal. R breast asymmetry at retroareolar anterior position: BiRADS 0 (incomplete); right diagnostic MMG w/spot compression views   01.13.2022 Diagnostic R MMG w/Lloyd: BiRADS 1 (negative); repeat 1 yr  07.06.2022 HO eval, surveillance   01.03.2022 HO eval, surveillance  12.30.2022 MMG B w/Lloyd: BiRADS 2 (benign)  03.13.2023 INpt CTAP  -Bibasilar patchy alveolar infiltrates could represent pneumonia.  Minimal consolidation in the lingula and left lower lobe also.  Recommend follow-up.  -Hepatosplenomegaly.  -Non-obstructing nephrolithiasis of the left kidney.  -Few stable mildly enlarged retroperitoneal lymph nodes.  07.11.2023 HO eval, surveillance       5/21/24  She will need outpatient follow up when she is done with her pneumonia treatment          VTE Risk Mitigation (From admission, onward)           Ordered     IP VTE HIGH RISK PATIENT  Once         05/21/24 1547     Place sequential compression device  Until discontinued         05/21/24 1547                       On 05/21/2024, patient should be placed in hospital observation services under my care.             Uche Blum MD  Department of Hospital Medicine  [facility] - Emergency Dept

## 2024-05-21 NOTE — ED PROVIDER NOTES
Encounter Date: 5/21/2024       History     Chief Complaint   Patient presents with    General Illness      Chief complaint: Cough, fever   A 49-year-old female with a history of carpal tunnel  biliary colic diabetes non-Hodgkin's lymphoma presents to be evaluated for cough and fever that began yesterday.  Patient reports T-max of 103°.  Also reports productive cough with clear sputum she reports an occasional blood streaks in her sputum denies any elida  hemoptysis.  Reports she has been taking ibuprofen and Tylenol which does improve or fever.  Denies shortness a breath or chest pain.  Does report some left shoulder pain and back pain.  Unsure of any recent contacts.  Denies any urinary complaints      Review of patient's allergies indicates:   Allergen Reactions    Tetanus vaccines and toxoid Rash     Past Medical History:   Diagnosis Date    Acute carpal tunnel syndrome of left wrist     Biliary colic     Diabetes mellitus     Encounter for blood transfusion     History of chemotherapy     Incisional hernia     Large cell (diffuse) non-Hodgkin's lymphoma     Stem cells transplant status      Past Surgical History:   Procedure Laterality Date    ARTHROSCOPY OF KNEE Bilateral 03/30/2023    Procedure: ARTHROSCOPY, KNEE;  Surgeon: Rudolph Murrell MD;  Location: [facility];  Service: Orthopedics;  Laterality: Bilateral;    BREAST BIOPSY Left     lymph node biopsy, benign    BREAST SURGERY      CHOLECYSTECTOMY      COLD KNIFE CONIZATION OF CERVIX N/A 03/08/2021    Procedure: CONE BIOPSY, CERVIX, USING COLD KNIFE;  Surgeon: Evelyn Wheeler MD;  Location: [facility];  Service: OB/GYN;  Laterality: N/A;    COLONOSCOPY N/A 12/21/2022    Procedure: COLONOSCOPY;  Surgeon: Annamarie Jane MD;  Location: [facility];  Service: Endoscopy;  Laterality: N/A;    HAND ARTHROTOMY Right 03/30/2023    Procedure: OPEN  ARTHROTOMY WRIST;  Surgeon: Rudolph Murrell MD;  Location: [facility];  Service: Orthopedics;  Laterality: Right;     HERNIA REPAIR      incisional    LUNG BIOPSY      lymphnode biopsy      MEDIPORT REMOVAL Left 01/30/2020    Procedure: REMOVAL, CATHETER, CENTRAL VENOUS, TUNNELED, WITH PORT;  Surgeon: Luis Bogran-Reyes, MD;  Location: [facility] OR;  Service: General;  Laterality: Left;    PORTACATH PLACEMENT Left     REVISION OF SCAR  06/22/2021    Procedure: REVISION, SCAR;  Surgeon: Otis Grimes MD;  Location: [facility] OR [facility];  Service: General;;    SKIN BIOPSY      TUBAL LIGATION      UMBILICAL HERNIA REPAIR       Family History   Problem Relation Name Age of Onset    Diabetes Mother      Hypertension Mother      Kidney disease Mother      Heart block Mother      Diabetes Father      Hypertension Father      Lung cancer Father      Heart failure Father      Breast cancer Maternal Aunt      Breast cancer Maternal Aunt          leukemia    Colon cancer Paternal Grandmother      Cancer Paternal Grandmother      Anesthesia problems Neg Hx       Social History     Tobacco Use    Smoking status: Never    Smokeless tobacco: Never   Substance Use Topics    Alcohol use: Not Currently    Drug use: No     Review of Systems   Constitutional:  Positive for fatigue and fever.   HENT:  Negative for congestion and sore throat.    Respiratory:  Positive for cough and chest tightness. Negative for shortness of breath.    Cardiovascular:  Negative for chest pain and palpitations.   Genitourinary:  Negative for dysuria.   Musculoskeletal:  Positive for back pain.       Physical Exam     Initial Vitals [05/21/24 1024]   BP Pulse Resp Temp SpO2   (!) 104/54 (!) 115 18 97.8 °F (36.6 °C) 95 %      MAP       --         Physical Exam    Nursing note and vitals reviewed.  Constitutional: She appears well-developed and well-nourished.   HENT:   Head: Normocephalic and atraumatic.   Mouth/Throat: Oropharynx is clear and moist.   Cardiovascular:  Normal rate and regular rhythm.     Exam reveals no gallop and no friction rub.       No murmur  heard.  Pulmonary/Chest: She has no wheezes. She has rhonchi. She has no rales.   Musculoskeletal:         General: Normal range of motion.     Neurological: She is alert and oriented to person, place, and time.   Skin: Skin is warm and dry.   Psychiatric: She has a normal mood and affect. Thought content normal.         ED Course   Procedures  Labs Reviewed   CBC W/ AUTO DIFFERENTIAL - Abnormal; Notable for the following components:       Result Value    WBC 14.88 (*)     Hemoglobin 10.9 (*)     Hematocrit 33.8 (*)     MCH 26.8 (*)     RDW 16.4 (*)     Platelets 94 (*)     Immature Granulocytes 0.6 (*)     Gran # (ANC) 10.8 (*)     Immature Grans (Abs) 0.09 (*)     Mono # 1.3 (*)     Lymph % 17.9 (*)     All other components within normal limits   COMPREHENSIVE METABOLIC PANEL - Abnormal; Notable for the following components:    CO2 19 (*)     Glucose 162 (*)     BUN 21 (*)     Albumin 3.4 (*)     Total Bilirubin 1.3 (*)     Alkaline Phosphatase 160 (*)     ALT 56 (*)     All other components within normal limits   LACTIC ACID, PLASMA - Abnormal; Notable for the following components:    Lactate (Lactic Acid) 2.3 (*)     All other components within normal limits   URINALYSIS, REFLEX TO URINE CULTURE - Abnormal; Notable for the following components:    Appearance, UA Hazy (*)     Specific Gravity, UA <=1.005 (*)     Leukocytes, UA 2+ (*)     All other components within normal limits    Narrative:     Specimen Source->Urine   INFLUENZA A & B BY MOLECULAR   CULTURE, BLOOD   CULTURE, BLOOD   SARS-COV-2 RNA AMPLIFICATION, QUAL   TROPONIN I   B-TYPE NATRIURETIC PEPTIDE   URINALYSIS MICROSCOPIC    Narrative:     Specimen Source->Urine   LACTIC ACID, PLASMA        ECG Results              EKG 12-lead (Final result)        Collection Time Result Time QRS Duration OHS QTC Calculation    05/21/24 10:47:25 05/21/24 12:28:12 88 497                     Final result by Interface, Lab In [facility] (05/21/24 12:28:16)                    Narrative:    Test Reason : R50.9    Vent. Rate : 103 BPM     Atrial Rate : 103 BPM     P-R Int : 134 ms          QRS Dur : 088 ms      QT Int : 380 ms       P-R-T Axes : 061 049 065 degrees     QTc Int : 497 ms    Sinus tachycardia with Premature atrial complexes  Otherwise normal ECG  When compared with ECG of 04-JUN-2023 16:22,  Premature atrial complexes are now Present  QT has lengthened  Confirmed by Francis FORBES, Alan NARAYAN ([id number]) on 5/21/2024 12:28:08 PM    Referred By: [email or web address]   SELF           Confirmed By:Alan Blanco MD                                  Imaging Results              CT Chest Without Contrast (Final result)  Result time 05/21/24 15:06:01      Final result by Arvin Torres MD (05/21/24 15:06:01)                   Impression:      1. Multifocal pneumonia, greatest in the left lower lobe.  2. Multiple enlarged mediastinal and hilar lymph nodes, consistent with history of non-Hodgkin's lymphoma.  These are new from the prior CT of 12/15/2021, consistent with disease recurrence.  3. Splenomegaly appears stable compared to abdomen pelvis CT 03/13/2023.      Electronically signed by: Arvin Torres MD  Date:    05/21/2024  Time:    15:06               Narrative:    EXAMINATION:  CT CHEST WITHOUT CONTRAST    CLINICAL HISTORY:  Dyspnea, chronic, unclear etiology;Pneumonia, unresolved; history of non-Hodgkin's lymphoma    TECHNIQUE:  Axial CT images were obtained. Iterative reconstruction technique was used.  CT/cardiac nuclear exam/s in prior 12 months: 1.    COMPARISON:  Portable chest 05/21/2024, portable chest 06/04/2023, CT chest with IV contrast 12/15/2021.    FINDINGS:  Breathing motion artifact on some images.    There is some mucosal thickening in the airways including some areas of bronchiolectasis.  There is consolidation in the left lower lobe.  Scattered areas of peribronchial consolidation in both lungs.  Scattered fibronodular opacities in both lungs.  There is no pleural  or pericardial fluid.  There is no aortic aneurysm.  There are multiple mediastinal and hilar lymph nodes, some of which are enlarged.  For example a pretracheal lymph node measuring 1.5 cm in short axis dimension (image 39 of series 2 axial).  There is no axillary lymph node enlargement.  Visualized upper abdomen demonstrates splenomegaly, stable compared to abdominal CT of 03/13/2023.  No osseous abnormality.                                       X-Ray Chest AP Portable (Final result)  Result time 05/21/24 12:43:18      Final result by Arvin Torres MD (05/21/24 12:43:18)                   Impression:      Bilateral pneumonia.      Electronically signed by: Arvin Torres MD  Date:    05/21/2024  Time:    12:43               Narrative:    EXAMINATION:  XR CHEST AP PORTABLE    CLINICAL HISTORY:  Cough, unspecified    COMPARISON:  Portable chest 06/04/2023.    FINDINGS:  Frontal portable chest demonstrates heterogeneous opacities in the mid and lower lung zones bilaterally.  No pleural fluid.  Cardiomediastinal silhouette is unremarkable.  No osseous abnormality.                                       Medications   benzonatate capsule 100 mg (has no administration in time range)   sodium chloride 0.9% bolus 1,000 mL 1,000 mL (0 mLs Intravenous Stopped 5/21/24 1245)   sodium chloride 0.9% bolus 1,000 mL 1,000 mL (0 mLs Intravenous Stopped 5/21/24 1344)   levoFLOXacin 750 mg/150 mL IVPB 750 mg (0 mg Intravenous Stopped 5/21/24 1449)     Medical Decision Making   49-year-old female presents to be evaluated for cough fever for the last 24 hours   Differential diagnosis include bronchitis, pneumonia, COVID, flu, pleurisy, PE, pneumonitis, sepsis    Amount and/or Complexity of Data Reviewed  Labs: ordered. Decision-making details documented in ED Course.     Details:  CBC, CMP, lactate, blood cultures, UA  Radiology: ordered.    Risk  Prescription drug management.  Risk Details: This patient does have evidence of  infective focus  My overall impression is sepsis.  Source: Respiratory  Antibiotics given- Antibiotics (72h ago, onward)    Start     Stop Route Frequency Ordered    05/21/24 1200  levoFLOXacin 750 mg/150 mL IVPB 750 mg         05/21/24 2359 IV ED 1 Time 05/21/24 1128      Latest lactate reviewed-  Lab             05/21/24                       1100          LACTATE      2.3*          Organ dysfunction indicated by  NA    Fluid challenge Ideal Body Weight- The patient's ideal body weight is Ideal body weight: 61.6 kg (135 lb 12.9 oz) which will be used to calculate fluid bolus of 30 ml/kg for treatment of septic shock.      Post- resuscitation assessment Yes Perfusion exam was performed within 6 hours of septic shock presentation after bolus shows Adequate tissue perfusion assessed by non-invasive monitoring       Will Not start Pressors- Levophed for MAP of 65, adequte MAP without     Patient with diminished breath sounds throughout   Patient had leukocytosis   Elevated lactate   Patient given Levaquin  Patient had bilateral pneumonia and 18   Will admit for IV fluids antibiotics and observation                         ED Course as of 05/21/24 1525   Tue May 21, 2024   1139 Lymph #: 2.7 [CB]      ED Course User Index  [CB] Ludy Reina NP               Medical Decision Making:   Clinical Tests:   Sepsis Perfusion Assessment: "I attest a sepsis perfusion exam was performed within 6 hours of sepsis, severe sepsis, or septic shock presentation, following fluid resuscitation."             Clinical Impression:  Final diagnoses:  [R05.9] Cough  [R50.9] Fever  [J18.9] Pneumonia of both lungs due to infectious organism, unspecified part of lung (Primary)  [J18.9] Pneumonia due to infectious organism, unspecified laterality, unspecified part of lung          ED Disposition Condition    Observation Stable                Ludy Reina NP  05/21/24 1531

## 2024-05-21 NOTE — ED TRIAGE NOTES
C/o cough, runny nose, fever, and chills since yesterday. Patient has been taking Mucinex DM. Patient reports coughed up specks of red blood this morning.patient took 5 am and Ibuprofen at 0730 this morning. Patient reports temp was 103.1 this morning. C/o sharp pain in left shoulder and right abdomen.

## 2024-05-21 NOTE — ASSESSMENT & PLAN NOTE
Dx: Stage IV DLBCL recurrence 2011 s/p salvage chemo and autoSCT     Onc Hx:  <Hx obtained from Dr. Mora's note dated 11.25.2019>  Patient was diagnosed with stage IV DLBCL recurrence in 2011, then treated with salvage chemotherapy and auto-HSCT.      Since March 2012, she has been told that she likely has recurrent disease based on scans showing enlarged retroperitoneal nodes and splenomegaly, but she has remained clinically well and asymptomatic all that time. Biopsy attempted in past was non-diagnostic.       CAT scan late last year showed several sub-cm pulmonary nodules, some retroperitoneal nodes, and some splenomegaly, little to no change over several months.      She was referred to pulmonary; bronch done and no evidence of lymphoma was seen. Lymphocytes appeared to be reactive in nature. She was given empiric abx for presumed infection, repeat CAT scan showed improvement in nodularity; no change to other lesions.        11.25.2019 HO eval  01.30.2020 Port removal  05.20.2020 HO eval - telehealth  11.17.2020 HO eval  06.21.2021 - 06.26.2021 INpatient stay for PNA, Bacteremia & Obstructed Incarcerated Hernia Repair  06.30.2021 HO eval, surveillance   12.15.2021 CT CAP  -Interval resolution of the left-sided pleural effusions since the prior CT scan of the chest from Abigail 10, 2021.  -Decrease in the airspace consolidation in the lungs bilaterally with persistent noncalcified nodules in the lungs bilaterally.  -Splenomegaly.  -Persistent mildly prominent lymph nodes in the retroperitoneum and enlarged lymph nodes in the right paratracheal and subcarinal regions.  -Postoperative changes in the midline of the anterior abdominal and pelvic wall.  -Possible hemangioma in the right lobe of the liver. This could be further assessed with an MRI examination of the abdomen with and without intravenous contrast to rule out alternative etiologies.  12.29.2021 HO eval, surveillance   Screening MMG: Left Normal. R  breast asymmetry at retroareolar anterior position: BiRADS 0 (incomplete); right diagnostic MMG w/spot compression views   01.13.2022 Diagnostic R MMG w/Lloyd: BiRADS 1 (negative); repeat 1 yr  07.06.2022 HO eval, surveillance   01.03.2022 HO eval, surveillance  12.30.2022 MMG B w/Lloyd: BiRADS 2 (benign)  03.13.2023 INpt CTAP  -Bibasilar patchy alveolar infiltrates could represent pneumonia.  Minimal consolidation in the lingula and left lower lobe also.  Recommend follow-up.  -Hepatosplenomegaly.  -Non-obstructing nephrolithiasis of the left kidney.  -Few stable mildly enlarged retroperitoneal lymph nodes.  07.11.2023 HO eval, surveillance       5/21/24  She will need outpatient follow up when she is done with her pneumonia treatment

## 2024-05-21 NOTE — SUBJECTIVE & OBJECTIVE
Past Medical History:   Diagnosis Date    Acute carpal tunnel syndrome of left wrist     Biliary colic     Diabetes mellitus     Encounter for blood transfusion     History of chemotherapy     Incisional hernia     Large cell (diffuse) non-Hodgkin's lymphoma     Stem cells transplant status        Past Surgical History:   Procedure Laterality Date    ARTHROSCOPY OF KNEE Bilateral 03/30/2023    Procedure: ARTHROSCOPY, KNEE;  Surgeon: Rudolph Murrell MD;  Location: [facility];  Service: Orthopedics;  Laterality: Bilateral;    BREAST BIOPSY Left     lymph node biopsy, benign    BREAST SURGERY      CHOLECYSTECTOMY      COLD KNIFE CONIZATION OF CERVIX N/A 03/08/2021    Procedure: CONE BIOPSY, CERVIX, USING COLD KNIFE;  Surgeon: Evelyn Wheeler MD;  Location: [facility] OR;  Service: OB/GYN;  Laterality: N/A;    COLONOSCOPY N/A 12/21/2022    Procedure: COLONOSCOPY;  Surgeon: Annamarie Jane MD;  Location: [facility];  Service: Endoscopy;  Laterality: N/A;    HAND ARTHROTOMY Right 03/30/2023    Procedure: OPEN  ARTHROTOMY WRIST;  Surgeon: Rudolph Murrell MD;  Location: [facility];  Service: Orthopedics;  Laterality: Right;    HERNIA REPAIR      incisional    LUNG BIOPSY      lymphnode biopsy      MEDIPORT REMOVAL Left 01/30/2020    Procedure: REMOVAL, CATHETER, CENTRAL VENOUS, TUNNELED, WITH PORT;  Surgeon: Luis Bogran-Reyes, MD;  Location: [facility];  Service: General;  Laterality: Left;    PORTACATH PLACEMENT Left     REVISION OF SCAR  06/22/2021    Procedure: REVISION, SCAR;  Surgeon: Otis Grimes MD;  Location: [address];  Service: General;;    SKIN BIOPSY      TUBAL LIGATION      UMBILICAL HERNIA REPAIR         Review of patient's allergies indicates:   Allergen Reactions    Tetanus vaccines and toxoid Rash       No current facility-administered medications on file prior to encounter.     Current Outpatient Medications on File Prior to Encounter   Medication Sig    acetaminophen (TYLENOL) 325 MG tablet Take  2 tablets (650 mg total) by mouth every 6 (six) hours as needed.    albuterol (PROVENTIL/VENTOLIN HFA) 90 mcg/actuation inhaler Ventolin HFA 90 mcg/actuation aerosol inhaler   INHALE 2 PUFFS BY MOUTH 3 TIMES DAILY AS NEEDED.    bisoprolol (ZEBETA) 5 MG tablet Take 5 mg by mouth once daily.    celecoxib (CELEBREX) 100 MG capsule Take 100 mg by mouth once daily.    fluticasone (FLONASE) 50 mcg/actuation nasal spray 1 spray by Each Nare route once daily.    melatonin 10 mg Cap Take 10 mg by mouth nightly as needed.    montelukast (SINGULAIR) 10 mg tablet Take 10 mg by mouth once daily.    ondansetron (ZOFRAN) 4 MG tablet Take 4 mg by mouth every 8 (eight) hours as needed.     Family History       Problem Relation (Age of Onset)    Breast cancer Maternal Aunt, Maternal Aunt    Cancer Paternal Grandmother    Colon cancer Paternal Grandmother    Diabetes Mother, Father    Heart block Mother    Heart failure Father    Hypertension Mother, Father    Kidney disease Mother    Lung cancer Father          Tobacco Use    Smoking status: Never    Smokeless tobacco: Never   Substance and Sexual Activity    Alcohol use: Not Currently    Drug use: No    Sexual activity: Yes     Partners: Male     Birth control/protection: See Surgical Hx     Comment: with one partner for 24 years     Review of Systems   Constitutional:  Positive for fatigue. Negative for appetite change, chills and fever.   HENT:  Negative for congestion, ear discharge, ear pain, rhinorrhea, sinus pressure and sore throat.    Respiratory:  Positive for cough, shortness of breath and wheezing. Negative for choking and chest tightness.         With coughing and increased activity   Cardiovascular:  Negative for chest pain and palpitations.   Gastrointestinal:  Negative for constipation, diarrhea, nausea and vomiting.   Musculoskeletal:  Negative for joint swelling and myalgias.   Skin:  Negative for rash and wound.   Neurological:  Negative for dizziness, syncope,  "weakness, light-headedness and numbness.     Objective:     Vital Signs (Most Recent):  Temp: 97.8 °F (36.6 °C) (05/21/24 1024)  Pulse: 98 (05/21/24 1502)  Resp: 18 (05/21/24 1024)  BP: (!) 109/58 (05/21/24 1502)  SpO2: 95 % (05/21/24 1502) Vital Signs (24h Range):  Temp:  [97.8 °F (36.6 °C)] 97.8 °F (36.6 °C)  Pulse:  [] 98  Resp:  [18] 18  SpO2:  [94 %-97 %] 95 %  BP: ()/(48-67) 109/58     Weight: 107.9 kg (237 lb 14 oz)  Body mass index is 37.26 kg/m².     Physical Exam  Vitals and nursing note reviewed.   Constitutional:       Appearance: She is well-developed.   HENT:      Head: Normocephalic and atraumatic.      Right Ear: External ear normal.      Left Ear: External ear normal.      Nose: Nose normal.   Cardiovascular:      Rate and Rhythm: Normal rate and regular rhythm.      Heart sounds: Normal heart sounds.   Pulmonary:      Effort: Pulmonary effort is normal.   Abdominal:      General: Bowel sounds are normal.      Palpations: Abdomen is soft.   Skin:     General: Skin is warm and dry.   Neurological:      Mental Status: She is alert and oriented to person, place, and time.                Significant Labs: All pertinent labs within the past 24 hours have been reviewed.  ABGs: No results for input(s): "PH", "PCO2", "HCO3", "POCSATURATED", "BE", "TOTALHB", "COHB", "METHB", "O2HB", "POCFIO2", "PO2" in the last 48 hours.  CBC:   Recent Labs   Lab 05/21/24  1101   WBC 14.88*   HGB 10.9*   HCT 33.8*   PLT 94*     CMP:   Recent Labs   Lab 05/21/24  1101      K 3.9      CO2 19*   *   BUN 21*   CREATININE 1.0   CALCIUM 9.2   PROT 6.0   ALBUMIN 3.4*   BILITOT 1.3*   ALKPHOS 160*   AST 17   ALT 56*   ANIONGAP 13     Lactic Acid:   Recent Labs   Lab 05/21/24  1100 05/21/24  1423   LACTATE 2.3* 1.2     Troponin:   Recent Labs   Lab 05/21/24  1101   TROPONINI <0.006     Urine Culture: No results for input(s): "LABURIN" in the last 48 hours.  Urine Studies:   Recent Labs   Lab " 05/21/24  1241   COLORU Yellow   APPEARANCEUA Hazy*   PHUR 6.0   SPECGRAV <=1.005*   PROTEINUA Negative   GLUCUA Negative   KETONESU Negative   BILIRUBINUA Negative   OCCULTUA Negative   NITRITE Negative   UROBILINOGEN Negative   LEUKOCYTESUR 2+*   RBCUA 0   WBCUA 3   BACTERIA Occasional   SQUAMEPITHEL 8   HYALINECASTS 1       Significant Imaging: I have reviewed all pertinent imaging results/findings within the past 24 hours.  CT: I have reviewed all pertinent results/findings within the past 24 hours and my personal findings are:  see below  CXR: I have reviewed all pertinent results/findings within the past 24 hours and my personal findings are:  see below  Impression:     1. Multifocal pneumonia, greatest in the left lower lobe.  2. Multiple enlarged mediastinal and hilar lymph nodes, consistent with history of non-Hodgkin's lymphoma.  These are new from the prior CT of 12/15/2021, consistent with disease recurrence.  3. Splenomegaly appears stable compared to abdomen pelvis CT 03/13/2023.        CXR   Frontal portable chest demonstrates heterogeneous opacities in the mid and lower lung zones bilaterally.  No pleural fluid.  Cardiomediastinal silhouette is unremarkable.  No osseous abnormality.     Impression:     Bilateral pneumonia.

## 2024-05-21 NOTE — HPI
Hedy Conway is a 49 y.o. female   with a history of carpal tunnel  biliary colic diabetes non-Hodgkin's lymphoma presents to be evaluated for cough and fever that began yesterday.  Patient reports T-max of 103°.  Also reports productive cough with clear sputum she reports an occasional blood streaks in her sputum denies any elida  hemoptysis.  Reports she has been taking ibuprofen and Tylenol which does improve or fever.  Denies shortness a breath or chest pain.  Does report some left shoulder pain and back pain.  Unsure of any recent contacts.  Denies any urinary complaints.      Work up in ER showed ;high white count : high lactate : bilateral pneumonia .  She is being admitted for IV antibiotics   Oncology history :  Dx: Stage IV DLBCL recurrence 2011 s/p salvage chemo and autoSCT   Onc Hx:  <Hx obtained from Dr. Mora's note dated 11.25.2019>  Patient was diagnosed with stage IV DLBCL recurrence in 2011, then treated with salvage chemotherapy and auto-HSCT.      Since March 2012, she has been told that she likely has recurrent disease based on scans showing enlarged retroperitoneal nodes and splenomegaly, but she has remained clinically well and asymptomatic all that time. Biopsy attempted in past was non-diagnostic.       CAT scan late last year showed several sub-cm pulmonary nodules, some retroperitoneal nodes, and some splenomegaly, little to no change over several months.      She was referred to pulmonary; bronch done and no evidence of lymphoma was seen. Lymphocytes appeared to be reactive in nature. She was given empiric abx for presumed infection, repeat CAT scan showed improvement in nodularity; no change to other lesions.

## 2024-05-21 NOTE — ASSESSMENT & PLAN NOTE
"Patient's FSGs are controlled on current medication regimen.  Last A1c reviewed-   Lab Results   Component Value Date    HGBA1C 6.4 (H) 03/22/2023     Most recent fingerstick glucose reviewed- No results for input(s): "POCTGLUCOSE" in the last 24 hours.  Current correctional scale  Medium  Maintain anti-hyperglycemic dose as follows-   Antihyperglycemics (From admission, onward)      Start     Stop Route Frequency Ordered    05/21/24 1648  insulin aspart U-100 pen 0-10 Units         -- SubQ Before meals & nightly PRN 05/21/24 1548          Hold Oral hypoglycemics while patient is in the hospital.  "

## 2024-05-21 NOTE — ASSESSMENT & PLAN NOTE
Chronic, controlled. Latest blood pressure and vitals reviewed-     Temp:  [97.8 °F (36.6 °C)]   Pulse:  []   Resp:  [18]   BP: ()/(48-67)   SpO2:  [94 %-97 %] .   Home meds for hypertension were reviewed and noted below.   Hypertension Medications               bisoprolol (ZEBETA) 5 MG tablet Take 5 mg by mouth once daily.            While in the hospital, will manage blood pressure as follows; Continue home antihypertensive regimen    Betablocker added; ziac not available in pharmacy : metoprolol

## 2024-05-22 LAB
ALBUMIN SERPL BCP-MCNC: 2.7 G/DL (ref 3.5–5.2)
ALP SERPL-CCNC: 121 U/L (ref 55–135)
ALT SERPL W/O P-5'-P-CCNC: 32 U/L (ref 10–44)
ANION GAP SERPL CALC-SCNC: 9 MMOL/L (ref 8–16)
AST SERPL-CCNC: 8 U/L (ref 10–40)
BASOPHILS # BLD AUTO: 0.02 K/UL (ref 0–0.2)
BASOPHILS NFR BLD: 0.2 % (ref 0–1.9)
BILIRUB SERPL-MCNC: 0.8 MG/DL (ref 0.1–1)
BUN SERPL-MCNC: 15 MG/DL (ref 6–20)
CALCIUM SERPL-MCNC: 9.3 MG/DL (ref 8.7–10.5)
CHLORIDE SERPL-SCNC: 108 MMOL/L (ref 95–110)
CO2 SERPL-SCNC: 23 MMOL/L (ref 23–29)
CREAT SERPL-MCNC: 0.7 MG/DL (ref 0.5–1.4)
DIFFERENTIAL METHOD BLD: ABNORMAL
EOSINOPHIL # BLD AUTO: 0 K/UL (ref 0–0.5)
EOSINOPHIL NFR BLD: 0.5 % (ref 0–8)
ERYTHROCYTE [DISTWIDTH] IN BLOOD BY AUTOMATED COUNT: 16.2 % (ref 11.5–14.5)
EST. GFR  (NO RACE VARIABLE): >60 ML/MIN/1.73 M^2
ESTIMATED AVG GLUCOSE: 111 MG/DL (ref 68–131)
GLUCOSE SERPL-MCNC: 126 MG/DL (ref 70–110)
HBA1C MFR BLD: 5.5 % (ref 4–5.6)
HCT VFR BLD AUTO: 28.1 % (ref 37–48.5)
HGB BLD-MCNC: 8.8 G/DL (ref 12–16)
IMM GRANULOCYTES # BLD AUTO: 0.05 K/UL (ref 0–0.04)
IMM GRANULOCYTES NFR BLD AUTO: 0.6 % (ref 0–0.5)
LYMPHOCYTES # BLD AUTO: 2.7 K/UL (ref 1–4.8)
LYMPHOCYTES NFR BLD: 32.9 % (ref 18–48)
MCH RBC QN AUTO: 26.7 PG (ref 27–31)
MCHC RBC AUTO-ENTMCNC: 31.3 G/DL (ref 32–36)
MCV RBC AUTO: 85 FL (ref 82–98)
MONOCYTES # BLD AUTO: 0.6 K/UL (ref 0.3–1)
MONOCYTES NFR BLD: 7.6 % (ref 4–15)
NEUTROPHILS # BLD AUTO: 4.8 K/UL (ref 1.8–7.7)
NEUTROPHILS NFR BLD: 58.2 % (ref 38–73)
NRBC BLD-RTO: 0 /100 WBC
PLATELET # BLD AUTO: 81 K/UL (ref 150–450)
PMV BLD AUTO: 10.9 FL (ref 9.2–12.9)
POCT GLUCOSE: 112 MG/DL (ref 70–110)
POCT GLUCOSE: 116 MG/DL (ref 70–110)
POCT GLUCOSE: 73 MG/DL (ref 70–110)
POCT GLUCOSE: 94 MG/DL (ref 70–110)
POTASSIUM SERPL-SCNC: 3.5 MMOL/L (ref 3.5–5.1)
PROT SERPL-MCNC: 5.2 G/DL (ref 6–8.4)
RBC # BLD AUTO: 3.29 M/UL (ref 4–5.4)
SODIUM SERPL-SCNC: 140 MMOL/L (ref 136–145)
WBC # BLD AUTO: 8.26 K/UL (ref 3.9–12.7)

## 2024-05-22 PROCEDURE — 63600175 PHARM REV CODE 636 W HCPCS: Performed by: INTERNAL MEDICINE

## 2024-05-22 PROCEDURE — 36415 COLL VENOUS BLD VENIPUNCTURE: CPT | Performed by: NURSE PRACTITIONER

## 2024-05-22 PROCEDURE — 25000242 PHARM REV CODE 250 ALT 637 W/ HCPCS: Performed by: NURSE PRACTITIONER

## 2024-05-22 PROCEDURE — 25000003 PHARM REV CODE 250: Performed by: STUDENT IN AN ORGANIZED HEALTH CARE EDUCATION/TRAINING PROGRAM

## 2024-05-22 PROCEDURE — 25000003 PHARM REV CODE 250: Performed by: INTERNAL MEDICINE

## 2024-05-22 PROCEDURE — 99232 SBSQ HOSP IP/OBS MODERATE 35: CPT | Mod: ,,, | Performed by: STUDENT IN AN ORGANIZED HEALTH CARE EDUCATION/TRAINING PROGRAM

## 2024-05-22 PROCEDURE — 25000003 PHARM REV CODE 250: Performed by: NURSE PRACTITIONER

## 2024-05-22 PROCEDURE — 99900035 HC TECH TIME PER 15 MIN (STAT)

## 2024-05-22 PROCEDURE — 85025 COMPLETE CBC W/AUTO DIFF WBC: CPT | Performed by: NURSE PRACTITIONER

## 2024-05-22 PROCEDURE — 11000001 HC ACUTE MED/SURG PRIVATE ROOM

## 2024-05-22 PROCEDURE — 96366 THER/PROPH/DIAG IV INF ADDON: CPT

## 2024-05-22 PROCEDURE — 80053 COMPREHEN METABOLIC PANEL: CPT | Performed by: NURSE PRACTITIONER

## 2024-05-22 PROCEDURE — 94761 N-INVAS EAR/PLS OXIMETRY MLT: CPT

## 2024-05-22 RX ORDER — POLYETHYLENE GLYCOL 3350 17 G/17G
17 POWDER, FOR SOLUTION ORAL 2 TIMES DAILY PRN
Status: DISCONTINUED | OUTPATIENT
Start: 2024-05-22 | End: 2024-05-23 | Stop reason: HOSPADM

## 2024-05-22 RX ORDER — PANTOPRAZOLE SODIUM 40 MG/1
40 TABLET, DELAYED RELEASE ORAL DAILY
Status: DISCONTINUED | OUTPATIENT
Start: 2024-05-22 | End: 2024-05-23 | Stop reason: HOSPADM

## 2024-05-22 RX ORDER — MUPIROCIN 20 MG/G
OINTMENT TOPICAL 2 TIMES DAILY
Status: DISCONTINUED | OUTPATIENT
Start: 2024-05-22 | End: 2024-05-23 | Stop reason: HOSPADM

## 2024-05-22 RX ADMIN — POLYETHYLENE GLYCOL 3350 17 G: 17 POWDER, FOR SOLUTION ORAL at 01:05

## 2024-05-22 RX ADMIN — VANCOMYCIN HYDROCHLORIDE 1500 MG: 1.5 INJECTION, POWDER, LYOPHILIZED, FOR SOLUTION INTRAVENOUS at 04:05

## 2024-05-22 RX ADMIN — CELECOXIB 200 MG: 200 CAPSULE ORAL at 10:05

## 2024-05-22 RX ADMIN — FLUTICASONE PROPIONATE 50 MCG: 50 SPRAY, METERED NASAL at 10:05

## 2024-05-22 RX ADMIN — Medication 6 MG: at 09:05

## 2024-05-22 RX ADMIN — LEVOFLOXACIN 750 MG: 750 INJECTION, SOLUTION INTRAVENOUS at 02:05

## 2024-05-22 RX ADMIN — PANTOPRAZOLE SODIUM 40 MG: 40 TABLET, DELAYED RELEASE ORAL at 01:05

## 2024-05-22 RX ADMIN — METOPROLOL TARTRATE 25 MG: 25 TABLET, FILM COATED ORAL at 09:05

## 2024-05-22 RX ADMIN — MONTELUKAST 10 MG: 10 TABLET, FILM COATED ORAL at 10:05

## 2024-05-22 NOTE — PLAN OF CARE
Problem: Adult Inpatient Plan of Care  Goal: Plan of Care Review  Outcome: Progressing  Flowsheets (Taken 5/22/2024 5224)  Plan of Care Reviewed With: patient  Goal: Patient-Specific Goal (Individualized)  Outcome: Progressing  Goal: Optimal Comfort and Wellbeing  Outcome: Progressing  Goal: Readiness for Transition of Care  Outcome: Progressing     Problem: Diabetes Comorbidity  Goal: Blood Glucose Level Within Targeted Range  Outcome: Progressing     Problem: Sepsis/Septic Shock  Goal: Optimal Coping  Outcome: Progressing  Goal: Absence of Bleeding  Outcome: Progressing  Goal: Optimal Nutrition Intake  Outcome: Progressing     Problem: Pneumonia  Goal: Fluid Balance  Outcome: Progressing  Goal: Resolution of Infection Signs and Symptoms  Outcome: Progressing  Goal: Effective Oxygenation and Ventilation  Outcome: Progressing     Problem: Skin Injury Risk Increased  Goal: Skin Health and Integrity  Outcome: Progressing     Problem: Fall Injury Risk  Goal: Absence of Fall and Fall-Related Injury  Outcome: Progressing

## 2024-05-22 NOTE — ASSESSMENT & PLAN NOTE
"This patient does have evidence of infective focus  My overall impression is sepsis.  Source: Respiratory  Antibiotics given-   Antibiotics (72h ago, onward)    Start     Stop Route Frequency Ordered    05/22/24 1245  levoFLOXacin 750 mg/150 mL IVPB 750 mg         -- IV Every 24 hours (non-standard times) 05/21/24 1548    05/22/24 0900  mupirocin 2 % ointment         05/27/24 0859 Nasl 2 times daily 05/22/24 0709    05/22/24 0400  vancomycin 1,500 mg in dextrose 5 % (D5W) 250 mL IVPB (Vial-Mate)         -- IV Every 12 hours (non-standard times) 05/21/24 1557    05/21/24 1626  vancomycin - pharmacy to dose  (vancomycin IVPB (PEDS and ADULTS))        Placed in "And" Linked Group    -- IV pharmacy to manage frequency 05/21/24 1526        Latest lactate reviewed-  Recent Labs   Lab 05/21/24  1423   LACTATE 1.2       Organ dysfunction indicated by Acute kidney injury and Thrombocytopenia     Fluid challenge Actual Body weight- Patient will receive 30ml/kg actual body weight to calculate fluid bolus for treatment of septic shock.   "

## 2024-05-22 NOTE — PROGRESS NOTES
[facility] ([facility])  [facility] Medicine  Progress Note    Patient Name: Hedy Conway  MRN: [id number]  Patient Class: IP- Inpatient   Admission Date: 5/21/2024  Length of Stay: 0 days  Attending Physician: Uche Blum MD  Primary Care Provider: John Lantigua PA-C        Subjective:     Principal Problem:Sepsis due to pneumonia        HPI:    Hedy Conway is a 49 y.o. female   with a history of carpal tunnel  biliary colic diabetes non-Hodgkin's lymphoma presents to be evaluated for cough and fever that began yesterday.  Patient reports T-max of 103°.  Also reports productive cough with clear sputum she reports an occasional blood streaks in her sputum denies any elida  hemoptysis.  Reports she has been taking ibuprofen and Tylenol which does improve or fever.  Denies shortness a breath or chest pain.  Does report some left shoulder pain and back pain.  Unsure of any recent contacts.  Denies any urinary complaints.      Work up in ER showed ;high white count : high lactate : bilateral pneumonia .  She is being admitted for IV antibiotics   Oncology history :  Dx: Stage IV DLBCL recurrence 2011 s/p salvage chemo and autoSCT   Onc Hx:  <Hx obtained from Dr. Mora's note dated 11.25.2019>  Patient was diagnosed with stage IV DLBCL recurrence in 2011, then treated with salvage chemotherapy and auto-HSCT.      Since March 2012, she has been told that she likely has recurrent disease based on scans showing enlarged retroperitoneal nodes and splenomegaly, but she has remained clinically well and asymptomatic all that time. Biopsy attempted in past was non-diagnostic.       CAT scan late last year showed several sub-cm pulmonary nodules, some retroperitoneal nodes, and some splenomegaly, little to no change over several months.      She was referred to pulmonary; bronch done and no evidence of lymphoma was seen. Lymphocytes appeared to be reactive in nature. She was given empiric abx for presumed  infection, repeat CAT scan showed improvement in nodularity; no change to other lesions.       Overview/Hospital Course:  5/22/24: no acute events overnight. Remains stable on RA. Some blood tinged sputum, drop in H&H. No new complaints.     Past Medical History:   Diagnosis Date    Acute carpal tunnel syndrome of left wrist     Biliary colic     Diabetes mellitus     Encounter for blood transfusion     History of chemotherapy     Incisional hernia     Large cell (diffuse) non-Hodgkin's lymphoma     Stem cells transplant status        Past Surgical History:   Procedure Laterality Date    ARTHROSCOPY OF KNEE Bilateral 03/30/2023    Procedure: ARTHROSCOPY, KNEE;  Surgeon: Rudolph Murrell MD;  Location: [facility];  Service: Orthopedics;  Laterality: Bilateral;    BREAST BIOPSY Left     lymph node biopsy, benign    BREAST SURGERY      CHOLECYSTECTOMY      COLD KNIFE CONIZATION OF CERVIX N/A 03/08/2021    Procedure: CONE BIOPSY, CERVIX, USING COLD KNIFE;  Surgeon: Evelyn Wheeler MD;  Location: [facility];  Service: OB/GYN;  Laterality: N/A;    COLONOSCOPY N/A 12/21/2022    Procedure: COLONOSCOPY;  Surgeon: Annamarie Jane MD;  Location: [facility];  Service: Endoscopy;  Laterality: N/A;    HAND ARTHROTOMY Right 03/30/2023    Procedure: OPEN  ARTHROTOMY WRIST;  Surgeon: Rudolph Murrell MD;  Location: [facility] OR;  Service: Orthopedics;  Laterality: Right;    HERNIA REPAIR      incisional    LUNG BIOPSY      lymphnode biopsy      MEDIPORT REMOVAL Left 01/30/2020    Procedure: REMOVAL, CATHETER, CENTRAL VENOUS, TUNNELED, WITH PORT;  Surgeon: Luis Bogran-Reyes, MD;  Location: [facility];  Service: General;  Laterality: Left;    PORTACATH PLACEMENT Left     REVISION OF SCAR  06/22/2021    Procedure: REVISION, SCAR;  Surgeon: Otis Grimes MD;  Location: [address];  Service: General;;    SKIN BIOPSY      TUBAL LIGATION      UMBILICAL HERNIA REPAIR         Review of patient's allergies indicates:   Allergen  Reactions    Tetanus vaccines and toxoid Rash       No current facility-administered medications on file prior to encounter.     Current Outpatient Medications on File Prior to Encounter   Medication Sig    acetaminophen (TYLENOL) 325 MG tablet Take 2 tablets (650 mg total) by mouth every 6 (six) hours as needed.    bisoprolol (ZEBETA) 5 MG tablet Take 5 mg by mouth once daily.    celecoxib (CELEBREX) 100 MG capsule Take 100 mg by mouth once daily.    celecoxib (CELEBREX) 200 MG capsule Take 200 mg by mouth daily as needed.    fluticasone (FLONASE) 50 mcg/actuation nasal spray 1 spray by Each Nare route once daily.    melatonin 10 mg Cap Take 10 mg by mouth nightly as needed.    montelukast (SINGULAIR) 10 mg tablet Take 10 mg by mouth once daily.    ondansetron (ZOFRAN) 4 MG tablet Take 4 mg by mouth every 8 (eight) hours as needed.    pantoprazole (PROTONIX) 40 MG tablet Take 40 mg by mouth once daily.    albuterol (PROVENTIL/VENTOLIN HFA) 90 mcg/actuation inhaler Ventolin HFA 90 mcg/actuation aerosol inhaler   INHALE 2 PUFFS BY MOUTH 3 TIMES DAILY AS NEEDED.     Family History       Problem Relation (Age of Onset)    Breast cancer Maternal Aunt, Maternal Aunt    Cancer Paternal Grandmother    Colon cancer Paternal Grandmother    Diabetes Mother, Father    Heart block Mother    Heart failure Father    Hypertension Mother, Father    Kidney disease Mother    Lung cancer Father          Tobacco Use    Smoking status: Never    Smokeless tobacco: Never   Substance and Sexual Activity    Alcohol use: Not Currently    Drug use: No    Sexual activity: Yes     Partners: Male     Birth control/protection: See Surgical Hx     Comment: with one partner for 24 years     Review of Systems   Constitutional:  Positive for fatigue. Negative for appetite change, chills and fever.   HENT:  Negative for congestion, ear discharge, ear pain, rhinorrhea, sinus pressure and sore throat.    Respiratory:  Positive for cough and shortness of  breath (improving). Negative for choking, chest tightness and wheezing.         With coughing and increased activity   Cardiovascular:  Negative for chest pain and palpitations.   Gastrointestinal:  Negative for constipation, diarrhea, nausea and vomiting.   Musculoskeletal:  Negative for joint swelling and myalgias.   Skin:  Negative for rash and wound.   Neurological:  Negative for dizziness, syncope, weakness, light-headedness and numbness.     Objective:     Vital Signs (Most Recent):  Temp: 98.3 °F (36.8 °C) (05/22/24 1113)  Pulse: 90 (05/22/24 1113)  Resp: 20 (05/22/24 1113)  BP: (!) 125/58 (05/22/24 1113)  SpO2: 95 % (05/22/24 1113) Vital Signs (24h Range):  Temp:  [98.2 °F (36.8 °C)-99.2 °F (37.3 °C)] 98.3 °F (36.8 °C)  Pulse:  [] 90  Resp:  [18-22] 20  SpO2:  [94 %-97 %] 95 %  BP: ()/(53-67) 125/58     Weight: 109.5 kg (241 lb 6.5 oz)  Body mass index is 37.81 kg/m².     Physical Exam  Vitals and nursing note reviewed.   Constitutional:       Appearance: She is well-developed.   HENT:      Head: Normocephalic and atraumatic.      Right Ear: External ear normal.      Left Ear: External ear normal.      Nose: Nose normal.   Eyes:      Extraocular Movements: Extraocular movements intact.      Conjunctiva/sclera: Conjunctivae normal.      Pupils: Pupils are equal, round, and reactive to light.   Cardiovascular:      Rate and Rhythm: Normal rate and regular rhythm.      Heart sounds: Normal heart sounds.   Pulmonary:      Effort: Pulmonary effort is normal. No respiratory distress.      Breath sounds: Rhonchi and rales (left base) present. No wheezing.   Abdominal:      General: Bowel sounds are normal.      Palpations: Abdomen is soft.   Skin:     General: Skin is warm and dry.   Neurological:      General: No focal deficit present.      Mental Status: She is alert and oriented to person, place, and time.   Psychiatric:         Mood and Affect: Mood normal.         Behavior: Behavior normal.         "      CRANIAL NERVES     CN III, IV, VI   Pupils are equal, round, and reactive to light.       Significant Labs: All pertinent labs within the past 24 hours have been reviewed.  ABGs: No results for input(s): "PH", "PCO2", "HCO3", "POCSATURATED", "BE", "TOTALHB", "COHB", "METHB", "O2HB", "POCFIO2", "PO2" in the last 48 hours.  CBC:   Recent Labs   Lab 05/21/24  1101 05/22/24  0413   WBC 14.88* 8.26   HGB 10.9* 8.8*   HCT 33.8* 28.1*   PLT 94* 81*     CMP:   Recent Labs   Lab 05/21/24  1101 05/22/24  0413    140   K 3.9 3.5    108   CO2 19* 23   * 126*   BUN 21* 15   CREATININE 1.0 0.7   CALCIUM 9.2 9.3   PROT 6.0 5.2*   ALBUMIN 3.4* 2.7*   BILITOT 1.3* 0.8   ALKPHOS 160* 121   AST 17 8*   ALT 56* 32   ANIONGAP 13 9     Lactic Acid:   Recent Labs   Lab 05/21/24  1100 05/21/24  1423   LACTATE 2.3* 1.2     Troponin:   Recent Labs   Lab 05/21/24  1101   TROPONINI <0.006     Urine Culture: No results for input(s): "LABURIN" in the last 48 hours.  Urine Studies:   Recent Labs   Lab 05/21/24  1241   COLORU Yellow   APPEARANCEUA Hazy*   PHUR 6.0   SPECGRAV <=1.005*   PROTEINUA Negative   GLUCUA Negative   KETONESU Negative   BILIRUBINUA Negative   OCCULTUA Negative   NITRITE Negative   UROBILINOGEN Negative   LEUKOCYTESUR 2+*   RBCUA 0   WBCUA 3   BACTERIA Occasional   SQUAMEPITHEL 8   HYALINECASTS 1       Significant Imaging: I have reviewed all pertinent imaging results/findings within the past 24 hours.  CT: I have reviewed all pertinent results/findings within the past 24 hours and my personal findings are:  see below  CXR: I have reviewed all pertinent results/findings within the past 24 hours and my personal findings are:  see below  Impression:     1. Multifocal pneumonia, greatest in the left lower lobe.  2. Multiple enlarged mediastinal and hilar lymph nodes, consistent with history of non-Hodgkin's lymphoma.  These are new from the prior CT of 12/15/2021, consistent with disease recurrence.  3. " "Splenomegaly appears stable compared to abdomen pelvis CT 03/13/2023.        CXR   Frontal portable chest demonstrates heterogeneous opacities in the mid and lower lung zones bilaterally.  No pleural fluid.  Cardiomediastinal silhouette is unremarkable.  No osseous abnormality.     Impression:     Bilateral pneumonia.        Assessment/Plan:      * Sepsis due to pneumonia  This patient does have evidence of infective focus  My overall impression is sepsis.  Source: Respiratory  Antibiotics given-   Antibiotics (72h ago, onward)      Start     Stop Route Frequency Ordered    05/22/24 1245  levoFLOXacin 750 mg/150 mL IVPB 750 mg         -- IV Every 24 hours (non-standard times) 05/21/24 1548    05/22/24 0900  mupirocin 2 % ointment         05/27/24 0859 Nasl 2 times daily 05/22/24 0709    05/22/24 0400  vancomycin 1,500 mg in dextrose 5 % (D5W) 250 mL IVPB (Vial-Mate)         -- IV Every 12 hours (non-standard times) 05/21/24 1557    05/21/24 1626  vancomycin - pharmacy to dose  (vancomycin IVPB (PEDS and ADULTS))        Placed in "And" Linked Group    -- IV pharmacy to manage frequency 05/21/24 1526          Latest lactate reviewed-  Recent Labs   Lab 05/21/24  1423   LACTATE 1.2       Organ dysfunction indicated by Acute kidney injury and Thrombocytopenia     Fluid challenge Actual Body weight- Patient will receive 30ml/kg actual body weight to calculate fluid bolus for treatment of septic shock.     Pneumonia of both lungs due to infectious organism  Patient has a diagnosis of pneumonia. The cause of the pneumonia is unknown at this time. The pneumonia is worsening due to cough /fever  . The patient has the following signs/symptoms of pneumonia: cough, sputum production, shortness of breath, and chest pain.   Current antimicrobial regimen consists of the antibiotics listed below. Will monitor patient closely and continue current treatment plan unchanged.    Antibiotics (From admission, onward)      Start     Stop " "Route Frequency Ordered    05/22/24 1245  levoFLOXacin 750 mg/150 mL IVPB 750 mg         -- IV Every 24 hours (non-standard times) 05/21/24 1548    05/22/24 0900  mupirocin 2 % ointment         05/27/24 0859 Nasl 2 times daily 05/22/24 0709    05/22/24 0400  vancomycin 1,500 mg in dextrose 5 % (D5W) 250 mL IVPB (Vial-Mate)         -- IV Every 12 hours (non-standard times) 05/21/24 1557    05/21/24 1626  vancomycin - pharmacy to dose  (vancomycin IVPB (PEDS and ADULTS))        Placed in "And" Linked Group    -- IV pharmacy to manage frequency 05/21/24 1526            Microbiology Results (last 7 days)       Procedure Component Value Units Date/Time    Culture, Respiratory with Gram Stain [[id number]] Collected: 05/21/24 2308    Order Status: Sent Specimen: Sputum, Expectorated Updated: 05/22/24 1020    Blood culture #1 **CANNOT BE ORDERED STAT** [[id number]] Collected: 05/21/24 1102    Order Status: Completed Specimen: Blood from Peripheral, Antecubital, Right Updated: 05/21/24 2115     Blood Culture, Routine No Growth to date    Blood culture #2 **CANNOT BE ORDERED STAT** [[id number]] Collected: 05/21/24 1109    Order Status: Completed Specimen: Blood from Peripheral, Wrist, Right Updated: 05/21/24 2115     Blood Culture, Routine No Growth to date    Influenza A & B by Molecular [[id number]] Collected: 05/21/24 1036    Order Status: Completed Specimen: Nasopharyngeal Swab Updated: 05/21/24 1108     Influenza A, Molecular Negative     Influenza B, Molecular Negative     Flu A & B Source NP            Essential hypertension  Chronic, controlled. Latest blood pressure and vitals reviewed-     Temp:  [98.2 °F (36.8 °C)-99.2 °F (37.3 °C)]   Pulse:  []   Resp:  [18-22]   BP: ()/(53-67)   SpO2:  [94 %-97 %] .   Home meds for hypertension were reviewed and noted below.   Hypertension Medications               bisoprolol (ZEBETA) 5 MG tablet Take 5 mg by mouth once daily.            While in the hospital, will " manage blood pressure as follows; Continue home antihypertensive regimen    Betablocker added; ziac not available in pharmacy : metoprolol        Type 2 diabetes mellitus without complication, without long-term current use of insulin  Patient's FSGs are controlled on current medication regimen.  Last A1c reviewed-   Lab Results   Component Value Date    HGBA1C 5.5 05/21/2024     Most recent fingerstick glucose reviewed-   Recent Labs   Lab 05/21/24  1715 05/21/24  1928 05/22/24  0706 05/22/24  1112   POCTGLUCOSE 128* 157* 116* 73     Current correctional scale  Medium  Maintain anti-hyperglycemic dose as follows-   Antihyperglycemics (From admission, onward)      Start     Stop Route Frequency Ordered    05/21/24 1648  insulin aspart U-100 pen 0-10 Units         -- SubQ Before meals & nightly PRN 05/21/24 1548          Hold Oral hypoglycemics while patient is in the hospital.    Thrombocytopenia  Patient was found to have thrombocytopenia, the likely etiology is secondary to sepsis/infection, will monitor the platelets Daily. Will transfuse if platelet count is <50k (if undergoing surgical procedure or have active bleeding). Hold DVT prophylaxis if platelets are <50k. The patient's platelet results have been reviewed and are listed below.  Recent Labs   Lab 05/22/24  0413   PLT 81*         History of Large cell (diffuse) non-Hodgkin's lymphoma    Dx: Stage IV DLBCL recurrence 2011 s/p salvage chemo and autoSCT     Onc Hx:  <Hx obtained from Dr. Mora's note dated 11.25.2019>  Patient was diagnosed with stage IV DLBCL recurrence in 2011, then treated with salvage chemotherapy and auto-HSCT.      Since March 2012, she has been told that she likely has recurrent disease based on scans showing enlarged retroperitoneal nodes and splenomegaly, but she has remained clinically well and asymptomatic all that time. Biopsy attempted in past was non-diagnostic.       CAT scan late last year showed several sub-cm pulmonary  nodules, some retroperitoneal nodes, and some splenomegaly, little to no change over several months.      She was referred to pulmonary; bronch done and no evidence of lymphoma was seen. Lymphocytes appeared to be reactive in nature. She was given empiric abx for presumed infection, repeat CAT scan showed improvement in nodularity; no change to other lesions.        11.25.2019 HO eval  01.30.2020 Port removal  05.20.2020 HO eval - telehealth  11.17.2020 HO eval  06.21.2021 - 06.26.2021 INpatient stay for PNA, Bacteremia & Obstructed Incarcerated Hernia Repair  06.30.2021 HO eval, surveillance   12.15.2021 CT CAP  -Interval resolution of the left-sided pleural effusions since the prior CT scan of the chest from [facility]ail 10, 2021.  -Decrease in the airspace consolidation in the lungs bilaterally with persistent noncalcified nodules in the lungs bilaterally.  -Splenomegaly.  -Persistent mildly prominent lymph nodes in the retroperitoneum and enlarged lymph nodes in the right paratracheal and subcarinal regions.  -Postoperative changes in the midline of the anterior abdominal and pelvic wall.  -Possible hemangioma in the right lobe of the liver. This could be further assessed with an MRI examination of the abdomen with and without intravenous contrast to rule out alternative etiologies.  12.29.2021 HO eval, surveillance   Screening MMG: Left Normal. R breast asymmetry at retroareolar anterior position: BiRADS 0 (incomplete); right diagnostic MMG w/spot compression views   01.13.2022 Diagnostic R MMG w/Lloyd: BiRADS 1 (negative); repeat 1 yr  07.06.2022 HO eval, surveillance   01.03.2022 HO eval, surveillance  12.30.2022 MMG B w/Lloyd: BiRADS 2 (benign)  03.13.2023 INpt CTAP  -Bibasilar patchy alveolar infiltrates could represent pneumonia.  Minimal consolidation in the lingula and left lower lobe also.  Recommend follow-up.  -Hepatosplenomegaly.  -Non-obstructing nephrolithiasis of the left kidney.  -Few stable mildly  enlarged retroperitoneal lymph nodes.  07.11.2023 HO eval, surveillance       5/21/24  She will need outpatient follow up when she is done with her pneumonia treatment         VTE Risk Mitigation (From admission, onward)           Ordered     IP VTE HIGH RISK PATIENT  Once         05/21/24 1547     Place sequential compression device  Until discontinued         05/21/24 1547                    Discharge Planning   JESSE: 5/22/2024     Code Status: Full Code   Is the patient medically ready for discharge?:     Reason for patient still in hospital (select all that apply): Patient trending condition and Treatment  Discharge Plan A: Home                  Perry Bray MD  Department of Hospital Medicine   [place] [facility] - Med Surg (3rd Fl)

## 2024-05-22 NOTE — HOSPITAL COURSE
5/22/24: no acute events overnight. Remains stable on RA. Some blood tinged sputum, drop in H&H. No new complaints.     5/23 PT on room air with good lung auscultation.  Will discharge with po levofloxacin.  F/u with PCP.

## 2024-05-22 NOTE — ASSESSMENT & PLAN NOTE
Chronic, controlled. Latest blood pressure and vitals reviewed-     Temp:  [98.2 °F (36.8 °C)-99.2 °F (37.3 °C)]   Pulse:  []   Resp:  [18-22]   BP: ()/(53-67)   SpO2:  [94 %-97 %] .   Home meds for hypertension were reviewed and noted below.   Hypertension Medications               bisoprolol (ZEBETA) 5 MG tablet Take 5 mg by mouth once daily.            While in the hospital, will manage blood pressure as follows; Continue home antihypertensive regimen    Betablocker added; ziac not available in pharmacy : metoprolol

## 2024-05-22 NOTE — SUBJECTIVE & OBJECTIVE
Past Medical History:   Diagnosis Date    Acute carpal tunnel syndrome of left wrist     Biliary colic     Diabetes mellitus     Encounter for blood transfusion     History of chemotherapy     Incisional hernia     Large cell (diffuse) non-Hodgkin's lymphoma     Stem cells transplant status        Past Surgical History:   Procedure Laterality Date    ARTHROSCOPY OF KNEE Bilateral 03/30/2023    Procedure: ARTHROSCOPY, KNEE;  Surgeon: Rudolph Murrell MD;  Location: [facility];  Service: Orthopedics;  Laterality: Bilateral;    BREAST BIOPSY Left     lymph node biopsy, benign    BREAST SURGERY      CHOLECYSTECTOMY      COLD KNIFE CONIZATION OF CERVIX N/A 03/08/2021    Procedure: CONE BIOPSY, CERVIX, USING COLD KNIFE;  Surgeon: Evelyn Wheeler MD;  Location: [facility] OR;  Service: OB/GYN;  Laterality: N/A;    COLONOSCOPY N/A 12/21/2022    Procedure: COLONOSCOPY;  Surgeon: Annamarie Jane MD;  Location: [facility];  Service: Endoscopy;  Laterality: N/A;    HAND ARTHROTOMY Right 03/30/2023    Procedure: OPEN  ARTHROTOMY WRIST;  Surgeon: Rudolph Murrell MD;  Location: [facility];  Service: Orthopedics;  Laterality: Right;    HERNIA REPAIR      incisional    LUNG BIOPSY      lymphnode biopsy      MEDIPORT REMOVAL Left 01/30/2020    Procedure: REMOVAL, CATHETER, CENTRAL VENOUS, TUNNELED, WITH PORT;  Surgeon: Luis Bogran-Reyes, MD;  Location: [facility];  Service: General;  Laterality: Left;    PORTACATH PLACEMENT Left     REVISION OF SCAR  06/22/2021    Procedure: REVISION, SCAR;  Surgeon: Otis Grimes MD;  Location: [address];  Service: General;;    SKIN BIOPSY      TUBAL LIGATION      UMBILICAL HERNIA REPAIR         Review of patient's allergies indicates:   Allergen Reactions    Tetanus vaccines and toxoid Rash       No current facility-administered medications on file prior to encounter.     Current Outpatient Medications on File Prior to Encounter   Medication Sig    acetaminophen (TYLENOL) 325 MG tablet Take  2 tablets (650 mg total) by mouth every 6 (six) hours as needed.    bisoprolol (ZEBETA) 5 MG tablet Take 5 mg by mouth once daily.    celecoxib (CELEBREX) 100 MG capsule Take 100 mg by mouth once daily.    celecoxib (CELEBREX) 200 MG capsule Take 200 mg by mouth daily as needed.    fluticasone (FLONASE) 50 mcg/actuation nasal spray 1 spray by Each Nare route once daily.    melatonin 10 mg Cap Take 10 mg by mouth nightly as needed.    montelukast (SINGULAIR) 10 mg tablet Take 10 mg by mouth once daily.    ondansetron (ZOFRAN) 4 MG tablet Take 4 mg by mouth every 8 (eight) hours as needed.    pantoprazole (PROTONIX) 40 MG tablet Take 40 mg by mouth once daily.    albuterol (PROVENTIL/VENTOLIN HFA) 90 mcg/actuation inhaler Ventolin HFA 90 mcg/actuation aerosol inhaler   INHALE 2 PUFFS BY MOUTH 3 TIMES DAILY AS NEEDED.     Family History       Problem Relation (Age of Onset)    Breast cancer Maternal Aunt, Maternal Aunt    Cancer Paternal Grandmother    Colon cancer Paternal Grandmother    Diabetes Mother, Father    Heart block Mother    Heart failure Father    Hypertension Mother, Father    Kidney disease Mother    Lung cancer Father          Tobacco Use    Smoking status: Never    Smokeless tobacco: Never   Substance and Sexual Activity    Alcohol use: Not Currently    Drug use: No    Sexual activity: Yes     Partners: Male     Birth control/protection: See Surgical Hx     Comment: with one partner for 24 years     Review of Systems   Constitutional:  Positive for fatigue. Negative for appetite change, chills and fever.   HENT:  Negative for congestion, ear discharge, ear pain, rhinorrhea, sinus pressure and sore throat.    Respiratory:  Positive for cough and shortness of breath (improving). Negative for choking, chest tightness and wheezing.         With coughing and increased activity   Cardiovascular:  Negative for chest pain and palpitations.   Gastrointestinal:  Negative for constipation, diarrhea, nausea and  "vomiting.   Musculoskeletal:  Negative for joint swelling and myalgias.   Skin:  Negative for rash and wound.   Neurological:  Negative for dizziness, syncope, weakness, light-headedness and numbness.     Objective:     Vital Signs (Most Recent):  Temp: 98.3 °F (36.8 °C) (05/22/24 1113)  Pulse: 90 (05/22/24 1113)  Resp: 20 (05/22/24 1113)  BP: (!) 125/58 (05/22/24 1113)  SpO2: 95 % (05/22/24 1113) Vital Signs (24h Range):  Temp:  [98.2 °F (36.8 °C)-99.2 °F (37.3 °C)] 98.3 °F (36.8 °C)  Pulse:  [] 90  Resp:  [18-22] 20  SpO2:  [94 %-97 %] 95 %  BP: ()/(53-67) 125/58     Weight: 109.5 kg (241 lb 6.5 oz)  Body mass index is 37.81 kg/m².     Physical Exam  Vitals and nursing note reviewed.   Constitutional:       Appearance: She is well-developed.   HENT:      Head: Normocephalic and atraumatic.      Right Ear: External ear normal.      Left Ear: External ear normal.      Nose: Nose normal.   Eyes:      Extraocular Movements: Extraocular movements intact.      Conjunctiva/sclera: Conjunctivae normal.      Pupils: Pupils are equal, round, and reactive to light.   Cardiovascular:      Rate and Rhythm: Normal rate and regular rhythm.      Heart sounds: Normal heart sounds.   Pulmonary:      Effort: Pulmonary effort is normal. No respiratory distress.      Breath sounds: Rhonchi and rales (left base) present. No wheezing.   Abdominal:      General: Bowel sounds are normal.      Palpations: Abdomen is soft.   Skin:     General: Skin is warm and dry.   Neurological:      General: No focal deficit present.      Mental Status: She is alert and oriented to person, place, and time.   Psychiatric:         Mood and Affect: Mood normal.         Behavior: Behavior normal.              CRANIAL NERVES     CN III, IV, VI   Pupils are equal, round, and reactive to light.       Significant Labs: All pertinent labs within the past 24 hours have been reviewed.  ABGs: No results for input(s): "PH", "PCO2", "HCO3", "POCSATURATED", " ""BE", "TOTALHB", "COHB", "METHB", "O2HB", "POCFIO2", "PO2" in the last 48 hours.  CBC:   Recent Labs   Lab 05/21/24  1101 05/22/24  0413   WBC 14.88* 8.26   HGB 10.9* 8.8*   HCT 33.8* 28.1*   PLT 94* 81*     CMP:   Recent Labs   Lab 05/21/24  1101 05/22/24  0413    140   K 3.9 3.5    108   CO2 19* 23   * 126*   BUN 21* 15   CREATININE 1.0 0.7   CALCIUM 9.2 9.3   PROT 6.0 5.2*   ALBUMIN 3.4* 2.7*   BILITOT 1.3* 0.8   ALKPHOS 160* 121   AST 17 8*   ALT 56* 32   ANIONGAP 13 9     Lactic Acid:   Recent Labs   Lab 05/21/24  1100 05/21/24  1423   LACTATE 2.3* 1.2     Troponin:   Recent Labs   Lab 05/21/24  1101   TROPONINI <0.006     Urine Culture: No results for input(s): "LABURIN" in the last 48 hours.  Urine Studies:   Recent Labs   Lab 05/21/24  1241   COLORU Yellow   APPEARANCEUA Hazy*   PHUR 6.0   SPECGRAV <=1.005*   PROTEINUA Negative   GLUCUA Negative   KETONESU Negative   BILIRUBINUA Negative   OCCULTUA Negative   NITRITE Negative   UROBILINOGEN Negative   LEUKOCYTESUR 2+*   RBCUA 0   WBCUA 3   BACTERIA Occasional   SQUAMEPITHEL 8   HYALINECASTS 1       Significant Imaging: I have reviewed all pertinent imaging results/findings within the past 24 hours.  CT: I have reviewed all pertinent results/findings within the past 24 hours and my personal findings are:  see below  CXR: I have reviewed all pertinent results/findings within the past 24 hours and my personal findings are:  see below  Impression:     1. Multifocal pneumonia, greatest in the left lower lobe.  2. Multiple enlarged mediastinal and hilar lymph nodes, consistent with history of non-Hodgkin's lymphoma.  These are new from the prior CT of 12/15/2021, consistent with disease recurrence.  3. Splenomegaly appears stable compared to abdomen pelvis CT 03/13/2023.        CXR   Frontal portable chest demonstrates heterogeneous opacities in the mid and lower lung zones bilaterally.  No pleural fluid.  Cardiomediastinal silhouette is " unremarkable.  No osseous abnormality.     Impression:     Bilateral pneumonia.

## 2024-05-22 NOTE — ASSESSMENT & PLAN NOTE
"Patient has a diagnosis of pneumonia. The cause of the pneumonia is unknown at this time. The pneumonia is worsening due to cough /fever  . The patient has the following signs/symptoms of pneumonia: cough, sputum production, shortness of breath, and chest pain.   Current antimicrobial regimen consists of the antibiotics listed below. Will monitor patient closely and continue current treatment plan unchanged.    Antibiotics (From admission, onward)    Start     Stop Route Frequency Ordered    05/22/24 1245  levoFLOXacin 750 mg/150 mL IVPB 750 mg         -- IV Every 24 hours (non-standard times) 05/21/24 1548    05/22/24 0900  mupirocin 2 % ointment         05/27/24 0859 Nasl 2 times daily 05/22/24 0709    05/22/24 0400  vancomycin 1,500 mg in dextrose 5 % (D5W) 250 mL IVPB (Vial-Mate)         -- IV Every 12 hours (non-standard times) 05/21/24 1557    05/21/24 1626  vancomycin - pharmacy to dose  (vancomycin IVPB (PEDS and ADULTS))        Placed in "And" Linked Group    -- IV pharmacy to manage frequency 05/21/24 1526          Microbiology Results (last 7 days)     Procedure Component Value Units Date/Time    Culture, Respiratory with Gram Stain [[id number]] Collected: 05/21/24 2308    Order Status: Sent Specimen: Sputum, Expectorated Updated: 05/22/24 1020    Blood culture #1 **CANNOT BE ORDERED STAT** [[id number]] Collected: 05/21/24 1102    Order Status: Completed Specimen: Blood from Peripheral, Antecubital, Right Updated: 05/21/24 2115     Blood Culture, Routine No Growth to date    Blood culture #2 **CANNOT BE ORDERED STAT** [[id number]] Collected: 05/21/24 1109    Order Status: Completed Specimen: Blood from Peripheral, Wrist, Right Updated: 05/21/24 2115     Blood Culture, Routine No Growth to date    Influenza A & B by Molecular [[id number]] Collected: 05/21/24 1036    Order Status: Completed Specimen: Nasopharyngeal Swab Updated: 05/21/24 1108     Influenza A, Molecular Negative     Influenza B, Molecular " Negative     Flu A & B Source NP

## 2024-05-22 NOTE — PLAN OF CARE
[facility] - Med Surg (3rd Fl)  Initial Discharge Assessment       Primary Care Provider: John Lantigua PA-C    Admission Diagnosis: Cough [R05.9]  Fever [R50.9]  Pneumonia of both lungs due to infectious organism, unspecified part of lung [J18.9]  Pneumonia due to infectious organism, unspecified laterality, unspecified part of lung [J18.9]    Admission Date: 5/21/2024  Expected Discharge Date: 5/22/2024    Transition of Care Barriers: (P) None    Payor: /     Extended Emergency Contact Information  Primary Emergency Contact: Raheem Conway  Address: [address], [place] [id number] [place] [address]  Home Phone: [phone]  Mobile Phone: [phone]  Relation: Spouse    Discharge Plan A: (P) Home  Discharge Plan B: (P) Home with family      [facility] - [place], [place] - [address]  [address]  [place] [address]  Phone: [phone] Fax: [phone]    [facility]  [address]  [place] [address]  Phone: [phone] Fax: [phone]    [facility] - [place], [place] - [address] [facility] [address] [id number] [facility] [address]  [facility] [address]  Phone: [phone] Fax: [phone]      Initial Assessment (most recent)       Adult Discharge Assessment - 05/22/24 1055          Discharge Assessment    Assessment Type Discharge Planning Assessment (P)      Confirmed/corrected address, phone number and insurance Yes (P)      Confirmed Demographics Correct on Facesheet (P)      Source of Information patient (P)      Communicated JESSE with patient/caregiver Date not available/Unable to determine (P)      Reason For Admission sepsis due to pneumonie (P)      People in Home spouse;child(valerio), adult (P)      Facility Arrived From: home (P)      Do you expect to return to your current living situation? Yes (P)      Do you have help at home or someone to help you manage your care at home? Yes (P)      Who are your caregiver(s) and their phone number(s)?  Raheem, spouse, [phone] (P)      Prior to hospitilization cognitive status: Alert/Oriented (P)      Current cognitive status: Alert/Oriented (P)      Walking or Climbing Stairs Difficulty yes (P)      Walking or Climbing Stairs ambulation difficulty, dependent;transferring difficulty, dependent;stair climbing difficulty, dependent (P)      Mobility Management wheelchair, (P)      Dressing/Bathing Difficulty yes (P)      Dressing/Bathing bathing difficulty, assistance 1 person;dressing difficulty, assistance 1 person (P)      Dressing/Bathing Management daughter and spouse assist (P)      Home Accessibility wheelchair accessible (P)      Home Layout Able to live on 1st floor (P)      Equipment Currently Used at Home wheelchair (P)      Readmission within 30 days? No (P)      Patient currently being followed by outpatient case management? No (P)      Do you currently have service(s) that help you manage your care at home? No (P)      Do you take prescription medications? Yes (P)      Do you have prescription coverage? No (P)      Do you have any problems affording any of your prescribed medications? No (P)      Is the patient taking medications as prescribed? yes (P)      Who is going to help you get home at discharge? raheem, spouse (P)      How do you get to doctors appointments? car, drives self (P)      Are you on dialysis? No (P)      Do you take coumadin? No (P)      Discharge Plan A Home (P)      Discharge Plan B Home with family (P)      DME Needed Upon Discharge  none (P)      Discharge Plan discussed with: Patient (P)      Transition of Care Barriers None (P)                         Discharge assessment completed at bedside with patient. Patient reports she does not have insurance anymore. Patient stated she applied for Medicaid and was denied. Patient currently waiting for disability to contact her. Patient stated she used to have a hospital bed, but when her insurance dropped her she could not afford  the rental payment and they came  the hospital bed. Patient stated she has no needs at this time. SW to remain available should needs arise.

## 2024-05-22 NOTE — ASSESSMENT & PLAN NOTE
Patient's FSGs are controlled on current medication regimen.  Last A1c reviewed-   Lab Results   Component Value Date    HGBA1C 5.5 05/21/2024     Most recent fingerstick glucose reviewed-   Recent Labs   Lab 05/21/24  1715 05/21/24  1928 05/22/24  0706 05/22/24  1112   POCTGLUCOSE 128* 157* 116* 73     Current correctional scale  Medium  Maintain anti-hyperglycemic dose as follows-   Antihyperglycemics (From admission, onward)    Start     Stop Route Frequency Ordered    05/21/24 1648  insulin aspart U-100 pen 0-10 Units         -- SubQ Before meals & nightly PRN 05/21/24 1548        Hold Oral hypoglycemics while patient is in the hospital.

## 2024-05-22 NOTE — ASSESSMENT & PLAN NOTE
Patient was found to have thrombocytopenia, the likely etiology is secondary to sepsis/infection, will monitor the platelets Daily. Will transfuse if platelet count is <50k (if undergoing surgical procedure or have active bleeding). Hold DVT prophylaxis if platelets are <50k. The patient's platelet results have been reviewed and are listed below.  Recent Labs   Lab 05/22/24  0413   PLT 81*

## 2024-05-23 VITALS
BODY MASS INDEX: 37.89 KG/M2 | RESPIRATION RATE: 22 BRPM | DIASTOLIC BLOOD PRESSURE: 55 MMHG | TEMPERATURE: 98 F | OXYGEN SATURATION: 97 % | HEART RATE: 94 BPM | SYSTOLIC BLOOD PRESSURE: 107 MMHG | HEIGHT: 67 IN | WEIGHT: 241.38 LBS

## 2024-05-23 LAB
ALBUMIN SERPL BCP-MCNC: 2.8 G/DL (ref 3.5–5.2)
ALP SERPL-CCNC: 105 U/L (ref 55–135)
ALT SERPL W/O P-5'-P-CCNC: 23 U/L (ref 10–44)
ANION GAP SERPL CALC-SCNC: 10 MMOL/L (ref 8–16)
ANION GAP SERPL CALC-SCNC: 11 MMOL/L (ref 8–16)
AST SERPL-CCNC: 6 U/L (ref 10–40)
BASOPHILS # BLD AUTO: 0.02 K/UL (ref 0–0.2)
BASOPHILS NFR BLD: 0.4 % (ref 0–1.9)
BILIRUB SERPL-MCNC: 0.5 MG/DL (ref 0.1–1)
BUN SERPL-MCNC: 15 MG/DL (ref 6–20)
BUN SERPL-MCNC: 16 MG/DL (ref 6–20)
CALCIUM SERPL-MCNC: 9.3 MG/DL (ref 8.7–10.5)
CALCIUM SERPL-MCNC: 9.4 MG/DL (ref 8.7–10.5)
CHLORIDE SERPL-SCNC: 109 MMOL/L (ref 95–110)
CHLORIDE SERPL-SCNC: 109 MMOL/L (ref 95–110)
CO2 SERPL-SCNC: 21 MMOL/L (ref 23–29)
CO2 SERPL-SCNC: 21 MMOL/L (ref 23–29)
CREAT SERPL-MCNC: 0.7 MG/DL (ref 0.5–1.4)
CREAT SERPL-MCNC: 0.7 MG/DL (ref 0.5–1.4)
DIFFERENTIAL METHOD BLD: ABNORMAL
EOSINOPHIL # BLD AUTO: 0.1 K/UL (ref 0–0.5)
EOSINOPHIL NFR BLD: 1.4 % (ref 0–8)
ERYTHROCYTE [DISTWIDTH] IN BLOOD BY AUTOMATED COUNT: 16.1 % (ref 11.5–14.5)
EST. GFR  (NO RACE VARIABLE): >60 ML/MIN/1.73 M^2
EST. GFR  (NO RACE VARIABLE): >60 ML/MIN/1.73 M^2
FERRITIN SERPL-MCNC: 617 NG/ML (ref 20–300)
GLUCOSE SERPL-MCNC: 128 MG/DL (ref 70–110)
GLUCOSE SERPL-MCNC: 129 MG/DL (ref 70–110)
HCT VFR BLD AUTO: 27.5 % (ref 37–48.5)
HGB BLD-MCNC: 8.7 G/DL (ref 12–16)
IMM GRANULOCYTES # BLD AUTO: 0.02 K/UL (ref 0–0.04)
IMM GRANULOCYTES NFR BLD AUTO: 0.4 % (ref 0–0.5)
IRON SERPL-MCNC: 30 UG/DL (ref 30–160)
LYMPHOCYTES # BLD AUTO: 2 K/UL (ref 1–4.8)
LYMPHOCYTES NFR BLD: 40.1 % (ref 18–48)
MCH RBC QN AUTO: 26.8 PG (ref 27–31)
MCHC RBC AUTO-ENTMCNC: 31.6 G/DL (ref 32–36)
MCV RBC AUTO: 85 FL (ref 82–98)
MONOCYTES # BLD AUTO: 0.3 K/UL (ref 0.3–1)
MONOCYTES NFR BLD: 6.9 % (ref 4–15)
NEUTROPHILS # BLD AUTO: 2.5 K/UL (ref 1.8–7.7)
NEUTROPHILS NFR BLD: 50.8 % (ref 38–73)
NRBC BLD-RTO: 0 /100 WBC
PLATELET # BLD AUTO: 83 K/UL (ref 150–450)
PMV BLD AUTO: 10.6 FL (ref 9.2–12.9)
POCT GLUCOSE: 104 MG/DL (ref 70–110)
POCT GLUCOSE: 115 MG/DL (ref 70–110)
POCT GLUCOSE: 94 MG/DL (ref 70–110)
POTASSIUM SERPL-SCNC: 3.7 MMOL/L (ref 3.5–5.1)
POTASSIUM SERPL-SCNC: 3.7 MMOL/L (ref 3.5–5.1)
PROT SERPL-MCNC: 5.3 G/DL (ref 6–8.4)
RBC # BLD AUTO: 3.25 M/UL (ref 4–5.4)
RETICS/RBC NFR AUTO: 2.6 % (ref 0.5–2.5)
SATURATED IRON: 15 % (ref 20–50)
SODIUM SERPL-SCNC: 140 MMOL/L (ref 136–145)
SODIUM SERPL-SCNC: 141 MMOL/L (ref 136–145)
TOTAL IRON BINDING CAPACITY: 200 UG/DL (ref 250–450)
TRANSFERRIN SERPL-MCNC: 135 MG/DL (ref 200–375)
VANCOMYCIN TROUGH SERPL-MCNC: 16.7 UG/ML (ref 10–22)
WBC # BLD AUTO: 4.91 K/UL (ref 3.9–12.7)

## 2024-05-23 PROCEDURE — 25000003 PHARM REV CODE 250: Performed by: NURSE PRACTITIONER

## 2024-05-23 PROCEDURE — 25000003 PHARM REV CODE 250: Performed by: INTERNAL MEDICINE

## 2024-05-23 PROCEDURE — 25000242 PHARM REV CODE 250 ALT 637 W/ HCPCS: Performed by: NURSE PRACTITIONER

## 2024-05-23 PROCEDURE — 83540 ASSAY OF IRON: CPT | Performed by: STUDENT IN AN ORGANIZED HEALTH CARE EDUCATION/TRAINING PROGRAM

## 2024-05-23 PROCEDURE — 94760 N-INVAS EAR/PLS OXIMETRY 1: CPT

## 2024-05-23 PROCEDURE — 99900035 HC TECH TIME PER 15 MIN (STAT)

## 2024-05-23 PROCEDURE — 36415 COLL VENOUS BLD VENIPUNCTURE: CPT | Performed by: INTERNAL MEDICINE

## 2024-05-23 PROCEDURE — 80048 BASIC METABOLIC PNL TOTAL CA: CPT | Performed by: INTERNAL MEDICINE

## 2024-05-23 PROCEDURE — 99238 HOSP IP/OBS DSCHRG MGMT 30/<: CPT | Mod: ,,, | Performed by: PHYSICIAN ASSISTANT

## 2024-05-23 PROCEDURE — 80202 ASSAY OF VANCOMYCIN: CPT | Performed by: INTERNAL MEDICINE

## 2024-05-23 PROCEDURE — 63600175 PHARM REV CODE 636 W HCPCS: Performed by: INTERNAL MEDICINE

## 2024-05-23 PROCEDURE — 85045 AUTOMATED RETICULOCYTE COUNT: CPT | Performed by: STUDENT IN AN ORGANIZED HEALTH CARE EDUCATION/TRAINING PROGRAM

## 2024-05-23 PROCEDURE — 85025 COMPLETE CBC W/AUTO DIFF WBC: CPT | Performed by: STUDENT IN AN ORGANIZED HEALTH CARE EDUCATION/TRAINING PROGRAM

## 2024-05-23 PROCEDURE — 80053 COMPREHEN METABOLIC PANEL: CPT | Performed by: STUDENT IN AN ORGANIZED HEALTH CARE EDUCATION/TRAINING PROGRAM

## 2024-05-23 PROCEDURE — 82728 ASSAY OF FERRITIN: CPT | Performed by: STUDENT IN AN ORGANIZED HEALTH CARE EDUCATION/TRAINING PROGRAM

## 2024-05-23 PROCEDURE — 25000003 PHARM REV CODE 250: Performed by: STUDENT IN AN ORGANIZED HEALTH CARE EDUCATION/TRAINING PROGRAM

## 2024-05-23 RX ORDER — LEVOFLOXACIN 750 MG/1
750 TABLET ORAL DAILY
Qty: 3 TABLET | Refills: 0 | Status: SHIPPED | OUTPATIENT
Start: 2024-05-24 | End: 2024-05-27

## 2024-05-23 RX ORDER — LEVOFLOXACIN 750 MG/1
750 TABLET ORAL DAILY
Qty: 3 TABLET | Refills: 0 | Status: SHIPPED | OUTPATIENT
Start: 2024-05-24 | End: 2024-05-23

## 2024-05-23 RX ORDER — CELECOXIB 100 MG/1
100 CAPSULE ORAL DAILY PRN
Start: 2024-05-23

## 2024-05-23 RX ADMIN — PANTOPRAZOLE SODIUM 40 MG: 40 TABLET, DELAYED RELEASE ORAL at 09:05

## 2024-05-23 RX ADMIN — VANCOMYCIN HYDROCHLORIDE 1500 MG: 1.5 INJECTION, POWDER, LYOPHILIZED, FOR SOLUTION INTRAVENOUS at 03:05

## 2024-05-23 RX ADMIN — MONTELUKAST 10 MG: 10 TABLET, FILM COATED ORAL at 09:05

## 2024-05-23 RX ADMIN — LEVOFLOXACIN 750 MG: 750 INJECTION, SOLUTION INTRAVENOUS at 01:05

## 2024-05-23 RX ADMIN — FLUTICASONE PROPIONATE 50 MCG: 50 SPRAY, METERED NASAL at 09:05

## 2024-05-23 NOTE — PLAN OF CARE
Problem: Adult Inpatient Plan of Care  Goal: Plan of Care Review  Outcome: Progressing  Goal: Patient-Specific Goal (Individualized)  Outcome: Progressing  Goal: Absence of Hospital-Acquired Illness or Injury  Outcome: Progressing  Goal: Optimal Comfort and Wellbeing  Outcome: Progressing  Goal: Readiness for Transition of Care  Outcome: Progressing     Problem: Diabetes Comorbidity  Goal: Blood Glucose Level Within Targeted Range  Outcome: Progressing     Problem: Sepsis/Septic Shock  Goal: Optimal Coping  Outcome: Progressing  Goal: Absence of Bleeding  Outcome: Progressing  Goal: Blood Glucose Level Within Targeted Range  Outcome: Progressing  Goal: Absence of Infection Signs and Symptoms  Outcome: Progressing  Goal: Optimal Nutrition Intake  Outcome: Progressing     Problem: Pneumonia  Goal: Fluid Balance  Outcome: Progressing  Goal: Resolution of Infection Signs and Symptoms  Outcome: Progressing  Goal: Effective Oxygenation and Ventilation  Outcome: Progressing     Problem: Skin Injury Risk Increased  Goal: Skin Health and Integrity  Outcome: Progressing     Problem: Fall Injury Risk  Goal: Absence of Fall and Fall-Related Injury  Outcome: Progressing

## 2024-05-23 NOTE — SUBJECTIVE & OBJECTIVE
Past Medical History:   Diagnosis Date    Acute carpal tunnel syndrome of left wrist     Biliary colic     Diabetes mellitus     Encounter for blood transfusion     History of chemotherapy     Incisional hernia     Large cell (diffuse) non-Hodgkin's lymphoma     Stem cells transplant status        Past Surgical History:   Procedure Laterality Date    ARTHROSCOPY OF KNEE Bilateral 03/30/2023    Procedure: ARTHROSCOPY, KNEE;  Surgeon: Rudolph Murrell MD;  Location: [facility];  Service: Orthopedics;  Laterality: Bilateral;    BREAST BIOPSY Left     lymph node biopsy, benign    BREAST SURGERY      CHOLECYSTECTOMY      COLD KNIFE CONIZATION OF CERVIX N/A 03/08/2021    Procedure: CONE BIOPSY, CERVIX, USING COLD KNIFE;  Surgeon: Evelyn Wheeler MD;  Location: [facility] OR;  Service: OB/GYN;  Laterality: N/A;    COLONOSCOPY N/A 12/21/2022    Procedure: COLONOSCOPY;  Surgeon: Annamarie Jane MD;  Location: [facility];  Service: Endoscopy;  Laterality: N/A;    HAND ARTHROTOMY Right 03/30/2023    Procedure: OPEN  ARTHROTOMY WRIST;  Surgeon: Rudolph Murrell MD;  Location: [facility];  Service: Orthopedics;  Laterality: Right;    HERNIA REPAIR      incisional    LUNG BIOPSY      lymphnode biopsy      MEDIPORT REMOVAL Left 01/30/2020    Procedure: REMOVAL, CATHETER, CENTRAL VENOUS, TUNNELED, WITH PORT;  Surgeon: Luis Bogran-Reyes, MD;  Location: [facility];  Service: General;  Laterality: Left;    PORTACATH PLACEMENT Left     REVISION OF SCAR  06/22/2021    Procedure: REVISION, SCAR;  Surgeon: Otis Grimes MD;  Location: [address];  Service: General;;    SKIN BIOPSY      TUBAL LIGATION      UMBILICAL HERNIA REPAIR         Review of patient's allergies indicates:   Allergen Reactions    Tetanus vaccines and toxoid Rash       No current facility-administered medications on file prior to encounter.     Current Outpatient Medications on File Prior to Encounter   Medication Sig    acetaminophen (TYLENOL) 325 MG tablet Take  2 tablets (650 mg total) by mouth every 6 (six) hours as needed.    bisoprolol (ZEBETA) 5 MG tablet Take 5 mg by mouth once daily.    fluticasone (FLONASE) 50 mcg/actuation nasal spray 1 spray by Each Nare route once daily.    melatonin 10 mg Cap Take 10 mg by mouth nightly as needed.    montelukast (SINGULAIR) 10 mg tablet Take 10 mg by mouth once daily.    ondansetron (ZOFRAN) 4 MG tablet Take 4 mg by mouth every 8 (eight) hours as needed.    pantoprazole (PROTONIX) 40 MG tablet Take 40 mg by mouth once daily.    [DISCONTINUED] celecoxib (CELEBREX) 100 MG capsule Take 100 mg by mouth once daily.    [DISCONTINUED] celecoxib (CELEBREX) 200 MG capsule Take 200 mg by mouth daily as needed.    albuterol (PROVENTIL/VENTOLIN HFA) 90 mcg/actuation inhaler Ventolin HFA 90 mcg/actuation aerosol inhaler   INHALE 2 PUFFS BY MOUTH 3 TIMES DAILY AS NEEDED.     Family History       Problem Relation (Age of Onset)    Breast cancer Maternal Aunt, Maternal Aunt    Cancer Paternal Grandmother    Colon cancer Paternal Grandmother    Diabetes Mother, Father    Heart block Mother    Heart failure Father    Hypertension Mother, Father    Kidney disease Mother    Lung cancer Father          Tobacco Use    Smoking status: Never    Smokeless tobacco: Never   Substance and Sexual Activity    Alcohol use: Not Currently    Drug use: No    Sexual activity: Yes     Partners: Male     Birth control/protection: See Surgical Hx     Comment: with one partner for 24 years     Review of Systems   Constitutional:  Negative for appetite change, chills, fatigue and fever.   HENT:  Negative for congestion, ear discharge, ear pain, rhinorrhea, sinus pressure and sore throat.    Respiratory:  Negative for cough, choking, chest tightness, shortness of breath (resolved) and wheezing.         With coughing and increased activity   Cardiovascular:  Negative for chest pain and palpitations.   Gastrointestinal:  Negative for constipation, diarrhea, nausea and  "vomiting.   Musculoskeletal:  Negative for joint swelling and myalgias.   Skin:  Negative for rash and wound.   Neurological:  Negative for dizziness, syncope, weakness, light-headedness and numbness.     Objective:     Vital Signs (Most Recent):  Temp: 98.2 °F (36.8 °C) (05/23/24 1214)  Pulse: 92 (05/23/24 1214)  Resp: 20 (05/23/24 1214)  BP: 130/64 (05/23/24 1214)  SpO2: 97 % (05/23/24 1214) Vital Signs (24h Range):  Temp:  [97.7 °F (36.5 °C)-98.2 °F (36.8 °C)] 98.2 °F (36.8 °C)  Pulse:  [80-93] 92  Resp:  [18-20] 20  SpO2:  [95 %-100 %] 97 %  BP: (113-130)/(58-68) 130/64     Weight: 109.5 kg (241 lb 6.5 oz)  Body mass index is 37.81 kg/m².     Physical Exam  Vitals and nursing note reviewed.   Constitutional:       Appearance: She is well-developed.   HENT:      Head: Normocephalic and atraumatic.      Right Ear: External ear normal.      Left Ear: External ear normal.      Nose: Nose normal.   Eyes:      Extraocular Movements: Extraocular movements intact.      Conjunctiva/sclera: Conjunctivae normal.      Pupils: Pupils are equal, round, and reactive to light.   Cardiovascular:      Rate and Rhythm: Normal rate and regular rhythm.      Heart sounds: Normal heart sounds.   Pulmonary:      Effort: Pulmonary effort is normal. No respiratory distress.      Breath sounds: No wheezing, rhonchi or rales.   Abdominal:      General: Bowel sounds are normal.      Palpations: Abdomen is soft.   Skin:     General: Skin is warm and dry.   Neurological:      General: No focal deficit present.      Mental Status: She is alert and oriented to person, place, and time.   Psychiatric:         Mood and Affect: Mood normal.         Behavior: Behavior normal.              CRANIAL NERVES     CN III, IV, VI   Pupils are equal, round, and reactive to light.       Significant Labs: All pertinent labs within the past 24 hours have been reviewed.  ABGs: No results for input(s): "PH", "PCO2", "HCO3", "POCSATURATED", "BE", "TOTALHB", " ""COHB", "METHB", "O2HB", "POCFIO2", "PO2" in the last 48 hours.  CBC:   Recent Labs   Lab 05/22/24  0413 05/23/24  0316   WBC 8.26 4.91   HGB 8.8* 8.7*   HCT 28.1* 27.5*   PLT 81* 83*     CMP:   Recent Labs   Lab 05/22/24  0413 05/23/24  0316    140  141   K 3.5 3.7  3.7    109  109   CO2 23 21*  21*   * 128*  129*   BUN 15 16  15   CREATININE 0.7 0.7  0.7   CALCIUM 9.3 9.3  9.4   PROT 5.2* 5.3*   ALBUMIN 2.7* 2.8*   BILITOT 0.8 0.5   ALKPHOS 121 105   AST 8* 6*   ALT 32 23   ANIONGAP 9 10  11     Lactic Acid:   Recent Labs   Lab 05/21/24  1423   LACTATE 1.2     Troponin:   No results for input(s): "TROPONINI", "TROPONINIHS" in the last 48 hours.    Urine Culture: No results for input(s): "LABURIN" in the last 48 hours.  Urine Studies:   No results for input(s): "COLORU", "APPEARANCEUA", "PHUR", "SPECGRAV", "PROTEINUA", "GLUCUA", "KETONESU", "BILIRUBINUA", "OCCULTUA", "NITRITE", "UROBILINOGEN", "LEUKOCYTESUR", "RBCUA", "WBCUA", "BACTERIA", "SQUAMEPITHEL", "HYALINECASTS" in the last 48 hours.    Invalid input(s): "WRIGHTSUR"      Significant Imaging: I have reviewed all pertinent imaging results/findings within the past 24 hours.  CT: I have reviewed all pertinent results/findings within the past 24 hours and my personal findings are:  see below  CXR: I have reviewed all pertinent results/findings within the past 24 hours and my personal findings are:  see below  Impression:     1. Multifocal pneumonia, greatest in the left lower lobe.  2. Multiple enlarged mediastinal and hilar lymph nodes, consistent with history of non-Hodgkin's lymphoma.  These are new from the prior CT of 12/15/2021, consistent with disease recurrence.  3. Splenomegaly appears stable compared to abdomen pelvis CT 03/13/2023.        CXR   Frontal portable chest demonstrates heterogeneous opacities in the mid and lower lung zones bilaterally.  No pleural fluid.  Cardiomediastinal silhouette is unremarkable.  No osseous " abnormality.     Impression:     Bilateral pneumonia.

## 2024-05-23 NOTE — ASSESSMENT & PLAN NOTE
Chronic, controlled. Latest blood pressure and vitals reviewed-     Temp:  [97.7 °F (36.5 °C)-98.2 °F (36.8 °C)]   Pulse:  [80-93]   Resp:  [18-20]   BP: (113-130)/(58-68)   SpO2:  [95 %-100 %] .   Home meds for hypertension were reviewed and noted below.   Hypertension Medications               bisoprolol (ZEBETA) 5 MG tablet Take 5 mg by mouth once daily.            While in the hospital, will manage blood pressure as follows; Continue home antihypertensive regimen    Betablocker added; ziac not available in pharmacy : metoprolol

## 2024-05-23 NOTE — PROGRESS NOTES
[facility] Surg ([facility])  [facility] Medicine  Progress Note    Patient Name: Hedy Conway  MRN: [id number]  Patient Class: IP- Inpatient   Admission Date: 5/21/2024  Length of Stay: 1 days  Attending Physician: Uche Blum MD  Primary Care Provider: John Lantigua PA-C        Subjective:     Principal Problem:Sepsis due to pneumonia        HPI:    Hedy Conway is a 49 y.o. female   with a history of carpal tunnel  biliary colic diabetes non-Hodgkin's lymphoma presents to be evaluated for cough and fever that began yesterday.  Patient reports T-max of 103°.  Also reports productive cough with clear sputum she reports an occasional blood streaks in her sputum denies any elida  hemoptysis.  Reports she has been taking ibuprofen and Tylenol which does improve or fever.  Denies shortness a breath or chest pain.  Does report some left shoulder pain and back pain.  Unsure of any recent contacts.  Denies any urinary complaints.      Work up in ER showed ;high white count : high lactate : bilateral pneumonia .  She is being admitted for IV antibiotics   Oncology history :  Dx: Stage IV DLBCL recurrence 2011 s/p salvage chemo and autoSCT   Onc Hx:  <Hx obtained from Dr. Mora's note dated 11.25.2019>  Patient was diagnosed with stage IV DLBCL recurrence in 2011, then treated with salvage chemotherapy and auto-HSCT.      Since March 2012, she has been told that she likely has recurrent disease based on scans showing enlarged retroperitoneal nodes and splenomegaly, but she has remained clinically well and asymptomatic all that time. Biopsy attempted in past was non-diagnostic.       CAT scan late last year showed several sub-cm pulmonary nodules, some retroperitoneal nodes, and some splenomegaly, little to no change over several months.      She was referred to pulmonary; bronch done and no evidence of lymphoma was seen. Lymphocytes appeared to be reactive in nature. She was given empiric abx for presumed  infection, repeat CAT scan showed improvement in nodularity; no change to other lesions.       Overview/Hospital Course:  5/22/24: no acute events overnight. Remains stable on RA. Some blood tinged sputum, drop in H&H. No new complaints.     5/23 PT on room air with good lung auscultation.  Will discharge with po levofloxacin.  F/u with PCP.      Past Medical History:   Diagnosis Date    Acute carpal tunnel syndrome of left wrist     Biliary colic     Diabetes mellitus     Encounter for blood transfusion     History of chemotherapy     Incisional hernia     Large cell (diffuse) non-Hodgkin's lymphoma     Stem cells transplant status        Past Surgical History:   Procedure Laterality Date    ARTHROSCOPY OF KNEE Bilateral 03/30/2023    Procedure: ARTHROSCOPY, KNEE;  Surgeon: Rudolph Murrell MD;  Location: [facility];  Service: Orthopedics;  Laterality: Bilateral;    BREAST BIOPSY Left     lymph node biopsy, benign    BREAST SURGERY      CHOLECYSTECTOMY      COLD KNIFE CONIZATION OF CERVIX N/A 03/08/2021    Procedure: CONE BIOPSY, CERVIX, USING COLD KNIFE;  Surgeon: Evelyn Wheeler MD;  Location: [facility];  Service: OB/GYN;  Laterality: N/A;    COLONOSCOPY N/A 12/21/2022    Procedure: COLONOSCOPY;  Surgeon: Annaamrie Jane MD;  Location: [facility];  Service: Endoscopy;  Laterality: N/A;    HAND ARTHROTOMY Right 03/30/2023    Procedure: OPEN  ARTHROTOMY WRIST;  Surgeon: Rudolph Murrell MD;  Location: [facility];  Service: Orthopedics;  Laterality: Right;    HERNIA REPAIR      incisional    LUNG BIOPSY      lymphnode biopsy      MEDIPORT REMOVAL Left 01/30/2020    Procedure: REMOVAL, CATHETER, CENTRAL VENOUS, TUNNELED, WITH PORT;  Surgeon: Luis Bogran-Reyes, MD;  Location: [facility];  Service: General;  Laterality: Left;    PORTACATH PLACEMENT Left     REVISION OF SCAR  06/22/2021    Procedure: REVISION, SCAR;  Surgeon: Otis Grimes MD;  Location: [address];  Service: General;;    SKIN BIOPSY      TUBAL  LIGATION      UMBILICAL HERNIA REPAIR         Review of patient's allergies indicates:   Allergen Reactions    Tetanus vaccines and toxoid Rash       No current facility-administered medications on file prior to encounter.     Current Outpatient Medications on File Prior to Encounter   Medication Sig    acetaminophen (TYLENOL) 325 MG tablet Take 2 tablets (650 mg total) by mouth every 6 (six) hours as needed.    bisoprolol (ZEBETA) 5 MG tablet Take 5 mg by mouth once daily.    fluticasone (FLONASE) 50 mcg/actuation nasal spray 1 spray by Each Nare route once daily.    melatonin 10 mg Cap Take 10 mg by mouth nightly as needed.    montelukast (SINGULAIR) 10 mg tablet Take 10 mg by mouth once daily.    ondansetron (ZOFRAN) 4 MG tablet Take 4 mg by mouth every 8 (eight) hours as needed.    pantoprazole (PROTONIX) 40 MG tablet Take 40 mg by mouth once daily.    [DISCONTINUED] celecoxib (CELEBREX) 100 MG capsule Take 100 mg by mouth once daily.    [DISCONTINUED] celecoxib (CELEBREX) 200 MG capsule Take 200 mg by mouth daily as needed.    albuterol (PROVENTIL/VENTOLIN HFA) 90 mcg/actuation inhaler Ventolin HFA 90 mcg/actuation aerosol inhaler   INHALE 2 PUFFS BY MOUTH 3 TIMES DAILY AS NEEDED.     Family History       Problem Relation (Age of Onset)    Breast cancer Maternal Aunt, Maternal Aunt    Cancer Paternal Grandmother    Colon cancer Paternal Grandmother    Diabetes Mother, Father    Heart block Mother    Heart failure Father    Hypertension Mother, Father    Kidney disease Mother    Lung cancer Father          Tobacco Use    Smoking status: Never    Smokeless tobacco: Never   Substance and Sexual Activity    Alcohol use: Not Currently    Drug use: No    Sexual activity: Yes     Partners: Male     Birth control/protection: See Surgical Hx     Comment: with one partner for 24 years     Review of Systems   Constitutional:  Negative for appetite change, chills, fatigue and fever.   HENT:  Negative for congestion, ear  discharge, ear pain, rhinorrhea, sinus pressure and sore throat.    Respiratory:  Negative for cough, choking, chest tightness, shortness of breath (resolved) and wheezing.         With coughing and increased activity   Cardiovascular:  Negative for chest pain and palpitations.   Gastrointestinal:  Negative for constipation, diarrhea, nausea and vomiting.   Musculoskeletal:  Negative for joint swelling and myalgias.   Skin:  Negative for rash and wound.   Neurological:  Negative for dizziness, syncope, weakness, light-headedness and numbness.     Objective:     Vital Signs (Most Recent):  Temp: 98.2 °F (36.8 °C) (05/23/24 1214)  Pulse: 92 (05/23/24 1214)  Resp: 20 (05/23/24 1214)  BP: 130/64 (05/23/24 1214)  SpO2: 97 % (05/23/24 1214) Vital Signs (24h Range):  Temp:  [97.7 °F (36.5 °C)-98.2 °F (36.8 °C)] 98.2 °F (36.8 °C)  Pulse:  [80-93] 92  Resp:  [18-20] 20  SpO2:  [95 %-100 %] 97 %  BP: (113-130)/(58-68) 130/64     Weight: 109.5 kg (241 lb 6.5 oz)  Body mass index is 37.81 kg/m².     Physical Exam  Vitals and nursing note reviewed.   Constitutional:       Appearance: She is well-developed.   HENT:      Head: Normocephalic and atraumatic.      Right Ear: External ear normal.      Left Ear: External ear normal.      Nose: Nose normal.   Eyes:      Extraocular Movements: Extraocular movements intact.      Conjunctiva/sclera: Conjunctivae normal.      Pupils: Pupils are equal, round, and reactive to light.   Cardiovascular:      Rate and Rhythm: Normal rate and regular rhythm.      Heart sounds: Normal heart sounds.   Pulmonary:      Effort: Pulmonary effort is normal. No respiratory distress.      Breath sounds: No wheezing, rhonchi or rales.   Abdominal:      General: Bowel sounds are normal.      Palpations: Abdomen is soft.   Skin:     General: Skin is warm and dry.   Neurological:      General: No focal deficit present.      Mental Status: She is alert and oriented to person, place, and time.   Psychiatric:     "     Mood and Affect: Mood normal.         Behavior: Behavior normal.              CRANIAL NERVES     CN III, IV, VI   Pupils are equal, round, and reactive to light.       Significant Labs: All pertinent labs within the past 24 hours have been reviewed.  ABGs: No results for input(s): "PH", "PCO2", "HCO3", "POCSATURATED", "BE", "TOTALHB", "COHB", "METHB", "O2HB", "POCFIO2", "PO2" in the last 48 hours.  CBC:   Recent Labs   Lab 05/22/24 0413 05/23/24  0316   WBC 8.26 4.91   HGB 8.8* 8.7*   HCT 28.1* 27.5*   PLT 81* 83*     CMP:   Recent Labs   Lab 05/22/24 0413 05/23/24 0316    140  141   K 3.5 3.7  3.7    109  109   CO2 23 21*  21*   * 128*  129*   BUN 15 16  15   CREATININE 0.7 0.7  0.7   CALCIUM 9.3 9.3  9.4   PROT 5.2* 5.3*   ALBUMIN 2.7* 2.8*   BILITOT 0.8 0.5   ALKPHOS 121 105   AST 8* 6*   ALT 32 23   ANIONGAP 9 10  11     Lactic Acid:   Recent Labs   Lab 05/21/24  1423   LACTATE 1.2     Troponin:   No results for input(s): "TROPONINI", "TROPONINIHS" in the last 48 hours.    Urine Culture: No results for input(s): "LABURIN" in the last 48 hours.  Urine Studies:   No results for input(s): "COLORU", "APPEARANCEUA", "PHUR", "SPECGRAV", "PROTEINUA", "GLUCUA", "KETONESU", "BILIRUBINUA", "OCCULTUA", "NITRITE", "UROBILINOGEN", "LEUKOCYTESUR", "RBCUA", "WBCUA", "BACTERIA", "SQUAMEPITHEL", "HYALINECASTS" in the last 48 hours.    Invalid input(s): "WRIGHTSUR"      Significant Imaging: I have reviewed all pertinent imaging results/findings within the past 24 hours.  CT: I have reviewed all pertinent results/findings within the past 24 hours and my personal findings are:  see below  CXR: I have reviewed all pertinent results/findings within the past 24 hours and my personal findings are:  see below  Impression:     1. Multifocal pneumonia, greatest in the left lower lobe.  2. Multiple enlarged mediastinal and hilar lymph nodes, consistent with history of non-Hodgkin's lymphoma.  These are " "new from the prior CT of 12/15/2021, consistent with disease recurrence.  3. Splenomegaly appears stable compared to abdomen pelvis CT 03/13/2023.        CXR   Frontal portable chest demonstrates heterogeneous opacities in the mid and lower lung zones bilaterally.  No pleural fluid.  Cardiomediastinal silhouette is unremarkable.  No osseous abnormality.     Impression:     Bilateral pneumonia.        Assessment/Plan:      * Sepsis due to pneumonia  This patient does have evidence of infective focus  My overall impression is sepsis.  Source: Respiratory  Antibiotics given-   Antibiotics (72h ago, onward)      Start     Stop Route Frequency Ordered    05/22/24 1245  levoFLOXacin 750 mg/150 mL IVPB 750 mg         -- IV Every 24 hours (non-standard times) 05/21/24 1548    05/22/24 0900  mupirocin 2 % ointment         05/27/24 0859 Nasl 2 times daily 05/22/24 0709    05/22/24 0400  vancomycin 1,500 mg in dextrose 5 % (D5W) 250 mL IVPB (Vial-Mate)         -- IV Every 12 hours (non-standard times) 05/21/24 1557    05/21/24 1626  vancomycin - pharmacy to dose  (vancomycin IVPB (PEDS and ADULTS))        Placed in "And" Linked Group    -- IV pharmacy to manage frequency 05/21/24 1526          Latest lactate reviewed-  Recent Labs   Lab 05/21/24  1423   LACTATE 1.2       Organ dysfunction indicated by Acute kidney injury and Thrombocytopenia     Fluid challenge Actual Body weight- Patient will receive 30ml/kg actual body weight to calculate fluid bolus for treatment of septic shock.     Blood cultures NGTD  Discharge with course of abx     Pneumonia of both lungs due to infectious organism  Patient has a diagnosis of pneumonia. The cause of the pneumonia is unknown at this time. The pneumonia is worsening due to cough /fever  . The patient has the following signs/symptoms of pneumonia: cough, sputum production, shortness of breath, and chest pain.   Current antimicrobial regimen consists of the antibiotics listed below. Will " "monitor patient closely and continue current treatment plan unchanged.    Antibiotics (From admission, onward)      Start     Stop Route Frequency Ordered    05/22/24 1245  levoFLOXacin 750 mg/150 mL IVPB 750 mg         -- IV Every 24 hours (non-standard times) 05/21/24 1548    05/22/24 0900  mupirocin 2 % ointment         05/27/24 0859 Nasl 2 times daily 05/22/24 0709    05/22/24 0400  vancomycin 1,500 mg in dextrose 5 % (D5W) 250 mL IVPB (Vial-Mate)         -- IV Every 12 hours (non-standard times) 05/21/24 1557    05/21/24 1626  vancomycin - pharmacy to dose  (vancomycin IVPB (PEDS and ADULTS))        Placed in "And" Linked Group    -- IV pharmacy to manage frequency 05/21/24 1526            Microbiology Results (last 7 days)       Procedure Component Value Units Date/Time    Blood culture #2 **CANNOT BE ORDERED STAT** [[id number]] Collected: 05/21/24 1109    Order Status: Completed Specimen: Blood from Peripheral, Wrist, Right Updated: 05/22/24 1612     Blood Culture, Routine No Growth to date      No Growth to date    Blood culture #1 **CANNOT BE ORDERED STAT** [[id number]] Collected: 05/21/24 1102    Order Status: Completed Specimen: Blood from Peripheral, Antecubital, Right Updated: 05/22/24 1612     Blood Culture, Routine No Growth to date      No Growth to date    Culture, Respiratory with Gram Stain [[id number]] Collected: 05/21/24 2308    Order Status: Completed Specimen: Sputum, Expectorated Updated: 05/22/24 1202     Gram Stain (Respiratory) <10 epithelial cells per low power field.     Gram Stain (Respiratory) Few WBC's     Gram Stain (Respiratory) Rare Gram positive cocci    Influenza A & B by Molecular [[id number]] Collected: 05/21/24 1036    Order Status: Completed Specimen: Nasopharyngeal Swab Updated: 05/21/24 1108     Influenza A, Molecular Negative     Influenza B, Molecular Negative     Flu A & B Source NP            CT chest with multifocal pneumonia     Essential hypertension  Chronic, " controlled. Latest blood pressure and vitals reviewed-     Temp:  [97.7 °F (36.5 °C)-98.2 °F (36.8 °C)]   Pulse:  [80-93]   Resp:  [18-20]   BP: (113-130)/(58-68)   SpO2:  [95 %-100 %] .   Home meds for hypertension were reviewed and noted below.   Hypertension Medications               bisoprolol (ZEBETA) 5 MG tablet Take 5 mg by mouth once daily.            While in the hospital, will manage blood pressure as follows; Continue home antihypertensive regimen    Betablocker added; ziac not available in pharmacy : metoprolol        Type 2 diabetes mellitus without complication, without long-term current use of insulin  Patient's FSGs are controlled on current medication regimen.  Last A1c reviewed-   Lab Results   Component Value Date    HGBA1C 5.5 05/21/2024     Most recent fingerstick glucose reviewed-   Recent Labs   Lab 05/22/24  1613 05/22/24  2104 05/23/24  0721 05/23/24  1215   POCTGLUCOSE 94 112* 115* 94       Current correctional scale  Medium  Maintain anti-hyperglycemic dose as follows-   Antihyperglycemics (From admission, onward)      Start     Stop Route Frequency Ordered    05/21/24 1648  insulin aspart U-100 pen 0-10 Units         -- SubQ Before meals & nightly PRN 05/21/24 1548          Hold Oral hypoglycemics while patient is in the hospital.    Thrombocytopenia  Patient was found to have thrombocytopenia, the likely etiology is secondary to sepsis/infection, will monitor the platelets Daily. Will transfuse if platelet count is <50k (if undergoing surgical procedure or have active bleeding). Hold DVT prophylaxis if platelets are <50k. The patient's platelet results have been reviewed and are listed below.  Recent Labs   Lab 05/23/24  0316   PLT 83*           History of Large cell (diffuse) non-Hodgkin's lymphoma    Dx: Stage IV DLBCL recurrence 2011 s/p salvage chemo and autoSCT     Onc Hx:  <Hx obtained from Dr. Mora's note dated 11.25.2019>  Patient was diagnosed with stage IV DLBCL recurrence  in 2011, then treated with salvage chemotherapy and auto-HSCT.      Since March 2012, she has been told that she likely has recurrent disease based on scans showing enlarged retroperitoneal nodes and splenomegaly, but she has remained clinically well and asymptomatic all that time. Biopsy attempted in past was non-diagnostic.       CAT scan late last year showed several sub-cm pulmonary nodules, some retroperitoneal nodes, and some splenomegaly, little to no change over several months.      She was referred to pulmonary; bronch done and no evidence of lymphoma was seen. Lymphocytes appeared to be reactive in nature. She was given empiric abx for presumed infection, repeat CAT scan showed improvement in nodularity; no change to other lesions.        11.25.2019 HO eval  01.30.2020 Port removal  05.20.2020 HO eval - telehealth  11.17.2020 HO eval  06.21.2021 - 06.26.2021 INpatient stay for PNA, Bacteremia & Obstructed Incarcerated Hernia Repair  06.30.2021 HO eval, surveillance   12.15.2021 CT CAP  -Interval resolution of the left-sided pleural effusions since the prior CT scan of the chest from Abigail 10, 2021.  -Decrease in the airspace consolidation in the lungs bilaterally with persistent noncalcified nodules in the lungs bilaterally.  -Splenomegaly.  -Persistent mildly prominent lymph nodes in the retroperitoneum and enlarged lymph nodes in the right paratracheal and subcarinal regions.  -Postoperative changes in the midline of the anterior abdominal and pelvic wall.  -Possible hemangioma in the right lobe of the liver. This could be further assessed with an MRI examination of the abdomen with and without intravenous contrast to rule out alternative etiologies.  12.29.2021 HO eval, surveillance   Screening MMG: Left Normal. R breast asymmetry at retroareolar anterior position: BiRADS 0 (incomplete); right diagnostic MMG w/spot compression views   01.13.2022 Diagnostic R MMG w/Lloyd: BiRADS 1 (negative); repeat 1  yr  07.06.2022 HO eval, surveillance   01.03.2022 HO eval, surveillance  12.30.2022 MMG B w/Lloyd: BiRADS 2 (benign)  03.13.2023 INpt CTAP  -Bibasilar patchy alveolar infiltrates could represent pneumonia.  Minimal consolidation in the lingula and left lower lobe also.  Recommend follow-up.  -Hepatosplenomegaly.  -Non-obstructing nephrolithiasis of the left kidney.  -Few stable mildly enlarged retroperitoneal lymph nodes.  07.11.2023 HO eval, surveillance       5/21/24  She will need outpatient follow up         VTE Risk Mitigation (From admission, onward)           Ordered     IP VTE HIGH RISK PATIENT  Once         05/21/24 1547     Place sequential compression device  Until discontinued         05/21/24 1547                    Discharge Planning   JESSE: 5/23/2024     Code Status: Full Code   Is the patient medically ready for discharge?:     Reason for patient still in hospital (select all that apply): Other (specify) discharge today  Discharge Plan A: Home                  Saira Solitario PA-C  Department of Hospital Medicine   [place] [facility] Surg (3rd Fl)

## 2024-05-23 NOTE — SUBJECTIVE & OBJECTIVE
Past Medical History:   Diagnosis Date    Acute carpal tunnel syndrome of left wrist     Biliary colic     Diabetes mellitus     Encounter for blood transfusion     History of chemotherapy     Incisional hernia     Large cell (diffuse) non-Hodgkin's lymphoma     Stem cells transplant status        Past Surgical History:   Procedure Laterality Date    ARTHROSCOPY OF KNEE Bilateral 03/30/2023    Procedure: ARTHROSCOPY, KNEE;  Surgeon: Rudolph Murrell MD;  Location: [facility];  Service: Orthopedics;  Laterality: Bilateral;    BREAST BIOPSY Left     lymph node biopsy, benign    BREAST SURGERY      CHOLECYSTECTOMY      COLD KNIFE CONIZATION OF CERVIX N/A 03/08/2021    Procedure: CONE BIOPSY, CERVIX, USING COLD KNIFE;  Surgeon: Evelyn Wheeler MD;  Location: [facility] OR;  Service: OB/GYN;  Laterality: N/A;    COLONOSCOPY N/A 12/21/2022    Procedure: COLONOSCOPY;  Surgeon: Annamarie Jane MD;  Location: [facility];  Service: Endoscopy;  Laterality: N/A;    HAND ARTHROTOMY Right 03/30/2023    Procedure: OPEN  ARTHROTOMY WRIST;  Surgeon: Rudolph Murrell MD;  Location: [facility];  Service: Orthopedics;  Laterality: Right;    HERNIA REPAIR      incisional    LUNG BIOPSY      lymphnode biopsy      MEDIPORT REMOVAL Left 01/30/2020    Procedure: REMOVAL, CATHETER, CENTRAL VENOUS, TUNNELED, WITH PORT;  Surgeon: Luis Bogran-Reyes, MD;  Location: [facility];  Service: General;  Laterality: Left;    PORTACATH PLACEMENT Left     REVISION OF SCAR  06/22/2021    Procedure: REVISION, SCAR;  Surgeon: Otis Grimes MD;  Location: [address];  Service: General;;    SKIN BIOPSY      TUBAL LIGATION      UMBILICAL HERNIA REPAIR         Review of patient's allergies indicates:   Allergen Reactions    Tetanus vaccines and toxoid Rash       No current facility-administered medications on file prior to encounter.     Current Outpatient Medications on File Prior to Encounter   Medication Sig    acetaminophen (TYLENOL) 325 MG tablet Take  2 tablets (650 mg total) by mouth every 6 (six) hours as needed.    bisoprolol (ZEBETA) 5 MG tablet Take 5 mg by mouth once daily.    fluticasone (FLONASE) 50 mcg/actuation nasal spray 1 spray by Each Nare route once daily.    melatonin 10 mg Cap Take 10 mg by mouth nightly as needed.    montelukast (SINGULAIR) 10 mg tablet Take 10 mg by mouth once daily.    ondansetron (ZOFRAN) 4 MG tablet Take 4 mg by mouth every 8 (eight) hours as needed.    pantoprazole (PROTONIX) 40 MG tablet Take 40 mg by mouth once daily.    [DISCONTINUED] celecoxib (CELEBREX) 100 MG capsule Take 100 mg by mouth once daily.    [DISCONTINUED] celecoxib (CELEBREX) 200 MG capsule Take 200 mg by mouth daily as needed.    albuterol (PROVENTIL/VENTOLIN HFA) 90 mcg/actuation inhaler Ventolin HFA 90 mcg/actuation aerosol inhaler   INHALE 2 PUFFS BY MOUTH 3 TIMES DAILY AS NEEDED.     Family History       Problem Relation (Age of Onset)    Breast cancer Maternal Aunt, Maternal Aunt    Cancer Paternal Grandmother    Colon cancer Paternal Grandmother    Diabetes Mother, Father    Heart block Mother    Heart failure Father    Hypertension Mother, Father    Kidney disease Mother    Lung cancer Father          Tobacco Use    Smoking status: Never    Smokeless tobacco: Never   Substance and Sexual Activity    Alcohol use: Not Currently    Drug use: No    Sexual activity: Yes     Partners: Male     Birth control/protection: See Surgical Hx     Comment: with one partner for 24 years     Review of Systems   Constitutional:  Negative for appetite change, chills, fatigue and fever.   HENT:  Negative for congestion, ear discharge, ear pain, rhinorrhea, sinus pressure and sore throat.    Respiratory:  Negative for cough, choking, chest tightness, shortness of breath (resolved) and wheezing.    Cardiovascular:  Negative for chest pain and palpitations.   Gastrointestinal:  Negative for constipation, diarrhea, nausea and vomiting.   Musculoskeletal:  Negative for  "joint swelling and myalgias.   Skin:  Negative for rash and wound.   Neurological:  Negative for dizziness, syncope, weakness, light-headedness and numbness.     Objective:     Vital Signs (Most Recent):  Temp: 98.2 °F (36.8 °C) (05/23/24 1214)  Pulse: 95 (05/23/24 1404)  Resp: 20 (05/23/24 1214)  BP: 130/64 (05/23/24 1214)  SpO2: 97 % (05/23/24 1214) Vital Signs (24h Range):  Temp:  [97.7 °F (36.5 °C)-98.2 °F (36.8 °C)] 98.2 °F (36.8 °C)  Pulse:  [80-95] 95  Resp:  [18-20] 20  SpO2:  [95 %-100 %] 97 %  BP: (113-130)/(58-68) 130/64     Weight: 109.5 kg (241 lb 6.5 oz)  Body mass index is 37.81 kg/m².     Physical Exam  Vitals and nursing note reviewed.   Constitutional:       Appearance: She is well-developed.   HENT:      Head: Normocephalic and atraumatic.      Right Ear: External ear normal.      Left Ear: External ear normal.      Nose: Nose normal.   Eyes:      Extraocular Movements: Extraocular movements intact.      Conjunctiva/sclera: Conjunctivae normal.      Pupils: Pupils are equal, round, and reactive to light.   Cardiovascular:      Rate and Rhythm: Normal rate and regular rhythm.      Heart sounds: Normal heart sounds.   Pulmonary:      Effort: Pulmonary effort is normal. No respiratory distress.      Breath sounds: No wheezing, rhonchi or rales.   Abdominal:      General: Bowel sounds are normal.      Palpations: Abdomen is soft.   Skin:     General: Skin is warm and dry.   Neurological:      General: No focal deficit present.      Mental Status: She is alert and oriented to person, place, and time.   Psychiatric:         Mood and Affect: Mood normal.         Behavior: Behavior normal.              CRANIAL NERVES     CN III, IV, VI   Pupils are equal, round, and reactive to light.       Significant Labs: All pertinent labs within the past 24 hours have been reviewed.  ABGs: No results for input(s): "PH", "PCO2", "HCO3", "POCSATURATED", "BE", "TOTALHB", "COHB", "METHB", "O2HB", "POCFIO2", "PO2" in the " "last 48 hours.  CBC:   Recent Labs   Lab 05/22/24  0413 05/23/24  0316   WBC 8.26 4.91   HGB 8.8* 8.7*   HCT 28.1* 27.5*   PLT 81* 83*     CMP:   Recent Labs   Lab 05/22/24  0413 05/23/24  0316    140  141   K 3.5 3.7  3.7    109  109   CO2 23 21*  21*   * 128*  129*   BUN 15 16  15   CREATININE 0.7 0.7  0.7   CALCIUM 9.3 9.3  9.4   PROT 5.2* 5.3*   ALBUMIN 2.7* 2.8*   BILITOT 0.8 0.5   ALKPHOS 121 105   AST 8* 6*   ALT 32 23   ANIONGAP 9 10  11     Lactic Acid:   No results for input(s): "LACTATE" in the last 48 hours.    Troponin:   No results for input(s): "TROPONINI", "TROPONINIHS" in the last 48 hours.    Urine Culture: No results for input(s): "LABURIN" in the last 48 hours.  Urine Studies:   No results for input(s): "COLORU", "APPEARANCEUA", "PHUR", "SPECGRAV", "PROTEINUA", "GLUCUA", "KETONESU", "BILIRUBINUA", "OCCULTUA", "NITRITE", "UROBILINOGEN", "LEUKOCYTESUR", "RBCUA", "WBCUA", "BACTERIA", "SQUAMEPITHEL", "HYALINECASTS" in the last 48 hours.    Invalid input(s): "WRIGHTSUR"      Significant Imaging: I have reviewed all pertinent imaging results/findings within the past 24 hours.  CT: I have reviewed all pertinent results/findings within the past 24 hours and my personal findings are:  see below  CXR: I have reviewed all pertinent results/findings within the past 24 hours and my personal findings are:  see below  Impression:     1. Multifocal pneumonia, greatest in the left lower lobe.  2. Multiple enlarged mediastinal and hilar lymph nodes, consistent with history of non-Hodgkin's lymphoma.  These are new from the prior CT of 12/15/2021, consistent with disease recurrence.  3. Splenomegaly appears stable compared to abdomen pelvis CT 03/13/2023.        CXR   Frontal portable chest demonstrates heterogeneous opacities in the mid and lower lung zones bilaterally.  No pleural fluid.  Cardiomediastinal silhouette is unremarkable.  No osseous abnormality.     Impression:   "   Bilateral pneumonia.

## 2024-05-23 NOTE — ASSESSMENT & PLAN NOTE
"Patient has a diagnosis of pneumonia. The cause of the pneumonia is unknown at this time. The pneumonia is worsening due to cough /fever  . The patient has the following signs/symptoms of pneumonia: cough, sputum production, shortness of breath, and chest pain.   Current antimicrobial regimen consists of the antibiotics listed below. Will monitor patient closely and continue current treatment plan unchanged.    Antibiotics (From admission, onward)    Start     Stop Route Frequency Ordered    05/22/24 1245  levoFLOXacin 750 mg/150 mL IVPB 750 mg         -- IV Every 24 hours (non-standard times) 05/21/24 1548    05/22/24 0900  mupirocin 2 % ointment         05/27/24 0859 Nasl 2 times daily 05/22/24 0709    05/22/24 0400  vancomycin 1,500 mg in dextrose 5 % (D5W) 250 mL IVPB (Vial-Mate)         -- IV Every 12 hours (non-standard times) 05/21/24 1557    05/21/24 1626  vancomycin - pharmacy to dose  (vancomycin IVPB (PEDS and ADULTS))        Placed in "And" Linked Group    -- IV pharmacy to manage frequency 05/21/24 1526          Microbiology Results (last 7 days)     Procedure Component Value Units Date/Time    Culture, Respiratory with Gram Stain [[id number]] Collected: 05/21/24 2308    Order Status: Completed Specimen: Sputum, Expectorated Updated: 05/23/24 1417     Respiratory Culture Normal respiratory emmett     Gram Stain (Respiratory) <10 epithelial cells per low power field.     Gram Stain (Respiratory) Few WBC's     Gram Stain (Respiratory) Rare Gram positive cocci    Blood culture #2 **CANNOT BE ORDERED STAT** [[id number]] Collected: 05/21/24 1109    Order Status: Completed Specimen: Blood from Peripheral, Wrist, Right Updated: 05/22/24 1612     Blood Culture, Routine No Growth to date      No Growth to date    Blood culture #1 **CANNOT BE ORDERED STAT** [[id number]] Collected: 05/21/24 1102    Order Status: Completed Specimen: Blood from Peripheral, Antecubital, Right Updated: 05/22/24 1612     Blood Culture, " Routine No Growth to date      No Growth to date    Influenza A & B by Molecular [[id number]] Collected: 05/21/24 1036    Order Status: Completed Specimen: Nasopharyngeal Swab Updated: 05/21/24 1108     Influenza A, Molecular Negative     Influenza B, Molecular Negative     Flu A & B Source NP          CT chest with multifocal pneumonia and will discharge with po levofloxacin

## 2024-05-23 NOTE — ASSESSMENT & PLAN NOTE
Patient was found to have thrombocytopenia, the likely etiology is secondary to sepsis/infection, will monitor the platelets Daily. Will transfuse if platelet count is <50k (if undergoing surgical procedure or have active bleeding). Hold DVT prophylaxis if platelets are <50k. The patient's platelet results have been reviewed and are listed below.  Recent Labs   Lab 05/23/24  0316   PLT 83*

## 2024-05-23 NOTE — PLAN OF CARE
Problem: Adult Inpatient Plan of Care  Goal: Plan of Care Review  Outcome: Progressing     Problem: Diabetes Comorbidity  Goal: Blood Glucose Level Within Targeted Range  Outcome: Progressing     Problem: Sepsis/Septic Shock  Goal: Optimal Coping  Outcome: Progressing     Problem: Pneumonia  Goal: Fluid Balance  Outcome: Progressing     Problem: Skin Injury Risk Increased  Goal: Skin Health and Integrity  Outcome: Progressing     Problem: Fall Injury Risk  Goal: Absence of Fall and Fall-Related Injury  Outcome: Progressing

## 2024-05-23 NOTE — ASSESSMENT & PLAN NOTE
"This patient does have evidence of infective focus  My overall impression is sepsis.  Source: Respiratory  Antibiotics given-   Antibiotics (72h ago, onward)      Start     Stop Route Frequency Ordered    05/22/24 1245  levoFLOXacin 750 mg/150 mL IVPB 750 mg         -- IV Every 24 hours (non-standard times) 05/21/24 1548    05/22/24 0900  mupirocin 2 % ointment         05/27/24 0859 Nasl 2 times daily 05/22/24 0709    05/22/24 0400  vancomycin 1,500 mg in dextrose 5 % (D5W) 250 mL IVPB (Vial-Mate)         -- IV Every 12 hours (non-standard times) 05/21/24 1557    05/21/24 1626  vancomycin - pharmacy to dose  (vancomycin IVPB (PEDS and ADULTS))        Placed in "And" Linked Group    -- IV pharmacy to manage frequency 05/21/24 1526          Latest lactate reviewed-  Recent Labs   Lab 05/21/24  1423   LACTATE 1.2       Organ dysfunction indicated by Acute kidney injury and Thrombocytopenia     Fluid challenge Actual Body weight- Patient will receive 30ml/kg actual body weight to calculate fluid bolus for treatment of septic shock.     Blood cultures NGTD  Discharge with course of abx   "

## 2024-05-23 NOTE — ASSESSMENT & PLAN NOTE
Chronic, controlled. Latest blood pressure and vitals reviewed-     Temp:  [97.7 °F (36.5 °C)-98.2 °F (36.8 °C)]   Pulse:  [80-95]   Resp:  [18-20]   BP: (113-130)/(58-68)   SpO2:  [95 %-100 %] .   Home meds for hypertension were reviewed and noted below.   Hypertension Medications               bisoprolol (ZEBETA) 5 MG tablet Take 5 mg by mouth once daily.            While in the hospital, will manage blood pressure as follows; Continue home antihypertensive regimen    Betablocker added; ziac not available in pharmacy : metoprolol

## 2024-05-23 NOTE — PLAN OF CARE
Care team reviewed plan to discharge today with patient.  Patient / family VU of information discussed.

## 2024-05-23 NOTE — ASSESSMENT & PLAN NOTE
Dx: Stage IV DLBCL recurrence 2011 s/p salvage chemo and autoSCT     Onc Hx:  <Hx obtained from Dr. Mora's note dated 11.25.2019>  Patient was diagnosed with stage IV DLBCL recurrence in 2011, then treated with salvage chemotherapy and auto-HSCT.      Since March 2012, she has been told that she likely has recurrent disease based on scans showing enlarged retroperitoneal nodes and splenomegaly, but she has remained clinically well and asymptomatic all that time. Biopsy attempted in past was non-diagnostic.       CAT scan late last year showed several sub-cm pulmonary nodules, some retroperitoneal nodes, and some splenomegaly, little to no change over several months.      She was referred to pulmonary; bronch done and no evidence of lymphoma was seen. Lymphocytes appeared to be reactive in nature. She was given empiric abx for presumed infection, repeat CAT scan showed improvement in nodularity; no change to other lesions.        11.25.2019 HO eval  01.30.2020 Port removal  05.20.2020 HO eval - telehealth  11.17.2020 HO eval  06.21.2021 - 06.26.2021 INpatient stay for PNA, Bacteremia & Obstructed Incarcerated Hernia Repair  06.30.2021 HO eval, surveillance   12.15.2021 CT CAP  -Interval resolution of the left-sided pleural effusions since the prior CT scan of the chest from [facility]ail 10, 2021.  -Decrease in the airspace consolidation in the lungs bilaterally with persistent noncalcified nodules in the lungs bilaterally.  -Splenomegaly.  -Persistent mildly prominent lymph nodes in the retroperitoneum and enlarged lymph nodes in the right paratracheal and subcarinal regions.  -Postoperative changes in the midline of the anterior abdominal and pelvic wall.  -Possible hemangioma in the right lobe of the liver. This could be further assessed with an MRI examination of the abdomen with and without intravenous contrast to rule out alternative etiologies.  12.29.2021 HO eval, surveillance   Screening MMG: Left Normal. R  breast asymmetry at retroareolar anterior position: BiRADS 0 (incomplete); right diagnostic MMG w/spot compression views   01.13.2022 Diagnostic R MMG w/Lloyd: BiRADS 1 (negative); repeat 1 yr  07.06.2022 HO eval, surveillance   01.03.2022 HO eval, surveillance  12.30.2022 MMG B w/Lloyd: BiRADS 2 (benign)  03.13.2023 INpt CTAP  -Bibasilar patchy alveolar infiltrates could represent pneumonia.  Minimal consolidation in the lingula and left lower lobe also.  Recommend follow-up.  -Hepatosplenomegaly.  -Non-obstructing nephrolithiasis of the left kidney.  -Few stable mildly enlarged retroperitoneal lymph nodes.  07.11.2023 HO eval, surveillance       5/21/24  She will need outpatient follow up

## 2024-05-23 NOTE — ASSESSMENT & PLAN NOTE
Patient's FSGs are controlled on current medication regimen.  Last A1c reviewed-   Lab Results   Component Value Date    HGBA1C 5.5 05/21/2024     Most recent fingerstick glucose reviewed-   Recent Labs   Lab 05/22/24  1613 05/22/24  2104 05/23/24  0721 05/23/24  1215   POCTGLUCOSE 94 112* 115* 94       Current correctional scale  Medium  Maintain anti-hyperglycemic dose as follows-   Antihyperglycemics (From admission, onward)    Start     Stop Route Frequency Ordered    05/21/24 1648  insulin aspart U-100 pen 0-10 Units         -- SubQ Before meals & nightly PRN 05/21/24 1548        Hold Oral hypoglycemics while patient is in the hospital.

## 2024-05-23 NOTE — ASSESSMENT & PLAN NOTE
"Patient has a diagnosis of pneumonia. The cause of the pneumonia is unknown at this time. The pneumonia is worsening due to cough /fever  . The patient has the following signs/symptoms of pneumonia: cough, sputum production, shortness of breath, and chest pain.   Current antimicrobial regimen consists of the antibiotics listed below. Will monitor patient closely and continue current treatment plan unchanged.    Antibiotics (From admission, onward)      Start     Stop Route Frequency Ordered    05/22/24 1245  levoFLOXacin 750 mg/150 mL IVPB 750 mg         -- IV Every 24 hours (non-standard times) 05/21/24 1548    05/22/24 0900  mupirocin 2 % ointment         05/27/24 0859 Nasl 2 times daily 05/22/24 0709    05/22/24 0400  vancomycin 1,500 mg in dextrose 5 % (D5W) 250 mL IVPB (Vial-Mate)         -- IV Every 12 hours (non-standard times) 05/21/24 1557    05/21/24 1626  vancomycin - pharmacy to dose  (vancomycin IVPB (PEDS and ADULTS))        Placed in "And" Linked Group    -- IV pharmacy to manage frequency 05/21/24 1526            Microbiology Results (last 7 days)       Procedure Component Value Units Date/Time    Blood culture #2 **CANNOT BE ORDERED STAT** [[id number]] Collected: 05/21/24 1109    Order Status: Completed Specimen: Blood from Peripheral, Wrist, Right Updated: 05/22/24 1612     Blood Culture, Routine No Growth to date      No Growth to date    Blood culture #1 **CANNOT BE ORDERED STAT** [[id number]] Collected: 05/21/24 1102    Order Status: Completed Specimen: Blood from Peripheral, Antecubital, Right Updated: 05/22/24 1612     Blood Culture, Routine No Growth to date      No Growth to date    Culture, Respiratory with Gram Stain [[id number]] Collected: 05/21/24 2308    Order Status: Completed Specimen: Sputum, Expectorated Updated: 05/22/24 1202     Gram Stain (Respiratory) <10 epithelial cells per low power field.     Gram Stain (Respiratory) Few WBC's     Gram Stain (Respiratory) Rare Gram positive " cocci    Influenza A & B by Molecular [[id number]] Collected: 05/21/24 1036    Order Status: Completed Specimen: Nasopharyngeal Swab Updated: 05/21/24 1108     Influenza A, Molecular Negative     Influenza B, Molecular Negative     Flu A & B Source NP            CT chest with multifocal pneumonia and will discharge with po levofloxacin

## 2024-05-23 NOTE — DISCHARGE SUMMARY
[facility] ([facility])  [facility] Medicine  Discharge Summary      Patient Name: Hedy Conway  MRN: [id number]  [facility]: [id number]  Patient Class: IP- Inpatient  Admission Date: 5/21/2024  Hospital Length of Stay: 1 days  Discharge Date and Time:  05/23/2024 2:01 PM  Attending Physician: Uche Blum MD   Discharging Provider: Saira Solitario PA-C  Primary Care Provider: John Lantigua PA-C    Primary Care Team: Networked reference to record PCT     HPI:     Hedy Conway is a 49 y.o. female   with a history of carpal tunnel  biliary colic diabetes non-Hodgkin's lymphoma presents to be evaluated for cough and fever that began yesterday.  Patient reports T-max of 103°.  Also reports productive cough with clear sputum she reports an occasional blood streaks in her sputum denies any elida  hemoptysis.  Reports she has been taking ibuprofen and Tylenol which does improve or fever.  Denies shortness a breath or chest pain.  Does report some left shoulder pain and back pain.  Unsure of any recent contacts.  Denies any urinary complaints.      Work up in ER showed ;high white count : high lactate : bilateral pneumonia .  She is being admitted for IV antibiotics   Oncology history :  Dx: Stage IV DLBCL recurrence 2011 s/p salvage chemo and autoSCT   Onc Hx:  <Hx obtained from Dr. Mora's note dated 11.25.2019>  Patient was diagnosed with stage IV DLBCL recurrence in 2011, then treated with salvage chemotherapy and auto-HSCT.      Since March 2012, she has been told that she likely has recurrent disease based on scans showing enlarged retroperitoneal nodes and splenomegaly, but she has remained clinically well and asymptomatic all that time. Biopsy attempted in past was non-diagnostic.       CAT scan late last year showed several sub-cm pulmonary nodules, some retroperitoneal nodes, and some splenomegaly, little to no change over several months.      She was referred to pulmonary; bronch done and no  "evidence of lymphoma was seen. Lymphocytes appeared to be reactive in nature. She was given empiric abx for presumed infection, repeat CAT scan showed improvement in nodularity; no change to other lesions.       * No surgery found *      Hospital Course:   5/22/24: no acute events overnight. Remains stable on RA. Some blood tinged sputum, drop in H&H. No new complaints.     5/23 PT on room air with good lung auscultation.  Will discharge with po levofloxacin.  F/u with PCP.       Goals of Care Treatment Preferences:  Code Status: Full Code      Consults:   Consults (From admission, onward)          Status Ordering Provider     Pharmacy to dose Vancomycin consult  Once        Provider:  (Not yet assigned)   Placed in "And" Linked Group    Acknowledged COLT HALEY            Pulmonary  Pneumonia of both lungs due to infectious organism  Patient has a diagnosis of pneumonia. The cause of the pneumonia is unknown at this time. The pneumonia is worsening due to cough /fever  . The patient has the following signs/symptoms of pneumonia: cough, sputum production, shortness of breath, and chest pain.   Current antimicrobial regimen consists of the antibiotics listed below. Will monitor patient closely and continue current treatment plan unchanged.    Antibiotics (From admission, onward)      Start     Stop Route Frequency Ordered    05/22/24 1245  levoFLOXacin 750 mg/150 mL IVPB 750 mg         -- IV Every 24 hours (non-standard times) 05/21/24 1548    05/22/24 0900  mupirocin 2 % ointment         05/27/24 0859 Nasl 2 times daily 05/22/24 0709    05/22/24 0400  vancomycin 1,500 mg in dextrose 5 % (D5W) 250 mL IVPB (Vial-Mate)         -- IV Every 12 hours (non-standard times) 05/21/24 1557    05/21/24 1626  vancomycin - pharmacy to dose  (vancomycin IVPB (PEDS and ADULTS))        Placed in "And" Linked Group    -- IV pharmacy to manage frequency 05/21/24 1526            Microbiology Results (last 7 days)       Procedure " Component Value Units Date/Time    Blood culture #2 **CANNOT BE ORDERED STAT** [[id number]] Collected: 05/21/24 1109    Order Status: Completed Specimen: Blood from Peripheral, Wrist, Right Updated: 05/22/24 1612     Blood Culture, Routine No Growth to date      No Growth to date    Blood culture #1 **CANNOT BE ORDERED STAT** [[id number]] Collected: 05/21/24 1102    Order Status: Completed Specimen: Blood from Peripheral, Antecubital, Right Updated: 05/22/24 1612     Blood Culture, Routine No Growth to date      No Growth to date    Culture, Respiratory with Gram Stain [[id number]] Collected: 05/21/24 2308    Order Status: Completed Specimen: Sputum, Expectorated Updated: 05/22/24 1202     Gram Stain (Respiratory) <10 epithelial cells per low power field.     Gram Stain (Respiratory) Few WBC's     Gram Stain (Respiratory) Rare Gram positive cocci    Influenza A & B by Molecular [[id number]] Collected: 05/21/24 1036    Order Status: Completed Specimen: Nasopharyngeal Swab Updated: 05/21/24 1108     Influenza A, Molecular Negative     Influenza B, Molecular Negative     Flu A & B Source NP            CT chest with multifocal pneumonia and will discharge with po levofloxacin    Cardiac/Vascular  Essential hypertension  Chronic, controlled. Latest blood pressure and vitals reviewed-     Temp:  [97.7 °F (36.5 °C)-98.2 °F (36.8 °C)]   Pulse:  [80-93]   Resp:  [18-20]   BP: (113-130)/(58-68)   SpO2:  [95 %-100 %] .   Home meds for hypertension were reviewed and noted below.   Hypertension Medications               bisoprolol (ZEBETA) 5 MG tablet Take 5 mg by mouth once daily.            While in the hospital, will manage blood pressure as follows; Continue home antihypertensive regimen    Betablocker added; ziac not available in pharmacy : metoprolol        ID  * Sepsis due to pneumonia  This patient does have evidence of infective focus  My overall impression is sepsis.  Source: Respiratory  Antibiotics given-  "  Antibiotics (72h ago, onward)      Start     Stop Route Frequency Ordered    05/22/24 1245  levoFLOXacin 750 mg/150 mL IVPB 750 mg         -- IV Every 24 hours (non-standard times) 05/21/24 1548    05/22/24 0900  mupirocin 2 % ointment         05/27/24 0859 Nasl 2 times daily 05/22/24 0709    05/22/24 0400  vancomycin 1,500 mg in dextrose 5 % (D5W) 250 mL IVPB (Vial-Mate)         -- IV Every 12 hours (non-standard times) 05/21/24 1557    05/21/24 1626  vancomycin - pharmacy to dose  (vancomycin IVPB (PEDS and ADULTS))        Placed in "And" Linked Group    -- IV pharmacy to manage frequency 05/21/24 1526          Latest lactate reviewed-  Recent Labs   Lab 05/21/24  1423   LACTATE 1.2       Organ dysfunction indicated by Acute kidney injury and Thrombocytopenia     Fluid challenge Actual Body weight- Patient will receive 30ml/kg actual body weight to calculate fluid bolus for treatment of septic shock.     Blood cultures NGTD  Discharge with course of abx     Oncology  History of Large cell (diffuse) non-Hodgkin's lymphoma    Dx: Stage IV DLBCL recurrence 2011 s/p salvage chemo and autoSCT     Onc Hx:  <Hx obtained from Dr. Mora's note dated 11.25.2019>  Patient was diagnosed with stage IV DLBCL recurrence in 2011, then treated with salvage chemotherapy and auto-HSCT.      Since March 2012, she has been told that she likely has recurrent disease based on scans showing enlarged retroperitoneal nodes and splenomegaly, but she has remained clinically well and asymptomatic all that time. Biopsy attempted in past was non-diagnostic.       CAT scan late last year showed several sub-cm pulmonary nodules, some retroperitoneal nodes, and some splenomegaly, little to no change over several months.      She was referred to pulmonary; bronch done and no evidence of lymphoma was seen. Lymphocytes appeared to be reactive in nature. She was given empiric abx for presumed infection, repeat CAT scan showed improvement in " nodularity; no change to other lesions.        11.25.2019 HO eval  01.30.2020 Port removal  05.20.2020 HO eval - telehealth  11.17.2020 HO eval  06.21.2021 - 06.26.2021 INpatient stay for PNA, Bacteremia & Obstructed Incarcerated Hernia Repair  06.30.2021 HO eval, surveillance   12.15.2021 CT CAP  -Interval resolution of the left-sided pleural effusions since the prior CT scan of the chest from Abigail 10, 2021.  -Decrease in the airspace consolidation in the lungs bilaterally with persistent noncalcified nodules in the lungs bilaterally.  -Splenomegaly.  -Persistent mildly prominent lymph nodes in the retroperitoneum and enlarged lymph nodes in the right paratracheal and subcarinal regions.  -Postoperative changes in the midline of the anterior abdominal and pelvic wall.  -Possible hemangioma in the right lobe of the liver. This could be further assessed with an MRI examination of the abdomen with and without intravenous contrast to rule out alternative etiologies.  12.29.2021 HO eval, surveillance   Screening MMG: Left Normal. R breast asymmetry at retroareolar anterior position: BiRADS 0 (incomplete); right diagnostic MMG w/spot compression views   01.13.2022 Diagnostic R MMG w/Lloyd: BiRADS 1 (negative); repeat 1 yr  07.06.2022 HO eval, surveillance   01.03.2022 HO eval, surveillance  12.30.2022 MMG B w/Lloyd: BiRADS 2 (benign)  03.13.2023 INpt CTAP  -Bibasilar patchy alveolar infiltrates could represent pneumonia.  Minimal consolidation in the lingula and left lower lobe also.  Recommend follow-up.  -Hepatosplenomegaly.  -Non-obstructing nephrolithiasis of the left kidney.  -Few stable mildly enlarged retroperitoneal lymph nodes.  07.11.2023 HO eval, surveillance       5/21/24  She will need outpatient follow up       Endocrine  Type 2 diabetes mellitus without complication, without long-term current use of insulin  Patient's FSGs are controlled on current medication regimen.  Last A1c reviewed-   Lab Results    Component Value Date    HGBA1C 5.5 05/21/2024     Most recent fingerstick glucose reviewed-   Recent Labs   Lab 05/22/24  1613 05/22/24  2104 05/23/24  0721 05/23/24  1215   POCTGLUCOSE 94 112* 115* 94       Current correctional scale  Medium  Maintain anti-hyperglycemic dose as follows-   Antihyperglycemics (From admission, onward)      Start     Stop Route Frequency Ordered    05/21/24 1648  insulin aspart U-100 pen 0-10 Units         -- SubQ Before meals & nightly PRN 05/21/24 1548          Hold Oral hypoglycemics while patient is in the hospital.    Other  Thrombocytopenia  Patient was found to have thrombocytopenia, the likely etiology is secondary to sepsis/infection, will monitor the platelets Daily. Will transfuse if platelet count is <50k (if undergoing surgical procedure or have active bleeding). Hold DVT prophylaxis if platelets are <50k. The patient's platelet results have been reviewed and are listed below.  Recent Labs   Lab 05/23/24  0316   PLT 83*             Final Active Diagnoses:    Diagnosis Date Noted POA    PRINCIPAL PROBLEM:  Sepsis due to pneumonia [J18.9, A41.9] 06/11/2021 Yes    Pneumonia of both lungs due to infectious organism [J18.9] 05/21/2024 Yes    Essential hypertension [I10] 06/05/2023 Yes    Type 2 diabetes mellitus without complication, without long-term current use of insulin [E11.9] 03/14/2023 Yes    Thrombocytopenia [D69.6] 10/25/2016 Yes    History of Large cell (diffuse) non-Hodgkin's lymphoma [C83.30] 06/28/2012 Yes      Problems Resolved During this Admission:       Discharged Condition: good    Disposition: Home or Self Care    Follow Up:   Follow-up Information       John Lantigua PA-C. Call in 1 week(s).    Specialty: Family Medicine  Why: call office to schedule follow up appointment as discussed  Contact information:  [address]  [facility]  [place] [address] [phone]                           Patient Instructions:   No discharge procedures on  file.    Significant Diagnostic Studies: see A&P     Pending Diagnostic Studies:       Procedure Component Value Units Date/Time    Ferritin [[id number]] Collected: 05/23/24 0315    Order Status: Sent Lab Status: In process Updated: 05/23/24 1252    Specimen: Blood            Medications:  Reconciled Home Medications:      Medication List        START taking these medications      levoFLOXacin 750 MG tablet  Commonly known as: LEVAQUIN  Take 1 tablet (750 mg total) by mouth once daily. for 3 days  Start taking on: May 24, 2024            CHANGE how you take these medications      celecoxib 100 MG capsule  Commonly known as: CeleBREX  Take 1 capsule (100 mg total) by mouth daily as needed for Pain.  What changed:   when to take this  reasons to take this  Another medication with the same name was removed. Continue taking this medication, and follow the directions you see here.            CONTINUE taking these medications      acetaminophen 325 MG tablet  Commonly known as: TYLENOL  Take 2 tablets (650 mg total) by mouth every 6 (six) hours as needed.     albuterol 90 mcg/actuation inhaler  Commonly known as: PROVENTIL/VENTOLIN HFA  Ventolin HFA 90 mcg/actuation aerosol inhaler   INHALE 2 PUFFS BY MOUTH 3 TIMES DAILY AS NEEDED.     bisoprolol 5 MG tablet  Commonly known as: ZEBETA  Take 5 mg by mouth once daily.     fluticasone propionate 50 mcg/actuation nasal spray  Commonly known as: FLONASE  1 spray by Each Nare route once daily.     melatonin 10 mg Cap  Take 10 mg by mouth nightly as needed.     montelukast 10 mg tablet  Commonly known as: SINGULAIR  Take 10 mg by mouth once daily.     ondansetron 4 MG tablet  Commonly known as: ZOFRAN  Take 4 mg by mouth every 8 (eight) hours as needed.     pantoprazole 40 MG tablet  Commonly known as: PROTONIX  Take 40 mg by mouth once daily.              Indwelling Lines/Drains at time of discharge:   Lines/Drains/Airways       Drain  Duration             Female External  Urinary Catheter w/ Suction 05/21/24 1759 1 day                    Time spent on the discharge of patient: 25 minutes         Saira Solitario PA-C  Department of Hospital Medicine  [place] [facility] ([facility])

## 2024-05-23 NOTE — ASSESSMENT & PLAN NOTE
"Patient has a diagnosis of pneumonia. The cause of the pneumonia is unknown at this time. The pneumonia is worsening due to cough /fever  . The patient has the following signs/symptoms of pneumonia: cough, sputum production, shortness of breath, and chest pain.   Current antimicrobial regimen consists of the antibiotics listed below. Will monitor patient closely and continue current treatment plan unchanged.    Antibiotics (From admission, onward)      Start     Stop Route Frequency Ordered    05/22/24 1245  levoFLOXacin 750 mg/150 mL IVPB 750 mg         -- IV Every 24 hours (non-standard times) 05/21/24 1548    05/22/24 0900  mupirocin 2 % ointment         05/27/24 0859 Nasl 2 times daily 05/22/24 0709    05/22/24 0400  vancomycin 1,500 mg in dextrose 5 % (D5W) 250 mL IVPB (Vial-Mate)         -- IV Every 12 hours (non-standard times) 05/21/24 1557    05/21/24 1626  vancomycin - pharmacy to dose  (vancomycin IVPB (PEDS and ADULTS))        Placed in "And" Linked Group    -- IV pharmacy to manage frequency 05/21/24 1526            Microbiology Results (last 7 days)       Procedure Component Value Units Date/Time    Blood culture #2 **CANNOT BE ORDERED STAT** [[id number]] Collected: 05/21/24 1109    Order Status: Completed Specimen: Blood from Peripheral, Wrist, Right Updated: 05/22/24 1612     Blood Culture, Routine No Growth to date      No Growth to date    Blood culture #1 **CANNOT BE ORDERED STAT** [[id number]] Collected: 05/21/24 1102    Order Status: Completed Specimen: Blood from Peripheral, Antecubital, Right Updated: 05/22/24 1612     Blood Culture, Routine No Growth to date      No Growth to date    Culture, Respiratory with Gram Stain [[id number]] Collected: 05/21/24 2308    Order Status: Completed Specimen: Sputum, Expectorated Updated: 05/22/24 1202     Gram Stain (Respiratory) <10 epithelial cells per low power field.     Gram Stain (Respiratory) Few WBC's     Gram Stain (Respiratory) Rare Gram positive " cocci    Influenza A & B by Molecular [[id number]] Collected: 05/21/24 1036    Order Status: Completed Specimen: Nasopharyngeal Swab Updated: 05/21/24 1108     Influenza A, Molecular Negative     Influenza B, Molecular Negative     Flu A & B Source NP            CT chest with multifocal pneumonia

## 2024-05-23 NOTE — PROGRESS NOTES
Pharmacokinetic Assessment Follow Up: IV Vancomycin    Vancomycin serum concentration assessment(s):    The trough level was drawn correctly and can be used to guide therapy at this time. The measurement is within the desired definitive target range of 10 to 20 mcg/mL.    Vancomycin Regimen Plan:    Continue regimen to Vancomycin 1500 mg IV every 12 hours with next serum trough concentration measured at 15:00 prior to 4th dose on 5/24    Drug levels (last 3 results):  Recent Labs   Lab Result Units 05/23/24  0315   Vancomycin-Trough ug/mL 16.7       Pharmacy will continue to follow and monitor vancomycin.    Please contact pharmacy for questions regarding this assessment.    Thank you for the consult,   Odell Mixon       Patient brief summary:  Hedy Conway is a 49 y.o. female initiated on antimicrobial therapy with IV Vancomycin for treatment of lower respiratory infection      Drug Allergies:   Review of patient's allergies indicates:   Allergen Reactions    Tetanus vaccines and toxoid Rash       Actual Body Weight:   109.5kg    Renal Function:   Estimated Creatinine Clearance: 122.6 mL/min (based on SCr of 0.7 mg/dL).,     Dialysis Method (if applicable):  N/A    CBC (last 72 hours):  Recent Labs   Lab Result Units 05/21/24  1101 05/21/24  1920 05/22/24  0413 05/23/24  0316   WBC K/uL 14.88*  --  8.26 4.91   Hemoglobin g/dL 10.9*  --  8.8* 8.7*   Hemoglobin A1C %  --  5.5  --   --    Hematocrit % 33.8*  --  28.1* 27.5*   Platelets K/uL 94*  --  81* 83*   Gran % % 72.7  --  58.2 50.8   Lymph % % 17.9*  --  32.9 40.1   Mono % % 8.4  --  7.6 6.9   Eosinophil % % 0.1  --  0.5 1.4   Basophil % % 0.3  --  0.2 0.4   Differential Method  Automated  --  Automated Automated       Metabolic Panel (last 72 hours):  Recent Labs   Lab Result Units 05/21/24  1101 05/21/24  1241 05/22/24  0413 05/23/24  0316   Sodium mmol/L 136  --  140 140  141   Potassium mmol/L 3.9  --  3.5 3.7  3.7   Chloride mmol/L 104  --  108  109  109   CO2 mmol/L 19*  --  23 21*  21*   Glucose mg/dL 162*  --  126* 128*  129*   Glucose, UA   --  Negative  --   --    BUN mg/dL 21*  --  15 16  15   Creatinine mg/dL 1.0  --  0.7 0.7  0.7   Albumin g/dL 3.4*  --  2.7* 2.8*   Total Bilirubin mg/dL 1.3*  --  0.8 0.5   Alkaline Phosphatase U/L 160*  --  121 105   AST U/L 17  --  8* 6*   ALT U/L 56*  --  32 23       Vancomycin Administrations:  vancomycin given in the last 96 hours                     vancomycin 1,500 mg in dextrose 5 % (D5W) 250 mL IVPB (Vial-Mate) (mg) 1,500 mg New Bag 05/23/24 0341     1,500 mg New Bag 05/22/24 1644      Restarted  0524     1,500 mg New Bag  0415    vancomycin 1,250 mg in dextrose 5 % (D5W) 250 mL IVPB (Vial-Mate) (mg) 1,250 mg New Bag 05/21/24 1733    vancomycin 1,250 mg in dextrose 5 % (D5W) 250 mL IVPB (Vial-Mate) (mg) 1,250 mg New Bag 05/21/24 1555                    Microbiologic Results:  Microbiology Results (last 7 days)       Procedure Component Value Units Date/Time    Blood culture #2 **CANNOT BE ORDERED STAT** [[id number]] Collected: 05/21/24 1109    Order Status: Completed Specimen: Blood from Peripheral, Wrist, Right Updated: 05/22/24 1612     Blood Culture, Routine No Growth to date      No Growth to date    Blood culture #1 **CANNOT BE ORDERED STAT** [[id number]] Collected: 05/21/24 1102    Order Status: Completed Specimen: Blood from Peripheral, Antecubital, Right Updated: 05/22/24 1612     Blood Culture, Routine No Growth to date      No Growth to date    Culture, Respiratory with Gram Stain [[id number]] Collected: 05/21/24 2308    Order Status: Completed Specimen: Sputum, Expectorated Updated: 05/22/24 1202     Gram Stain (Respiratory) <10 epithelial cells per low power field.     Gram Stain (Respiratory) Few WBC's     Gram Stain (Respiratory) Rare Gram positive cocci    Influenza A & B by Molecular [[id number]] Collected: 05/21/24 1036    Order Status: Completed Specimen: Nasopharyngeal Swab Updated:  05/21/24 1108     Influenza A, Molecular Negative     Influenza B, Molecular Negative     Flu A & B Source NP

## 2024-05-24 LAB
BACTERIA SPEC AEROBE CULT: NORMAL
BACTERIA SPEC AEROBE CULT: NORMAL
GRAM STN SPEC: NORMAL

## 2024-05-24 NOTE — PLAN OF CARE
Problem: Adult Inpatient Plan of Care  Goal: Plan of Care Review  Outcome: Met     Problem: Diabetes Comorbidity  Goal: Blood Glucose Level Within Targeted Range  Outcome: Progressing     Problem: Sepsis/Septic Shock  Goal: Optimal Coping  Outcome: Progressing     Problem: Pneumonia  Goal: Fluid Balance  Outcome: Progressing     Problem: Skin Injury Risk Increased  Goal: Skin Health and Integrity  Outcome: Progressing     Problem: Fall Injury Risk  Goal: Absence of Fall and Fall-Related Injury  Outcome: Progressing

## 2024-05-24 NOTE — PLAN OF CARE
[facility] - Med Surg (3rd Fl)  Discharge Final Note    Primary Care Provider: John Lantigua PA-C    Expected Discharge Date: 5/23/2024    Final Discharge Note (most recent)       Final Note - 05/23/24 1305          Final Note    Assessment Type Final Discharge Note (P)      Anticipated Discharge Disposition Home or Self Care (P)      Hospital Resources/Appts/Education Provided Provided education on problems/symptoms using teachback;Patient refused appointment set-up (P)                      Important Message from Medicare             Contact Info       John Lantigua PA-C   Specialty: Family Medicine   Relationship: PCP - General    [address]  [facility]  [place] [address]   Phone: [phone]       Next Steps: Call in 1 week(s)    Instructions: call office to schedule follow up appointment as discussed          AVS completed and verbally reviewed with patient,  patient requested to self schedule follow up visit with pcp.

## 2024-05-26 LAB
BACTERIA BLD CULT: NORMAL
BACTERIA BLD CULT: NORMAL

## (undated) DEVICE — GOWN SURGICAL X-LARGE

## (undated) DEVICE — SUT 2-0 VICRYL / CT-1

## (undated) DEVICE — SUT PROLENE 4-0 SH BLU 36IN

## (undated) DEVICE — CORD FOR BIPOLAR FORCEPS 12

## (undated) DEVICE — TRAY MINOR GEN SURG

## (undated) DEVICE — BANDAGE ESMARK ELASTIC ST 6X9

## (undated) DEVICE — DRAPE PLASTIC U 60X72

## (undated) DEVICE — POSITIONER HEAD DONUT 9IN FOAM

## (undated) DEVICE — TRAY CATH UM FOLEY SIL W 16FR

## (undated) DEVICE — GAUZE SPONGE 4X4 12PLY

## (undated) DEVICE — SUT VICRYL BR 1 GEN 27 CT-1

## (undated) DEVICE — SUT SILK 2-0 STRANDS 30IN

## (undated) DEVICE — PAD CAST SPECIALIST STRL 3

## (undated) DEVICE — PACK DRAPE UNIVERSAL CONVERTOR

## (undated) DEVICE — CLOSURE SKIN STERI STRIP 1/2X4

## (undated) DEVICE — TAPE SILK 3IN

## (undated) DEVICE — SEE MEDLINE ITEM 156902

## (undated) DEVICE — SEE MEDLINE ITEM 157173

## (undated) DEVICE — CONTAINER SPECIMEN STRL 4OZ

## (undated) DEVICE — SYR 10CC LUER LOCK

## (undated) DEVICE — Device

## (undated) DEVICE — BLADE SURG CARBON STEEL SZ11

## (undated) DEVICE — BLADE 4 INCH EDGE UN-INS

## (undated) DEVICE — DRESSING TELFA STRL 4X3 LF

## (undated) DEVICE — GLOVE BIOGEL ORTHOPEDIC 7.5

## (undated) DEVICE — DRAPE INCISE IOBAN 2 23X17IN

## (undated) DEVICE — BNDG COFLEX FOAM LF2 ST 6X5YD

## (undated) DEVICE — CANISTER SUCTION 2 LTR

## (undated) DEVICE — SUT PROLENE 4-0 RB-1 BL MO

## (undated) DEVICE — PAD CAST SPECIALIST STRL 4

## (undated) DEVICE — GLOVE SURGICAL LATEX SZ 8

## (undated) DEVICE — SUT ETHILON 2-0 BLK PS-2

## (undated) DEVICE — DRESSING ADH ISLAND 3.6 X 14

## (undated) DEVICE — SOL NACL IRR 3000ML

## (undated) DEVICE — DRAIN CHEST DRY SUCTION

## (undated) DEVICE — SEE MEDLINE ITEM 157117

## (undated) DEVICE — ELECTRODE BLADE INSULATED 1 IN

## (undated) DEVICE — ELECTRODE REM PLYHSV RETURN 9

## (undated) DEVICE — UNDERGLOVES BIOGEL PI SIZE 8

## (undated) DEVICE — BANDAGE ELASTIC ACE 2IN 10/CA

## (undated) DEVICE — NDL 18GA X1 1/2 REG BEVEL

## (undated) DEVICE — SEE MEDLINE ITEM 146417

## (undated) DEVICE — SPONGE LAP 18X18 PREWASHED

## (undated) DEVICE — SEE MEDLINE ITEM 157110

## (undated) DEVICE — SUT 2 27IN COATED VICRYL V

## (undated) DEVICE — CLIP MED TICALL

## (undated) DEVICE — CATH THORACIC 24FR ST

## (undated) DEVICE — SEE MEDLINE ITEM 107746

## (undated) DEVICE — TAPE MEDIPORE 4IN X 2YDS

## (undated) DEVICE — SEE MEDLINE ITEM 152487

## (undated) DEVICE — SUT LIGACLIP SMALL XTRA

## (undated) DEVICE — TOWEL OR XRAY WHITE 17X26IN

## (undated) DEVICE — SUT MONOCYRL 4-0 PS2 UND

## (undated) DEVICE — BANDAGE RUBBER ELAS STD 3X5YD

## (undated) DEVICE — TOWEL OR DISP STRL BLUE 4/PK

## (undated) DEVICE — STOCKINET 4INX48

## (undated) DEVICE — NDL BOX COUNTER

## (undated) DEVICE — SUT 1 48IN PDS II VIO MONO

## (undated) DEVICE — SUT MONOCRYL 4-0 PS-2

## (undated) DEVICE — SUT ETHILON 4-0 BLK MONO

## (undated) DEVICE — TUBE SET INFLOW/OUTFLOW

## (undated) DEVICE — TUBING SUC UNIV W/CONN 12FT

## (undated) DEVICE — SPONGE TONSIL LARGE

## (undated) DEVICE — STAPLER SKIN REGULAR

## (undated) DEVICE — ENDO GIA UNIVERSAL 12MM

## (undated) DEVICE — PAD PREP 50/CA

## (undated) DEVICE — RELOAD ENDO GIA TRISTAPLE 45MM

## (undated) DEVICE — BLADE ELECTRO EDGE INSULATED

## (undated) DEVICE — BLANKET UPPER BODY 78.7X29.9IN

## (undated) DEVICE — APPLICATOR CHLORAPREP ORN 26ML

## (undated) DEVICE — BANDAGE ELAS SOFTWRAP ST 6X5YD

## (undated) DEVICE — ELECTRODE EXTENDED BLADE

## (undated) DEVICE — WARMER DRAPE STERILE LF

## (undated) DEVICE — PAD CAST SPECIALIST STRL 6

## (undated) DEVICE — MAT QUICK 40X30 FLOOR FLUID LF

## (undated) DEVICE — SUT 1 36IN PDS II VIO MONO

## (undated) DEVICE — SEE MEDLINE ITEM 152622

## (undated) DEVICE — SYR ONLY LUER LOCK 20CC

## (undated) DEVICE — SUT CTD VICRYL CT-1 UND BR

## (undated) DEVICE — SEE MEDLINE ITEM 157144

## (undated) DEVICE — DRAPE ABDOMINAL TIBURON 14X11

## (undated) DEVICE — DRESSING GAUZE XEROFORM 5X9

## (undated) DEVICE — GAUZE SPONGE PEANUT STRL

## (undated) DEVICE — LINER GLOVE POWDERFREE SZ 7.5

## (undated) DEVICE — DRAIN CHAN RND HUBLS 8MM 24FR

## (undated) DEVICE — KIT ANTIFOG

## (undated) DEVICE — CANISTER SUCTION MEDI-VAC 12L

## (undated) DEVICE — PADDING CAST SOF-ROL 2X4YD

## (undated) DEVICE — SUT PROLENE 0 CT1 30IN BLUE

## (undated) DEVICE — TOURNIQUET SB QC DP 34X4IN

## (undated) DEVICE — SUT CTD VICRYL 0 UND BR CT

## (undated) DEVICE — NDL SPINAL 18GX3.5 SPINOCAN

## (undated) DEVICE — CLIPPER BLADE MOD 4406 (CAREF)

## (undated) DEVICE — SEE MEDLINE ITEM 146313

## (undated) DEVICE — STAPLER SKIN PROXIMATE WIDE

## (undated) DEVICE — SUT 2 30IN SILK BLK BRAIDE

## (undated) DEVICE — SUT 2/0 30IN SILK BLK BRAI

## (undated) DEVICE — SOL NACL IRR 1000ML BTL

## (undated) DEVICE — SEE MEDLINE ITEM 146420

## (undated) DEVICE — PACK ARTHROSCOPY W/ISO BAC